# Patient Record
Sex: MALE | Race: WHITE | NOT HISPANIC OR LATINO | Employment: OTHER | URBAN - METROPOLITAN AREA
[De-identification: names, ages, dates, MRNs, and addresses within clinical notes are randomized per-mention and may not be internally consistent; named-entity substitution may affect disease eponyms.]

---

## 2017-01-18 ENCOUNTER — TRANSFERRED RECORDS (OUTPATIENT)
Dept: HEALTH INFORMATION MANAGEMENT | Facility: CLINIC | Age: 77
End: 2017-01-18

## 2017-11-20 ENCOUNTER — TRANSFERRED RECORDS (OUTPATIENT)
Dept: HEALTH INFORMATION MANAGEMENT | Facility: CLINIC | Age: 77
End: 2017-11-20

## 2018-09-23 ENCOUNTER — HOSPITAL ENCOUNTER (EMERGENCY)
Facility: CLINIC | Age: 78
Discharge: HOME | End: 2018-09-23
Attending: EMERGENCY MEDICINE | Admitting: EMERGENCY MEDICINE
Payer: MEDICARE

## 2018-09-23 ENCOUNTER — APPOINTMENT (OUTPATIENT)
Dept: GENERAL RADIOLOGY | Facility: CLINIC | Age: 78
End: 2018-09-23
Attending: EMERGENCY MEDICINE
Payer: MEDICARE

## 2018-09-23 VITALS
SYSTOLIC BLOOD PRESSURE: 161 MMHG | WEIGHT: 185.6 LBS | RESPIRATION RATE: 18 BRPM | HEART RATE: 62 BPM | TEMPERATURE: 97.9 F | DIASTOLIC BLOOD PRESSURE: 82 MMHG | OXYGEN SATURATION: 95 %
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
## 2018-09-23 DIAGNOSIS — S62.521B OPEN DISPLACED FRACTURE OF DISTAL PHALANX OF RIGHT THUMB, INITIAL ENCOUNTER: ICD-10-CM

## 2018-09-23 PROCEDURE — 25000125 ZZHC RX 250

## 2018-09-23 PROCEDURE — 99285 EMERGENCY DEPT VISIT HI MDM: CPT | Mod: Z6 | Performed by: EMERGENCY MEDICINE

## 2018-09-23 PROCEDURE — 11760 REPAIR OF NAIL BED: CPT | Mod: F5 | Performed by: EMERGENCY MEDICINE

## 2018-09-23 PROCEDURE — 25000128 H RX IP 250 OP 636: Performed by: EMERGENCY MEDICINE

## 2018-09-23 PROCEDURE — 99284 EMERGENCY DEPT VISIT MOD MDM: CPT | Mod: 25 | Performed by: EMERGENCY MEDICINE

## 2018-09-23 PROCEDURE — 73140 X-RAY EXAM OF FINGER(S): CPT | Mod: RT

## 2018-09-23 PROCEDURE — 25000132 ZZH RX MED GY IP 250 OP 250 PS 637: Mod: GY | Performed by: EMERGENCY MEDICINE

## 2018-09-23 PROCEDURE — 90715 TDAP VACCINE 7 YRS/> IM: CPT | Performed by: EMERGENCY MEDICINE

## 2018-09-23 PROCEDURE — A9270 NON-COVERED ITEM OR SERVICE: HCPCS | Mod: GY | Performed by: EMERGENCY MEDICINE

## 2018-09-23 PROCEDURE — 90471 IMMUNIZATION ADMIN: CPT | Performed by: EMERGENCY MEDICINE

## 2018-09-23 RX ORDER — OXYCODONE HYDROCHLORIDE 5 MG/1
5 TABLET ORAL ONCE
Status: COMPLETED | OUTPATIENT
Start: 2018-09-23 | End: 2018-09-23

## 2018-09-23 RX ORDER — OXYCODONE HYDROCHLORIDE 5 MG/1
5 TABLET ORAL EVERY 6 HOURS PRN
Qty: 12 TABLET | Refills: 0 | Status: ON HOLD | OUTPATIENT
Start: 2018-09-23 | End: 2020-10-28

## 2018-09-23 RX ORDER — ACETAMINOPHEN 325 MG/1
650 TABLET ORAL EVERY 6 HOURS PRN
Qty: 100 TABLET | Refills: 0 | Status: SHIPPED | OUTPATIENT
Start: 2018-09-23 | End: 2021-10-26

## 2018-09-23 RX ORDER — LIDOCAINE HYDROCHLORIDE 10 MG/ML
10 INJECTION, SOLUTION INFILTRATION; PERINEURAL ONCE
Status: COMPLETED | OUTPATIENT
Start: 2018-09-23 | End: 2018-09-23

## 2018-09-23 RX ORDER — CIPROFLOXACIN 500 MG/1
500 TABLET, FILM COATED ORAL 2 TIMES DAILY
Qty: 14 TABLET | Refills: 0 | Status: SHIPPED | OUTPATIENT
Start: 2018-09-23 | End: 2018-09-30

## 2018-09-23 RX ORDER — CEFAZOLIN SODIUM 1 G/3ML
1 INJECTION, POWDER, FOR SOLUTION INTRAMUSCULAR; INTRAVENOUS ONCE
Status: COMPLETED | OUTPATIENT
Start: 2018-09-23 | End: 2018-09-23

## 2018-09-23 RX ORDER — LIDOCAINE HYDROCHLORIDE 10 MG/ML
INJECTION, SOLUTION EPIDURAL; INFILTRATION; INTRACAUDAL; PERINEURAL
Status: COMPLETED
Start: 2018-09-23 | End: 2018-09-23

## 2018-09-23 RX ORDER — PYRIDOXINE HCL (VITAMIN B6) 50 MG
TABLET ORAL
Status: ON HOLD | COMMUNITY
End: 2020-10-28

## 2018-09-23 RX ORDER — ACETAMINOPHEN 325 MG/1
650 TABLET ORAL EVERY 4 HOURS PRN
Status: DISCONTINUED | OUTPATIENT
Start: 2018-09-23 | End: 2018-09-23 | Stop reason: HOSPADM

## 2018-09-23 RX ADMIN — OXYCODONE HYDROCHLORIDE 5 MG: 5 TABLET ORAL at 14:37

## 2018-09-23 RX ADMIN — CEFAZOLIN SODIUM 1 G: 1 INJECTION, POWDER, FOR SOLUTION INTRAMUSCULAR; INTRAVENOUS at 14:45

## 2018-09-23 RX ADMIN — LIDOCAINE HYDROCHLORIDE 10 ML: 10 INJECTION, SOLUTION EPIDURAL; INFILTRATION; INTRACAUDAL; PERINEURAL at 16:31

## 2018-09-23 RX ADMIN — CLOSTRIDIUM TETANI TOXOID ANTIGEN (FORMALDEHYDE INACTIVATED), CORYNEBACTERIUM DIPHTHERIAE TOXOID ANTIGEN (FORMALDEHYDE INACTIVATED), BORDETELLA PERTUSSIS TOXOID ANTIGEN (GLUTARALDEHYDE INACTIVATED), BORDETELLA PERTUSSIS FILAMENTOUS HEMAGGLUTININ ANTIGEN (FORMALDEHYDE INACTIVATED), BORDETELLA PERTUSSIS PERTACTIN ANTIGEN, AND BORDETELLA PERTUSSIS FIMBRIAE 2/3 ANTIGEN 0.5 ML: 5; 2; 2.5; 5; 3; 5 INJECTION, SUSPENSION INTRAMUSCULAR at 14:39

## 2018-09-23 RX ADMIN — LIDOCAINE HYDROCHLORIDE 10 ML: 10 INJECTION, SOLUTION INFILTRATION; PERINEURAL at 16:31

## 2018-09-23 ASSESSMENT — ENCOUNTER SYMPTOMS: WOUND: 1

## 2018-09-23 NOTE — DISCHARGE INSTRUCTIONS
Take antibiotics as prescribed,  Tylenol every 6 hours as needed for pain, oxycodone every 6 hours as needed for breakthrough pain  Keep splint in place, dry, until evaluated by orthopedics  See orthopedics this coming week.  They should call you.  If they do not call Monday for appointment.  Return to the emergency department for worsening pain, numbness, bleeding, fever, pus, or if you have any new or changing symptoms/concerns.   Open Thumb Fracture  You have a broken (fractured) thumb. This causes local pain, swelling, and often bruising. This injury will usually take about 4 to 6 weeks or longer to heal. Thumb fractures may be treated with a splint or cast. This protects the thumb and holds the bone in place while it heals. More serious fractures may need surgery.     If the thumbnail has been severely injured, it may fall off in 1 to 2 weeks. A new thumbnail will usually start to grow back within a month.  Home care  Follow these guidelines when caring for yourself at home:    Keep your arm elevated to reduce pain and swelling. When sitting or lying down elevate your arm above the level of your heart. You can do this by placing your arm on a pillow that rests on your chest or on a pillow at your side. This is most important during the first 2 days (48 hours) after the injury.    Put an ice pack on the injured area. Do this for 20 minutes every 1 to 2 hours the first day for pain relief. You can make an ice pack by wrapping a plastic bag of ice cubes in a thin towel. As the ice melts, be careful that the cast or splint doesn t get wet. Continue using the ice pack 3 to 4 times a day for the next 2 days. Then use the ice pack as needed to ease pain and swelling.    If a splint was put on, leave this in place for the time advised. This will keep the bones from moving out of position.    Keep the cast or splint completely dry at all times. Bathe with your cast or splint out of the water. Protect it with a large  plastic bag, rubber-banded at the top end. If a fiberglass cast or splint gets wet, you can dry it with a hair dryer.    You may use acetaminophen or ibuprofen to control pain, unless another pain medicine was prescribed. If you have chronic liver or kidney disease, talk with your healthcare provider before using these medicines. Also talk with your provider if you ve had a stomach ulcer or gastrointestinal bleeding.    Don t put creams or objects under the cast if you have itching.  Follow-up care  Follow up with your healthcare provider in 1 week, or as advised. This is to make sure the bone is healing the way it should. Talk with your provider about when it is safe to return to sports or work.  X-rays may be taken. You will be told of any new findings that may affect your care.  When to seek medical advice  Call your healthcare provider right away if any of these occur:    The cast or splint cracks    The plaster cast or splint becomes wet or soft    The fiberglass cast or splint stays wet for more than 24 hours    Bad odor from the cast or wound fluid stains the cast    Pain or swelling gets worse    Redness or warmth in the hand    Fingers or hand become cold, blue, numb, or tingly    You can t move your hand or fingers    Skin around cast or splint becomes red    Fever of 100.4 F (38 C) or higher, or as directed by your healthcare provider  Date Last Reviewed: 2/1/2017 2000-2017 [employer]. [address] [place], [place] [id number]. All rights reserved. This information is not intended as a substitute for professional medical care. Always follow your healthcare professional's instructions.          Discharge Instructions: Caring for Your Splint  You will be going home with a splint. This is sometimes called a removable cast. A splint helps your body heal by holding your injured bones or joints in place. Take good care of your splint. A damaged splint can keep your injury from healing well.  If your splint becomes damaged or loses its shape, you may need to replace it.   You have a broken __distal phalanx_ bone.  This bone is located in your R thumb.   Home care    Wear your splint according to your doctor s instructions.    Keep the splint dry at all times. Bathe with your splint well out of the water. You can hold the splint outside the tub or shower when bathing. Protect it with a large plastic bag closed at the top end with a rubber band. Use two layers of plastic to help keep the splint dry. Or you can buy a waterproof shield.    If a splint gets wet, dry it with a hair dryer on the  cool  setting. Don t use the warm or hot setting, because those settings can burn your skin.    Always keep the splint clean and away from dirt.    Wash the Velcro straps and inner cloth sleeve (stockinet) with soapy water and air dry.    Keep your splint away from open flames.    Don t expose your splint to heat, space heaters, or prolonged sunlight. Excessive heat will cause the splint to change shape.    Don t cut or tear the splint.     Exercise all the nearby joints not kept still by the splint. If you have a long leg splint, exercise your hip joint and your toes. If you have an arm splint, exercise your shoulder, elbow, thumb, and fingers.    Elevate the part of your body that is in the splint. This helps reduce swelling.  Follow-up care  Make a follow-up appointment with your healthcare provider, or as advised.  When to call your healthcare provider  Call your healthcare provider right away if you have any of these:    Tingling or numbness in the affected area    Severe pain that cannot be relieved with medicine    Cast that feels too tight or too loose    Swelling, coldness, or blue-gray color in the fingers or toes    Cast that is damaged, cracked, or has rough edges that hurt    Pressure sores or red marks that don t go away within 1 hour after removing the splint    Blisters   Date Last Reviewed: 7/1/2016     1011-9756 [employer]. [address] [place] [address], [place] [address]. All rights reserved. This information is not intended as a substitute for professional medical care. Always follow your healthcare professional's instructions.

## 2018-09-23 NOTE — ED AVS SNAPSHOT
[facility], [place], Emergency Department    [address]    [facility] [address]    Phone:  [phone]    Fax:  [phone]                                       Melo Dangelo   MRN: [id number]    Department:  [facility], Emergency Department   Date of Visit:  9/23/2018           After Visit Summary Signature Page     I have received my discharge instructions, and my questions have been answered. I have discussed any challenges I see with this plan with the nurse or doctor.    ..........................................................................................................................................  Patient/Patient Representative Signature      ..........................................................................................................................................  Patient Representative Print Name and Relationship to Patient    ..................................................               ................................................  Date                                   Time    ..........................................................................................................................................  Reviewed by Signature/Title    ...................................................              ..............................................  Date                                               Time          22EPIC Rev 08/18

## 2018-09-23 NOTE — ED AVS SNAPSHOT
[facility], Emergency Department    [address]    [place] [address]    Phone:  [phone]    Fax:  [phone]                                       Melo Dangelo   MRN: [id number]    Department:  [facility], Emergency Department   Date of Visit:  9/23/2018           Patient Information     Date Of Birth          1940        Your diagnoses for this visit were:     Contact with powered saw as cause of accidental injury     Open displaced fracture of distal phalanx of right thumb, initial encounter        You were seen by Daphne Owen MD.      Follow-up Information     Follow up with [facility], Emergency Department.    Specialty:  EMERGENCY MEDICINE    Why:  As needed, If symptoms worsen    Contact information:    [address] [facility] [address] [phone]    Additional information:    The [facility] is located in the [facility]. lt is easily accessible from virtually any point in the [facility] area, via [address]        Follow up with [facility] In 2 days.    Contact information:    [address]  [address]  [facility] [address] [phone]        Discharge Instructions         Take antibiotics as prescribed,  Tylenol every 6 hours as needed for pain, oxycodone every 6 hours as needed for breakthrough pain  Keep splint in place, dry, until evaluated by orthopedics  See orthopedics this coming week.  They should call you.  If they do not call Monday for appointment.  Return to the emergency department for worsening pain, numbness, bleeding, fever, pus, or if you have any new or changing symptoms/concerns.   Open Thumb Fracture  You have a broken (fractured) thumb. This causes local pain, swelling, and often bruising. This injury will usually take about 4 to 6 weeks or longer to heal. Thumb fractures may be treated with a splint or cast. This protects the thumb and holds  the bone in place while it heals. More serious fractures may need surgery.     If the thumbnail has been severely injured, it may fall off in 1 to 2 weeks. A new thumbnail will usually start to grow back within a month.  Home care  Follow these guidelines when caring for yourself at home:    Keep your arm elevated to reduce pain and swelling. When sitting or lying down elevate your arm above the level of your heart. You can do this by placing your arm on a pillow that rests on your chest or on a pillow at your side. This is most important during the first 2 days (48 hours) after the injury.    Put an ice pack on the injured area. Do this for 20 minutes every 1 to 2 hours the first day for pain relief. You can make an ice pack by wrapping a plastic bag of ice cubes in a thin towel. As the ice melts, be careful that the cast or splint doesn t get wet. Continue using the ice pack 3 to 4 times a day for the next 2 days. Then use the ice pack as needed to ease pain and swelling.    If a splint was put on, leave this in place for the time advised. This will keep the bones from moving out of position.    Keep the cast or splint completely dry at all times. Bathe with your cast or splint out of the water. Protect it with a large plastic bag, rubber-banded at the top end. If a fiberglass cast or splint gets wet, you can dry it with a hair dryer.    You may use acetaminophen or ibuprofen to control pain, unless another pain medicine was prescribed. If you have chronic liver or kidney disease, talk with your healthcare provider before using these medicines. Also talk with your provider if you ve had a stomach ulcer or gastrointestinal bleeding.    Don t put creams or objects under the cast if you have itching.  Follow-up care  Follow up with your healthcare provider in 1 week, or as advised. This is to make sure the bone is healing the way it should. Talk with your provider about when it is safe to return to sports or  work.  X-rays may be taken. You will be told of any new findings that may affect your care.  When to seek medical advice  Call your healthcare provider right away if any of these occur:    The cast or splint cracks    The plaster cast or splint becomes wet or soft    The fiberglass cast or splint stays wet for more than 24 hours    Bad odor from the cast or wound fluid stains the cast    Pain or swelling gets worse    Redness or warmth in the hand    Fingers or hand become cold, blue, numb, or tingly    You can t move your hand or fingers    Skin around cast or splint becomes red    Fever of 100.4 F (38 C) or higher, or as directed by your healthcare provider  Date Last Reviewed: 2/1/2017 2000-2017 [employer]. [address] [place] [address]. All rights reserved. This information is not intended as a substitute for professional medical care. Always follow your healthcare professional's instructions.          Discharge Instructions: Caring for Your Splint  You will be going home with a splint. This is sometimes called a removable cast. A splint helps your body heal by holding your injured bones or joints in place. Take good care of your splint. A damaged splint can keep your injury from healing well. If your splint becomes damaged or loses its shape, you may need to replace it.   You have a broken __distal phalanx_ bone.  This bone is located in your R thumb.   Home care    Wear your splint according to your doctor s instructions.    Keep the splint dry at all times. Bathe with your splint well out of the water. You can hold the splint outside the tub or shower when bathing. Protect it with a large plastic bag closed at the top end with a rubber band. Use two layers of plastic to help keep the splint dry. Or you can buy a waterproof shield.    If a splint gets wet, dry it with a hair dryer on the  cool  setting. Don t use the warm or hot setting, because those settings can burn your  skin.    Always keep the splint clean and away from dirt.    Wash the Velcro straps and inner cloth sleeve (stockinet) with soapy water and air dry.    Keep your splint away from open flames.    Don t expose your splint to heat, space heaters, or prolonged sunlight. Excessive heat will cause the splint to change shape.    Don t cut or tear the splint.     Exercise all the nearby joints not kept still by the splint. If you have a long leg splint, exercise your hip joint and your toes. If you have an arm splint, exercise your shoulder, elbow, thumb, and fingers.    Elevate the part of your body that is in the splint. This helps reduce swelling.  Follow-up care  Make a follow-up appointment with your healthcare provider, or as advised.  When to call your healthcare provider  Call your healthcare provider right away if you have any of these:    Tingling or numbness in the affected area    Severe pain that cannot be relieved with medicine    Cast that feels too tight or too loose    Swelling, coldness, or blue-gray color in the fingers or toes    Cast that is damaged, cracked, or has rough edges that hurt    Pressure sores or red marks that don t go away within 1 hour after removing the splint    Blisters   Date Last Reviewed: 7/1/2016 2000-2017 [employer]. [address] [place] [address]. All rights reserved. This information is not intended as a substitute for professional medical care. Always follow your healthcare professional's instructions.          24 Hour Appointment Hotline       To make an appointment at any [facility], call [phone] ([phone]). If you don't have a family doctor or clinic, we will help you find one. [facility] clinics are conveniently located to serve the needs of you and your family.             Review of your medicines      START taking        Dose / Directions Last dose taken    acetaminophen 325 MG tablet   Commonly known as:  TYLENOL   Dose:  650  mg   Quantity:  100 tablet        Take 2 tablets (650 mg) by mouth every 6 hours as needed for mild pain   Refills:  0        ciprofloxacin 500 MG tablet   Commonly known as:  CIPRO   Dose:  500 mg   Quantity:  14 tablet        Take 1 tablet (500 mg) by mouth 2 times daily for 7 days   Refills:  0        oxyCODONE IR 5 MG tablet   Commonly known as:  ROXICODONE   Dose:  5 mg   Quantity:  12 tablet        Take 1 tablet (5 mg) by mouth every 6 hours as needed for pain   Refills:  0          Our records show that you are taking the medicines listed below. If these are incorrect, please call your family doctor or clinic.        Dose / Directions Last dose taken    B-12 100 MCG Tabs        Refills:  0        BUSPAR PO        Refills:  0        CITALOPRAM HYDROBROMIDE PO        Refills:  0        LAMICTAL PO        Refills:  0        SIMVASTATIN PO        Refills:  0                Information about OPIOIDS     PRESCRIPTION OPIOIDS: WHAT YOU NEED TO KNOW   We gave you an opioid (narcotic) pain medicine. It is important to manage your pain, but opioids are not always the best choice. You should first try all the other options your care team gave you. Take this medicine for as short a time (and as few doses) as possible.    Some activities can increase your pain, such as bandage changes or therapy sessions. It may help to take your pain medicine 30 to 60 minutes before these activities. Reduce your stress by getting enough sleep, working on hobbies you enjoy and practicing relaxation or meditation. Talk to your care team about ways to manage your pain beyond prescription opioids.    These medicines have risks:    DO NOT drive when on new or higher doses of pain medicine. These medicines can affect your alertness and reaction times, and you could be arrested for driving under the influence (DUI). If you need to use opioids long-term, talk to your care team about driving.    DO NOT operate heavy machinery    DO NOT do any  other dangerous activities while taking these medicines.    DO NOT drink any alcohol while taking these medicines.     If the opioid prescribed includes acetaminophen, DO NOT take with any other medicines that contain acetaminophen. Read all labels carefully. Look for the word  acetaminophen  or  Tylenol.  Ask your pharmacist if you have questions or are unsure.    You can get addicted to pain medicines, especially if you have a history of addiction (chemical, alcohol or substance dependence). Talk to your care team about ways to reduce this risk.    All opioids tend to cause constipation. Drink plenty of water and eat foods that have a lot of fiber, such as fruits, vegetables, prune juice, apple juice and high-fiber cereal. Take a laxative (Miralax, milk of magnesia, Colace, Senna) if you don t move your bowels at least every other day. Other side effects include upset stomach, sleepiness, dizziness, throwing up, tolerance (needing more of the medicine to have the same effect), physical dependence and slowed breathing.    Store your pills in a secure place, locked if possible. We will not replace any lost or stolen medicine. If you don t finish your medicine, please throw away (dispose) as directed by your pharmacist. The [place] [employer] has more information about safe disposal: https://www.pca.state.[place]/living-green/managing-unwanted-medications        Prescriptions were sent or printed at these locations (3 Prescriptions)                   Other Prescriptions                Printed at Department/Unit printer (3 of 3)         acetaminophen (TYLENOL) 325 MG tablet               ciprofloxacin (CIPRO) 500 MG tablet               oxyCODONE IR (ROXICODONE) 5 MG tablet                Procedures and tests performed during your visit     XR Finger Right G/E 2 Views      Orders Needing Specimen Collection     None      Pending Results     No orders found from 9/21/2018 to 9/24/2018.            Pending  "Culture Results     No orders found from 2018 to 2018.            Pending Results Instructions     If you had any lab results that were not finalized at the time of your Discharge, you can call the ED Lab Result RN at [phone]. You will be contacted by this team for any positive Lab results or changes in treatment. The nurses are available 7 days a week from 10A to 6:30P.  You can leave a message 24 hours per day and they will return your call.        Thank you for choosing [facility]       Thank you for choosing [facility] for your care. Our goal is always to provide you with excellent care. Hearing back from our patients is one way we can continue to improve our services. Please take a few minutes to complete the written survey that you may receive in the mail after you visit with us. Thank you!        [employer] Information     "[employer] lets you send messages to your doctor, view your test results, renew your prescriptions, schedule appointments and more. To sign up, go to [email or web address] . Click on \"Log in\" on the left side of the screen, which will take you to the Welcome page. Then click on \"Sign up Now\" on the right side of the page.     You will be asked to enter the access code listed below, as well as some personal information. Please follow the directions to create your username and password.     Your access code is: [email or web address]  Expires: 2018  5:44 PM     Your access code will  in 90 days. If you need help or a new code, please call your [facility] clinic or [phone].        Care EveryWhere ID     This is your Care EveryWhere ID. This could be used by other organizations to access your [facility] medical records  [id number]        Equal Access to Services     ALVARO CASTANEDA : Wiley Rivera, damon hazel, ranjana beal. So [facility] [phone].    ATENCIÓN: Si habla español, tiene a pickens disposición servicios " emmanuel de asistencia lingüística. Raghavendra lovell [phone].    We comply with applicable federal civil rights laws and [place] laws. We do not discriminate on the basis of race, color, national origin, age, disability, sex, sexual orientation, or gender identity.            After Visit Summary       This is your record. Keep this with you and show to your community pharmacist(s) and doctor(s) at your next visit.

## 2018-09-23 NOTE — ED PROVIDER NOTES
History     Chief Complaint   Patient presents with     Hand Injury     sawed into R thumb with a tablesaw just PTA     HPI  Melo Dangelo is a 77 year old male who has a history of a well-controlled seizure disorder who presents today with a thumb injury.  He was cutting a piece of wood when the wood kicked back and threw his hand into the blade.  He has a cut through his thumb in the sagittal plane involving his nail.  The thumb was wrapped and he was brought to the hospital.  He has pain and bleeding, has some residual numbness from a previous saw injury to the same thumb though he says that he feels that that has disappeared since injuring his thumb today.  He does not know when his last tetanus shot was.  No other injuries.    I have reviewed the Medications, Allergies, Past Medical and Surgical History, and Social History in the Epic system.    Review of Systems   Skin: Positive for wound.      ROS: 10 point ROS neg other than the symptoms noted above in the HPI.    Physical Exam   BP: 153/74  Pulse: 62  Temp: 98  F (36.7  C)  Resp: 16  Weight: 84.2 kg (185 lb 9.6 oz)  SpO2: 96 %      Physical Exam   Constitutional: He is oriented to person, place, and time. He appears well-developed and well-nourished.   Neurological: He is alert and oriented to person, place, and time.   Sensation intact to BL distal R thumb   Skin: Skin is warm and dry.   Cut extends from tip of the R thumb through 2/3 of the nail.  Ventral surface of thumb macerated with avulsed skin.         ED Course     ED Course     Procedures             Critical Care time:  none             Labs Ordered and Resulted from Time of ED Arrival Up to the Time of Departure from the ED - No data to display         Assessments & Plan (with Medical Decision Making)   Patient with saw injury to R thumb about 1 hour prior to arrival.  Deep macerated laceration, high suspicion for bony involvement.  Cleaned and examined with use of tourniquet, tdap  administered and given ancef ppx.  Xray and hand surgery consult planned.    Progress note:  Comminuted open fracture noted on xray.  Nailbed laceration repaired by hand surgery, splinted, to follow up early next week, they will call.  Pain meds and antibiotics prescribed.  Patient expresses understanding of plan.  Daphne Owen MD on 9/23/2018 at 5:44 PM     I have reviewed the nursing notes.    I have reviewed the findings, diagnosis, plan and need for follow up with the patient.    New Prescriptions    No medications on file       Final diagnoses:   None       9/23/2018   [facility], [place], EMERGENCY DEPARTMENT     Daphne Owen MD  09/23/18 [id number]

## 2018-09-23 NOTE — CONSULTS
[facility]  ORTHOPAEDIC SURGERY CONSULT - HISTORY AND PHYSICAL    DATE OF CONSULT: 9/23/2018 2:41 PM    REQUESTING PROVIDER: Daphne Owen MD, MD - [facility] Staff.    CC: R thumb table saw injury    DATE OF INJURY: 9/23/18    HISTORY OF PRESENT ILLNESS:   Melo Dangelo is a 77 year old R-hand dominant male with PMH including seizure disorder presenting with a R thumb table saw injury. The patient was building a side walk box for his daughter when the piece of wood slipped causing him to sustain a table saw injury to his thumb. Found to have a laceration to his thumb upon presentation to the ED. Denies numbness or tingling, motor dysfunction or weakness, injury elsewhere. Of note, the patient has a history of prior table saw injury to this thumb. The patient reports having a groove to his nail since this previous injury. Tetanus updated in the ED today. Antibiotics administered in the ED.     Nursing and physician Emergency Department notes reviewed and HPI updated to reflect their ED course.     PAST MEDICAL HISTORY:   Seizure disorder  Patient denies any personal history of bleeding disorders, clotting disorders, or adverse reactions to anesthesia.    PAST SURGICAL HISTORY:   Denies PSH.     MEDICATIONS:   Prior to Admission medications    Medication Sig Last Dose Taking? Auth Provider   BusPIRone HCl (BUSPAR PO)   Yes Reported, Patient   CITALOPRAM HYDROBROMIDE PO   Yes Reported, Patient   Cyanocobalamin (B-12) 100 MCG TABS   Yes Reported, Patient   LamoTRIgine (LAMICTAL PO)   Yes Reported, Patient   SIMVASTATIN PO   Yes Reported, Patient     ALLERGIES:   Review of patient's allergies indicates no known allergies.    SOCIAL HISTORY:   Living situation: Patient lives in [place] with his wife.  Occupation: Retired - X-ray technologist.   Tobacco: Denies.   Alcohol: Denies.   Illicit Drugs: Denies.     FAMILY HISTORY:  Patient denies known family history of bleeding, clotting, or anesthesia  related complications.     REVIEW OF SYSTEMS:   Otherwise, a 10-point reviews of systems was negative except as noted above in the HPI.     PHYSICAL EXAM:   Vitals:    09/23/18 1315 09/23/18 1324   BP: 153/74    Pulse: 62    Resp: 16    Temp: 98  F (36.7  C)    TempSrc: Oral    SpO2: 96%    Weight:  84.2 kg (185 lb 9.6 oz)     General: Awake, alert, appropriate, following commands, NAD.  Neuro: CN II-XII grossly intact.   Psych: Appropriate affect.   Skin: See MSK for extremity exam. Otherwise, no rashes,  skin color normal.  HEENT: Normal.   Lungs: Breathing comfortably and nonlabored, no wheezes or stridor noted.  Heart/Cardiovascular: Regular pulse, no peripheral cyanosis.  Right Upper Extremity: Obvious laceration to the thumb in the sagittal plane involving the nail bed. Volar aspect of the distal aspect of the thumb with longitudinal laceration with well appearing volar pulp underneath but wound edges not able to be re-approximated without undue tension. Nail with longitudinal laceration. Nail removed and nail bed with laceration. Laceration with ragged edges and although nail bed able to be closely re-approximated. +EPL and FPL. SILT M/R/U and radial and ulnar aspects of the thumb. No other injuries or lacerations to the remainder of the hand.     LABS: No laboratory data to review.     IMAGING:    XR R Thumb: Distal phalanx tuft fracture with evidence of prior injury. No other acute bony abnormalities appreciated.     PROCEDURE: R thumb irrigation and debridement, nail removal and nail bed repair.     After discussion of the risks, benefits and alternatives, written informed consent was obtained from the patient for the aforementioned procedure. Correct site and side verified with the patient. Neurovascular status checked pre procedure. CMS intact. Digital block performed with 15 ml of lidocaine 1% without epinephrine. Adequate anesthesia achieved. Finger prepped and draped in the usual sterile fashion.  Wound was irrigated with 1 L of sterile normal saline. Nail bed was removed using a freer elevator and needle . The nail bed was repaired with interrupted 5-0 chromic gut suture. A single 4-0 nylon suture was placed in the laceration at the volar aspect of the thumb to re-approximate the laceration. The sterile tin foil was utilized to keep the epithelial fold open. Sterile dressings of adaptic, gauze, cling wrap and a thumb spica splint was applied. Patient tolerated the procedure well without any immediate complications.     ASSESSMENT AND PLAN:   Melo Dangelo is a 77 year old R-hand dominant male with PMH including seizure disorder presenting with a R thumb table saw injury with distal phalanx tuft fracture and nail bed laceration s/p I&D and nail bed repair in the ED.     Activity: Up ad toni.  Weight bearing status: NWB RUE - okay for ADLs.   Pain management: Per ED.    Antibiotics/Tetanus: Ceftriaxone administered in ED. Tetanus updated in ED. PO course of antibiotics at discharge.  Diet: Okay for a diet from Orthopedic Surgery perspective.   Anticoagulation/DVT prophylaxis: Ambulatory.   Imaging: No further imaging needed at this time.  Labs: None.  Bracing/Splinting: Thumb spica splint to be kept clean, dry, and intact until follow-up.   Elevation: Elevate RUE on pillows to keep above the level of the heart as much as possible.   PFollow-up: Clinic with Dr. Kingsley on 9/28/18.   Disposition: Okay to discharge home.     Case and plan discussed with Dr. Owen from the ED.     Assessment and Plan discussed with Dr. Kingsley, Orthopaedic Surgery attending.     Nyla Pride MD  Orthopaedic Surgery Resident, PGY-4  Pager: [phone]     For questions about this patient, please attempt to contact me at my pager prior to contacting the Orthopaedic Surgery resident on call. Thank you!

## 2018-09-28 ENCOUNTER — OFFICE VISIT (OUTPATIENT)
Dept: ORTHOPEDICS | Facility: CLINIC | Age: 78
End: 2018-09-28
Payer: MEDICARE

## 2018-09-28 ENCOUNTER — THERAPY VISIT (OUTPATIENT)
Dept: OCCUPATIONAL THERAPY | Facility: CLINIC | Age: 78
End: 2018-09-28
Payer: MEDICARE

## 2018-09-28 VITALS — HEIGHT: 70 IN | BODY MASS INDEX: 26.84 KG/M2 | WEIGHT: 187.5 LBS

## 2018-09-28 DIAGNOSIS — M79.644 THUMB PAIN, RIGHT: Primary | ICD-10-CM

## 2018-09-28 DIAGNOSIS — S62.524B OPEN NONDISPLACED FRACTURE OF DISTAL PHALANX OF RIGHT THUMB, INITIAL ENCOUNTER: Primary | ICD-10-CM

## 2018-09-28 PROCEDURE — G8984 CARRY CURRENT STATUS: HCPCS | Mod: GO | Performed by: OCCUPATIONAL THERAPIST

## 2018-09-28 PROCEDURE — 97165 OT EVAL LOW COMPLEX 30 MIN: CPT | Mod: GO | Performed by: OCCUPATIONAL THERAPIST

## 2018-09-28 PROCEDURE — G8985 CARRY GOAL STATUS: HCPCS | Mod: GO | Performed by: OCCUPATIONAL THERAPIST

## 2018-09-28 PROCEDURE — 97760 ORTHOTIC MGMT&TRAING 1ST ENC: CPT | Mod: GO | Performed by: OCCUPATIONAL THERAPIST

## 2018-09-28 NOTE — LETTER
9/28/2018       RE: Melo Dangelo  [address]  [place] [address]     Dear Colleague,    Thank you for referring your patient, Melo Dangelo, to [facility]. Please see a copy of my visit note below.    Orthopaedic Surgery Consultation  9/28/2018 10:28 AM   by Colby Kingsley MD    Melo Dangelo MRN# [id number]   Age: 77 year old YOB: 1940     Reason for consult:  Right thumb table saw injury                       Assessment and Plan:   Assessment:   Right thumb distal phalangeal fracture, nailbed injury, status post repair      Plan:   Local wound care.  No indications for any surgical treatment.  The fracture is well aligned, no need for pinning.  We will start dressing changes today.  OT will fabricate a finger tip protector.  He can do dressing changes once a day.  This should re-epithelialize nicely.  I will see him back in 2 weeks for a wound check or sooner if need be.  He does have our contact information.              History of Present Illness:   Melo is a 77-year-old male who injured his right thumb on a table saw.  Date of injury was 9/23/2018.  He was seen in the ED here, nailbed repair was performed.  He was placed in a splint.  He was on antibiotics.  Denies any other injuries, no fevers or chills.           Past Medical History:   There is no problem list on file for this patient.    Past Medical History:   Diagnosis Date     Seizure disorder (H)     last seizure 2008              Past Surgical History:   Relevant surgical history as mentioned in HPI.  No past surgical history on file.           Social History:     Social History     Social History     Marital status:      Spouse name: N/A     Number of children: N/A     Years of education: N/A     Occupational History     Not on file.     Social History Main Topics     Smoking status: Never Smoker     Smokeless tobacco: Never  "Used     Alcohol use No     Drug use: No     Sexual activity: Not on file     Other Topics Concern     Not on file     Social History Narrative      Smoking,  EtOH,        Family History:   No family history on file.  No history of problems with bleeding or anesthesia       Allergies:   No Known Allergies       Medications:     Current Outpatient Prescriptions   Medication Sig     acetaminophen (TYLENOL) 325 MG tablet Take 2 tablets (650 mg) by mouth every 6 hours as needed for mild pain     BusPIRone HCl (BUSPAR PO)      ciprofloxacin (CIPRO) 500 MG tablet Take 1 tablet (500 mg) by mouth 2 times daily for 7 days     CITALOPRAM HYDROBROMIDE PO      Cyanocobalamin (B-12) 100 MCG TABS      LamoTRIgine (LAMICTAL PO)      oxyCODONE IR (ROXICODONE) 5 MG tablet Take 1 tablet (5 mg) by mouth every 6 hours as needed for pain     SIMVASTATIN PO      No current facility-administered medications for this visit.              Review of Systems:   A comprehensive 10 point review of systems (constitutional, ENT, cardiac, peripheral vascular, lymphatic, respiratory, GI, , Musculoskeletal, skin, Neurological) was performed and found to be negative except as described in this note.           Physical Exam:     EXAMINATION pertinent findings:   VITAL SIGNS: Height 1.778 m (5' 10\"), weight 85 kg (187 lb 8 oz).  Body mass index is 26.9 kg/(m^2).  RESP: non labored breathing   CARD: comfortable, no acute distress  ABD: benign   Examination of the right thumb demonstrates a nailbed repair, there is a 1 cm x 1 cm loss of epithelium and dermis on the volar surface of the thumb distal phalanx.  Does not appear to be infected.  FPL is functional as is the EPL.  He has brisk capillary refill.  Overall alignment of the thumb is anatomic.  No other injuries noted.  No motor, no sensory deficits are noted.  No significant atrophy.  There is brisk capillary refill in all digits and a palpable radial pulse.  No overlying skin changes noted.     "        Data:     X-rays are reviewed of the right thumb.  It looks like he had a small distal phalangeal fracture, possibly an old injury to the thumb distal phalanx as well.  Minimally displaced though.      Total Time = 15 min, 50% of which was spent in counseling and coordination of care as documented above.    Colby Kingsley MD  Orthopedic Surgery, Upper Extremity  Cell [phone]       Again, thank you for allowing me to participate in the care of your patient.      Sincerely,    Colby Kinsgley MD

## 2018-09-28 NOTE — LETTER
Date:October 5, 2018      Patient was self referred, no letter generated. Do not send.        [facility] Information

## 2018-09-28 NOTE — MR AVS SNAPSHOT
After Visit Summary   9/28/2018    Melo Dangelo    MRN: [id number]           Patient Information     Date Of Birth          1940        Visit Information        Provider Department      9/28/2018 10:20 AM Colby Kingsley MD [facility]        Today's Diagnoses     Open nondisplaced fracture of distal phalanx of right thumb, initial encounter    -  1       Follow-ups after your visit        Additional Services     HAND THERAPY Occupational Therapy or Physical Therapy       Hand Therapy Referral                  Follow-up notes from your care team     Return in about 2 weeks (around 10/12/2018).      Your next 10 appointments already scheduled     Oct 10, 2018  8:30 AM CDT   RAHEEM Hand with Rita Bhagat OT   [facility] ([facility])    [address] [place] [id number] [phone]            Oct 12, 2018 11:50 AM CDT   (Arrive by 11:35 AM)   RETURN HAND with Colby Kingsley MD   [facility] ([facility])    [address] [place] [id number] [phone]              Who to contact     Please call your clinic at [phone] to:    Ask questions about your health    Make or cancel appointments    Discuss your medicines    Learn about your test results    Speak to your doctor            Additional Information About Your Visit        MyChart Information     [employer] is an electronic gateway that provides easy, online access to your medical records. With [employer], you can request a clinic appointment, read your test results, renew a prescription or communicate with your care team.     To sign up for [employer] visit the website at [email or web address]   You will be asked to enter the access code listed below, as well as some personal information. Please follow the directions to create your username and password.     Your access code is:  [email or web address]  Expires: 2018  5:44 PM     Your access code will  in 90 days. If you need help or a new code, please contact your [facility] or call [phone] for assistance.        Care EveryWhere ID     This is your Care EveryWhere ID. This could be used by other organizations to access your [place] medical records  [id number]        Your Vitals Were     Height BMI (Body Mass Index)                1.778 m (5' 10\") 26.9 kg/m2           Blood Pressure from Last 3 Encounters:   18 161/82    Weight from Last 3 Encounters:   18 85 kg (187 lb 8 oz)   18 84.2 kg (185 lb 9.6 oz)               Primary Care Provider Fax #    Physician No Ref-Primary [phone]       No address on file        Equal Access to Services     ALVARO CASTANEDA : Hadii carlos fleming Soanastacio, waaxda yasemin, qaybta kaalmada dina, ranjana henderson . So [facility] [phone].    ATENCIÓN: Si habla español, tiene a pickens disposición servicios gratuitos de asistencia lingüística. Llame al [phone].    We comply with applicable federal civil rights laws and [place] laws. We do not discriminate on the basis of race, color, national origin, age, disability, sex, sexual orientation, or gender identity.            Thank you!     Thank you for choosing [facility]  for your care. Our goal is always to provide you with excellent care. Hearing back from our patients is one way we can continue to improve our services. Please take a few minutes to complete the written survey that you may receive in the mail after your visit with us. Thank you!             Your Updated Medication List - Protect others around you: Learn how to safely use, store and throw away your medicines at www.disposemymeds.org.          This list is accurate as of 18 11:59 PM.  Always use your most recent med list.                   Brand Name Dispense Instructions for use Diagnosis "    acetaminophen 325 MG tablet    TYLENOL    100 tablet    Take 2 tablets (650 mg) by mouth every 6 hours as needed for mild pain        B-12 100 MCG Tabs           BUSPAR PO           ciprofloxacin 500 MG tablet    CIPRO    14 tablet    Take 1 tablet (500 mg) by mouth 2 times daily for 7 days        CITALOPRAM HYDROBROMIDE PO           LAMICTAL PO           oxyCODONE IR 5 MG tablet    ROXICODONE    12 tablet    Take 1 tablet (5 mg) by mouth every 6 hours as needed for pain        SIMVASTATIN PO

## 2018-09-28 NOTE — PROGRESS NOTES
Orthopaedic Surgery Consultation  9/28/2018 10:28 AM   by Colby Kingsley MD    Melo Dangelo MRN# [id number]   Age: 77 year old YOB: 1940     Reason for consult:  Right thumb table saw injury                       Assessment and Plan:   Assessment:   Right thumb distal phalangeal fracture, nailbed injury, status post repair      Plan:   Local wound care.  No indications for any surgical treatment.  The fracture is well aligned, no need for pinning.  We will start dressing changes today.  OT will fabricate a finger tip protector.  He can do dressing changes once a day.  This should re-epithelialize nicely.  I will see him back in 2 weeks for a wound check or sooner if need be.  He does have our contact information.              History of Present Illness:   Melo is a 77-year-old male who injured his right thumb on a table saw.  Date of injury was 9/23/2018.  He was seen in the ED here, nailbed repair was performed.  He was placed in a splint.  He was on antibiotics.  Denies any other injuries, no fevers or chills.           Past Medical History:   There is no problem list on file for this patient.    Past Medical History:   Diagnosis Date     Seizure disorder (H)     last seizure 2008              Past Surgical History:   Relevant surgical history as mentioned in HPI.  No past surgical history on file.           Social History:     Social History     Social History     Marital status:      Spouse name: N/A     Number of children: N/A     Years of education: N/A     Occupational History     Not on file.     Social History Main Topics     Smoking status: Never Smoker     Smokeless tobacco: Never Used     Alcohol use No     Drug use: No     Sexual activity: Not on file     Other Topics Concern     Not on file     Social History Narrative      Smoking,  EtOH,        Family History:   No family history on file.  No history of problems with bleeding or anesthesia       Allergies:   No Known  "Allergies       Medications:     Current Outpatient Prescriptions   Medication Sig     acetaminophen (TYLENOL) 325 MG tablet Take 2 tablets (650 mg) by mouth every 6 hours as needed for mild pain     BusPIRone HCl (BUSPAR PO)      ciprofloxacin (CIPRO) 500 MG tablet Take 1 tablet (500 mg) by mouth 2 times daily for 7 days     CITALOPRAM HYDROBROMIDE PO      Cyanocobalamin (B-12) 100 MCG TABS      LamoTRIgine (LAMICTAL PO)      oxyCODONE IR (ROXICODONE) 5 MG tablet Take 1 tablet (5 mg) by mouth every 6 hours as needed for pain     SIMVASTATIN PO      No current facility-administered medications for this visit.              Review of Systems:   A comprehensive 10 point review of systems (constitutional, ENT, cardiac, peripheral vascular, lymphatic, respiratory, GI, , Musculoskeletal, skin, Neurological) was performed and found to be negative except as described in this note.           Physical Exam:     EXAMINATION pertinent findings:   VITAL SIGNS: Height 1.778 m (5' 10\"), weight 85 kg (187 lb 8 oz).  Body mass index is 26.9 kg/(m^2).  RESP: non labored breathing   CARD: comfortable, no acute distress  ABD: benign   Examination of the right thumb demonstrates a nailbed repair, there is a 1 cm x 1 cm loss of epithelium and dermis on the volar surface of the thumb distal phalanx.  Does not appear to be infected.  FPL is functional as is the EPL.  He has brisk capillary refill.  Overall alignment of the thumb is anatomic.  No other injuries noted.  No motor, no sensory deficits are noted.  No significant atrophy.  There is brisk capillary refill in all digits and a palpable radial pulse.  No overlying skin changes noted.            Data:     X-rays are reviewed of the right thumb.  It looks like he had a small distal phalangeal fracture, possibly an old injury to the thumb distal phalanx as well.  Minimally displaced though.      Total Time = 15 min, 50% of which was spent in counseling and coordination of care as " documented above.    Colby Kingsley MD  Orthopedic Surgery, Upper Extremity  Cell [phone]

## 2018-09-28 NOTE — LETTER
DEPARTMENT OF HEALTH AND HUMAN SERVICES  CENTERS FOR MEDICARE & MEDICAID SERVICES    PLAN/UPDATED PLAN OF PROGRESS FOR OUTPATIENT REHABILITATION  PATIENTS NAME:  Melo Dangelo   : 1940  PR[facility] NUMBER:    [id number]  [id number]   PROVIDER NAME: [facility]  MEDICAL RECORD NUMBER: [id number]   START OF CARE DATE:  SOC Date: 18   TYPE:  PT  PRIMARY/TREATMENT DIAGNOSIS: (Pertinent Medical Diagnosis)  Thumb pain, right  VISITS FROM START OF CARE:  Rxs Used: 1     Hand Therapy Initial Evaluation  Current Date:  2018  DOI: 18  Diagnosis: Distal phalanx fracture, R thumb  Procedure:  Casted  Post:  0w 4d    Subjective:  Melo Dangelo is a 77 year old R hand dominant male.  Patient reports symptoms of pain, stiffness/loss of motion and weakness/loss of strength of the right thumb which occurred due to Cutting it on a table saw. Since onset symptoms are Gradually getting better.  Special tests:  x-ray.  Previous treatment: Cast.    General health as reported by patient is good.  Pertinent medical history includes:Depression, Seizures  Medical allergies:none.  Surgical history: none.  Medication history: Anti-depressants, Anti-seizure.    Occupational Profile Information:  Current occupation is Retired  Job Tasks: Computer Work, Operating a Machine, Assembly  Prior functional level:  no limitations  Barriers include:none  Mobility: No difficulty  Transportation: drives  Leisure activities/hobbies:  Woodworking, household chores  Other: Does majority of household work due to wife's health.    Functional Outcome Measure:   Upper Extremity Functional Index Score:  SCORE:   Column Totals: /80: 37   (A lower score indicates greater disability.)    Objective:  Pain Level Report  VAS(0-10) 2018   At Rest: 1-2/10   With Use: 5/10   Antolin Melo       Report of Pain:  Location:  thumb   Pain Quality:  Throbbing and swollen  Frequency: constant    Pain is worst:  daytime or  nighttime  Exacerbated by:  Moving it wrong, leaving it down at my side  Relieved by:  elevation  Progression:  Gradually better  Edema:  MILD  Sensation: WNL throughout all nerve distributions; per patient report    ROM:  Not tested    Strength: Contraindicated     Assessment:  Patient presents with symptoms consistent with diagnosis of distal phalanx fracture,  with non-surgical intervention.     Patient's limitations or Problem List includes:  Pain, Decreased ROM/motion, Increased edema, Weakness and Decreased pinch of the right thumb which interferes with the patient's ability to perform Self Care Tasks (dressing, eating, bathing, hygiene/toileting), Sleep Patterns, Recreational Activities, Household Chores and Driving  as compared to previous level of function.    Rehab Potential:  Excellent - Return to full activity, no limitations    Patient will benefit from skilled Occupational Therapy to increase ROM,  strength, pinch strength and dexterity and decrease pain, edema and adherence of scarring to return to previous activity level and resume normal daily tasks and to reach their rehab potential.    Barriers to Learning:  No barrier    Communication Issues:  Patient appears to be able to clearly communicate and understand verbal and written communication and follow directions correctly.    Chart Review: Chart Review and Simple history review with patient    Identified Performance Deficits: bathing/showering, dressing, hygiene and grooming, care of others, driving and community mobility, health management and maintenance, home establishment and management, meal preparation and cleanup, shopping, sleep and leisure activities    Assessment of Occupational Performance:  5 or more Performance Deficits    Clinical Decision Making (Complexity): Low complexity        Melo Dangelo     Treatment Explanation:  The following has been discussed with the patient:  RX ordered/plan of care  Anticipated  "outcomes  Possible risks and side effects    Plan:  Frequency:  1 X week, once daily, beginning after MD indicates.  Duration:  for 6 weeks    Treatment Plan:   Therapeutic Exercise:  AROM, AAROM, PROM, Tendon Gliding and Isotonics  Manual Techniques:  Scar mobilization and Myofascial release  Orthotic Fabrication:  Static orthosis  Discharge Plan:  Achieve all LTG.  Independent in home treatment program.  Reach maximal therapeutic benefit.    Home Exercise Program:  Tip protector full time    Next Visit:  Check splint  Begin AROM when indicated.      Caregiver Signature/Credentials _____________________________ Date ________         Rita Bhagat OTR/L     I have reviewed and certified the need for these services and plan of treatment while under my care.        PHYSICIAN'S SIGNATURE:   _____________________________________  Date___________     Colby Kingsley MD     Certification period:  Beginning of Cert date period: 09/28/18 to  End of Cert period date: 12/26/18     Functional Level Progress Report: Please see attached \"Goal Flow sheet for Functional level.\"    ____X____ Continue Services or       ________ DC Services                Service dates: From  SOC Date: 09/28/18 date to present                         "

## 2018-09-28 NOTE — NURSING NOTE
"Reason For Visit:   Chief Complaint   Patient presents with     Consult     Right thumb table saw injury, DOI: 9-. Distal phalanx thumb fracture.  S/p I&D and nail bed repair in ED.       Primary MD: Clementine Ref-Primary, Physician  Referring:   EMERGENCY, RESIDENT PHYSICIAN    Age: 77 year old    ?  No      Ht 1.778 m (5' 10\")  Wt 85 kg (187 lb 8 oz)  BMI 26.9 kg/m2      Pain Assessment  Patient Currently in Pain: Yes (a little bit.)  Primary Pain Location:  (thumb)  Pain Orientation: Right    Hand Dominance Evaluation  Hand Dominance: Right          QuickDASH Assessment  No flowsheet data found.       Current Outpatient Prescriptions   Medication Sig Dispense Refill     acetaminophen (TYLENOL) 325 MG tablet Take 2 tablets (650 mg) by mouth every 6 hours as needed for mild pain 100 tablet 0     BusPIRone HCl (BUSPAR PO)        ciprofloxacin (CIPRO) 500 MG tablet Take 1 tablet (500 mg) by mouth 2 times daily for 7 days 14 tablet 0     CITALOPRAM HYDROBROMIDE PO        Cyanocobalamin (B-12) 100 MCG TABS        LamoTRIgine (LAMICTAL PO)        oxyCODONE IR (ROXICODONE) 5 MG tablet Take 1 tablet (5 mg) by mouth every 6 hours as needed for pain 12 tablet 0     SIMVASTATIN PO          No Known Allergies    May Ardon LPN    "

## 2018-09-28 NOTE — MR AVS SNAPSHOT
"              After Visit Summary   9/28/2018    Meol Dangelo    MRN: [id number]           Patient Information     Date Of Birth          1940        Visit Information        Provider Department      9/28/2018 11:30 AM [facility]        Today's Diagnoses     Thumb pain, right    -  1       Follow-ups after your visit        Your next 10 appointments already scheduled     Oct 10, 2018  8:30 AM CDT   RAHEEM Hand with [facility] ([facility])    [address] [place] [address]  4th [facility] [address] [phone]              Future tests that were ordered for you today     Open Future Orders        Priority Expected Expires Ordered    HAND THERAPY Occupational Therapy or Physical Therapy Routine  9/28/2019 9/28/2018            Who to contact     If you have questions or need follow up information about today's clinic visit or your schedule please contact [facility] directly at [phone].  Normal or non-critical lab and imaging results will be communicated to you by Zyngeniahart, letter or phone within 4 business days after the clinic has received the results. If you do not hear from us within 7 days, please contact the clinic through FusionOpst or phone. If you have a critical or abnormal lab result, we will notify you by phone as soon as possible.  Submit refill requests through [employer] or call your pharmacy and they will forward the refill request to us. Please allow 3 business days for your refill to be completed.          Additional Information About Your Visit        Zyngeniahart Information     [facility] [facility] [employer] lets you send messages to your doctor, view your test results, renew your prescriptions, schedule appointments and more. To sign up, go to [email or web address] . Click on \"Log in\" on the left side of the screen, which will take you to the Welcome page. Then click on \"Sign up Now\" on the right side of the page. "     You will be asked to enter the access code listed below, as well as some personal information. Please follow the directions to create your username and password.     Your access code is: [email or web address]  Expires: 2018  5:44 PM     Your access code will  in 90 days. If you need help or a new code, please call your [place] clinic or [phone].        Care EveryWhere ID     This is your Care EveryWhere ID. This could be used by other organizations to access your [place] medical records  [id number]         Blood Pressure from Last 3 Encounters:   18 161/82    Weight from Last 3 Encounters:   18 85 kg (187 lb 8 oz)   18 84.2 kg (185 lb 9.6 oz)              We Performed the Following     HC OT EVAL, LOW COMPLEXITY     RAHEEM CERT REPORT     RAHEEM INITIAL EVAL REPORT     ORTHOTIC MGMT AND TRAINING, EACH 15 MIN        Primary Care Provider Fax #    Physician No Ref-Primary [phone]       No address on file        Equal Access to Services     ALVARO CASTANEDA : Hadii aad ku hadasho Soomaali, waaxda luqadaha, qaybta kaalmada adeegyada, waxay amy henderson . So [facility] [phone].    ATENCIÓN: Si habla español, tiene a pickens disposición servicios gratuitos de asistencia lingüística. Llame al [phone].    We comply with applicable federal civil rights laws and [place] laws. We do not discriminate on the basis of race, color, national origin, age, disability, sex, sexual orientation, or gender identity.            Thank you!     Thank you for choosing [facility]  for your care. Our goal is always to provide you with excellent care. Hearing back from our patients is one way we can continue to improve our services. Please take a few minutes to complete the written survey that you may receive in the mail after your visit with us. Thank you!             Your Updated Medication List - Protect others around you: Learn how to safely use, store and throw away your  medicines at www.disposemymeds.org.          This list is accurate as of 9/28/18  1:16 PM.  Always use your most recent med list.                   Brand Name Dispense Instructions for use Diagnosis    acetaminophen 325 MG tablet    TYLENOL    100 tablet    Take 2 tablets (650 mg) by mouth every 6 hours as needed for mild pain        B-12 100 MCG Tabs           BUSPAR PO           ciprofloxacin 500 MG tablet    CIPRO    14 tablet    Take 1 tablet (500 mg) by mouth 2 times daily for 7 days        CITALOPRAM HYDROBROMIDE PO           LAMICTAL PO           oxyCODONE IR 5 MG tablet    ROXICODONE    12 tablet    Take 1 tablet (5 mg) by mouth every 6 hours as needed for pain        SIMVASTATIN PO

## 2018-09-28 NOTE — PROGRESS NOTES
Hand Therapy Initial Evaluation    Current Date:  9/28/2018  DOI: 9/24/18  Diagnosis: Distal phalanx fracture, R thumb  Procedure:  Casted  Post:  0w 4d         Subjective:  Melo Dangelo is a 77 year old R hand dominant male.    Patient reports symptoms of pain, stiffness/loss of motion and weakness/loss of strength of the right thumb which occurred due to Cutting it on a table saw. Since onset symptoms are Gradually getting better.  Special tests:  x-ray.  Previous treatment: Cast.    General health as reported by patient is good.  Pertinent medical history includes:Depression, Seizures  Medical allergies:none.  Surgical history: none.  Medication history: Anti-depressants, Anti-seizure.    Occupational Profile Information:  Current occupation is Retired  Job Tasks: Computer Work, Operating a Machine, Assembly  Prior functional level:  no limitations  Barriers include:none  Mobility: No difficulty  Transportation: drives  Leisure activities/hobbies:  Woodworking, household chores  Other: Does majority of household work due to wife's health.    Functional Outcome Measure:   Upper Extremity Functional Index Score:  SCORE:   Column Totals: /80: 37   (A lower score indicates greater disability.)    Objective:  Pain Level Report  VAS(0-10) 9/28/2018   At Rest: 1-2/10   With Use: 5/10     Report of Pain:  Location:  thumb   Pain Quality:  Throbbing and swollen  Frequency: constant    Pain is worst:  daytime or nighttime  Exacerbated by:  Moving it wrong, leaving it down at my side  Relieved by:  elevation  Progression:  Gradually better  Edema:  MILD  Sensation: WNL throughout all nerve distributions; per patient report    ROM:  Not tested    Strength: Contraindicated     Assessment:  Patient presents with symptoms consistent with diagnosis of distal phalanx fracture,  with non-surgical intervention.     Patient's limitations or Problem List includes:  Pain, Decreased ROM/motion, Increased edema, Weakness and  Decreased pinch of the right thumb which interferes with the patient's ability to perform Self Care Tasks (dressing, eating, bathing, hygiene/toileting), Sleep Patterns, Recreational Activities, Household Chores and Driving  as compared to previous level of function.    Rehab Potential:  Excellent - Return to full activity, no limitations    Patient will benefit from skilled Occupational Therapy to increase ROM,  strength, pinch strength and dexterity and decrease pain, edema and adherence of scarring to return to previous activity level and resume normal daily tasks and to reach their rehab potential.    Barriers to Learning:  No barrier    Communication Issues:  Patient appears to be able to clearly communicate and understand verbal and written communication and follow directions correctly.    Chart Review: Chart Review and Simple history review with patient    Identified Performance Deficits: bathing/showering, dressing, hygiene and grooming, care of others, driving and community mobility, health management and maintenance, home establishment and management, meal preparation and cleanup, shopping, sleep and leisure activities    Assessment of Occupational Performance:  5 or more Performance Deficits    Clinical Decision Making (Complexity): Low complexity    Treatment Explanation:  The following has been discussed with the patient:  RX ordered/plan of care  Anticipated outcomes  Possible risks and side effects    Plan:  Frequency:  1 X week, once daily, beginning after MD indicates.  Duration:  for 6 weeks    Treatment Plan:   Therapeutic Exercise:  AROM, AAROM, PROM, Tendon Gliding and Isotonics  Manual Techniques:  Scar mobilization and Myofascial release  Orthotic Fabrication:  Static orthosis  Discharge Plan:  Achieve all LTG.  Independent in home treatment program.  Reach maximal therapeutic benefit.    Home Exercise Program:  Tip protector full time    Next Visit:  Check splint  Begin AROM when  indicated.

## 2018-10-10 ENCOUNTER — THERAPY VISIT (OUTPATIENT)
Dept: OCCUPATIONAL THERAPY | Facility: CLINIC | Age: 78
End: 2018-10-10
Payer: MEDICARE

## 2018-10-10 DIAGNOSIS — S62.524B OPEN NONDISPLACED FRACTURE OF DISTAL PHALANX OF RIGHT THUMB, INITIAL ENCOUNTER: ICD-10-CM

## 2018-10-10 DIAGNOSIS — M79.644 THUMB PAIN, RIGHT: ICD-10-CM

## 2018-10-10 PROCEDURE — 97110 THERAPEUTIC EXERCISES: CPT | Mod: GO | Performed by: OCCUPATIONAL THERAPIST

## 2018-10-10 PROCEDURE — 97535 SELF CARE MNGMENT TRAINING: CPT | Mod: GO | Performed by: OCCUPATIONAL THERAPIST

## 2018-10-10 NOTE — MR AVS SNAPSHOT
"              After Visit Summary   10/10/2018    Melo Dangelo    MRN: [id number]           Patient Information     Date Of Birth          1940        Visit Information        Provider Department      10/10/2018 8:30 AM Rita Bhagat OT [facility]        Today's Diagnoses     Open nondisplaced fracture of distal phalanx of right thumb, initial encounter        Thumb pain, right           Follow-ups after your visit        Your next 10 appointments already scheduled     Oct 12, 2018 11:50 AM CDT   (Arrive by 11:35 AM)   RETURN HAND with Colby Kingsley MD   [facility] ([facility])    [address] [place] [address] [phone]            Oct 17, 2018  3:30 PM CDT   RAHEEM Hand with Rita Bhagat OT   [facility] ([facility])    [address] [place] [address] [phone]              Who to contact     If you have questions or need follow up information about today's clinic visit or your schedule please contact [facility] directly at [phone].  Normal or non-critical lab and imaging results will be communicated to you by MyChart, letter or phone within 4 business days after the clinic has received the results. If you do not hear from us within 7 days, please contact the clinic through [employer] or phone. If you have a critical or abnormal lab result, we will notify you by phone as soon as possible.  Submit refill requests through Back[employer] or call your pharmacy and they will forward the refill request to us. Please allow 3 business days for your refill to be completed.          Additional Information About Your Visit        MyChart Information     [employer] lets you send messages to your doctor, view your test results, renew your prescriptions, schedule appointments and more. To sign up, go to [email or web address] . Click on \"Log in\" on the left " "side of the screen, which will take you to the Welcome page. Then click on \"Sign up Now\" on the right side of the page.     You will be asked to enter the access code listed below, as well as some personal information. Please follow the directions to create your username and password.     Your access code is: [email or web address]  Expires: 2018  5:44 PM     Your access code will  in 90 days. If you need help or a new code, please call your [place] clinic or [phone].        Care EveryWhere ID     This is your Care EveryWhere ID. This could be used by other organizations to access your [place] medical records  [id number]         Blood Pressure from Last 3 Encounters:   18 161/82    Weight from Last 3 Encounters:   18 85 kg (187 lb 8 oz)   18 84.2 kg (185 lb 9.6 oz)              We Performed the Following     HAND THERAPY Occupational Therapy or Physical Therapy     SELF CARE MNGMENT TRAINING     THERAPEUTIC EXERCISES        Primary Care Provider Fax #    Physician No Ref-Primary [phone]       No address on file        Equal Access to Services     LAURA [facility] Hospital[facility] : Hadii carlos Rivera, damon hazel, tesfaye arroyo, ranjana henderson . So [facility] [phone].    ATENCIÓN: Si habla español, tiene a pickens disposición servicios gratuitos de asistencia lingüística. [facility] [phone].    We comply with applicable federal civil rights laws and [place] laws. We do not discriminate on the basis of race, color, national origin, age, disability, sex, sexual orientation, or gender identity.            Thank you!     Thank you for choosing [facility]ER[facility]  for your care. Our goal is always to provide you with excellent care. Hearing back from our patients is one way we can continue to improve our services. Please take a few minutes to complete the written survey that you may receive in the mail after your visit with us. Thank you!             Your " Updated Medication List - Protect others around you: Learn how to safely use, store and throw away your medicines at www.disposemymeds.org.          This list is accurate as of 10/10/18 10:33 AM.  Always use your most recent med list.                   Brand Name Dispense Instructions for use Diagnosis    acetaminophen 325 MG tablet    TYLENOL    100 tablet    Take 2 tablets (650 mg) by mouth every 6 hours as needed for mild pain        B-12 100 MCG Tabs           BUSPAR PO           CITALOPRAM HYDROBROMIDE PO           LAMICTAL PO           oxyCODONE IR 5 MG tablet    ROXICODONE    12 tablet    Take 1 tablet (5 mg) by mouth every 6 hours as needed for pain        SIMVASTATIN PO

## 2018-10-12 ENCOUNTER — OFFICE VISIT (OUTPATIENT)
Dept: ORTHOPEDICS | Facility: CLINIC | Age: 78
End: 2018-10-12
Payer: MEDICARE

## 2018-10-12 DIAGNOSIS — S62.524D OPEN NONDISPLACED FRACTURE OF DISTAL PHALANX OF RIGHT THUMB WITH ROUTINE HEALING, SUBSEQUENT ENCOUNTER: Primary | ICD-10-CM

## 2018-10-12 NOTE — LETTER
Date:October 15, 2018      Patient was self referred, no letter generated. Do not send.        [facility] Information

## 2018-10-12 NOTE — MR AVS SNAPSHOT
After Visit Summary   10/12/2018    Melo Dangelo    MRN: [id number]           Patient Information     Date Of Birth          1940        Visit Information        Provider Department      10/12/2018 11:50 AM [facility]         Follow-ups after your visit        Your next 10 appointments already scheduled     Oct 17, 2018  3:30 PM CDT   RAHEEM Hand with Rita Bhagat OT   [facility] ([facility])    [address] [place] [id number] [phone]            2018  8:40 AM CST   (Arrive by 8:25 AM)   RETURN HAND with Colby Kingsley MD   [facility] ([facility])    [address] [place] [address] [phone]              Who to contact     Please call your clinic at [phone] to:    Ask questions about your health    Make or cancel appointments    Discuss your medicines    Learn about your test results    Speak to your doctor            Additional Information About Your Visit        [employer] Information     [employer] is an electronic gateway that provides easy, online access to your medical records. With [employer], you can request a clinic appointment, read your test results, renew a prescription or communicate with your care team.     To sign up for [employer] visit the website at [email or web address]   You will be asked to enter the access code listed below, as well as some personal information. Please follow the directions to create your username and password.     Your access code is: [email or web address]  Expires: 2018  5:44 PM     Your access code will  in 90 days. If you need help or a new code, please contact your [facility] or call [phone] for assistance.        Care EveryWhere ID     This is your Care EveryWhere ID. This could be used by other organizations to access  your [place] medical records  [id number]         Blood Pressure from Last 3 Encounters:   09/23/18 161/82    Weight from Last 3 Encounters:   09/28/18 85 kg (187 lb 8 oz)   09/23/18 84.2 kg (185 lb 9.6 oz)              Today, you had the following     No orders found for display       Primary Care Provider Fax #    Physician No Ref-Primary [phone]       No address on file        Equal Access to Services     ALVARO CASTANEDA : Hadii aad ku hadasho Soomaali, waaxda luqadaha, qaybta kaalmada adeegyada, ranjana franklinin shonn marcsilvana castano santiago . So [facility] [phone].    ATENCIÓN: Si habla español, tiene a pickens disposición servicios gratuitos de asistencia lingüística. Jazmineame al [phone].    We comply with applicable federal civil rights laws and [place] laws. We do not discriminate on the basis of race, color, national origin, age, disability, sex, sexual orientation, or gender identity.            Thank you!     Thank you for [facility]  for your care. Our goal is always to provide you with excellent care. Hearing back from our patients is one way we can continue to improve our services. Please take a few minutes to complete the written survey that you may receive in the mail after your visit with us. Thank you!             Your Updated Medication List - Protect others around you: Learn how to safely use, store and throw away your medicines at www.disposemymeds.org.          This list is accurate as of 10/12/18 12:11 PM.  Always use your most recent med list.                   Brand Name Dispense Instructions for use Diagnosis    acetaminophen 325 MG tablet    TYLENOL    100 tablet    Take 2 tablets (650 mg) by mouth every 6 hours as needed for mild pain        B-12 100 MCG Tabs           BUSPAR PO           CITALOPRAM HYDROBROMIDE PO           LAMICTAL PO           oxyCODONE IR 5 MG tablet    ROXICODONE    12 tablet    Take 1 tablet (5 mg) by mouth every 6 hours as needed for pain         SIMVASTATIN PO

## 2018-10-12 NOTE — NURSING NOTE
Reason For Visit:   Chief Complaint   Patient presents with     RECHECK     Right thumb table saw injury, DOI: 9-. Distal phalanx thumb fracture.  S/p I&D and nail bed repair in ED.     Wound Check     Right thumb       Primary MD: No Ref-Primary, Physician    Age: 78 year old      Pain Assessment  Patient Currently in Pain: Yes  0-10 Pain Scale: 1  Primary Pain Location:  (Thumb)  Pain Orientation: Right  Aggravating Factors:  (Bump)    Hand Dominance Evaluation  Hand Dominance: Right            Current Outpatient Prescriptions   Medication Sig Dispense Refill     acetaminophen (TYLENOL) 325 MG tablet Take 2 tablets (650 mg) by mouth every 6 hours as needed for mild pain 100 tablet 0     BusPIRone HCl (BUSPAR PO)        CITALOPRAM HYDROBROMIDE PO        Cyanocobalamin (B-12) 100 MCG TABS        LamoTRIgine (LAMICTAL PO)        oxyCODONE IR (ROXICODONE) 5 MG tablet Take 1 tablet (5 mg) by mouth every 6 hours as needed for pain 12 tablet 0     SIMVASTATIN PO          No Known Allergies    May Ardon LPN

## 2018-10-12 NOTE — PROGRESS NOTES
Clinic note    Last clinic visit: 9/20/2018    Date of injury 9/23/2018    Injury table injury to right distal thumb status post ED irrigation and debridement and nailbed repair    Melo is a 38-year-old male who presents clinic today for follow-up on the above-mentioned injuries.  Patient has been doing very well at this time is wearing his splint as needed.  He has no concerns or complaints today.  He started to wash his hand with tap water and said no wound issues.  No fevers no chills    Physical examination:  Alert and oriented x3  Nonlabored breathing  Then focused on right upper extremity.  Incision is healing clean dry intact.  Luminal follow-up fallen out from underneath the eponychial fold.  No drainage or erythema on the thumb.  Able to flex and extend the IP joint and MP joint.  Decreased sensation on the radial ulnar aspect of the thumb.  There is a new nail growing at this time.    Imaging: None    Impression 78-year-old male who is now 2.5 weeks status post Injury to the Right Distal Thumb Doing Well    Plan:  We discussed with the patient that he can start washing his hands with soapy water now.  He should use his protective splint whenever he is doing activities that might cause him to bump his finger.  He remove it while at home and resting.  He is return to clinic if any worsening pain redness or drainage.  Otherwise we will see him back in 4 weeks and he can cancel the appointment if he is doing well.    Lenny Arias MD  Orthopaedic resident    Patient was discussed and seen with Dr. Kingsley who agrees with the assessment and plan noted above  Patient was seen and examined with the resident.  I agree with the assessment and plan of care.

## 2018-10-12 NOTE — LETTER
10/12/2018       RE: Melo Dangelo  [address]  [place] [address]     Dear Colleague,    Thank you for referring your patient, Melo Dangelo, to the [facility]. Please see a copy of my visit note below.    Clinic note    Last clinic visit: 9/20/2018    Date of injury 9/23/2018    Injury table injury to right distal thumb status post ED irrigation and debridement and nailbed repair    Melo is a 38-year-old male who presents clinic today for follow-up on the above-mentioned injuries.  Patient has been doing very well at this time is wearing his splint as needed.  He has no concerns or complaints today.  He started to wash his hand with tap water and said no wound issues.  No fevers no chills    Physical examination:  Alert and oriented x3  Nonlabored breathing  Then focused on right upper extremity.  Incision is healing clean dry intact.  Luminal follow-up fallen out from underneath the eponychial fold.  No drainage or erythema on the thumb.  Able to flex and extend the IP joint and MP joint.  Decreased sensation on the radial ulnar aspect of the thumb.  There is a new nail growing at this time.    Imaging: None    Impression 78-year-old male who is now 2.5 weeks status post Injury to the Right Distal Thumb Doing Well    Plan:  We discussed with the patient that he can start washing his hands with soapy water now.  He should use his protective splint whenever he is doing activities that might cause him to bump his finger.  He remove it while at home and resting.  He is return to clinic if any worsening pain redness or drainage.  Otherwise we will see him back in 4 weeks and he can cancel the appointment if he is doing well.    Lenny Arias MD  Orthopaedic resident    Patient was discussed and seen with Dr. Kingsley who agrees with the assessment and plan noted above  Patient was seen and examined with the resident.   I agree with the assessment and plan of care.          Again, thank you for allowing me to participate in the care of your patient.      Sincerely,    Colby Kingsley MD

## 2019-01-02 ENCOUNTER — TRANSFERRED RECORDS (OUTPATIENT)
Dept: HEALTH INFORMATION MANAGEMENT | Facility: CLINIC | Age: 79
End: 2019-01-02

## 2019-04-11 PROBLEM — M79.644 THUMB PAIN, RIGHT: Status: RESOLVED | Noted: 2018-09-28 | Resolved: 2019-04-11

## 2019-10-23 ENCOUNTER — TRANSFERRED RECORDS (OUTPATIENT)
Dept: HEALTH INFORMATION MANAGEMENT | Facility: CLINIC | Age: 79
End: 2019-10-23

## 2019-10-24 ENCOUNTER — TELEPHONE (OUTPATIENT)
Dept: OPHTHALMOLOGY | Facility: CLINIC | Age: 79
End: 2019-10-24

## 2019-10-24 NOTE — TELEPHONE ENCOUNTER
Wet AMD referral   Records and images were faxed yesterday per pt/clinic    Scheduled with Dr. Julien next Monday at [facility] location    Pt aware of date/time/location  Lenin Garcia RN RN 4:30 PM 10/24/19          [facility]    Phone Message    May a detailed message be left on voicemail: yes    Reason for Call: Other: Pt has been referred to us for retinal (macula) consult.  Dr Inderjit Grier from [facility] referring is asking for appt in next two weeks. Please call Pt back to schedule    Action Taken: Message routed to:  [facility] Eye clinic

## 2019-10-28 ENCOUNTER — OFFICE VISIT (OUTPATIENT)
Dept: OPHTHALMOLOGY | Facility: CLINIC | Age: 79
End: 2019-10-28
Attending: OPHTHALMOLOGY
Payer: MEDICARE

## 2019-10-28 DIAGNOSIS — H35.711: Primary | ICD-10-CM

## 2019-10-28 DIAGNOSIS — H47.231 CUPPING OF OPTIC DISC, RIGHT: ICD-10-CM

## 2019-10-28 DIAGNOSIS — H35.363 PERIPHERAL DRUSEN OF BOTH EYES: ICD-10-CM

## 2019-10-28 DIAGNOSIS — H25.13 AGE-RELATED NUCLEAR CATARACT OF BOTH EYES: ICD-10-CM

## 2019-10-28 DIAGNOSIS — H43.813 POSTERIOR VITREOUS DETACHMENT, BILATERAL: ICD-10-CM

## 2019-10-28 PROCEDURE — 92134 CPTRZ OPH DX IMG PST SGM RTA: CPT | Mod: ZF | Performed by: OPHTHALMOLOGY

## 2019-10-28 PROCEDURE — 92250 FUNDUS PHOTOGRAPHY W/I&R: CPT | Mod: ZF,59 | Performed by: OPHTHALMOLOGY

## 2019-10-28 PROCEDURE — G0463 HOSPITAL OUTPT CLINIC VISIT: HCPCS | Mod: ZF

## 2019-10-28 ASSESSMENT — EXTERNAL EXAM - RIGHT EYE: OD_EXAM: NORMAL

## 2019-10-28 ASSESSMENT — VISUAL ACUITY
OS_CC+: -1
OD_CC+: -1
CORRECTION_TYPE: GLASSES
METHOD: SNELLEN - LINEAR
OS_CC: 20/20
OD_CC: 20/25

## 2019-10-28 ASSESSMENT — REFRACTION_WEARINGRX
OD_CYLINDER: +1.00
OD_AXIS: 179
OS_AXIS: 010
OS_SPHERE: +2.50
SPECS_TYPE: BIFOCAL
OS_CYLINDER: +1.75
OD_SPHERE: +2.50
OD_ADD: +2.50
OS_ADD: +2.50

## 2019-10-28 ASSESSMENT — CUP TO DISC RATIO
OD_RATIO: 0.6
OS_RATIO: 0.5

## 2019-10-28 ASSESSMENT — CONF VISUAL FIELD
OS_SUPERIOR_TEMPORAL_RESTRICTION: 3
OD_NORMAL: 1

## 2019-10-28 ASSESSMENT — SLIT LAMP EXAM - LIDS: COMMENTS: MEIBOMIAN GLAND INSPISSATION

## 2019-10-28 ASSESSMENT — TONOMETRY
IOP_METHOD: TONOPEN
OD_IOP_MMHG: 14
OS_IOP_MMHG: 15

## 2019-10-28 ASSESSMENT — EXTERNAL EXAM - LEFT EYE: OS_EXAM: NORMAL

## 2019-10-28 NOTE — PROGRESS NOTES
Chief Complaint/Presenting Concern: Macular Degeneration evaluation    History of Present Illness: Mr. Dangelo is a 79 year old male who noticed a sudden vision change in the right eye two weeks ago while at his wife's [facility] Clinic visit. He had not noticed any issues prior, but during that visit, he noticed while trying to read the chart that it was more difficult out of the right eye only. The left eye was without symptoms or vision changes.     A few weeks later, Mr. Dangelo saw Dr. Grier who noticed some changes in the right eye and made this referral for possible wet macular degeneration in the right eye. No prior mention of macular degeneration before this visit last week.     Past Ocular History: No prior eye injuries, surgeries, no known     Relevant Past Medical/Family/Social History:  Seizure history on prophylactic Lamictal. Last seizure was in the year 2000.   Also takes Buspirone and Citalopram. No history of steroid use    Simvastatin for hyperlipidemia. No hypertension or diabetes. No family history of eye conditions. Non-smoker since 1985-90. 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 Did not work with lasers.    Relevant Review of Systems: Some increased weight gain. Some stressful episodes with loud noises and sensitivity but these are episodic.      Current eye related medications: No eye drops, no eye vitamins.     Retina/Uveitis Imaging:  OCT Spectralis Macula October 28, 2019  right eye: Few Drusenoid changes and focal areas of outer segment disruption, no fluid, moderate choroidal thickness  left eye: Normal foveal contour, no fluid, normal choroidal thickness    Optos Photos October 28, 2019  right eye: Moderate cup, irregular pigmentation in macula, nasal drusen  left eye: Normal disc, macula, nasal drusenoid changes    Optos Autofluorescence October 28, 2019  right eye: Hyper-autofluorescence in macula, speckled nasally  left eye: Normal Optos  Autofluorescence centrally, minimal speckling nasal    Assessment:    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 Mottled appearance on exam with one circuitous vessel but no césar hemorrhage and no intraretinal/subretinal fluid by OCT. There is also mild choroidal thickening and speckled autofluorescence, suggestive of prior alteration of retinal pigment epithelium without active leakage    2. Cupping of optic disc, right  Normal IOP with moderate cupping. No specific risk factors for normal tension glaucoma.     3. Peripheral drusen of both eyes  Not directly associated with macular degeneration.    4. Posterior vitreous detachment, bilateral  Asymptomatic and without retinal breaks or detachments    5. Age-related nuclear cataract of both eyes  Not visually significant    Plan/Recommendations:    Reviewed findings and imaging with patient of inactive central serous chorioretinopathy of the right eye. Discussed that while this is similar to macular degeneration, that there are subtle distinguishing features.. Not taking any steroid medications and not smoking, so nothing in particular to avoid. As this is not wet macular degeneration, no specific treatment indicated.    Explained that the specific onset is difficult to determine, the absence of fluid on OCT scan suggests this is inactive. Will monitor for recurrence of fluid and/or transition to secondary choroidal neovascularization    New medications: None specifically. Avoid steroid medications if possible. Continue taking Citalopram daily. Could consider AREDS2 vitamins, although not specifically necessary    Letters: Dr. Grier, Patient.     RTC 2 months (Before end of 2019) IOP, dilate, OCT, RNFL, Optos AF (all ordered) or sooner PRN worsening blurry vision/lines in the right eye     Physician Attestation     Attending Physician Attestation:  Complete documentation of  historical and exam elements from today's encounter can be found in the full encounter summary report (not reduplicated in this progress note). I personally obtained the chief complaint(s) and history of present illness. I confirmed and edited as necessary the review of systems, past medical/surgical history, family history, social history, and examination findings as documented by others; and I examined the patient myself. I personally reviewed the relevant tests, images, and reports as documented above. I formulated and edited as necessary the assessment and plan and discussed the findings and management plan with the patient and family.    Malik Julien M.D., Uveitis and Medical Retina, October 28, 2019

## 2019-10-28 NOTE — NURSING NOTE
Chief Complaints and History of Present Illnesses   Patient presents with     Macular Degeneration Evaluation     Chief Complaint(s) and History of Present Illness(es)     Macular Degeneration Evaluation               Comments     Pt states vision has been stable over the past year. No eye pain today. RE has slight irritation today.  No flashes or floaters.    KENDY Peres  October 28, 2019 12:46 PM

## 2019-10-28 NOTE — LETTER
10/28/2019     RE: Melo Dangelo  [address]  [place] [address]     Dear Colleague,    Thank you for referring your patient, Melo Dangelo, to the EYE CLINIC at [facility]. Please see a copy of my visit note below.    Chief Complaint/Presenting Concern: Macular Degeneration evaluation    History of Present Illness: Mr. Dangleo is a 79 year old male who noticed a sudden vision change in the right eye two weeks ago while at his wife's [facility] Clinic visit. He had not noticed any issues prior, but during that visit, he noticed while trying to read the chart that it was more difficult out of the right eye only. The left eye was without symptoms or vision changes.     A few weeks later, Mr. Dangelo saw Dr. Grier who noticed some changes in the right eye and made this referral for possible wet macular degeneration in the right eye. No prior mention of macular degeneration before this visit last week.     Past Ocular History: No prior eye injuries, surgeries, no known     Relevant Past Medical/Family/Social History:  Seizure history on prophylactic Lamictal. Last seizure was in the year 2000.   Also takes Buspirone and Citalopram. No history of steroid use    Simvastatin for hyperlipidemia. No hypertension or diabetes. No family history of eye conditions. Non-smoker since 1985-90. 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    Relevant Review of Systems: Some increased weight gain. Some stressful episodes with loud noises and sensitivity but these are episodic.      Current eye related medications: No eye drops, no eye vitamins.     Retina/Uveitis Imaging:  OCT Spectralis Macula October 28, 2019  right eye: Few Drusenoid changes and focal areas of outer segment disruption, no fluid, moderate choroidal thickness  left eye: Normal foveal contour, no fluid, normal choroidal thickness    Optos Photos October  28, 2019  right eye: Moderate cup, irregular pigmentation in macula, nasal drusen  left eye: Normal disc, macula, nasal drusenoid changes    Optos Autofluorescence October 28, 2019  right eye: Hyper-autofluorescence in macula, speckled nasally  left eye: Normal Optos Autofluorescence centrally, minimal speckling nasal    Assessment:  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 Mottled appearance on exam with one circuitous vessel but no césar hemorrhage and no intraretinal/subretinal fluid by OCT. There is also mild choroidal thickening and speckled autofluorescence, suggestive of prior alteration of retinal pigment epithelium without active leakage    2. Cupping of optic disc, right  Normal IOP with moderate cupping. No specific risk factors for normal tension glaucoma.     3. Peripheral drusen of both eyes  Not directly associated with macular degeneration.    4. Posterior vitreous detachment, bilateral  Asymptomatic and without retinal breaks or detachments    5. Age-related nuclear cataract of both eyes  Not visually significant    Plan/Recommendations:    Reviewed findings and imaging with patient of inactive central serous chorioretinopathy of the right eye. Discussed that while this is similar to macular degeneration, that there are subtle distinguishing features.. Not taking any steroid medications and not smoking, so nothing in particular to avoid. As this is not wet macular degeneration, no specific treatment indicated.    Explained that the specific onset is difficult to determine, the absence of fluid on OCT scan suggests this is inactive. Will monitor for recurrence of fluid and/or transition to secondary choroidal neovascularization    New medications: None specifically. Avoid steroid medications if possible. Continue taking Citalopram daily. Could consider AREDS2 vitamins, although not specifically  necessary    Letters: Dr. Grier, Patient.     RTC 2 months (Before end of 2019) IOP, dilate, OCT, RNFL, Optos AF (all ordered) or sooner PRN worsening blurry vision/lines in the right eye     Attending Physician Attestation:  Complete documentation of historical and exam elements from today's encounter can be found in the full encounter summary report (not reduplicated in this progress note). I personally obtained the chief complaint(s) and history of present illness. I confirmed and edited as necessary the review of systems, past medical/surgical history, family history, social history, and examination findings as documented by others; and I examined the patient myself. I personally reviewed the relevant tests, images, and reports as documented above. I formulated and edited as necessary the assessment and plan and discussed the findings and management plan with the patient and family.  Malik Julien MD.        Malik Julien M.D.  Uveitis and Medical Retina  [facility] Department of Ophthalmology and Visual Neurosciences

## 2019-10-28 NOTE — PATIENT INSTRUCTIONS
This is most likely inactive Central Serous Retinopathy of the right eye (Leaky topsoil without actual water in the lawn). No specific treatments are recommended other than to continue your current medications. You can take eye vitamins called AREDS2 or ICAPS/Preservision, but these are not specifically necessary as this is not true macular degeneration.    We will see you back before the end of the year. Please call us sooner for worsening blurry vision of the right eye.

## 2020-02-08 ENCOUNTER — HEALTH MAINTENANCE LETTER (OUTPATIENT)
Age: 80
End: 2020-02-08

## 2020-07-02 ENCOUNTER — VIRTUAL VISIT (OUTPATIENT)
Dept: FAMILY MEDICINE | Facility: CLINIC | Age: 80
End: 2020-07-02
Payer: MEDICARE

## 2020-07-02 DIAGNOSIS — Z86.19 HISTORY OF VIRAL ILLNESS: ICD-10-CM

## 2020-07-02 DIAGNOSIS — G40.909 SEIZURE DISORDER (H): ICD-10-CM

## 2020-07-02 DIAGNOSIS — F32.A DEPRESSION, UNSPECIFIED DEPRESSION TYPE: ICD-10-CM

## 2020-07-02 DIAGNOSIS — E78.00 HIGH CHOLESTEROL: ICD-10-CM

## 2020-07-02 DIAGNOSIS — F41.9 ANXIETY: Primary | ICD-10-CM

## 2020-07-02 RX ORDER — PROPRANOLOL HYDROCHLORIDE 10 MG/1
TABLET ORAL
Qty: 25 TABLET | Refills: 3 | Status: ON HOLD | OUTPATIENT
Start: 2020-07-02 | End: 2020-10-29

## 2020-07-02 RX ORDER — LAMOTRIGINE 100 MG/1
200 TABLET ORAL 2 TIMES DAILY
Qty: 360 TABLET | Refills: 3 | Status: SHIPPED | OUTPATIENT
Start: 2020-07-02 | End: 2021-01-18

## 2020-07-02 RX ORDER — BUSPIRONE HYDROCHLORIDE 5 MG/1
5 TABLET ORAL 3 TIMES DAILY
Qty: 360 TABLET | Refills: 3 | Status: ON HOLD | OUTPATIENT
Start: 2020-07-02 | End: 2020-10-28

## 2020-07-02 RX ORDER — CITALOPRAM HYDROBROMIDE 20 MG/1
20 TABLET ORAL DAILY
Qty: 90 TABLET | Refills: 3 | Status: ON HOLD | OUTPATIENT
Start: 2020-07-02 | End: 2020-10-28

## 2020-07-02 RX ORDER — SIMVASTATIN 20 MG
20 TABLET ORAL AT BEDTIME
Qty: 90 TABLET | Refills: 3 | Status: SHIPPED | OUTPATIENT
Start: 2020-07-02 | End: 2021-10-18

## 2020-07-02 ASSESSMENT — PAIN SCALES - GENERAL: PAINLEVEL: NO PAIN (0)

## 2020-07-02 NOTE — NURSING NOTE
Chief Complaint   Patient presents with     Establish Care     pt would like to establish care       Hudson Price CMA, EMT at 1:40 PM on 7/2/2020.

## 2020-07-02 NOTE — PROGRESS NOTES
"Melo Dangelo is a 79 year old male who is being evaluated via a billable video visit.      The patient has been notified of following:     \"This video visit will be conducted via a call between you and your physician/provider. We have found that certain health care needs can be provided without the need for an in-person physical exam.  This service lets us provide the care you need with a video conversation.  If a prescription is necessary we can send it directly to your pharmacy.  If lab work is needed we can place an order for that and you can then stop by our lab to have the test done at a later time.    Video visits are billed at different rates depending on your insurance coverage.  Please reach out to your insurance provider with any questions.    If during the course of the call the physician/provider feels a video visit is not appropriate, you will not be charged for this service.\"    Patient has given verbal consent for Video visit? Yes  How would you like to obtain your AVS? MyChart  Will anyone else be joining your video visit? 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 No blood, stool normal, no bump no itch or pain. Month or more between        Past Medical History:   Diagnosis Date     Seizure disorder (H)     last seizure 2008     No past surgical history on file.  Family History   Problem Relation Age of Onset     Glaucoma No family hx of      Macular Degeneration No family hx " of      Current Outpatient Medications   Medication     acetaminophen (TYLENOL) 325 MG tablet     BusPIRone HCl (BUSPAR PO)     CITALOPRAM HYDROBROMIDE PO     Cyanocobalamin (B-12) 100 MCG TABS     LamoTRIgine (LAMICTAL PO)     oxyCODONE IR (ROXICODONE) 5 MG tablet     SIMVASTATIN PO     No current facility-administered medications for this visit.      No Known Allergies  Social History     Socioeconomic History     Marital status:      Spouse name: Not on file     Number of children: Not on file     Years of education: Not on file     Highest education level: Not on file   Occupational History     Not on file   Social Needs     Financial resource strain: Not on file     Food insecurity     Worry: Not on file     Inability: Not on file     Transportation needs     Medical: Not on file     Non-medical: Not on file   Tobacco Use     Smoking status: Never Smoker     Smokeless tobacco: Never Used   Substance and Sexual Activity     Alcohol use: No     Drug use: No     Sexual activity: Not on file   Lifestyle     Physical activity     Days per week: Not on file     Minutes per session: Not on file     Stress: Not on file   Relationships     Social connections     Talks on phone: Not on file     Gets together: Not on file     Attends Voodoo service: Not on file     Active member of club or organization: Not on file     Attends meetings of clubs or organizations: Not on file     Relationship status: Not on file     Intimate partner violence     Fear of current or ex partner: Not on file     Emotionally abused: Not on file     Physically abused: Not on file     Forced sexual activity: Not on file   Other Topics Concern     Not on file   Social History Narrative     Not on file           Video Start Time: 2:57             Review of Systems         Objective             Physical Exam     GENERAL: Healthy, alert and no distress  EYES: Eyes grossly normal to inspection.  No discharge or erythema, or obvious  scleral/conjunctival abnormalities.  RESP: No audible wheeze, cough, or visible cyanosis.  No visible retractions or increased work of breathing.    SKIN: Visible skin clear. No significant rash, abnormal pigmentation or lesions.  NEURO: Cranial nerves grossly intact.  Mentation and speech appropriate for age.  PSYCH: Mentation appears normal, affect normal/bright, judgement and insight intact, normal speech and appearance well-groomed.      Diagnostic Test Results:  Labs reviewed in Epic        Assessment & Plan     1. Anxiety  See therapy, cont meds, see me three mo, decide if need changes  - busPIRone (BUSPAR) 5 MG tablet; Take 1 tablet (5 mg) by mouth 3 times daily  Dispense: 360 tablet; Refill: 3  - citalopram (CELEXA) 20 MG tablet; Take 1 tablet (20 mg) by mouth daily  Dispense: 90 tablet; Refill: 3  - propranolol (INDERAL) 10 MG tablet; Take 1-2 po every day prn anxiety attack  Dispense: 25 tablet; Refill: 3  - [facility]    2. Depression, unspecified depression type  As above  - citalopram (CELEXA) 20 MG tablet; Take 1 tablet (20 mg) by mouth daily  Dispense: 90 tablet; Refill: 3  - [facility]    3. High cholesterol  labs  - simvastatin (ZOCOR) 20 MG tablet; Take 1 tablet (20 mg) by mouth At Bedtime  Dispense: 90 tablet; Refill: 3    4. Seizure disorder (H)  See me in autumn consider see neuro to see if still needs  - lamoTRIgine (LAMICTAL) 100 MG tablet; Take 2 tablets (200 mg) by mouth 2 times daily  Dispense: 360 tablet; Refill: 3    5. History of viral illness  Ill in December flu like  - COVID-19 Virus (Coronavirus) Antibody & Titer Reflex; Future    See me three mo hope in person consider prevnar, shingrix then           Return in about 3 months (around 10/2/2020).    Francis Smith MD  [facility]      Video-Visit Details    Type of service:  Video Visit    Video End Time: 3:28    Originating Location (pt. Location): Home    Distant  Location (provider location):  [facility]     Platform used for Video Visit: Jyoti    No follow-ups on file.       Francis Smith MD

## 2020-07-08 DIAGNOSIS — Z86.19 HISTORY OF VIRAL ILLNESS: ICD-10-CM

## 2020-07-08 NOTE — LETTER
July 13, 2020        Melo Dangelo  [address]   [place] [address]      COVID-19 Antibody Screen   Date Value Ref Range Status   07/08/2020 Negative  Final     Comment:     No COVID-19 antibodies detected.  Patients within 10 days of symptom onset for   COVID-19 may not produce sufficient levels of detectable antibodies.    Immunocompromised COVID-19 patients may take longer to develop antibodies.       COVID-19 Antibody, IgG Titer   Date Value Ref Range Status   07/08/2020 Not Applicable  Final     Comment:     Qualitative screen for total antibodies to COVID-19 (SARS-CoV-2) with   semi-quantitative measurement of IgG COVID-19 antibodies by endpoint titer.    COVID-19 antibodies may be elevated due to a past or current infection.  Negative results do not rule out COVID-19 infection.  Results from antibody   testing should not be used as the sole basis to diagnose or exclude SARS-CoV-2   infection or to inform infection status.  COVID-19 PCR test should be ordered   if current infection is suspected.  False positive results may occur in rare   cases due to cross-reacting antibodies.  This test was developed and its performance characteristics determined by the   [facility] ([facility]),   which is regulated under CLIA as qualified to perform high-complexity testing.    This test has not been reviewed by the FDA.  Testing performed by [facility], [facility], [address], [address], [place] [address]       You have tested NEGATIVE for COVID-19 antibodies. This suggests you have not had or been exposed to COVID-19. But it does not mean that for sure.     The test finds antibodies in most people 10 days after they get sick. For some people, it takes longer than 10 days for antibodies to show up. Others may never show antibodies against COVID-19, especially if they have weak immune  systems.    If you have COVID-19 symptoms now, please stay home and away from others.     What is antibody testing?    This is a kind of blood test. We take a small sample of your blood, and then test it for something called  antibodies.      Your body makes antibodies to fight infection. If your blood has antibodies for a certain germ, it means you ve been infected with that germ in the past.     Sometimes, antibodies stay in your body for years after you ve had the infection. They can be there even if the germ didn t make you sick. They are a sign that your body fought off the infection.    Will this test find antibodies in everyone who s had COVID-19?    No. The test finds antibodies in most people 10 days after they get sick. For some people, it takes longer than 10 days for antibodies to show up. Others may never show antibodies against COVID-19, especially if they have weak immune systems.    What does it mean if the test finds COVID-19 antibodies?    If we find these antibodies, it suggests:     This person has had the virus.     Their body s immune system fought the virus.     We don t know if this will help protect someone from getting COVID-19 again. Scientists are still learning about this.    What are the signs of COVID-19?    Signs of COVID-19 can appear from 2 to 14 days (up to 2 weeks) after you re infected. Some people have no symptoms or only mild symptoms. Others get very sick. The most common symptoms are:          Cough    Shortness of breath or trouble breathing  Or at least 2 of these symptoms:    Fever    Chills    Repeated shaking with chills    Muscle pain    Headache    Sore throat    Losing your sense of taste or smell    You may have other symptoms. Please contact your doctor or clinic for any symptoms that worry you.    Where can I get more information?     To learn the [place] s guidelines for staying home, please visit the [facility] of Health website at  [email or web address]/[email or web address]    To learn more about COVID-19 and how to care for yourself at home, please visit the CDC website at https://www.cdc.gov/coronavirus/2019-ncov/about/steps-when-sick.html    For more options for care at [facility], please visit our website at [email or web address]    [place] [facility] ([facility]) COVID-19 Hotline:  [phone]

## 2020-07-09 LAB
COVID-19 SPIKE RBD ABY TITER: NORMAL
COVID-19 SPIKE RBD ABY: NEGATIVE

## 2020-07-21 ENCOUNTER — VIRTUAL VISIT (OUTPATIENT)
Dept: BEHAVIORAL HEALTH | Facility: CLINIC | Age: 80
End: 2020-07-21
Attending: FAMILY MEDICINE
Payer: MEDICARE

## 2020-07-21 DIAGNOSIS — F41.1 GENERALIZED ANXIETY DISORDER: ICD-10-CM

## 2020-07-21 DIAGNOSIS — R41.9 COGNITIVE COMPLAINTS: Primary | ICD-10-CM

## 2020-07-21 DIAGNOSIS — F33.1 MAJOR DEPRESSIVE DISORDER, RECURRENT EPISODE, MODERATE (H): ICD-10-CM

## 2020-07-21 SDOH — ECONOMIC STABILITY: INCOME INSECURITY: IN THE LAST 12 MONTHS, WAS THERE A TIME WHEN YOU WERE NOT ABLE TO PAY THE MORTGAGE OR RENT ON TIME?: NO

## 2020-07-21 ASSESSMENT — ANXIETY QUESTIONNAIRES
GAD7 TOTAL SCORE: 10
4. TROUBLE RELAXING: SEVERAL DAYS
7. FEELING AFRAID AS IF SOMETHING AWFUL MIGHT HAPPEN: SEVERAL DAYS
6. BECOMING EASILY ANNOYED OR IRRITABLE: SEVERAL DAYS
5. BEING SO RESTLESS THAT IT IS HARD TO SIT STILL: SEVERAL DAYS
2. NOT BEING ABLE TO STOP OR CONTROL WORRYING: SEVERAL DAYS
GAD7 TOTAL SCORE: 10
3. WORRYING TOO MUCH ABOUT DIFFERENT THINGS: MORE THAN HALF THE DAYS
GAD7 TOTAL SCORE: 10
1. FEELING NERVOUS, ANXIOUS, OR ON EDGE: NEARLY EVERY DAY
7. FEELING AFRAID AS IF SOMETHING AWFUL MIGHT HAPPEN: SEVERAL DAYS

## 2020-07-21 ASSESSMENT — PATIENT HEALTH QUESTIONNAIRE - PHQ9
SUM OF ALL RESPONSES TO PHQ QUESTIONS 1-9: 10
SUM OF ALL RESPONSES TO PHQ QUESTIONS 1-9: 10
10. IF YOU CHECKED OFF ANY PROBLEMS, HOW DIFFICULT HAVE THESE PROBLEMS MADE IT FOR YOU TO DO YOUR WORK, TAKE CARE OF THINGS AT HOME, OR GET ALONG WITH OTHER PEOPLE: VERY DIFFICULT

## 2020-07-22 ASSESSMENT — PATIENT HEALTH QUESTIONNAIRE - PHQ9: SUM OF ALL RESPONSES TO PHQ QUESTIONS 1-9: 10

## 2020-07-22 ASSESSMENT — ANXIETY QUESTIONNAIRES: GAD7 TOTAL SCORE: 10
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
## 2020-07-29 ENCOUNTER — VIRTUAL VISIT (OUTPATIENT)
Dept: BEHAVIORAL HEALTH | Facility: CLINIC | Age: 80
End: 2020-07-29
Payer: MEDICARE

## 2020-07-29 DIAGNOSIS — F32.A DEPRESSION, UNSPECIFIED DEPRESSION TYPE: Primary | ICD-10-CM

## 2020-07-29 DIAGNOSIS — F41.9 ANXIETY DISORDER, UNSPECIFIED: ICD-10-CM

## 2020-07-29 NOTE — PROGRESS NOTES
"[facility]: Integrated Behavioral Health  July 29, 2020      Behavioral Health Clinician Progress Note    Patient Name: Melo Dangelo           Service Type: Individual      Session Start Time: 9:05  Session End Time: 9:50      Session Length: 38 - 52      Attendees: Patient    Visit Activities (Refresh list every visit): [facility] Only and Phone Encounter    Melo Dangelo is a 79 year old male who is being evaluated via a telephone visit.      The patient has been notified of the following:     \"We have found that certain health care needs can be provided without the need for a face to face visit.  This service lets us provide the care you need with a short phone conversation.      I will have full access to your [facility] medical record during this entire phone call.   I will be taking notes for your medical record.     Since this is like an office visit, we will bill your insurance company for this service.  Please check with your medical insurance if this type of telephone visit/virtual care is covered.  You may be responsible for the cost of this service if insurance coverage is denied.      There are potential benefits and risks of telephone visits (e.g. limits to patient confidentiality) that differ from in-person visits.?  Confidentiality still applies for telephone services, and nobody will record the visit.  It is important to be in a quiet, private space that is free of distractions (including cell phone or other devices) during the visit.??     If during the course of the call I believe a telephone visit is not appropriate, you will not be charged for this service\"    Consent has been obtained for this service by care team member: yes.    Diagnostic Assessment Date: 7/21/2020  Treatment Plan Review Date: 10/29/2020  See Flowsheets for today's PHQ-9 and SHAINA-7 results  Previous PHQ-9:   PHQ-9 SCORE 7/21/2020   PHQ-9 Total Score MyChart 10 (Moderate depression)   PHQ-9 Total " "Score 10     Previous SHAINA-7:   SHAINA-7 SCORE 7/21/2020   Total Score 10 (moderate anxiety)   Total Score 10       RASHMI LEVEL:  No flowsheet data found.    DATA  Extended Session (60+ minutes): No  Interactive Complexity: No  Crisis: No    Treatment Objective(s) Addressed in This Session:  Target Behavior(s): disease management/lifestyle changes related to depression, anxiety, and related anger/irritability    Depressed Mood: Decrease frequency and intensity of feeling down, depressed, hopeless  Anxiety: will experience a reduction in anxiety  Relationship Problems: will address relationship difficulties in a more adaptive manner  Gather additional background for DA    Current Stressors / Issues:  Continued to gather background information on Melo today and started to address his relationship concerns and trouble with irritability or anger. Melo states he often becomes \"jumpy\" or irritable when his wife criticizes him. Explored antecedents and other emotions he experiences with anger, such as feeling hurt. We looked at ways to better manage anger or mood symptoms in the moment. Discussed how anger can sometimes be related to depression and we explored this at length during the session.       Progress on Treatment Objective(s) / Homework:  New Objective established this session - PREPARATION (Decided to change - considering how); Intervened by negotiating a change plan and determining options / strategies for behavior change, identifying triggers, exploring social supports, and working towards setting a date to begin behavior change    Motivational Interviewing    MI Intervention: Expressed Empathy/Understanding, Supported Autonomy, Collaboration, Evocation, Permission to raise concern or advise, Open-ended questions, Reflections: simple and complex, Change talk (evoked) and Reframe     Change Talk Expressed by the Patient: Desire to change Taking steps    Provider Response to Change Talk: E - Evoked more info from " patient about behavior change, A - Affirmed patient's thoughts, decisions, or attempts at behavior change, R - Reflected patient's change talk and S - Summarized patient's change talk statements    Also provided psychoeducation about behavioral health condition, symptoms, and treatment options    Care Plan review completed: Yes    Medication Review:  No changes to current psychiatric medication(s)    Medication Compliance:  Yes    Changes in Health Issues:   None reported    Chemical Use Review:   Substance Use: Chemical use reviewed, no active concerns identified      Tobacco Use: No current tobacco use.      Assessment: Current Emotional / Mental Status (status of significant symptoms):  Risk status (Self / Other harm or suicidal ideation)  Patient has had a history of suicidal ideation: passive thoughts in the remote past, 2000 these last occurred  Patient denies current fears or concerns for personal safety.  Patient denies current or recent suicidal ideation or behaviors.  Patient denies current or recent homicidal ideation or behaviors.  Patient denies current or recent self injurious behavior or ideation.  Patient denies other safety concerns.  A safety and risk management plan has not been developed at this time, however patient was encouraged to call [place] [phone] should there be a change in any of these risk factors.    Appearance:   Appropriate   Eye Contact:   Good   Psychomotor Behavior: Normal   Attitude:   Cooperative  Friendly Pleasant  Orientation:   All  Speech   Rate / Production: Slow    Volume:  Normal   Mood:    Depressed   Affect:    Appropriate   Thought Content:  Clear   Thought Form:  Coherent  Logical   Insight:    Good     Diagnoses:  1. Depression, unspecified depression type    2. Anxiety disorder, unspecified        Collateral Reports Completed:  Not Applicable    Plan: (Homework, other):  Patient was given information about behavioral services and encouraged to schedule a follow  up appointment with the clinic [facility] in 2 weeks.  He was also given information about mental health symptoms and treatment options  and anger management ideas.  CD Recommendations: No indications of CD issues.     Wayne Farmer, LUCAS  7/29/2020

## 2020-08-06 ENCOUNTER — VIRTUAL VISIT (OUTPATIENT)
Dept: BEHAVIORAL HEALTH | Facility: CLINIC | Age: 80
End: 2020-08-06
Payer: MEDICARE

## 2020-08-06 DIAGNOSIS — F41.9 ANXIETY DISORDER, UNSPECIFIED: ICD-10-CM

## 2020-08-06 DIAGNOSIS — F32.A DEPRESSION, UNSPECIFIED DEPRESSION TYPE: Primary | ICD-10-CM

## 2020-08-06 PROBLEM — F33.1 MAJOR DEPRESSIVE DISORDER, RECURRENT EPISODE, MODERATE (H): Status: ACTIVE | Noted: 2020-08-06

## 2020-08-06 PROBLEM — F41.1 GENERALIZED ANXIETY DISORDER: Status: ACTIVE | Noted: 2020-08-06

## 2020-08-06 NOTE — PATIENT INSTRUCTIONS
"Strategies to improve effective listening and communication:    1. Be standing or stand up when trying to listen to important information. If sitting in comfortable seating makes you sleepy, this can make it more difficult to listen effectively. Framing to your partner or the other person that standing up might help you remember can be helpful.     2. Make sure your environment is free of noise or distractions. This could include turning off the television or reducing background noise. If you are doing something else when someone is speaking to you, try to finish that task or take a break and attend to what they are saying.     3. Use summaries and clarifying statements to communicate your understanding and improve retention. This could look like sharing your interpretation of the information given to you and asking if you understood the the main message. For example, you could say, \"I heard you say that we have family coming over tomorrow at noon and you want me to take out the trash, is that right?\"   "

## 2020-08-06 NOTE — PROGRESS NOTES
"[facility]  August 6, 2020      Behavioral Health Clinician Progress Note    Patient Name: Melo Dangelo           Service Type: Individual      Session Start Time: 10:06  Session End Time: 10:52      Session Length: 38 - 52      Attendees: Patient    Visit Activities (Refresh list every visit): [facility] Only and Phone Encounter    Melo Dangelo is a 79 year old male who is being evaluated via a telephone visit.      The patient has been notified of the following:     \"We have found that certain health care needs can be provided without the need for a face to face visit.  This service lets us provide the care you need with a short phone conversation.      I will have full access to your [facility] medical record during this entire phone call.   I will be taking notes for your medical record.     Since this is like an office visit, we will bill your insurance company for this service.  Please check with your medical insurance if this type of telephone visit/virtual care is covered.  You may be responsible for the cost of this service if insurance coverage is denied.      There are potential benefits and risks of telephone visits (e.g. limits to patient confidentiality) that differ from in-person visits.?  Confidentiality still applies for telephone services, and nobody will record the visit.  It is important to be in a quiet, private space that is free of distractions (including cell phone or other devices) during the visit.??     If during the course of the call I believe a telephone visit is not appropriate, you will not be charged for this service\"    Consent has been obtained for this service by care team member: yes.    Diagnostic Assessment Date: 7/21/2020  Treatment Plan Review Date: 10/29/2020  See Flowsheets for today's PHQ-9 and SHAINA-7 results  Previous PHQ-9:   PHQ-9 SCORE 7/21/2020   PHQ-9 Total Score MyChart 10 (Moderate depression)   PHQ-9 " Total Score 10     Previous SHAINA-7:   SHAINA-7 SCORE 7/21/2020   Total Score 10 (moderate anxiety)   Total Score 10       RASHMI LEVEL:  No flowsheet data found.    DATA  Extended Session (60+ minutes): No  Interactive Complexity: No  Crisis: No    Treatment Objective(s) Addressed in This Session:  Target Behavior(s): disease management/lifestyle changes related to depression, anxiety, and related sleep difficulties.    Depressed Mood: Decrease frequency and intensity of feeling down, depressed, hopeless  Anxiety: will experience a reduction in anxiety  Relationship Problems: will address relationship difficulties in a more adaptive manner  Psychological distress related to Sleep Disturbance    Current Stressors / Issues:  Melo today states his mood has felt better, though describes concerns with excessive daytime sleepiness. He notes that he will often become very sleepy when sitting a recliner and has observed some sleepiness while driving. At times, he reports having to pull over his car and rest for 10 minutes before resuming. He reports suspecting that he has narcolepsy, though I explained this is often diagnosed through a sleep study and he denied cataplexy. Melo states his trouble with alertness has caused some relationship stress, as he notes his wife becomes frustrated with him if he nods off during a conversation.     Melo wished to explore ways he could compensate for his sleepiness and retain verbal information more effectively. We explored this at length today and I provided him feedback, based on neuropsychological compensatory strategies, tools he could use to help address his concern. These are listed below.     Melo notes he found today helpful and wishes to continue individual therapy.       Progress on Treatment Objective(s) / Homework:  Satisfactory progress - ACTION (Actively working towards change); Intervened by reinforcing change plan / affirming steps taken    Motivational  "Interviewing    MI Intervention: Expressed Empathy/Understanding, Supported Autonomy, Collaboration, Evocation, Permission to raise concern or advise, Open-ended questions, Reflections: simple and complex, Change talk (evoked) and Reframe     Change Talk Expressed by the Patient: Desire to change Taking steps    Provider Response to Change Talk: E - Evoked more info from patient about behavior change, A - Affirmed patient's thoughts, decisions, or attempts at behavior change, R - Reflected patient's change talk and S - Summarized patient's change talk statements    Strategies to improve effective listening and communication:    1. Be standing or stand up when trying to listen to important information. If sitting in comfortable seating makes you sleepy, this can make it more difficult to listen effectively. Framing to your partner or the other person that standing up might help you remember can be helpful.     2. Make sure your environment is free of noise or distractions. This could include turning off the television or reducing background noise. If you are doing something else when someone is speaking to you, try to finish that task or take a break and attend to what they are saying.     3. Use summaries and clarifying statements to communicate your understanding and improve retention. This could look like sharing your interpretation of the information given to you and asking if you understood the the main message. For example, you could say, \"I heard you say that we have family coming over tomorrow at noon and you want me to take out the trash, is that right?\"     Care Plan review completed: Yes    Medication Review:  No changes to current psychiatric medication(s)    Medication Compliance:  Yes    Changes in Health Issues:   None reported    Chemical Use Review:   Substance Use: Chemical use reviewed, no active concerns identified      Tobacco Use: No current tobacco use.      Assessment: Current Emotional / " Mental Status (status of significant symptoms):  Risk status (Self / Other harm or suicidal ideation)  Patient has had a history of suicidal ideation: passive thoughts in the remote past, 2000 these last occurred  Patient denies current fears or concerns for personal safety.  Patient denies current or recent suicidal ideation or behaviors.  Patient denies current or recent homicidal ideation or behaviors.  Patient denies current or recent self injurious behavior or ideation.  Patient denies other safety concerns.  A safety and risk management plan has not been developed at this time, however patient was encouraged to call [place] [phone] should there be a change in any of these risk factors.    Appearance:   Appropriate   Eye Contact:   Good   Psychomotor Behavior: Normal   Attitude:   Cooperative  Friendly Pleasant  Orientation:   All  Speech   Rate / Production: Slow    Volume:  Normal   Mood:    Depressed   Affect:    Appropriate   Thought Content:  Clear   Thought Form:  Coherent  Logical   Insight:    Good     Diagnoses:  1. Depression, unspecified depression type    2. Anxiety disorder, unspecified        Collateral Reports Completed:  Not Applicable    Plan: (Homework, other):  Patient was given information about behavioral services and encouraged to schedule a follow up appointment with the clinic [facility] in 2 weeks.  He was also given information about compensatory strategies for memory/communication. Encouraged Melo to follow-up with his PCP about a sleep study if he contnues to have concerns with daytime sleepiness.  CD Recommendations: No indications of CD issues.     Wayne Farmer LP  8/6/2020

## 2020-08-20 ENCOUNTER — VIRTUAL VISIT (OUTPATIENT)
Dept: BEHAVIORAL HEALTH | Facility: CLINIC | Age: 80
End: 2020-08-20
Payer: MEDICARE

## 2020-08-20 DIAGNOSIS — F33.1 MAJOR DEPRESSIVE DISORDER, RECURRENT EPISODE, MODERATE (H): Primary | ICD-10-CM

## 2020-08-20 DIAGNOSIS — F41.1 GENERALIZED ANXIETY DISORDER: ICD-10-CM

## 2020-08-20 NOTE — PROGRESS NOTES
"[facility]: Integrated Behavioral Health  August 20, 2020      Behavioral Health Clinician Progress Note    Patient Name: Melo Dangelo           Service Type: Individual      Session Start Time: 10:20  Session End Time: 10:56      Session Length: 16 - 37      Attendees: Patient    Visit Activities (Refresh list every visit): [facility] Only and Phone Encounter    Melo Dangelo is a 79 year old male who is being evaluated via a telephone visit.      The patient has been notified of the following:     \"We have found that certain health care needs can be provided without the need for a face to face visit.  This service lets us provide the care you need with a short phone conversation.      I will have full access to your [place] medical record during this entire phone call.   I will be taking notes for your medical record.     Since this is like an office visit, we will bill your insurance company for this service.  Please check with your medical insurance if this type of telephone visit/virtual care is covered.  You may be responsible for the cost of this service if insurance coverage is denied.      There are potential benefits and risks of telephone visits (e.g. limits to patient confidentiality) that differ from in-person visits.?  Confidentiality still applies for telephone services, and nobody will record the visit.  It is important to be in a quiet, private space that is free of distractions (including cell phone or other devices) during the visit.??     If during the course of the call I believe a telephone visit is not appropriate, you will not be charged for this service\"    Consent has been obtained for this service by care team member: yes.    Diagnostic Assessment Date: 7/21/2020  Treatment Plan Review Date: 10/29/2020  See Flowsheets for today's PHQ-9 and SHAINA-7 results  Previous PHQ-9:   PHQ-9 SCORE 7/21/2020   PHQ-9 Total Score MyChart 10 (Moderate depression)   PHQ-9 " "Total Score 10     Previous SHAINA-7:   SHAINA-7 SCORE 7/21/2020   Total Score 10 (moderate anxiety)   Total Score 10       RASHMI LEVEL:  No flowsheet data found.    DATA  Extended Session (60+ minutes): No  Interactive Complexity: No  Crisis: No    Treatment Objective(s) Addressed in This Session:  Target Behavior(s): disease management/lifestyle changes related to depression, anxiety, and related sleep difficulties.    Depressed Mood: Decrease frequency and intensity of feeling down, depressed, hopeless  Anxiety: will experience a reduction in anxiety  Relationship Problems: will address relationship difficulties in a more adaptive manner  Psychological distress related to Sleep Disturbance    Current Stressors / Issues:  Met with Melo over the phone today for about 36 minutes. Our session began late as Melo stated having some ongoing troubles with his land line and did not receive this writer's first call attempt.  Melo states he has observed greater anger and irritability lately, especially in circumstances when his wife will ask him to remember something or criticizes him. Melo described a recent circumstance when he got upset after his wife said something to him while he was cooking dinner. Meol stated he became \"abusive\" with getting upset, referring to he raised his voice and got upset. We discussed how getting upset/angry does not necessarily constitute abuse and we spent time expanding his definition of this, as it seemed that Melo uses strong negative language to describe himself, a factor I suspect contributes to his depressed mood. We discussed the difference between irritability and anger and how emotional factors like depression or anxiety can influence these experiences. Reflected to Melo that it seems his irritability is triggered by moments when he is doing multiple things at once, which seems to make it more challenging for him to listen or retain information shared with him. Melo " agreed with this observation, as he reports his mood is much better when he isn't doing multiple things at once. We explored ways to reduce distractions when conversing with his wife and reduce his tendency to multi-task and focus instead on completing one task at a time to reduce feeling flustered. We discussed ways to communicate more effectively when flustered or annoyed to reduce relational conflict. For example, we discussed asking for a moment to reduce distractions before engaging with his wife.       Progress on Treatment Objective(s) / Homework:  Satisfactory progress - ACTION (Actively working towards change); Intervened by reinforcing change plan / affirming steps taken    Motivational Interviewing    MI Intervention: Expressed Empathy/Understanding, Supported Autonomy, Collaboration, Evocation, Permission to raise concern or advise, Open-ended questions, Reflections: simple and complex, Change talk (evoked) and Reframe     Change Talk Expressed by the Patient: Desire to change Taking steps    Provider Response to Change Talk: E - Evoked more info from patient about behavior change, A - Affirmed patient's thoughts, decisions, or attempts at behavior change, R - Reflected patient's change talk and S - Summarized patient's change talk statements    Strategies to improve effective listening and communication and reduce irritability/anxious distress and related relational conflict emphasized today.    Care Plan review completed: Yes    Medication Review:  No changes to current psychiatric medication(s)    Medication Compliance:  Yes    Changes in Health Issues:   None reported    Chemical Use Review:   Substance Use: Chemical use reviewed, no active concerns identified      Tobacco Use: No current tobacco use.      Assessment: Current Emotional / Mental Status (status of significant symptoms):  Risk status (Self / Other harm or suicidal ideation)  Patient has had a history of suicidal ideation: passive  thoughts in the remote past, 2000 these last occurred  Patient denies current fears or concerns for personal safety.     Patient denies current or recent suicidal ideation or behaviors.  Patient denies current or recent homicidal ideation or behaviors.  Patient denies current or recent self injurious behavior or ideation.  Patient denies other safety concerns.     A safety and risk management plan has not been developed at this time, however patient was encouraged to call [place] [phone] should there be a change in any of these risk factors.    Appearance:   Appropriate   Eye Contact:   Good   Psychomotor Behavior: Normal   Attitude:   Cooperative  Friendly Pleasant  Orientation:   All  Speech   Rate / Production: Slow    Volume:  Normal   Mood:    Depressed   Affect:    Appropriate   Thought Content:  Clear   Thought Form:  Coherent  Logical   Insight:    Good     Diagnoses:  1. Major depressive disorder, recurrent episode, moderate (H)    2. Generalized anxiety disorder        Collateral Reports Completed:  Not Applicable    Plan: (Homework, other):  Patient was given information about behavioral services and encouraged to schedule a follow up appointment with the clinic [facility] in 2 weeks.  He was also given strategies to improve listening and reduce anxiety/irritabililty.  CD Recommendations: No indications of CD issues.     Wayne Farmer, LUCAS  8/20/2020    _____________________________________________________________________________________  [facility] Integrated Behavioral Health Treatment Plan    Client's Name: Melo Dangelo  YOB: 1940    Date: 8/20/2020    DSM-V Diagnoses: 296.32 (F33.1) Major Depressive Disorder, Recurrent Episode, Moderate _; 300.02 (F41.1) Generalized Anxiety Disorder   Psychosocial / Contextual Factors: marital conflict  WHODAS: 17  CGI: 4     Referral / Collaboration:  Will collaborate with care team as indicated during treatment.    Anticipated number of session or this  episode of care: 10-15      MeasurableTreatment Goal(s) related to diagnosis / functional impairment(s)  Goal 1:  Patient will experience a reduction in depressive and anxious symptoms, along with a corresponding increase in positive emotion and life satisfaction.    Objective #A: Patient will experience a reduction in depressed mood, will develop more effective coping skills to manage depressive symptoms, will develop healthy cognitive patterns and beliefs, will increase ability to function adaptively and will continue to take medications as prescribed / participate in supportive activities and services    Status: Continued - Date(s): 8/20/2020    Objective #B: Patient will experience a reduction in anxiety, will develop more effective coping skills to manage anxiety symptoms, will develop healthy cognitive patterns and beliefs and will increase ability to function adaptively  Status: Continued - Date(s): 8/20/2020    Objective #C: Patient will develop better understanding of triggers and coping strategies to stabilize mood  Status: Continued - Date(s): 8/20/2020    Goal 2:  Patient will identify and increase engagement in valued activity, i.e. improving social connections/relationships, pursuing occupational goals or personally meaningful pursuits, exploration of meaning in life.     Objective #A: Patient will identify meaningful activity in social, occupational and  personal goals, and increase behavioral activation around these goals   Status: Continued - Date(s): 8/20/2020    Objective #B: Patient will address relationship difficulties in a more adaptive manner  Status: Continued - Date(s): 8/20/2020    Objective #C: Patient will develop coping/problem-solving skills to facilitate more adaptive adjustment and will effectively address problems that interfere with adaptive functioning  Status: Continued - Date(s): 8/20/2020    Goal 3:  Patient will address irritability and anger difficulties in a more adaptive  manner.    Objective #A: Patient will learn strategies to resolve conflict adaptively   Status: Continued - Date(s): 8/20/2020    Objective #B: Patient will learn and practice positive anger management skills   Status: Continued - Date(s): 8/20/2020    Objective #C: Patient will increse understanding of triggers for irritability and anger experiences  Status: Continued - Date(s): 8/20/2020    Possible Therapeutic Intervention(s)  Psycho-education regarding mental health diagnoses and treatment options    Skills training    Explore skills useful to client in current situation.    Skills include assertiveness, communication, conflict management, problem-solving, relaxation, etc.    Solution-Focused Therapy    Explore patterns in patient's relationships and discuss options for new behaviors.    Explore patterns in patient's actions and choices and discuss options for new behaviors.    Cognitive-behavioral Therapy    Discuss common cognitive distortions, identify them in patient's life.    Explore ways to challenge, replace, and act against these cognitions.    Acceptance and Commitment Therapy    Explore and identify important values in patient's life.    Discuss ways to commit to behavioral activation around these values.    Psychodynamic psychotherapy    Discuss patient's emotional dynamics and issues and how they impact behaviors.    Explore patient's history of relationships and how they impact present behaviors.    Explore how to work with and make changes in these schemas and patterns.    Narrative Therapy    Explore the patient's story of his/her life from his/her perspective.    Explore alternate ways of understanding their experience, identifying exceptions, developing new themes.    Interpersonal Psychotherapy    Explore patterns in relationships that are effective or ineffective at helping patient reach their goals, find satisfying experience.    Discuss new patterns or behaviors to engage in for improved  social functioning.    Behavioral Activation    Discuss steps patient can take to become more involved in meaningful activity.    Identify barriers to these activities and explore possible solutions.    Mindfulness-Based Strategies    Discuss skills based on development and application of mindfulness.    Skills drawn from compassion-focused therapy, dialectical behavior therapy, mindfulness-based stress reduction, mindfulness-based cognitive therapy, etc.      We have developed these goals together during our work to this point. Patient has assisted in the development of these goals and has agreed to this treatment plan.       Wayne Farmer, LUCAS  August 20, 2020

## 2020-09-08 ENCOUNTER — VIRTUAL VISIT (OUTPATIENT)
Dept: BEHAVIORAL HEALTH | Facility: CLINIC | Age: 80
End: 2020-09-08
Payer: MEDICARE

## 2020-09-08 DIAGNOSIS — F41.1 GENERALIZED ANXIETY DISORDER: ICD-10-CM

## 2020-09-08 DIAGNOSIS — F33.1 MAJOR DEPRESSIVE DISORDER, RECURRENT EPISODE, MODERATE (H): Primary | ICD-10-CM

## 2020-09-08 NOTE — PROGRESS NOTES
"[facility]: Integrated Behavioral Health  September 8, 2020      Behavioral Health Clinician Progress Note    Patient Name: Melo Dangelo           Service Type: Individual      Session Start Time: 11:15  Session End Time: 11:50       Session Length: 16 - 37      Attendees: Patient    Visit Activities (Refresh list every visit): [facility] Only and Phone Encounter    Melo Dangelo is a 79 year old male who is being evaluated via a telephone visit.      The patient has been notified of the following:     \"We have found that certain health care needs can be provided without the need for a face to face visit.  This service lets us provide the care you need with a short phone conversation.      I will have full access to your [place] medical record during this entire phone call.   I will be taking notes for your medical record.     Since this is like an office visit, we will bill your insurance company for this service.  Please check with your medical insurance if this type of telephone visit/virtual care is covered.  You may be responsible for the cost of this service if insurance coverage is denied.      There are potential benefits and risks of telephone visits (e.g. limits to patient confidentiality) that differ from in-person visits.?  Confidentiality still applies for telephone services, and nobody will record the visit.  It is important to be in a quiet, private space that is free of distractions (including cell phone or other devices) during the visit.??     If during the course of the call I believe a telephone visit is not appropriate, you will not be charged for this service\"    Consent has been obtained for this service by care team member: yes.    Diagnostic Assessment Date: 7/21/2020  Treatment Plan Review Date: 10/29/2020  See Flowsheets for today's PHQ-9 and SHAINA-7 results  Previous PHQ-9:   PHQ-9 SCORE 7/21/2020   PHQ-9 Total Score MyChart 10 (Moderate depression) "   PHQ-9 Total Score 10     Previous SHAINA-7:   SHAINA-7 SCORE 7/21/2020   Total Score 10 (moderate anxiety)   Total Score 10       RASHMI LEVEL:  No flowsheet data found.    DATA  Extended Session (60+ minutes): No  Interactive Complexity: No  Crisis: No    Treatment Objective(s) Addressed in This Session:  Target Behavior(s): disease management/lifestyle changes related to depression, anxiety, and related sleep difficulties.    Depressed Mood: Decrease frequency and intensity of feeling down, depressed, hopeless  Anxiety: will experience a reduction in anxiety and will develop more effective coping skills to manage anxiety symptoms  Relationship Problems: will address relationship difficulties in a more adaptive manner    Current Stressors / Issues:  Today Melo reported his mood has improved over the last two weeks. He attributes this change to our previous conversations about strategies to reduce feeling flustered/irritable when distracted. We reviewed what was helpful for him over the last couple weeks and he states that reminding himself to focus on one thing at a time and reduce distractions tendst to help his sense of control and reduces his anger. We continued to discuss strategies he could use to reduce anger/irritability outbursts in relationships. Discussed various metaphors for attention/concentration to help Melo think about how to better manage his concerns with attention.     Melo cited he has had trouble remembering to take his medications as directed. He reports often forgetting to take his buspar during the day. We reviewed strategies to help him remember to take medications as directed, including using a pill box and phone reminders. Melo indicates his daughter helped program his phone to alert him to take his medications at certain times of day.     Melo states he would still like neuropsych testing as he continues to struggle with memory and focus. I provided him contact information from  my previous referral to arrange this. We did discuss how neuropsych testing at this time could be difficult due to COVID-19.        Progress on Treatment Objective(s) / Homework:  Satisfactory progress - ACTION (Actively working towards change); Intervened by reinforcing change plan / affirming steps taken    Motivational Interviewing    MI Intervention: Expressed Empathy/Understanding, Supported Autonomy, Collaboration, Evocation, Permission to raise concern or advise, Open-ended questions, Reflections: simple and complex, Change talk (evoked) and Reframe     Change Talk Expressed by the Patient: Desire to change Taking steps    Provider Response to Change Talk: E - Evoked more info from patient about behavior change, A - Affirmed patient's thoughts, decisions, or attempts at behavior change, R - Reflected patient's change talk and S - Summarized patient's change talk statements    Strategies to improve effective listening and communication and reduce irritability/anxious distress and related relational conflict emphasized today.     Care Plan review completed: Yes    Medication Review:  No changes to current psychiatric medication(s)    Medication Compliance:  Yes - Does report occasionally forgetting medications; reviewed strategies to help remember take them as directed.     Changes in Health Issues:   None reported    Chemical Use Review:   Substance Use: Chemical use reviewed, no active concerns identified      Tobacco Use: No current tobacco use.      Assessment: Current Emotional / Mental Status (status of significant symptoms):  Risk status (Self / Other harm or suicidal ideation)  Patient has had a history of suicidal ideation: passive thoughts in the remote past, 2000 these last occurred  Patient denies current fears or concerns for personal safety.     Patient denies current or recent suicidal ideation or behaviors.  Patient denies current or recent homicidal ideation or behaviors.  Patient denies  current or recent self injurious behavior or ideation.  Patient denies other safety concerns.     A safety and risk management plan has not been developed at this time, however patient was encouraged to call [place] [phone] should there be a change in any of these risk factors.    Appearance:   Appropriate   Eye Contact:   Good   Psychomotor Behavior: Normal   Attitude:   Cooperative  Friendly Pleasant  Orientation:   All  Speech   Rate / Production: Slow    Volume:  Normal   Mood:    Normal  Affect:    Appropriate   Thought Content:  Clear   Thought Form:  Coherent  Logical   Insight:    Good     Diagnoses:  1. Major depressive disorder, recurrent episode, moderate (H)    2. Generalized anxiety disorder        Collateral Reports Completed:  Not Applicable    Plan: (Homework, other):  Patient was given information about behavioral services and encouraged to schedule a follow up appointment with the clinic [facility] in 2 weeks.  He was also given strategies to improve listening and reduce anxiety/irritabililty.  CD Recommendations: No indications of CD issues.     Wayne Farmer LP  9/8/2020    _____________________________________________________________________________________  [facility] Integrated Behavioral Health Treatment Plan    Client's Name: Melo Dangelo  YOB: 1940    Date: 8/20/2020    DSM-V Diagnoses: 296.32 (F33.1) Major Depressive Disorder, Recurrent Episode, Moderate _; 300.02 (F41.1) Generalized Anxiety Disorder   Psychosocial / Contextual Factors: marital conflict  WHODAS: 17  CGI: 4     Referral / Collaboration:  Will collaborate with care team as indicated during treatment.    Anticipated number of session or this episode of care: 10-15      MeasurableTreatment Goal(s) related to diagnosis / functional impairment(s)  Goal 1:  Patient will experience a reduction in depressive and anxious symptoms, along with a corresponding increase in positive emotion and life  satisfaction.    Objective #A: Patient will experience a reduction in depressed mood, will develop more effective coping skills to manage depressive symptoms, will develop healthy cognitive patterns and beliefs, will increase ability to function adaptively and will continue to take medications as prescribed / participate in supportive activities and services    Status: Continued - Date(s): 8/20/2020    Objective #B: Patient will experience a reduction in anxiety, will develop more effective coping skills to manage anxiety symptoms, will develop healthy cognitive patterns and beliefs and will increase ability to function adaptively  Status: Continued - Date(s): 8/20/2020    Objective #C: Patient will develop better understanding of triggers and coping strategies to stabilize mood  Status: Continued - Date(s): 8/20/2020    Goal 2:  Patient will identify and increase engagement in valued activity, i.e. improving social connections/relationships, pursuing occupational goals or personally meaningful pursuits, exploration of meaning in life.     Objective #A: Patient will identify meaningful activity in social, occupational and  personal goals, and increase behavioral activation around these goals   Status: Continued - Date(s): 8/20/2020    Objective #B: Patient will address relationship difficulties in a more adaptive manner  Status: Continued - Date(s): 8/20/2020    Objective #C: Patient will develop coping/problem-solving skills to facilitate more adaptive adjustment and will effectively address problems that interfere with adaptive functioning  Status: Continued - Date(s): 8/20/2020    Goal 3:  Patient will address irritability and anger difficulties in a more adaptive manner.    Objective #A: Patient will learn strategies to resolve conflict adaptively   Status: Continued - Date(s): 8/20/2020    Objective #B: Patient will learn and practice positive anger management skills   Status: Continued - Date(s):  8/20/2020    Objective #C: Patient will increse understanding of triggers for irritability and anger experiences  Status: Continued - Date(s): 8/20/2020    Possible Therapeutic Intervention(s)  Psycho-education regarding mental health diagnoses and treatment options    Skills training    Explore skills useful to client in current situation.    Skills include assertiveness, communication, conflict management, problem-solving, relaxation, etc.    Solution-Focused Therapy    Explore patterns in patient's relationships and discuss options for new behaviors.    Explore patterns in patient's actions and choices and discuss options for new behaviors.    Cognitive-behavioral Therapy    Discuss common cognitive distortions, identify them in patient's life.    Explore ways to challenge, replace, and act against these cognitions.    Acceptance and Commitment Therapy    Explore and identify important values in patient's life.    Discuss ways to commit to behavioral activation around these values.    Psychodynamic psychotherapy    Discuss patient's emotional dynamics and issues and how they impact behaviors.    Explore patient's history of relationships and how they impact present behaviors.    Explore how to work with and make changes in these schemas and patterns.    Narrative Therapy    Explore the patient's story of his/her life from his/her perspective.    Explore alternate ways of understanding their experience, identifying exceptions, developing new themes.    Interpersonal Psychotherapy    Explore patterns in relationships that are effective or ineffective at helping patient reach their goals, find satisfying experience.    Discuss new patterns or behaviors to engage in for improved social functioning.    Behavioral Activation    Discuss steps patient can take to become more involved in meaningful activity.    Identify barriers to these activities and explore possible solutions.    Mindfulness-Based Strategies    Discuss  skills based on development and application of mindfulness.    Skills drawn from compassion-focused therapy, dialectical behavior therapy, mindfulness-based stress reduction, mindfulness-based cognitive therapy, etc.      We have developed these goals together during our work to this point. Patient has assisted in the development of these goals and has agreed to this treatment plan.       Wayne Farmer, LUCAS  August 20, 2020

## 2020-09-21 ENCOUNTER — VIRTUAL VISIT (OUTPATIENT)
Dept: BEHAVIORAL HEALTH | Facility: CLINIC | Age: 80
End: 2020-09-21
Payer: MEDICARE

## 2020-09-21 DIAGNOSIS — F33.1 MAJOR DEPRESSIVE DISORDER, RECURRENT EPISODE, MODERATE (H): Primary | ICD-10-CM

## 2020-09-21 DIAGNOSIS — F41.1 GENERALIZED ANXIETY DISORDER: ICD-10-CM
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
## 2020-10-05 ENCOUNTER — VIRTUAL VISIT (OUTPATIENT)
Dept: BEHAVIORAL HEALTH | Facility: CLINIC | Age: 80
End: 2020-10-05
Payer: MEDICARE

## 2020-10-05 DIAGNOSIS — F33.1 MAJOR DEPRESSIVE DISORDER, RECURRENT EPISODE, MODERATE (H): Primary | ICD-10-CM

## 2020-10-05 PROCEDURE — 90834 PSYTX W PT 45 MINUTES: CPT | Mod: 95 | Performed by: PSYCHOLOGIST

## 2020-10-05 NOTE — PROGRESS NOTES
"[facility]: Integrated Behavioral Health  October 5, 2020      Behavioral Health Clinician Progress Note    Patient Name: Melo Dangelo           Service Type: Individual      Session Start Time: 11:00  Session End Time: 11:50       Session Length: 38 - 52      Attendees: Patient    Visit Activities (Refresh list every visit): [facility] Only and Phone Encounter    Melo Dangelo is a 79 year old male who is being evaluated via a telephone visit.      The patient has been notified of the following:     \"We have found that certain health care needs can be provided without the need for a face to face visit.  This service lets us provide the care you need with a short phone conversation.      I will have full access to your [place] medical record during this entire phone call.   I will be taking notes for your medical record.     Since this is like an office visit, we will bill your insurance company for this service.  Please check with your medical insurance if this type of telephone visit/virtual care is covered.  You may be responsible for the cost of this service if insurance coverage is denied.      There are potential benefits and risks of telephone visits (e.g. limits to patient confidentiality) that differ from in-person visits.?  Confidentiality still applies for telephone services, and nobody will record the visit.  It is important to be in a quiet, private space that is free of distractions (including cell phone or other devices) during the visit.??     If during the course of the call I believe a telephone visit is not appropriate, you will not be charged for this service\"    Consent has been obtained for this service by care team member: yes.    Diagnostic Assessment Date: 7/21/2020  Treatment Plan Review Date: 10/29/2020  See Flowsheets for today's PHQ-9 and SHAINA-7 results  Previous PHQ-9:   PHQ-9 SCORE 7/21/2020   PHQ-9 Total Score MyChart 10 (Moderate depression)   PHQ-9 " Total Score 10     Previous SHAINA-7:   SHAINA-7 SCORE 7/21/2020   Total Score 10 (moderate anxiety)   Total Score 10       RASHMI LEVEL:  No flowsheet data found.    DATA  Extended Session (60+ minutes): No  Interactive Complexity: No  Crisis: No    Treatment Objective(s) Addressed in This Session:  Target Behavior(s): disease management/lifestyle changes related to depression, anxiety, and related sleep difficulties.    Depressed Mood: Decrease frequency and intensity of feeling down, depressed, hopeless  Anxiety: will experience a reduction in anxiety and will develop more effective coping skills to manage anxiety symptoms  Relationship Problems: will address relationship difficulties in a more adaptive manner    Current Stressors / Issues:  Melo reports feeling more down and depressed lately, noting he feels as though his outlook continues to remain negative/pessimistic. He inquired about medication changes and we explored the options of talking with his PCP or a consult with Geoff Solano MD. Provided an overview of psychiatry's role in [facility]. Melo indicated he thought a review of his medications would be helpful and he is open to exploring alternative medicines, as he has been on his current regiment for some time and continues to feel down. Placed referral for [facility].     We additionally reviewed options for increased levels of care, including a referral to the 55+ program. Melo declined this offer for now, noting he wants to continue individual counseling with me and look into medication changes. We agreed to stick with this plan and review progress in a few weeks.     Melo indicated his primary goal today was to improve his tendency to have a negative outlook on future situations. We explored possible reasons for pattern of thinking, including his experience as a  when he had to anticipate the worst quite often. Through other CBT strategies, we looked at possible ways we  could change this pattern of thinking. We also explored broad fears and underlying conflicts with his self-worth. Based on his report, we discussed ways to build self-efficacy and belonging through active listening strategies we explored today.       Progress on Treatment Objective(s) / Homework:  Satisfactory progress - ACTION (Actively working towards change); Intervened by reinforcing change plan / affirming steps taken    Motivational Interviewing    MI Intervention: Expressed Empathy/Understanding, Supported Autonomy, Collaboration, Evocation, Permission to raise concern or advise, Open-ended questions, Reflections: simple and complex, Change talk (evoked) and Reframe     Change Talk Expressed by the Patient: Desire to change Taking steps    Provider Response to Change Talk: E - Evoked more info from patient about behavior change, A - Affirmed patient's thoughts, decisions, or attempts at behavior change, R - Reflected patient's change talk and S - Summarized patient's change talk statements    CBT strategies emphasized today    Care Plan review completed: Not today    Medication Review:  No changes to current psychiatric medication(s)    Medication Compliance:  Yes - Does report occasionally forgetting medications; reviewed strategies to help remember take them as directed.     Changes in Health Issues:   None reported    Chemical Use Review:   Substance Use: Chemical use reviewed, no active concerns identified      Tobacco Use: No current tobacco use.      Assessment: Current Emotional / Mental Status (status of significant symptoms):  Risk status (Self / Other harm or suicidal ideation)  Patient has had a history of suicidal ideation: passive thoughts in the remote past, 2000 these last occurred  Patient denies current fears or concerns for personal safety.     Patient denies current or recent suicidal ideation or behaviors.  Patient denies current or recent homicidal ideation or behaviors.  Patient denies  current or recent self injurious behavior or ideation.  Patient denies other safety concerns.     A safety and risk management plan has not been developed at this time, however patient was encouraged to call [place] [id number] should there be a change in any of these risk factors.    Appearance:   Appropriate   Eye Contact:   Good   Psychomotor Behavior: Normal   Attitude:   Cooperative  Friendly Pleasant  Orientation:   All  Speech   Rate / Production: Slow    Volume:  Normal   Mood:    Normal  Affect:    Appropriate   Thought Content:  Clear   Thought Form:  Coherent  Logical   Insight:    Good     Diagnoses:  1. Major depressive disorder, recurrent episode, moderate (H)        Collateral Reports Completed:  Not Applicable    Plan: (Homework, other):  Patient was given information about behavioral services and encouraged to schedule a follow up appointment with the clinic [facility] in 2 weeks.  He was also given information about mental health symptoms and treatment options  and Cognitive Behavioral Therapy skills to practice when experiencing depression.  CD Recommendations: No indications of CD issues.     Wayne Farmer LP  10/5/2020    _____________________________________________________________________________________  [facility] Integrated Behavioral Health Treatment Plan    Client's Name: Melo Dangelo  YOB: 1940    Date: 8/20/2020    DSM-V Diagnoses: 296.32 (F33.1) Major Depressive Disorder, Recurrent Episode, Moderate _; 300.02 (F41.1) Generalized Anxiety Disorder   Psychosocial / Contextual Factors: marital conflict  WHODAS: 17  CGI: 4     Referral / Collaboration:  Will collaborate with care team as indicated during treatment.    Anticipated number of session or this episode of care: 10-15      MeasurableTreatment Goal(s) related to diagnosis / functional impairment(s)  Goal 1:  Patient will experience a reduction in depressive and anxious symptoms, along with a corresponding increase in  positive emotion and life satisfaction.    Objective #A: Patient will experience a reduction in depressed mood, will develop more effective coping skills to manage depressive symptoms, will develop healthy cognitive patterns and beliefs, will increase ability to function adaptively and will continue to take medications as prescribed / participate in supportive activities and services    Status: Continued - Date(s): 8/20/2020    Objective #B: Patient will experience a reduction in anxiety, will develop more effective coping skills to manage anxiety symptoms, will develop healthy cognitive patterns and beliefs and will increase ability to function adaptively  Status: Continued - Date(s): 8/20/2020    Objective #C: Patient will develop better understanding of triggers and coping strategies to stabilize mood  Status: Continued - Date(s): 8/20/2020    Goal 2:  Patient will identify and increase engagement in valued activity, i.e. improving social connections/relationships, pursuing occupational goals or personally meaningful pursuits, exploration of meaning in life.     Objective #A: Patient will identify meaningful activity in social, occupational and  personal goals, and increase behavioral activation around these goals   Status: Continued - Date(s): 8/20/2020    Objective #B: Patient will address relationship difficulties in a more adaptive manner  Status: Continued - Date(s): 8/20/2020    Objective #C: Patient will develop coping/problem-solving skills to facilitate more adaptive adjustment and will effectively address problems that interfere with adaptive functioning  Status: Continued - Date(s): 8/20/2020    Goal 3:  Patient will address irritability and anger difficulties in a more adaptive manner.    Objective #A: Patient will learn strategies to resolve conflict adaptively   Status: Continued - Date(s): 8/20/2020    Objective #B: Patient will learn and practice positive anger management skills   Status:  Continued - Date(s): 8/20/2020    Objective #C: Patient will increse understanding of triggers for irritability and anger experiences  Status: Continued - Date(s): 8/20/2020    Possible Therapeutic Intervention(s)  Psycho-education regarding mental health diagnoses and treatment options    Skills training    Explore skills useful to client in current situation.    Skills include assertiveness, communication, conflict management, problem-solving, relaxation, etc.    Solution-Focused Therapy    Explore patterns in patient's relationships and discuss options for new behaviors.    Explore patterns in patient's actions and choices and discuss options for new behaviors.    Cognitive-behavioral Therapy    Discuss common cognitive distortions, identify them in patient's life.    Explore ways to challenge, replace, and act against these cognitions.    Acceptance and Commitment Therapy    Explore and identify important values in patient's life.    Discuss ways to commit to behavioral activation around these values.    Psychodynamic psychotherapy    Discuss patient's emotional dynamics and issues and how they impact behaviors.    Explore patient's history of relationships and how they impact present behaviors.    Explore how to work with and make changes in these schemas and patterns.    Narrative Therapy    Explore the patient's story of his/her life from his/her perspective.    Explore alternate ways of understanding their experience, identifying exceptions, developing new themes.    Interpersonal Psychotherapy    Explore patterns in relationships that are effective or ineffective at helping patient reach their goals, find satisfying experience.    Discuss new patterns or behaviors to engage in for improved social functioning.    Behavioral Activation    Discuss steps patient can take to become more involved in meaningful activity.    Identify barriers to these activities and explore possible solutions.    Mindfulness-Based  Strategies    Discuss skills based on development and application of mindfulness.    Skills drawn from compassion-focused therapy, dialectical behavior therapy, mindfulness-based stress reduction, mindfulness-based cognitive therapy, etc.      We have developed these goals together during our work to this point. Patient has assisted in the development of these goals and has agreed to this treatment plan.       Wayne Farmer, LUCAS  August 20, 2020

## 2020-10-15 ENCOUNTER — VIRTUAL VISIT (OUTPATIENT)
Dept: BEHAVIORAL HEALTH | Facility: CLINIC | Age: 80
End: 2020-10-15
Payer: MEDICARE

## 2020-10-15 DIAGNOSIS — F41.1 GENERALIZED ANXIETY DISORDER: ICD-10-CM

## 2020-10-15 DIAGNOSIS — F33.1 MAJOR DEPRESSIVE DISORDER, RECURRENT EPISODE, MODERATE (H): Primary | ICD-10-CM

## 2020-10-15 PROCEDURE — 90834 PSYTX W PT 45 MINUTES: CPT | Mod: 95 | Performed by: PSYCHOLOGIST

## 2020-10-15 NOTE — PROGRESS NOTES
"[facility] Integrated [facility]  October 15, 2020      Behavioral Health Clinician Progress Note    Patient Name: Melo Dangelo           Service Type: Individual      Session Start Time: 3:03  Session End Time: 3:54      Session Length: 38 - 52      Attendees: Patient    Visit Activities (Refresh list every visit): [facility] [place] Only and Phone Encounter    Melo Dangelo is a 79 year old male who is being evaluated via a telephone visit.      The patient has been notified of the following:     \"We have found that certain health care needs can be provided without the need for a face to face visit.  This service lets us provide the care you need with a short phone conversation.      I will have full access to your [place] medical record during this entire phone call.   I will be taking notes for your medical record.     Since this is like an office visit, we will bill your insurance company for this service.  Please check with your medical insurance if this type of telephone visit/virtual care is covered.  You may be responsible for the cost of this service if insurance coverage is denied.      There are potential benefits and risks of telephone visits (e.g. limits to patient confidentiality) that differ from in-person visits.?  Confidentiality still applies for telephone services, and nobody will record the visit.  It is important to be in a quiet, private space that is free of distractions (including cell phone or other devices) during the visit.??     If during the course of the call I believe a telephone visit is not appropriate, you will not be charged for this service\"    Consent has been obtained for this service by care team member: yes.    Diagnostic Assessment Date: 7/21/2020  Treatment Plan Review Date: 10/29/2020  See Flowsheets for today's PHQ-9 and SHAINA-7 results  Previous PHQ-9:   PHQ-9 SCORE 7/21/2020   PHQ-9 Total Score MyChart 10 (Moderate depression)   PHQ-9 " Total Score 10     Previous SHAINA-7:   SHAINA-7 SCORE 7/21/2020   Total Score 10 (moderate anxiety)   Total Score 10       RASHMI LEVEL:  No flowsheet data found.    DATA  Extended Session (60+ minutes): No  Interactive Complexity: No  Crisis: No    Treatment Objective(s) Addressed in This Session:  Target Behavior(s): disease management/lifestyle changes related to depression, anxiety, and related sleep difficulties.    Depressed Mood: Decrease frequency and intensity of feeling down, depressed, hopeless  Anxiety: will experience a reduction in anxiety and will develop more effective coping skills to manage anxiety symptoms  Relationship Problems: will address relationship difficulties in a more adaptive manner    Current Stressors / Issues:  Melo reported he has been doing better overall, but he is not sure why though. We tried reflecting on what was better for him over the last few weeks, but Melo indictaed he was unsure. We did identify that he might have been engaging in more mastery-oriented and enjoyable tasks lately like wood working or fixing matters with his computer. We discussed how he has been able to manage frustrations/set backs as he works toward these goals. We explored an assignment of rewatching a few wood-working videos and making note of how others manage stress/set backs to improve adaptive coping in himself for our next session. We also talked about how to better adaptive to relationship stress, particularly with his wife. Explored ways to adaptively respond to stressful situations or times where he feels criticized.     Progress on Treatment Objective(s) / Homework:  Satisfactory progress - ACTION (Actively working towards change); Intervened by reinforcing change plan / affirming steps taken    Motivational Interviewing    MI Intervention: Expressed Empathy/Understanding, Supported Autonomy, Collaboration, Evocation, Permission to raise concern or advise, Open-ended questions, Reflections:  simple and complex, Change talk (evoked) and Reframe     Change Talk Expressed by the Patient: Desire to change Taking steps    Provider Response to Change Talk: E - Evoked more info from patient about behavior change, A - Affirmed patient's thoughts, decisions, or attempts at behavior change, R - Reflected patient's change talk and S - Summarized patient's change talk statements    CBT strategies emphasized today    Care Plan review completed: Not today    Medication Review:  No changes to current psychiatric medication(s)    Medication Compliance:  Yes - Does report occasionally forgetting medications; reviewed strategies to help remember take them as directed.     Changes in Health Issues:   None reported    Chemical Use Review:   Substance Use: Chemical use reviewed, no active concerns identified      Tobacco Use: No current tobacco use.      Assessment: Current Emotional / Mental Status (status of significant symptoms):  Risk status (Self / Other harm or suicidal ideation)  Patient has had a history of suicidal ideation: passive thoughts in the remote past, 2000 these last occurred  Patient denies current fears or concerns for personal safety.     Patient denies current or recent suicidal ideation or behaviors.  Patient denies current or recent homicidal ideation or behaviors.  Patient denies current or recent self injurious behavior or ideation.  Patient denies other safety concerns.     A safety and risk management plan has not been developed at this time, however patient was encouraged to call [place] [phone] should there be a change in any of these risk factors.    Appearance:   Appropriate   Eye Contact:   Good   Psychomotor Behavior: Normal   Attitude:   Cooperative  Friendly Pleasant  Orientation:   All  Speech   Rate / Production: Slow    Volume:  Normal   Mood:    Normal  Affect:    Appropriate   Thought Content:  Clear   Thought Form:  Coherent  Logical   Insight:    Good     Diagnoses:  1. Major  depressive disorder, recurrent episode, moderate (H)    2. Generalized anxiety disorder         Collateral Reports Completed:  Not Applicable    Plan: (Homework, other):  Patient was given information about behavioral services and encouraged to schedule a follow up appointment with the clinic [facility] in 2 weeks.  He was also given information about mental health symptoms and treatment options  and Cognitive Behavioral Therapy skills to practice when experiencing depression.  CD Recommendations: No indications of CD issues.     Wayne Farmer, LUCAS  10/15/2020    _____________________________________________________________________________________  [facility] Integrated Behavioral Health Treatment Plan    Client's Name: Melo Dangelo  YOB: 1940    Date: 8/20/2020    DSM-V Diagnoses: 296.32 (F33.1) Major Depressive Disorder, Recurrent Episode, Moderate _; 300.02 (F41.1) Generalized Anxiety Disorder   Psychosocial / Contextual Factors: marital conflict  WHODAS: 17  CGI: 4     Referral / Collaboration:  Will collaborate with care team as indicated during treatment.    Anticipated number of session or this episode of care: 10-15      MeasurableTreatment Goal(s) related to diagnosis / functional impairment(s)  Goal 1:  Patient will experience a reduction in depressive and anxious symptoms, along with a corresponding increase in positive emotion and life satisfaction.    Objective #A: Patient will experience a reduction in depressed mood, will develop more effective coping skills to manage depressive symptoms, will develop healthy cognitive patterns and beliefs, will increase ability to function adaptively and will continue to take medications as prescribed / participate in supportive activities and services    Status: Continued - Date(s): 8/20/2020    Objective #B: Patient will experience a reduction in anxiety, will develop more effective coping skills to manage anxiety symptoms, will develop healthy cognitive  patterns and beliefs and will increase ability to function adaptively  Status: Continued - Date(s): 8/20/2020    Objective #C: Patient will develop better understanding of triggers and coping strategies to stabilize mood  Status: Continued - Date(s): 8/20/2020    Goal 2:  Patient will identify and increase engagement in valued activity, i.e. improving social connections/relationships, pursuing occupational goals or personally meaningful pursuits, exploration of meaning in life.     Objective #A: Patient will identify meaningful activity in social, occupational and  personal goals, and increase behavioral activation around these goals   Status: Continued - Date(s): 8/20/2020    Objective #B: Patient will address relationship difficulties in a more adaptive manner  Status: Continued - Date(s): 8/20/2020    Objective #C: Patient will develop coping/problem-solving skills to facilitate more adaptive adjustment and will effectively address problems that interfere with adaptive functioning  Status: Continued - Date(s): 8/20/2020    Goal 3:  Patient will address irritability and anger difficulties in a more adaptive manner.    Objective #A: Patient will learn strategies to resolve conflict adaptively   Status: Continued - Date(s): 8/20/2020    Objective #B: Patient will learn and practice positive anger management skills   Status: Continued - Date(s): 8/20/2020    Objective #C: Patient will increse understanding of triggers for irritability and anger experiences  Status: Continued - Date(s): 8/20/2020    Possible Therapeutic Intervention(s)  Psycho-education regarding mental health diagnoses and treatment options    Skills training    Explore skills useful to client in current situation.    Skills include assertiveness, communication, conflict management, problem-solving, relaxation, etc.    Solution-Focused Therapy    Explore patterns in patient's relationships and discuss options for new behaviors.    Explore patterns in  patient's actions and choices and discuss options for new behaviors.    Cognitive-behavioral Therapy    Discuss common cognitive distortions, identify them in patient's life.    Explore ways to challenge, replace, and act against these cognitions.    Acceptance and Commitment Therapy    Explore and identify important values in patient's life.    Discuss ways to commit to behavioral activation around these values.    Psychodynamic psychotherapy    Discuss patient's emotional dynamics and issues and how they impact behaviors.    Explore patient's history of relationships and how they impact present behaviors.    Explore how to work with and make changes in these schemas and patterns.    Narrative Therapy    Explore the patient's story of his/her life from his/her perspective.    Explore alternate ways of understanding their experience, identifying exceptions, developing new themes.    Interpersonal Psychotherapy    Explore patterns in relationships that are effective or ineffective at helping patient reach their goals, find satisfying experience.    Discuss new patterns or behaviors to engage in for improved social functioning.    Behavioral Activation    Discuss steps patient can take to become more involved in meaningful activity.    Identify barriers to these activities and explore possible solutions.    Mindfulness-Based Strategies    Discuss skills based on development and application of mindfulness.    Skills drawn from compassion-focused therapy, dialectical behavior therapy, mindfulness-based stress reduction, mindfulness-based cognitive therapy, etc.      We have developed these goals together during our work to this point. Patient has assisted in the development of these goals and has agreed to this treatment plan.       Wayne Farmer, LUCAS  August 20, 2020

## 2020-10-21 ENCOUNTER — COMMUNICATION - HEALTHEAST (OUTPATIENT)
Dept: SCHEDULING | Facility: CLINIC | Age: 80
End: 2020-10-21

## 2020-10-22 ENCOUNTER — COMMUNICATION - HEALTHEAST (OUTPATIENT)
Dept: SCHEDULING | Facility: CLINIC | Age: 80
End: 2020-10-22

## 2020-10-26 DIAGNOSIS — R05.9 COUGH: ICD-10-CM

## 2020-10-26 PROCEDURE — U0003 INFECTIOUS AGENT DETECTION BY NUCLEIC ACID (DNA OR RNA); SEVERE ACUTE RESPIRATORY SYNDROME CORONAVIRUS 2 (SARS-COV-2) (CORONAVIRUS DISEASE [COVID-19]), AMPLIFIED PROBE TECHNIQUE, MAKING USE OF HIGH THROUGHPUT TECHNOLOGIES AS DESCRIBED BY CMS-2020-01-R: HCPCS | Performed by: NURSE PRACTITIONER

## 2020-10-27 ENCOUNTER — APPOINTMENT (OUTPATIENT)
Dept: GENERAL RADIOLOGY | Facility: CLINIC | Age: 80
DRG: 177 | End: 2020-10-27
Attending: EMERGENCY MEDICINE
Payer: MEDICARE

## 2020-10-27 ENCOUNTER — HOSPITAL ENCOUNTER (INPATIENT)
Facility: CLINIC | Age: 80
LOS: 2 days | Discharge: HOME | DRG: 177 | End: 2020-10-29
Attending: EMERGENCY MEDICINE | Admitting: STUDENT IN AN ORGANIZED HEALTH CARE EDUCATION/TRAINING PROGRAM
Payer: MEDICARE

## 2020-10-27 ENCOUNTER — NURSE TRIAGE (OUTPATIENT)
Dept: NURSING | Facility: CLINIC | Age: 80
End: 2020-10-27

## 2020-10-27 DIAGNOSIS — F41.1 GENERALIZED ANXIETY DISORDER: ICD-10-CM

## 2020-10-27 DIAGNOSIS — I48.0 PAROXYSMAL ATRIAL FIBRILLATION (H): Primary | ICD-10-CM

## 2020-10-27 DIAGNOSIS — R09.02 HYPOXIA: ICD-10-CM

## 2020-10-27 DIAGNOSIS — K59.00 CONSTIPATION, UNSPECIFIED CONSTIPATION TYPE: ICD-10-CM

## 2020-10-27 DIAGNOSIS — U07.1 2019 NOVEL CORONAVIRUS DISEASE (COVID-19): ICD-10-CM

## 2020-10-27 LAB
ALBUMIN SERPL-MCNC: 3.2 G/DL (ref 3.4–5)
ALP SERPL-CCNC: 50 U/L (ref 40–150)
ALT SERPL W P-5'-P-CCNC: 27 U/L (ref 0–70)
ANION GAP SERPL CALCULATED.3IONS-SCNC: 8 MMOL/L (ref 3–14)
APTT PPP: 30 SEC (ref 22–37)
AST SERPL W P-5'-P-CCNC: 52 U/L (ref 0–45)
BASOPHILS # BLD AUTO: 0 10E9/L (ref 0–0.2)
BASOPHILS NFR BLD AUTO: 0.1 %
BILIRUB SERPL-MCNC: 0.7 MG/DL (ref 0.2–1.3)
BUN SERPL-MCNC: 24 MG/DL (ref 7–30)
CALCIUM SERPL-MCNC: 8.9 MG/DL (ref 8.5–10.1)
CHLORIDE SERPL-SCNC: 105 MMOL/L (ref 94–109)
CO2 SERPL-SCNC: 23 MMOL/L (ref 20–32)
CREAT SERPL-MCNC: 1.67 MG/DL (ref 0.66–1.25)
CRP SERPL-MCNC: 100 MG/L (ref 0–8)
D DIMER PPP FEU-MCNC: 1.9 UG/ML FEU (ref 0–0.5)
DIFFERENTIAL METHOD BLD: NORMAL
EOSINOPHIL # BLD AUTO: 0 10E9/L (ref 0–0.7)
EOSINOPHIL NFR BLD AUTO: 0 %
ERYTHROCYTE [DISTWIDTH] IN BLOOD BY AUTOMATED COUNT: 13.4 % (ref 10–15)
ERYTHROCYTE [SEDIMENTATION RATE] IN BLOOD BY WESTERGREN METHOD: 30 MM/H (ref 0–20)
FERRITIN SERPL-MCNC: 444 NG/ML (ref 26–388)
GFR SERPL CREATININE-BSD FRML MDRD: 38 ML/MIN/{1.73_M2}
GLUCOSE SERPL-MCNC: 101 MG/DL (ref 70–99)
HCT VFR BLD AUTO: 41.2 % (ref 40–53)
HGB BLD-MCNC: 13.4 G/DL (ref 13.3–17.7)
IMM GRANULOCYTES # BLD: 0 10E9/L (ref 0–0.4)
IMM GRANULOCYTES NFR BLD: 0.5 %
INR PPP: 1.05 (ref 0.86–1.14)
LACTATE BLD-SCNC: 2.2 MMOL/L (ref 0.7–2)
LDH SERPL L TO P-CCNC: 307 U/L (ref 85–227)
LYMPHOCYTES # BLD AUTO: 1 10E9/L (ref 0.8–5.3)
LYMPHOCYTES NFR BLD AUTO: 12.7 %
MCH RBC QN AUTO: 29.7 PG (ref 26.5–33)
MCHC RBC AUTO-ENTMCNC: 32.5 G/DL (ref 31.5–36.5)
MCV RBC AUTO: 91 FL (ref 78–100)
MONOCYTES # BLD AUTO: 0.7 10E9/L (ref 0–1.3)
MONOCYTES NFR BLD AUTO: 8.9 %
NEUTROPHILS # BLD AUTO: 6.2 10E9/L (ref 1.6–8.3)
NEUTROPHILS NFR BLD AUTO: 77.8 %
NRBC # BLD AUTO: 0 10*3/UL
NRBC BLD AUTO-RTO: 0 /100
NT-PROBNP SERPL-MCNC: 634 PG/ML (ref 0–1800)
PLATELET # BLD AUTO: 185 10E9/L (ref 150–450)
POTASSIUM SERPL-SCNC: 3.9 MMOL/L (ref 3.4–5.3)
PROCALCITONIN SERPL-MCNC: 0.1 NG/ML
PROT SERPL-MCNC: 7.6 G/DL (ref 6.8–8.8)
RBC # BLD AUTO: 4.51 10E12/L (ref 4.4–5.9)
SARS-COV-2 RNA SPEC QL NAA+PROBE: ABNORMAL
SODIUM SERPL-SCNC: 136 MMOL/L (ref 133–144)
SPECIMEN SOURCE: ABNORMAL
TROPONIN I SERPL-MCNC: 0.03 UG/L (ref 0–0.04)
WBC # BLD AUTO: 8 10E9/L (ref 4–11)

## 2020-10-27 PROCEDURE — 96368 THER/DIAG CONCURRENT INF: CPT | Performed by: EMERGENCY MEDICINE

## 2020-10-27 PROCEDURE — 84484 ASSAY OF TROPONIN QUANT: CPT | Performed by: EMERGENCY MEDICINE

## 2020-10-27 PROCEDURE — 94640 AIRWAY INHALATION TREATMENT: CPT | Performed by: EMERGENCY MEDICINE

## 2020-10-27 PROCEDURE — 99285 EMERGENCY DEPT VISIT HI MDM: CPT | Mod: 25 | Performed by: EMERGENCY MEDICINE

## 2020-10-27 PROCEDURE — 85610 PROTHROMBIN TIME: CPT | Performed by: EMERGENCY MEDICINE

## 2020-10-27 PROCEDURE — 83615 LACTATE (LD) (LDH) ENZYME: CPT | Performed by: EMERGENCY MEDICINE

## 2020-10-27 PROCEDURE — 83690 ASSAY OF LIPASE: CPT | Performed by: EMERGENCY MEDICINE

## 2020-10-27 PROCEDURE — 258N000003 HC RX IP 258 OP 636: Performed by: EMERGENCY MEDICINE

## 2020-10-27 PROCEDURE — 99223 1ST HOSP IP/OBS HIGH 75: CPT | Mod: AI | Performed by: STUDENT IN AN ORGANIZED HEALTH CARE EDUCATION/TRAINING PROGRAM

## 2020-10-27 PROCEDURE — 93005 ELECTROCARDIOGRAM TRACING: CPT | Performed by: EMERGENCY MEDICINE

## 2020-10-27 PROCEDURE — 71045 X-RAY EXAM CHEST 1 VIEW: CPT

## 2020-10-27 PROCEDURE — 99291 CRITICAL CARE FIRST HOUR: CPT | Mod: 25 | Performed by: EMERGENCY MEDICINE

## 2020-10-27 PROCEDURE — 86900 BLOOD TYPING SEROLOGIC ABO: CPT | Performed by: STUDENT IN AN ORGANIZED HEALTH CARE EDUCATION/TRAINING PROGRAM

## 2020-10-27 PROCEDURE — 81003 URINALYSIS AUTO W/O SCOPE: CPT | Performed by: EMERGENCY MEDICINE

## 2020-10-27 PROCEDURE — 96365 THER/PROPH/DIAG IV INF INIT: CPT | Performed by: EMERGENCY MEDICINE

## 2020-10-27 PROCEDURE — 86901 BLOOD TYPING SEROLOGIC RH(D): CPT | Performed by: STUDENT IN AN ORGANIZED HEALTH CARE EDUCATION/TRAINING PROGRAM

## 2020-10-27 PROCEDURE — 82728 ASSAY OF FERRITIN: CPT | Performed by: EMERGENCY MEDICINE

## 2020-10-27 PROCEDURE — 85652 RBC SED RATE AUTOMATED: CPT | Performed by: EMERGENCY MEDICINE

## 2020-10-27 PROCEDURE — 82550 ASSAY OF CK (CPK): CPT | Performed by: EMERGENCY MEDICINE

## 2020-10-27 PROCEDURE — 71045 X-RAY EXAM CHEST 1 VIEW: CPT | Mod: 26 | Performed by: RADIOLOGY

## 2020-10-27 PROCEDURE — 85025 COMPLETE CBC W/AUTO DIFF WBC: CPT | Performed by: EMERGENCY MEDICINE

## 2020-10-27 PROCEDURE — 250N000011 HC RX IP 250 OP 636: Performed by: EMERGENCY MEDICINE

## 2020-10-27 PROCEDURE — 83880 ASSAY OF NATRIURETIC PEPTIDE: CPT | Performed by: EMERGENCY MEDICINE

## 2020-10-27 PROCEDURE — 83605 ASSAY OF LACTIC ACID: CPT | Performed by: EMERGENCY MEDICINE

## 2020-10-27 PROCEDURE — 96366 THER/PROPH/DIAG IV INF ADDON: CPT | Performed by: EMERGENCY MEDICINE

## 2020-10-27 PROCEDURE — 93010 ELECTROCARDIOGRAM REPORT: CPT | Performed by: EMERGENCY MEDICINE

## 2020-10-27 PROCEDURE — 83735 ASSAY OF MAGNESIUM: CPT | Performed by: EMERGENCY MEDICINE

## 2020-10-27 PROCEDURE — 96375 TX/PRO/DX INJ NEW DRUG ADDON: CPT | Performed by: EMERGENCY MEDICINE

## 2020-10-27 PROCEDURE — 85730 THROMBOPLASTIN TIME PARTIAL: CPT | Performed by: EMERGENCY MEDICINE

## 2020-10-27 PROCEDURE — 87086 URINE CULTURE/COLONY COUNT: CPT | Performed by: EMERGENCY MEDICINE

## 2020-10-27 PROCEDURE — 120N000002 HC R&B MED SURG/OB UMMC

## 2020-10-27 PROCEDURE — 86140 C-REACTIVE PROTEIN: CPT | Performed by: EMERGENCY MEDICINE

## 2020-10-27 PROCEDURE — 84145 PROCALCITONIN (PCT): CPT | Performed by: EMERGENCY MEDICINE

## 2020-10-27 PROCEDURE — 87040 BLOOD CULTURE FOR BACTERIA: CPT | Performed by: EMERGENCY MEDICINE

## 2020-10-27 PROCEDURE — 85379 FIBRIN DEGRADATION QUANT: CPT | Performed by: EMERGENCY MEDICINE

## 2020-10-27 PROCEDURE — 80053 COMPREHEN METABOLIC PANEL: CPT | Performed by: EMERGENCY MEDICINE

## 2020-10-27 RX ORDER — MAGNESIUM SULFATE HEPTAHYDRATE 40 MG/ML
2 INJECTION, SOLUTION INTRAVENOUS ONCE
Status: COMPLETED | OUTPATIENT
Start: 2020-10-27 | End: 2020-10-28

## 2020-10-27 RX ORDER — SODIUM CHLORIDE 9 MG/ML
INJECTION, SOLUTION INTRAVENOUS CONTINUOUS
Status: DISCONTINUED | OUTPATIENT
Start: 2020-10-27 | End: 2020-10-27

## 2020-10-27 RX ORDER — DEXAMETHASONE SODIUM PHOSPHATE 10 MG/ML
6 INJECTION, SOLUTION INTRAMUSCULAR; INTRAVENOUS ONCE
Status: COMPLETED | OUTPATIENT
Start: 2020-10-27 | End: 2020-10-27

## 2020-10-27 RX ORDER — ONDANSETRON 2 MG/ML
4 INJECTION INTRAMUSCULAR; INTRAVENOUS ONCE
Status: COMPLETED | OUTPATIENT
Start: 2020-10-27 | End: 2020-10-27

## 2020-10-27 RX ADMIN — DEXAMETHASONE SODIUM PHOSPHATE 6 MG: 10 INJECTION, SOLUTION INTRAMUSCULAR; INTRAVENOUS at 20:47

## 2020-10-27 RX ADMIN — SODIUM CHLORIDE: 9 INJECTION, SOLUTION INTRAVENOUS at 21:53

## 2020-10-27 RX ADMIN — ONDANSETRON 4 MG: 2 INJECTION INTRAMUSCULAR; INTRAVENOUS at 20:49

## 2020-10-27 RX ADMIN — SODIUM CHLORIDE 1000 ML: 9 INJECTION, SOLUTION INTRAVENOUS at 20:57

## 2020-10-27 RX ADMIN — MAGNESIUM SULFATE IN WATER 2 G: 40 INJECTION, SOLUTION INTRAVENOUS at 20:53

## 2020-10-27 ASSESSMENT — ENCOUNTER SYMPTOMS
SHORTNESS OF BREATH: 1
NAUSEA: 1
ABDOMINAL PAIN: 1
PALPITATIONS: 0
COUGH: 1
VOMITING: 0

## 2020-10-27 ASSESSMENT — MIFFLIN-ST. JEOR: SCORE: 1579.45

## 2020-10-27 NOTE — TELEPHONE ENCOUNTER
He  Was checking on his and his wife's covid results.  His  Was not in but hers was. She is positive. However Melo sounded very sick.  He was having trouble breathing, shortness of breath.  Discussed going to ER for supportive help.  He agrees to do so if needed.  Still awaiting results.     Additional Information    MILD difficulty breathing (e.g., minimal/no SOB at rest, SOB with walking, pulse < 100) of new onset or worse than normal     Possibly with covid. Will go to ER as needed he states.    Protocols used: BREATHING DIFFICULTY-A-OH

## 2020-10-27 NOTE — TELEPHONE ENCOUNTER
Patient called, unable to stop coughing. Every time he try's to talk he starts coughing.     About a week and a half ago he started getting bad headaches and coughing.     Felt like eyes were bulging.  Subsided a little but the coughing became more and more.     Especially when exerting himself.  Example: dishes would take 5 minutes to do but now needs to sit down and then go back to them.     At night if he lays on side, the coughing will subside and able to sleep but then will wake with nausea.      Yesterday had COVID test, wife came back positive and patients test did not come back yet.     Coughs and cannot stop until he is out of breath.  Has dizziness and off balance.  Called 911 and EMTs said not bad enough to take to hospital.  Told him that he would need to be passed out for them to take him.     Advised ED per protocol.     Deyanira Perez, RN/[facility] Nurse Advisors    COVID 19 Nurse Triage Plan/Patient Instructions    Please be aware that novel coronavirus (COVID-19) may be circulating in the community. If you develop symptoms such as fever, cough, or SOB or if you have concerns about the presence of another infection including coronavirus (COVID-19), please contact your health care provider or visit [email or web address].     Disposition/Instructions    ED Visit recommended. Follow protocol based instructions.     Bring Your Own Device:  Please also bring your smart device(s) (smart phones, tablets, laptops) and their charging cables for your personal use and to communicate with your care team during your visit.    Thank you for taking steps to prevent the spread of this virus.  o Limit your contact with others.  o Wear a simple mask to cover your cough.  o Wash your hands well and often.    Resources    [facility]: About COVID-19: [email or web address]    CDC: What to Do If You're Sick: www.cdc.gov/coronavirus/2019-ncov/about/steps-when-sick.html    CDC: Ending Home Isolation:  www.cdc.gov/coronavirus/2019-ncov/hcp/disposition-in-home-patients.html     CDC: Caring for Someone: www.cdc.gov/coronavirus/2019-ncov/if-you-are-sick/care-for-someone.html     [facility]: Interim Guidance for Hospital Discharge to Home: www.health.[facility].[place]/diseases/coronavirus/hcp/hospdischarge.pdf    [facility] clinical trials (COVID-19 research studies): clinicalaffairs.[facility]-clinical-trials     Below are the COVID-19 hotlines at the [place] Department of Health ([facility]). Interpreters are available.   o For health questions: Call 844-148-3915 or 1-801.702.1335 (7 a.m. to 7 p.m.)  o For questions about schools and childcare: Call 313-688-9653 or 1-821.369.5081 (7 a.m. to 7 p.m.)     Reason for Disposition    MODERATE difficulty breathing (e.g., speaks in phrases, SOB even at rest, pulse 100-120)    Additional Information    Negative: SEVERE difficulty breathing (e.g., struggling for each breath, speaks in single words)    Negative: Difficult to awaken or acting confused (e.g., disoriented, slurred speech)    Negative: Bluish (or gray) lips or face now    Negative: Shock suspected (e.g., cold/pale/clammy skin, too weak to stand, low BP, rapid pulse)    Negative: Sounds like a life-threatening emergency to the triager    Negative: [1] COVID-19 exposure AND [2] no symptoms    Negative: COVID-19 and Breastfeeding, questions about    Negative: [1] Adult with possible COVID-19 symptoms AND [2] triager concerned about severity of symptoms or other causes    Negative: SEVERE or constant chest pain or pressure (Exception: mild central chest pain, present only when coughing)    Protocols used: CORONAVIRUS (COVID-19) DIAGNOSED OR [id number] 8.4.20

## 2020-10-28 ENCOUNTER — APPOINTMENT (OUTPATIENT)
Dept: CARDIOLOGY | Facility: CLINIC | Age: 80
DRG: 177 | End: 2020-10-28
Attending: STUDENT IN AN ORGANIZED HEALTH CARE EDUCATION/TRAINING PROGRAM
Payer: MEDICARE

## 2020-10-28 LAB
ABO + RH BLD: NORMAL
ABO + RH BLD: NORMAL
ALBUMIN SERPL-MCNC: 2.9 G/DL (ref 3.4–5)
ALBUMIN UR-MCNC: 100 MG/DL
ALP SERPL-CCNC: 42 U/L (ref 40–150)
ALT SERPL W P-5'-P-CCNC: 29 U/L (ref 0–70)
AMORPH CRY #/AREA URNS HPF: ABNORMAL /HPF
ANION GAP SERPL CALCULATED.3IONS-SCNC: 7 MMOL/L (ref 3–14)
APPEARANCE UR: ABNORMAL
AST SERPL W P-5'-P-CCNC: 52 U/L (ref 0–45)
AT III ACT/NOR PPP CHRO: NORMAL % (ref 85–135)
BACTERIA SPEC CULT: NO GROWTH
BILIRUB SERPL-MCNC: 0.6 MG/DL (ref 0.2–1.3)
BILIRUB UR QL STRIP: NEGATIVE
BLD PROD TYP BPU: NORMAL
BLD PROD TYP BPU: NORMAL
BLD UNIT ID BPU: NORMAL
BLOOD PRODUCT CODE: NORMAL
BPU ID: NORMAL
BUN SERPL-MCNC: 28 MG/DL (ref 7–30)
C PNEUM DNA SPEC QL NAA+PROBE: NOT DETECTED
CALCIUM SERPL-MCNC: 8.1 MG/DL (ref 8.5–10.1)
CHLORIDE SERPL-SCNC: 110 MMOL/L (ref 94–109)
CK SERPL-CCNC: 778 U/L (ref 30–300)
CO2 SERPL-SCNC: 22 MMOL/L (ref 20–32)
COLOR UR AUTO: YELLOW
CREAT SERPL-MCNC: 1.4 MG/DL (ref 0.66–1.25)
CRP SERPL-MCNC: 90 MG/L (ref 0–8)
ERYTHROCYTE [DISTWIDTH] IN BLOOD BY AUTOMATED COUNT: 13.7 % (ref 10–15)
FLUAV H1 2009 PAND RNA SPEC QL NAA+PROBE: NOT DETECTED
FLUAV H1 RNA SPEC QL NAA+PROBE: NOT DETECTED
FLUAV H3 RNA SPEC QL NAA+PROBE: NOT DETECTED
FLUAV RNA SPEC QL NAA+PROBE: NOT DETECTED
FLUBV RNA SPEC QL NAA+PROBE: NOT DETECTED
GFR SERPL CREATININE-BSD FRML MDRD: 47 ML/MIN/{1.73_M2}
GLUCOSE SERPL-MCNC: 124 MG/DL (ref 70–99)
GLUCOSE UR STRIP-MCNC: NEGATIVE MG/DL
HADV DNA SPEC QL NAA+PROBE: NOT DETECTED
HCOV PNL SPEC NAA+PROBE: NOT DETECTED
HCT VFR BLD AUTO: 37.2 % (ref 40–53)
HGB BLD-MCNC: 11.7 G/DL (ref 13.3–17.7)
HGB UR QL STRIP: ABNORMAL
HMPV RNA SPEC QL NAA+PROBE: NOT DETECTED
HPIV1 RNA SPEC QL NAA+PROBE: NOT DETECTED
HPIV2 RNA SPEC QL NAA+PROBE: NOT DETECTED
HPIV3 RNA SPEC QL NAA+PROBE: NOT DETECTED
HPIV4 RNA SPEC QL NAA+PROBE: NOT DETECTED
IL6 SERPL-MCNC: 28.81 PG/ML
INTERPRETATION ECG - MUSE: NORMAL
KETONES UR STRIP-MCNC: 10 MG/DL
LEUKOCYTE ESTERASE UR QL STRIP: NEGATIVE
LIPASE SERPL-CCNC: 97 U/L (ref 73–393)
LMWH PPP CHRO-ACNC: NORMAL IU/ML
Lab: NORMAL
M PNEUMO DNA SPEC QL NAA+PROBE: NOT DETECTED
MAGNESIUM SERPL-MCNC: 2.3 MG/DL (ref 1.6–2.3)
MCH RBC QN AUTO: 29.3 PG (ref 26.5–33)
MCHC RBC AUTO-ENTMCNC: 31.5 G/DL (ref 31.5–36.5)
MCV RBC AUTO: 93 FL (ref 78–100)
MICROBIOLOGIST REVIEW: NORMAL
MUCOUS THREADS #/AREA URNS LPF: PRESENT /LPF
NITRATE UR QL: NEGATIVE
NUM BPU REQUESTED: 1
PH UR STRIP: 5.5 PH (ref 5–7)
PLATELET # BLD AUTO: 182 10E9/L (ref 150–450)
POTASSIUM SERPL-SCNC: 4.4 MMOL/L (ref 3.4–5.3)
POTASSIUM SERPL-SCNC: 4.4 MMOL/L (ref 3.4–5.3)
PROT SERPL-MCNC: 6.8 G/DL (ref 6.8–8.8)
RBC # BLD AUTO: 3.99 10E12/L (ref 4.4–5.9)
RBC #/AREA URNS AUTO: ABNORMAL /HPF (ref 0–2)
RSV RNA SPEC QL NAA+PROBE: NOT DETECTED
RSV RNA SPEC QL NAA+PROBE: NOT DETECTED
RV+EV RNA SPEC QL NAA+PROBE: NOT DETECTED
SODIUM SERPL-SCNC: 139 MMOL/L (ref 133–144)
SOURCE: ABNORMAL
SP GR UR STRIP: 1.03 (ref 1–1.03)
SPECIMEN EXP DATE BLD: NORMAL
SPECIMEN SOURCE: NORMAL
SQUAMOUS #/AREA URNS AUTO: <1 /HPF (ref 0–1)
TRANS CELLS #/AREA URNS HPF: <1 /HPF (ref 0–1)
TRANSFUSION STATUS PATIENT QL: NORMAL
TRANSFUSION STATUS PATIENT QL: NORMAL
UFH PPP CHRO-ACNC: 0.87 IU/ML
UFH PPP CHRO-ACNC: 1 IU/ML
UROBILINOGEN UR STRIP-MCNC: NORMAL MG/DL (ref 0–2)
WBC # BLD AUTO: 6.9 10E9/L (ref 4–11)
WBC #/AREA URNS AUTO: ABNORMAL /HPF (ref 0–5)

## 2020-10-28 PROCEDURE — 999N001187 HC STATISTIC CONVALESCENT PLASMA: Performed by: PATHOLOGY

## 2020-10-28 PROCEDURE — 250N000013 HC RX MED GY IP 250 OP 250 PS 637: Performed by: STUDENT IN AN ORGANIZED HEALTH CARE EDUCATION/TRAINING PROGRAM

## 2020-10-28 PROCEDURE — 99233 SBSQ HOSP IP/OBS HIGH 50: CPT | Performed by: NURSE PRACTITIONER

## 2020-10-28 PROCEDURE — 250N000013 HC RX MED GY IP 250 OP 250 PS 637: Performed by: EMERGENCY MEDICINE

## 2020-10-28 PROCEDURE — 250N000011 HC RX IP 250 OP 636: Performed by: STUDENT IN AN ORGANIZED HEALTH CARE EDUCATION/TRAINING PROGRAM

## 2020-10-28 PROCEDURE — 36415 COLL VENOUS BLD VENIPUNCTURE: CPT | Performed by: PHYSICIAN ASSISTANT

## 2020-10-28 PROCEDURE — 93306 TTE W/DOPPLER COMPLETE: CPT

## 2020-10-28 PROCEDURE — 36415 COLL VENOUS BLD VENIPUNCTURE: CPT | Performed by: PATHOLOGY

## 2020-10-28 PROCEDURE — 36415 COLL VENOUS BLD VENIPUNCTURE: CPT | Performed by: STUDENT IN AN ORGANIZED HEALTH CARE EDUCATION/TRAINING PROGRAM

## 2020-10-28 PROCEDURE — 250N000012 HC RX MED GY IP 250 OP 636 PS 637: Performed by: STUDENT IN AN ORGANIZED HEALTH CARE EDUCATION/TRAINING PROGRAM

## 2020-10-28 PROCEDURE — 85520 HEPARIN ASSAY: CPT | Performed by: STUDENT IN AN ORGANIZED HEALTH CARE EDUCATION/TRAINING PROGRAM

## 2020-10-28 PROCEDURE — 87633 RESP VIRUS 12-25 TARGETS: CPT | Performed by: EMERGENCY MEDICINE

## 2020-10-28 PROCEDURE — 87486 CHLMYD PNEUM DNA AMP PROBE: CPT | Performed by: EMERGENCY MEDICINE

## 2020-10-28 PROCEDURE — 85520 HEPARIN ASSAY: CPT | Performed by: PHYSICIAN ASSISTANT

## 2020-10-28 PROCEDURE — 84132 ASSAY OF SERUM POTASSIUM: CPT | Performed by: STUDENT IN AN ORGANIZED HEALTH CARE EDUCATION/TRAINING PROGRAM

## 2020-10-28 PROCEDURE — 999N000128 HC STATISTIC PERIPHERAL IV START W/O US GUIDANCE

## 2020-10-28 PROCEDURE — 85027 COMPLETE CBC AUTOMATED: CPT | Performed by: PATHOLOGY

## 2020-10-28 PROCEDURE — 86140 C-REACTIVE PROTEIN: CPT | Performed by: PATHOLOGY

## 2020-10-28 PROCEDURE — 83520 IMMUNOASSAY QUANT NOS NONAB: CPT | Performed by: STUDENT IN AN ORGANIZED HEALTH CARE EDUCATION/TRAINING PROGRAM

## 2020-10-28 PROCEDURE — 250N000011 HC RX IP 250 OP 636: Performed by: EMERGENCY MEDICINE

## 2020-10-28 PROCEDURE — 99231 SBSQ HOSP IP/OBS SF/LOW 25: CPT

## 2020-10-28 PROCEDURE — 999N001063 HC STATISTIC THAWING COMPONENT: Performed by: PATHOLOGY

## 2020-10-28 PROCEDURE — A9270 NON-COVERED ITEM OR SERVICE: HCPCS | Performed by: STUDENT IN AN ORGANIZED HEALTH CARE EDUCATION/TRAINING PROGRAM

## 2020-10-28 PROCEDURE — 80053 COMPREHEN METABOLIC PANEL: CPT | Performed by: PATHOLOGY

## 2020-10-28 PROCEDURE — 120N000002 HC R&B MED SURG/OB UMMC

## 2020-10-28 PROCEDURE — 93306 TTE W/DOPPLER COMPLETE: CPT | Mod: 26 | Performed by: STUDENT IN AN ORGANIZED HEALTH CARE EDUCATION/TRAINING PROGRAM

## 2020-10-28 PROCEDURE — 87581 M.PNEUMON DNA AMP PROBE: CPT | Performed by: EMERGENCY MEDICINE

## 2020-10-28 RX ORDER — MAGNESIUM SULFATE HEPTAHYDRATE 40 MG/ML
2 INJECTION, SOLUTION INTRAVENOUS DAILY PRN
Status: DISCONTINUED | OUTPATIENT
Start: 2020-10-28 | End: 2020-10-28

## 2020-10-28 RX ORDER — DEXAMETHASONE 2 MG/1
6 TABLET ORAL EVERY 24 HOURS
Status: DISCONTINUED | OUTPATIENT
Start: 2020-10-28 | End: 2020-10-29 | Stop reason: HOSPADM

## 2020-10-28 RX ORDER — CEFTRIAXONE 1 G/1
1 INJECTION, POWDER, FOR SOLUTION INTRAMUSCULAR; INTRAVENOUS ONCE
Status: COMPLETED | OUTPATIENT
Start: 2020-10-28 | End: 2020-10-28

## 2020-10-28 RX ORDER — SIMVASTATIN 20 MG
20 TABLET ORAL AT BEDTIME
Status: DISCONTINUED | OUTPATIENT
Start: 2020-10-28 | End: 2020-10-29 | Stop reason: HOSPADM

## 2020-10-28 RX ORDER — POTASSIUM CHLORIDE 750 MG/1
20-40 TABLET, EXTENDED RELEASE ORAL
Status: DISCONTINUED | OUTPATIENT
Start: 2020-10-28 | End: 2020-10-28

## 2020-10-28 RX ORDER — HYDROXYZINE HYDROCHLORIDE 25 MG/1
25 TABLET, FILM COATED ORAL EVERY 6 HOURS PRN
Status: DISCONTINUED | OUTPATIENT
Start: 2020-10-28 | End: 2020-10-29 | Stop reason: HOSPADM

## 2020-10-28 RX ORDER — POTASSIUM CHLORIDE 1.5 G/1.58G
20-40 POWDER, FOR SOLUTION ORAL
Status: DISCONTINUED | OUTPATIENT
Start: 2020-10-28 | End: 2020-10-28

## 2020-10-28 RX ORDER — ALBUTEROL SULFATE 90 UG/1
2 AEROSOL, METERED RESPIRATORY (INHALATION) ONCE
Status: COMPLETED | OUTPATIENT
Start: 2020-10-28 | End: 2020-10-28

## 2020-10-28 RX ORDER — MAGNESIUM SULFATE HEPTAHYDRATE 40 MG/ML
4 INJECTION, SOLUTION INTRAVENOUS EVERY 4 HOURS PRN
Status: DISCONTINUED | OUTPATIENT
Start: 2020-10-28 | End: 2020-10-28

## 2020-10-28 RX ORDER — ESCITALOPRAM OXALATE 20 MG/1
20 TABLET ORAL DAILY
COMMUNITY
End: 2021-03-30

## 2020-10-28 RX ORDER — BUSPIRONE HYDROCHLORIDE 5 MG/1
5 TABLET ORAL 3 TIMES DAILY
Status: DISCONTINUED | OUTPATIENT
Start: 2020-10-28 | End: 2020-10-29 | Stop reason: HOSPADM

## 2020-10-28 RX ORDER — UBIDECARENONE 75 MG
1000 CAPSULE ORAL EVERY MORNING
COMMUNITY
End: 2023-06-19

## 2020-10-28 RX ORDER — BUSPIRONE HYDROCHLORIDE 5 MG/1
5 TABLET ORAL 3 TIMES DAILY
Status: DISCONTINUED | OUTPATIENT
Start: 2020-10-28 | End: 2020-10-28

## 2020-10-28 RX ORDER — LIDOCAINE 40 MG/G
CREAM TOPICAL
Status: DISCONTINUED | OUTPATIENT
Start: 2020-10-28 | End: 2020-10-29 | Stop reason: HOSPADM

## 2020-10-28 RX ORDER — CITALOPRAM HYDROBROMIDE 10 MG/1
20 TABLET ORAL DAILY
Status: DISCONTINUED | OUTPATIENT
Start: 2020-10-28 | End: 2020-10-29

## 2020-10-28 RX ORDER — LAMOTRIGINE 200 MG/1
200 TABLET ORAL 2 TIMES DAILY
Status: DISCONTINUED | OUTPATIENT
Start: 2020-10-28 | End: 2020-10-28

## 2020-10-28 RX ORDER — METOPROLOL TARTRATE 25 MG/1
25 TABLET, FILM COATED ORAL 2 TIMES DAILY
Status: DISCONTINUED | OUTPATIENT
Start: 2020-10-28 | End: 2020-10-29 | Stop reason: HOSPADM

## 2020-10-28 RX ORDER — METOPROLOL TARTRATE 25 MG/1
25 TABLET, FILM COATED ORAL 2 TIMES DAILY
Status: DISCONTINUED | OUTPATIENT
Start: 2020-10-28 | End: 2020-10-28

## 2020-10-28 RX ORDER — IBUPROFEN 200 MG
200-400 TABLET ORAL EVERY 4 HOURS PRN
Status: ON HOLD | COMMUNITY
End: 2020-10-29

## 2020-10-28 RX ORDER — ONDANSETRON 2 MG/ML
4 INJECTION INTRAMUSCULAR; INTRAVENOUS EVERY 6 HOURS PRN
Status: DISCONTINUED | OUTPATIENT
Start: 2020-10-28 | End: 2020-10-29 | Stop reason: HOSPADM

## 2020-10-28 RX ORDER — ALBUTEROL SULFATE 90 UG/1
2 AEROSOL, METERED RESPIRATORY (INHALATION) EVERY 6 HOURS PRN
Status: DISCONTINUED | OUTPATIENT
Start: 2020-10-28 | End: 2020-10-29 | Stop reason: HOSPADM

## 2020-10-28 RX ORDER — ONDANSETRON 4 MG/1
4 TABLET, ORALLY DISINTEGRATING ORAL EVERY 6 HOURS PRN
Status: DISCONTINUED | OUTPATIENT
Start: 2020-10-28 | End: 2020-10-29 | Stop reason: HOSPADM

## 2020-10-28 RX ORDER — AMOXICILLIN 250 MG
2 CAPSULE ORAL 2 TIMES DAILY
Status: DISCONTINUED | OUTPATIENT
Start: 2020-10-28 | End: 2020-10-29 | Stop reason: HOSPADM

## 2020-10-28 RX ORDER — LAMOTRIGINE 200 MG/1
200 TABLET ORAL 2 TIMES DAILY
Status: DISCONTINUED | OUTPATIENT
Start: 2020-10-28 | End: 2020-10-29 | Stop reason: HOSPADM

## 2020-10-28 RX ORDER — HEPARIN SODIUM 10000 [USP'U]/100ML
0-5000 INJECTION, SOLUTION INTRAVENOUS CONTINUOUS
Status: DISCONTINUED | OUTPATIENT
Start: 2020-10-28 | End: 2020-10-29

## 2020-10-28 RX ORDER — CODEINE PHOSPHATE AND GUAIFENESIN 10; 100 MG/5ML; MG/5ML
10 SOLUTION ORAL EVERY 4 HOURS PRN
Status: DISCONTINUED | OUTPATIENT
Start: 2020-10-28 | End: 2020-10-29 | Stop reason: HOSPADM

## 2020-10-28 RX ORDER — BUSPIRONE HYDROCHLORIDE 5 MG/1
10 TABLET ORAL 2 TIMES DAILY
COMMUNITY
End: 2021-03-30

## 2020-10-28 RX ORDER — AMOXICILLIN 250 MG
1 CAPSULE ORAL 2 TIMES DAILY
Status: DISCONTINUED | OUTPATIENT
Start: 2020-10-28 | End: 2020-10-29 | Stop reason: HOSPADM

## 2020-10-28 RX ORDER — HEPARIN SODIUM 10000 [USP'U]/100ML
0-3500 INJECTION, SOLUTION INTRAVENOUS CONTINUOUS
Status: DISCONTINUED | OUTPATIENT
Start: 2020-10-28 | End: 2020-10-28 | Stop reason: CLARIF

## 2020-10-28 RX ORDER — AZITHROMYCIN 250 MG/1
500 TABLET, FILM COATED ORAL ONCE
Status: COMPLETED | OUTPATIENT
Start: 2020-10-28 | End: 2020-10-28

## 2020-10-28 RX ADMIN — METOPROLOL TARTRATE 25 MG: 25 TABLET, FILM COATED ORAL at 01:00

## 2020-10-28 RX ADMIN — OMEPRAZOLE 20 MG: 20 CAPSULE, DELAYED RELEASE ORAL at 08:23

## 2020-10-28 RX ADMIN — BUSPIRONE HYDROCHLORIDE 5 MG: 5 TABLET ORAL at 08:23

## 2020-10-28 RX ADMIN — LAMOTRIGINE 200 MG: 200 TABLET ORAL at 01:50

## 2020-10-28 RX ADMIN — LAMOTRIGINE 200 MG: 200 TABLET ORAL at 08:22

## 2020-10-28 RX ADMIN — CEFTRIAXONE 1 G: 1 INJECTION, POWDER, FOR SOLUTION INTRAMUSCULAR; INTRAVENOUS at 02:05

## 2020-10-28 RX ADMIN — SIMVASTATIN 20 MG: 20 TABLET, FILM COATED ORAL at 22:10

## 2020-10-28 RX ADMIN — BUSPIRONE HYDROCHLORIDE 5 MG: 5 TABLET ORAL at 19:21

## 2020-10-28 RX ADMIN — AZITHROMYCIN MONOHYDRATE 500 MG: 250 TABLET ORAL at 01:00

## 2020-10-28 RX ADMIN — GUAIFENESIN AND CODEINE PHOSPHATE 10 ML: 10; 100 LIQUID ORAL at 08:26

## 2020-10-28 RX ADMIN — HEPARIN SODIUM 1550 UNITS/HR: 10000 INJECTION, SOLUTION INTRAVENOUS at 01:45

## 2020-10-28 RX ADMIN — GUAIFENESIN AND CODEINE PHOSPHATE 10 ML: 10; 100 LIQUID ORAL at 13:45

## 2020-10-28 RX ADMIN — DEXAMETHASONE 6 MG: 2 TABLET ORAL at 16:45

## 2020-10-28 RX ADMIN — LAMOTRIGINE 200 MG: 200 TABLET ORAL at 19:18

## 2020-10-28 RX ADMIN — HYDROXYZINE HYDROCHLORIDE 25 MG: 25 TABLET, FILM COATED ORAL at 23:13

## 2020-10-28 RX ADMIN — BUSPIRONE HYDROCHLORIDE 5 MG: 5 TABLET ORAL at 13:44

## 2020-10-28 RX ADMIN — METOPROLOL TARTRATE 25 MG: 25 TABLET, FILM COATED ORAL at 08:23

## 2020-10-28 RX ADMIN — GUAIFENESIN AND CODEINE PHOSPHATE 10 ML: 10; 100 LIQUID ORAL at 23:12

## 2020-10-28 RX ADMIN — DOCUSATE SODIUM 50 MG AND SENNOSIDES 8.6 MG 1 TABLET: 8.6; 5 TABLET, FILM COATED ORAL at 08:23

## 2020-10-28 RX ADMIN — METOPROLOL TARTRATE 25 MG: 25 TABLET, FILM COATED ORAL at 19:18

## 2020-10-28 RX ADMIN — GUAIFENESIN AND CODEINE PHOSPHATE 10 ML: 10; 100 LIQUID ORAL at 19:18

## 2020-10-28 RX ADMIN — DOCUSATE SODIUM 50 MG AND SENNOSIDES 8.6 MG 1 TABLET: 8.6; 5 TABLET, FILM COATED ORAL at 19:19

## 2020-10-28 RX ADMIN — ALBUTEROL SULFATE 2 PUFF: 90 AEROSOL, METERED RESPIRATORY (INHALATION) at 00:59

## 2020-10-28 RX ADMIN — HEPARIN SODIUM 1250 UNITS/HR: 10000 INJECTION, SOLUTION INTRAVENOUS at 18:30

## 2020-10-28 RX ADMIN — BUSPIRONE HYDROCHLORIDE 5 MG: 5 TABLET ORAL at 01:50

## 2020-10-28 RX ADMIN — GUAIFENESIN AND CODEINE PHOSPHATE 10 ML: 10; 100 LIQUID ORAL at 01:52

## 2020-10-28 RX ADMIN — CITALOPRAM HYDROBROMIDE 20 MG: 20 TABLET ORAL at 08:23

## 2020-10-28 ASSESSMENT — ACTIVITIES OF DAILY LIVING (ADL)
DOING_ERRANDS_INDEPENDENTLY_DIFFICULTY: NO
DIFFICULTY_COMMUNICATING: NO
ADLS_ACUITY_SCORE: 13
ADLS_ACUITY_SCORE: 13
DIFFICULTY_EATING/SWALLOWING: NO
CONCENTRATING,_REMEMBERING_OR_MAKING_DECISIONS_DIFFICULTY: NO
FALL_HISTORY_WITHIN_LAST_SIX_MONTHS: NO
WALKING_OR_CLIMBING_STAIRS_DIFFICULTY: NO
TOILETING_ISSUES: NO
ADLS_ACUITY_SCORE: 13
DRESSING/BATHING_DIFFICULTY: NO
WEAR_GLASSES_OR_BLIND: YES
ADLS_ACUITY_SCORE: 13

## 2020-10-28 NOTE — PROGRESS NOTES
[facility]     Medicine Progress Note - Hospitalist Service, Gold 6       Date of Admission:  10/27/2020  Assessment & Plan         Melo Dangelo is an 80 year old male with past medical history of seizure disorder, CKD, depression, and anxiety admitted for acute hypoxic respiratory failure and found to be positive for COVID 19.      # Acute hypoxic respiratory failure   # COVID 19 infection   Symptom onset about 9-10 days ago.  Presented with dyspnea and persistent dry cough.  Currently requiring 2L NC for sats > 92%.  No hx underlying lung disease.  CRP 90, , AST mildly elevated 92, IL6 28.81, D dimer 1.9, , ferritin 444, sed rate 30, procal 0.10. WBCs wnl. Afebrile.     - Meets criteria for PassItOn convalescent plasma randomized control trial, pt agreeable, consent completed   - Continue suppl O2, wean as able   - Likely will transfer to [facility] or [place] tomorrow for continued treatment if still hypoxic       # New onset paroxysmal AF w/ RVR - Resolved with mag replacement.  HR wnl this AM.  Echocardiogram today 10/28 with normal EF 55-60%, slightly elevated RA pressure, no WMAs.  Started on heparin gtt and metoprolol overnight. [id number] 2.   - Continue metoprolol 25mg BID w/ hold parameters   - D/w Pharmacy, will need full treatment dose if starting Eliquis; will ask discharge pharmacy to run a test claim in AM     # Suspected MARYANN in setting of CKD - Likely pre-renal in setting of acute infection, possible dehydration.  BL Cr unclear.  Cr elevated to 1.67 on admission, improved to 1.4 today.    - Check BMP in AM     # Seizure disorder - Continue PTA Lamictal BID.     # Depression, anxiety - Follows closely with Psychiatry outpt.  On Buspar 5mg TID, Celexa 20mg daily, and Propranolol PTA.  Pt requested Psychiatry consult while inpatient.    - For now, continue PTA Buspar and Celexa           Diet: Combination Diet Regular Diet Adult    DVT  Prophylaxis: Heparin gtt for now, may switch to DOAC  Barclay Catheter: not present  Code Status: No CPR- Pre-arrest intubation OK           Disposition Plan   Expected discharge: 1-2 days, recommended to home vs [facility]  pending supp O2 requirements.  Entered: ELIZABETH Whitt CNP 10/28/2020, 7:32 AM       The patient's care was discussed with the Attending Physician, Dr. Abad George.    ELIZABETH Whitt CNP  Hospitalist Service, [address]   Contact information available via [facility] Paging/Directory  Please see sign in/sign out for up to date coverage information  ______________________________________________________________________    Interval History   Melo is resting in bed.  He reports feeling better with having been started on supplemental oxygen.  He reports developing a dry cough that has gotten significantly worse over the last week.  Reports that his wife is sick as well, and that he is her main caretaker.  He is concerned about family being able to take care of her.  Currently he denies chest pain and abdominal pain.  Denies ever having fevers.  Eating and drinking well.      Data reviewed today: I reviewed all medications, new labs and imaging results over the last 24 hours.    Physical Exam   Vital Signs: Temp: 98.5  F (36.9  C)   BP: 120/69 Pulse: 82   Resp: 10 SpO2: 97 % O2 Device: Nasal cannula Oxygen Delivery: 4 LPM  Weight: 190 lbs 4.8 oz    GENERAL: Alert and oriented x 3. Well nourished, well developed.  No acute distress.    HEENT: Normocephalic, atraumatic. Anicteric sclera. Mucous membranes moist.   CV: RRR. S1, S2. No murmurs appreciated.   RESPIRATORY: Effort normal on room air. Lungs CTAB with no wheezing, rales, or rhonchi.   GI: Abdomen soft and non distended, bowel sounds present x all 4 quadrants. No tenderness, rebound, or guarding.   NEUROLOGICAL: No focal deficits. Follows commands.  Strength equal in upper and lower  extremities.   MUSCULOSKELETAL: No joint swelling or tenderness. Moves all extremities.   EXTREMITIES: No gross deformities. No peripheral edema.   SKIN: Grossly warm, dry, and intact. No jaundice. No rashes.       Data   Recent Labs   Lab 10/28/20  1631 10/28/20  0905 10/27/20  2008   WBC 6.9  --  8.0   HGB 11.7*  --  13.4   MCV 93  --  91     --  185   INR  --   --  1.05     --  136   POTASSIUM 4.4 4.4 3.9   CHLORIDE 110*  --  105   CO2 22  --  23   BUN 28  --  24   CR 1.40*  --  1.67*   ANIONGAP 7  --  8   GIANNI 8.1*  --  8.9   *  --  101*   ALBUMIN 2.9*  --  3.2*   PROTTOTAL 6.8  --  7.6   BILITOTAL 0.6  --  0.7   ALKPHOS 42  --  50   ALT 29  --  27   AST 52*  --  52*   LIPASE  --   --  97   TROPI  --   --  0.025     Recent Results (from the past 24 hour(s))   XR Chest Port 1 View    Narrative    Exam: XR CHEST PORT 1 VW, 10/27/2020 8:20 PM    Indication: SOB / COVID+    Comparison: None    Findings:   Prominent pulmonary vasculature and diffuse interstitial opacities.  Subtle East there is a small area of atelectasis versus consolidation  in the left lower lung. No pleural effusion or pneumothorax. Cardiac  silhouette is within normal limits. Cardiac silhouette is within  normal limits. Mild calcifications in the aortic arch. No acute  findings in the upper abdomen. No acute osseous findings.      Impression    Impression:   1. Diffuse interstitial opacities which may represent  infection in  the setting of known COVID 19, versus pulmonary edema.   2. Small airspace opacity in the left lower lobe may atelectasis  versus superimposed pneumonia.    I have personally reviewed the examination and initial interpretation  and I agree with the findings.    BARBIE MA MD     Medications     heparin 1,550 Units/hr (10/28/20 0145)     - MEDICATION INSTRUCTIONS -         busPIRone  5 mg Oral TID     citalopram  20 mg Oral Daily     dexamethasone  6 mg Oral Q24H     lamoTRIgine  200 mg Oral BID      metoprolol tartrate  25 mg Oral BID     omeprazole  20 mg Oral QAM AC     senna-docusate  1 tablet Oral BID    Or     senna-docusate  2 tablet Oral BID     simvastatin  20 mg Oral At Bedtime     sodium chloride (PF)  3 mL Intracatheter Q8H

## 2020-10-28 NOTE — ED PROVIDER NOTES
[place] EMERGENCY DEPARTMENT ([facility])  10/27/20    History     Chief Complaint   Patient presents with     Shortness of Breath     Cough     Headache     History was provided by the patient and medical records.        Melo Dangelo is a 80 year old male with a history of seizure disorder and major depressive disorder who presents for evaluation of headache, cough and shortness of breath following a positive COVID-19 result this afternoon.  Patient reports developing a cough with associated shortness of breath on 10/17/2020, 10 days ago.  He notes his cough and shortness of breath worsened with minimal exertion.  He states his symptoms have worsened over the past week.  He also notes abdominal pain with nausea but denies vomiting.  Patient denies chest pain or palpitations.  No leg or calf pain.  Patient denies being on O2 at home.  He denies history of atrial fibrillation.  Patient's wife has also been ill over the past week received a positive test this morning.  She has not been hospitalized.      Past Medical History:   Diagnosis Date     Seizure disorder (H)     last seizure 2008       History reviewed. No pertinent surgical history.    Family History   Problem Relation Age of Onset     Glaucoma No family hx of      Macular Degeneration No family hx of        Social History     Tobacco Use     Smoking status: Never Smoker     Smokeless tobacco: Never Used   Substance Use Topics     Alcohol use: No     Frequency: Never     Drinks per session: 5 or 6     Binge frequency: Never     Current Facility-Administered Medications   Medication     0.9% sodium chloride BOLUS    Followed by     sodium chloride 0.9% infusion     dexamethasone PF (DECADRON) injection 6 mg     magnesium sulfate 2 g in water intermittent infusion     ondansetron (ZOFRAN) injection 4 mg     Current Outpatient Medications   Medication     acetaminophen (TYLENOL) 325 MG tablet     busPIRone (BUSPAR) 5 MG tablet     BusPIRone HCl  "(BUSPAR PO)     citalopram (CELEXA) 20 MG tablet     CITALOPRAM HYDROBROMIDE PO     Cyanocobalamin (B-12) 100 MCG TABS     LamoTRIgine (LAMICTAL PO)     lamoTRIgine (LAMICTAL) 100 MG tablet     oxyCODONE IR (ROXICODONE) 5 MG tablet     propranolol (INDERAL) 10 MG tablet     simvastatin (ZOCOR) 20 MG tablet     SIMVASTATIN PO      No Known Allergies      Review of Systems   Respiratory: Positive for cough and shortness of breath.    Cardiovascular: Negative for chest pain, palpitations and leg swelling.   Gastrointestinal: Positive for abdominal pain and nausea. Negative for vomiting.     A complete review of systems was performed with pertinent positives and negatives noted in the HPI, and all other systems negative.       Physical Exam   BP: 139/65  Pulse: 134  Temp: 98.5  F (36.9  C)  Resp: 20  Height: 177.8 cm (5' 10\")  Weight: 86.3 kg (190 lb 4.8 oz)  SpO2: 92 %  Physical Exam  Vitals signs and nursing note reviewed.   Constitutional:       General: He is not in acute distress.     Appearance: He is well-developed. He is not diaphoretic.   HENT:      Head: Atraumatic.   Eyes:      General: No scleral icterus.     Pupils: Pupils are equal, round, and reactive to light.   Cardiovascular:      Heart sounds: Normal heart sounds.   Pulmonary:      Effort: Pulmonary effort is normal. No respiratory distress.      Breath sounds: Normal breath sounds.   Abdominal:      General: Bowel sounds are normal.      Palpations: Abdomen is soft.      Tenderness: There is no abdominal tenderness.   Musculoskeletal:         General: No tenderness.   Skin:     General: Skin is warm.      Findings: No rash.   Neurological:      General: No focal deficit present.      Mental Status: He is alert and oriented to person, place, and time.           ED Course   8:21 PM  The patient was seen and examined by Dr. Natasha Isaac in Room ED 11.     ED Course as of Oct 27 2021   Tue Oct 27, 2020   2018 XR Chest Port 1 View     Procedures            "  EKG Interpretation:      Interpreted by Natasha Isaac MD  Time reviewed: perEpic  Symptoms at time of EKG: SOB   Rhythm: atrial fibrillation - rapid  Rate: 140-150  Axis: Normal  Ectopy: none  Conduction: normal  ST Segments/ T Waves: Non-specific ST-T wave changes  Q Waves: none  Comparison to prior: No old EKG available    Clinical Impression: atrial fibrillation (new onset)                   Critical Care Addendum    My initial assessment, based on my review of vital signs, focused history, review of cardiac rhythm monitor and 12 lead ECG analysis, established that Melo Dangelo has a critical arrhythmia, which requires immediate intervention, and therefore he is critically ill.     After the initial assessment, the care team initiated multiple lab tests, initiated IV fluid administration and initiated medication therapy with IVF and Mag Sulfate to provide stabilization care. Due to the critical nature of this patient, I reassessed review of cardiac rhythm monitor multiple times prior to his disposition.     Time also spent performing documentation.     Critical care time (excluding teaching time and procedures): 40 minutes.            No results found for any visits on 10/27/20.  Medications - No data to display     Assessments & Plan (with Medical Decision Making)     80 year old male with a history of seizure disorder and major depressive disorder who presents for evaluation of headache, cough and shortness of breath following a positive COVID-19 result this afternoon. Patient was found to be hypoxic on room air and placed on 2 L supplemental oxygen which improved her pulse ox from 92% to 94%, and during the course of ED evaluation patient's supplemental oxygen was uptitrated to 4 L which maintained his saturations in the 97% range.  IV established, labs consent reviewed and document in epic remarkable for a ferritinemia, elevated inflammatory markers and LDH and dimer.  Electrolytes showed an MARYANN with a  creatinine 1.67 but otherwise normal chemistry and liver panel.  Procalcitonin 0.1 lactic acid 2.2.  X-ray of the chest obtained which showed diffuse interstitial opacities consistent with COVID-19 with small airspace opacity concerning for possible superimposed pneumonia.  Patient given dexamethasone, ceftriaxone and Zithromax.  Case discussed with medicine hospital service with arrangements made to admit to Covid floor.    I have reviewed the nursing notes. I have reviewed the findings, diagnosis, plan and need for follow up with the patient.    New Prescriptions    No medications on file       Final diagnoses:   2019 novel coronavirus disease (COVID-19)   Hypoxia     I, Fernando Mendoza, am serving as a trained medical scribe to document services personally performed by Natasha sIaac MD, based on the provider's statements to me.      I, Natasha Isaac MD, was physically present and have reviewed and verified the accuracy of this note documented by Fernando Mendoza.     --  Natasha Isaac MD  [facility] EMERGENCY DEPARTMENT  10/27/2020     Natasha Isaac MD  10/28/20 0036

## 2020-10-28 NOTE — PLAN OF CARE
Alert, orientated, placed on 2 liters as pt desats to 87% on RA at rest and with activity, up with SBA due to lines, using call light appropriately, fair po intake, very pleasant and cooperative, lung sounds diminished, will start on convalescent plasma study this jessica, blood consent in chart, other VSS afebrile

## 2020-10-28 NOTE — PROGRESS NOTES
Passive Immunity Trial for [facility]  IRB:   : Myla Li MD  Consent discussion follow up  The consent discussion included:  The following items were reviewed with .him again:     Introduction     Procedure information    Device information    Post procedure follow up    Risks    Potential benefits (if any)    Alternatives    Costs    Injury coverage    Legal rights    Privacy    Contact information    Mr Dangelo indicated .his questions were answered to .his satisfaction.  He signed the paper consent form.  A copy of his signed consent form will be provided to him.     No study procedures were done prior to obtaining informed consent.

## 2020-10-28 NOTE — CONSULTS
Psychiatry Consult Brief Note    Patient wanted to discuss the possibility of changing his regimen for his chronic depression and anxiety.  On Citalopram and Buspar. His symptoms appear to be fairly well controlled and at baseline currently.  He has not had a worsening of symptoms. He doesn't feel a strong need to do this but wanted to discuss options. He currently does not have any significant or disabling symptoms. He currently is ill with COVID. We decided there was no pressing need to change things now and it would make sense to revisit this when he is feeling better physically. Informed him we would be available for any changing or worsening symptoms.    Enzo Gonzalez MD

## 2020-10-28 NOTE — ED TRIAGE NOTES
Pt arrives to triage with shortness of breath, cough and headache which started last Saturday. Had COVID test yesterday which came back positive. Upon exertion gets short of breath and has harsh frequent cough. Wife is also positive for COVID. Patient feels like he is getting progressively weaker every day and has no energy to do anything.

## 2020-10-28 NOTE — H&P
Medicine History and Physical - Hospitalist Service  Melo Dangelo MRN: [id number]  Age: 80 year old, : 1940  Primary care provider: No Ref-Primary, Physician  Admitting service: Gold Night   Date of Service: 10/27/2020           Chief Complaint:     Dyspnea           History of Present Illness:     HISTORY OF PRESENT ILLNESS:  Melo Dangelo is a 80 year old with PMH of seizure disorder who presented with two weeks history of dyspnea.     Patient reports that since last Saturday he started feeling weak. Initially he started having intermittent headache which was followed by a cough. Cough was dry and non productive associated with dyspnea . No chest pain. Had fever of 100.3. Reports that wife recently tested positive for COVID for the second time (initially in November). Denies paplpitations, dyspnea prior to Saturday, weight changes, orthopnea.     In the ED patient was initially in A.fib with RVR , /65, spo2 92% on RA. Labs were pertinent for Cr 1.67, .0, Lactic acid 2.2 ,  - WBC 8.0. Patient was given 1 L of NS, 6 mg of dexamethasone, 2 g of Mg - patient converted to NSR and was admitted to medicine for further evaluation.        ROS : 10 point review of systems was performed and was positive for epigastric pain. Started on Monday . No associated N/V/D. Reports that its like being full. Not related to full. Intermittent , not sure what makes it better or worse. Doesn't radiate.             Past Medical History:     Reviewed with patient on 10/27/2020   Past Medical History:   Diagnosis Date     Seizure disorder (H)     last seizure        History reviewed. No pertinent surgical history.          Social History:     Social History     Tobacco Use     Smoking status: Never Smoker     Smokeless tobacco: Never Used   Substance Use Topics     Alcohol use: No     Frequency: Never     Drinks per session: 5 or 6     Binge frequency: Never       . Wife has  CARI.  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             Family History:     Family History   Problem Relation Age of Onset     Glaucoma No family hx of      Macular Degeneration No family hx of    Mother had RA  Brother has A.fib             Allergies:     No Known Allergies                 Medications:     PTA Meds  Prior to Admission medications    Medication Sig Last Dose Taking? Auth Provider   acetaminophen (TYLENOL) 325 MG tablet Take 2 tablets (650 mg) by mouth every 6 hours as needed for mild pain   Daphne Owen MD   busPIRone (BUSPAR) 5 MG tablet Take 1 tablet (5 mg) by mouth 3 times daily   Francis Smith MD   BusPIRone HCl (BUSPAR PO)    Reported, Patient   citalopram (CELEXA) 20 MG tablet Take 1 tablet (20 mg) by mouth daily   Francis Smith MD   CITALOPRAM HYDROBROMIDE PO    Reported, Patient   Cyanocobalamin (B-12) 100 MCG TABS    Reported, Patient   LamoTRIgine (LAMICTAL PO)    Reported, Patient   lamoTRIgine (LAMICTAL) 100 MG tablet Take 2 tablets (200 mg) by mouth 2 times daily   Francis Smith MD   oxyCODONE IR (ROXICODONE) 5 MG tablet Take 1 tablet (5 mg) by mouth every 6 hours as needed for pain   Daphne Owen MD   propranolol (INDERAL) 10 MG tablet Take 1-2 po every day prn anxiety attack   Francis Smith MD   simvastatin (ZOCOR) 20 MG tablet Take 1 tablet (20 mg) by mouth At Bedtime   Francis Smith MD   SIMVASTATIN PO    Reported, Patient      Current Meds    Infusion Meds      ALLERGIES:    No Known Allergies              Physical Exam:     B/P: 133/75, T: 98.5, P: 80, R: 22    Wt Readings from Last 4 Encounters:   10/27/20 86.3 kg (190 lb 4.8 oz)   09/28/18 85 kg (187 lb 8 oz)   09/23/18 84.2 kg (185 lb 9.6 oz)     General: coughing excessively, appears stated age,pleasant and cooperative, AAOx3.  HEENT: NC/AT, well injected Conjunctiva, anicteric sclera, EOMI; PERRL,   Neck: Trachea midline; supple, good tone; no JVD  Chest: basilar rales on  the right , expiratory wheezing   CV: RRR; nl S1/S2, no murmurs   Abd: Soft, NT/ND, (+) BS;   Ext: Warm/well perfused; no Edema.  Skin: Clear; unbroken; no new rashe  Neuro: - MS: LORAO x3 - no focal deficit   Psych: good mood             Data:       DIAGNOSTIC STUDIES  ROUTINE ICU LABS (Last four results)  CMP  Recent Labs   Lab 10/27/20  2008      POTASSIUM 3.9   CHLORIDE 105   CO2 23   ANIONGAP 8   *   BUN 24   CR 1.67*   GFRESTIMATED 38*   GFRESTBLACK 44*   GIANNI 8.9   PROTTOTAL 7.6   ALBUMIN 3.2*   BILITOTAL 0.7   ALKPHOS 50   AST 52*   ALT 27     CBC  Recent Labs   Lab 10/27/20  2008   WBC 8.0   RBC 4.51   HGB 13.4   HCT 41.2   MCV 91   MCH 29.7   MCHC 32.5   RDW 13.4        Procalcitonin 0.10   INR  Recent Labs   Lab 10/27/20  2008   INR 1.05     Arterial Blood GasNo lab results found in last 7 days.    MICROBIOLOGY  Blood culture 10/27     IMAGING  CXR: diffuse interstitial infiltrates.     EKG: A.fib with RVR     Echo  None           Assessment and Plan:     Melo Dangelo is a 80 year old with PMH of seizure disorder who presented with two weeks history of dyspnea.     #Dyspnea   #Cough  #COVID-19   Likely 2/2 Covid-19 no evidence of superimposed bacterial infection at this point. He was given Abx by the ED. Will hold off further dosing for now.     - Dexamethasone 6 mg qday   - Remdesivir to be decided in the AM  - Antibiotics: Given Ceftriaxone and azithromycin in the ED. Day team to decide if further therapy is warranted.   - AC: therapeutic heparin (see below)   - Titrate o2 90-95%   - Albuterol inhaler q6h PRN  - Codeine-guaifenesin AC given excessive cough   - Check RVP given recent wife second illness (she had COVID a month ago and now is sick again - tested + for covid again)    #New onset paroxysmal A.fib with RVR (CHADSVASC 2)   - TTE in the AM   - RC: start metoprolol 25 mg BID   - RyC: none   - AC: start heparin gtt overnight ---> long term to be decided based on A.fib burden.  May need to be discharged with Ziopatch.     #Abdominal pain   Hx suggestive of PUD. DDx gastritis, pancreatitis, biliary colic.   - Check Lipase   - Start PPI     #MARYANN ?   Unclear what baseline kidney function is. Likely pre-renal if true MARYANN.   - s/p IVF in the ED   - obtain UA     #Lacitic acidosis   Likely driven by acute illness.  - Recheck in the AM     #Anxiety  Patient reports he was trying to reach psychiatry to adjust medications  - Psychiatry consult in the AM (patient requested)   - Continue Buspar 5 mg TID, Celexa 20 mg   - Hold propranolol PRN and start hydralazine PRN     #History of seizures: Continue PTA Lamictal 200 mg BID     Consulting Services:   None     Diet: Regular   GI PPX: PPI   VTE prophylaxis: on heparin gtt  Disposition: 2-3 days   Family: Not updated   PT/OT: N/N  Code status: DNR okay with intubation     Patient care plan discussed beside with patient, RN.     Felix Santos MD  Internal Medicine Hospitalist & Staff Physician  [facility]  Pager: [phone]    Team: Wally Tang   Page Cross Cover between 5pm-8am: pager [phone] ([place]), if no response then page job code [phone].

## 2020-10-28 NOTE — PHARMACY-ADMISSION MEDICATION HISTORY
Pharmacy Admission Medication History    Admission medication history interview status for the 10/27/2020 admission is complete. See EPIC admission navigator for allergy information, prior to admission medications and immunization status.     Medication history interview source(s): Patient    Medication history resources (including written lists, pill bottles, clinic record): Pharmacy records from [facility]    Medication history source reliability: Good    Primary pharmacy: [facility]. [phone]    Actions taken by pharmacist (provider contacted, medication changes, etc):Provider paged about medication changes.     Changes made to medication history:    Added to medication list: Lexapro, Ibuprofen  Removed from medication list: Citalopram 20 mg daily, Oxycodone IR 5 mg Q6 PRN.  Modified medication on list: Buspar from 5 mg TID, to 10 mg BID    Additional medication history information: None    Medication reconciliation/reorder completed by provider prior to medication history? Yes    Time spent in this activity: 30 minutes    Prior to Admission medications    Medication Sig Last Dose Taking? Auth Provider   busPIRone (BUSPAR) 5 MG tablet Take 10 mg by mouth 2 times daily Takes in am and at 5pm 10/27/2020 at Unknown time Yes Unknown, Entered By History   cyanocobalamin (VITAMIN B-12) 100 MCG tablet Take 100 mcg by mouth daily 10/27/2020 at Unknown time Yes Unknown, Entered By History   escitalopram (LEXAPRO) 20 MG tablet Take 20 mg by mouth daily 10/27/2020 at Unknown time Yes Unknown, Entered By History   ibuprofen (ADVIL/MOTRIN) 200 MG tablet Take 200-400 mg by mouth every 4 hours as needed for mild pain 10/27/2020 at Unknown time Yes Unknown, Entered By History   lamoTRIgine (LAMICTAL) 100 MG tablet Take 2 tablets (200 mg) by mouth 2 times daily 10/27/2020 at Unknown time Yes Francis Smith MD   simvastatin (ZOCOR) 20 MG tablet Take 1 tablet (20 mg) by mouth At Bedtime 10/26/2020 at Unknown  time Yes Francis Smith MD   acetaminophen (TYLENOL) 325 MG tablet Take 2 tablets (650 mg) by mouth every 6 hours as needed for mild pain   Daphne Owen MD   propranolol (INDERAL) 10 MG tablet Take 1-2 po every day prn anxiety attack More than a month at Unknown time  Francis Smith MD

## 2020-10-29 ENCOUNTER — TRANSFERRED RECORDS (OUTPATIENT)
Dept: HEALTH INFORMATION MANAGEMENT | Facility: CLINIC | Age: 80
End: 2020-10-29

## 2020-10-29 ENCOUNTER — PATIENT OUTREACH (OUTPATIENT)
Dept: CARE COORDINATION | Facility: CLINIC | Age: 80
End: 2020-10-29

## 2020-10-29 VITALS
OXYGEN SATURATION: 95 % | BODY MASS INDEX: 27.24 KG/M2 | TEMPERATURE: 97.7 F | SYSTOLIC BLOOD PRESSURE: 138 MMHG | RESPIRATION RATE: 20 BRPM | HEART RATE: 63 BPM | WEIGHT: 190.3 LBS | DIASTOLIC BLOOD PRESSURE: 68 MMHG | HEIGHT: 70 IN

## 2020-10-29 LAB
ALBUMIN SERPL-MCNC: 2.7 G/DL (ref 3.4–5)
ALP SERPL-CCNC: 42 U/L (ref 40–150)
ALT SERPL W P-5'-P-CCNC: 30 U/L (ref 0–70)
ANION GAP SERPL CALCULATED.3IONS-SCNC: 5 MMOL/L (ref 3–14)
AST SERPL W P-5'-P-CCNC: 53 U/L (ref 0–45)
BASOPHILS # BLD AUTO: 0 10E9/L (ref 0–0.2)
BASOPHILS NFR BLD AUTO: 0.1 %
BILIRUB SERPL-MCNC: 0.4 MG/DL (ref 0.2–1.3)
BUN SERPL-MCNC: 31 MG/DL (ref 7–30)
CALCIUM SERPL-MCNC: 8.5 MG/DL (ref 8.5–10.1)
CHLORIDE SERPL-SCNC: 110 MMOL/L (ref 94–109)
CO2 SERPL-SCNC: 25 MMOL/L (ref 20–32)
CREAT SERPL-MCNC: 1.33 MG/DL (ref 0.66–1.25)
CRP SERPL-MCNC: 71 MG/L (ref 0–8)
D DIMER PPP FEU-MCNC: 0.9 UG/ML FEU (ref 0–0.5)
DIFFERENTIAL METHOD BLD: ABNORMAL
EOSINOPHIL # BLD AUTO: 0 10E9/L (ref 0–0.7)
EOSINOPHIL NFR BLD AUTO: 0 %
ERYTHROCYTE [DISTWIDTH] IN BLOOD BY AUTOMATED COUNT: 13.7 % (ref 10–15)
FIBRINOGEN PPP-MCNC: 597 MG/DL (ref 200–420)
GFR SERPL CREATININE-BSD FRML MDRD: 50 ML/MIN/{1.73_M2}
GLUCOSE SERPL-MCNC: 136 MG/DL (ref 70–99)
HCT VFR BLD AUTO: 35.6 % (ref 40–53)
HGB BLD-MCNC: 11.6 G/DL (ref 13.3–17.7)
IMM GRANULOCYTES # BLD: 0.1 10E9/L (ref 0–0.4)
IMM GRANULOCYTES NFR BLD: 0.6 %
INR PPP: 1.15 (ref 0.86–1.14)
LACTATE BLD-SCNC: 1.1 MMOL/L (ref 0.7–2)
LDH SERPL L TO P-CCNC: 294 U/L (ref 85–227)
LYMPHOCYTES # BLD AUTO: 0.7 10E9/L (ref 0.8–5.3)
LYMPHOCYTES NFR BLD AUTO: 9.3 %
MAGNESIUM SERPL-MCNC: 2.9 MG/DL (ref 1.6–2.3)
MCH RBC QN AUTO: 30.1 PG (ref 26.5–33)
MCHC RBC AUTO-ENTMCNC: 32.6 G/DL (ref 31.5–36.5)
MCV RBC AUTO: 93 FL (ref 78–100)
MONOCYTES # BLD AUTO: 0.5 10E9/L (ref 0–1.3)
MONOCYTES NFR BLD AUTO: 5.9 %
NEUTROPHILS # BLD AUTO: 6.5 10E9/L (ref 1.6–8.3)
NEUTROPHILS NFR BLD AUTO: 84.1 %
NRBC # BLD AUTO: 0 10*3/UL
NRBC BLD AUTO-RTO: 0 /100
PLATELET # BLD AUTO: 198 10E9/L (ref 150–450)
POTASSIUM SERPL-SCNC: 4.7 MMOL/L (ref 3.4–5.3)
PROT SERPL-MCNC: 6.8 G/DL (ref 6.8–8.8)
RBC # BLD AUTO: 3.85 10E12/L (ref 4.4–5.9)
RETICS # AUTO: 25 10E9/L (ref 25–95)
RETICS/RBC NFR AUTO: 0.7 % (ref 0.5–2)
SODIUM SERPL-SCNC: 139 MMOL/L (ref 133–144)
UFH PPP CHRO-ACNC: 0.65 IU/ML
UFH PPP CHRO-ACNC: 0.98 IU/ML
WBC # BLD AUTO: 7.7 10E9/L (ref 4–11)

## 2020-10-29 PROCEDURE — 86140 C-REACTIVE PROTEIN: CPT | Performed by: PATHOLOGY

## 2020-10-29 PROCEDURE — 83735 ASSAY OF MAGNESIUM: CPT | Performed by: PATHOLOGY

## 2020-10-29 PROCEDURE — 85045 AUTOMATED RETICULOCYTE COUNT: CPT | Performed by: PATHOLOGY

## 2020-10-29 PROCEDURE — 250N000011 HC RX IP 250 OP 636: Performed by: NURSE PRACTITIONER

## 2020-10-29 PROCEDURE — 999N000127 HC STATISTIC PERIPHERAL IV START W US GUIDANCE

## 2020-10-29 PROCEDURE — 250N000011 HC RX IP 250 OP 636: Performed by: STUDENT IN AN ORGANIZED HEALTH CARE EDUCATION/TRAINING PROGRAM

## 2020-10-29 PROCEDURE — 83615 LACTATE (LD) (LDH) ENZYME: CPT | Performed by: PATHOLOGY

## 2020-10-29 PROCEDURE — 99207 PR APP CREDIT; MD BILLING SHARED VISIT: CPT | Performed by: NURSE PRACTITIONER

## 2020-10-29 PROCEDURE — 80053 COMPREHEN METABOLIC PANEL: CPT | Performed by: PATHOLOGY

## 2020-10-29 PROCEDURE — 83605 ASSAY OF LACTIC ACID: CPT | Performed by: PHYSICIAN ASSISTANT

## 2020-10-29 PROCEDURE — 36415 COLL VENOUS BLD VENIPUNCTURE: CPT | Performed by: STUDENT IN AN ORGANIZED HEALTH CARE EDUCATION/TRAINING PROGRAM

## 2020-10-29 PROCEDURE — 85610 PROTHROMBIN TIME: CPT | Performed by: PATHOLOGY

## 2020-10-29 PROCEDURE — 250N000013 HC RX MED GY IP 250 OP 250 PS 637: Performed by: STUDENT IN AN ORGANIZED HEALTH CARE EDUCATION/TRAINING PROGRAM

## 2020-10-29 PROCEDURE — 85520 HEPARIN ASSAY: CPT | Performed by: STUDENT IN AN ORGANIZED HEALTH CARE EDUCATION/TRAINING PROGRAM

## 2020-10-29 PROCEDURE — 999N001121 HC STATISTIC RESEARCH KIT COLLECTION: Performed by: PATHOLOGY

## 2020-10-29 PROCEDURE — 36415 COLL VENOUS BLD VENIPUNCTURE: CPT | Performed by: PATHOLOGY

## 2020-10-29 PROCEDURE — 85384 FIBRINOGEN ACTIVITY: CPT | Performed by: PATHOLOGY

## 2020-10-29 PROCEDURE — 85025 COMPLETE CBC W/AUTO DIFF WBC: CPT | Performed by: PATHOLOGY

## 2020-10-29 PROCEDURE — 85379 FIBRIN DEGRADATION QUANT: CPT | Performed by: PATHOLOGY

## 2020-10-29 RX ORDER — DEXAMETHASONE 6 MG/1
6 TABLET ORAL EVERY 24 HOURS
DISCHARGE
Start: 2020-10-29 | End: 2021-04-16

## 2020-10-29 RX ORDER — CODEINE PHOSPHATE AND GUAIFENESIN 10; 100 MG/5ML; MG/5ML
10 SOLUTION ORAL EVERY 4 HOURS PRN
Status: SHIPPED | DISCHARGE
Start: 2020-10-29 | End: 2021-06-21

## 2020-10-29 RX ORDER — ONDANSETRON 4 MG/1
4 TABLET, ORALLY DISINTEGRATING ORAL EVERY 6 HOURS PRN
DISCHARGE
Start: 2020-10-29 | End: 2021-06-21

## 2020-10-29 RX ORDER — HYDROXYZINE HYDROCHLORIDE 25 MG/1
25 TABLET, FILM COATED ORAL EVERY 6 HOURS PRN
DISCHARGE
Start: 2020-10-29 | End: 2020-12-10

## 2020-10-29 RX ORDER — ALBUTEROL SULFATE 90 UG/1
2 AEROSOL, METERED RESPIRATORY (INHALATION) EVERY 6 HOURS PRN
DISCHARGE
Start: 2020-10-29 | End: 2021-06-21

## 2020-10-29 RX ORDER — AMOXICILLIN 250 MG
1 CAPSULE ORAL 2 TIMES DAILY
DISCHARGE
Start: 2020-10-29 | End: 2021-06-21

## 2020-10-29 RX ORDER — ESCITALOPRAM OXALATE 20 MG/1
20 TABLET ORAL DAILY
Status: DISCONTINUED | OUTPATIENT
Start: 2020-10-30 | End: 2020-10-29 | Stop reason: HOSPADM

## 2020-10-29 RX ORDER — METOPROLOL TARTRATE 25 MG/1
25 TABLET, FILM COATED ORAL 2 TIMES DAILY
DISCHARGE
Start: 2020-10-29 | End: 2021-10-15

## 2020-10-29 RX ADMIN — METOPROLOL TARTRATE 25 MG: 25 TABLET, FILM COATED ORAL at 08:16

## 2020-10-29 RX ADMIN — ENOXAPARIN SODIUM 40 MG: 40 INJECTION SUBCUTANEOUS at 10:42

## 2020-10-29 RX ADMIN — DOCUSATE SODIUM 50 MG AND SENNOSIDES 8.6 MG 2 TABLET: 8.6; 5 TABLET, FILM COATED ORAL at 08:16

## 2020-10-29 RX ADMIN — CITALOPRAM HYDROBROMIDE 20 MG: 20 TABLET ORAL at 08:16

## 2020-10-29 RX ADMIN — OMEPRAZOLE 20 MG: 20 CAPSULE, DELAYED RELEASE ORAL at 08:17

## 2020-10-29 RX ADMIN — BUSPIRONE HYDROCHLORIDE 5 MG: 5 TABLET ORAL at 08:16

## 2020-10-29 RX ADMIN — LAMOTRIGINE 200 MG: 200 TABLET ORAL at 08:16

## 2020-10-29 ASSESSMENT — ACTIVITIES OF DAILY LIVING (ADL)
ADLS_ACUITY_SCORE: 13

## 2020-10-29 NOTE — PROGRESS NOTES
Passive Immunity Trial for [place]: [place]  : Myla Li MD  Melo Mtz has been enrolled in the PassItOn trial and has completed their treatment.    Myla Li MD, PhD  Transfusion Medicine Attending  Medical Director, Blood Bank Laboratory  Pager [phone]

## 2020-10-29 NOTE — CONSULTS
Blaise from pharmacist service pointed out that the patient was taking Lexapro rather than Celexa, so I made this order.   Page me at [phone] as needed.

## 2020-10-29 NOTE — PLAN OF CARE
Transfer arranged to [facility] at 11AM tomorrow (10/29/20) as [facility] is no longer taking patients    Janelle Sosa PA-C  Hospitalist Service  [facility], [place]  Pager: [phone]

## 2020-10-29 NOTE — PLAN OF CARE
Time:  3463-0956    Reason for admission: Acute resp failure, COVID(+)  Activity:  Ind in room, able to make needs known  Pain:  No c/o pain this shift  Neuro: WDL, AOx4. Able to make needs known. Pleasant and cooperative.   Cardiac: Int homer, Tele in place reading sinus bradycardia.   Respiratory:  Coughs frequently, Codeine given x2. Stable on 2L NC.   GI/: Voiding in urinal w/o difficulty. LBM 10/27 one senna given.  Diet:  Reg diet, tolerating well with good appetite  Lines:  2 R PIV WDL. Infusing Heparin at 1050units/hour.   Skin:  WDL  Labs/Imaging:  Most recent Xa was 0.98, redraw due at 0845. Research Lab drawn this AM.     New changes this shift:  Performed plasma study for COVID, lab draw due this AM for study. Infusion went well w/o difficulty. Pt removed both IVs this AM, unknown how long heparin was disconnected. MD Notified. New IV placed and heparin restarted at 0630.    Plan: Transfer arranged for 11am today to [facility].

## 2020-10-29 NOTE — DISCHARGE SUMMARY
[facility]   Hospitalist Discharge Summary      Date of Admission:  10/27/2020  Date of Discharge:  10/29/2020 11:16 AM  Discharging Provider: ELIZABETH Whitt CNP, Abad George MD   Discharge Team: Hospitalist Service, Gold     Discharge Diagnoses   # Acute hypoxic respiratory failure   # COVID 19 infection  # New onset paroxysmal AF w/ RVR   # Suspected MARYANN in setting of CKD   # Seizure disorder   # Depression, anxiety     Follow-ups Needed After Discharge     - Please follow up with PCP after appropriate quarantine period  - Cardiology referral placed for new onset atrial fib   - Further follow up (if needed) regarding clinical trial per Dr. Myla Li     Unresulted Labs Ordered in the Past 30 Days of this Admission     Date and Time Order Name Status Description    10/29/2020 0001 Research Kit Collection In process     10/27/2020 2032 Blood culture Preliminary           Discharge Disposition   Transferred to [facility] COVID unit for ongoing care   Condition at discharge: Stable/good       Hospital Course      Melo Dangelo is an 80 year old male with past medical history of seizure disorder, CKD, depression, and anxiety admitted for acute hypoxic respiratory failure and found to be positive for COVID 19.     # Acute hypoxic respiratory failure   # COVID 19 infection   Symptom onset about 9-10 days ago.  Presented with dyspnea and persistent dry cough.  COVID+ on 10/27.  Currently requiring 2L NC for sats > 92%.  No hx underlying lung disease.  Started on dexamethasone 6mg daily.  Initially on heparin gtt due to new onset atrial fib, transferred to [facility] on lovenox - could consider transition to Eliquis vs Xarelto (test claims checked, both in the ~ $180/mo price range).  Met criteria for PassItOn convalescent plasma randomized control trial, pt agreeable, consent and treatment completed.  Continuing to require 2L supplemental O2 at time of  transfer, but cough seems improved.  Continue PRN robitussin w/ codeine.        # New onset paroxysmal AF w/ RVR  # HLD  Noted to be in atrial fib w/ RVR on admission, no known previous hx arrhythmias.  Resolved with mag replacement and metoprolol, no further issues.  [id number] 2. Initially started on heparin gtt, transitioned to lovenox prior to transfer.  Echocardiogram on 10/28 with normal EF 55-60%, slightly elevated RA pressure, no WMAs.  Continued metoprolol 25mg BID and Lovenox.  Continue PTA statin.  Cardiology consult placed due to new atrial fib, patient instructed to follow up.       # Suspected MARYANN in setting of CKD - Likely pre-renal in setting of acute infection, possible dehydration.  BL Cr unclear.  Cr elevated to 1.67 on admission, downtrending. GFR stable.      # Seizure disorder - Continued PTA Lamictal BID.      # Depression, anxiety - Follows closely with Psychiatry outpt.  On Buspar 5mg TID, Lexapro 20mg daily, and Propranolol PTA.  Pt requested Psychiatry consult while inpatient, agree w/ resuming home regimen and stopping Propranolol (due to initiation of metoprolol for rate control).  Continue to follow w/ OP Psychiatry.     Consultations This Hospital Stay   PSYCHIATRY IP CONSULT  MEDICATION HISTORY IP PHARMACY CONSULT  VASCULAR ACCESS CARE ADULT IP CONSULT  VASCULAR ACCESS CARE ADULT IP CONSULT  PHYSICAL THERAPY ADULT IP CONSULT    Code Status   No CPR- Pre-arrest intubation OK    Time Spent on this Encounter   I, ELIZABETH Whitt CNP, personally saw the patient today and spent greater than 30 minutes discharging this patient.       ELIZABETH Whitt CNP  [facility] [address] [place] [id number]  Phone: [phone]    Physician Attestation   I, Abad George MD, have reviewed and discussed with the advanced practice provider their discharge plan for Melo Dangelo. I did not participate in a shared visit by interviewing or examining the  patient and this should be billed as an advanced practice provider only discharge.    Abad George  Date of Service (when I saw the patient): I did not personally see this patient today.    ______________________________________________________________________    Physical Exam   Vital Signs: Temp: 97.7  F (36.5  C) Temp src: Oral BP: (!) 149/74 Pulse: 63   Resp: 20 SpO2: 95 % O2 Device: Nasal cannula Oxygen Delivery: 2 LPM  Weight: 190 lbs 4.8 oz    GENERAL: Alert and oriented x 3. Well nourished, well developed.  No acute distress.    HEENT: Normocephalic, atraumatic. Anicteric sclera. Mucous membranes moist.   CV: RRR. S1, S2. No murmurs appreciated.   RESPIRATORY: Effort normal on 2L. Lungs CTAB with no wheezing, rales, or rhonchi.   GI: Abdomen soft and non distended, bowel sounds present x all 4 quadrants. No tenderness, rebound, or guarding.   NEUROLOGICAL: No focal deficits. Follows commands.  Strength equal in upper and lower extremities.   MUSCULOSKELETAL: No joint swelling or tenderness. Moves all extremities.   EXTREMITIES: No gross deformities. No peripheral edema.   SKIN: Grossly warm, dry, and intact. No jaundice. No rashes.          Primary Care Physician   Francis Smith    Discharge Orders   No discharge procedures on file.    Significant Results and Procedures   Most Recent 3 CBC's:  Recent Labs   Lab Test 10/29/20  0613 10/28/20  1631 10/27/20  2008   WBC 7.7 6.9 8.0   HGB 11.6* 11.7* 13.4   MCV 93 93 91    182 185     Most Recent 3 BMP's:  Recent Labs   Lab Test 10/29/20  0613 10/28/20  1631 10/28/20  0905 10/27/20  2008    139  --  136   POTASSIUM 4.7 4.4 4.4 3.9   CHLORIDE 110* 110*  --  105   CO2 25 22  --  23   BUN 31* 28  --  24   CR 1.33* 1.40*  --  1.67*   ANIONGAP 5 7  --  8   GIANNI 8.5 8.1*  --  8.9   * 124*  --  101*     Most Recent 2 LFT's:  Recent Labs   Lab Test 10/29/20  0613 10/28/20  1631   AST 53* 52*   ALT 30 29   ALKPHOS 42 42   BILITOTAL 0.4 0.6      Most Recent 3 INR's:  Recent Labs   Lab Test 10/29/20  0613 10/27/20  2008   INR 1.15* 1.05   ,   Results for orders placed or performed during the hospital encounter of 10/27/20   XR Chest Port 1 View    Narrative    Exam: XR CHEST PORT 1 VW, 10/27/2020 8:20 PM    Indication: SOB / COVID+    Comparison: None    Findings:   Prominent pulmonary vasculature and diffuse interstitial opacities.  Subtle East there is a small area of atelectasis versus consolidation  in the left lower lung. No pleural effusion or pneumothorax. Cardiac  silhouette is within normal limits. Cardiac silhouette is within  normal limits. Mild calcifications in the aortic arch. No acute  findings in the upper abdomen. No acute osseous findings.      Impression    Impression:   1. Diffuse interstitial opacities which may represent  infection in  the setting of known COVID 19, versus pulmonary edema.   2. Small airspace opacity in the left lower lobe may atelectasis  versus superimposed pneumonia.    I have personally reviewed the examination and initial interpretation  and I agree with the findings.    BARBIE MA MD   Echocardiogram Complete    Narrative    [id number]  [id number]  [id number]  [id number]^PAYAL SARAVIA^BENJI^ALAINA           [facility],[place]  Echocardiography Laboratory  [address] [place] [address]     Name: BATSHEVA DEUTSCH  MRN: [id number]  : 1940  Study Date: 10/28/2020 11:10 AM  Age: 80 yrs  Gender: Male  Patient Location: [facility]  Reason For Study: Atrial Fibrillation  Ordering Physician: BENJI LAWS  Performed By: Anali Freire RDCS     BSA: 2.0 m2  Height: 70 in  Weight: 190 lb  HR: 61  BP: 125/79 mmHg  _____________________________________________________________________________  __        Procedure  Echocardiogram with two-dimensional, color and spectral Doppler performed.  _____________________________________________________________________________  __         Interpretation Summary  Global and regional left ventricular function is normal with an EF of 55-60%.  Left ventricular diastolic function is normal.  Global right ventricular function is normal. The right ventricle is normal  size.  The estimated pulmonary artery systolic pressure is at least 34 mmHg.  There is no prior study for direct comparison.  _____________________________________________________________________________  __        Left Ventricle  Global and regional left ventricular function is normal with an EF of 55-60%.  Left ventricular wall thickness is normal. Left ventricular size is normal.  Left ventricular diastolic function is normal.     Right Ventricle  Global right ventricular function is normal. The right ventricle is normal  size.     Atria  Both atria appear normal.     Mitral Valve  The mitral valve is normal. Mild mitral insufficiency is present.        Aortic Valve  The aortic valve is tricuspid. Mild aortic insufficiency is present.     Tricuspid Valve  Trace tricuspid insufficiency is present. The right ventricular systolic  pressure is approximated at 19.4 mmHg plus the right atrial pressure.     Pulmonic Valve  The valve leaflets are not well visualized. Trace pulmonic insufficiency is  present.     Vessels  Ascending aorta 3.6 cm. IVC diameter >2.1 cm collapsing <50% with sniff  suggests a high RA pressure estimated at 15 mmHg or greater.     Pericardium  No pericardial effusion is present.        Compared to Previous Study  There is no prior study for direct comparison.  _____________________________________________________________________________  __  MMode/2D Measurements & Calculations     IVSd: 1.0 cm  LVIDd: 5.2 cm  LVIDs: 3.4 cm  LVPWd: 1.0 cm  FS: 34.4 %  LV mass(C)d: 202.7 grams  LV mass(C)dI: 99.2 grams/m2  asc Aorta Diam: 3.6 cm  LVOT diam: 2.3 cm  LVOT area: 4.2 cm2  LA Volume (BP): 63.5 ml  LA Volume Index (BP): 31.1 ml/m2  RWT: 0.41           Doppler Measurements &  Calculations  MV E max yumiko: 73.3 cm/sec  MV A max yumiko: 42.8 cm/sec  MV E/A: 1.7  MV dec slope: 400.0 cm/sec2  MV dec time: 0.18 sec  PA acc time: 0.11 sec  TR max yumiko: 220.0 cm/sec  TR max P.4 mmHg  E/E' av.1  Lateral E/e': 9.4  Medial E/e': 8.7     _____________________________________________________________________________  __           Report approved by: Calos Rolle 10/28/2020 12:42 PM            Discharge Medications   Current Discharge Medication List      CONTINUE these medications which have NOT CHANGED    Details   busPIRone (BUSPAR) 5 MG tablet Take 10 mg by mouth 2 times daily Takes in am and at 5pm      cyanocobalamin (VITAMIN B-12) 100 MCG tablet Take 100 mcg by mouth daily      escitalopram (LEXAPRO) 20 MG tablet Take 20 mg by mouth daily      ibuprofen (ADVIL/MOTRIN) 200 MG tablet Take 200-400 mg by mouth every 4 hours as needed for mild pain      lamoTRIgine (LAMICTAL) 100 MG tablet Take 2 tablets (200 mg) by mouth 2 times daily  Qty: 360 tablet, Refills: 3    Associated Diagnoses: Seizure disorder (H)      simvastatin (ZOCOR) 20 MG tablet Take 1 tablet (20 mg) by mouth At Bedtime  Qty: 90 tablet, Refills: 3    Associated Diagnoses: High cholesterol      acetaminophen (TYLENOL) 325 MG tablet Take 2 tablets (650 mg) by mouth every 6 hours as needed for mild pain  Qty: 100 tablet, Refills: 0      propranolol (INDERAL) 10 MG tablet Take 1-2 po every day prn anxiety attack  Qty: 25 tablet, Refills: 3    Associated Diagnoses: Anxiety         STOP taking these medications       citalopram (CELEXA) 20 MG tablet Comments:   Reason for Stopping:             Allergies   No Known Allergies

## 2020-10-29 NOTE — PLAN OF CARE
"[id number]    Pt discharged and transferred to [facility] unit 3500.    Frequent dry cough with some discomfort, On 2L via NC. Can ambulate independently. Pt A&Ox4 but forgetful.     /68   Pulse 63   Temp 97.7  F (36.5  C) (Oral)   Resp 20   Ht 1.778 m (5' 10\")   Wt 86.3 kg (190 lb 4.8 oz)   SpO2 95%   BMI 27.31 kg/m      "

## 2020-10-30 NOTE — PROGRESS NOTES
Spoke to wife   She states the Melo is currently admitted to [facility]     No further calls were made at this time

## 2020-11-02 LAB
BACTERIA SPEC CULT: NO GROWTH
RESEARCH KIT COLLECTION: NORMAL
SPECIMEN SOURCE: NORMAL

## 2020-11-03 ENCOUNTER — TRANSFERRED RECORDS (OUTPATIENT)
Dept: HEALTH INFORMATION MANAGEMENT | Facility: CLINIC | Age: 80
End: 2020-11-03

## 2020-11-08 ENCOUNTER — HEALTH MAINTENANCE LETTER (OUTPATIENT)
Age: 80
End: 2020-11-08

## 2020-11-11 ENCOUNTER — COMMUNICATION - HEALTHEAST (OUTPATIENT)
Dept: CARDIOLOGY | Facility: CLINIC | Age: 80
End: 2020-11-11

## 2020-11-25 ENCOUNTER — COMMUNICATION - HEALTHEAST (OUTPATIENT)
Dept: CARDIOLOGY | Facility: CLINIC | Age: 80
End: 2020-11-25

## 2020-12-08 ENCOUNTER — NURSE TRIAGE (OUTPATIENT)
Dept: NURSING | Facility: CLINIC | Age: 80
End: 2020-12-08

## 2020-12-08 NOTE — TELEPHONE ENCOUNTER
On eliquis - is wondering how long he's to take it. Was given a 3 month supply. He just got a refill 2 days ago.    Wants his pharmacy to be the [facility] pharmacy in [place]. They will pay more of the xarelto prescription than his current pharmacy.     [phone]     Luis PCP [facility] [phone]    Advised patient he needs an appointment with Luis to follow-up on hospital stay. Patient states he hasn't been able to find the clinic phone #. Warm transferred patient to the scheduling line.    Melisa Wallace RN on 12/8/2020 at 4:51 PM    Reason for Disposition    [1] Follow-up call from patient regarding patient's clinical status AND [2] information NON-URGENT    Additional Information    Negative: Lab calling with strep throat test results and triager can call in prescription    Negative: Lab calling with urinalysis test results and triager can call in prescription    Negative: Medication questions    Negative: ED call to PCP    Negative: Physician call to PCP    Negative: Call about patient who is currently hospitalized    Negative: Lab or radiology calling with CRITICAL test results    Negative: [1] Prescription not at pharmacy AND [2] was prescribed today by PCP    Negative: [1] Follow-up call from patient regarding patient's clinical status AND [2] information urgent    Negative: [1] Caller requests to speak ONLY to PCP AND [2] urgent question    Negative: [1] Caller requests to speak to PCP now AND [2] won't tell us reason for call  (Exception: if 10 pm to 6 am, caller must first discuss reason for the call)    Negative: Notification of hospital admission    Negative: Notification of death    Negative: Caller requesting lab results    Negative: Lab or radiology calling with test results    Protocols used: PCP CALL - NO TRIAGE-A-

## 2020-12-10 ENCOUNTER — VIRTUAL VISIT (OUTPATIENT)
Dept: BEHAVIORAL HEALTH | Facility: CLINIC | Age: 80
End: 2020-12-10
Payer: MEDICARE

## 2020-12-10 DIAGNOSIS — F33.0 MILD EPISODE OF RECURRENT MAJOR DEPRESSIVE DISORDER (H): ICD-10-CM

## 2020-12-10 DIAGNOSIS — F41.1 GENERALIZED ANXIETY DISORDER: Primary | ICD-10-CM

## 2020-12-10 PROCEDURE — 90792 PSYCH DIAG EVAL W/MED SRVCS: CPT | Mod: 95 | Performed by: PSYCHIATRY & NEUROLOGY

## 2020-12-10 RX ORDER — PROPRANOLOL HYDROCHLORIDE 10 MG/1
20-30 TABLET ORAL DAILY PRN
COMMUNITY
End: 2021-10-15

## 2020-12-10 RX ORDER — ASPIRIN 81 MG/1
81 TABLET, CHEWABLE ORAL DAILY
COMMUNITY
End: 2021-03-12

## 2020-12-10 RX ORDER — OMEGA-3 FATTY ACIDS/FISH OIL 300-1000MG
400 CAPSULE ORAL EVERY 4 HOURS PRN
COMMUNITY
End: 2021-06-21

## 2020-12-10 NOTE — Clinical Note
Martha Hitchcock, this pt was referred by Wayne Farmer and is seeing you on Monday. Pretty straightforward case, very nice mauricio.     I'd say just increase his buspirone and see how it goes.   If you're okay with propranolol (given that he was recently started on low dose metoprolol) I'd say let him keep that as a PRN.   He should definitely reduce caffeine use (drinking 12 cups per day)  Probably would be good to do a MoCA screener if possible given his age and mild cognitive complaints, but not essential.     Long term, would be nice if escitalopram could be reduced, but not necessary, and certainly no need to change it anytime soon.
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
## 2020-12-10 NOTE — NURSING NOTE
"Chief Complaint   Patient presents with     Consult     MDD SHAINA     Would like out of today's visit:  Treatment for anxiety  Sienna Denny LPN     VIDEO VISIT  Melo Dangelo is a 80 year old patient who is being evaluated via a billable video visit.      The patient has been notified of following:   \"This video visit will be conducted via a call between you and your physician/provider. We have found that certain health care needs can be provided without the need for an in-person physical exam. This service lets us provide the care you need with a video conversation. If a prescription is necessary we can send it directly to your pharmacy. If lab work is needed we can place an order for that and you can then stop by our lab to have the test done at a later time. Insurers are generally covering virtual visits as they would in-office visits so billing should not be different than normal.  If for some reason you do get billed incorrectly, you should contact the billing office to correct it and that number is in the AVS .    Video Conference to be completed via:  Winifred    Patient has given verbal consent for video visit?:  Yes    Patient would prefer that any video invitations be sent by: Text to cell phone: [phone]      How would patient like to obtain AVS?:  Mail a copy    AVS SmartPhrase [PsychAVS] has been placed in 'Patient Instructions':  Yes    "

## 2020-12-12 NOTE — PATIENT INSTRUCTIONS
Contact Us: Please call [phone] during business hours (8-5:00 M-F).  If after clinic hours, or on the weekend, please call  [phone].    Financial Assistance [phone]  [facility] Billing [phone]  [facility] Billing [phone]  Medical Records [phone]  [facility] [place] Patient Bill of Rights    MENTAL HEALTH CRISIS NUMBERS:  [facility]:  [facility] - [phone]  Crisis Residence [facility] - [phone]  Walk-In Counseling [facility] [phone]  COPE 24/7 [facility] Mobile Team - [[phone]]    [facility]:  [facility] - [phone]  Walk-in counseling [facility] - [phone]  Walk-in counseling [facility] - [phone]  [facility] Residence [facility] - [phone]  Urgent Care Adult Mental Health:   --Drop-in, 24/7 crisis line, and [facility] Mobile Team [[phone]]    24/7 CRISIS TEXT LINE: www.crisistextline.org OR text [phone] anywhere, anytime, any crisis    Poison Control Center - [phone]  [facility] [employer] - [phone]; or [employer] - [phone]    If you have a medical emergency please call 911 or go to the nearest ER.

## 2020-12-14 ENCOUNTER — VIRTUAL VISIT (OUTPATIENT)
Dept: FAMILY MEDICINE | Facility: CLINIC | Age: 80
End: 2020-12-14
Payer: MEDICARE

## 2020-12-14 DIAGNOSIS — N40.0 BENIGN PROSTATIC HYPERPLASIA, UNSPECIFIED WHETHER LOWER URINARY TRACT SYMPTOMS PRESENT: ICD-10-CM

## 2020-12-14 DIAGNOSIS — G40.909 SEIZURE DISORDER (H): Primary | ICD-10-CM

## 2020-12-14 PROCEDURE — 99213 OFFICE O/P EST LOW 20 MIN: CPT | Mod: 95 | Performed by: FAMILY MEDICINE

## 2020-12-14 NOTE — NURSING NOTE
Chief Complaint   Patient presents with     Hospital F/U     pt was in hospital with COVID. still having symptoms     Kimberly Nissen, EMT at 8:41 AM on 12/14/2020    Video Visit Technology for this patient: Caitlyn not working, patient has smart device, please try Doximity Video with patient

## 2020-12-14 NOTE — PROGRESS NOTES
"Melo Dangelo is a 80 year old male who is being evaluated via a billable video visit.      The patient has been notified of following:     \"This video visit will be conducted via a call between you and your physician/provider. We have found that certain health care needs can be provided without the need for an in-person physical exam.  This service lets us provide the care you need with a video conversation.  If a prescription is necessary we can send it directly to your pharmacy.  If lab work is needed we can place an order for that and you can then stop by our lab to have the test done at a later time.    Video visits are billed at different rates depending on your insurance coverage.  Please reach out to your insurance provider with any questions.    If during the course of the call the physician/provider feels a video visit is not appropriate, you will not be charged for this service.\"    Patient has given verbal consent for Video visit? Yes  How would you like to obtain your AVS? MyChart  If you are dropped from the video visit, the video invite should be resent to: in epic  Will anyone else be joining your video visit? No    Subjective     Melo Dangelo is a 80 year old male who presents today via video visit for the following health issues:    HPI     Had covid about six weeks ago  Still tired, dizzy at times  Sx began w/ covid  Can taste and smell now    Also switching all cares and meds [facility] to here, sees Urology (bph) and Neurology (sz) at [facility]    Had flu shot    Past Medical History:   Diagnosis Date     Alcohol dependence (H)      Concussion with loss of consciousness     x10 going back to childhood     Seizure disorder (H)     last seizure 2008     No past surgical history on file.  Current Outpatient Medications   Medication     acetaminophen (TYLENOL) 325 MG tablet     albuterol (PROAIR HFA/PROVENTIL HFA/VENTOLIN HFA) 108 (90 Base) MCG/ACT inhaler     apixaban ANTICOAGULANT (ELIQUIS) 2.5 MG " tablet     aspirin (ASA) 81 MG chewable tablet     busPIRone (BUSPAR) 5 MG tablet     cyanocobalamin (VITAMIN B-12) 100 MCG tablet     escitalopram (LEXAPRO) 20 MG tablet     guaiFENesin-codeine (ROBITUSSIN AC) 100-10 MG/5ML solution     ibuprofen (ADVIL/MOTRIN) 200 MG capsule     lamoTRIgine (LAMICTAL) 100 MG tablet     metoprolol tartrate (LOPRESSOR) 25 MG tablet     propranolol (INDERAL) 10 MG tablet     simvastatin (ZOCOR) 20 MG tablet     dexamethasone (DECADRON) 6 MG tablet     enoxaparin ANTICOAGULANT (LOVENOX) 40 MG/0.4ML syringe     omeprazole (PRILOSEC) 20 MG DR capsule     ondansetron (ZOFRAN-ODT) 4 MG ODT tab     senna-docusate (SENOKOT-S/PERICOLACE) 8.6-50 MG tablet     No current facility-administered medications for this visit.      No Known Allergies      Video Start Time: 9 am    Review of Systems         Objective           Vitals:  No vitals were obtained today due to virtual visit.    Physical Exam     GENERAL: Healthy, alert and no distress  EYES: Eyes grossly normal to inspection.  No discharge or erythema, or obvious scleral/conjunctival abnormalities.  RESP: No audible wheeze, cough, or visible cyanosis.  No visible retractions or increased work of breathing.    SKIN: Visible skin clear. No significant rash, abnormal pigmentation or lesions.  NEURO: Cranial nerves grossly intact.  Mentation and speech appropriate for age.  PSYCH: Mentation appears normal, affect normal/bright, judgement and insight intact, normal speech and appearance well-groomed.      Covid w/ persisiting fatigue  See me one mo    BPH: est care Urology   Sz: est care Neurology  Will switch meds to me      Video-Visit Details    Type of service:  Video Visit    Video End Time:9:50    Originating Location (pt. Location): Home    Distant Location (provider location):  [facility] PRIMARY CARE [facility]     Platform used for Video Visit: MikieJack Robie   Francis Smith MD

## 2020-12-15 NOTE — TELEPHONE ENCOUNTER
MEDICAL RECORDS REQUEST   [facility]  Urology Clinic  [address] [place] [address]  PHONE: [phone]  Fax: [phone]        FUTURE VISIT INFORMATION                                                   Melo Dangelo, : 1940 scheduled for future visit at [facility]    APPOINTMENT INFORMATION:    Date: 2021 1:30PM    Provider:  Esperanza Guillaume RN    Reason for Visit/Diagnosis: BPH    REFERRAL INFORMATION:    Referring provider:  N/A    Specialty: N/A    Referring providers clinic:      Clinic contact number:  N/A    RECORDS REQUESTED FOR VISIT                                                     NOTES  STATUS/DETAILS   OFFICE NOTE from referring provider  yes   OFFICE NOTE from other specialist  no   DISCHARGE SUMMARY from hospital  no   DISCHARGE REPORT from the ER  no   OPERATIVE REPORT  no   MEDICATION LIST  no   LABS     URINALYSIS (UA)  yes     PRE-VISIT CHECKLIST      Record collection complete Yes   Appointment appropriately scheduled           (right time/right provider) Yes   MyChart activation Yes   Questionnaire complete If no, please explain: In process      Completed by: Starr Calderón

## 2020-12-28 ENCOUNTER — TELEPHONE (OUTPATIENT)
Dept: FAMILY MEDICINE | Facility: CLINIC | Age: 80
End: 2020-12-28

## 2020-12-28 NOTE — TELEPHONE ENCOUNTER
Health Call Center    Phone Message    May a detailed message be left on voicemail: yes     Reason for Call: Medication Question or concern regarding medication   Prescription Clarification  Name of Medication: All medications  Prescribing Provider: Dr. Solano should have contacted to Dr. Smith    Pharmacy: N/A   What on the order needs clarification? Patient said Dr. Solano should have contacted Dr. Smith about medication changes. Patient was wondering if Dr. Smith got the message yet about him approving all patient's medications. Please call patient. Thank you.           Action Taken: Message routed to:  [facility]    Travel Screening: Not Applicable

## 2020-12-29 NOTE — TELEPHONE ENCOUNTER
I can prescribe and did get Dr Solano notes, did pt need meds at this time or just alerting me that he'd seen Dr Solano?

## 2020-12-30 NOTE — TELEPHONE ENCOUNTER
Ideally we could discuss at a visit and do then, if can wait see if could do in person or virtual visit w/ me soon to do that

## 2020-12-31 NOTE — TELEPHONE ENCOUNTER
I called and left VM, patient should call to schedule phone or video appointment with Dr. Smith to discuss medications. If he prefers in person please schedule him on jan 8th.  Ilda King, EMT at 12:31 PM on 12/31/2020.

## 2021-01-11 ENCOUNTER — MEDICAL CORRESPONDENCE (OUTPATIENT)
Dept: HEALTH INFORMATION MANAGEMENT | Facility: CLINIC | Age: 81
End: 2021-01-11

## 2021-01-15 ENCOUNTER — PRE VISIT (OUTPATIENT)
Dept: UROLOGY | Facility: CLINIC | Age: 81
End: 2021-01-15

## 2021-01-15 NOTE — TELEPHONE ENCOUNTER
Chief Complaint : Referral - Dr. Smith    Hx/Sx: BPH    Records/Orders: CE recs need authorization. Message sent to recs to inquire    Pt Contacted: N/a    At Rooming: Ford Partida EMT

## 2021-01-18 ENCOUNTER — OFFICE VISIT (OUTPATIENT)
Dept: NEUROLOGY | Facility: CLINIC | Age: 81
End: 2021-01-18
Payer: MEDICARE

## 2021-01-18 ENCOUNTER — ANCILLARY PROCEDURE (OUTPATIENT)
Dept: NEUROLOGY | Facility: CLINIC | Age: 81
End: 2021-01-18
Payer: MEDICARE

## 2021-01-18 VITALS
TEMPERATURE: 97.5 F | WEIGHT: 194.4 LBS | DIASTOLIC BLOOD PRESSURE: 80 MMHG | HEART RATE: 74 BPM | SYSTOLIC BLOOD PRESSURE: 142 MMHG | HEIGHT: 70 IN | BODY MASS INDEX: 27.83 KG/M2

## 2021-01-18 DIAGNOSIS — R56.9 SEIZURES (H): ICD-10-CM

## 2021-01-18 DIAGNOSIS — G40.909 SEIZURE DISORDER (H): ICD-10-CM

## 2021-01-18 RX ORDER — LAMOTRIGINE 100 MG/1
200 TABLET ORAL 2 TIMES DAILY
Qty: 360 TABLET | Refills: 3 | Status: SHIPPED | OUTPATIENT
Start: 2021-01-18 | End: 2021-07-27

## 2021-01-18 ASSESSMENT — PAIN SCALES - GENERAL: PAINLEVEL: NO PAIN (0)

## 2021-01-18 ASSESSMENT — MIFFLIN-ST. JEOR: SCORE: 1598.04

## 2021-01-18 NOTE — PATIENT INSTRUCTIONS
Continue lamotrigine 200 mg twice a day script sent   Check lamotrigine lab at a [facility] today   Follow up with primary care provider about lexapro, mental health, and cough     Daria Raza MD   [phone]

## 2021-01-18 NOTE — LETTER
"2021       RE: Melo Dangelo  : 1940   MRN: [id number]      Dear Colleague,    Thank you for referring your patient, Melo Dangelo, to the [facility] EPILEPSY CARE at [facility]. Please see a copy of my visit note below.    [facility] Epilepsy Care History and Physical       Patient:  Melo Dangelo  :  1940   Age:  80 year old   Today's Office Visit:  2021    Referring Provider:    Referred Self, MD  No address on file    History of Present Illness:  He is transferring care to [facility] because he moved to the [facility]. Melo Dangelo is 80 year old right handed who has history of seizure that started age 55. He has been seizure free since . He became seizure free after starting lamotrigine. He is not able to recall details of his epilepsy history. He has daily dizziness (if he turns quickly, he has not fallen from this, he has gotten use to this), he has not had a primary care provider for 15 years. He got a primary care provider this year. No falls, independent at home and ADL, he is able to do his finance. He stopped drinking in , it seems his big (generalized tonic-clonic convulsion) in  and he had focal seizure with no impairment in awareness last .   Seizure type 1:  \"funny sensation in mediastinum and nausea\". Confused one moment and then woke up in the hospital wondering why he was there. He has been told by his wife he was shaking and belligerent after the seizure. He has had seizure in the day and nighttime.   Epilepsy Risk Factors:  Patient has no history of encephalitis/meningitis, no history of stroke, no history of tumor, no history of traumatic brain injury.  The patient had a normal birth and delivery.  No developmental delays noted.  No febrile seizures.  No family members with epilepsy.     Precipitating factors:   NONE  Current Outpatient Medications   Medication Sig Dispense Refill     acetaminophen (TYLENOL) " 325 MG tablet Take 2 tablets (650 mg) by mouth every 6 hours as needed for mild pain 100 tablet 0     albuterol (PROAIR HFA/PROVENTIL HFA/VENTOLIN HFA) 108 (90 Base) MCG/ACT inhaler Inhale 2 puffs into the lungs every 6 hours as needed for wheezing       apixaban ANTICOAGULANT (ELIQUIS) 2.5 MG tablet Take 2.5 mg by mouth 2 times daily       aspirin (ASA) 81 MG chewable tablet Take 81 mg by mouth daily       busPIRone (BUSPAR) 5 MG tablet Take 10 mg by mouth 2 times daily Takes in am and at 5pm       cyanocobalamin (VITAMIN B-12) 100 MCG tablet Take 100 mcg by mouth daily       dexamethasone (DECADRON) 6 MG tablet Take 1 tablet (6 mg) by mouth every 24 hours (Patient not taking: Reported on 12/10/2020)       enoxaparin ANTICOAGULANT (LOVENOX) 40 MG/0.4ML syringe Inject 0.4 mLs (40 mg) Subcutaneous every 24 hours (Patient not taking: Reported on 12/10/2020)       escitalopram (LEXAPRO) 20 MG tablet Take 20 mg by mouth daily       guaiFENesin-codeine (ROBITUSSIN AC) 100-10 MG/5ML solution Take 10 mLs by mouth every 4 hours as needed for cough       ibuprofen (ADVIL/MOTRIN) 200 MG capsule Take 400 mg by mouth every 4 hours as needed for fever       lamoTRIgine (LAMICTAL) 100 MG tablet Take 2 tablets (200 mg) by mouth 2 times daily 360 tablet 3     metoprolol tartrate (LOPRESSOR) 25 MG tablet Take 1 tablet (25 mg) by mouth 2 times daily       omeprazole (PRILOSEC) 20 MG DR capsule Take 1 capsule (20 mg) by mouth every morning (before breakfast) (Patient not taking: Reported on 12/10/2020)       ondansetron (ZOFRAN-ODT) 4 MG ODT tab Take 1 tablet (4 mg) by mouth every 6 hours as needed for nausea or vomiting (Patient not taking: Reported on 12/10/2020)       propranolol (INDERAL) 10 MG tablet Take 20-30 mg by mouth daily as needed (for anxiety)       senna-docusate (SENOKOT-S/PERICOLACE) 8.6-50 MG tablet Take 1 tablet by mouth 2 times daily (Patient not taking: Reported on 12/10/2020)       simvastatin (ZOCOR) 20 MG  tablet Take 1 tablet (20 mg) by mouth At Bedtime 90 tablet 3     Perceived AED Side Effects: No  Medication Notes:   AED Medication Compliance:  compliant most of the time  Using a pill box:  Yes, has an alarm on phone for pill (shelia)  Past AEDs:  Does not recall  Family history:   No family history of epilepsy     AED - ANTIEPILEPTIC DRUGS 10/29/2020   lamoTRIgine (Oral) 200 mg BID     Past Medical History:   Diagnosis Date     Alcohol dependence (H)      Concussion with loss of consciousness     x10 going back to childhood     Seizure disorder (H)     last seizure      Atrial Fibrilliation  Depresion/Anxiety    No surgery   Family History   Problem Relation Age of Onset     Glaucoma No family hx of      Macular Degeneration No family hx of       Psycho-Social History: Lives in [place], highest level of education 4 year degree in college, 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 Lives with his wife, he has three daughters that live near by.    Currently, patient denies feeling depressed, denies feeling anhedonia, denies suicidal  thoughts, and denies having feelings of excessive guilt/worthlessness.  Does not smoke, no alcohol use since , no recreational drug use. We reviewed importance of mental and emotional wellbeing and impact on health.   Driving:  Currently patient is:  Driving: Patient was made aware of state driving laws. Currently meets criteria to continue to drive.  Previous Evaluations for Epilepsy:   EE2009 - EEG CLASSIFICATION: Dysrhythmia gr. 1 generalized (excess beta or medication effect);   Sleep - no activation; EKG and pulse oximeter monitors.     REPORT: The brief recording during wakefulness contains 10 Hz alpha activity over  the posterior head regions.  An excess of beta activity is present.  No abnormal activity occurred during hyperventilation or photic stimulation.  During   the recording, the patient was excessively drowsy and fell asleep spontaneously.  No abnormal  "activity occurred during sleep or at the time of arousal. The EKG and pulse oximeter monitors were unremarkable.   MRI of Brain: 9/9/2009 - Again noted and stable is mild cerebral atrophy, including mild atrophy of the hippocampal formations bilaterally.  Subtle diffusely increased T2 signal within both hippocampal formations. This is nonspecific, and may be related to recent seizure activity. No evidence for pathologic enhancement following administration of IV gadolinium.      Remainder the MRI is otherwise unchanged. Minimal leukoaraiosis. Scattered membrane thickening and fluid within bilateral ethmoid air cells. Nasal septal deviation, convex to the left.     Review of Systems:  Lethargy / Tiredness:  yes  Nausea / Vomiting:  No  Double Vision:  No  Sleepiness:  No  Depression:  No  Slowed Cognitive Function:  No  Memory Problems:  yes  Poor Balance:  No  Dizziness:  yes  Appetite Changes:  No  Blurred Vision:  No  Sleep Changes:  No  Behavioral Changes:  No  Skin: negative  Respiratory: +shortness of breath after COVID, dyspnea on exertion, cough, or hemoptysis  Cardiovascular: negative  Have you experienced a traumatic fall related to your events: No  Are these falls related to your seizures: No      Exam:    There were no vitals taken for this visit.     Wt Readings from Last 5 Encounters:   10/27/20 190 lb 4.8 oz (86.3 kg)   09/28/18 187 lb 8 oz (85 kg)   09/23/18 185 lb 9.6 oz (84.2 kg)       EXAMINATION BP (!) 142/80 (BP Location: Left arm, Patient Position: Sitting, Cuff Size: Adult Regular)   Pulse 74   Temp 97.5  F (36.4  C) (Temporal)   Ht 5' 10\" (177.8 cm)   Wt 194 lb 6.4 oz (88.2 kg)   BMI 27.89 kg/m    GENERAL:  Alert and oriented x3.   CARDIOVASCULAR:  Regular rate and rhythm, positive S1, S2.   LUNGS:  Clear to auscultation bilaterally.   ABDOMEN:  Nondistended, nontender.  Normal active bowel sounds.      NEUROLOGICAL EXAMINATION   Mental Status and Higher Cortical Functions:  Alert and " "oriented to person, place, and time.  Speech fluent, with intact naming and repetition. No dysarthria.  Cranial Nerves (II-XII):  Pupils equal, round, and reactive to light.  Extraocular movements full with no nystagmus.  Visual fields full to confrontation.  Facial sensation intact to light touch.  Face symmetric at rest and with activation.  Hearing intact to finger rub bilaterally.  Tongue midline and palate elevation symmetric.   Motor:  Normal tone, normal bulk, and no pronator drift.  No tremors or fasciculations. Arm/hand circumduction was symmetric.  Motor strength 5/5 in upper and lower extremities.   Sensation:  Intact to light touch and vibration  Coordination:  Normal finger-nose-finger  Reflexes:  Deep tendon reflexes 2+ and symmetric throughout.    Gait:  Casual gait and stance normal.        Impression:    History of seizure unspecified   History of A-Fib   History of depression/anxiety/ alcohol dependency sober since 2000    Discussion:   Seizure are stable, last seizure was 2009. We have no EEG with abnormalities. MRI notable for small bilateral hippocampi. He is tolerating lamotrigine with no major side effects. We will get EEG today. Continue lamotrigine.     Plan :   Continue lamotrigine 200 mg twice a day script sent   Check lamotrigine lab   vEEG today   Encouraged him to follow up with primary care provider about lexapro and mental health     I spent 45 minutes with the patient. During this time medical history data collection, counseling, and coordination of care exceeded 50% of the visit time. I addressed all questions the patient/caregiver raised in regards to the patient's medical care. This note was created with voice recognition software. Inadvertent grammatical errors, typographical errors, and \"sound a like\" substitutions may occur due to limitations of the software.  Read the note carefully and apply context when erroneous substitutions have occurred. Thank you.     Daria Raza MD "   Epilepsy Staff

## 2021-01-18 NOTE — PROGRESS NOTES
"[facility] Epilepsy Care History and Physical       Patient:  Melo Dangelo  :  1940   Age:  80 year old   Today's Office Visit:  2021    Referring Provider:    Referred Self, MD  No address on file    History of Present Illness:  He is transferring care to [facility] because he moved to the [facility]. Melo Dangelo is 80 year old right handed who has history of seizure that started age 55. He has been seizure free since . He became seizure free after starting lamotrigine. He is not able to recall details of his epilepsy history. He has daily dizziness (if he turns quickly, he has not fallen from this, he has gotten use to this), he has not had a primary care provider for 15 years. He got a primary care provider this year. No falls, independent at home and ADL, he is able to do his finance. He stopped drinking in , it seems his big (generalized tonic-clonic convulsion) in  and he had focal seizure with no impairment in awareness last .   Seizure type 1:  \"funny sensation in mediastinum and nausea\". Confused one moment and then woke up in the hospital wondering why he was there. He has been told by his wife he was shaking and belligerent after the seizure. He has had seizure in the day and nighttime.   Epilepsy Risk Factors:  Patient has no history of encephalitis/meningitis, no history of stroke, no history of tumor, no history of traumatic brain injury.  The patient had a normal birth and delivery.  No developmental delays noted.  No febrile seizures.  No family members with epilepsy.     Precipitating factors:   NONE  Current Outpatient Medications   Medication Sig Dispense Refill     acetaminophen (TYLENOL) 325 MG tablet Take 2 tablets (650 mg) by mouth every 6 hours as needed for mild pain 100 tablet 0     albuterol (PROAIR HFA/PROVENTIL HFA/VENTOLIN HFA) 108 (90 Base) MCG/ACT inhaler Inhale 2 puffs into the lungs every 6 hours as needed for wheezing       apixaban ANTICOAGULANT " (ELIQUIS) 2.5 MG tablet Take 2.5 mg by mouth 2 times daily       aspirin (ASA) 81 MG chewable tablet Take 81 mg by mouth daily       busPIRone (BUSPAR) 5 MG tablet Take 10 mg by mouth 2 times daily Takes in am and at 5pm       cyanocobalamin (VITAMIN B-12) 100 MCG tablet Take 100 mcg by mouth daily       dexamethasone (DECADRON) 6 MG tablet Take 1 tablet (6 mg) by mouth every 24 hours (Patient not taking: Reported on 12/10/2020)       enoxaparin ANTICOAGULANT (LOVENOX) 40 MG/0.4ML syringe Inject 0.4 mLs (40 mg) Subcutaneous every 24 hours (Patient not taking: Reported on 12/10/2020)       escitalopram (LEXAPRO) 20 MG tablet Take 20 mg by mouth daily       guaiFENesin-codeine (ROBITUSSIN AC) 100-10 MG/5ML solution Take 10 mLs by mouth every 4 hours as needed for cough       ibuprofen (ADVIL/MOTRIN) 200 MG capsule Take 400 mg by mouth every 4 hours as needed for fever       lamoTRIgine (LAMICTAL) 100 MG tablet Take 2 tablets (200 mg) by mouth 2 times daily 360 tablet 3     metoprolol tartrate (LOPRESSOR) 25 MG tablet Take 1 tablet (25 mg) by mouth 2 times daily       omeprazole (PRILOSEC) 20 MG DR capsule Take 1 capsule (20 mg) by mouth every morning (before breakfast) (Patient not taking: Reported on 12/10/2020)       ondansetron (ZOFRAN-ODT) 4 MG ODT tab Take 1 tablet (4 mg) by mouth every 6 hours as needed for nausea or vomiting (Patient not taking: Reported on 12/10/2020)       propranolol (INDERAL) 10 MG tablet Take 20-30 mg by mouth daily as needed (for anxiety)       senna-docusate (SENOKOT-S/PERICOLACE) 8.6-50 MG tablet Take 1 tablet by mouth 2 times daily (Patient not taking: Reported on 12/10/2020)       simvastatin (ZOCOR) 20 MG tablet Take 1 tablet (20 mg) by mouth At Bedtime 90 tablet 3     Perceived AED Side Effects: No  Medication Notes:   AED Medication Compliance:  compliant most of the time  Using a pill box:  Yes, has an alarm on phone for pill (shelia)  Past AEDs:  Does not recall  Family history:    No family history of epilepsy     AED - ANTIEPILEPTIC DRUGS 10/29/2020   lamoTRIgine (Oral) 200 mg BID     Past Medical History:   Diagnosis Date     Alcohol dependence (H)      Concussion with loss of consciousness     x10 going back to childhood     Seizure disorder (H)     last seizure      Atrial Fibrilliation  Depresion/Anxiety    No surgery   Family History   Problem Relation Age of Onset     Glaucoma No family hx of      Macular Degeneration No family hx of       Psycho-Social History: Lives in [place], highest level of education 4 year degree in college, 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 Lives with his wife, he has three daughters that live near by.    Currently, patient denies feeling depressed, denies feeling anhedonia, denies suicidal  thoughts, and denies having feelings of excessive guilt/worthlessness.  Does not smoke, no alcohol use since , no recreational drug use. We reviewed importance of mental and emotional wellbeing and impact on health.   Driving:  Currently patient is:  Driving: Patient was made aware of state driving laws. Currently meets criteria to continue to drive.  Previous Evaluations for Epilepsy:   EE2009 - EEG CLASSIFICATION: Dysrhythmia gr. 1 generalized (excess beta or medication effect);   Sleep - no activation; EKG and pulse oximeter monitors.     REPORT: The brief recording during wakefulness contains 10 Hz alpha activity over  the posterior head regions.  An excess of beta activity is present.  No abnormal activity occurred during hyperventilation or photic stimulation.  During   the recording, the patient was excessively drowsy and fell asleep spontaneously.  No abnormal activity occurred during sleep or at the time of arousal. The EKG and pulse oximeter monitors were unremarkable.   MRI of Brain: 2009 - Again noted and stable is mild cerebral atrophy, including mild atrophy of the hippocampal formations bilaterally.  Subtle diffusely increased T2  "signal within both hippocampal formations. This is nonspecific, and may be related to recent seizure activity. No evidence for pathologic enhancement following administration of IV gadolinium.      Remainder the MRI is otherwise unchanged. Minimal leukoaraiosis. Scattered membrane thickening and fluid within bilateral ethmoid air cells. Nasal septal deviation, convex to the left.     Review of Systems:  Lethargy / Tiredness:  yes  Nausea / Vomiting:  No  Double Vision:  No  Sleepiness:  No  Depression:  No  Slowed Cognitive Function:  No  Memory Problems:  yes  Poor Balance:  No  Dizziness:  yes  Appetite Changes:  No  Blurred Vision:  No  Sleep Changes:  No  Behavioral Changes:  No  Skin: negative  Respiratory: +shortness of breath after COVID, dyspnea on exertion, cough, or hemoptysis  Cardiovascular: negative  Have you experienced a traumatic fall related to your events: No  Are these falls related to your seizures: No      Exam:    There were no vitals taken for this visit.     Wt Readings from Last 5 Encounters:   10/27/20 190 lb 4.8 oz (86.3 kg)   09/28/18 187 lb 8 oz (85 kg)   09/23/18 185 lb 9.6 oz (84.2 kg)       EXAMINATION BP (!) 142/80 (BP Location: Left arm, Patient Position: Sitting, Cuff Size: Adult Regular)   Pulse 74   Temp 97.5  F (36.4  C) (Temporal)   Ht 5' 10\" (177.8 cm)   Wt 194 lb 6.4 oz (88.2 kg)   BMI 27.89 kg/m    GENERAL:  Alert and oriented x3.   CARDIOVASCULAR:  Regular rate and rhythm, positive S1, S2.   LUNGS:  Clear to auscultation bilaterally.   ABDOMEN:  Nondistended, nontender.  Normal active bowel sounds.      NEUROLOGICAL EXAMINATION   Mental Status and Higher Cortical Functions:  Alert and oriented to person, place, and time.  Speech fluent, with intact naming and repetition. No dysarthria.  Cranial Nerves (II-XII):  Pupils equal, round, and reactive to light.  Extraocular movements full with no nystagmus.  Visual fields full to confrontation.  Facial sensation intact to " "light touch.  Face symmetric at rest and with activation.  Hearing intact to finger rub bilaterally.  Tongue midline and palate elevation symmetric.   Motor:  Normal tone, normal bulk, and no pronator drift.  No tremors or fasciculations. Arm/hand circumduction was symmetric.  Motor strength 5/5 in upper and lower extremities.   Sensation:  Intact to light touch and vibration  Coordination:  Normal finger-nose-finger  Reflexes:  Deep tendon reflexes 2+ and symmetric throughout.    Gait:  Casual gait and stance normal.        Impression:    History of seizure unspecified   History of A-Fib   History of depression/anxiety/ alcohol dependency sober since 2000    Discussion:   Seizure are stable, last seizure was 2009. We have no EEG with abnormalities. MRI notable for small bilateral hippocampi. He is tolerating lamotrigine with no major side effects. We will get EEG today. Continue lamotrigine.     Plan :   Continue lamotrigine 200 mg twice a day script sent   Check lamotrigine lab   vEEG today   Encouraged him to follow up with primary care provider about lexapro and mental health     I spent 45 minutes with the patient. During this time medical history data collection, counseling, and coordination of care exceeded 50% of the visit time. I addressed all questions the patient/caregiver raised in regards to the patient's medical care. This note was created with voice recognition software. Inadvertent grammatical errors, typographical errors, and \"sound a like\" substitutions may occur due to limitations of the software.  Read the note carefully and apply context when erroneous substitutions have occurred. Thank you.     Daria Raza MD   Epilepsy Staff           "

## 2021-01-19 DIAGNOSIS — G40.909 SEIZURE DISORDER (H): ICD-10-CM

## 2021-01-19 PROCEDURE — 80175 DRUG SCREEN QUAN LAMOTRIGINE: CPT | Mod: 90 | Performed by: PATHOLOGY

## 2021-01-19 PROCEDURE — 99000 SPECIMEN HANDLING OFFICE-LAB: CPT | Performed by: PATHOLOGY

## 2021-01-20 LAB — LAMOTRIGINE SERPL-MCNC: 8.9 UG/ML (ref 2.5–15)

## 2021-01-22 ENCOUNTER — VIRTUAL VISIT (OUTPATIENT)
Dept: UROLOGY | Facility: CLINIC | Age: 81
End: 2021-01-22
Payer: MEDICARE

## 2021-01-22 ENCOUNTER — PRE VISIT (OUTPATIENT)
Dept: UROLOGY | Facility: CLINIC | Age: 81
End: 2021-01-22

## 2021-01-22 DIAGNOSIS — N40.1 BENIGN PROSTATIC HYPERPLASIA WITH URINARY FREQUENCY: Primary | ICD-10-CM

## 2021-01-22 DIAGNOSIS — R35.0 BENIGN PROSTATIC HYPERPLASIA WITH URINARY FREQUENCY: Primary | ICD-10-CM

## 2021-01-22 PROCEDURE — 99203 OFFICE O/P NEW LOW 30 MIN: CPT | Mod: 95 | Performed by: NURSE PRACTITIONER

## 2021-01-22 RX ORDER — TAMSULOSIN HYDROCHLORIDE 0.4 MG/1
0.4 CAPSULE ORAL DAILY
Qty: 60 CAPSULE | Refills: 3 | Status: SHIPPED | OUTPATIENT
Start: 2021-01-22 | End: 2021-10-15

## 2021-01-22 ASSESSMENT — PATIENT HEALTH QUESTIONNAIRE - PHQ9: SUM OF ALL RESPONSES TO PHQ QUESTIONS 1-9: 3

## 2021-01-22 NOTE — LETTER
"1/22/2021       RE: Melo Dangelo  [address]  [place] [address]     Dear Colleague,    Thank you for referring your patient, Melo Dangelo, to the [facility] UROLOGY CLINIC [place] at [facility]. Please see a copy of my visit note below.    Melo is a 80 year old who is being evaluated via a billable video visit.      Video Visit Technology for this patient: Caitlyn Video Visit- Please send an invite to patient's [phone]    How would you like to obtain your AVS? MyChart  If the video visit is dropped, the invitation should be resent by: Text to cell phone: [phone]  Will anyone else be joining your video visit? No    Video Start Time: 1:43 PM  Video-Visit Details    Type of service:  Video Visit    Video End Time:1:59 PM    Originating Location (pt. Location): Home    Distant Location (provider location):  [facility] UROLOGY [facility]     Platform used for Video Visit: Caitlyn      Melo Dangelo complains of   Chief Complaint   Patient presents with     Consult For     BPH       80 year old male who presents today for evaluation regarding BPH. Referred by his PCP, Dr. Smith. The patient initially states that he is unaware of the reason of the referral, as he has no issue with voiding symptoms. However, upon further questioning, he notes some mild frequency, a variably weaker stream, nocturia x3-4, and urgency with some bathroom-mapping when he is out of the house. Denies any concern for retention. Denies significant bother from these symptoms, but also notes that they are \"on my mind.\" Has wondered if he should seek care for this but usually talks himself out of it. Does not take any medication for urination and has not previously followed with a urologist for this.     Exam:   Patient is a 80 year old male   General Appearance: Well groomed, hygenic  Respiratory: No cough, no respiratory distress or labored " breathing  Musculoskeletal: Grossly normal with no gross deficits  Skin: No discoloration or apparent rashes  Neurologic: No tremors  Psychiatric: Alert and oriented  Further examination is deferred due to the nature of our visit.       Assessment/Plan:  80 year old male with symptoms suggestive of BPH, including  mild frequency, a variably weaker stream, nocturia x3-4, and urgency with some bathroom-mapping when he is out of the house. Due to the nature of our visit today, I am unable to perform an exam, including DI and PVR. I suspect he would benefit from the addition of an alpha blocker, and we discussed this today.   -Will initiate a trial of Flomax 0.4 mg daily. Potential side effects discussed and Rx sent.   -Patient will plan to follow up with me in 2-3 months for a symptom check.     Esperanza Guillaume, CNP  Department of Urology

## 2021-01-22 NOTE — PROGRESS NOTES
"Melo is a 80 year old who is being evaluated via a billable video visit.      Video Visit Technology for this patient: Caitlyn Video Visit- Please send an invite to patient's [phone]    How would you like to obtain your AVS? MyChart  If the video visit is dropped, the invitation should be resent by: Text to cell phone: [phone]  Will anyone else be joining your video visit? No    Video Start Time: 1:43 PM  Video-Visit Details    Type of service:  Video Visit    Video End Time:1:59 PM    Originating Location (pt. Location): Home    Distant Location (provider location):  [facility] UROLOGY CLINIC [place]     Platform used for Video Visit: Caitlyn      Melo Dangelo complains of   Chief Complaint   Patient presents with     Consult For     BPH       80 year old male who presents today for evaluation regarding BPH. Referred by his PCP, Dr. Smith. The patient initially states that he is unaware of the reason of the referral, as he has no issue with voiding symptoms. However, upon further questioning, he notes some mild frequency, a variably weaker stream, nocturia x3-4, and urgency with some bathroom-mapping when he is out of the house. Denies any concern for retention. Denies significant bother from these symptoms, but also notes that they are \"on my mind.\" Has wondered if he should seek care for this but usually talks himself out of it. Does not take any medication for urination and has not previously followed with a urologist for this.     Exam:   Patient is a 80 year old male   General Appearance: Well groomed, hygenic  Respiratory: No cough, no respiratory distress or labored breathing  Musculoskeletal: Grossly normal with no gross deficits  Skin: No discoloration or apparent rashes  Neurologic: No tremors  Psychiatric: Alert and oriented  Further examination is deferred due to the nature of our visit.       Assessment/Plan:  80 year old male with symptoms suggestive of BPH, including  " mild frequency, a variably weaker stream, nocturia x3-4, and urgency with some bathroom-mapping when he is out of the house. Due to the nature of our visit today, I am unable to perform an exam, including DI and PVR. I suspect he would benefit from the addition of an alpha blocker, and we discussed this today.   -Will initiate a trial of Flomax 0.4 mg daily. Potential side effects discussed and Rx sent.   -Patient will plan to follow up with me in 2-3 months for a symptom check.     Esperanza Guillaume, CNP  Department of Urology

## 2021-01-22 NOTE — PATIENT INSTRUCTIONS
UROLOGY CLINIC VISIT PATIENT INSTRUCTIONS    -Start Flomax 0.4 mg daily. Can take in the evening.   -Follow up with me in 2-3 months for a follow up visit to revisit your urinary symptoms.     If you have any issues, questions or concerns in the meantime, do not hesitate to contact us at [phone] or via [employer].     It was a pleasure meeting with you today.  Thank you for allowing me and my team the privilege of caring for you today.  YOU are the reason we are here, and I truly hope we provided you with the excellent service you deserve.  Please let us know if there is anything else we can do for you so that we can be sure you are leaving completely satisfied with your care experience.    Esperanza Guillaume, CNP

## 2021-02-18 ENCOUNTER — TELEPHONE (OUTPATIENT)
Dept: NEUROLOGY | Facility: CLINIC | Age: 81
End: 2021-02-18

## 2021-02-24 ENCOUNTER — IMMUNIZATION (OUTPATIENT)
Dept: NURSING | Facility: CLINIC | Age: 81
End: 2021-02-24
Payer: MEDICARE

## 2021-02-24 PROCEDURE — 0001A PR COVID VAC PFIZER DIL RECON 30 MCG/0.3 ML IM: CPT

## 2021-02-24 PROCEDURE — 91300 PR COVID VAC PFIZER DIL RECON 30 MCG/0.3 ML IM: CPT

## 2021-03-08 DIAGNOSIS — I48.0 PAROXYSMAL ATRIAL FIBRILLATION (H): Primary | ICD-10-CM

## 2021-03-08 NOTE — TELEPHONE ENCOUNTER
[facility] Call Center    Phone Message    May a detailed message be left on voicemail: yes     Reason for Call: Medication Refill Request    Has the patient contacted the pharmacy for the refill? Yes   Name of medication being requested: apixaban ANTICOAGULANT (ELIQUIS) 2.5 MG tablet  Provider who prescribed the medication: Dr. Brower  Pharmacy: [facility] [address] [place] [address]  Date medication is needed: 3/8/21   Pt is out of this medication, pt is needing it today, pt requesting call back      Action Taken: Message routed to:  [facility] sarah

## 2021-03-08 NOTE — TELEPHONE ENCOUNTER
apixaban ANTICOAGULANT (ELIQUIS) 2.5 MG tablet   HISTORICAL ONLY ON MEDICATION LIST    Last Office Visit : 12-  Future Office visit:  none    Routing refill request to provider for review/approval because:  Medication is reported/historical.  Pt moving medication management to [facility].      Kathleen M Doege RN

## 2021-03-09 ENCOUNTER — TELEPHONE (OUTPATIENT)
Dept: OPHTHALMOLOGY | Facility: CLINIC | Age: 81
End: 2021-03-09

## 2021-03-09 NOTE — TELEPHONE ENCOUNTER
Thanks for the note. This timeframe and date sound appropriate. Wrote comment for testing in the visit notes. Thank you!

## 2021-03-09 NOTE — TELEPHONE ENCOUNTER
Spoke to pt at 1040    Right upper quadrant distortion and blurring in right eye-- noticed in past 2 days    Pt still able to read as before.    Dryness discomfort    Scheduled appt this Friday with Dr. Julien at 0930     Pt aware of date/time/location at [facility] and aware to call for any sudden changes between visits.    Reviewed may use preservative free artificial tears til visit for dryness symptoms    Note to Dr. Julien for review/amendment to plan if applicable    Lenin Garcia RN 10:53 AM 03/09/21         [facility] Call Center    Phone Message    May a detailed message be left on voicemail: yes     Reason for Call: Symptoms or Concerns     If patient has red-flag symptoms, warm transfer to triage line    Current symptom or concern: Rt eye vision is fuzzier, and when he looks at Amsler grid he sees jagged lines.    Symptoms have been present for:  2 day(s)    Has patient previously been seen for this? Yes    By : Dr. Julien    Date: 10/28/2019    Are there any new or worsening symptoms? Yes: Pt just noticed these symptoms 2 days ago.      Action Taken: Message routed to:  [facility] EYE    Travel Screening: Not Applicable

## 2021-03-12 ENCOUNTER — OFFICE VISIT (OUTPATIENT)
Dept: OPHTHALMOLOGY | Facility: CLINIC | Age: 81
End: 2021-03-12
Attending: OPHTHALMOLOGY
Payer: MEDICARE

## 2021-03-12 DIAGNOSIS — H43.813 POSTERIOR VITREOUS DETACHMENT, BILATERAL: ICD-10-CM

## 2021-03-12 DIAGNOSIS — H35.711: Primary | ICD-10-CM

## 2021-03-12 DIAGNOSIS — H25.13 AGE-RELATED NUCLEAR CATARACT OF BOTH EYES: ICD-10-CM

## 2021-03-12 DIAGNOSIS — H47.231 CUPPING OF OPTIC DISC, RIGHT: ICD-10-CM

## 2021-03-12 DIAGNOSIS — H35.363 PERIPHERAL DRUSEN OF BOTH EYES: ICD-10-CM

## 2021-03-12 PROCEDURE — 99207 OCT OPTIC NERVE RNFL SPECTRALIS OU (BOTH EYES): CPT | Mod: 26 | Performed by: OPHTHALMOLOGY

## 2021-03-12 PROCEDURE — 92133 CPTRZD OPH DX IMG PST SGM ON: CPT | Performed by: OPHTHALMOLOGY

## 2021-03-12 PROCEDURE — 92250 FUNDUS PHOTOGRAPHY W/I&R: CPT | Performed by: OPHTHALMOLOGY

## 2021-03-12 PROCEDURE — 99207 FUNDUS PHOTOS OU (BOTH EYES): CPT | Mod: 26 | Performed by: OPHTHALMOLOGY

## 2021-03-12 PROCEDURE — 92134 CPTRZ OPH DX IMG PST SGM RTA: CPT | Performed by: OPHTHALMOLOGY

## 2021-03-12 PROCEDURE — 99214 OFFICE O/P EST MOD 30 MIN: CPT | Performed by: OPHTHALMOLOGY

## 2021-03-12 PROCEDURE — G0463 HOSPITAL OUTPT CLINIC VISIT: HCPCS

## 2021-03-12 RX ORDER — CITALOPRAM HYDROBROMIDE 20 MG/1
TABLET ORAL
COMMUNITY
Start: 2020-12-04 | End: 2021-03-30

## 2021-03-12 ASSESSMENT — EXTERNAL EXAM - LEFT EYE: OS_EXAM: NORMAL

## 2021-03-12 ASSESSMENT — TONOMETRY
OD_IOP_MMHG: 20
OS_IOP_MMHG: 22
IOP_METHOD: TONOPEN

## 2021-03-12 ASSESSMENT — REFRACTION_WEARINGRX
OS_ADD: +2.50
OS_CYLINDER: +1.75
OD_SPHERE: +2.50
SPECS_TYPE: BIFOCAL
OD_ADD: +2.50
OS_SPHERE: +2.50
OS_AXIS: 010
OD_AXIS: 179
OD_CYLINDER: +1.00

## 2021-03-12 ASSESSMENT — EXTERNAL EXAM - RIGHT EYE: OD_EXAM: NORMAL

## 2021-03-12 ASSESSMENT — VISUAL ACUITY
OS_CC: 20/20
CORRECTION_TYPE: GLASSES
METHOD: SNELLEN - LINEAR
OD_CC: 20/30

## 2021-03-12 ASSESSMENT — CONF VISUAL FIELD
OS_NORMAL: 1
METHOD: COUNTING FINGERS
OD_NORMAL: 1

## 2021-03-12 ASSESSMENT — SLIT LAMP EXAM - LIDS: COMMENTS: MEIBOMIAN GLAND INSPISSATION

## 2021-03-12 ASSESSMENT — CUP TO DISC RATIO
OD_RATIO: 0.6
OS_RATIO: 0.5

## 2021-03-12 NOTE — PATIENT INSTRUCTIONS
Continue using the Amsler grid at least once weekly to check the right eye. If you notice any major changes before next visit, please let us know.    No new medications are needed.     We will see you back in 4-6 weeks

## 2021-03-12 NOTE — NURSING NOTE
Chief Complaints and History of Present Illnesses   Patient presents with     Retinal Evaluation     Chief Complaint(s) and History of Present Illness(es)     Retinal Evaluation     Laterality: right eye    Onset: years ago    Quality: RE some changes in the Amsler Grid with both vertical and horizontal lines  VA in the RE is hazy or milky.    Associated symptoms: photophobia (has increased).  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

## 2021-03-12 NOTE — PROGRESS NOTES
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 He was last seen in October 2019 at which time the  in the right eye was inactive and we recommended follow up in a few months.    In the interim, Mr. Dangelo reports things were doing well until a few weeks ago when he again noticed blurriness in the right eye and he called for this appointment. He noticed some waviness on the Amsler Grid. The left eye is doing well without any issues.     Interval Updates to Medical/Family/Social History:    Had COVID in November 2020 and recovered well after a few days in the hospital. Took some Dexamethosone pills around this time in the hospital in November 2020.. Was previously on Lovenox but now on Eliquis (after being off few days).     Relevant Review of Systems Updates:  Still having some challenges going up/down stairs and some residual COVID brain fog.      Current eye related medications: None currently, no current use of steroid drops/pills    Retina/Uveitis Imaging:   OCT Spectralis Macula March 12, 2021  right eye: New outer segment loss nasal to fovea, focal subretinal changes but no césar fluid, no CNV. Thick choroid. No PP CNV  left eye: Normal contour, no fluid, normal outer segments, normal choroidal thickness    OCT Optic Nerve RNFL Spectralis March 12, 2021  right eye: Avg thickness 115 microns, no thinning.   left eye: Avg thickness 118 microns, no thinning.    Optos Fundus Autofluorescence March 12, 2021  right eye:Mixed AF in macula, focal hyper AF spots nasal  left eye: Focal hyper-AF spots nasal    Optos Fundus Photos OU (both eyes) March 12, 2021  right eye: Mottling in macula  left eye:Nasal drusen    Assessment:     1. 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     2.  Cupping of optic disc, right  With elevated IOP left eye, but normal RNFL scans of both optic nerves    3. Peripheral drusen of both eyes  Stable anatomic variant    4. Posterior vitreous detachment, bilateral  Stable without associated retinal breaks    5. Age-related nuclear cataract of both eyes  Mildly visually signficiant    Plan/Recommendations:      Discussed findings with patient. There have been interval symptoms since last visit. There is no fluid on OCT but some outer segment disruption nasal to the fovea. These changes may account for the symptoms in the right eye (progression of atrophy), or perhaps there was fluid in the retina which could have been related to dexamethosone use from COVID, which has since resolved. As such, no treatment recommended other than observation    The retina in the left eye is stable with nasal drusen but no macular changes     The eye pressure is slightly elevated in the left eye but optic nerve scan normal in each eye    The cataracts are stable and have not progressed much since last visit. As such, would not recommend new glasses change at this time    Do not recommend additional diagnostic testing at this time    Continue using the Amsler grid at least once weekly to check the right eye. If you notice any major changes before next visit, please let us know.    No new medications are needed. Limit oral steroid use which can cause this condition to flare     Return for 4-6 weeks , Tonopen, Dilate, OCT (ordered).     Physician Attestation     Attending Physician Attestation:  Complete documentation of historical and exam elements from today's encounter can be found in the full encounter summary report (not reduplicated in this progress note). I personally obtained the chief complaint(s) and history of present illness. I confirmed and edited as necessary the review of systems, past medical/surgical history, family history, social history, and examination findings as  documented by others; and I examined the patient myself. I personally reviewed the relevant tests, images, and reports as documented above. I formulated and edited as necessary the assessment and plan and discussed the findings and management plan with the patient and family members present at the time of this visit.  Malik Julien M.D., Uveitis and Medical Retina, March 12, 2021

## 2021-03-12 NOTE — LETTER
3/12/2021       RE: Melo Dangelo  [address]  [place] [address]     Dear Colleague,    Thank you for referring your patient, Melo Dangelo, to the [facility] EYE [facility] at [facility]. Please see a copy of my visit note below.    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 He was last seen in October 2019 at which time the  in the right eye was inactive and we recommended follow up in a few months.    In the interim, Mr. Dangelo reports things were doing well until a few weeks ago when he again noticed blurriness in the right eye and he called for this appointment. He noticed some waviness on the Amsler Grid. The left eye is doing well without any issues.     Interval Updates to Medical/Family/Social History: Had COVID in November 2020 and recovered well after a few days in the hospital. Took some Dexamethosone pills around this time in the hospital in November 2020.. Was previously on Lovenox but now on Eliquis (after being off few days).     Relevant Review of Systems Updates:  Still having some challenges going up/down stairs and some residual COVID brain fog.      Current eye related medications: None currently, no current use of steroid drops/pills    Retina/Uveitis Imaging:   OCT Spectralis Macula March 12, 2021  right eye: New outer segment loss nasal to fovea, focal subretinal changes but no césar fluid, no CNV. Thick choroid. No PP CNV  left eye: Normal contour, no fluid, normal outer segments, normal choroidal thickness    OCT Optic Nerve RNFL Spectralis March 12, 2021  right eye: Avg thickness 115 microns, no thinning.   left eye: Avg thickness 118 microns, no thinning.    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     2. Cupping of optic disc, right  With elevated IOP left eye, but normal RNFL scans of both optic nerves    3. Peripheral drusen of both eyes  Stable anatomic variant    4. Posterior vitreous detachment, bilateral  Stable without associated retinal breaks    5. Age-related nuclear cataract of both eyes  Mildly visually signficiant    Plan/Recommendations:      Discussed findings with patient. There have been interval symptoms since last visit. There is no fluid on OCT but some outer segment disruption nasal to the fovea. These changes may account for the symptoms in the right eye (progression of atrophy), or perhaps there was fluid in the retina which could have been related to dexamethosone use from COVID, which has since resolved. As such, no treatment recommended other than observation    The retina in the left eye is stable with nasal drusen but no macular changes     The eye pressure is slightly elevated in the left eye but optic nerve scan normal in each eye    The cataracts are stable and have not progressed much since last visit. As such, would not recommend new glasses change at this time    Do not recommend additional diagnostic testing at this time    Continue using the Amsler grid at least once weekly to check the right eye. If you notice any major changes before next visit, please let us know.    No new medications are needed. Limit oral steroid use which can cause this condition to flare     Return for 4-6 weeks , Tonopen, Dilate, OCT (ordered).     Physician Attestation   Attending Physician Attestation:  Complete documentation of historical and exam elements from today's encounter can be found in the  full encounter summary report (not reduplicated in this progress note). I personally obtained the chief complaint(s) and history of present illness. I confirmed and edited as necessary the review of systems, past medical/surgical history, family history, social history, and examination findings as documented by others; and I examined the patient myself. I personally reviewed the relevant tests, images, and reports as documented above. I formulated and edited as necessary the assessment and plan and discussed the findings and management plan with the patient and family.  Malik Julien MD.    Malik Julien M.D., Uveitis and Medical Retina, March 12, 2021       Again, thank you for allowing me to participate in the care of your patient.    Sincerely,    Malik Julien MD  [facility] Dept of Ophthalmology  Uveitis and Medical Retina

## 2021-03-17 ENCOUNTER — IMMUNIZATION (OUTPATIENT)
Dept: NURSING | Facility: CLINIC | Age: 81
End: 2021-03-17
Attending: INTERNAL MEDICINE
Payer: MEDICARE

## 2021-03-17 PROCEDURE — 91300 PR COVID VAC PFIZER DIL RECON 30 MCG/0.3 ML IM: CPT

## 2021-03-17 PROCEDURE — 0002A PR COVID VAC PFIZER DIL RECON 30 MCG/0.3 ML IM: CPT

## 2021-03-19 ENCOUNTER — TELEPHONE (OUTPATIENT)
Dept: FAMILY MEDICINE | Facility: CLINIC | Age: 81
End: 2021-03-19

## 2021-03-19 NOTE — TELEPHONE ENCOUNTER
[facility] Call Center    Phone Message    May a detailed message be left on voicemail: yes     Reason for Call: Symptoms or Concerns     If patient has red-flag symptoms, warm transfer to triage line    Current symptom or concern: Blood in urine, pt to say it is bright red.     Symptoms have been present for:  2 day(s)    Has patient previously been seen for this? No    By NA    Date: NA    Are there any new or worsening symptoms? No      Action Taken: Message routed to:  [facility]    Travel Screening: Not Applicable

## 2021-03-22 NOTE — TELEPHONE ENCOUNTER
Spoke with pt. He is currently taking Eliquis 2.5 mg BID. Blood in urine was tapered off and it was completely gone 2 days ago. No other sxs. He will keep monitoring.

## 2021-03-26 DIAGNOSIS — F32.A DEPRESSION: Primary | ICD-10-CM

## 2021-03-28 ENCOUNTER — HEALTH MAINTENANCE LETTER (OUTPATIENT)
Age: 81
End: 2021-03-28

## 2021-03-30 RX ORDER — BUSPIRONE HYDROCHLORIDE 5 MG/1
20 TABLET ORAL 2 TIMES DAILY
Start: 2021-03-30 | End: 2021-09-07

## 2021-03-30 RX ORDER — ESCITALOPRAM OXALATE 20 MG/1
20 TABLET ORAL DAILY
Status: ON HOLD
Start: 2021-03-30 | End: 2021-10-22

## 2021-03-30 RX ORDER — CITALOPRAM HYDROBROMIDE 20 MG/1
TABLET ORAL
OUTPATIENT
Start: 2021-03-30

## 2021-03-30 NOTE — TELEPHONE ENCOUNTER
CITALOPRAM 20MG TABS      Last Written Prescription Date:  12/4/2020  Last Fill Quantity: ?,   # refills: ?  Last Office Visit : 12/14/2020  Future Office visit:  None    Routing refill request to provider for review/approval because:  Medication is reported/historical      Raissa Fisher RN  Central Triage Red Flags/Med Refills

## 2021-03-30 NOTE — TELEPHONE ENCOUNTER
Celexa was discontinued when discharged on 10/29/20.    From Dr. Solano's note on 12/10/20,  Pt is on :  1. Lexapro 20 mg/day -- consider decreasing to 10 mg when sxx have improved.    2. Buspirone 20 mg BID --could increase as high as 30 mg BID    I called the pt and discussed.

## 2021-04-08 ENCOUNTER — TELEPHONE (OUTPATIENT)
Dept: FAMILY MEDICINE | Facility: CLINIC | Age: 81
End: 2021-04-08

## 2021-04-08 ENCOUNTER — NURSE TRIAGE (OUTPATIENT)
Dept: NURSING | Facility: CLINIC | Age: 81
End: 2021-04-08

## 2021-04-08 NOTE — TELEPHONE ENCOUNTER
Patient was sent to the nurse triage line.  Patient was advised to be seen in urgent care today.  Patient agrees.      Scout Calderón CMA ([facility]) at 1:07 PM on 4/8/2021

## 2021-04-08 NOTE — TELEPHONE ENCOUNTER
"Call from Melo, past 1.5 weeks experiencing increasing shortness of breath and dry cough.  Temp 97.7 during call.  Denies dizziness/lightheadedness.  Experiences right upper chest discomfort with coughing.  Pain is not worse with inspiration. On eliquis.  Is post covid patient, had experienced shortness of breath after covid, this was getting better until 1.5 weeks ago.  Has been vaccinated for COVID 19.  Coughing throughout entire call.  No available in person appointments until 4/20 per scheduling    Advised to be seen in urgent care today.  Agrees.    Additional Information    Negative: Breathing stopped and hasn't returned    Negative: Choking on something    Negative: SEVERE difficulty breathing (e.g., struggling for each breath, speaks in single words, pulse > 120)    Negative: Bluish (or gray) lips or face    Negative: Difficult to awaken or acting confused (e.g., disoriented, slurred speech)    Negative: Passed out (i.e., fainted, collapsed and was not responding)    Negative: Wheezing started suddenly after medicine, an allergic food, or bee sting    Negative: Stridor    Negative: Slow, shallow and weak breathing    Negative: Sounds like a life-threatening emergency to the triager    Negative: Chest pain    Negative: Wheezing (high pitched whistling sound) and previous asthma attacks or use of asthma medicines    Negative: Difficulty breathing and only present when coughing    Negative: Difficulty breathing and only from stuffy or runny nose    Negative: MODERATE difficulty breathing (e.g., speaks in phrases, SOB even at rest, pulse 100-120) of new onset or worse than normal    Negative: Wheezing can be heard across the room    Negative: Drooling or spitting out saliva (because can't swallow)    Negative: Any history of prior \"blood clot\" in leg or lungs    Negative: Recent illness requiring prolonged bedrest (i.e., immobilization)    Negative: Hip or leg fracture in past 2 months (e.g., or had cast on " "leg or ankle)    Negative: Major surgery in the past month    Negative: Recent long-distance travel with prolonged time in car, bus, plane, or train (i.e., within past 2 weeks; 6 or  more hours duration)    Negative: Extra heart beats OR irregular heart beating   (i.e., \"palpitations\")    Negative: Fever > 103 F (39.4 C)    Negative: Fever > 101 F (38.3 C) and over 60 years of age    Negative: Fever > 100.0 F (37.8 C) and bedridden (e.g., nursing home patient, stroke, chronic illness, recovering from surgery)    Negative: Fever > 100.0 F (37.8 C) and diabetes mellitus or weak immune system (e.g., HIV positive, cancer chemo, splenectomy, organ transplant, chronic steroids)    Negative: Periods where breathing stops and then resumes normally and bedridden (e.g., nursing home patient, CVA)    Negative: Pregnant or postpartum (from 0 to 6 weeks after delivery)    Negative: Patient sounds very sick or weak to the triager    MILD difficulty breathing (e.g., minimal/no SOB at rest, SOB with walking, pulse < 100) of new onset or worse than normal    Answer Assessment - Initial Assessment Questions  1. RESPIRATORY STATUS: \"Describe your breathing?\" (e.g., wheezing, shortness of breath, unable to speak, severe coughing)       Short of breath  2. ONSET: \"When did this breathing problem begin?\"       1.5 weeks  3. PATTERN \"Does the difficult breathing come and go, or has it been constant since it started?\"       Has been increasing this past week and a half  4. SEVERITY: \"How bad is your breathing?\" (e.g., mild, moderate, severe)     - MILD: No SOB at rest, mild SOB with walking, speaks normally in sentences, can lay down, no retractions, pulse < 100.     - MODERATE: SOB at rest, SOB with minimal exertion and prefers to sit, cannot lie down flat, speaks in phrases, mild retractions, audible wheezing, pulse 100-120.     - SEVERE: Very SOB at rest, speaks in single words, struggling to breathe, sitting hunched forward, " "retractions, pulse > 120       Mild-moderate  5. RECURRENT SYMPTOM: \"Have you had difficulty breathing before?\" If so, ask: \"When was the last time?\" and \"What happened that time?\"       Had this after having had COVID 19, improved  6. CARDIAC HISTORY: \"Do you have any history of heart disease?\" (e.g., heart attack, angina, bypass surgery, angioplasty)       denies  7. LUNG HISTORY: \"Do you have any history of lung disease?\"  (e.g., pulmonary embolus, asthma, emphysema)      Bronchitis 50 years ago  8. CAUSE: \"What do you think is causing the breathing problem?\"       Doesn't know  9. OTHER SYMPTOMS: \"Do you have any other symptoms? (e.g., dizziness, runny nose, cough, chest pain, fever)      Non productive, temp 97.7, denies cold symptoms, dizziness light headedness  10. PREGNANCY: \"Is there any chance you are pregnant?\" \"When was your last menstrual period?\"        N/A  11. TRAVEL: \"Have you traveled out of the country in the last month?\" (e.g., travel history, exposures)        none    Protocols used: BREATHING DIFFICULTY-A-OH      "

## 2021-04-08 NOTE — TELEPHONE ENCOUNTER
[facility] Call Center    Phone Message    May a detailed message be left on voicemail: yes     Reason for Call: Symptoms or Concerns     If patient has red-flag symptoms, warm transfer to triage line    Current symptom or concern: trouble breathing for last month following COVID vaccine     Symptoms have been present for:  1 month(s)    Has patient previously been seen for this? No     Patient is having difficulty in breathing which is increasing since the COVID vaccine. Patient having pain and coughing     Are there any new or worsening symptoms? Yes:        Action Taken: Message routed to:  [facility]    Travel Screening: Not Applicable

## 2021-04-15 ENCOUNTER — MYC MEDICAL ADVICE (OUTPATIENT)
Dept: OPHTHALMOLOGY | Facility: CLINIC | Age: 81
End: 2021-04-15

## 2021-04-15 NOTE — TELEPHONE ENCOUNTER
Spoke to pt at 1628    Right eye blurring and distortion noted in past times one week-- been gradual    Amsler grid changes in past week    Offered appt tomorrow with Dr. Julien in open return retina time slot and pt accepts    Pt aware of date/time/location at [facility]    H/o central serous retinopathy    Lenin Garcia RN 4:33 PM 04/15/21    Note to Dr. Julien

## 2021-04-16 ENCOUNTER — OFFICE VISIT (OUTPATIENT)
Dept: OPHTHALMOLOGY | Facility: CLINIC | Age: 81
End: 2021-04-16
Attending: OPHTHALMOLOGY
Payer: MEDICARE

## 2021-04-16 DIAGNOSIS — H35.711: Primary | ICD-10-CM

## 2021-04-16 DIAGNOSIS — H35.363 PERIPHERAL DRUSEN OF BOTH EYES: ICD-10-CM

## 2021-04-16 DIAGNOSIS — H47.231 CUPPING OF OPTIC DISC, RIGHT: ICD-10-CM

## 2021-04-16 DIAGNOSIS — H43.813 POSTERIOR VITREOUS DETACHMENT, BILATERAL: ICD-10-CM

## 2021-04-16 DIAGNOSIS — H25.13 AGE-RELATED NUCLEAR CATARACT OF BOTH EYES: ICD-10-CM

## 2021-04-16 PROCEDURE — 92133 CPTRZD OPH DX IMG PST SGM ON: CPT | Performed by: OPHTHALMOLOGY

## 2021-04-16 PROCEDURE — G0463 HOSPITAL OUTPT CLINIC VISIT: HCPCS

## 2021-04-16 PROCEDURE — 92134 CPTRZ OPH DX IMG PST SGM RTA: CPT | Performed by: OPHTHALMOLOGY

## 2021-04-16 PROCEDURE — 99207 FUNDUS AUTOFLUORESCENCE IMAGE (FAF) OU (BOTH EYES): CPT | Performed by: OPHTHALMOLOGY

## 2021-04-16 PROCEDURE — 99213 OFFICE O/P EST LOW 20 MIN: CPT | Performed by: OPHTHALMOLOGY

## 2021-04-16 PROCEDURE — 92250 FUNDUS PHOTOGRAPHY W/I&R: CPT | Performed by: OPHTHALMOLOGY

## 2021-04-16 ASSESSMENT — REFRACTION_WEARINGRX
OD_SPHERE: +2.50
OS_SPHERE: +2.50
OD_ADD: +2.50
OD_AXIS: 179
OS_CYLINDER: +1.75
OD_CYLINDER: +1.00
OS_ADD: +2.50
SPECS_TYPE: BIFOCAL
OS_AXIS: 010

## 2021-04-16 ASSESSMENT — VISUAL ACUITY
OD_CC+: +2
OD_PH_CC: 20/25
OD_CC: 20/40
OS_CC+: -1
METHOD: SNELLEN - LINEAR
CORRECTION_TYPE: GLASSES
OS_CC: 20/20

## 2021-04-16 ASSESSMENT — CUP TO DISC RATIO
OD_RATIO: 0.6
OS_RATIO: 0.5

## 2021-04-16 ASSESSMENT — TONOMETRY
OD_IOP_MMHG: 16
IOP_METHOD: TONOPEN
OS_IOP_MMHG: 15

## 2021-04-16 ASSESSMENT — SLIT LAMP EXAM - LIDS: COMMENTS: MEIBOMIAN GLAND INSPISSATION

## 2021-04-16 ASSESSMENT — EXTERNAL EXAM - LEFT EYE: OS_EXAM: NORMAL

## 2021-04-16 ASSESSMENT — EXTERNAL EXAM - RIGHT EYE: OD_EXAM: NORMAL

## 2021-04-16 ASSESSMENT — CONF VISUAL FIELD
OD_NORMAL: 1
METHOD: COUNTING FINGERS
OS_NORMAL: 1

## 2021-04-16 NOTE — PROGRESS NOTES
Chief Complaint/Presenting Concern:  Retina follow up    Interval History of Present Ocular Illness:  Melo Dangelo is a 80 year old patient who returns for follow up for inactive  of the right eye. He was last seen on 3/12/21 for blurry vision at which time the  was inactive and we determined the blurry vision was related to progressive macular atrophy. We recommended observation and to return this week.    In the interim, Mr. Dangelo reports increasing blurry vision and changes on the Amsler grid in the right eye. He notices that the horizontal lines on the Amsler grid seem to be wider and the vertical ones seem to be doubled. The right eye seems somewhat dry, particularly when looking at the computer. No issues with the left eye, lines are clear and straight.     Interval Updates to Medical/Family/Social History:    Still recovering from COVID infection with some improvement in breathing ability. Not on Decadron/Dexamethosone nor any other steroids    Relevant Review of Systems Updates:  No fevers. Some tightness when pushing on chest, but no pain when walking.     Laboratory Testing: None today/since last visit    Current eye related medications: No steroid medications    Retina/Uveitis Imaging:   OCT Spectralis Macula April 16, 2021  right eye: Focal outer segment changes without fluid. Thick choroid, slightly improved. Subretinal deposit inferior to fovea and nasal to fovea slightly increased but no overlying fluid.  left eye: Normal contour, no outer segment changes. No fluid    [place] 33 degree Autofluorescence April 16, 2021  right eye: Mixed AF, generally stable vs 55 degree AF from October 20219.  left eye: Minimal hyper-AF just temporal disc margin, normal macular Auto-fluoresence.     RNFL April 16, 2021  right eye: Avg thickness 115 microns, no thinning.   left eye: Avg thickness 115 microns, no thinning.     Assessment:     1. 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 Likely related to progression of atrophy.     2. Cupping of optic disc, right  With normal eye pressure. RNFL scans without signs of glaucoma last month and today    3. Peripheral drusen of both eyes  Anatomic variant in each eye     4. Posterior vitreous detachment, bilateral  Without associated retinal breaks    5. Age-related nuclear cataract of both eyes  Mildly visually significant    Plan/Recommendations:      Discussed findings with patient and his wife. The blurriness of the lines on the Amsler grid in the right eye are likely related to progression of atrophy. There is no fluid and no blood on exam. Given ongoing symptoms, we recommend close monitoring and a check in 1 month to ensure no recurrence of retinal fluid and no signs of CNV    Eye pressure is normal in each eye and RNFL scans are normal in each eye without signs of glaucoma. As such, we can continue to observe without eye pressure lowering drops    Do not recommend additional diagnostic testing    Try some artificial tears over the counter such as Refresh or Systane. These can help with eye dryness and may even help with eye watering.     Avoid steroid medications including inhaled ones (Not using any currently)    Recommend holding off on new glasses until the  stabilizes    Continue to monitor the vision with the Amsler grid weekly and let us know if there are any sudden changes.    RTC 1 month tonopen, dilate, OCT (ordered)    Physician Attestation     Attending Physician Attestation:  Complete documentation of historical and exam elements from today's encounter can be found in the full encounter summary report (not reduplicated in this progress note). I personally obtained the chief complaint(s) and history of present illness. I confirmed and edited as necessary the review of systems, past medical/surgical history, family history, social history, and examination findings as documented by others; and I examined  the patient myself. I personally reviewed the relevant tests, images, and reports as documented above. I formulated and edited as necessary the assessment and plan and discussed the findings and management plan with the patient and family members present at the time of this visit.  Mlaik Julien M.D., Uveitis and Medical Retina, April 16, 2021

## 2021-04-16 NOTE — LETTER
4/16/2021       RE: Melo Dangelo  [address]  [place] [address]     Dear Colleague,    Thank you for referring your patient, Melo Dangelo, to the [facility] at [facility]. Please see a copy of my visit note below.    Chief Complaint/Presenting Concern:  Retina follow up    Interval History of Present Ocular Illness:  Melo Dangelo is a 80 year old patient who returns for follow up for inactive  of the right eye. He was last seen on 3/12/21 for blurry vision at which time the  was inactive and we determined the blurry vision was related to progressive macular atrophy. We recommended observation and to return this week.    In the interim, Mr. Dangelo reports increasing blurry vision and changes on the Amsler grid in the right eye. He notices that the horizontal lines on the Amsler grid seem to be wider and the vertical ones seem to be doubled. The right eye seems somewhat dry, particularly when looking at the computer. No issues with the left eye, lines are clear and straight.     Interval Updates to Medical/Family/Social History:    Still recovering from COVID infection with some improvement in breathing ability. Not on Decadron/Dexamethosone nor any other steroids    Relevant Review of Systems Updates:  No fevers. Some tightness when pushing on chest, but no pain when walking.     Laboratory Testing: None today/since last visit    Current eye related medications: No steroid medications    Retina/Uveitis Imaging:   OCT Spectralis Macula April 16, 2021  right eye: Focal outer segment changes without fluid. Thick choroid, slightly improved. Subretinal deposit inferior to fovea and nasal to fovea slightly increased but no overlying fluid.  left eye: Normal contour, no outer segment changes. No fluid    [place] 33 degree Autofluorescence April 16, 2021  right eye: Mixed AF, generally stable vs 55 degree AF from  October 20219.  left eye: Minimal hyper-AF just temporal disc margin, normal macular Auto-fluoresence.     RNFL April 16, 2021  right eye: Avg thickness 115 microns, no thinning.   left eye: Avg thickness 115 microns, no thinning.     Assessment:     1. 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 Likely related to progression of atrophy.     2. Cupping of optic disc, right  With normal eye pressure. RNFL scans without signs of glaucoma last month and today    3. Peripheral drusen of both eyes  Anatomic variant in each eye     4. Posterior vitreous detachment, bilateral  Without associated retinal breaks    5. Age-related nuclear cataract of both eyes  Mildly visually significant    Plan/Recommendations:      Discussed findings with patient and his wife. The blurriness of the lines on the Amsler grid in the right eye are likely related to progression of atrophy. There is no fluid and no blood on exam. Given ongoing symptoms, we recommend close monitoring and a check in 1 month to ensure no recurrence of retinal fluid and no signs of CNV    Eye pressure is normal in each eye and RNFL scans are normal in each eye without signs of glaucoma. As such, we can continue to observe without eye pressure lowering drops    Do not recommend additional diagnostic testing    Try some artificial tears over the counter such as Refresh or Systane. These can help with eye dryness and may even help with eye watering.     Avoid steroid medications including inhaled ones (Not using any currently)    Recommend holding off on new glasses until the  stabilizes    Continue to monitor the vision with the Amsler grid weekly and let us know if there are any sudden changes.    RTC 1 month tonopen, dilate, OCT (ordered)    Physician Attestation     Attending Physician Attestation:  Complete documentation of historical and exam elements from today's encounter can be found in the full encounter  summary report (not reduplicated in this progress note). I personally obtained the chief complaint(s) and history of present illness. I confirmed and edited as necessary the review of systems, past medical/surgical history, family history, social history, and examination findings as documented by others; and I examined the patient myself. I personally reviewed the relevant tests, images, and reports as documented above. I formulated and edited as necessary the assessment and plan and discussed the findings and management plan with the patient and family.  Malik Julien MD.      Sincerely,    Malik Julien MD  [facility] Dept of Ophthalmology  Uveitis and Medical Retina

## 2021-04-16 NOTE — PATIENT INSTRUCTIONS
Try some artificial tears over the counter such as Refresh or Systane. These can help with eye dryness and may even help with eye watering.     Avoid steroid medications including inhaled ones (Not using any currently)    Recommend holding off on new glasses until the  stabilizes    Continue to monitor the vision with the Amsler grid weekly and let us know if there are any sudden changes.

## 2021-05-21 ENCOUNTER — OFFICE VISIT (OUTPATIENT)
Dept: OPHTHALMOLOGY | Facility: CLINIC | Age: 81
End: 2021-05-21
Attending: OPHTHALMOLOGY
Payer: MEDICARE

## 2021-05-21 DIAGNOSIS — H25.13 AGE-RELATED NUCLEAR CATARACT OF BOTH EYES: ICD-10-CM

## 2021-05-21 DIAGNOSIS — H43.813 POSTERIOR VITREOUS DETACHMENT, BILATERAL: ICD-10-CM

## 2021-05-21 DIAGNOSIS — H47.231 CUPPING OF OPTIC DISC, RIGHT: ICD-10-CM

## 2021-05-21 DIAGNOSIS — H35.711 CENTRAL SEROUS RETINOPATHY, RIGHT: Primary | ICD-10-CM

## 2021-05-21 DIAGNOSIS — H35.363 PERIPHERAL DRUSEN OF BOTH EYES: ICD-10-CM

## 2021-05-21 PROCEDURE — 92134 CPTRZ OPH DX IMG PST SGM RTA: CPT | Performed by: OPHTHALMOLOGY

## 2021-05-21 PROCEDURE — 99214 OFFICE O/P EST MOD 30 MIN: CPT | Performed by: OPHTHALMOLOGY

## 2021-05-21 PROCEDURE — G0463 HOSPITAL OUTPT CLINIC VISIT: HCPCS

## 2021-05-21 RX ORDER — EPLERENONE 25 MG/1
25 TABLET, FILM COATED ORAL 2 TIMES DAILY
Qty: 60 TABLET | Refills: 1 | Status: SHIPPED | OUTPATIENT
Start: 2021-05-21 | End: 2021-06-21

## 2021-05-21 ASSESSMENT — CONF VISUAL FIELD
OD_NORMAL: 1
METHOD: COUNTING FINGERS
OS_NORMAL: 1

## 2021-05-21 ASSESSMENT — TONOMETRY
IOP_METHOD: TONOPEN
OS_IOP_MMHG: 18
OD_IOP_MMHG: 21

## 2021-05-21 ASSESSMENT — REFRACTION_WEARINGRX
OS_AXIS: 010
OD_AXIS: 179
SPECS_TYPE: BIFOCAL
OS_SPHERE: +2.50
OS_CYLINDER: +1.75
OD_ADD: +2.50
OD_SPHERE: +2.50
OD_CYLINDER: +1.00
OS_ADD: +2.50

## 2021-05-21 ASSESSMENT — VISUAL ACUITY
OS_CC: 20/25
OD_CC: 20/30
OS_CC+: +2
METHOD: SNELLEN - LINEAR
CORRECTION_TYPE: GLASSES

## 2021-05-21 ASSESSMENT — CUP TO DISC RATIO
OS_RATIO: 0.5
OD_RATIO: 0.6

## 2021-05-21 ASSESSMENT — EXTERNAL EXAM - RIGHT EYE: OD_EXAM: NORMAL

## 2021-05-21 ASSESSMENT — SLIT LAMP EXAM - LIDS: COMMENTS: MEIBOMIAN GLAND INSPISSATION

## 2021-05-21 ASSESSMENT — EXTERNAL EXAM - LEFT EYE: OS_EXAM: NORMAL

## 2021-05-21 NOTE — PATIENT INSTRUCTIONS
Start a pill called Eplerenone 25 mg. This is a blood pressure medicine but can also help retinal swelling. If you feel too tired, just take the morning dose

## 2021-05-21 NOTE — LETTER
"5/21/2021       RE: Melo Dangelo  [address]  [place] [address]     Dear Colleague,    Thank you for referring your patient, Melo Dangelo, to the [facility] at [facility]. Please see a copy of my visit note below.    Chief Complaint/Presenting Concern:  Retina follow up    Interval History of Present Ocular Illness:  Melo Dangelo is a 80 year old patient who returns for follow up of the  of the right eye. Since last visit, he feels the waviness is getting slightly worse and the line is perhaps spreading out. He also noticed twice when looking into a sterescope that he saw \"coffee ground images\" twice and then things cleared. Left eye fine.     Interval Updates to Medical/Family/Social History:    No recent health changes. Health continues to improve with more energy with increased activity.     Relevant Review of Systems Updates:  Mild leg swelling in evening at times depending on certain foods.      Current eye related medications: No oral prednisone, no eye drops.     Retina/Uveitis Imaging:   OCT Spectralis Macula May 21, 2021  right eye: Focal subretinal deposits, faint subretinal fluid in one area, stable to slightly thick choroid  left eye: Normal contour, no fluid    Assessment:     1.Central serous chorioretinopathy of right eye  Persistent symptoms with early return of subretinal fluid but no hemorrhage    2. Peripheral drusen of both eyes  Stable in both eyes  3. Cupping of optic disc, right  With normal eye pressure in both eyes.    4. Posterior vitreous detachment, bilateral  Without retinal breaks in either eye    5. Age-related nuclear cataract of both eyes  Mildly visually significant in both eyes    Plan/Recommendations:      Discussed findings with patient.  There are persistent symptoms with waviness of straight lines in the right eye.  While exam is without obvious fluid or hemorrhage, OCT scan " shows a trace amount of subretinal fluid.  We discussed that often the course would be observation but given persistent and worsening symptoms that would consider a medical option.  We discussed that one of the treatment options is a medication called eplerenone which is primarily used for blood pressure but can also help with fluid under the retina.  We will obtain OCT angiography at the next visit to ensure there are no signs of choroidal neovascularization    Eye pressure is normal in both eyes    Do not recommend additional diagnostic testing:    Start a pill called Eplerenone 25 mg as long as this is safe per PCP. This is a blood pressure medicine but can also help retinal swelling and central serous retinopathy. If you feel too tired, just take the morning dose    RTC Last week of June tonopen, dilate, OCT (ordered) and OCT-A    Physician Attestation     Attending Physician Attestation:  Complete documentation of historical and exam elements from today's encounter can be found in the full encounter summary report (not reduplicated in this progress note). I personally obtained the chief complaint(s) and history of present illness. I confirmed and edited as necessary the review of systems, past medical/surgical history, family history, social history, and examination findings as documented by others; and I examined the patient myself. I personally reviewed the relevant tests, images, and reports as documented above. I formulated and edited as necessary the assessment and plan and discussed the findings and management plan with the patient and family members present at the time of this visit.  Malik Julien M.D., Uveitis and Medical Retina, May 21, 2021     Sincerely,    Malik Julien MD  [facility] Dept of Ophthalmology  Uveitis and Medical Retina

## 2021-05-21 NOTE — PROGRESS NOTES
"Chief Complaint/Presenting Concern:  Retina follow up    Interval History of Present Ocular Illness:  Melo Dangelo is a 80 year old patient who returns for follow up of the  of the right eye. Since last visit, he feels the waviness is getting slightly worse and the line is perhaps spreading out. He also noticed twice when looking into a sterescope that he saw \"Coffee ground images\" twice and then things cleared. Left eye fine.     Interval Updates to Medical/Family/Social History:    No recent health changes. Health continues to improve with more energy with increased activity.     Relevant Review of Systems Updates:  Mild leg swelling in evening at times depending on certain foods.      Current eye related medications: No oral prednisone, no eye drops.     Retina/Uveitis Imaging:   OCT Spectralis Macula May 21, 2021  right eye: Focal subretinal deposits, faint subretinal fluid in one area, stable to slightly thick choroid  left eye: Normal contour, no fluid    Assessment:     1.Central serous chorioretinopathy of right eye  Persistent symptoms with early return of subretinal fluid but no hemorrhage    2. Peripheral drusen of both eyes  Stable in both eyes    3. Cupping of optic disc, right  With normal eye pressure in both eyes.    4. Posterior vitreous detachment, bilateral  Without retinal breaks in either eye    5. Age-related nuclear cataract of both eyes  Mildly visually significant in both eyes    Plan/Recommendations:      Discussed findings with patient.  There are persistent symptoms with waviness of straight lines in the right eye.  While exam is without obvious fluid or hemorrhage, OCT scan shows a trace amount of subretinal fluid.  We discussed that often the course would be observation but given persistent and worsening symptoms that would consider a medical option.  We discussed that one of the treatment options is a medication called eplerenone which is primarily used for blood pressure but " can also help with fluid under the retina.  We will obtain OCT angiography at the next visit to ensure there are no signs of choroidal neovascularization    Eye pressure is normal in both eyes    Do not recommend additional diagnostic testing:    Start a pill called Eplerenone 25 mg as long as this is safe per PCP. This is a blood pressure medicine but can also help retinal swelling and central serous retinopathy. If you feel too tired, just take the morning dose    RTC Last week of June tonopen, dilate, OCT (ordered) and OCT-A    Physician Attestation     Attending Physician Attestation:  Complete documentation of historical and exam elements from today's encounter can be found in the full encounter summary report (not reduplicated in this progress note). I personally obtained the chief complaint(s) and history of present illness. I confirmed and edited as necessary the review of systems, past medical/surgical history, family history, social history, and examination findings as documented by others; and I examined the patient myself. I personally reviewed the relevant tests, images, and reports as documented above. I formulated and edited as necessary the assessment and plan and discussed the findings and management plan with the patient and family members present at the time of this visit.  Malik Julien M.D., Uveitis and Medical Retina, May 21, 2021

## 2021-05-21 NOTE — NURSING NOTE
Chief Complaints and History of Present Illnesses   Patient presents with     Retinal Evaluation     Chief Complaint(s) and History of Present Illness(es)     Retinal Evaluation     Laterality: left eye    Onset: gradual    Onset: months ago    Quality: Feels the va in the right eye has decreased but the left eye is stable      Severity: moderate    Frequency: intermittently    Associated symptoms: Negative for floaters, flashes and photophobia    Treatments tried: eye drops and no treatments    Pain scale: 0/10              Comments     Here for 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   Georgia JENNINGS 10:03 AM May 21, 2021

## 2021-06-12 NOTE — TELEPHONE ENCOUNTER
RN Triage:    Calling regarding covid testing.  Transferred caller to covid test scheduling line.    Carlota Rapp RN  [facility] Nurse Advisor

## 2021-06-12 NOTE — TELEPHONE ENCOUNTER
COVID 19 Nurse Triage Plan/Patient Instructions    Please be aware that novel coronavirus (COVID-19) may be circulating in the community. If you develop symptoms such as fever, cough, or SOB or if you have concerns about the presence of another infection including coronavirus (COVID-19), please contact your health care provider or visit [email or web address].     Disposition/Instructions    Virtual Visit with provider recommended. Reference Visit Selection Guide.    Thank you for taking steps to prevent the spread of this virus.  o Limit your contact with others.  o Wear a simple mask to cover your cough.  o Wash your hands well and often.    Resources    [facility]: About COVID-19: [email or web address]    CDC: What to Do If You're Sick: www.cdc.gov/coronavirus/2019-ncov/about/steps-when-sick.html    CDC: Ending Home Isolation: www.cdc.gov/coronavirus/2019-ncov/hcp/disposition-in-home-patients.html     CDC: Caring for Someone: www.cdc.gov/coronavirus/2019-ncov/if-you-are-sick/care-for-someone.html     [facility]: Interim Guidance for Hospital Discharge to Home: www.[facility].[place]/diseases/coronavirus/hcp/hospdischarge.pdf    [facility] clinical trials (COVID-19 research studies): clinicalaffairs.[facility]-clinical-trials     Below are the COVID-19 hotlines at the [place] [facility] ([facility]). Interpreters are available.   o For health questions: Call 045-161-8555 or 1-304.324.8214 (7 a.m. to 7 p.m.)  o For questions about schools and childcare: Call 028-016-7588 or 1-244.795.7401 (7 a.m. to 7 p.m.)           RN triage   Call from pt   Pt states sick since Saturday -- fevers to 100.3 -- cough -- some shortness of breath w/ activity -- not at rest -- headache off and on -- no chest pressure /heaviness     Reviewed home care advice - and isolation advice  Transferred to    Emily Sotomayor RN [facility] RN triage    Reason for Disposition    HIGH RISK patient (e.g., age > 64  years, diabetes, heart or lung disease, weak immune system) (Exception: Has already been evaluated by healthcare provider and has no new or worsening symptoms)    MILD difficulty breathing (e.g., minimal/no SOB at rest, SOB with walking, pulse <100)    Additional Information    Negative: SEVERE difficulty breathing (e.g., struggling for each breath, speaks in single words)    Negative: Difficult to awaken or acting confused (e.g., disoriented, slurred speech)    Negative: Bluish (or gray) lips or face now    Negative: Shock suspected (e.g., cold/pale/clammy skin, too weak to stand, low BP, rapid pulse)    Negative: Sounds like a life-threatening emergency to the triager    Negative: SEVERE or constant chest pain or pressure (Exception: mild central chest pain, present only when coughing)    Negative: MODERATE difficulty breathing (e.g., speaks in phrases, SOB even at rest, pulse 100-120)    Negative: Chest pain or pressure    Protocols used: CORONAVIRUS (COVID-19) DIAGNOSED OR [id number] 8.4.20

## 2021-06-13 NOTE — TELEPHONE ENCOUNTER
Symptom screening: [] fevers [x] cough [] shortness of breath   Melo Dangelo reports occasional cough.  He had not had any ER visits or hospital re-admissions since discharge on 11/04/2020  7 point ordinal scale   []  Not hospitalized with resumption of normal activities  [x]  Not hospitalized, but unable to resume normal activities  []  Hospitalized, not on supplemental oxygen  []  Hospitalized, on supplemental oxygen  []  Hospitalized, on nasal high-flow oxygen therapy, noninvasive mechanical ventilation, or both  []  Hospitalized, on ECMO, invasive mechanical ventilation, or both  []  Death

## 2021-06-14 ENCOUNTER — TELEPHONE (OUTPATIENT)
Dept: FAMILY MEDICINE | Facility: CLINIC | Age: 81
End: 2021-06-14

## 2021-06-14 NOTE — TELEPHONE ENCOUNTER
[facility]    Phone Message    May a detailed message be left on voicemail: yes     Reason for Call: Form or Letter   Type or form/letter needing completion: Paperwork for medical asst to approve medication; patient dropping off in clinic 6/18/21.   Provider: Ofstedal  Date form needed: ASAP  Once completed: Patient will  at .      Action Taken: Message routed to:  [facility] pcc    Travel Screening: Not Applicable

## 2021-06-15 NOTE — CONFIDENTIAL NOTE
Will wait for patient to drop off form on 6/18 per message sent.    Qiana Bagley on 6/15/2021 at 11:18 AM

## 2021-06-17 NOTE — TELEPHONE ENCOUNTER
Telephone Encounter by Henny Yancey RN at 11/11/2020 10:26 AM     Author: Henny Yancey RN Service: -- Author Type: Registered Nurse    Filed: 11/11/2020 10:30 AM Encounter Date: 11/11/2020 Status: Signed    : Henny Yancey RN (Registered Nurse)     Summary: [employer] Trial Phone Visit Day 15        Passive Immunity Trial for [place]: [employer]  IRB:   : Myla Li MD  Day   [x] 15  [] 29  Phone call visit    Participant reached at [x]home [] long term acute care facility [] skilled nursing facility []  acute rehabilitation facility [] hospital    Consent continuation: Updated the participant on their progress and any study updates.    Since discharge from hospital on 11/04/20], have you been seen   [] In an ED No If yes, details:    [] been admitted to a hospital   If yes, details:   [x] None    Symptom screening: [] fevers [x] cough [] shortness of breath     7 point ordinal scale   [x] Not hospitalized with resumption of normal activities  [] Not hospitalized, but unable to resume normal activities  [] Hospitalized, not on supplemental oxygen  [] Hospitalized, on supplemental oxygen  [] Hospitalized, on nasal high-flow oxygen therapy, noninvasive mechanical ventilation, or both  [] Hospitalized, on ECMO, invasive mechanical ventilation, or both  [] Death     Thank you for your gift of participation.    Henny Yancey RN     Current Outpatient Medications   Medication Instructions   ? albuterol (PROAIR HFA;PROVENTIL HFA;VENTOLIN HFA) 90 mcg/actuation inhaler 2 puffs, Inhalation, Every 6 hours PRN   ? apixaban ANTICOAGULANT (ELIQUIS) 5 mg, Oral, 2 times daily   ? benzonatate (TESSALON) 100 mg, Oral, 3 times daily   ? busPIRone (BUSPAR) 10 mg, Oral, 2 times daily   ? cyanocobalamin 100 mcg, Oral, DAILY   ? escitalopram oxalate (LEXAPRO) 20 mg, Oral, DAILY   ? guaiFENesin (ROBITUSSIN) 200 mg, Oral, 3 times daily PRN   ? hydrOXYzine HCL (ATARAX) 25 mg, Oral,  Every 6 hours PRN   ? lamoTRIgine (LAMICTAL) 200 mg, Oral, 2 times daily   ? metoprolol tartrate (LOPRESSOR) 25 mg, Oral, DAILY   ? omeprazole (PRILOSEC) 20 mg, Oral, every morning   ? ondansetron (ZOFRAN-ODT) 4 mg, Oral, Every 6 hours PRN   ? propranoloL (INDERAL) 10 MG tablet 2 tablets, Oral, Daily PRN   ? senna-docusate (PERICOLACE) 8.6-50 mg tablet 1 tablet, Oral, 2 times daily   ? simvastatin (ZOCOR) 20 mg, Oral, Bedtime

## 2021-06-17 NOTE — TELEPHONE ENCOUNTER
Telephone Encounter by Henny Yancey RN at 11/25/2020 10:42 AM     Author: Henny Yancey RN Service: -- Author Type: Registered Nurse    Filed: 11/25/2020 10:45 AM Encounter Date: 11/25/2020 Status: Signed    : Henny Yancey RN (Registered Nurse)         Passive Immunity Trial for [place]: [facility]  IRB: [id number]  : Myla Li MD  Day   [] 15  [x] 29  Phone call visit    Participant reached at [x]home [] long term acute care facility [] skilled nursing facility []  acute rehabilitation facility [] hospital      Since discharge from hospital on 11/04/2020 have you been seen   [] In an ED  If yes, details:    [] been admitted to a hospital   If yes, details:   [x] None   No ED or hospital visits    Symptom screening: [] fevers [x] cough [] shortness of breath     7 point ordinal scale   [] Not hospitalized with resumption of normal activities  [x] Not hospitalized, but unable to resume normal activities  [] Hospitalized, not on supplemental oxygen  [] Hospitalized, on supplemental oxygen  [] Hospitalized, on nasal high-flow oxygen therapy, noninvasive mechanical ventilation, or both  [] Hospitalized, on ECMO, invasive mechanical ventilation, or both  [] Death      11/25/2020: Day 29: this is the final study visit for Melo Saleem  .   Thank you for your gift of participation.    Henny Yancey RN

## 2021-06-21 ENCOUNTER — OFFICE VISIT (OUTPATIENT)
Dept: OPHTHALMOLOGY | Facility: CLINIC | Age: 81
End: 2021-06-21
Attending: OPHTHALMOLOGY
Payer: MEDICARE

## 2021-06-21 DIAGNOSIS — H43.813 POSTERIOR VITREOUS DETACHMENT, BILATERAL: ICD-10-CM

## 2021-06-21 DIAGNOSIS — H25.13 AGE-RELATED NUCLEAR CATARACT OF BOTH EYES: ICD-10-CM

## 2021-06-21 DIAGNOSIS — H35.711 CENTRAL SEROUS RETINOPATHY, RIGHT: Primary | ICD-10-CM

## 2021-06-21 DIAGNOSIS — H35.363 PERIPHERAL DRUSEN OF BOTH EYES: ICD-10-CM

## 2021-06-21 DIAGNOSIS — H47.231 CUPPING OF OPTIC DISC, RIGHT: ICD-10-CM

## 2021-06-21 DIAGNOSIS — H35.711 CENTRAL SEROUS CHORIORETINOPATHY OF RIGHT EYE: ICD-10-CM

## 2021-06-21 PROCEDURE — G0463 HOSPITAL OUTPT CLINIC VISIT: HCPCS

## 2021-06-21 PROCEDURE — 92134 CPTRZ OPH DX IMG PST SGM RTA: CPT | Performed by: OPHTHALMOLOGY

## 2021-06-21 PROCEDURE — 99214 OFFICE O/P EST MOD 30 MIN: CPT | Performed by: OPHTHALMOLOGY

## 2021-06-21 RX ORDER — EPLERENONE 25 MG/1
25 TABLET, FILM COATED ORAL DAILY
Qty: 60 TABLET | Refills: 1 | Status: ON HOLD | OUTPATIENT
Start: 2021-06-21 | End: 2021-10-22

## 2021-06-21 ASSESSMENT — VISUAL ACUITY
CORRECTION_TYPE: GLASSES
METHOD: SNELLEN - LINEAR
OS_CC: 20/20
OD_CC: 20/25

## 2021-06-21 ASSESSMENT — SLIT LAMP EXAM - LIDS: COMMENTS: MEIBOMIAN GLAND INSPISSATION

## 2021-06-21 ASSESSMENT — REFRACTION_WEARINGRX
OS_ADD: +2.50
OS_CYLINDER: +1.75
OS_AXIS: 010
OS_SPHERE: +2.50
OD_AXIS: 179
OD_ADD: +2.50
OD_CYLINDER: +1.00
SPECS_TYPE: BIFOCAL
OD_SPHERE: +2.50

## 2021-06-21 ASSESSMENT — CONF VISUAL FIELD
OD_NORMAL: 1
METHOD: COUNTING FINGERS
OS_NORMAL: 1

## 2021-06-21 ASSESSMENT — EXTERNAL EXAM - LEFT EYE: OS_EXAM: NORMAL

## 2021-06-21 ASSESSMENT — TONOMETRY
IOP_METHOD: TONOPEN
OS_IOP_MMHG: 18
OD_IOP_MMHG: 18

## 2021-06-21 ASSESSMENT — EXTERNAL EXAM - RIGHT EYE: OD_EXAM: NORMAL

## 2021-06-21 ASSESSMENT — CUP TO DISC RATIO
OD_RATIO: 0.6
OS_RATIO: 0.5

## 2021-06-21 NOTE — NURSING NOTE
Chief Complaints and History of Present Illnesses   Patient presents with     Central Serous Retinopathy Follow Up     Chief Complaint(s) and History of Present Illness(es)     Central Serous Retinopathy Follow Up     Laterality: right eye    Onset: gradual    Onset: months ago    Quality: States va is the same since last visit      Severity: moderate    Frequency: intermittently    Associated symptoms: Negative for floaters, flashes and photophobia    Treatments tried: no treatments    Pain scale: 0/10              Comments     Here for Central serous retinopathy, right   Georgia Clifton COT 9:55 AM June 21, 2021

## 2021-06-21 NOTE — PROGRESS NOTES
Chief Complaint/Presenting Concern: Retina follow up    Interval History of Present Ocular Illness:  Melo Dangelo is a 80 year old patient who returns for follow up of his  of the right eye. At his last visit, we added Eplerenone for perhaps slight worsening of . This was started and Mr. Dangelo feels perhaps things are slightly worse, more compact areas of wavinees. Left eye fine.     Interval Updates to Medical/Family/Social History:    Narcolepsy perhaps more noticeable with some tiredness when driving, improved by resting short periods. Using propranolol roughly weekly for anxiety.     Relevant Review of Systems Updates:  Improved breathing and fatigue     Current eye related medications: Now on Eplerenone 25 mg twice daily.     Retina/Uveitis Imaging:   OCT Spectralis Macula June 21, 2021  right eye: Outer segment deposits, focal area of SRF stable, thick choroid slightly improved  left eye: Normal contour, no fluid. Thick choroid but stable.     Assessment:     1. Central serous retinopathy, right  Subjectively not much change, OCT stable, no signs of CNV    2. Peripheral drusen of both eyes  Anatomic variant    3. Cupping of optic disc, right  With normal eye pressure    4. Posterior vitreous detachment, bilateral  Without retinal breaks    5. Age-related nuclear cataract of both eyes  Mildly visually significant    Plan/Recommendations:      Discussed findings with patient and his wife. The symptoms are about the same and OCT scan generally stable without fluid. As such, we will reduce eplerenone to once daily. No plans for PDT laser nor eye injections    Eye pressure is normal and optic discs without hemorrhages in either eye    Do not recommend additional diagnostic testing    Continue Eplerenone but reduce to just one 25 mg pill in the morning and monitor with [employer] grid. Call for any worsening symptoms.     Return for 6 weeks, Tonopen, Dilate, OCT (ordered).     Physician Attestation      Attending Physician Attestation:  Complete documentation of historical and exam elements from today's encounter can be found in the full encounter summary report (not reduplicated in this progress note). I personally obtained the chief complaint(s) and history of present illness. I confirmed and edited as necessary the review of systems, past medical/surgical history, family history, social history, and examination findings as documented by others; and I examined the patient myself. I personally reviewed the relevant tests, images, and reports as documented above. I formulated and edited as necessary the assessment and plan and discussed the findings and management plan with the patient and family members present at the time of this visit.  Malik Julien M.D., Uveitis and Medical Retina, June 21, 2021

## 2021-06-21 NOTE — PATIENT INSTRUCTIONS
Reduce the Eplerenone to just 1 pill (25 mg) once in the morning.     We will see you in six weeks

## 2021-06-22 ENCOUNTER — MEDICAL CORRESPONDENCE (OUTPATIENT)
Dept: HEALTH INFORMATION MANAGEMENT | Facility: CLINIC | Age: 81
End: 2021-06-22

## 2021-07-27 ENCOUNTER — VIRTUAL VISIT (OUTPATIENT)
Dept: NEUROLOGY | Facility: CLINIC | Age: 81
End: 2021-07-27
Payer: MEDICARE

## 2021-07-27 DIAGNOSIS — G40.909 SEIZURE DISORDER (H): ICD-10-CM

## 2021-07-27 RX ORDER — LAMOTRIGINE 100 MG/1
200 TABLET ORAL 2 TIMES DAILY
Qty: 360 TABLET | Refills: 3 | Status: SHIPPED | OUTPATIENT
Start: 2021-07-27 | End: 2022-07-26

## 2021-07-27 ASSESSMENT — PATIENT HEALTH QUESTIONNAIRE - PHQ9: SUM OF ALL RESPONSES TO PHQ QUESTIONS 1-9: 5

## 2021-07-27 NOTE — PROGRESS NOTES
"I spent 13 minutes with the patient on the phone obtaining a medical history, determined medical diagnosis and treatment plan, then patient was counseled on this treatment plan and diagnosis. I answered all her questions and concerns, and arranged follow up care during this time.    [facility] Epilepsy Care History and Physical       Patient:  Melo Dangelo  :  1940   Age:  80 year old   Today's Visit:  2021    History of Present Illness:  He is transferring care to [facility] because he moved to the [facility]. Melo Dangelo is 80 year old right handed who has history of seizure that started age 55. He has been seizure free since . He became seizure free after starting lamotrigine.   Interval History: No seizures since . He stopped drinking in , it seems his big (generalized tonic-clonic convulsion) in  and he had focal seizure with no impairment in awareness last .   Seizure type:  \"funny sensation in mediastinum and nausea\". Confused one moment and then woke up in the hospital wondering why he was there. He has been told by his wife he was shaking and belligerent after the seizure. Last seizure was .   Current Outpatient Medications   Medication Sig Dispense Refill     acetaminophen (TYLENOL) 325 MG tablet Take 2 tablets (650 mg) by mouth every 6 hours as needed for mild pain 100 tablet 0     apixaban ANTICOAGULANT (ELIQUIS) 2.5 MG tablet Take 2 tablets (5 mg) by mouth 2 times daily 120 tablet 5     aspirin (ASA) 81 MG EC tablet Take 81 mg by mouth daily       busPIRone (BUSPAR) 5 MG tablet Take 4 tablets (20 mg) by mouth 2 times daily       cyanocobalamin (VITAMIN B-12) 100 MCG tablet Take 1,000 mcg by mouth daily        escitalopram (LEXAPRO) 20 MG tablet Take 1 tablet (20 mg) by mouth daily       lamoTRIgine (LAMICTAL) 100 MG tablet Take 2 tablets (200 mg) by mouth 2 times daily 360 tablet 3     metoprolol tartrate (LOPRESSOR) 25 MG tablet Take 1 tablet (25 mg) by mouth " 2 times daily       propranolol (INDERAL) 10 MG tablet Take 20-30 mg by mouth daily as needed (for anxiety)       simvastatin (ZOCOR) 20 MG tablet Take 1 tablet (20 mg) by mouth At Bedtime 90 tablet 3     tamsulosin (FLOMAX) 0.4 MG capsule Take 1 capsule (0.4 mg) by mouth daily 60 capsule 3     eplerenone (INSPRA) 25 MG tablet Take 1 tablet (25 mg) by mouth daily (Patient not taking: Reported on 2021) 60 tablet 1     omeprazole (PRILOSEC) 20 MG DR capsule Take 1 capsule (20 mg) by mouth every morning (before breakfast) (Patient not taking: Reported on 2021)       Perceived AED Side Effects: No  Medication Notes:   AED Medication Compliance:  compliant most of the time  Using a pill box:  Yes, has an alarm on phone for pill (song)  Past AEDs:  Does not recall  Psycho-Social History: Lives in [place], highest level of education 4 year degree in college, 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 Lives with his wife, he has three daughters that live near by.    Currently, patient denies feeling depressed, denies feeling anhedonia, denies suicidal  thoughts, and denies having feelings of excessive guilt/worthlessness.  Does not smoke, no alcohol use since , no recreational drug use. We reviewed importance of mental and emotional wellbeing and impact on health.   Driving:  Currently patient is:  Driving: Patient was made aware of state driving laws. Currently meets criteria to continue to drive.  Previous Evaluations for Epilepsy:   EE2009 - EEG CLASSIFICATION: Dysrhythmia gr. 1 generalized (excess beta or medication effect);     REPORT: The brief recording during wakefulness contains 10 Hz alpha activity over  the posterior head regions.  An excess of beta activity is present.  No abnormal activity occurred during hyperventilation or photic stimulation.  During the recording, the patient was excessively drowsy and fell asleep spontaneously.  No abnormal activity occurred during sleep or at the time  of arousal. The EKG and pulse oximeter monitors were unremarkable.   MRI of Brain: 9/9/2009 - Again noted and stable is mild cerebral atrophy, including mild atrophy of the hippocampal formations bilaterally.  Subtle diffusely increased T2 signal within both hippocampal formations. This is nonspecific, and may be related to recent seizure activity. No evidence for pathologic enhancement following administration of IV gadolinium.      Remainder the MRI is otherwise unchanged. Minimal leukoaraiosis. Scattered membrane thickening and fluid within bilateral ethmoid air cells. Nasal septal deviation, convex to the left.     Exam:    There were no vitals taken for this visit.     Wt Readings from Last 5 Encounters:   01/18/21 194 lb 6.4 oz (88.2 kg)   10/27/20 190 lb 4.8 oz (86.3 kg)   09/28/18 187 lb 8 oz (85 kg)   09/23/18 185 lb 9.6 oz (84.2 kg)       EXAMINATION There were no vitals taken for this visit.  Alert, orientated, speech is fluent    Impression:    History of seizure unspecified   History of A-Fib on anticoagulant   History of depression/anxiety/ alcohol dependency sober since 2000    Discussion:   He is seizure free on lamotrigine, last seizure was 2009. We have no EEG with abnormalities. MRI notable for small bilateral hippocampi. He is tolerating lamotrigine with no major side effects. We will get EEG today. Continue lamotrigine.     Plan :   Continue lamotrigine 200 mg twice a day script sent   Encouraged him to follow up with primary care provider about lexapro and mental health   Follow up  1 year   He can drive (no need complete DMV form since he is seizure free 10 year)    I spent 13 minutes with the patient. During this time medical history data collection, counseling, and coordination of care exceeded 50% of the visit time. I addressed all questions the patient/caregiver raised in regards to the patient's medical care. This note was created with voice recognition software. Inadvertent grammatical  "errors, typographical errors, and \"sound a like\" substitutions may occur due to limitations of the software.  Read the note carefully and apply context when erroneous substitutions have occurred. Thank you.     Daria Raza MD   Epilepsy Staff           "

## 2021-07-27 NOTE — PATIENT INSTRUCTIONS
Continue lamotrigine 200 mg twice a day script sent  Follow up with primary care provider about lexapro and mental health   Follow up  1 year   He can drive     Daria Raza MD

## 2021-07-27 NOTE — LETTER
"2021       RE: Melo Dangelo  : 1940   MRN: [id number]      Dear Colleague,    Thank you for referring your patient, Melo Dangleo, to the [facility] EPILEPSY CARE at [facility]. Please see a copy of my visit note below.    Called patient, no answer.  Left voicemail message to call back for rooming process.    Naila Strickland CMA      I spent 13 minutes with the patient on the phone obtaining a medical history, determined medical diagnosis and treatment plan, then patient was counseled on this treatment plan and diagnosis. I answered all her questions and concerns, and arranged follow up care during this time.    [facility] Epilepsy Care History and Physical       Patient:  Melo Dangelo  :  1940   Age:  80 year old   Today's Visit:  2021    History of Present Illness:  He is transferring care to [facility] because he moved to the [facility]. Melo Dangelo is 80 year old right handed who has history of seizure that started age 55. He has been seizure free since . He became seizure free after starting lamotrigine.   Interval History: No seizures since . He stopped drinking in , it seems his big (generalized tonic-clonic convulsion) in  and he had focal seizure with no impairment in awareness last .   Seizure type:  \"funny sensation in mediastinum and nausea\". Confused one moment and then woke up in the hospital wondering why he was there. He has been told by his wife he was shaking and belligerent after the seizure. Last seizure was .   Current Outpatient Medications   Medication Sig Dispense Refill     acetaminophen (TYLENOL) 325 MG tablet Take 2 tablets (650 mg) by mouth every 6 hours as needed for mild pain 100 tablet 0     apixaban ANTICOAGULANT (ELIQUIS) 2.5 MG tablet Take 2 tablets (5 mg) by mouth 2 times daily 120 tablet 5     aspirin (ASA) 81 MG EC tablet Take 81 mg by mouth daily       busPIRone (BUSPAR) 5 " MG tablet Take 4 tablets (20 mg) by mouth 2 times daily       cyanocobalamin (VITAMIN B-12) 100 MCG tablet Take 1,000 mcg by mouth daily        escitalopram (LEXAPRO) 20 MG tablet Take 1 tablet (20 mg) by mouth daily       lamoTRIgine (LAMICTAL) 100 MG tablet Take 2 tablets (200 mg) by mouth 2 times daily 360 tablet 3     metoprolol tartrate (LOPRESSOR) 25 MG tablet Take 1 tablet (25 mg) by mouth 2 times daily       propranolol (INDERAL) 10 MG tablet Take 20-30 mg by mouth daily as needed (for anxiety)       simvastatin (ZOCOR) 20 MG tablet Take 1 tablet (20 mg) by mouth At Bedtime 90 tablet 3     tamsulosin (FLOMAX) 0.4 MG capsule Take 1 capsule (0.4 mg) by mouth daily 60 capsule 3     eplerenone (INSPRA) 25 MG tablet Take 1 tablet (25 mg) by mouth daily (Patient not taking: Reported on 2021) 60 tablet 1     omeprazole (PRILOSEC) 20 MG DR capsule Take 1 capsule (20 mg) by mouth every morning (before breakfast) (Patient not taking: Reported on 2021)       Perceived AED Side Effects: No  Medication Notes:   AED Medication Compliance:  compliant most of the time  Using a pill box:  Yes, has an alarm on phone for pill (shelia)  Past AEDs:  Does not recall  Psycho-Social History: Lives in [place], highest level of education 4 year degree in college, 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    Currently, patient denies feeling depressed, denies feeling anhedonia, denies suicidal  thoughts, and denies having feelings of excessive guilt/worthlessness.  Does not smoke, no alcohol use since , no recreational drug use. We reviewed importance of mental and emotional wellbeing and impact on health.   Driving:  Currently patient is:  Driving: Patient was made aware of state driving laws. Currently meets criteria to continue to drive.  Previous Evaluations for Epilepsy:   EE2009 - EEG CLASSIFICATION: Dysrhythmia gr. 1 generalized (excess beta or  medication effect);     REPORT: The brief recording during wakefulness contains 10 Hz alpha activity over  the posterior head regions.  An excess of beta activity is present.  No abnormal activity occurred during hyperventilation or photic stimulation.  During the recording, the patient was excessively drowsy and fell asleep spontaneously.  No abnormal activity occurred during sleep or at the time of arousal. The EKG and pulse oximeter monitors were unremarkable.   MRI of Brain: 9/9/2009 - Again noted and stable is mild cerebral atrophy, including mild atrophy of the hippocampal formations bilaterally.  Subtle diffusely increased T2 signal within both hippocampal formations. This is nonspecific, and may be related to recent seizure activity. No evidence for pathologic enhancement following administration of IV gadolinium.      Remainder the MRI is otherwise unchanged. Minimal leukoaraiosis. Scattered membrane thickening and fluid within bilateral ethmoid air cells. Nasal septal deviation, convex to the left.     Exam:    There were no vitals taken for this visit.     Wt Readings from Last 5 Encounters:   01/18/21 194 lb 6.4 oz (88.2 kg)   10/27/20 190 lb 4.8 oz (86.3 kg)   09/28/18 187 lb 8 oz (85 kg)   09/23/18 185 lb 9.6 oz (84.2 kg)       EXAMINATION There were no vitals taken for this visit.  Alert, orientated, speech is fluent    Impression:    History of seizure unspecified   History of A-Fib on anticoagulant   History of depression/anxiety/ alcohol dependency sober since 2000    Discussion:   He is seizure free on lamotrigine, last seizure was 2009. We have no EEG with abnormalities. MRI notable for small bilateral hippocampi. He is tolerating lamotrigine with no major side effects. We will get EEG today. Continue lamotrigine.     Plan :   Continue lamotrigine 200 mg twice a day script sent   Encouraged him to follow up with primary care provider about lexapro and mental health   Follow up  1 year   He can  "drive (no need complete DMV form since he is seizure free 10 year)    I spent 13 minutes with the patient. During this time medical history data collection, counseling, and coordination of care exceeded 50% of the visit time. I addressed all questions the patient/caregiver raised in regards to the patient's medical care. This note was created with voice recognition software. Inadvertent grammatical errors, typographical errors, and \"sound a like\" substitutions may occur due to limitations of the software.  Read the note carefully and apply context when erroneous substitutions have occurred. Thank you.     Daria Raza MD   Epilepsy Staff       "

## 2021-07-27 NOTE — PROGRESS NOTES
Called patient, no answer.  Left voicemail message to call back for rooming process.    Naila Strickland, [facility]

## 2021-08-02 ENCOUNTER — OFFICE VISIT (OUTPATIENT)
Dept: OPHTHALMOLOGY | Facility: CLINIC | Age: 81
End: 2021-08-02
Attending: OPHTHALMOLOGY
Payer: MEDICARE

## 2021-08-02 DIAGNOSIS — H35.363 PERIPHERAL DRUSEN OF BOTH EYES: ICD-10-CM

## 2021-08-02 DIAGNOSIS — H35.711 CENTRAL SEROUS CHORIORETINOPATHY OF RIGHT EYE: ICD-10-CM

## 2021-08-02 DIAGNOSIS — H43.813 POSTERIOR VITREOUS DETACHMENT, BILATERAL: ICD-10-CM

## 2021-08-02 DIAGNOSIS — H35.711 CENTRAL SEROUS RETINOPATHY, RIGHT: Primary | ICD-10-CM

## 2021-08-02 DIAGNOSIS — H47.231 CUPPING OF OPTIC DISC, RIGHT: ICD-10-CM

## 2021-08-02 DIAGNOSIS — H25.13 AGE-RELATED NUCLEAR CATARACT OF BOTH EYES: ICD-10-CM

## 2021-08-02 PROCEDURE — 99213 OFFICE O/P EST LOW 20 MIN: CPT | Performed by: OPHTHALMOLOGY

## 2021-08-02 PROCEDURE — G0463 HOSPITAL OUTPT CLINIC VISIT: HCPCS

## 2021-08-02 PROCEDURE — 92134 CPTRZ OPH DX IMG PST SGM RTA: CPT | Performed by: OPHTHALMOLOGY

## 2021-08-02 ASSESSMENT — REFRACTION_WEARINGRX
SPECS_TYPE: BIFOCAL
OS_SPHERE: +2.50
OD_CYLINDER: +1.00
OD_SPHERE: +2.50
OS_CYLINDER: +1.75
OD_AXIS: 179
OD_ADD: +2.50
OS_AXIS: 010
OS_ADD: +2.50

## 2021-08-02 ASSESSMENT — EXTERNAL EXAM - RIGHT EYE: OD_EXAM: NORMAL

## 2021-08-02 ASSESSMENT — CUP TO DISC RATIO
OS_RATIO: 0.5
OD_RATIO: 0.6

## 2021-08-02 ASSESSMENT — TONOMETRY
OS_IOP_MMHG: 21
IOP_METHOD: TONOPEN
OD_IOP_MMHG: 21

## 2021-08-02 ASSESSMENT — SLIT LAMP EXAM - LIDS: COMMENTS: MEIBOMIAN GLAND INSPISSATION

## 2021-08-02 ASSESSMENT — VISUAL ACUITY
CORRECTION_TYPE: GLASSES
METHOD: SNELLEN - LINEAR
OD_CC: 20/25
OS_CC: 20/20
OD_CC+: -1

## 2021-08-02 ASSESSMENT — CONF VISUAL FIELD
METHOD: COUNTING FINGERS
OS_NORMAL: 1

## 2021-08-02 ASSESSMENT — EXTERNAL EXAM - LEFT EYE: OS_EXAM: NORMAL

## 2021-08-02 NOTE — LETTER
8/2/2021       RE: Melo Dangelo  [address]  [place] [address]     Dear Colleague,    Thank you for referring your patient, Melo Dangelo, to the [facility] EYE CLINIC at [facility]. Please see a copy of my visit note below.    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 He was last seen on 6/21/21 at which time there was minimal change after adding Eplerenone 25 mg twice daily and there were also some symptoms possibly related to low blood pressure. We reduced eplerenone to 25 mg once daily and recommended follow up today.     Since then, Mr. Dangelo feels things are doing well. He thinks things are doing with the eyes working well together, still some waviness right eye. Left eye fine.     Interval Updates to Medical/Family/Social History:    Health doing well, not so tired/dizzy. At recent video visit with neurologist, it was determined that his seizure medications should remain unchanged.    Relevant Review of Systems Updates:  No coughs/colds, no dizziness     Current eye related medications: Eplerenone 25 mg daily    Retina/Uveitis Imaging:   OCT Spectralis Macula August 2, 2021  right eye: Subretinal deposits, less Subretinal fluid, thick choroid but stable.   left eye: Normal contour, no fluid          Assessment:     1. Central serous retinopathy, right  Slightly improved subretinal fluid    2. Peripheral drusen of both eyes  Anatomic variant in each eye     3. Cupping of optic disc, right  With normal eye pressure    4. Posterior vitreous detachment, bilateral  Without associated retinal breaks in either eye    5. Age-related nuclear cataract of both eyes  Mildly visually significant    Plan/Recommendations:      Discussed findings with patient.  Although symptoms are persistent, the subretinal fluid in the right eye improving and this dose of Eplerenone is better tolerated. As such, we will continue this dose    Eye pressure is normal in each eye and optic nerves without disc hemorrhages    Do not recommend additional diagnostic testing    Continue Eplerenone 25 mg daily. While this is not used primarily for blood pressure lowering, we are using it to help with stress related retinal changes in the right eye (This medication also blocks some of the stress receptors)     Will ask Dr. Smith to assist with a refill on Propranolol which has been used for situational anxiety     It would be okay to use AREDS 2 vitamins, although not completely necessary as this is not true macular degeneration.     RTC 2 months tonopen, dilate, RNFL, OCT (ordered)    Physician Attestation     Attending Physician Attestation:  Complete documentation of historical and exam elements from today's encounter can be found in the full encounter summary report (not reduplicated in this progress note). I personally obtained the chief complaint(s) and history of present illness. I confirmed and edited as necessary the review of systems, past medical/surgical history, family history, social history, and examination findings as documented by others; and I examined the patient myself. I personally reviewed the relevant tests, images, and reports as documented above. I formulated and edited as necessary the assessment and plan and discussed the findings and management plan with the patient and family members present at the time of this visit.  Malik Julien M.D., Uveitis and Medical Retina, August 2, 2021     Sincerely,    Malik Julien MD  [facility] Dept of Ophthalmology  Uveitis and Medical Retina

## 2021-08-02 NOTE — PATIENT INSTRUCTIONS
Continue Eplerenone 25 mg daily. While this is not used primarily for blood pressure lowering, we are using it to help with stress related retinal changes in the right eye (This medication also blocks some of the stress receptors)     Will ask Dr. Smith to assist with a refill on Propranolol which has been used for situational anxiety     It would be okay to use AREDS 2 vitamins, although not completely necessary as this is not true macular degeneration.

## 2021-08-02 NOTE — PROGRESS NOTES
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 He was last seen on 6/21/21 at which time there was minimal change after adding Eplerenone 25 mg twice daily and there were also some symptoms possibly related to low blood pressure. We reduced eplerenone to 25 mg once daily and recommended follow up today.     Since then, Mr. Dangelo feels things are doing well. He thinks things are doing with the eyes working well together, still some waviness right eye. Left eye fine.     Interval Updates to Medical/Family/Social History:    Health doing well, not so tired/dizzy. At recent video visit with Neurologist, it was determined that his seizure medications should remain unchanged.    Relevant Review of Systems Updates:  No coughs/colds, no dizziness     Current eye related medications: Eplerenone 25 mg daily    Retina/Uveitis Imaging:   OCT Spectralis Macula August 2, 2021  right eye: Subretinal deposits, less Subretinal fluid, thick choroid but stable.   left eye: Normal contour, no fluid    Assessment:     1. Central serous retinopathy, right  Slightly improved subretinal fluid    2. Peripheral drusen of both eyes  Anatomic variant in each eye     3. Cupping of optic disc, right  With normal eye pressure    4. Posterior vitreous detachment, bilateral  Without associated retinal breaks in either eye    5. Age-related nuclear cataract of both eyes  Mildly visually significant    Plan/Recommendations:      Discussed findings with patient. Although symptoms are persistent, the subretinal fluid in the right eye improving and this dose of Eplerenone is better tolerated. As such, we will continue this dose    Eye pressure is normal in each eye and optic nerves without disc hemorrhages    Do not recommend additional diagnostic testing    Continue Eplerenone 25  mg daily. While this is not used primarily for blood pressure lowering, we are using it to help with stress related retinal changes in the right eye (This medication also blocks some of the stress receptors)     Will ask Dr. Smith to assist with a refill on Propranolol which has been used for situational anxiety     It would be okay to use AREDS 2 vitamins, although not completely necessary as this is not true macular degeneration.     RTC 2 months tonopen, dilate, RNFL, OCT (ordered)    Physician Attestation     Attending Physician Attestation:  Complete documentation of historical and exam elements from today's encounter can be found in the full encounter summary report (not reduplicated in this progress note). I personally obtained the chief complaint(s) and history of present illness. I confirmed and edited as necessary the review of systems, past medical/surgical history, family history, social history, and examination findings as documented by others; and I examined the patient myself. I personally reviewed the relevant tests, images, and reports as documented above. I formulated and edited as necessary the assessment and plan and discussed the findings and management plan with the patient and family members present at the time of this visit.  Malik Julien M.D., Uveitis and Medical Retina, August 2, 2021

## 2021-08-02 NOTE — NURSING NOTE
Chief Complaints and History of Present Illnesses   Patient presents with     Central Serous Retinopathy Follow Up     Chief Complaint(s) and History of Present Illness(es)     Central Serous Retinopathy Follow Up     Laterality: right eye    Onset: gradual    Onset: months ago    Quality: States that the right eye the lines are more curved than before      Severity: moderate    Frequency: intermittently    Associated symptoms: photophobia.  Negative for flashes and floaters    Treatments tried: no treatments    Pain scale: 0/10              Comments     Here for Central serous retinopathy, right   No gtts  Georgia Clifton COT 12:59 PM August 2, 2021

## 2021-08-12 ENCOUNTER — TELEPHONE (OUTPATIENT)
Dept: FAMILY MEDICINE | Facility: CLINIC | Age: 81
End: 2021-08-12

## 2021-08-12 NOTE — TELEPHONE ENCOUNTER
[facility] Call Center    Phone Message    May a detailed message be left on voicemail: no     Reason for Call: Medication Question or concern regarding medication   Prescription Clarification  Name of Medication: apixaban ANTICOAGULANT (ELIQUIS) 2.5 MG tablet    Prescribing Provider: Dr. Smith     Pharmacy: [facility]     What on the order needs clarification?Please call pharmacy back for clarification on Eliquis.            Action Taken: Other: PCC    Travel Screening: Not Applicable

## 2021-08-13 NOTE — TELEPHONE ENCOUNTER
[facility] Call Center    Phone Message    May a detailed message be left on voicemail: yes     Reason for Call: Other: .  Patient is requesting that we call the pharmacy back. Please call patient back once complete.    Action Taken: Message routed to:  [facility] primary care clinic    Travel Screening: Not Applicable

## 2021-09-01 ENCOUNTER — TELEPHONE (OUTPATIENT)
Dept: FAMILY MEDICINE | Facility: CLINIC | Age: 81
End: 2021-09-01

## 2021-09-01 DIAGNOSIS — F32.A DEPRESSION: ICD-10-CM

## 2021-09-04 NOTE — CONFIDENTIAL NOTE
busPIRone (BUSPAR) 5 MG tablet  Last Written Prescription Date:  unknown  Last Fill Quantity: unknown,   # refills: unknown  Last Office Visit : 12/14/20  Future Office visit:  None    Routing refill request to provider for review/approval because: please complete order.

## 2021-09-11 ENCOUNTER — HEALTH MAINTENANCE LETTER (OUTPATIENT)
Age: 81
End: 2021-09-11

## 2021-09-13 RX ORDER — BUSPIRONE HYDROCHLORIDE 5 MG/1
20 TABLET ORAL 2 TIMES DAILY
Qty: 360 TABLET | Refills: 0 | Status: SHIPPED | OUTPATIENT
Start: 2021-09-13 | End: 2021-09-16

## 2021-09-16 RX ORDER — BUSPIRONE HYDROCHLORIDE 5 MG/1
20 TABLET ORAL 2 TIMES DAILY
Qty: 360 TABLET | Refills: 0 | Status: ON HOLD | OUTPATIENT
Start: 2021-09-16 | End: 2021-10-22

## 2021-09-16 NOTE — TELEPHONE ENCOUNTER
Last Clinic Visit: 12/14/20 to establish care  Recommended 1 month follow up, no upcoming appointments  Last completed as no print out    
[facility] Call Center    Phone Message    May a detailed message be left on voicemail: no     Reason for Call: Medication Question or concern regarding medication   Prescription Clarification  Name of Medication: busPIRone (BUSPAR) 5 MG tablet     Prescribing Provider: Dr. Smith   Pharmacy: [facility]   What on the order needs clarification? Pharmacy called and stated they never received this script, writer informed the pharmacy it was sent on 9/13, but they stated they did not get it          Action Taken: Message routed to:  [facility]                                           
[facility] Call Center    Phone Message    May a detailed message be left on voicemail: yes     Reason for Call: Medication Refill Request    Has the patient contacted the pharmacy for the refill? Yes   Name of medication being requested: busPIRone (BUSPAR) 5 MG tablet  Provider who prescribed the medication: Colestmabell  Pharmacy: [facility] - [place], [place] - [address]  Date medication is needed: ASAP    Action Taken: Message routed to:  [facility]    Travel Screening: Not Applicable                                                                      
Patient scheduled for 10/15/21 at 9:00 AM with Dr. Smith.   
Rx was resent to pharmacy. Zuly Barriga LPN 9/16/2021 12:09 PM    
No. ABILIO screening performed.  STOP BANG Legend: 0-2 = LOW Risk; 3-4 = INTERMEDIATE Risk; 5-8 = HIGH Risk

## 2021-10-07 ENCOUNTER — ALLIED HEALTH/NURSE VISIT (OUTPATIENT)
Dept: INTERNAL MEDICINE | Facility: CLINIC | Age: 81
End: 2021-10-07
Payer: MEDICARE

## 2021-10-07 DIAGNOSIS — Z23 NEED FOR VACCINATION: Primary | ICD-10-CM

## 2021-10-07 PROCEDURE — 90662 IIV NO PRSV INCREASED AG IM: CPT

## 2021-10-07 PROCEDURE — G0008 ADMIN INFLUENZA VIRUS VAC: HCPCS

## 2021-10-07 NOTE — PROGRESS NOTES
Melo Dangelo received the Influenza Vaccine Fluzone High-Dose Quadrivalent today in clinic at the request of Dr. Francis Smith. The immunization was given under the supervision of Dr. Carvalho if assistance was needed. The immunization site was cleaned with an alcohol prep wipe. The immunization was given without incident--see immunization list for administration details. No swelling or redness was observed at the site of injection after the immunization was given. The patient was advised to remain in [facility] lobby for 15 minutes after the injection in case of an averse reaction.     Justice Miller, EMT at 10:28 AM on 10/7/2021

## 2021-10-14 DIAGNOSIS — E78.00 HIGH CHOLESTEROL: ICD-10-CM

## 2021-10-15 ENCOUNTER — OFFICE VISIT (OUTPATIENT)
Dept: FAMILY MEDICINE | Facility: CLINIC | Age: 81
End: 2021-10-15
Payer: MEDICARE

## 2021-10-15 ENCOUNTER — LAB (OUTPATIENT)
Dept: LAB | Facility: CLINIC | Age: 81
End: 2021-10-15
Payer: MEDICARE

## 2021-10-15 ENCOUNTER — TELEPHONE (OUTPATIENT)
Dept: INTERNAL MEDICINE | Facility: CLINIC | Age: 81
End: 2021-10-15

## 2021-10-15 VITALS
BODY MASS INDEX: 26.4 KG/M2 | OXYGEN SATURATION: 95 % | DIASTOLIC BLOOD PRESSURE: 79 MMHG | HEART RATE: 64 BPM | SYSTOLIC BLOOD PRESSURE: 170 MMHG | WEIGHT: 184 LBS

## 2021-10-15 DIAGNOSIS — F41.9 ANXIETY: ICD-10-CM

## 2021-10-15 DIAGNOSIS — N53.14 RETROGRADE EJACULATION: ICD-10-CM

## 2021-10-15 DIAGNOSIS — R68.82 LOW LIBIDO: ICD-10-CM

## 2021-10-15 DIAGNOSIS — N40.1 BENIGN PROSTATIC HYPERPLASIA WITH URINARY FREQUENCY: ICD-10-CM

## 2021-10-15 DIAGNOSIS — N17.9 ACUTE RENAL FAILURE, UNSPECIFIED ACUTE RENAL FAILURE TYPE (H): ICD-10-CM

## 2021-10-15 DIAGNOSIS — I10 BENIGN ESSENTIAL HYPERTENSION: ICD-10-CM

## 2021-10-15 DIAGNOSIS — D64.9 ANEMIA, UNSPECIFIED TYPE: ICD-10-CM

## 2021-10-15 DIAGNOSIS — D64.9 ANEMIA: Primary | ICD-10-CM

## 2021-10-15 DIAGNOSIS — N28.9 DECREASED RENAL FUNCTION: ICD-10-CM

## 2021-10-15 DIAGNOSIS — E78.00 HIGH CHOLESTEROL: ICD-10-CM

## 2021-10-15 DIAGNOSIS — R35.0 BENIGN PROSTATIC HYPERPLASIA WITH URINARY FREQUENCY: ICD-10-CM

## 2021-10-15 DIAGNOSIS — D64.9 ANEMIA: ICD-10-CM

## 2021-10-15 DIAGNOSIS — Z23 NEED FOR VIRAL IMMUNIZATION: ICD-10-CM

## 2021-10-15 DIAGNOSIS — E78.00 HIGH CHOLESTEROL: Primary | ICD-10-CM

## 2021-10-15 DIAGNOSIS — M72.2 PLANTAR FASCIITIS OF LEFT FOOT: ICD-10-CM

## 2021-10-15 LAB
ALBUMIN SERPL-MCNC: 4 G/DL (ref 3.4–5)
ALP SERPL-CCNC: 82 U/L (ref 40–150)
ALT SERPL W P-5'-P-CCNC: 21 U/L (ref 0–70)
ANION GAP SERPL CALCULATED.3IONS-SCNC: 7 MMOL/L (ref 3–14)
AST SERPL W P-5'-P-CCNC: 19 U/L (ref 0–45)
BASOPHILS # BLD AUTO: 0.1 10E3/UL (ref 0–0.2)
BASOPHILS NFR BLD AUTO: 1 %
BILIRUB SERPL-MCNC: 0.4 MG/DL (ref 0.2–1.3)
BUN SERPL-MCNC: 36 MG/DL (ref 7–30)
CALCIUM SERPL-MCNC: 9.1 MG/DL (ref 8.5–10.1)
CHLORIDE BLD-SCNC: 108 MMOL/L (ref 94–109)
CHOLEST SERPL-MCNC: 179 MG/DL
CO2 SERPL-SCNC: 24 MMOL/L (ref 20–32)
CREAT SERPL-MCNC: 3.58 MG/DL (ref 0.66–1.25)
EOSINOPHIL # BLD AUTO: 0 10E3/UL (ref 0–0.7)
EOSINOPHIL NFR BLD AUTO: 0 %
ERYTHROCYTE [DISTWIDTH] IN BLOOD BY AUTOMATED COUNT: 13.3 % (ref 10–15)
FASTING STATUS PATIENT QL REPORTED: ABNORMAL
FERRITIN SERPL-MCNC: 260 NG/ML (ref 26–388)
GFR SERPL CREATININE-BSD FRML MDRD: 15 ML/MIN/1.73M2
GLUCOSE BLD-MCNC: 100 MG/DL (ref 70–99)
HCT VFR BLD AUTO: 33 % (ref 40–53)
HDLC SERPL-MCNC: 40 MG/DL
HGB BLD-MCNC: 10.9 G/DL (ref 13.3–17.7)
IMM GRANULOCYTES # BLD: 0 10E3/UL
IMM GRANULOCYTES NFR BLD: 0 %
IRON SATN MFR SERPL: 24 % (ref 15–46)
IRON SERPL-MCNC: 81 UG/DL (ref 35–180)
LDLC SERPL CALC-MCNC: 114 MG/DL
LYMPHOCYTES # BLD AUTO: 1.5 10E3/UL (ref 0.8–5.3)
LYMPHOCYTES NFR BLD AUTO: 28 %
MCH RBC QN AUTO: 30.1 PG (ref 26.5–33)
MCHC RBC AUTO-ENTMCNC: 33 G/DL (ref 31.5–36.5)
MCV RBC AUTO: 91 FL (ref 78–100)
MONOCYTES # BLD AUTO: 0.7 10E3/UL (ref 0–1.3)
MONOCYTES NFR BLD AUTO: 12 %
NEUTROPHILS # BLD AUTO: 3.1 10E3/UL (ref 1.6–8.3)
NEUTROPHILS NFR BLD AUTO: 59 %
NONHDLC SERPL-MCNC: 139 MG/DL
NRBC # BLD AUTO: 0 10E3/UL
NRBC BLD AUTO-RTO: 0 /100
PLATELET # BLD AUTO: 179 10E3/UL (ref 150–450)
POTASSIUM BLD-SCNC: 4.8 MMOL/L (ref 3.4–5.3)
PROT SERPL-MCNC: 7.8 G/DL (ref 6.8–8.8)
RBC # BLD AUTO: 3.62 10E6/UL (ref 4.4–5.9)
RETICS # AUTO: 0.03 10E6/UL (ref 0.03–0.1)
RETICS/RBC NFR AUTO: 0.8 % (ref 0.5–2)
SODIUM SERPL-SCNC: 139 MMOL/L (ref 133–144)
TIBC SERPL-MCNC: 331 UG/DL (ref 240–430)
TRIGL SERPL-MCNC: 126 MG/DL
VIT B12 SERPL-MCNC: 2363 PG/ML (ref 193–986)
WBC # BLD AUTO: 5.3 10E3/UL (ref 4–11)

## 2021-10-15 PROCEDURE — 99215 OFFICE O/P EST HI 40 MIN: CPT | Performed by: FAMILY MEDICINE

## 2021-10-15 PROCEDURE — 82607 VITAMIN B-12: CPT | Performed by: PATHOLOGY

## 2021-10-15 PROCEDURE — 80053 COMPREHEN METABOLIC PANEL: CPT | Performed by: PATHOLOGY

## 2021-10-15 PROCEDURE — 80061 LIPID PANEL: CPT | Performed by: PATHOLOGY

## 2021-10-15 PROCEDURE — 36415 COLL VENOUS BLD VENIPUNCTURE: CPT | Performed by: PATHOLOGY

## 2021-10-15 PROCEDURE — 84403 ASSAY OF TOTAL TESTOSTERONE: CPT | Performed by: FAMILY MEDICINE

## 2021-10-15 PROCEDURE — 85025 COMPLETE CBC W/AUTO DIFF WBC: CPT | Performed by: PATHOLOGY

## 2021-10-15 PROCEDURE — 85045 AUTOMATED RETICULOCYTE COUNT: CPT | Performed by: PATHOLOGY

## 2021-10-15 PROCEDURE — 83550 IRON BINDING TEST: CPT | Performed by: PATHOLOGY

## 2021-10-15 PROCEDURE — 82728 ASSAY OF FERRITIN: CPT | Performed by: PATHOLOGY

## 2021-10-15 RX ORDER — TAMSULOSIN HYDROCHLORIDE 0.4 MG/1
0.4 CAPSULE ORAL DAILY
Qty: 90 CAPSULE | Refills: 3 | Status: SHIPPED | OUTPATIENT
Start: 2021-10-15 | End: 2022-07-29

## 2021-10-15 RX ORDER — FINASTERIDE 5 MG/1
5 TABLET, FILM COATED ORAL DAILY
Qty: 90 TABLET | Refills: 3 | Status: SHIPPED | OUTPATIENT
Start: 2021-10-15 | End: 2022-08-09

## 2021-10-15 ASSESSMENT — PAIN SCALES - GENERAL: PAINLEVEL: MODERATE PAIN (4)

## 2021-10-15 NOTE — PROGRESS NOTES
"    Assessment & Plan     Benign prostatic hyperplasia with urinary frequency  Add Proscar, up four times/dnight  - finasteride (PROSCAR) 5 MG tablet; Take 1 tablet (5 mg) by mouth daily  - tamsulosin (FLOMAX) 0.4 MG capsule; Take 1 capsule (0.4 mg) by mouth daily    High cholesterol  Due  - Comprehensive metabolic panel; Future  - Lipid panel reflex to direct LDL Fasting; Future    Anemia, unspecified type  Recheck due based on today labs will check expanded labs but could be from renal fcn decline  - CBC with platelets differential; Future    Low libido  Discussed what to do if abnl  - Testosterone total; Future    Need for viral immunization  Covid at pharm    Retrograde ejaculation  Monitor    Anxiety  Review again w/ Dr Solano  - [facility]    Plantar fasciitis of left foot  PT  - [facility]; Future    ARF on today labs; on Inspra, hold, push fluids, recheck BMP 3 days if not better review w/ renal; if better will reach out to prescriinb eye MD for alternative    Watch BP at home consider norvasc w/ his renal fcn      65 minutes spent on the date of the encounter doing chart review, history and exam, documentation and further activities per the note    BMI:   Estimated body mass index is 26.4 kg/m  as calculated from the following:    Height as of 1/18/21: 1.778 m (5' 10\").    Weight as of this encounter: 83.5 kg (184 lb).       Return in about 3 months (around 1/15/2022).    Francis Smith MD  [facility] KARLY Alejo is a 81 year old who presents for the following health issues  accompanied by his spouse:    HPI 1-will get pfizer booster today   2-left foot pain, new no trauma red warm swollen, worst in am, heel  3-still nocturia see Uro note on Flomax tolerates, he'd like better control  4-ED: minimal he declines meds  5-he describes low libido, he'd like eval, mood can contribute see below but will check testosterone " he knows may or may not be appropriate to take meds for if low  And to endo  6-he describes retrograde ejaculation, no known PN dx  7-anxiety: discussed at length, on lexapro/buspar, still a problem today jeannette is 8 phq is 9, cannot take wellbutrin re: seizures  8-seizures none for years on lamictal discussed this might help mental health sx too  10-htn; at home wnl  11-anemia: no GI,  bleed on ROS recheck  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  Current Outpatient Medications   Medication     acetaminophen (TYLENOL) 325 MG tablet     apixaban ANTICOAGULANT (ELIQUIS) 2.5 MG tablet     aspirin (ASA) 81 MG EC tablet     busPIRone (BUSPAR) 5 MG tablet     cyanocobalamin (VITAMIN B-12) 100 MCG tablet     eplerenone (INSPRA) 25 MG tablet     escitalopram (LEXAPRO) 20 MG tablet     finasteride (PROSCAR) 5 MG tablet     lamoTRIgine (LAMICTAL) 100 MG tablet     simvastatin (ZOCOR) 20 MG tablet     tamsulosin (FLOMAX) 0.4 MG capsule     omeprazole (PRILOSEC) 20 MG DR capsule     No current facility-administered medications for this visit.     No Known Allergies      Review of Systems         Objective    BP (!) 170/79   Pulse 64   Wt 83.5 kg (184 lb)   SpO2 95%   BMI 26.40 kg/m    Body mass index is 26.4 kg/m .  Physical Exam   GENERAL: healthy, alert and no distress  MS: no gross musculoskeletal defects noted, no edema  Psych exam wnl

## 2021-10-15 NOTE — TELEPHONE ENCOUNTER
----- Message from Francis Smith MD sent at 10/15/2021 10:43 AM CDT -----  Just did labs  Anemic  See if lab can add  Ferritin, iron, tibc, b12, folate, reticulocyte count for that  Thanks

## 2021-10-15 NOTE — NURSING NOTE
Chief Complaint   Patient presents with     Recheck Medication     follow up visit, his left heal he would like to have looked at and discussed       Carmen Jauregui, EMT at 8:59 AM on 10/15/2021

## 2021-10-15 NOTE — TELEPHONE ENCOUNTER
----- Message from Francis Smith MD sent at 10/15/2021 11:40 AM CDT -----  I just noted his eye MD put him on Inspra that could impact kidneys    Soon mi can you tell him kidney function down, stop Inspra over the weekend, push fluids, redo BMP Mon or Tues? If improves I'll ask eye MD for alternatives.

## 2021-10-15 NOTE — TELEPHONE ENCOUNTER
Spoke with pt, informed the result and recommendations. He denied to take OTC Nsaids.  He drinks lots of coffee, but not the water and I encouraged to drink more water.    Soon-Mi  ----------------------------------------------------------------------------------          ----- Message from Francis Smith MD sent at 10/15/2021 10:52 AM CDT -----  I just saw him  No discussion GI symptoms to suggest dehydration  Kidney funciton is down significantly from last year  Ask if drinking ok  Ask if taking OTC meds perhaps something like advil would irritate kidneys    Nithin if you have a chance could you glance at his meds, kidney function dropped, I don't think his meds would do this but I'd appreciate your opinon

## 2021-10-17 LAB — TESTOST SERPL-MCNC: 258 NG/DL (ref 240–950)

## 2021-10-18 ENCOUNTER — LAB (OUTPATIENT)
Dept: LAB | Facility: CLINIC | Age: 81
End: 2021-10-18
Payer: MEDICARE

## 2021-10-18 ENCOUNTER — HOSPITAL ENCOUNTER (OUTPATIENT)
Facility: CLINIC | Age: 81
Setting detail: OBSERVATION
Discharge: HOME | End: 2021-10-22
Attending: EMERGENCY MEDICINE | Admitting: INTERNAL MEDICINE
Payer: MEDICARE

## 2021-10-18 ENCOUNTER — APPOINTMENT (OUTPATIENT)
Dept: CT IMAGING | Facility: CLINIC | Age: 81
End: 2021-10-18
Attending: EMERGENCY MEDICINE
Payer: MEDICARE

## 2021-10-18 DIAGNOSIS — F32.A DEPRESSION: ICD-10-CM

## 2021-10-18 DIAGNOSIS — N17.9 ACUTE KIDNEY INJURY (H): Primary | ICD-10-CM

## 2021-10-18 DIAGNOSIS — N28.9 DECREASED RENAL FUNCTION: ICD-10-CM

## 2021-10-18 DIAGNOSIS — F32.9 MAJOR DEPRESSIVE DISORDER WITH CURRENT ACTIVE EPISODE, UNSPECIFIED DEPRESSION EPISODE SEVERITY, UNSPECIFIED WHETHER RECURRENT: ICD-10-CM

## 2021-10-18 DIAGNOSIS — R31.9 HEMATURIA, UNSPECIFIED TYPE: ICD-10-CM

## 2021-10-18 DIAGNOSIS — D64.9 ANEMIA: ICD-10-CM

## 2021-10-18 DIAGNOSIS — Z11.52 ENCOUNTER FOR SCREENING LABORATORY TESTING FOR SEVERE ACUTE RESPIRATORY SYNDROME CORONAVIRUS 2 (SARS-COV-2): ICD-10-CM

## 2021-10-18 LAB
ALBUMIN SERPL-MCNC: 3.9 G/DL (ref 3.4–5)
ALBUMIN UR-MCNC: 20 MG/DL
ALP SERPL-CCNC: 70 U/L (ref 40–150)
ALT SERPL W P-5'-P-CCNC: 20 U/L (ref 0–70)
ANION GAP SERPL CALCULATED.3IONS-SCNC: 3 MMOL/L (ref 3–14)
ANION GAP SERPL CALCULATED.3IONS-SCNC: 4 MMOL/L (ref 3–14)
APPEARANCE UR: CLEAR
AST SERPL W P-5'-P-CCNC: 16 U/L (ref 0–45)
BASOPHILS # BLD AUTO: 0.1 10E3/UL (ref 0–0.2)
BASOPHILS NFR BLD AUTO: 1 %
BILIRUB DIRECT SERPL-MCNC: <0.1 MG/DL (ref 0–0.2)
BILIRUB SERPL-MCNC: 0.3 MG/DL (ref 0.2–1.3)
BILIRUB UR QL STRIP: NEGATIVE
BUN SERPL-MCNC: 40 MG/DL (ref 7–30)
BUN SERPL-MCNC: 42 MG/DL (ref 7–30)
CALCIUM SERPL-MCNC: 8.8 MG/DL (ref 8.5–10.1)
CALCIUM SERPL-MCNC: 9.1 MG/DL (ref 8.5–10.1)
CHLORIDE BLD-SCNC: 108 MMOL/L (ref 94–109)
CHLORIDE BLD-SCNC: 108 MMOL/L (ref 94–109)
CO2 SERPL-SCNC: 27 MMOL/L (ref 20–32)
CO2 SERPL-SCNC: 27 MMOL/L (ref 20–32)
COLOR UR AUTO: ABNORMAL
CREAT SERPL-MCNC: 3.85 MG/DL (ref 0.66–1.25)
CREAT SERPL-MCNC: 3.92 MG/DL (ref 0.66–1.25)
EOSINOPHIL # BLD AUTO: 0.1 10E3/UL (ref 0–0.7)
EOSINOPHIL NFR BLD AUTO: 2 %
ERYTHROCYTE [DISTWIDTH] IN BLOOD BY AUTOMATED COUNT: 13.5 % (ref 10–15)
FOLATE SERPL-MCNC: 23.6 NG/ML
GFR SERPL CREATININE-BSD FRML MDRD: 13 ML/MIN/1.73M2
GFR SERPL CREATININE-BSD FRML MDRD: 14 ML/MIN/1.73M2
GLUCOSE BLD-MCNC: 86 MG/DL (ref 70–99)
GLUCOSE BLD-MCNC: 91 MG/DL (ref 70–99)
GLUCOSE UR STRIP-MCNC: NEGATIVE MG/DL
HCT VFR BLD AUTO: 30.8 % (ref 40–53)
HGB BLD-MCNC: 10.2 G/DL (ref 13.3–17.7)
HGB UR QL STRIP: ABNORMAL
IMM GRANULOCYTES # BLD: 0 10E3/UL
IMM GRANULOCYTES NFR BLD: 0 %
KETONES UR STRIP-MCNC: NEGATIVE MG/DL
LEUKOCYTE ESTERASE UR QL STRIP: NEGATIVE
LYMPHOCYTES # BLD AUTO: 1.6 10E3/UL (ref 0.8–5.3)
LYMPHOCYTES NFR BLD AUTO: 29 %
MCH RBC QN AUTO: 30.8 PG (ref 26.5–33)
MCHC RBC AUTO-ENTMCNC: 33.1 G/DL (ref 31.5–36.5)
MCV RBC AUTO: 93 FL (ref 78–100)
MONOCYTES # BLD AUTO: 0.9 10E3/UL (ref 0–1.3)
MONOCYTES NFR BLD AUTO: 17 %
NEUTROPHILS # BLD AUTO: 2.8 10E3/UL (ref 1.6–8.3)
NEUTROPHILS NFR BLD AUTO: 51 %
NITRATE UR QL: NEGATIVE
NRBC # BLD AUTO: 0 10E3/UL
NRBC BLD AUTO-RTO: 0 /100
PH UR STRIP: 5.5 [PH] (ref 5–7)
PLATELET # BLD AUTO: 161 10E3/UL (ref 150–450)
POTASSIUM BLD-SCNC: 4.5 MMOL/L (ref 3.4–5.3)
POTASSIUM BLD-SCNC: 4.7 MMOL/L (ref 3.4–5.3)
PROT SERPL-MCNC: 7.5 G/DL (ref 6.8–8.8)
RBC # BLD AUTO: 3.31 10E6/UL (ref 4.4–5.9)
RBC URINE: 84 /HPF
SODIUM SERPL-SCNC: 138 MMOL/L (ref 133–144)
SODIUM SERPL-SCNC: 139 MMOL/L (ref 133–144)
SP GR UR STRIP: 1.01 (ref 1–1.03)
TRANSITIONAL EPI: 4 /HPF
UROBILINOGEN UR STRIP-MCNC: NORMAL MG/DL
WBC # BLD AUTO: 5.5 10E3/UL (ref 4–11)
WBC URINE: 1 /HPF

## 2021-10-18 PROCEDURE — 82746 ASSAY OF FOLIC ACID SERUM: CPT

## 2021-10-18 PROCEDURE — 36415 COLL VENOUS BLD VENIPUNCTURE: CPT | Performed by: EMERGENCY MEDICINE

## 2021-10-18 PROCEDURE — 74176 CT ABD & PELVIS W/O CONTRAST: CPT | Mod: ME

## 2021-10-18 PROCEDURE — G1004 CDSM NDSC: HCPCS | Performed by: RADIOLOGY

## 2021-10-18 PROCEDURE — 99285 EMERGENCY DEPT VISIT HI MDM: CPT | Performed by: EMERGENCY MEDICINE

## 2021-10-18 PROCEDURE — 96360 HYDRATION IV INFUSION INIT: CPT

## 2021-10-18 PROCEDURE — 82040 ASSAY OF SERUM ALBUMIN: CPT | Performed by: EMERGENCY MEDICINE

## 2021-10-18 PROCEDURE — 80048 BASIC METABOLIC PNL TOTAL CA: CPT | Performed by: EMERGENCY MEDICINE

## 2021-10-18 PROCEDURE — 80048 BASIC METABOLIC PNL TOTAL CA: CPT

## 2021-10-18 PROCEDURE — 85025 COMPLETE CBC W/AUTO DIFF WBC: CPT | Performed by: EMERGENCY MEDICINE

## 2021-10-18 PROCEDURE — 83036 HEMOGLOBIN GLYCOSYLATED A1C: CPT | Performed by: STUDENT IN AN ORGANIZED HEALTH CARE EDUCATION/TRAINING PROGRAM

## 2021-10-18 PROCEDURE — U0005 INFEC AGEN DETEC AMPLI PROBE: HCPCS | Performed by: EMERGENCY MEDICINE

## 2021-10-18 PROCEDURE — 82550 ASSAY OF CK (CPK): CPT | Performed by: STUDENT IN AN ORGANIZED HEALTH CARE EDUCATION/TRAINING PROGRAM

## 2021-10-18 PROCEDURE — 80175 DRUG SCREEN QUAN LAMOTRIGINE: CPT | Performed by: STUDENT IN AN ORGANIZED HEALTH CARE EDUCATION/TRAINING PROGRAM

## 2021-10-18 PROCEDURE — C9803 HOPD COVID-19 SPEC COLLECT: HCPCS

## 2021-10-18 PROCEDURE — 74176 CT ABD & PELVIS W/O CONTRAST: CPT | Mod: 26 | Performed by: RADIOLOGY

## 2021-10-18 PROCEDURE — 99285 EMERGENCY DEPT VISIT HI MDM: CPT | Mod: 25

## 2021-10-18 PROCEDURE — 84166 PROTEIN E-PHORESIS/URINE/CSF: CPT | Mod: TC | Performed by: STUDENT IN AN ORGANIZED HEALTH CARE EDUCATION/TRAINING PROGRAM

## 2021-10-18 PROCEDURE — 84156 ASSAY OF PROTEIN URINE: CPT | Performed by: STUDENT IN AN ORGANIZED HEALTH CARE EDUCATION/TRAINING PROGRAM

## 2021-10-18 PROCEDURE — 84443 ASSAY THYROID STIM HORMONE: CPT | Performed by: STUDENT IN AN ORGANIZED HEALTH CARE EDUCATION/TRAINING PROGRAM

## 2021-10-18 PROCEDURE — 84540 ASSAY OF URINE/UREA-N: CPT | Performed by: STUDENT IN AN ORGANIZED HEALTH CARE EDUCATION/TRAINING PROGRAM

## 2021-10-18 PROCEDURE — 84100 ASSAY OF PHOSPHORUS: CPT | Performed by: STUDENT IN AN ORGANIZED HEALTH CARE EDUCATION/TRAINING PROGRAM

## 2021-10-18 PROCEDURE — 36415 COLL VENOUS BLD VENIPUNCTURE: CPT

## 2021-10-18 PROCEDURE — 81001 URINALYSIS AUTO W/SCOPE: CPT | Performed by: EMERGENCY MEDICINE

## 2021-10-18 PROCEDURE — 84300 ASSAY OF URINE SODIUM: CPT | Performed by: STUDENT IN AN ORGANIZED HEALTH CARE EDUCATION/TRAINING PROGRAM

## 2021-10-18 PROCEDURE — 84550 ASSAY OF BLOOD/URIC ACID: CPT | Performed by: STUDENT IN AN ORGANIZED HEALTH CARE EDUCATION/TRAINING PROGRAM

## 2021-10-18 PROCEDURE — 82570 ASSAY OF URINE CREATININE: CPT | Performed by: STUDENT IN AN ORGANIZED HEALTH CARE EDUCATION/TRAINING PROGRAM

## 2021-10-18 RX ORDER — SIMVASTATIN 20 MG
20 TABLET ORAL AT BEDTIME
Qty: 90 TABLET | Refills: 3 | Status: SHIPPED | OUTPATIENT
Start: 2021-10-18 | End: 2023-01-19

## 2021-10-18 ASSESSMENT — MIFFLIN-ST. JEOR: SCORE: 1588.96

## 2021-10-18 ASSESSMENT — ACTIVITIES OF DAILY LIVING (ADL): ADLS_ACUITY_SCORE: 10

## 2021-10-18 NOTE — ED TRIAGE NOTES
Referred from clinic for further evaluation of hematuria and increased creatinine of 3.85. Patient states urine continues to be pink in color but has significantly improved since Saturday. Denies pain.

## 2021-10-19 LAB
ALBUMIN SERPL ELPH-MCNC: 3.7 G/DL (ref 3.7–5.1)
ALPHA1 GLOB SERPL ELPH-MCNC: 0.3 G/DL (ref 0.2–0.4)
ALPHA2 GLOB SERPL ELPH-MCNC: 0.7 G/DL (ref 0.5–0.9)
ANION GAP SERPL CALCULATED.3IONS-SCNC: 7 MMOL/L (ref 3–14)
B-GLOBULIN SERPL ELPH-MCNC: 0.7 G/DL (ref 0.6–1)
BASOPHILS # BLD AUTO: 0.1 10E3/UL (ref 0–0.2)
BASOPHILS NFR BLD AUTO: 1 %
BUN SERPL-MCNC: 42 MG/DL (ref 7–30)
CA-I BLD-MCNC: 5 MG/DL (ref 4.4–5.2)
CALCIUM SERPL-MCNC: 8.6 MG/DL (ref 8.5–10.1)
CHLORIDE BLD-SCNC: 111 MMOL/L (ref 94–109)
CK SERPL-CCNC: 126 U/L (ref 30–300)
CO2 SERPL-SCNC: 23 MMOL/L (ref 20–32)
CREAT SERPL-MCNC: 3.8 MG/DL (ref 0.66–1.25)
CREAT UR-MCNC: 51 MG/DL
EOSINOPHIL # BLD AUTO: 0.1 10E3/UL (ref 0–0.7)
EOSINOPHIL NFR BLD AUTO: 1 %
ERYTHROCYTE [DISTWIDTH] IN BLOOD BY AUTOMATED COUNT: 13.3 % (ref 10–15)
ERYTHROCYTE [DISTWIDTH] IN BLOOD BY AUTOMATED COUNT: 13.5 % (ref 10–15)
FERRITIN SERPL-MCNC: 242 NG/ML (ref 26–388)
GAMMA GLOB SERPL ELPH-MCNC: 0.7 G/DL (ref 0.7–1.6)
GFR SERPL CREATININE-BSD FRML MDRD: 14 ML/MIN/1.73M2
GLUCOSE BLD-MCNC: 80 MG/DL (ref 70–99)
HAPTOGLOB SERPL-MCNC: 150 MG/DL (ref 32–197)
HBA1C MFR BLD: 5.8 % (ref 0–5.6)
HBV SURFACE AB SERPL IA-ACNC: 134.61 M[IU]/ML
HBV SURFACE AG SERPL QL IA: NONREACTIVE
HCT VFR BLD AUTO: 29.6 % (ref 40–53)
HCT VFR BLD AUTO: 30.5 % (ref 40–53)
HCV AB SERPL QL IA: NONREACTIVE
HGB BLD-MCNC: 10.1 G/DL (ref 13.3–17.7)
HGB BLD-MCNC: 9.7 G/DL (ref 13.3–17.7)
HIV 1+2 AB+HIV1 P24 AG SERPL QL IA: NONREACTIVE
IMM GRANULOCYTES # BLD: 0 10E3/UL
IMM GRANULOCYTES NFR BLD: 0 %
INR PPP: 1.09 (ref 0.85–1.15)
IRON SATN MFR SERPL: 29 % (ref 15–46)
IRON SERPL-MCNC: 72 UG/DL (ref 35–180)
KAPPA LC FREE SER-MCNC: 3.95 MG/DL (ref 0.33–1.94)
KAPPA LC FREE/LAMBDA FREE SER NEPH: 1.3 {RATIO} (ref 0.26–1.65)
LAMBDA LC FREE SERPL-MCNC: 3.05 MG/DL (ref 0.57–2.63)
LDH SERPL L TO P-CCNC: 155 U/L (ref 85–227)
LYMPHOCYTES # BLD AUTO: 1.4 10E3/UL (ref 0.8–5.3)
LYMPHOCYTES NFR BLD AUTO: 26 %
M PROTEIN SERPL ELPH-MCNC: 0.1 G/DL
MAGNESIUM SERPL-MCNC: 2.4 MG/DL (ref 1.6–2.3)
MCH RBC QN AUTO: 30.2 PG (ref 26.5–33)
MCH RBC QN AUTO: 30.7 PG (ref 26.5–33)
MCHC RBC AUTO-ENTMCNC: 32.8 G/DL (ref 31.5–36.5)
MCHC RBC AUTO-ENTMCNC: 33.1 G/DL (ref 31.5–36.5)
MCV RBC AUTO: 92 FL (ref 78–100)
MCV RBC AUTO: 93 FL (ref 78–100)
MONOCYTES # BLD AUTO: 0.5 10E3/UL (ref 0–1.3)
MONOCYTES NFR BLD AUTO: 10 %
NEUTROPHILS # BLD AUTO: 3.2 10E3/UL (ref 1.6–8.3)
NEUTROPHILS NFR BLD AUTO: 62 %
NRBC # BLD AUTO: 0 10E3/UL
NRBC BLD AUTO-RTO: 0 /100
PHOSPHATE SERPL-MCNC: 4.6 MG/DL (ref 2.5–4.5)
PLATELET # BLD AUTO: 161 10E3/UL (ref 150–450)
PLATELET # BLD AUTO: 169 10E3/UL (ref 150–450)
POTASSIUM BLD-SCNC: 4.3 MMOL/L (ref 3.4–5.3)
PROT PATTERN SERPL ELPH-IMP: ABNORMAL
PROT UR-MCNC: 0.35 G/L
PROT/CREAT 24H UR: 0.69 G/G CR (ref 0–0.2)
RBC # BLD AUTO: 3.21 10E6/UL (ref 4.4–5.9)
RBC # BLD AUTO: 3.29 10E6/UL (ref 4.4–5.9)
RETICS # AUTO: 0.03 10E6/UL (ref 0.03–0.1)
RETICS/RBC NFR AUTO: 0.9 % (ref 0.5–2)
SARS-COV-2 RNA RESP QL NAA+PROBE: NEGATIVE
SODIUM SERPL-SCNC: 141 MMOL/L (ref 133–144)
SODIUM UR-SCNC: 44 MMOL/L
TIBC SERPL-MCNC: 246 UG/DL (ref 240–430)
TOTAL PROTEIN SERUM FOR ELP: 6.2 G/DL (ref 6.8–8.8)
TRANSFERRIN SERPL-MCNC: 194 MG/DL (ref 210–360)
TSH SERPL DL<=0.005 MIU/L-ACNC: 2.76 MU/L (ref 0.4–4)
URATE SERPL-MCNC: 5.2 MG/DL (ref 3.5–7.2)
UUN UR-MCNC: 359 MG/DL
UUN/CREAT 24H UR: 7 G/G CR
WBC # BLD AUTO: 5.1 10E3/UL (ref 4–11)
WBC # BLD AUTO: 5.2 10E3/UL (ref 4–11)

## 2021-10-19 PROCEDURE — 85610 PROTHROMBIN TIME: CPT | Performed by: STUDENT IN AN ORGANIZED HEALTH CARE EDUCATION/TRAINING PROGRAM

## 2021-10-19 PROCEDURE — 84165 PROTEIN E-PHORESIS SERUM: CPT | Mod: 26 | Performed by: PATHOLOGY

## 2021-10-19 PROCEDURE — 99220 PR INITIAL OBSERVATION CARE,LEVEL III: CPT | Mod: GC | Performed by: STUDENT IN AN ORGANIZED HEALTH CARE EDUCATION/TRAINING PROGRAM

## 2021-10-19 PROCEDURE — 36415 COLL VENOUS BLD VENIPUNCTURE: CPT

## 2021-10-19 PROCEDURE — 36415 COLL VENOUS BLD VENIPUNCTURE: CPT | Performed by: STUDENT IN AN ORGANIZED HEALTH CARE EDUCATION/TRAINING PROGRAM

## 2021-10-19 PROCEDURE — 85027 COMPLETE CBC AUTOMATED: CPT

## 2021-10-19 PROCEDURE — 99207 PR CDG-CODE CATEGORY CHANGED: CPT | Performed by: STUDENT IN AN ORGANIZED HEALTH CARE EDUCATION/TRAINING PROGRAM

## 2021-10-19 PROCEDURE — 83516 IMMUNOASSAY NONANTIBODY: CPT

## 2021-10-19 PROCEDURE — 86160 COMPLEMENT ANTIGEN: CPT

## 2021-10-19 PROCEDURE — 83615 LACTATE (LD) (LDH) ENZYME: CPT | Performed by: STUDENT IN AN ORGANIZED HEALTH CARE EDUCATION/TRAINING PROGRAM

## 2021-10-19 PROCEDURE — 83735 ASSAY OF MAGNESIUM: CPT | Performed by: STUDENT IN AN ORGANIZED HEALTH CARE EDUCATION/TRAINING PROGRAM

## 2021-10-19 PROCEDURE — 80048 BASIC METABOLIC PNL TOTAL CA: CPT | Performed by: STUDENT IN AN ORGANIZED HEALTH CARE EDUCATION/TRAINING PROGRAM

## 2021-10-19 PROCEDURE — 84165 PROTEIN E-PHORESIS SERUM: CPT | Mod: TC | Performed by: PATHOLOGY

## 2021-10-19 PROCEDURE — 86038 ANTINUCLEAR ANTIBODIES: CPT

## 2021-10-19 PROCEDURE — 87389 HIV-1 AG W/HIV-1&-2 AB AG IA: CPT | Performed by: STUDENT IN AN ORGANIZED HEALTH CARE EDUCATION/TRAINING PROGRAM

## 2021-10-19 PROCEDURE — 82330 ASSAY OF CALCIUM: CPT | Performed by: STUDENT IN AN ORGANIZED HEALTH CARE EDUCATION/TRAINING PROGRAM

## 2021-10-19 PROCEDURE — 87340 HEPATITIS B SURFACE AG IA: CPT | Mod: GZ | Performed by: STUDENT IN AN ORGANIZED HEALTH CARE EDUCATION/TRAINING PROGRAM

## 2021-10-19 PROCEDURE — 85025 COMPLETE CBC W/AUTO DIFF WBC: CPT | Performed by: STUDENT IN AN ORGANIZED HEALTH CARE EDUCATION/TRAINING PROGRAM

## 2021-10-19 PROCEDURE — 82728 ASSAY OF FERRITIN: CPT

## 2021-10-19 PROCEDURE — 258N000003 HC RX IP 258 OP 636: Performed by: EMERGENCY MEDICINE

## 2021-10-19 PROCEDURE — 86334 IMMUNOFIX E-PHORESIS SERUM: CPT | Mod: TC

## 2021-10-19 PROCEDURE — 84155 ASSAY OF PROTEIN SERUM: CPT | Performed by: STUDENT IN AN ORGANIZED HEALTH CARE EDUCATION/TRAINING PROGRAM

## 2021-10-19 PROCEDURE — 83550 IRON BINDING TEST: CPT

## 2021-10-19 PROCEDURE — G0378 HOSPITAL OBSERVATION PER HR: HCPCS

## 2021-10-19 PROCEDURE — 85045 AUTOMATED RETICULOCYTE COUNT: CPT

## 2021-10-19 PROCEDURE — 250N000013 HC RX MED GY IP 250 OP 250 PS 637: Performed by: STUDENT IN AN ORGANIZED HEALTH CARE EDUCATION/TRAINING PROGRAM

## 2021-10-19 PROCEDURE — 83883 ASSAY NEPHELOMETRY NOT SPEC: CPT | Performed by: STUDENT IN AN ORGANIZED HEALTH CARE EDUCATION/TRAINING PROGRAM

## 2021-10-19 PROCEDURE — 86255 FLUORESCENT ANTIBODY SCREEN: CPT

## 2021-10-19 PROCEDURE — 99222 1ST HOSP IP/OBS MODERATE 55: CPT | Mod: GC | Performed by: INTERNAL MEDICINE

## 2021-10-19 PROCEDURE — 84466 ASSAY OF TRANSFERRIN: CPT

## 2021-10-19 PROCEDURE — 86706 HEP B SURFACE ANTIBODY: CPT | Performed by: STUDENT IN AN ORGANIZED HEALTH CARE EDUCATION/TRAINING PROGRAM

## 2021-10-19 PROCEDURE — 86803 HEPATITIS C AB TEST: CPT | Mod: GZ | Performed by: STUDENT IN AN ORGANIZED HEALTH CARE EDUCATION/TRAINING PROGRAM

## 2021-10-19 PROCEDURE — 83010 ASSAY OF HAPTOGLOBIN QUANT: CPT | Performed by: STUDENT IN AN ORGANIZED HEALTH CARE EDUCATION/TRAINING PROGRAM

## 2021-10-19 RX ORDER — BUSPIRONE HYDROCHLORIDE 10 MG/1
20 TABLET ORAL
Status: CANCELLED | OUTPATIENT
Start: 2021-10-19

## 2021-10-19 RX ORDER — ESCITALOPRAM OXALATE 10 MG/1
20 TABLET ORAL DAILY
Status: DISCONTINUED | OUTPATIENT
Start: 2021-10-19 | End: 2021-10-22 | Stop reason: HOSPADM

## 2021-10-19 RX ORDER — TAMSULOSIN HYDROCHLORIDE 0.4 MG/1
0.4 CAPSULE ORAL DAILY
Status: DISCONTINUED | OUTPATIENT
Start: 2021-10-19 | End: 2021-10-22 | Stop reason: HOSPADM

## 2021-10-19 RX ORDER — LIDOCAINE 40 MG/G
CREAM TOPICAL
Status: DISCONTINUED | OUTPATIENT
Start: 2021-10-19 | End: 2021-10-22 | Stop reason: HOSPADM

## 2021-10-19 RX ORDER — LANOLIN ALCOHOL/MO/W.PET/CERES
3 CREAM (GRAM) TOPICAL
Status: DISCONTINUED | OUTPATIENT
Start: 2021-10-19 | End: 2021-10-22 | Stop reason: HOSPADM

## 2021-10-19 RX ORDER — FINASTERIDE 5 MG/1
5 TABLET, FILM COATED ORAL DAILY
Status: DISCONTINUED | OUTPATIENT
Start: 2021-10-19 | End: 2021-10-22 | Stop reason: HOSPADM

## 2021-10-19 RX ORDER — ACETAMINOPHEN 650 MG/1
650 SUPPOSITORY RECTAL EVERY 6 HOURS PRN
Status: DISCONTINUED | OUTPATIENT
Start: 2021-10-19 | End: 2021-10-22 | Stop reason: HOSPADM

## 2021-10-19 RX ORDER — SIMVASTATIN 20 MG
20 TABLET ORAL AT BEDTIME
Status: DISCONTINUED | OUTPATIENT
Start: 2021-10-19 | End: 2021-10-22 | Stop reason: HOSPADM

## 2021-10-19 RX ORDER — LAMOTRIGINE 100 MG/1
200 TABLET ORAL 2 TIMES DAILY
Status: DISCONTINUED | OUTPATIENT
Start: 2021-10-19 | End: 2021-10-22 | Stop reason: HOSPADM

## 2021-10-19 RX ORDER — ACETAMINOPHEN 325 MG/1
650 TABLET ORAL EVERY 6 HOURS PRN
Status: DISCONTINUED | OUTPATIENT
Start: 2021-10-19 | End: 2021-10-22 | Stop reason: HOSPADM

## 2021-10-19 RX ORDER — LANOLIN ALCOHOL/MO/W.PET/CERES
1000 CREAM (GRAM) TOPICAL DAILY
Status: DISCONTINUED | OUTPATIENT
Start: 2021-10-19 | End: 2021-10-22 | Stop reason: HOSPADM

## 2021-10-19 RX ORDER — LABETALOL HYDROCHLORIDE 5 MG/ML
20 INJECTION, SOLUTION INTRAVENOUS EVERY 6 HOURS PRN
Status: ACTIVE | OUTPATIENT
Start: 2021-10-19 | End: 2021-10-20

## 2021-10-19 RX ADMIN — LAMOTRIGINE 200 MG: 100 TABLET ORAL at 19:34

## 2021-10-19 RX ADMIN — Medication 1000 MCG: at 09:40

## 2021-10-19 RX ADMIN — ESCITALOPRAM OXALATE 20 MG: 10 TABLET ORAL at 09:39

## 2021-10-19 RX ADMIN — FINASTERIDE 5 MG: 5 TABLET, FILM COATED ORAL at 09:41

## 2021-10-19 RX ADMIN — TAMSULOSIN HYDROCHLORIDE 0.4 MG: 0.4 CAPSULE ORAL at 09:43

## 2021-10-19 RX ADMIN — SIMVASTATIN 20 MG: 20 TABLET, FILM COATED ORAL at 21:41

## 2021-10-19 RX ADMIN — SODIUM CHLORIDE 1000 ML: 9 INJECTION, SOLUTION INTRAVENOUS at 02:13

## 2021-10-19 RX ADMIN — LAMOTRIGINE 200 MG: 100 TABLET ORAL at 09:40

## 2021-10-19 ASSESSMENT — ACTIVITIES OF DAILY LIVING (ADL)
ADLS_ACUITY_SCORE: 10

## 2021-10-19 ASSESSMENT — MIFFLIN-ST. JEOR: SCORE: 1549.96

## 2021-10-19 NOTE — H&P
[facility]    History and Physical - [facility]        Date of Admission:  10/19/2021    Assessment & Plan      Melo Dangelo is a 81 year old male with a history of seizure disorder, GERD, HTN, depression/anxiety, HLD, and BPH who presents with hematuria and elevated creatinine, concerning for MARYANN on CKD.    # MARYANN on  CKD:  # Hematuria:  # Proteinuria:  Cr 3.92 on presentation today. Recent 10/15 Creatinine also elevated at 3.58, but prior to this, last values in October-November 2020 were in the 1.2-1.3 range. UA not c/w rhabdo or infection, but with hematuria and proteinuria. Remote, but significant smoking history. No evidence of stones or obstruction on CT scan. No clear history to suggest pre-renal. Given IVF challenge in the ED (1 L NS), will assess response. Possible etiologies: HTN (given elevated blood pressures in the ED), NSAID use (if more than stated), causes of GN. Will evaluate for additional etiologies with labs.  - Fluid challenge in ED  - I/Os  - Labs: urine lytes, CK, Phosphorus, Hemoglobin A1c, SPEP, UPEP  - Hold eplerenone  - Nephrology consult, appreciate recs.    # Normocytic anemia:  Hgb 10.2 on presentation. Suspect in setting of hematuria, kidney disease. Prior baseline appears to be in the 11s-12s.. Folate, B12, iron studies, TBili wnl. Abs retic count inappropriately not elevated.  - LDH, Haptoglobin, MM labs    # HTN:  Reports largely normal BP's at home in the past, although admits to not checking as frequently. Elevated Cr, but no other clear signs of end-organ dysfunction on presentation.   - Consider amlodipine or other agent for additional BP control in AM.    # Atrial fibrillation:  Diagnosed in setting of hospitalization for COVID in October 2020, at which time Apixaban was restarted, with plans to possibly stop med 1+ months after discharge.  - Hold PTA apixaban    # Central Retinal Swelling :   - Hold PTA  eplerenone 25 mg qday (ordered by patient's eye doctor) in setting of MARYANN/CKD  # Seizure Disorder:   Last seizure reportedly in 2008  - PTA Lamotrigine 200 mg BID  # Depression/Anxiety: Hold PTA Buspirone 20 mg TID d/t renal function, Continue PTA Escitalopram at reduced dose (5 mg qday)  # HLD: PTA Simvastatin 20 mg at bedtime  # BPH: PTA Tamsulosin 0.4 mg qday, PTA Finasteride 5 mg qday     Diet:  Renal  DVT Prophylaxis: Pneumatic Compression Devices  Barclay Catheter: Not present  Fluids: 1L IVF down in ED  Central Lines: None  Code Status:  Full Code    Clinically Significant Risk Factors Present on Admission             # Coagulation Defect: home medication list includes an anticoagulant medication       Disposition Plan   Expected discharge:  recommended to prior living arrangement once renal function improved.     Patient will be formally staffed in the AM.    Bryon Lopez MD  [facility]  Securely message with the Vocera Web Console (learn more here)  Text page via [employer] Paging/Directory  Please see sign in/sign out for up to date coverage information  ______________________________________________________________________    Chief Complaint   Hematuria, elevated creatinine    History is obtained from the patient, chart review    History of Present Illness   Melo Dangelo is a 81 year old male with a history of seizure disorder, GERD, HTN, depression/anxiety, HLD, and BPH who presents with hematuria and elevated creatinine.    He notes several days of hematuria, mainly pinkish color without any clots. He reports a previous episode of this about 1-1.5 months ago, that quickly cleared up. When seen by his PCP on 10/15, his creatinine was noted to be markedly elevated from his baseline (3.58 compared to the low-mid 1s). The plan was to hold his Eplerenone, push fluids, and recheck BMP in 3 days. On re-check of labs on 10/18, his creatinine  was 3.85. He was sent to the ED from clinic for further evaluation of hematuria and an elevated creatinine. The hematuria has improved over the past 2 days. He has been holding the apixaban over this time.    He denies fevers, chills, headache (had one last week, though), vision changes, nausea, vomiting, SOB, chest pain, abdominal pain, bowel changes, diarrhea, melena, hematochezia, new rashes or skin changes, myalgias, lower extremity swelling, dysuria. He smoked for 15 years (1-3 ppd), quitting around 1980. Former heavy alcohol consumption, but quit 6 years ago. Denies other drug use. No known history of diabetes or prior renal problems he knows of. Denies a family history of kidney disease. Reports adequate PO intake prior to presentation. Reports taking NSAIDs, unclear how much, about 1 month ago, but denies more recent use. 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    Patient was hospitalized for COVID-19 at [facility] in October 2020. Hospital course was complicated by new diagnosis of atrial fibrillation, for which he has been taking apixaban, and MARYANN (resolved). Creatinine at the time was 1.67 on admission and 1.25 on discharge.     In the ED:  - Vitals: T 98.1, P 60s-70s, RR 17-18, BP's: 155/99 initially, up to 217/98 charted, O2 97-99 on room air  - Labs: Na 139, K 4.7, CO2 27, BUN 42, Cr 3.92. LFTs unremarkable. WBC 5.5, Hgb 10.2, Plts 161. UA with albuminuria, large blood, 84 RBCs, negative for nitrite and leuk esterase. COVID negative.  - Imaging: CT Abdomen/Pelvis without any acute abnormality, renal/ureteral calculi, or evidence of urinary obstruction.   - Interventions: 1L NS IVF bolus    Review of Systems    The 10 point Review of Systems is negative other than noted in the HPI     Past Medical History    I have reviewed this patient's medical history and updated it with pertinent information if needed.   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  History reviewed. No pertinent surgical history.     Social History   I have reviewed this patient's social history and updated it with pertinent information if needed. Melo Dangelo  reports that he has never smoked. He has never used smokeless tobacco. He reports that he does not drink alcohol and does not use drugs.    Family History     No significant family history, including no history of: kidney disease    Prior to Admission Medications   Prior to Admission Medications   Prescriptions Last Dose Informant Patient Reported? Taking?   acetaminophen (TYLENOL) 325 MG tablet   No No   Sig: Take 2 tablets (650 mg) by mouth every 6 hours as needed for mild pain   apixaban ANTICOAGULANT (ELIQUIS) 2.5 MG tablet  at Unknown time  No No   Sig: Take 2 tablets (5 mg) by mouth 2 times daily   busPIRone (BUSPAR) 5 MG tablet 10/18/2021 at Unknown time  No Yes   Sig: Take 4 tablets (20 mg) by mouth 2 times daily For additional refills, please schedule a follow-up appointment at [phone]   Patient taking differently: Take 20 mg by mouth 3 times daily For additional refills, please schedule a follow-up appointment at [phone]   cyanocobalamin (VITAMIN B-12) 100 MCG tablet   Yes No   Sig: Take 1,000 mcg by mouth daily    eplerenone (INSPRA) 25 MG tablet 10/17/2021 at Unknown time  No Yes   Sig: Take 1 tablet (25 mg) by mouth daily   escitalopram (LEXAPRO) 20 MG tablet 10/18/2021 at Unknown time  No Yes   Sig: Take 1 tablet (20 mg) by mouth daily   finasteride (PROSCAR) 5 MG tablet 10/18/2021 at Unknown time  No Yes   Sig: Take 1 tablet (5 mg) by mouth daily   lamoTRIgine (LAMICTAL) 100 MG tablet  10/18/2021 at Unknown time  No Yes   Sig: Take 2 tablets (200 mg) by mouth 2 times daily   omeprazole (PRILOSEC) 20 MG DR capsule   No No   Sig: Take 1 capsule (20 mg) by mouth every morning (before breakfast)   Patient not taking: Reported on 7/27/2021   simvastatin (ZOCOR) 20 MG tablet   No No   Sig: Take 1 tablet (20 mg) by mouth At Bedtime   tamsulosin (FLOMAX) 0.4 MG capsule 10/18/2021 at Unknown time  No Yes   Sig: Take 1 capsule (0.4 mg) by mouth daily      Facility-Administered Medications: None     Allergies   No Known Allergies    Physical Exam   Vital Signs: Temp: 98.1  F (36.7  C) Temp src: Oral BP: (!) 154/84 Pulse: 60   Resp: 18 SpO2: 93 % O2 Device: None (Room air)    Weight: 190 lbs 0 oz    General Appearance: no acute distress, appears stated age  HEENT: NC/AT, no scleral icterus, PERRL, EOMI  Respiratory: CTAB, no wheezes or crackles, no accessory muscle use  Cardiovascular: RRR, no murmurs appreciated  GI: +bowel sounds, soft, non-tender, non-distended  Skin: no concerning rashes or skin changes  Extremities: warm, no lower extremity edema  Neurologic: alert & oriented to person, place, time, situation. 5/5 gross strength in bilateral upper and lower extremities. CNII-XII grossly intact and symmetric bilaterally. Intermittently tangential and losing his train of thought.     Data   Data reviewed today: I reviewed all medications, new labs and imaging results over the last 24 hours.

## 2021-10-19 NOTE — UTILIZATION REVIEW
"  Admission Status; Secondary Review Determination         Under the authority of the Utilization Management Committee, the utilization review process indicated a secondary review on the above patient.  The review outcome is based on review of the medical records, discussions with staff, and applying clinical experience noted on the date of the review.          (x) Observation Status Appropriate - This patient does not meet hospital inpatient criteria and is placed in observation status. If this patient's primary payer is Medicare and was admitted as an inpatient, Condition Code 44 should be used and patient status changed to \"observation\".     RATIONALE FOR DETERMINATION   81-year-old man admitted to the hospital with concern for acute kidney injury, last creatinine on record was 1.2 from November 2020, he was referred for admission with elevated creatinine Cr 3.92 on presentation . Recent 10/15 Creatinine also elevated at 3.58.  Patient received IV fluid no significant change in creatinine, unclear how acute ST kidney injury, work-up for acute kidney injury including UPEP SPEP    Patient is not anuric, there is no evidence of rapid worsening in kidney function, he is hemodynamically stable, there is no concern for cardiac arrhythmia or mental status, there is no significant electrolyte abnormality such as hyperkalemia or metabolic acidosis there is no documentation of Goodpasture syndrome, vasculitis or possible need for plasmapheresis, there is no anasarca documented.    The severity of illness, intensity of service provided, expected LOS and risk for adverse outcome make the care appropriate for further observation; however, doesn't meet criteria for hospital inpatient admission. Dr Bray  notified of this determination.    This document was produced using voice recognition software.      The information on this document is developed by the utilization review team in order for the business office to ensure " compliance.  This only denotes the appropriateness of proper admission status and does not reflect the quality of care rendered.         The definitions of Inpatient Status and Observation Status used in making the determination above are those provided in the CMS Coverage Manual, Chapter 1 and Chapter 6, section 70.4.      Sincerely,     JANET ASHLEY MD    System Medical Director  Utilization Management  [facility].

## 2021-10-19 NOTE — ED PROVIDER NOTES
[place] EMERGENCY DEPARTMENT ([facility])  10/18/21    History     Chief Complaint   Patient presents with     Abnormal Labs     Hematuria     HPI  Melo Dangelo is a 81 year old male with PMH significant for seizure disorder, CKD, depression, and anxiety who presents to the Emergency Department for evaluation of hematuria and increased creatinine of 3.85.  Patient states that the urine may have some slight hematuria, but is much improved since it was noticed 2 days ago.  Patient reports he is on Eliquis and was instructed to hold this.  He indicates that 4 days ago he started noticing fatigue.  He indicates he is still urinating.  He denies dysuria, abdominal pain, flank pain, nausea, vomiting, or diarrhea.  He denies fever, chills, chest pain, cough, or shortness of breath.  He indicates that he is on Eliquis following COVID-19 hospitalization.    Per review of the chart, patient was admitted to [facility] from 10/29/2020-11/04/2020 for pneumonia due to COVID-19, acute hypoxic respiratory failure, new diagnosis of atrial fibrillation, MARYANN resolved, and CKD stage III.  Patient started on Eliquis following the new diagnosis of A. fib.  Creatinine was 1.67 on admission, and a 1.25 on discharge.    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 No pertinent surgical history.  busPIRone (BUSPAR) 5 MG tablet  eplerenone (INSPRA) 25 MG tablet  escitalopram (LEXAPRO) 20 MG tablet  finasteride (PROSCAR) 5 MG tablet  lamoTRIgine (LAMICTAL) 100 MG tablet  tamsulosin (FLOMAX) 0.4 MG capsule  acetaminophen (TYLENOL) 325 MG tablet  apixaban ANTICOAGULANT (ELIQUIS) 2.5 MG tablet  cyanocobalamin (VITAMIN B-12) 100 MCG  "tablet  omeprazole (PRILOSEC) 20 MG DR capsule  simvastatin (ZOCOR) 20 MG tablet      No Known Allergies  Past medical history, past surgical history, medications, and allergies were reviewed with the patient. Additional pertinent items: None    Family History  Family History   Problem Relation Age of Onset     Glaucoma No family hx of      Macular Degeneration No family hx of      Family history was reviewed with the patient. Additional pertinent items: None    Social History  Social History     Tobacco Use     Smoking status: Never Smoker     Smokeless tobacco: Never Used   Substance Use Topics     Alcohol use: No     Drug use: No      Social history was reviewed with the patient. Additional pertinent items: None      Review of Systems  A complete review of systems was performed with pertinent positives and negatives noted in the HPI, and all other systems negative.    Physical Exam   BP: (!) 155/99  Pulse: 69  Temp: 98.1  F (36.7  C)  Resp: 17  Height: 180.3 cm (5' 11\")  Weight: 86.2 kg (190 lb)  SpO2: 97 %  Physical Exam  Constitutional:       General: He is not in acute distress.     Appearance: Normal appearance.   HENT:      Head: Normocephalic and atraumatic.      Mouth/Throat:      Mouth: Mucous membranes are moist.   Eyes:      Extraocular Movements: Extraocular movements intact.      Pupils: Pupils are equal, round, and reactive to light.   Cardiovascular:      Rate and Rhythm: Normal rate and regular rhythm.      Pulses: Normal pulses.   Pulmonary:      Effort: Pulmonary effort is normal.      Breath sounds: Normal breath sounds.   Abdominal:      General: Abdomen is flat.      Palpations: Abdomen is soft.      Tenderness: There is no abdominal tenderness. There is no guarding or rebound.   Musculoskeletal:         General: Normal range of motion.      Cervical back: Normal range of motion.   Skin:     General: Skin is warm.   Neurological:      General: No focal deficit present.      Mental Status: He " is alert and oriented to person, place, and time.         ED Course      Procedures   9:35 PM  The patient was seen and examined by Good Bell MD in Room ED26.           Results for orders placed or performed during the hospital encounter of 10/18/21   Abd/pelvis CT no contrast - Stone Protocol     Status: None    Narrative    EXAM: CT ABDOMEN AND PELVIS WITHOUT CONTRAST - RENAL STONE PROTOCOL  LOCATION: [facility]  DATE/TIME: 10/18/2021 10:17 PM    INDICATION: Hematuria.  COMPARISON: None.    TECHNIQUE: CT scan of the abdomen and pelvis was performed without oral or IV contrast. Multiplanar reformats were obtained. Dose reduction techniques were used.  CONTRAST: None.    FINDINGS:    LOWER CHEST: Coronary artery calcification.    HEPATOBILIARY: Unremarkable.    SPLEEN: Unremarkable.    PANCREAS: Unremarkable.    ADRENAL GLANDS: Unremarkable.    KIDNEYS/BLADDER: No renal or ureteral calculi. No dilatation of the intrarenal collecting systems or ureters. No visualized bladder calculi.    BOWEL: Possible tiny hiatal hernia. Normal appendix.    LYMPH NODES: Unremarkable.    PELVIC ORGANS: No acute findings.    MUSCULOSKELETAL: No acute findings.    OTHER: Atherosclerotic calcification in the abdominal aorta.      Impression    IMPRESSION:   1. No acute abnormality identified in the abdomen or pelvis.  2. No renal or ureteral calculi or evidence of urinary obstruction.    Basic metabolic panel     Status: Abnormal   Result Value Ref Range    Sodium 139 133 - 144 mmol/L    Potassium 4.7 3.4 - 5.3 mmol/L    Chloride 108 94 - 109 mmol/L    Carbon Dioxide (CO2) 27 20 - 32 mmol/L    Anion Gap 4 3 - 14 mmol/L    Urea Nitrogen 42 (H) 7 - 30 mg/dL    Creatinine 3.92 (H) 0.66 - 1.25 mg/dL    Calcium 9.1 8.5 - 10.1 mg/dL    Glucose 86 70 - 99 mg/dL    GFR Estimate 13 (L) >60 mL/min/1.73m2   UA with Microscopic reflex to Culture     Status: Abnormal    Specimen: Urine, Midstream    Result Value Ref Range    Color Urine Straw Colorless, Straw, Light Yellow, Yellow    Appearance Urine Clear Clear    Glucose Urine Negative Negative mg/dL    Bilirubin Urine Negative Negative    Ketones Urine Negative Negative mg/dL    Specific Gravity Urine 1.009 1.003 - 1.035    Blood Urine Large (A) Negative    pH Urine 5.5 5.0 - 7.0    Protein Albumin Urine 20  (A) Negative mg/dL    Urobilinogen Urine Normal Normal, 2.0 mg/dL    Nitrite Urine Negative Negative    Leukocyte Esterase Urine Negative Negative    RBC Urine 84 (H) <=2 /HPF    WBC Urine 1 <=5 /HPF    Transitional Epithelials Urine 4 (H) <=1 /HPF    Narrative    Urine Culture not indicated   CBC with platelets and differential     Status: Abnormal   Result Value Ref Range    WBC Count 5.5 4.0 - 11.0 10e3/uL    RBC Count 3.31 (L) 4.40 - 5.90 10e6/uL    Hemoglobin 10.2 (L) 13.3 - 17.7 g/dL    Hematocrit 30.8 (L) 40.0 - 53.0 %    MCV 93 78 - 100 fL    MCH 30.8 26.5 - 33.0 pg    MCHC 33.1 31.5 - 36.5 g/dL    RDW 13.5 10.0 - 15.0 %    Platelet Count 161 150 - 450 10e3/uL    % Neutrophils 51 %    % Lymphocytes 29 %    % Monocytes 17 %    % Eosinophils 2 %    % Basophils 1 %    % Immature Granulocytes 0 %    NRBCs per 100 WBC 0 <1 /100    Absolute Neutrophils 2.8 1.6 - 8.3 10e3/uL    Absolute Lymphocytes 1.6 0.8 - 5.3 10e3/uL    Absolute Monocytes 0.9 0.0 - 1.3 10e3/uL    Absolute Eosinophils 0.1 0.0 - 0.7 10e3/uL    Absolute Basophils 0.1 0.0 - 0.2 10e3/uL    Absolute Immature Granulocytes 0.0 <=0.0 10e3/uL    Absolute NRBCs 0.0 10e3/uL   Hepatic panel     Status: Normal   Result Value Ref Range    Bilirubin Total 0.3 0.2 - 1.3 mg/dL    Bilirubin Direct <0.1 0.0 - 0.2 mg/dL    Protein Total 7.5 6.8 - 8.8 g/dL    Albumin 3.9 3.4 - 5.0 g/dL    Alkaline Phosphatase 70 40 - 150 U/L    AST 16 0 - 45 U/L    ALT 20 0 - 70 U/L   CBC with platelets differential     Status: Abnormal    Narrative    The following orders were created for panel order CBC with platelets  differential.  Procedure                               Abnormality         Status                     ---------                               -----------         ------                     CBC with platelets and d...[[id number]]  Abnormal            Final result                 Please view results for these tests on the individual orders.   Results for orders placed or performed in visit on 10/18/21   Folate     Status: Normal   Result Value Ref Range    Folic Acid 23.6 >=5.4 ng/mL   Basic metabolic panel     Status: Abnormal   Result Value Ref Range    Sodium 138 133 - 144 mmol/L    Potassium 4.5 3.4 - 5.3 mmol/L    Chloride 108 94 - 109 mmol/L    Carbon Dioxide (CO2) 27 20 - 32 mmol/L    Anion Gap 3 3 - 14 mmol/L    Urea Nitrogen 40 (H) 7 - 30 mg/dL    Creatinine 3.85 (H) 0.66 - 1.25 mg/dL    Calcium 8.8 8.5 - 10.1 mg/dL    Glucose 91 70 - 99 mg/dL    GFR Estimate 14 (L) >60 mL/min/1.73m2     Medications   0.9% sodium chloride BOLUS (has no administration in time range)        Assessments & Plan (with Medical Decision Making)   Patient referred by primary care physician for abnormal outpatient labs and hematuria.  Patient denies any abdominal pain dysuria or GI complaints.  He reports that his hematuria has started to improve while holding Eliquis.  He denies any recent NSAID use.  Discussed that his creatinine is close to 4, unsure how recent this is as this was found to be elevated over the last week on outpatient labs.  Previously when he had MARYANN his creatinine has been in the 1.5 range when he was admitted last year for Covid.  He states that he still is urinating.  He does not have any abdominal tenderness on exam.  Repeat labs show his worsening kidney function.  CT imaging was obtained no signs of obstruction or stone.  No signs for urinary infection.  Discussed the results with the patient plan is for admission for further evaluation for the MARYANN on CKD.  Patient was discussed with medicine who agreed.    I  have reviewed the nursing notes. I have reviewed the findings, diagnosis, plan and need for follow up with the patient.    New Prescriptions    No medications on file       Final diagnoses:   Acute kidney injury (H)   Hematuria, unspecified type     I, Clay Markham, am serving as a trained medical scribe to document services personally performed by Good Bell MD, based on the provider's statements to me.     I, Good Bell MD, was physically present and have reviewed and verified the accuracy of this note documented by Clay Markham.    --  Good Bell MD  [facility] EMERGENCY DEPARTMENT  10/18/2021     Good Bell MD  10/18/21 [id number]

## 2021-10-19 NOTE — PLAN OF CARE
"Focus: Admit Observation    Data: Pt came from ED and is being admitted Acute kidney injury  under observation status. Monitoring his renal function. Today Creatinine level 3.80.     Intervention: Written literature \"What is observation\" given to pt. MD notified.     Assessment: Pt doing fine. Making yellow color urine.     Plan: Waiting for nephrology consult.   "

## 2021-10-19 NOTE — PROGRESS NOTES
Resident/Fellow Attestation   I, Javan Zavala, was present with the medical/KASSANDRA student who participated in the service and in the documentation of the note.  I have verified the history and personally performed the physical exam and medical decision making.  I agree with the assessment and plan of care as documented in the note.      Melo Dangelo is a 81 year old male with a history of seizure disorder, GERD, HTN, depression/anxiety, HLD, and BPH who presents with hematuria and elevated creatinine, proteinuria, concerning for MARYANN on CKD likely due to AGN    Javan Zavala MD  PGY1  Date of Service (when I saw the patient): 10/19/21    [facility]    Progress Note - Maroon 1 Service        Date of Admission:  10/18/2021    Assessment & Plan           Melo Dangelo is a 81 year old male with a history of seizure disorder, GERD, HTN, depression/anxiety, HLD, and BPH who presents with hematuria and elevated creatinine, concerning for MARYANN on CKD.    Today:   - Nephrology consult  - JOSE, ANCA, Anti- GBM, C3/C4, HIV pending  - Peripheral morphology.  - Transferrin/Ferritin pending  - PRN antihypertensive    # MARYANN on CKD  #Concern for AGN  # Hematuria:   # Proteinuria:  Cr 3.92 on presentation to the ED. Recent 10/15 Creatinine also elevated at 3.58, but prior to this, last values in October-November 2020 were in the 1.2-1.3 range. UA not c/w rhabdo or infection, but with hematuria and proteinuria. No evidence of stones or obstruction on CT scan and without CVA tenderness. He denies any issues with oral intake or decreased void volumes, making pre and post-renal etiologies less likely. Patient received 1 L IVF while in ED, with no real improvement in Cr. Most recent of 3.8 on 10/19. Phos elevated at 4.6, ionized calcium wnl, protein urine/creatinine ratio elevated at 0.69.  Calculated FeNa >2, consistent with intrinsic MARYANN. Differential includes interstitial nephritis due  to NSAID use or microthrombi vs most likely GN. Thrombotic etiologies are less likely given pt's history of Eliquis use. Workup including SPEP, JOSE, ANCA, Anti-GBM, and C3/C4 pending to evaluate for causes of GN. Suspect patient's hypertension is likely secondary to possible glomerulonephritis, will continue to monitor. Plan to consult nephrology, awaiting further recommendations.    - Nephrology consult, appreciate recs  - 1 L. IVF in ED.  - I/Os  - Labs: SPEP, JOSE, ANCA, Anti-GBM, C3/C4, Hep B and C antibodies pending   - Hold eplerenone     # Normocytic anemia:  Hgb 10.2 on presentation with most recent of 9.7 on 10/19. Suspect in setting of hematuria, likely 2/2 kidney disease. Prior baseline appears to be in the 11s-12s.. Folate, B12, iron studies, TBili wnl. Abs retic count inappropriately not elevated. LDH and haptoglobin wnl, making hemolysis less likely. Plan to further evaluate with Transferrin, Ferritin, and blood smear.     - Transferrin, Ferritin, and Blood Smear labs pending  - Daily CBC  - Transfuse if Hb <7     # HTN:  Reports largely normal BP's at home in the past, although admits to not checking as frequently. Elevated Cr, but no other clear signs of end-organ dysfunction on presentation. Suspect secondary to intrinsic MARYANN.  - PRN Labetalol 20 mg if SBP >180    # Atrial fibrillation:  Diagnosed in setting of hospitalization for COVID in October 2020, at which time Apixaban was restarted, with plans to possibly stop med 1+ months after discharge.  - Hold PTA apixaban     # Central Retinal Swelling :   - Hold PTA eplerenone 25 mg qday (ordered by patient's eye doctor) in setting of MARYANN/CKD    # Seizure Disorder:   Last seizure reportedly in 2008  - PTA Lamotrigine 200 mg BID    # Depression/Anxiety: Hold PTA Buspirone 20 mg TID d/t renal function, Continue PTA Escitalopram at reduced dose (5 mg qday)    # HLD: PTA Simvastatin 20 mg at bedtime  # BPH: PTA Tamsulosin 0.4 mg qday, PTA Finasteride 5 mg  qday         Diet: Renal Diet (non-dialysis)    DVT Prophylaxis: Pneumatic Compression Devices  Barclay Catheter: Not present  Fluids: 1L IVF down in ED  Central Lines: None  Code Status:   Full Code    Disposition Plan   Expected discharge: 10/22/2021   recommended to prior living arrangement once renal function improved.     The patient's care was discussed with the Attending Physician, Dr. Bray.    Cedrick [facility]  Medical Student  [address]  Securely message with the Vocera Web Console (learn more here)  Text page via [employer] Paging/Directory  Please see sign in/sign out for up to date coverage information    ______________________________________________________________________    Interval History   No acute events overnight. The patient states that his hematuria has resolved. He states he has some mild tenderness over his right flank. When obtaining additional history, the patient states that he was feeling fatigued during the same time that he noticed the hematuria, but is feeling improved today. He denies any recent illness, sore throat, or cough. He has had no chest pain, shortness of breath, or abdominal pain.     Data reviewed today: I reviewed all medications, new labs and imaging results over the last 24 hours.    Physical Exam   Vital Signs: Temp: (!) 96.3  F (35.7  C) Temp src: Oral BP: (!) 150/67 Pulse: 74   Resp: 18 SpO2: 96 % O2 Device: None (Room air)    Weight: 181 lbs 6.4 oz  General Appearance: Well appearing. Pleasant. Laying in bed. NAD  HEENT: No conjunctival icterus. Moist mucus membranes.  Respiratory: Clear to auscultation bilaterally.  Cardiovascular: Regular rate and rhythm. No murmurs, rubs, or gallops.  GI: Soft, non-distended, non-tender. No rebound or guarding.  Skin: No rashes to exposed skin areas. No jaundice.  Neuro: Alert and oriented x 3.    Data   Recent Labs   Lab 10/19/21  0552 10/18/21  1832 10/18/21  1302  10/15/21  1023 10/15/21  1023   WBC 5.1 5.5  --   --  5.3   HGB 9.7* 10.2*  --   --  10.9*   MCV 92 93  --   --  91    161  --   --  179   INR 1.09  --   --   --   --     139 138   < > 139   POTASSIUM 4.3 4.7 4.5   < > 4.8   CHLORIDE 111* 108 108   < > 108   CO2 23 27 27   < > 24   BUN 42* 42* 40*   < > 36*   CR 3.80* 3.92* 3.85*   < > 3.58*   ANIONGAP 7 4 3   < > 7   GIANNI 8.6 9.1 8.8   < > 9.1   GLC 80 86 91   < > 100*   ALBUMIN  --  3.9  --   --  4.0   PROTTOTAL  --  7.5  --   --  7.8   BILITOTAL  --  0.3  --   --  0.4   ALKPHOS  --  70  --   --  82   ALT  --  20  --   --  21   AST  --  16  --   --  19    < > = values in this interval not displayed.     Recent Results (from the past 24 hour(s))   Abd/pelvis CT no contrast - Stone Protocol    Narrative    EXAM: CT ABDOMEN AND PELVIS WITHOUT CONTRAST - RENAL STONE PROTOCOL  LOCATION: [facility]  DATE/TIME: 10/18/2021 10:17 PM    INDICATION: Hematuria.  COMPARISON: None.    TECHNIQUE: CT scan of the abdomen and pelvis was performed without oral or IV contrast. Multiplanar reformats were obtained. Dose reduction techniques were used.  CONTRAST: None.    FINDINGS:    LOWER CHEST: Coronary artery calcification.    HEPATOBILIARY: Unremarkable.    SPLEEN: Unremarkable.    PANCREAS: Unremarkable.    ADRENAL GLANDS: Unremarkable.    KIDNEYS/BLADDER: No renal or ureteral calculi. No dilatation of the intrarenal collecting systems or ureters. No visualized bladder calculi.    BOWEL: Possible tiny hiatal hernia. Normal appendix.    LYMPH NODES: Unremarkable.    PELVIC ORGANS: No acute findings.    MUSCULOSKELETAL: No acute findings.    OTHER: Atherosclerotic calcification in the abdominal aorta.      Impression    IMPRESSION:   1. No acute abnormality identified in the abdomen or pelvis.  2. No renal or ureteral calculi or evidence of urinary obstruction.

## 2021-10-20 ENCOUNTER — PRE VISIT (OUTPATIENT)
Dept: UROLOGY | Facility: CLINIC | Age: 81
End: 2021-10-20

## 2021-10-20 PROBLEM — R09.02 HYPOXIA: Status: RESOLVED | Noted: 2020-10-27 | Resolved: 2021-10-20

## 2021-10-20 LAB
ANA SER QL IF: NEGATIVE
ANCA AB PATTERN SER IF-IMP: NORMAL
ANION GAP SERPL CALCULATED.3IONS-SCNC: 8 MMOL/L (ref 3–14)
BUN SERPL-MCNC: 50 MG/DL (ref 7–30)
C-ANCA TITR SER IF: NORMAL {TITER}
C3 SERPL-MCNC: 115 MG/DL (ref 81–157)
C4 SERPL-MCNC: 31 MG/DL (ref 13–39)
CALCIUM SERPL-MCNC: 8.7 MG/DL (ref 8.5–10.1)
CHLORIDE BLD-SCNC: 110 MMOL/L (ref 94–109)
CO2 SERPL-SCNC: 23 MMOL/L (ref 20–32)
CREAT SERPL-MCNC: 3.85 MG/DL (ref 0.66–1.25)
ERYTHROCYTE [DISTWIDTH] IN BLOOD BY AUTOMATED COUNT: 13.3 % (ref 10–15)
GFR SERPL CREATININE-BSD FRML MDRD: 14 ML/MIN/1.73M2
GLUCOSE BLD-MCNC: 84 MG/DL (ref 70–99)
HCT VFR BLD AUTO: 28.1 % (ref 40–53)
HGB BLD-MCNC: 9.3 G/DL (ref 13.3–17.7)
LAMOTRIGINE SERPL-MCNC: 6.8 UG/ML
MCH RBC QN AUTO: 30.5 PG (ref 26.5–33)
MCHC RBC AUTO-ENTMCNC: 33.1 G/DL (ref 31.5–36.5)
MCV RBC AUTO: 92 FL (ref 78–100)
PATH REPORT.COMMENTS IMP SPEC: NORMAL
PATH REPORT.COMMENTS IMP SPEC: NORMAL
PATH REPORT.FINAL DX SPEC: NORMAL
PATH REPORT.MICROSCOPIC SPEC OTHER STN: NORMAL
PATH REPORT.MICROSCOPIC SPEC OTHER STN: NORMAL
PATH REPORT.RELEVANT HX SPEC: NORMAL
PLATELET # BLD AUTO: 162 10E3/UL (ref 150–450)
POTASSIUM BLD-SCNC: 4.4 MMOL/L (ref 3.4–5.3)
PTH-INTACT SERPL-MCNC: 78 PG/ML (ref 18–80)
RBC # BLD AUTO: 3.05 10E6/UL (ref 4.4–5.9)
SODIUM SERPL-SCNC: 141 MMOL/L (ref 133–144)
WBC # BLD AUTO: 6 10E3/UL (ref 4–11)

## 2021-10-20 PROCEDURE — 250N000013 HC RX MED GY IP 250 OP 250 PS 637: Performed by: STUDENT IN AN ORGANIZED HEALTH CARE EDUCATION/TRAINING PROGRAM

## 2021-10-20 PROCEDURE — 999N000128 HC STATISTIC PERIPHERAL IV START W/O US GUIDANCE

## 2021-10-20 PROCEDURE — 99214 OFFICE O/P EST MOD 30 MIN: CPT | Performed by: PHYSICIAN ASSISTANT

## 2021-10-20 PROCEDURE — 83970 ASSAY OF PARATHORMONE: CPT | Performed by: INTERNAL MEDICINE

## 2021-10-20 PROCEDURE — G0378 HOSPITAL OBSERVATION PER HR: HCPCS

## 2021-10-20 PROCEDURE — 99226 PR SUBSEQUENT OBSERVATION CARE,LEVEL III: CPT | Mod: GC | Performed by: STUDENT IN AN ORGANIZED HEALTH CARE EDUCATION/TRAINING PROGRAM

## 2021-10-20 PROCEDURE — 80048 BASIC METABOLIC PNL TOTAL CA: CPT

## 2021-10-20 PROCEDURE — 99207 PR CDG-CODE CATEGORY CHANGED: CPT | Performed by: STUDENT IN AN ORGANIZED HEALTH CARE EDUCATION/TRAINING PROGRAM

## 2021-10-20 PROCEDURE — 85018 HEMOGLOBIN: CPT

## 2021-10-20 PROCEDURE — 36415 COLL VENOUS BLD VENIPUNCTURE: CPT

## 2021-10-20 RX ADMIN — TAMSULOSIN HYDROCHLORIDE 0.4 MG: 0.4 CAPSULE ORAL at 08:59

## 2021-10-20 RX ADMIN — LAMOTRIGINE 200 MG: 100 TABLET ORAL at 19:25

## 2021-10-20 RX ADMIN — ESCITALOPRAM OXALATE 20 MG: 10 TABLET ORAL at 08:59

## 2021-10-20 RX ADMIN — SIMVASTATIN 20 MG: 20 TABLET, FILM COATED ORAL at 21:25

## 2021-10-20 RX ADMIN — Medication 1000 MCG: at 08:59

## 2021-10-20 RX ADMIN — LAMOTRIGINE 200 MG: 100 TABLET ORAL at 08:59

## 2021-10-20 RX ADMIN — FINASTERIDE 5 MG: 5 TABLET, FILM COATED ORAL at 08:59

## 2021-10-20 ASSESSMENT — MIFFLIN-ST. JEOR: SCORE: 1529.54

## 2021-10-20 NOTE — UTILIZATION REVIEW
"  Admission Status; Secondary Review Determination         Under the authority of the Utilization Management Committee, the utilization review process indicated a secondary review on the above patient.  The review outcome is based on review of the medical records, discussions with staff, and applying clinical experience noted on the date of the review.       (x) Observation Status Appropriate - This patient does not meet hospital inpatient criteria and is placed in observation status. If this patient's primary payer is Medicare and was admitted as an inpatient, Condition Code 44 should be used and patient status changed to \"observation\".     RATIONALE FOR DETERMINATION     Admission Status; Secondary Review Determination         Under the authority of the Utilization Management Committee, the utilization review process indicated a secondary review on the above patient.  The review outcome is based on review of the medical records, discussions with staff, and applying clinical experience noted on the date of the review.        ()      Inpatient Status Appropriate - This patient's medical care is consistent with medical management for inpatient care and reasonable inpatient medical practice.      () Observation Status Appropriate - This patient does not meet hospital inpatient criteria and is placed in observation status. If this patient's primary payer is Medicare and was admitted as an inpatient, Condition Code 44 should be used and patient status changed to \"observation\".   () Admission Status NOT Appropriate - This patient's medical care is not consistent with medical management for Inpatient or Observation Status.          RATIONALE FOR DETERMINATION   The patient is a 81-year-old male admitted on 10/18/2021.  The patient came to the emergency room because he had slight hematuria 2 days ago and some fatigue.  He did have COVID-19 in October 2020.  Patient was noted on evaluation to have a creatinine of 3.92.  " Creatinine is 3.85 today.  Patient had a utilization review done yesterday which determined and recommended observation status based on the patient being able to continue to avoid been stable creatinine at this time.  Initial labs and work-up do not point to a specific cause at this time for his acute renal failure.  The patient's baseline for creatinine is in the mid to lower 100s.  Oxygenation and vitals and other labs appear to be normal at this time.  The patient likely will require follow-up as an outpatient for further renal testing.  Urology was consulted and determined that there was no obstructive situation leading to elevated creatinine.  Based on no significant change in patient information and care since yesterday, recommend continuation of observation status.  If there are other issues that arise that require acute hospital management, recommend an additional review at that time.  Likely the patient will be discharged tomorrow if he continues to be stable..      The severity of illness, intensity of service provided, expected LOS and risk for adverse outcome make the care complex, high risk and appropriate for hospital admission.        The information on this document is developed by the utilization review team in order for the business office to ensure compliance.  This only denotes the appropriateness of proper admission status and does not reflect the quality of care rendered.         The definitions of Inpatient Status and Observation Status used in making the determination above are those provided in the CMS Coverage Manual, Chapter 1 and Chapter 6, section 70.4.      Sincerely,     Jorge Irvin MD  Physician Advisor  Utilization Review/ Case Management  [facility].        The severity of illness, intensity of service provided, expected LOS and risk for adverse outcome make the care complex, high risk and appropriate for hospital admission.        The information on this document is  developed by the utilization review team in order for the business office to ensure compliance.  This only denotes the appropriateness of proper admission status and does not reflect the quality of care rendered.         The definitions of Inpatient Status and Observation Status used in making the determination above are those provided in the CMS Coverage Manual, Chapter 1 and Chapter 6, section 70.4.      Sincerely,     Jorge Irvin MD  Physician Advisor  Utilization Review/ Case Management  [facility].

## 2021-10-20 NOTE — PROGRESS NOTES
Focus: Observation goal  -Observation goal: Improve renal function    -Observation goal not met. Creatinine level this am 3.85. Pt waiting for nephrology and urology consult recommendation if anything can be done outpatient or does pt need to remain hospitalized. No maint. IV fluid infusion needed per primary team as MARYANN not related to dehydration.

## 2021-10-20 NOTE — CONSULTS
"Care Management Follow Up    Length of Stay (days): 1    Expected Discharge Date: 10/22/2021     Concerns to be Addressed: GARZA document       Patient plan of care discussed at interdisciplinary rounds: Yes    Anticipated Discharge Disposition: Home      Anticipated Discharge Services: None  Anticipated Discharge DME: N/A    Patient/family educated on Medicare website which has current facility and service quality ratings: N/A  Education Provided on the Discharge Plan: Yes   Patient/Family in Agreement with the Plan: Yes     Referrals Placed by CM/SW: N/A   Private pay costs discussed: insurance costs out of pocket expenses    Additional Information:    Patient was admitted under observation for hematuria and elevated creatinine, concerning for MARYANN on CKD. Per team, nephrology consulted. Pending nephrology plan, patient may discharge home today, with OP follow-up.     Patient flagged as needing \"MOON\" document as patient was admitted under observation status and has Medicare insurance.     Writer met with the patient to discuss \"MOON\" document. Patient stated understanding and signed document. Document placed in patients chart and copy provided to the patient. Patient had no questions or concerns at this time.     Candy Cohen, RN, BSN, PHN  Care Coordinator   P: [phone], [facility]             "

## 2021-10-20 NOTE — PLAN OF CARE
Focus: Observation goal  -Observation goal: Improve renal function    -Observation goal not met. Creatinine level this am 3.85. Pt waiting for nephrology and urology consult recommendation if anything can be done outpatient or does pt need to remain hospitalized.

## 2021-10-20 NOTE — PLAN OF CARE
[id number]    VSS on RA. Denies pain and nausea. Up independently to void. Continues to void clear, yellow urine. Pt is observation status, see observation goals notes. Continue with plan of care.

## 2021-10-20 NOTE — PROGRESS NOTES
Observation goals:      Improved renal function: Not met. Pt is still voiding clear, yellow urine. Most recent creatinine level is 3.80.           Will continue to monitor.

## 2021-10-20 NOTE — TELEPHONE ENCOUNTER
Reason for visit: cystoscopy      Dx/Hx/Sx: hematuria, BPH    Records/imaging/labs/orders: in epic     At Rooming: collect urine for cytology

## 2021-10-20 NOTE — PLAN OF CARE
[id number]:        Observation assessment. Pt is voiding clear, yellow urine output without difficulty. The goal for pt Creatinine is 1-2. Creatinine this morning was 8.80.     Pt is alert and oriented x 4. Vital signs stable. Afebrile. Pt denies nausea/vomiting/shortness of breath and pain. Pt code status updated. Pt is SBA; writer and pt went for a walked x 2 on evening. Pt has PIV on the LA that is saline locked. Ate all of his dinner. No BM this shift. Continue with POC.

## 2021-10-20 NOTE — PROGRESS NOTES
[facility]    Progress Note - Maroon 1 Service        Date of Admission:  10/18/2021    Assessment & Plan           Melo Dangelo is a 81 year old male with a history of seizure disorder, GERD, HTN, depression/anxiety, HLD, and BPH who presents with hematuria and elevated creatinine, concerning for MARYANN on CKD.    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 Recent 10/15 Creatinine also elevated at 3.58, but prior to this, last values in October-November 2020 were in the 1.2-1.3 range., hematuria and proteinuria. No evidence of stones or obstruction on CT scan and without CVA tenderness. He denies any issues with oral intake or decreased void volumes, making pre and post-renal etiologies less likely. Patient received 1 L IVF while in ED, with no real improvement in Cr. Most recent of 3.8 on 10/19 and 3.85 on 10/20. Phos elevated at 4.6, ionized calcium wnl, protein urine/creatinine ratio elevated at 0.69.  Calculated FeNa >2, consistent with intrinsic MARYANN. JOSE, ANCA neg. Hep screen neg.   Normal C3, C4. Hapto noraml Concern or IGA-nephropathy gross hematuria occurred following COVID vaccination     Nephrology consult, considered to check urine centrifuged,suggested cystoscopy before considering renal biops.    Urology consulted for cystoscope, to be done as outpatient        # HTN:  Reports largely normal BP's at home in the past, although admits to not checking as frequently. Elevated Cr, but no other clear signs of end-organ dysfunction on presentation. Likely 2/2 AGN    PRN Labetalol 20 mg if SBP >180    Consider CCB if high BP persists      # Normocytic anemia  likey 2/2 CKD    # Atrial fibrillation:  Diagnosed in setting of hospitalization for COVID in October 2020, at which time Apixaban was restarted, with plans to possibly stop med 1+ months after discharge. Chads Vasc score 3    Hold PTA apixaban    Consdier rhythm rhythm monitorin    # Central Retinal Swelling :   - Hold PTA eplerenone 25 mg qday (ordered by patient's eye doctor) in setting of MARYANN/CKD    # Seizure Disorder:   Last seizure reportedly in 2008  - PTA Lamotrigine 200 mg BID    # Depression/Anxiety: Hold PTA Buspirone 20 mg TID d/t renal function, Continue PTA Escitalopram at reduced dose (5 mg qday)    # HLD: PTA Simvastatin 20 mg at bedtime  # BPH: PTA Tamsulosin 0.4 mg qday, PTA Finasteride 5 mg qday         Diet: Renal Diet (non-dialysis)    DVT Prophylaxis: Pneumatic Compression Devices  Barclay Catheter: Not present  Fluids: 1L IVF down in ED  Central Lines: None  Code Status: Full Code Full Code    Disposition Plan   Expected discharge: Possibly on 10/21/2021   recommended to prior living arrangement.     The patient's care was discussed with the Attending Physician, Dr. Bray.    Javan Zavala MD  PGY1  Maroon 1 Service  [phone]  [facility]  Securely message with the Vocera Web Console (learn more here)  Text page via [employer] Paging/Directory  Please see sign in/sign out for up to date coverage information    ______________________________________________________________________    Interval History   No acute events overnight. The patient states that his hematuria has resolved.     Data reviewed today: I reviewed all medications, new labs and imaging results over the last 24 hours.    Physical Exam   Vital Signs: Temp: 97.7  F (36.5  C) Temp src: Oral BP: (!) 167/67 Pulse: 72   Resp: 16 SpO2: 92 % O2 Device: None (Room air)    Weight: 176 lbs 14.4 oz  General Appearance: Well appearing. Pleasant. Laying in bed. NAD  HEENT: No conjunctival icterus. Moist  mucus membranes.  Respiratory: Clear to auscultation bilaterally.  Cardiovascular: Regular rate and rhythm. Systolic murmur, with P2 accentuation.  GI: Soft, non-distended, non-tender. No rebound or guarding.  MONY: mild right CVAT  Skin: No rashes to exposed skin areas. No jaundice.  Neuro: Alert and oriented x 3.    Data   Recent Labs   Lab 10/20/21  0646 10/19/21  1442 10/19/21  0552 10/18/21  1832 10/18/21  1832 10/18/21  1302 10/15/21  1023   WBC 6.0 5.2 5.1   < > 5.5  --  5.3   HGB 9.3* 10.1* 9.7*   < > 10.2*  --  10.9*   MCV 92 93 92   < > 93  --  91    169 161   < > 161  --  179   INR  --   --  1.09  --   --   --   --      --  141  --  139   < > 139   POTASSIUM 4.4  --  4.3  --  4.7   < > 4.8   CHLORIDE 110*  --  111*  --  108   < > 108   CO2 23  --  23  --  27   < > 24   BUN 50*  --  42*  --  42*   < > 36*   CR 3.85*  --  3.80*  --  3.92*   < > 3.58*   ANIONGAP 8  --  7  --  4   < > 7   GIANNI 8.7  --  8.6  --  9.1   < > 9.1   GLC 84  --  80  --  86   < > 100*   ALBUMIN  --   --   --   --  3.9  --  4.0   PROTTOTAL  --   --   --   --  7.5  --  7.8   BILITOTAL  --   --   --   --  0.3  --  0.4   ALKPHOS  --   --   --   --  70  --  82   ALT  --   --   --   --  20  --  21   AST  --   --   --   --  16  --  19    < > = values in this interval not displayed.     No results found for this or any previous visit (from the past 24 hour(s)).

## 2021-10-20 NOTE — PROGRESS NOTES
Observation goals:     Improved renal function: Not met. Pt is voiding clear, yellow urine. Most recent creatinine level is 3.80.         Will continue to monitor.

## 2021-10-20 NOTE — PLAN OF CARE
Focus: Observation goal  -Observation goal: Improve renal function     -Observation goal not met. Creatinine level this morning was 3.85. Pt is staying overnight at the hospital.

## 2021-10-20 NOTE — CONSULTS
Nephrology Initial Consult  October 19, 2021      Melo Dangelo MRN:[id number] YOB: 1940  Date of Admission:10/18/2021  Primary care provider: Francis Smith  Requesting physician: Khris Bray    ASSESSMENT AND RECOMMENDATIONS:     # Non oliguric MARYANN on CKD stage 3a likely secondary to acute tubular injury   # Tubular range proteinuria  # Hematuria    - Baseline creatinine ~1.2 -1.3  - Check complement level,  JOSE, ANCA, serum immunofixation  - Follow daily renal panel  - Stability of serum creatinine on repeat draw is reassuring, though will continue to monitor trend closely  - UA - 84 RBC, 1 WBC.   - He had COVID infection in Oct 2020 and at that time developed MARYANN with creatinine peak of 1.67. This subsequently improved. UA at that time also demonstrated presence of RBC and albuminuria.   It is interesting to note that he noted reddish discoloration of urine after his booster shot on Friday. Isolated cases of IgA nephropathy flare post COVID vaccine have been noted. It is unclear whether current incidence is related to this. Given presence of gross hematuria recommend urologic work-up (possible cystoscopy) first before considering renal biopsy.     # Hx of Afib on anticoagulation     Recommendations were communicated to primary team via note    Seen and discussed with Dr. Pushpa Harry MD   [phone]        REASON FOR CONSULT: MARYANN, hematuria, proteinuria    HISTORY OF PRESENT ILLNESS:  Admitting provider and nursing notes reviewed  Melo Dangelo is a 81 year old male with history of CKD stage III who presented to ED for evaluation of hematuria. He was seen in clinic on Friday for regular checkup and underwent. During his visit he also received Covid shot. Following this the noted increased lethargy and blood in urine the next day. He reached out to his clinic and was advised to hold Eliquis. His serum creatinine obtained during lab investigation on 10/15 showed serum  creatinine elevation at 3.58. Given new onset hematuria and elevated serum creatinine he was advised to report to ER.    History of hematuria in the past in setting of NSAID use. History of hospitalization for COVID-19 in October 2020 which was complicated by a new diagnosis of atrial fibrillation for which he has been on apixaban. He also had acute kidney injury at that time with creatinine peak at 1.67. This subsequently improved to 1.25 on discharge.    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 No pertinent surgical history.     MEDICATIONS:  PTA Meds  Prior to Admission medications    Medication Sig Last Dose Taking? Auth Provider   acetaminophen (TYLENOL) 325 MG tablet Take 2 tablets (650 mg) by mouth every 6 hours as needed for mild pain  at prn Yes Daphne Owen MD   apixaban ANTICOAGULANT (ELIQUIS) 2.5 MG tablet Take 2 tablets (5 mg) by mouth 2 times daily 10/17/2021 at am Yes Francis Smith MD   busPIRone (BUSPAR) 5 MG tablet Take 4 tablets (20 mg) by mouth 2 times daily For additional refills, please schedule a follow-up appointment at [phone]  Patient taking differently: Take 20 mg by mouth 3 times daily For additional refills, please schedule a follow-up appointment at [phone] 10/18/2021 at  Yes Francis Smith MD   cyanocobalamin (VITAMIN B-12) 100 MCG tablet Take 1,000 mcg by mouth daily  10/18/2021 at am Yes Unknown, Entered By History   eplerenone (INSPRA) 25 MG tablet Take 1 tablet (25 mg) by mouth daily 10/17/2021 at am Yes Malik Julien MD   escitalopram (LEXAPRO) 20 MG tablet Take 1 tablet (20 mg) by mouth daily 10/18/2021 at am  Yes Francis Smith MD   finasteride (PROSCAR) 5 MG tablet Take 1 tablet (5 mg) by mouth daily  at not started Yes Francis Smith MD   lamoTRIgine (LAMICTAL) 100 MG tablet Take 2 tablets (200 mg) by mouth 2 times daily 10/18/2021 at pm Yes Daria Raza MD   simvastatin (ZOCOR) 20 MG tablet Take 1 tablet (20 mg) by mouth At Bedtime 10/17/2021 at hs Yes Francis Smith MD   tamsulosin (FLOMAX) 0.4 MG capsule Take 1 capsule (0.4 mg) by mouth daily 10/18/2021 at am Yes Francis Smith MD   omeprazole (PRILOSEC) 20 MG DR capsule Take 1 capsule (20 mg) by mouth every morning (before breakfast)  Patient not taking: Reported on 7/27/2021   Virginie Mccain, BRITTANY      Current Meds    cyanocobalamin  1,000 mcg Oral Daily     escitalopram  20 mg Oral Daily     finasteride  5 mg Oral Daily     lamoTRIgine  200 mg Oral BID     simvastatin  20 mg Oral At Bedtime     sodium chloride (PF)  3 mL Intracatheter Q8H     tamsulosin  0.4 mg Oral Daily     Infusion Meds      ALLERGIES:    No Known Allergies    REVIEW OF SYSTEMS:  A comprehensive of systems was negative except as noted above.    SOCIAL HISTORY:   Social History     Socioeconomic History     Marital status:      Spouse name: Not on file     Number of children: Not on file     Years of education: Not on file     Highest education level: Bachelor's degree (e.g., BA, AB, BS)   Occupational History     Not on file   Tobacco Use     Smoking status: Never Smoker     Smokeless tobacco: Never Used   Substance and Sexual Activity     Alcohol use: No     Drug use: No     Sexual activity: Not on file   Other Topics Concern     Not on file   Social History Narrative     Not on file     Social Determinants of Health     Financial Resource Strain: Low Risk      Difficulty of Paying Living Expenses: Not hard at all   Food Insecurity: No Food Insecurity     Worried About Running Out of Food in the Last Year: Never true     Ran Out of Food in the Last  "Year: Never true   Transportation Needs: No Transportation Needs     Lack of Transportation (Medical): No     Lack of Transportation (Non-Medical): No   Physical Activity:      Days of Exercise per Week:      Minutes of Exercise per Session:    Stress:      Feeling of Stress :    Social Connections:      Frequency of Communication with Friends and Family:      Frequency of Social Gatherings with Friends and Family:      Attends Rastafari Services:      Active Member of Clubs or Organizations:      Attends Club or Organization Meetings:      Marital Status:    Intimate Partner Violence:      Fear of Current or Ex-Partner:      Emotionally Abused:      Physically Abused:      Sexually Abused:      Reviewed with patient   FAMILY MEDICAL HISTORY:   Family History   Problem Relation Age of Onset     Glaucoma No family hx of      Macular Degeneration No family hx of      Reviewed with patient     PHYSICAL EXAM:   Temp  Av.1  F (36.2  C)  Min: 96.3  F (35.7  C)  Max: 98.1  F (36.7  C)      Pulse  Av.9  Min: 55  Max: 74 Resp  Av  Min: 16  Max: 18  SpO2  Av.8 %  Min: 93 %  Max: 99 %       /57 (BP Location: Right arm)   Pulse 59   Temp 97.1  F (36.2  C) (Oral)   Resp 16   Ht 1.803 m (5' 11\")   Wt 82.3 kg (181 lb 6.4 oz)   SpO2 94%   BMI 25.30 kg/m     Date 10/19/21 07 - 10/20/21 0659   Shift 6382-7050 2406-8425 5918-7641 24 Hour Total   INTAKE   P.O.  400  400   Shift Total(mL/kg)  400(4.86)  400(4.86)   OUTPUT   Urine  450  450   Shift Total(mL/kg)  450(5.47)  450(5.47)   Weight (kg) 82.28 82.28 82.28 82.28      Admit Weight: 86.2 kg (190 lb)     GENERAL APPEARANCE: Not in acute distress, alert and awake  EYES: no scleral icterus, pupils equal  Endo: no goiter, no moon facies  Lymphatics: no cervical or supraclavicular LAD  Pulmonary: lungs clear to auscultation with equal breath sounds bilaterally, no clubbing  CV: regular rhythm, normal rate, no rub   - JVD not distended   - Edema " absent  GI: soft, nontender, normal bowel sounds  MS: no evidence of inflammation in joints, no muscle tenderness  : nono pompa  SKIN: no rash, warm, dry, no cyanosis    LABS:   CMP  Recent Labs   Lab 10/19/21  0552 10/18/21  1832 10/18/21  1302 10/15/21  1023    139 138 139   POTASSIUM 4.3 4.7 4.5 4.8   CHLORIDE 111* 108 108 108   CO2 23 27 27 24   ANIONGAP 7 4 3 7   GLC 80 86 91 100*   BUN 42* 42* 40* 36*   CR 3.80* 3.92* 3.85* 3.58*   GFRESTIMATED 14* 13* 14* 15*   GIANNI 8.6 9.1 8.8 9.1   MAG 2.4*  --   --   --    PHOS  --  4.6*  --   --    PROTTOTAL  --  7.5  --  7.8   ALBUMIN  --  3.9  --  4.0   BILITOTAL  --  0.3  --  0.4   ALKPHOS  --  70  --  82   AST  --  16  --  19   ALT  --  20  --  21     CBC  Recent Labs   Lab 10/19/21  1442 10/19/21  0552 10/18/21  1832 10/15/21  1023   HGB 10.1* 9.7* 10.2* 10.9*   WBC 5.2 5.1 5.5 5.3   RBC 3.29* 3.21* 3.31* 3.62*   HCT 30.5* 29.6* 30.8* 33.0*   MCV 93 92 93 91   MCH 30.7 30.2 30.8 30.1   MCHC 33.1 32.8 33.1 33.0   RDW 13.5 13.3 13.5 13.3    161 161 179     INR  Recent Labs   Lab 10/19/21  0552   INR 1.09     ABGNo lab results found in last 7 days.   URINE STUDIES  Recent Labs   Lab Test 10/18/21  2126 10/27/20  2257   COLOR Straw Yellow   APPEARANCE Clear Slightly Cloudy   URINEGLC Negative Negative   URINEBILI Negative Negative   URINEKETONE Negative 10*   SG 1.009 1.026   UBLD Large* Large*   URINEPH 5.5 5.5   PROTEIN 20 * 100*   NITRITE Negative Negative   LEUKEST Negative Negative   RBCU 84* 10-25   WBCU 1 2-5     Recent Labs   Lab Test 10/18/21  2126   UTPG 0.69*     PTH  No lab results found.  IRON STUDIES  Recent Labs   Lab Test 10/19/21  0552 10/15/21  1023 10/27/20  2008   IRON 72 81  --     331  --    IRONSAT 29 24  --    ALBERT 242 260 444*       IMAGING:  All imaging studies reviewed by me.     Dar Harry MD

## 2021-10-21 ENCOUNTER — APPOINTMENT (OUTPATIENT)
Dept: ULTRASOUND IMAGING | Facility: CLINIC | Age: 81
End: 2021-10-21
Attending: INTERNAL MEDICINE
Payer: MEDICARE

## 2021-10-21 LAB
ANION GAP SERPL CALCULATED.3IONS-SCNC: 7 MMOL/L (ref 3–14)
BUN SERPL-MCNC: 54 MG/DL (ref 7–30)
CALCIUM SERPL-MCNC: 9 MG/DL (ref 8.5–10.1)
CHLORIDE BLD-SCNC: 109 MMOL/L (ref 94–109)
CO2 SERPL-SCNC: 24 MMOL/L (ref 20–32)
CREAT SERPL-MCNC: 3.89 MG/DL (ref 0.66–1.25)
ERYTHROCYTE [DISTWIDTH] IN BLOOD BY AUTOMATED COUNT: 13.4 % (ref 10–15)
GFR SERPL CREATININE-BSD FRML MDRD: 14 ML/MIN/1.73M2
GLUCOSE BLD-MCNC: 86 MG/DL (ref 70–99)
HCT VFR BLD AUTO: 29.4 % (ref 40–53)
HGB BLD-MCNC: 9.6 G/DL (ref 13.3–17.7)
HGB BLD-MCNC: 9.6 G/DL (ref 13.3–17.7)
MCH RBC QN AUTO: 30.5 PG (ref 26.5–33)
MCHC RBC AUTO-ENTMCNC: 32.7 G/DL (ref 31.5–36.5)
MCV RBC AUTO: 93 FL (ref 78–100)
PATH REPORT.COMMENTS IMP SPEC: NORMAL
PATH REPORT.FINAL DX SPEC: NORMAL
PATH REPORT.GROSS SPEC: NORMAL
PATH REPORT.MICROSCOPIC SPEC OTHER STN: NORMAL
PATH REPORT.RELEVANT HX SPEC: NORMAL
PLATELET # BLD AUTO: 170 10E3/UL (ref 150–450)
POTASSIUM BLD-SCNC: 4.6 MMOL/L (ref 3.4–5.3)
PROT PATTERN SERPL IFE-IMP: NORMAL
PROT PATTERN UR ELPH-IMP: NORMAL
RBC # BLD AUTO: 3.15 10E6/UL (ref 4.4–5.9)
SODIUM SERPL-SCNC: 140 MMOL/L (ref 133–144)
WBC # BLD AUTO: 5.8 10E3/UL (ref 4–11)

## 2021-10-21 PROCEDURE — 99226 PR SUBSEQUENT OBSERVATION CARE,LEVEL III: CPT | Mod: GC | Performed by: STUDENT IN AN ORGANIZED HEALTH CARE EDUCATION/TRAINING PROGRAM

## 2021-10-21 PROCEDURE — 86334 IMMUNOFIX E-PHORESIS SERUM: CPT | Mod: 26 | Performed by: PATHOLOGY

## 2021-10-21 PROCEDURE — 99207 PR CDG-CODE CATEGORY CHANGED: CPT | Performed by: STUDENT IN AN ORGANIZED HEALTH CARE EDUCATION/TRAINING PROGRAM

## 2021-10-21 PROCEDURE — 88112 CYTOPATH CELL ENHANCE TECH: CPT | Mod: TC | Performed by: PHYSICIAN ASSISTANT

## 2021-10-21 PROCEDURE — 76942 ECHO GUIDE FOR BIOPSY: CPT | Mod: TC

## 2021-10-21 PROCEDURE — 250N000013 HC RX MED GY IP 250 OP 250 PS 637: Performed by: STUDENT IN AN ORGANIZED HEALTH CARE EDUCATION/TRAINING PROGRAM

## 2021-10-21 PROCEDURE — 80048 BASIC METABOLIC PNL TOTAL CA: CPT

## 2021-10-21 PROCEDURE — 88348 ELECTRON MICROSCOPY DX: CPT | Mod: TC | Performed by: INTERNAL MEDICINE

## 2021-10-21 PROCEDURE — 84165 PROTEIN E-PHORESIS SERUM: CPT | Mod: 26 | Performed by: PATHOLOGY

## 2021-10-21 PROCEDURE — 85014 HEMATOCRIT: CPT

## 2021-10-21 PROCEDURE — 88112 CYTOPATH CELL ENHANCE TECH: CPT | Mod: 26 | Performed by: PATHOLOGY

## 2021-10-21 PROCEDURE — 36415 COLL VENOUS BLD VENIPUNCTURE: CPT

## 2021-10-21 PROCEDURE — 272N000431 US BIOPSY RENAL: Mod: TC

## 2021-10-21 PROCEDURE — 250N000009 HC RX 250: Performed by: INTERNAL MEDICINE

## 2021-10-21 PROCEDURE — 99233 SBSQ HOSP IP/OBS HIGH 50: CPT | Mod: GC | Performed by: INTERNAL MEDICINE

## 2021-10-21 PROCEDURE — 84166 PROTEIN E-PHORESIS/URINE/CSF: CPT | Mod: 26 | Performed by: PATHOLOGY

## 2021-10-21 PROCEDURE — 85018 HEMOGLOBIN: CPT | Mod: 91 | Performed by: STUDENT IN AN ORGANIZED HEALTH CARE EDUCATION/TRAINING PROGRAM

## 2021-10-21 PROCEDURE — 36415 COLL VENOUS BLD VENIPUNCTURE: CPT | Performed by: STUDENT IN AN ORGANIZED HEALTH CARE EDUCATION/TRAINING PROGRAM

## 2021-10-21 PROCEDURE — G0378 HOSPITAL OBSERVATION PER HR: HCPCS

## 2021-10-21 RX ORDER — BUSPIRONE HYDROCHLORIDE 5 MG/1
5 TABLET ORAL
Status: CANCELLED
Start: 2021-10-21

## 2021-10-21 RX ORDER — LIDOCAINE HYDROCHLORIDE 10 MG/ML
30 INJECTION, SOLUTION INFILTRATION; PERINEURAL ONCE
Status: COMPLETED | OUTPATIENT
Start: 2021-10-21 | End: 2021-10-21

## 2021-10-21 RX ADMIN — LAMOTRIGINE 200 MG: 100 TABLET ORAL at 20:45

## 2021-10-21 RX ADMIN — LIDOCAINE HYDROCHLORIDE 8 ML: 10 INJECTION, SOLUTION EPIDURAL; INFILTRATION; INTRACAUDAL; PERINEURAL at 14:25

## 2021-10-21 RX ADMIN — LAMOTRIGINE 200 MG: 100 TABLET ORAL at 07:59

## 2021-10-21 RX ADMIN — ESCITALOPRAM OXALATE 20 MG: 10 TABLET ORAL at 07:59

## 2021-10-21 RX ADMIN — SIMVASTATIN 20 MG: 20 TABLET, FILM COATED ORAL at 21:52

## 2021-10-21 RX ADMIN — Medication 1000 MCG: at 07:59

## 2021-10-21 RX ADMIN — FINASTERIDE 5 MG: 5 TABLET, FILM COATED ORAL at 07:59

## 2021-10-21 RX ADMIN — TAMSULOSIN HYDROCHLORIDE 0.4 MG: 0.4 CAPSULE ORAL at 07:59

## 2021-10-21 ASSESSMENT — MIFFLIN-ST. JEOR: SCORE: 1518.66

## 2021-10-21 NOTE — PLAN OF CARE
Focus: Observation goal  -Observation goal: Improve renal function     -Observation goal not met. Creatinine level this morning was 3.85. Pt is staying overnight.  Urology met with pt today, decision will be made tomorrow.      Vital signs stable. Afebrile. Pt denies N/V/SOB/Pain. Pt ate 100% of his dinner. Voiding clear yellow urine without difficulty.  Pt is independent. PIV on RA that is saline locked.  No BM this shift. Continue with POC.

## 2021-10-21 NOTE — PROGRESS NOTES
Focus: Observation goal   -Observation goal: Improve renal function: Goal not met. Most recent creatinine level is 3.85. Pt continues to void clear, yellow urine.         Will continue to monitor.

## 2021-10-21 NOTE — PLAN OF CARE
[id number]    VSS on RA. Denies pain and nausea. Up independently to void. Pt continues to void clear, yellow urine. Pt is observation status, see observation goals notes. Continue with plan of care.

## 2021-10-21 NOTE — PROGRESS NOTES
Focus: Observation Status    Observation goal: Improve renal function      - Goal not met.  Creatinine  continue to be elevated 3.89 today.  Pt currently NPO to have Renal biopsy w/ Ultrasound at 13:00 today per nephrology recommendation. Plan for pt to be observed overnight after procedure. No discharge today.

## 2021-10-21 NOTE — PROGRESS NOTES
"  Nephrology Progress Note  10/21/2021         Assessment & Recommendations:     # Non oliguric MARYANN on CKD stage 3a likely secondary to acute tubular injury   # Tubular range proteinuria  # Hematuria     - Baseline creatinine ~1.2 -1.3  - Underwent kidney biopsy on 10/21. Monitor for post-biopsy bleeding. Repeat Hb 4 hrs after procedure  - Outpatient follow-up with Nephrology (will set-up)  - repeat renal panel on 10/25  - May discharge from Nephrology standpoint on 10/22 if hemoglobin remains stable.       # Hx of COVID infection in Oct 2020 with MARYANN (creatinine peak of 1.67, with subsequent improvement)      # Hx of Afib on anticoagulation      Recommendations were communicated to primary team via note     Seen and discussed with Dr. Pushpa Harry MD   [phone]    Interval History :   Nursing and provider notes from last 24 hours reviewed.    Review of Systems:   I reviewed the following systems:  GI: no change in appetite. no nausea or vomiting or diarrhea.   Neuro:  no confusion  Constitutional:  no fever or chills  CV: no dyspnea or edema.  no chest pain.    Physical Exam:   I/O last 3 completed shifts:  In: 2940 [P.O.:2940]  Out: 2350 [Urine:2350]   /60 (BP Location: Right arm)   Pulse 63   Temp 97.3  F (36.3  C) (Oral)   Resp 16   Ht 1.803 m (5' 11\")   Wt 79.2 kg (174 lb 8 oz)   SpO2 93%   BMI 24.34 kg/m       GENERAL APPEARANCE: stable, not in acute distress  EYES:  no scleral icterus, pupils equal  HENT: mouth without ulcers or lesions  PULM: lungs are clear to auscultation bilaterally, equal air movement, no clubbing  CV: regular rhythm, normal rate, no rub     -JVD not distended     -edema not present   GI: soft, non-tender, not distended  INTEGUMENT: no cyanosis, no rash      Labs:   All labs reviewed by me  Electrolytes/Renal - Recent Labs   Lab Test 10/21/21  0601 10/20/21  0646 10/19/21  0552 10/18/21  1832 10/18/21  1832 11/04/20  0703 11/03/20  0923 11/02/20  0900    " 141 141   < > 139   < >  --  139   POTASSIUM 4.6 4.4 4.3   < > 4.7   < >  --  4.1   CHLORIDE 109 110* 111*   < > 108   < >  --  108*   CO2 24 23 23   < > 27   < >  --  24   BUN 54* 50* 42*   < > 42*   < >  --  26   CR 3.89* 3.85* 3.80*   < > 3.92*   < >  --  1.24   GLC 86 84 80   < > 86   < >  --  76   GIANNI 9.0 8.7 8.6   < > 9.1   < >  --  8.7   MAG  --   --  2.4*  --   --   --  2.1 2.3   PHOS  --   --   --   --  4.6*  --   --   --     < > = values in this interval not displayed.       CBC -   Recent Labs   Lab Test 10/21/21  0601 10/20/21  0646 10/19/21  1442   WBC 5.8 6.0 5.2   HGB 9.6* 9.3* 10.1*    162 169       LFTs -   Recent Labs   Lab Test 10/18/21  1832 10/15/21  1023 11/04/20  0703   ALKPHOS 70 82 47   BILITOTAL 0.3 0.4 0.4   ALT 20 21 61*   AST 16 19 52*   PROTTOTAL 7.5 7.8 6.1   ALBUMIN 3.9 4.0 2.7*       Iron Panel -   Recent Labs   Lab Test 10/19/21  0552 10/15/21  1023 10/15/21  1023   IRON 72  --  81   IRONSAT 29  --  24   ALBERT 242   < > 260    < > = values in this interval not displayed.         Imaging:  All imaging studies reviewed by me.     Current Medications:    cyanocobalamin  1,000 mcg Oral Daily     escitalopram  20 mg Oral Daily     finasteride  5 mg Oral Daily     lamoTRIgine  200 mg Oral BID     simvastatin  20 mg Oral At Bedtime     sodium chloride (PF)  3 mL Intracatheter Q8H     tamsulosin  0.4 mg Oral Daily       Dar Harry MD

## 2021-10-21 NOTE — PROGRESS NOTES
[facility]    Progress Note - Shayne 1 Service        Date of Admission:  10/18/2021    Assessment & Plan           Melo Dangelo is a 81 year old male with a history of seizure disorder, GERD, HTN, depression/anxiety, HLD, and BPH who presents with hematuria and elevated creatinine, concerning for MARYANN on CKD.    Today:   - Nephrology plan for renal biopsy today    # MARYANN on CKD  #Concern for AGN  #Concern for IGA nephropahty  # Hematuria:   # Proteinuria:  Cr 3.92 on presentation to the ED. Recent 10/15 Creatinine also elevated at 3.58, but prior to this, last values in October-November 2020 were in the 1.2-1.3 range., hematuria and proteinuria. No evidence of stones or obstruction on CT scan and without CVA tenderness. He denies any issues with oral intake or decreased void volumes, making pre and post-renal etiologies less likely. Patient received 1 L IVF while in ED, with no real improvement in Cr. Most recent of 3.8 on 10/19 and 3.85 on 10/20. Phos elevated at 4.6, ionized calcium wnl, protein urine/creatinine ratio elevated at 0.69.  Calculated FeNa >2, consistent with intrinsic MARYANN. JOSE, ANCA neg. Hep screen neg.   Normal C3, C4. Hapto normal. Concern or IGA-nephropathy gross hematuria occurred following COVID vaccination  - Nephrology consulted   -Plan for biopsy 10/21   - Recommend overnight monitoring after biopsy  - Urology consulted   - Plan for outpatient cystoscopy   - Urine cytology pending   - Consider CT urogram (if renal function improves)    # HTN:  Reports largely normal BP's at home in the past, although admits to not checking as frequently. Elevated Cr, but no other clear signs of end-organ dysfunction on presentation. Likely 2/2 AGN. SBPs mainly 130-160s during admission     Consider PRN Labetalol 20 mg if SBP >180    Consider CCB if high BP persists    # Normocytic anemia  likey 2/2 CKD. Iron studies unremarkable   - CTM    # Paroxysmal  Atrial fibrillation  Diagnosed in setting of hospitalization for COVID in October 2020, at which time Apixaban was restarted, with plans to possibly stop med 1+ months after discharge. Chads Vasc score 3. Patient currently in NSR and has been throughout admission on examination    Hold PTA apixaban in anticipation of procedures    Plan to hold AC on discharge as may have needs for further procedures (cystoscopy)     Plan to discharge with zio patch/event monitor at time of discharge and follow-up with PCP to determine Afib burden     # Central Retinal Swelling :   - Hold PTA eplerenone 25 mg qday (ordered by patient's eye doctor) in setting of MARYANN/CKD    # Seizure Disorder:   Last seizure reportedly in 2008  - PTA Lamotrigine 200 mg BID    # Depression/Anxiety: Hold PTA Buspirone 20 mg TID d/t renal function, Continue PTA Escitalopram at reduced dose (5 mg qday)    # HLD: PTA Simvastatin 20 mg at bedtime  # BPH: PTA Tamsulosin 0.4 mg qday, PTA Finasteride 5 mg qday       Diet: NPO per Anesthesia Guidelines for Procedure/Surgery Except for: Meds    DVT Prophylaxis: Pneumatic Compression Devices  Abrclay Catheter: Not present  Fluids:  PO intake   Central Lines: None  Code Status: Full Code Full Code    Disposition Plan   Expected discharge: Possibly on 10/22/2021   recommended to prior living arrangement.     The patient's care was discussed with the Attending Physician, Dr. Bray.    Ines Arora MD  IM PGY-3    ______________________________________________________________________    Interval History   No acute events overnight. Denies hematuria. Patient denying in pain. Urine output is good.    4pt ROS completed and negative or as per above history     Data reviewed today: I reviewed all medications, new labs and imaging results over the last 24 hours.    Physical Exam   Vital Signs: Temp: 96.9  F (36.1  C) Temp src: Oral BP: 121/59 Pulse: 65   Resp: 16 SpO2: 94 % O2 Device: None (Room air)    Weight: 174 lbs 8  oz  General Appearance: Well appearing. Pleasant. Sitting on edge of bed. NAD  HEENT: No conjunctival icterus. Moist mucus membranes.  Respiratory: Clear to auscultation bilaterally.  Cardiovascular: Regular rate and rhythm  GI: Soft, non-distended, non-tender. No rebound or guarding.  MONY: no CVAT  Skin: No rashes to exposed skin areas. No jaundice.  Neuro: Alert and oriented x 3.    Data   Recent Labs   Lab 10/21/21  0601 10/20/21  0646 10/19/21  1442 10/19/21  0552 10/19/21  0552 10/18/21  1832 10/18/21  1832 10/18/21  1302 10/15/21  1023   WBC 5.8 6.0 5.2   < > 5.1   < > 5.5  --  5.3   HGB 9.6* 9.3* 10.1*   < > 9.7*   < > 10.2*  --  10.9*   MCV 93 92 93   < > 92   < > 93  --  91    162 169   < > 161   < > 161  --  179   INR  --   --   --   --  1.09  --   --   --   --     141  --   --  141   < > 139   < > 139   POTASSIUM 4.6 4.4  --   --  4.3   < > 4.7   < > 4.8   CHLORIDE 109 110*  --   --  111*   < > 108   < > 108   CO2 24 23  --   --  23   < > 27   < > 24   BUN 54* 50*  --   --  42*   < > 42*   < > 36*   CR 3.89* 3.85*  --   --  3.80*   < > 3.92*   < > 3.58*   ANIONGAP 7 8  --   --  7   < > 4   < > 7   GIANNI 9.0 8.7  --   --  8.6   < > 9.1   < > 9.1   GLC 86 84  --   --  80   < > 86   < > 100*   ALBUMIN  --   --   --   --   --   --  3.9  --  4.0   PROTTOTAL  --   --   --   --   --   --  7.5  --  7.8   BILITOTAL  --   --   --   --   --   --  0.3  --  0.4   ALKPHOS  --   --   --   --   --   --  70  --  82   ALT  --   --   --   --   --   --  20  --  21   AST  --   --   --   --   --   --  16  --  19    < > = values in this interval not displayed.     No results found for this or any previous visit (from the past 24 hour(s)).

## 2021-10-21 NOTE — PLAN OF CARE
Pt return to floor at 15:00 from Ultrasound renal biopsy. Nurse called ultrasound dept and request post procedure order and monitoring.

## 2021-10-21 NOTE — PLAN OF CARE
Focus: Observation status  -Observation goal: Improve renal function      - Goal not met.  Creatinine  continue to be elevated 3.89 today. Plan to possible discharge today and have procedure & testing done outpatient.

## 2021-10-22 ENCOUNTER — APPOINTMENT (OUTPATIENT)
Dept: CARDIOLOGY | Facility: CLINIC | Age: 81
End: 2021-10-22
Payer: MEDICARE

## 2021-10-22 VITALS
WEIGHT: 174.2 LBS | RESPIRATION RATE: 18 BRPM | SYSTOLIC BLOOD PRESSURE: 155 MMHG | HEIGHT: 71 IN | TEMPERATURE: 96.7 F | HEART RATE: 51 BPM | BODY MASS INDEX: 24.39 KG/M2 | OXYGEN SATURATION: 93 % | DIASTOLIC BLOOD PRESSURE: 74 MMHG

## 2021-10-22 LAB
ANION GAP SERPL CALCULATED.3IONS-SCNC: 5 MMOL/L (ref 3–14)
BUN SERPL-MCNC: 49 MG/DL (ref 7–30)
CALCIUM SERPL-MCNC: 8.8 MG/DL (ref 8.5–10.1)
CHLORIDE BLD-SCNC: 111 MMOL/L (ref 94–109)
CO2 SERPL-SCNC: 25 MMOL/L (ref 20–32)
CREAT SERPL-MCNC: 3.9 MG/DL (ref 0.66–1.25)
ERYTHROCYTE [DISTWIDTH] IN BLOOD BY AUTOMATED COUNT: 13.3 % (ref 10–15)
GBM IGG SER IA-ACNC: <2.4 U/ML
GBM IGG SER IA-ACNC: NEGATIVE
GFR SERPL CREATININE-BSD FRML MDRD: 14 ML/MIN/1.73M2
GLUCOSE BLD-MCNC: 93 MG/DL (ref 70–99)
HCT VFR BLD AUTO: 29 % (ref 40–53)
HGB BLD-MCNC: 9.6 G/DL (ref 13.3–17.7)
MCH RBC QN AUTO: 30.6 PG (ref 26.5–33)
MCHC RBC AUTO-ENTMCNC: 33.1 G/DL (ref 31.5–36.5)
MCV RBC AUTO: 92 FL (ref 78–100)
PLATELET # BLD AUTO: 162 10E3/UL (ref 150–450)
POTASSIUM BLD-SCNC: 4.6 MMOL/L (ref 3.4–5.3)
RBC # BLD AUTO: 3.14 10E6/UL (ref 4.4–5.9)
SODIUM SERPL-SCNC: 141 MMOL/L (ref 133–144)
WBC # BLD AUTO: 6 10E3/UL (ref 4–11)

## 2021-10-22 PROCEDURE — G0378 HOSPITAL OBSERVATION PER HR: HCPCS

## 2021-10-22 PROCEDURE — 82565 ASSAY OF CREATININE: CPT

## 2021-10-22 PROCEDURE — 99207 PR CDG-CODE CATEGORY CHANGED: CPT | Performed by: STUDENT IN AN ORGANIZED HEALTH CARE EDUCATION/TRAINING PROGRAM

## 2021-10-22 PROCEDURE — 99217 PR OBSERVATION CARE DISCHARGE: CPT | Mod: GC | Performed by: STUDENT IN AN ORGANIZED HEALTH CARE EDUCATION/TRAINING PROGRAM

## 2021-10-22 PROCEDURE — 36415 COLL VENOUS BLD VENIPUNCTURE: CPT

## 2021-10-22 PROCEDURE — 250N000013 HC RX MED GY IP 250 OP 250 PS 637: Performed by: STUDENT IN AN ORGANIZED HEALTH CARE EDUCATION/TRAINING PROGRAM

## 2021-10-22 PROCEDURE — 85027 COMPLETE CBC AUTOMATED: CPT

## 2021-10-22 PROCEDURE — 93246 EXT ECG>7D<15D RECORDING: CPT

## 2021-10-22 PROCEDURE — 82374 ASSAY BLOOD CARBON DIOXIDE: CPT

## 2021-10-22 PROCEDURE — 93248 EXT ECG>7D<15D REV&INTERPJ: CPT | Performed by: INTERNAL MEDICINE

## 2021-10-22 RX ORDER — ESCITALOPRAM OXALATE 5 MG/1
5 TABLET ORAL DAILY
Qty: 30 TABLET | Refills: 0 | Status: SHIPPED | OUTPATIENT
Start: 2021-10-22 | End: 2022-01-05

## 2021-10-22 RX ADMIN — Medication 1000 MCG: at 08:36

## 2021-10-22 RX ADMIN — ESCITALOPRAM OXALATE 20 MG: 10 TABLET ORAL at 08:36

## 2021-10-22 RX ADMIN — FINASTERIDE 5 MG: 5 TABLET, FILM COATED ORAL at 08:36

## 2021-10-22 RX ADMIN — LAMOTRIGINE 200 MG: 100 TABLET ORAL at 08:36

## 2021-10-22 RX ADMIN — TAMSULOSIN HYDROCHLORIDE 0.4 MG: 0.4 CAPSULE ORAL at 08:36

## 2021-10-22 ASSESSMENT — MIFFLIN-ST. JEOR: SCORE: 1517.3

## 2021-10-22 NOTE — PROGRESS NOTES
Focus: Observation goal   -Observation goal: Improve renal function: Goal not met. Most recent creatinine level is 3.89. Pt continues to void clear, yellow urine without difficulty.          Will continue to monitor.

## 2021-10-22 NOTE — PROGRESS NOTES
Focus: Observation Status    Observation goal: Improve renal function   - Goal not met. Pt creat elevated at 3.89 this morning, evaluate ceat tomorrow AM.    -Pt hgb stable at 9.6 following  following renal biopsy this afternoon. Urine is clear yellow and free of blood. Continue to monitor hgb and urine for hematuria.

## 2021-10-22 NOTE — PROGRESS NOTES
Improved renal function - Not completed: AM creatinine increased slightly to 3.90. Hgb  stable at 9.6. Will continue  to monitor.

## 2021-10-22 NOTE — PROGRESS NOTES
Improved renal function - Not completed: AM creatinine increased slightly to 3.90. Provider and nephrology signing off on discharge as patient is willing to follow-up outpatient and has no other problems right now.     Discharge  D: Orders for discharge and outpatient medications written.  I: Home medications and return to clinic schedule reviewed with patient. Discharge instructions and parameters for calling Health Care Provider reviewed. Patient left at 1430 accompanied by himself.   A: Patient/family verbalized understanding and was ready for discharge.   P: Patient instructed to  medications in Pharmacy. Follow up as scheduled outpatient with urology clinic on Nov. 1st.

## 2021-10-22 NOTE — PLAN OF CARE
[id number]     VSS on RA. Denies pain and nausea. Up independently to void. Pt continues to void clear, yellow urine without difficulty. Observation status continued, see observation goals notes. Continue with plan of care.

## 2021-10-22 NOTE — PROGRESS NOTES
Focus: Observation goal   -Observation goal: Improve renal function: Goal not met. Most recent creatinine level is 3.89. Pt continues to void clear, yellow urine.         Will continue to monitor.

## 2021-10-22 NOTE — DISCHARGE SUMMARY
[facility]  Discharge Summary - Medicine & Pediatrics       Date of Admission:  10/18/2021  Date of Discharge:  10/22/2021  3:12 PM  Discharging Provider: Dr. Bray  Discharge Service: Shayne Valencia    Discharge Diagnoses   Non oliguric MARYANN on CKD stage 3a  Hematuria  Proteinuria  HTN  Normocytic Anemia  Paroxysmal Atrial Fibrillation  Central Retinal Swelling  Seizure Disorder  Depression/Anxiety  HLD  BPH    Follow-ups Needed After Discharge   Follow-up Appointments     Adult [facility] Follow-up and recommended labs and tests      Follow up with primary care provider, Francis Smith, within 7 days   to evaluate medication change and for hospital follow- up.  No follow up   labs or test are needed.      Appointments on [place] and/or [facility] (with [facility] or [facility]   provider or service). Call [phone] if you haven't heard regarding   these appointments within 7 days of discharge.             Unresulted Labs Ordered in the Past 30 Days of this Admission       Date and Time Order Name Status Description    10/21/2021  2:29 PM Surgical Pathology Exam In process         These results will be followed up by [facility] Nephrology     Discharge Disposition   Discharged to home  Condition at discharge: Stable    Hospital Course   Melo Dangelo was admitted on 10/18/2021 for MARYANN on CKD.  The following problems were addressed during his hospitalization:    # MARYANN on CKD Stage 3a  # Hematuria  # Proteinuria  Cr 3.92 on presentation to the ED. Recent 10/15 Creatinine also elevated at 3.58, but prior to this, last values in October-November 2020 were in the 1.2-1.3 range. Patient had gross hematuria PTA and continued to have microscopic hematuria on admission UA. Also demonstrates proteinuria. No evidence of stones or obstruction on CT scan and without CVA tenderness. No evidence of dehydration or pre-renal pathologies (cardio-renal; hepato-renal). Patient received 1 L IVF while  in ED, with no real improvement in Cr. Phos elevated at 4.6, ionized calcium wnl, protein urine/creatinine ratio elevated at 0.69.  Calculated FeNa >2, consistent with intrinsic MARYANN. JOSE, ANCA neg. Hep screen neg.  Normal C3, C4. Hapto normal. Etiology is overall unknown at this time.   Nephrology consulted with patient undergoing renal biopsy on 10/21. Observed for nearly 24 hours post-biopsy with no evidence of bleeding or complications. Will follow-up as outpatient regarding biopsy results. For hematuria, urology follow-up recommending outpatient cystoscopy.  Creatinine stabilized prior to discharge at approximately 3.9  - BMP ordered for 10/25 - will be followed up by Dr. Bray  - Outpatient nephrology follow-up (arranged by nephrology team)  - Urology outpatient follow-up for hematuria evaluation with cystoscopy. Could consider CT urogram if renal function improves.      # HTN:  Reports largely normal BP's at home in the past, although admits to not checking as frequently. Elevated Cr, but no other clear signs of end-organ dysfunction on presentation. Likely 2/2 AGN. SBPs mainly 130-160s during admission.  - PCP follow-up      # Normocytic anemia  likey 2/2 CKD. Iron studies unremarkable. Hgb on discharge 9.6      # Paroxysmal Atrial fibrillation  Diagnosed in setting of hospitalization for COVID in October 2020, at which time Apixaban was restarted, with plans to possibly stop med 1+ months after discharge. Chads Vasc score 3. Patient currently in NSR and has been throughout admission on examination. Apixaban was held during admission with anticipation of procedures. Due to likely stress-induced afib during acute infection and concern for potential continued procedures (cystoscopy); AC held on discharge.   - Discontinue apixaban  - Zio patch placed prior to discharge to determine afib burden  - Follow-up with PCP     # Central Retinal Swelling :   - Hold PTA eplerenone 25 mg qday (ordered by patient's eye  doctor) in setting of MARYANN/CKD     # Seizure Disorder:   Last seizure reportedly in 2008  - PTA Lamotrigine 200 mg BID     # Depression/Anxiety:   - Hold PTA Buspirone 20 mg TID d/t renal function (discussed with pharmacy could consider resuming at much lower dose if needed)  - Continue PTA Escitalopram at reduced dose (5 mg qday) due to renal impairment     # HLD: PTA Simvastatin 20 mg at bedtime  # BPH: PTA Tamsulosin 0.4 mg qday, PTA Finasteride 5 mg qday    Consultations This Hospital Stay   NEPHROLOGY GENERAL ADULT IP CONSULT  VASCULAR ACCESS CARE ADULT IP CONSULT  CARE MANAGEMENT / SOCIAL WORK IP CONSULT  UROLOGY IP CONSULT    Code Status   Full Code       The patient was discussed with Dr. Asa Arora MD  [address] [place] [id number]  Phone: [phone]  ______________________________________________________________________    Physical Exam   Vital Signs: Temp: (!) 96.7  F (35.9  C) Temp src: Oral BP: (!) 155/74 Pulse: 51   Resp: 18 SpO2: 93 % O2 Device: None (Room air)    Weight: 174 lbs 3.2 oz  General Appearance:      Well appearing. Pleasant. Sitting on edge of bed. NAD  HEENT: No conjunctival icterus. Moist mucus membranes.  Respiratory: Clear to auscultation bilaterally.  Cardiovascular: Regular rate and rhythm  GI: Soft, non-distended, non-tender. No rebound or guarding.  MONY: no CVAT  Skin: No rashes to exposed skin areas. No jaundice.  Neuro: Alert and oriented x 3.      Primary Care Physician   Francis Smith    Discharge Orders      Basic metabolic panel     Activity    Your activity upon discharge: activity as tolerated     Adult [facility] Follow-up and recommended labs and tests    Follow up with primary care provider, Francis Smith, within 7 days to evaluate medication change and for hospital follow- up.  No follow up labs or test are needed.      Appointments on [place] and/or [facility] (with [facility] or  [facility] provider or service). Call [phone] if you haven't heard regarding these appointments within 7 days of discharge.     Reason for your hospital stay    You were admitted to hospital for increase creatinine (or worsening renal function). You were evaluated by nephrology (kidney doctors) and urology. You underwent kidney biopsy on 10/21 with results of this biopsy pending. You also had other blood tests completed to determine reason for your worsening kidney function.     You will need to follow-up with nephrology and urology as an outpatient for continued monitoring and testing.    Due to your blood in urine and your heart rhythm being normal in the hospital. We discontinued your apixaban (blood thinner). You will have a cardiac monitor placed to further evaluate your heart rhythm to determine if you have atrial fibrillation. You will follow-up with your primary doctor to discuss this cardiac monitor.    Please obtain a lab on 10/25 to check your kidney function.     If you have decrease in urine, chest pain, shortness of breath, blood in urine, fever/chills please seek medical attention     Diet    Follow this diet upon discharge: Orders Placed This Encounter      Regular Diet Adult       Significant Results and Procedures   Results for orders placed or performed during the hospital encounter of 10/18/21   Abd/pelvis CT no contrast - Stone Protocol    Narrative    EXAM: CT ABDOMEN AND PELVIS WITHOUT CONTRAST - RENAL STONE PROTOCOL  LOCATION: [facility]  DATE/TIME: 10/18/2021 10:17 PM    INDICATION: Hematuria.  COMPARISON: None.    TECHNIQUE: CT scan of the abdomen and pelvis was performed without oral or IV contrast. Multiplanar reformats were obtained. Dose reduction techniques were used.  CONTRAST: None.    FINDINGS:    LOWER CHEST: Coronary artery calcification.    HEPATOBILIARY: Unremarkable.    SPLEEN: Unremarkable.    PANCREAS: Unremarkable.    ADRENAL  GLANDS: Unremarkable.    KIDNEYS/BLADDER: No renal or ureteral calculi. No dilatation of the intrarenal collecting systems or ureters. No visualized bladder calculi.    BOWEL: Possible tiny hiatal hernia. Normal appendix.    LYMPH NODES: Unremarkable.    PELVIC ORGANS: No acute findings.    MUSCULOSKELETAL: No acute findings.    OTHER: Atherosclerotic calcification in the abdominal aorta.      Impression    IMPRESSION:   1. No acute abnormality identified in the abdomen or pelvis.  2. No renal or ureteral calculi or evidence of urinary obstruction.    US Biopsy Renal    Narrative    This exam was marked as non-reportable because it will not be read by a   radiologist or a [place] non-radiologist provider.               Discharge Medications   Discharge Medication List as of 10/22/2021  2:14 PM        CONTINUE these medications which have CHANGED    Details   escitalopram (LEXAPRO) 5 MG tablet Take 1 tablet (5 mg) by mouth daily, Disp-30 tablet, R-0, E-Prescribe           CONTINUE these medications which have NOT CHANGED    Details   acetaminophen (TYLENOL) 325 MG tablet Take 2 tablets (650 mg) by mouth every 6 hours as needed for mild pain, Disp-100 tablet, R-0, Local Print      cyanocobalamin (VITAMIN B-12) 100 MCG tablet Take 1,000 mcg by mouth daily , Historical      finasteride (PROSCAR) 5 MG tablet Take 1 tablet (5 mg) by mouth daily, Disp-90 tablet, R-3, E-Prescribe      lamoTRIgine (LAMICTAL) 100 MG tablet Take 2 tablets (200 mg) by mouth 2 times daily, Disp-360 tablet, R-3, E-PrescribeKeep on file      simvastatin (ZOCOR) 20 MG tablet Take 1 tablet (20 mg) by mouth At Bedtime, Disp-90 tablet, R-3, E-Prescribe      tamsulosin (FLOMAX) 0.4 MG capsule Take 1 capsule (0.4 mg) by mouth daily, Disp-90 capsule, R-3, E-Prescribe           STOP taking these medications       apixaban ANTICOAGULANT (ELIQUIS) 2.5 MG tablet Comments:   Reason for Stopping:         busPIRone (BUSPAR) 5 MG tablet Comments:   Reason for  Stopping:         eplerenone (INSPRA) 25 MG tablet Comments:   Reason for Stopping:         omeprazole (PRILOSEC) 20 MG DR capsule Comments:   Reason for Stopping:             Allergies   No Known Allergies

## 2021-10-25 ENCOUNTER — LAB (OUTPATIENT)
Dept: LAB | Facility: CLINIC | Age: 81
End: 2021-10-25
Payer: MEDICARE

## 2021-10-25 DIAGNOSIS — N17.9 ACUTE KIDNEY INJURY (H): ICD-10-CM

## 2021-10-25 PROCEDURE — 80048 BASIC METABOLIC PNL TOTAL CA: CPT

## 2021-10-25 PROCEDURE — 36415 COLL VENOUS BLD VENIPUNCTURE: CPT

## 2021-10-26 ENCOUNTER — OFFICE VISIT (OUTPATIENT)
Dept: OPHTHALMOLOGY | Facility: CLINIC | Age: 81
End: 2021-10-26
Attending: OPHTHALMOLOGY
Payer: MEDICARE

## 2021-10-26 DIAGNOSIS — H35.711 CENTRAL SEROUS CHORIORETINOPATHY OF RIGHT EYE: ICD-10-CM

## 2021-10-26 DIAGNOSIS — H47.231 CUPPING OF OPTIC DISC, RIGHT: ICD-10-CM

## 2021-10-26 DIAGNOSIS — H35.711 CENTRAL SEROUS RETINOPATHY, RIGHT: Primary | ICD-10-CM

## 2021-10-26 DIAGNOSIS — H25.13 AGE-RELATED NUCLEAR CATARACT OF BOTH EYES: ICD-10-CM

## 2021-10-26 DIAGNOSIS — Z79.899 HIGH RISK MEDICATION USE: ICD-10-CM

## 2021-10-26 DIAGNOSIS — N17.9 ACUTE KIDNEY INJURY (H): Primary | ICD-10-CM

## 2021-10-26 DIAGNOSIS — H43.813 POSTERIOR VITREOUS DETACHMENT, BILATERAL: ICD-10-CM

## 2021-10-26 DIAGNOSIS — H35.363 PERIPHERAL DRUSEN OF BOTH EYES: ICD-10-CM

## 2021-10-26 LAB
ANION GAP SERPL CALCULATED.3IONS-SCNC: 6 MMOL/L (ref 3–14)
BUN SERPL-MCNC: 48 MG/DL (ref 7–30)
CALCIUM SERPL-MCNC: 8.9 MG/DL (ref 8.5–10.1)
CHLORIDE BLD-SCNC: 109 MMOL/L (ref 94–109)
CO2 SERPL-SCNC: 23 MMOL/L (ref 20–32)
CREAT SERPL-MCNC: 4.35 MG/DL (ref 0.66–1.25)
GFR SERPL CREATININE-BSD FRML MDRD: 12 ML/MIN/1.73M2
GLUCOSE BLD-MCNC: 94 MG/DL (ref 70–99)
PATH REPORT.COMMENTS IMP SPEC: NORMAL
PATH REPORT.COMMENTS IMP SPEC: NORMAL
PATH REPORT.FINAL DX SPEC: NORMAL
PATH REPORT.GROSS SPEC: NORMAL
PATH REPORT.MICROSCOPIC SPEC OTHER STN: NORMAL
PATH REPORT.RELEVANT HX SPEC: NORMAL
PHOTO IMAGE: NORMAL
POTASSIUM BLD-SCNC: 4.8 MMOL/L (ref 3.4–5.3)
SODIUM SERPL-SCNC: 138 MMOL/L (ref 133–144)

## 2021-10-26 PROCEDURE — 92133 CPTRZD OPH DX IMG PST SGM ON: CPT | Performed by: OPHTHALMOLOGY

## 2021-10-26 PROCEDURE — 88313 SPECIAL STAINS GROUP 2: CPT | Mod: 26 | Performed by: PATHOLOGY

## 2021-10-26 PROCEDURE — 99214 OFFICE O/P EST MOD 30 MIN: CPT | Performed by: OPHTHALMOLOGY

## 2021-10-26 PROCEDURE — 88348 ELECTRON MICROSCOPY DX: CPT | Mod: 26 | Performed by: PATHOLOGY

## 2021-10-26 PROCEDURE — G0463 HOSPITAL OUTPT CLINIC VISIT: HCPCS

## 2021-10-26 PROCEDURE — 88346 IMFLUOR 1ST 1ANTB STAIN PX: CPT | Mod: 26 | Performed by: PATHOLOGY

## 2021-10-26 PROCEDURE — 88305 TISSUE EXAM BY PATHOLOGIST: CPT | Mod: 26 | Performed by: PATHOLOGY

## 2021-10-26 PROCEDURE — 88350 IMFLUOR EA ADDL 1ANTB STN PX: CPT | Mod: 26 | Performed by: PATHOLOGY

## 2021-10-26 PROCEDURE — 92134 CPTRZ OPH DX IMG PST SGM RTA: CPT | Performed by: OPHTHALMOLOGY

## 2021-10-26 ASSESSMENT — CONF VISUAL FIELD
METHOD: COUNTING FINGERS
OD_NORMAL: 1
OS_NORMAL: 1

## 2021-10-26 ASSESSMENT — REFRACTION_WEARINGRX
OD_AXIS: 179
OS_AXIS: 010
OD_CYLINDER: +1.00
OS_ADD: +2.50
OS_CYLINDER: +1.75
OS_SPHERE: +2.50
SPECS_TYPE: BIFOCAL
OD_SPHERE: +2.50
OD_ADD: +2.50

## 2021-10-26 ASSESSMENT — VISUAL ACUITY
OD_CC: 20/25
CORRECTION_TYPE: GLASSES
METHOD: SNELLEN - LINEAR
OS_CC: 20/20

## 2021-10-26 ASSESSMENT — SLIT LAMP EXAM - LIDS: COMMENTS: MEIBOMIAN GLAND INSPISSATION

## 2021-10-26 ASSESSMENT — EXTERNAL EXAM - RIGHT EYE: OD_EXAM: NORMAL

## 2021-10-26 ASSESSMENT — TONOMETRY
OS_IOP_MMHG: 20
IOP_METHOD: TONOPEN
OD_IOP_MMHG: 19

## 2021-10-26 ASSESSMENT — EXTERNAL EXAM - LEFT EYE: OS_EXAM: NORMAL

## 2021-10-26 ASSESSMENT — CUP TO DISC RATIO
OS_RATIO: 0.5
OD_RATIO: 0.6

## 2021-10-26 NOTE — PROGRESS NOTES
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  Seen on August 2, 2021 at which time the central serous retinopathy was slightly improved but there is some persistent subretinal fluid in the right eye. We recommended daily eplerenone 25 mg daily and to follow-up today.    He reports things may be perhaps slightly worse in the right eye.     Interval Updates to Medical/Family/Social History:    Mr. Dangelo dialyzed recently for acute kidney injury as well as hematuria.  There are some concern about the possibility of eplerenone contributing to these conditions but this was discontinued after his hospitalization.  He does have plans to follow-up with urology as an outpatient next week. He has stopped the Eplerenone and also awaiting kidney biopsy. Not back on Propranolol for anxiety    Relevant Review of Systems Updates:  Slightly more dizziness and perhaps unsteadiness. No passing out, no fevers.    Laboratory Testing  Reviewed from October 19, 2021: HIV negative, hepatitis C negative, Hepatitis B surface antibody positive. CBC with mild anemia, BMP with elevated creatinine 3.80  Normal C3, C4, JOSE, ANCA, Anti Glomerular Basement Membrane antibody    BMP on 10/26/21: Creatine 4.5, CBC 9.6 (stable)    Current eye related medications: No longer on Eplerenone    Retina/Uveitis Imaging:   OCT Spectralis Macula October 26, 2021  right eye: No new fluid, one focal PED slightly larger, one slightly smaller, some stable nasal ellipsoid band disruption. Thick choroid.   left eye: Normal contour    OCT Optic Nerve RNFL Spectralis October 26, 2021  right eye: Avg thickness 114 microns, no thinning.   left eye: Avg thickness 117 microns, no thinning.     Assessment:     1. Central serous retinopathy, right  Slight variation on OCT, but no active fluid, no  CNV    2. Cupping of optic disc, right  With normal eye pressure and stable RNFL scans without signs of glaucoma    3. Peripheral drusen of both eyes  Anatomic variant     4. Posterior vitreous detachment, bilateral  Stable without retinal braks    5. Age-related nuclear cataract of both eyes  Mild visual signficance    Plan/Recommendations:      Discussed findings with patient. There are persistent changes on exam, but no fluid by OCT. Some of the vision issues may be related to prior changes and fluid. Given no active fluid and no CNV, we can observe off Eplerenone and without any other treatment.    Eye pressure is 19, 20    Repeat Creatinine next week    No more Eplerenone but continue drinking lots of water.    Will ask Dr. Smith if he has any other recommendations regarding elevated creatinine other than no more Eplerenone and continued hydration    RTC  1. Urology on 11/1/21, will also schedule lab at [place] to recheck BMP (Creatinine)    2. Early Dec 2021, tonopen, dilate, OCT (ordered) and 55 degree FAF    Physician Attestation     Attending Physician Attestation:  Complete documentation of historical and exam elements from today's encounter can be found in the full encounter summary report (not reduplicated in this progress note). I personally obtained the chief complaint(s) and history of present illness. I confirmed and edited as necessary the review of systems, past medical/surgical history, family history, social history, and examination findings as documented by others; and I examined the patient myself. I personally reviewed the relevant tests, images, and reports as documented above. I formulated and edited as necessary the assessment and plan and discussed the findings and management plan with the patient and family members present at the time of this visit.  Malik Julien M.D., Uveitis and Medical Retina, October 26, 2021

## 2021-10-26 NOTE — PROGRESS NOTES
Renal panel and CBC placed. To be seen by Dr. Barrow Nov 1st at 1230 virtual. To be discussed by Dr. Harry to the patient.  Lizbeth Roldan LPN  Nephrology  [phone]

## 2021-10-26 NOTE — PATIENT INSTRUCTIONS
Okay to keep drinking lots of water and see the Urologist next week and get blood tests next week.

## 2021-10-26 NOTE — NURSING NOTE
Chief Complaints and History of Present Illnesses   Patient presents with     Central Serous Retinopathy Follow Up     Chief Complaint(s) and History of Present Illness(es)     Central Serous Retinopathy Follow Up     Laterality: right eye    Onset: gradual    Onset: months ago    Quality: Feels the va has decreased  But also will fluctuate     Severity: moderate    Frequency: intermittently    Associated symptoms: photophobia (very little).  Negative for floaters and flashes    Treatments tried: no treatments    Pain scale: 0/10              Comments     Here for Central serous retinopathy  Eplerenone discontinue after kidney function was off  Georgia Clfiton COT 8:54 AM October 26, 2021                    Pt wasn't weaned at this time because she will have dialysis later today per the RNOpal.

## 2021-10-26 NOTE — LETTER
10/26/2021       RE: Melo Dangelo  [address]  [place] [address]     Dear Colleague,    Thank you for referring your patient, Melo Dangelo, to the [facility] at [facility]. Please see a copy of my visit note below.    Chief Complaint/Presenting Concern:  Retina fllow up.    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  Seen on August 2, 2021 at which time the central serous retinopathy was slightly improved but there is some persistent subretinal fluid in the right eye. We recommended daily eplerenone 25 mg daily and to follow-up today.    He reports things may be perhaps slightly worse in the right eye.     Interval Updates to Medical/Family/Social History:    Mr. Dangelo dialyzed recently for acute kidney injury as well as hematuria.  There are some concern about the possibility of eplerenone contributing to these conditions but this was discontinued after his hospitalization.  He does have plans to follow-up with urology as an outpatient next week. He has stopped the Eplerenone and also awaiting kidney biopsy. Not back on Propranolol for anxiety.    Relevant Review of Systems Updates:  Slightly more dizziness and perhaps unsteadiness. No passing out, no fevers.    Laboratory Testing  Reviewed from October 19, 2021: HIV negative, hepatitis C negative, Hepatitis B surface antibody positive. CBC with mild anemia, BMP with elevated creatinine 3.80.  Normal C3, C4, JOSE, ANCA, Anti Glomerular Basement Membrane antibody.    BMP on 10/26/21: Creatine 4.5, CBC 9.6 (stable)    Current eye related medications: No longer on Eplerenone.    Retina/Uveitis Imaging:   OCT Spectralis Macula October 26, 2021  right eye: No new fluid, one focal PED slightly larger, one slightly smaller, some stable nasal ellipsoid band  disruption. Thick choroid.   left eye: Normal contour.    OCT Optic Nerve RNFL Spectralis October 26, 2021  right eye: Avg thickness 114 microns, no thinning.   left eye: Avg thickness 117 microns, no thinning.     Assessment:     1. Central serous retinopathy, right  Slight variation on OCT, but no active fluid, no CNV.    2. Cupping of optic disc, right  With normal eye pressure and stable RNFL scans without signs of glaucoma.    3. Peripheral drusen of both eyes  Anatomic variant.     4. Posterior vitreous detachment, bilateral  Stable without retinal braks.    5. Age-related nuclear cataract of both eyes  Mild visual signficance.    Plan/Recommendations:      Discussed findings with patient. There are persistent changes on exam, but no fluid by OCT. Some of the vision issues may be related to prior changes and fluid. Given no active fluid and no CNV, we can observe off Eplerenone and without any other treatment.    Eye pressure is 19, 20.    Repeat Creatinine next week.    No more Eplerenone but continue drinking lots of water.    Will ask Dr. Smith if he has any other recommendations regarding elevated creatinine other than no more Eplerenone and continued hydration.    RTC  1. Urology on 11/1/21, will also schedule lab at [facility] to recheck BMP (Creatinine).    2. Early Dec 2021, tonopen, dilate, OCT (ordered) and 55 degree FAF.    Physician Attestation     Attending Physician Attestation:  Complete documentation of historical and exam elements from today's encounter can be found in the full encounter summary report (not reduplicated in this progress note). I personally obtained the chief complaint(s) and history of present illness. I confirmed and edited as necessary the review of systems, past medical/surgical history, family history, social history, and examination findings as documented by others; and I examined the patient myself. I personally reviewed the relevant tests, images, and reports as documented  above. I formulated and edited as necessary the assessment and plan and discussed the findings and management plan with the patient and family members present at the time of this visit.  Malik Julien M.D., Uveitis and Medical Retina, October 26, 2021     Again, thank you for allowing me to participate in the care of your patient.    Sincerely,    Malik Julien MD  [facility] Dept of Ophthalmology  Uveitis and Medical Retina

## 2021-10-27 ENCOUNTER — TELEPHONE (OUTPATIENT)
Dept: NEPHROLOGY | Facility: CLINIC | Age: 81
End: 2021-10-27

## 2021-10-27 NOTE — TELEPHONE ENCOUNTER
Call to patient per Dr. Barrow to inform him that we are working on infusion appointments:Date: 10/27/2021    Time of Call: 4:54 PM     Diagnosis:  IgA Nephropathy    [ TORB ] Ordering provider: Dr. Merry Barorw    Order: High dose solumedrol 500 mg/3 days followed by oral prednisone 40 mg every other day with bactrim single strength 1 tab PO every other day, pepcid 40 mg PO daily and calcium carbonate 8080-0344 mg PO daily.         Order received by: Lizbeth Roldan LPN     Follow-up/additional notes:    Patient has labs tomorrow (RP/CBC) and follow up with Dr. Barrow/Dr. Harry Monday Nov 1.    Lizbeth Roldan LPN  Nephrology  [phone]

## 2021-10-28 ENCOUNTER — LAB (OUTPATIENT)
Dept: LAB | Facility: CLINIC | Age: 81
End: 2021-10-28
Payer: MEDICARE

## 2021-10-28 ENCOUNTER — TELEPHONE (OUTPATIENT)
Dept: MULTI SPECIALTY CLINIC | Facility: CLINIC | Age: 81
End: 2021-10-28

## 2021-10-28 DIAGNOSIS — N02.B9 IGA NEPHROPATHY: Primary | ICD-10-CM

## 2021-10-28 DIAGNOSIS — Z79.899 HIGH RISK MEDICATION USE: ICD-10-CM

## 2021-10-28 DIAGNOSIS — N17.9 ACUTE KIDNEY INJURY (H): ICD-10-CM

## 2021-10-28 LAB
ALBUMIN SERPL-MCNC: 3.6 G/DL (ref 3.4–5)
ANION GAP SERPL CALCULATED.3IONS-SCNC: 2 MMOL/L (ref 3–14)
BUN SERPL-MCNC: 46 MG/DL (ref 7–30)
CALCIUM SERPL-MCNC: 9.1 MG/DL (ref 8.5–10.1)
CHLORIDE BLD-SCNC: 110 MMOL/L (ref 94–109)
CO2 SERPL-SCNC: 26 MMOL/L (ref 20–32)
CREAT SERPL-MCNC: 4.45 MG/DL (ref 0.66–1.25)
ERYTHROCYTE [DISTWIDTH] IN BLOOD BY AUTOMATED COUNT: 13.3 % (ref 10–15)
GFR SERPL CREATININE-BSD FRML MDRD: 12 ML/MIN/1.73M2
GLUCOSE BLD-MCNC: 100 MG/DL (ref 70–99)
HCT VFR BLD AUTO: 29.1 % (ref 40–53)
HGB BLD-MCNC: 9.7 G/DL (ref 13.3–17.7)
MCH RBC QN AUTO: 30.2 PG (ref 26.5–33)
MCHC RBC AUTO-ENTMCNC: 33.3 G/DL (ref 31.5–36.5)
MCV RBC AUTO: 91 FL (ref 78–100)
PHOSPHATE SERPL-MCNC: 3.5 MG/DL (ref 2.5–4.5)
PLATELET # BLD AUTO: 176 10E3/UL (ref 150–450)
POTASSIUM BLD-SCNC: 5.6 MMOL/L (ref 3.4–5.3)
RBC # BLD AUTO: 3.21 10E6/UL (ref 4.4–5.9)
SODIUM SERPL-SCNC: 138 MMOL/L (ref 133–144)
WBC # BLD AUTO: 6 10E3/UL (ref 4–11)

## 2021-10-28 PROCEDURE — 36415 COLL VENOUS BLD VENIPUNCTURE: CPT | Performed by: PATHOLOGY

## 2021-10-28 PROCEDURE — 85027 COMPLETE CBC AUTOMATED: CPT | Performed by: PATHOLOGY

## 2021-10-28 PROCEDURE — 80069 RENAL FUNCTION PANEL: CPT | Performed by: PATHOLOGY

## 2021-10-28 RX ORDER — DIPHENHYDRAMINE HYDROCHLORIDE 50 MG/ML
50 INJECTION INTRAMUSCULAR; INTRAVENOUS
Status: CANCELLED
Start: 2021-10-28

## 2021-10-28 RX ORDER — MEPERIDINE HYDROCHLORIDE 25 MG/ML
25 INJECTION INTRAMUSCULAR; INTRAVENOUS; SUBCUTANEOUS EVERY 30 MIN PRN
Status: CANCELLED | OUTPATIENT
Start: 2021-10-28

## 2021-10-28 RX ORDER — ALBUTEROL SULFATE 90 UG/1
1-2 AEROSOL, METERED RESPIRATORY (INHALATION)
Status: CANCELLED
Start: 2021-10-28

## 2021-10-28 RX ORDER — EPINEPHRINE 1 MG/ML
0.3 INJECTION, SOLUTION, CONCENTRATE INTRAVENOUS EVERY 5 MIN PRN
Status: CANCELLED | OUTPATIENT
Start: 2021-10-28

## 2021-10-28 RX ORDER — FAMOTIDINE 40 MG/1
40 TABLET, FILM COATED ORAL DAILY
Qty: 90 TABLET | Refills: 3 | Status: SHIPPED | OUTPATIENT
Start: 2021-10-28 | End: 2022-01-05

## 2021-10-28 RX ORDER — SULFAMETHOXAZOLE AND TRIMETHOPRIM 400; 80 MG/1; MG/1
1 TABLET ORAL EVERY OTHER DAY
Qty: 60 TABLET | Refills: 0 | Status: SHIPPED | OUTPATIENT
Start: 2021-10-28 | End: 2022-08-09

## 2021-10-28 RX ORDER — ALBUTEROL SULFATE 0.83 MG/ML
2.5 SOLUTION RESPIRATORY (INHALATION)
Status: CANCELLED | OUTPATIENT
Start: 2021-10-28

## 2021-10-28 RX ORDER — PREDNISONE 20 MG/1
40 TABLET ORAL EVERY OTHER DAY
Qty: 60 TABLET | Refills: 1 | Status: SHIPPED | OUTPATIENT
Start: 2021-11-01 | End: 2021-12-30

## 2021-10-28 RX ORDER — HEPARIN SODIUM,PORCINE 10 UNIT/ML
5 VIAL (ML) INTRAVENOUS
Status: CANCELLED | OUTPATIENT
Start: 2021-10-28

## 2021-10-28 RX ORDER — HEPARIN SODIUM (PORCINE) LOCK FLUSH IV SOLN 100 UNIT/ML 100 UNIT/ML
5 SOLUTION INTRAVENOUS
Status: CANCELLED | OUTPATIENT
Start: 2021-10-28

## 2021-10-28 RX ORDER — DIPHENHYDRAMINE HYDROCHLORIDE 50 MG/ML
50 INJECTION INTRAMUSCULAR; INTRAVENOUS DAILY
Status: CANCELLED
Start: 2021-10-28

## 2021-10-28 RX ORDER — NALOXONE HYDROCHLORIDE 0.4 MG/ML
0.2 INJECTION, SOLUTION INTRAMUSCULAR; INTRAVENOUS; SUBCUTANEOUS
Status: CANCELLED | OUTPATIENT
Start: 2021-10-28

## 2021-10-28 NOTE — PROGRESS NOTES
Communicated to patient solumedrol infusion appointments and oral medications to start Monday per Dr. Barrow. Patient verbalized understanding. Will send my chart with appointments and reminders.  Lizbeth Roldan LPN  Nephrology  [phone]

## 2021-10-28 NOTE — TELEPHONE ENCOUNTER
[facility] Call Center    Phone Message    May a detailed message be left on voicemail: yes     Reason for Call: Other: Pt has questions regarding injections, and would like some clarification. Please call pt to discuss. Thanks     Action Taken: Message routed to:  [facility] Nephro    Travel Screening: Not Applicable

## 2021-10-28 NOTE — TELEPHONE ENCOUNTER
Attempted to call patient regarding call. Provided number for call back.  Lizbeth Roldan LPN  Nephrology  [phone]

## 2021-10-29 ENCOUNTER — TELEPHONE (OUTPATIENT)
Dept: INTERNAL MEDICINE | Facility: CLINIC | Age: 81
End: 2021-10-29

## 2021-10-29 ENCOUNTER — INFUSION THERAPY VISIT (OUTPATIENT)
Dept: INFUSION THERAPY | Facility: CLINIC | Age: 81
End: 2021-10-29
Attending: INTERNAL MEDICINE
Payer: MEDICARE

## 2021-10-29 VITALS
TEMPERATURE: 98.1 F | RESPIRATION RATE: 16 BRPM | DIASTOLIC BLOOD PRESSURE: 86 MMHG | HEART RATE: 72 BPM | OXYGEN SATURATION: 98 % | SYSTOLIC BLOOD PRESSURE: 180 MMHG

## 2021-10-29 DIAGNOSIS — N17.9 ACUTE KIDNEY INJURY (H): ICD-10-CM

## 2021-10-29 DIAGNOSIS — N02.B9 IGA NEPHROPATHY: Primary | ICD-10-CM

## 2021-10-29 PROCEDURE — 258N000003 HC RX IP 258 OP 636: Performed by: INTERNAL MEDICINE

## 2021-10-29 PROCEDURE — 96365 THER/PROPH/DIAG IV INF INIT: CPT

## 2021-10-29 PROCEDURE — 250N000011 HC RX IP 250 OP 636: Performed by: INTERNAL MEDICINE

## 2021-10-29 RX ORDER — MEPERIDINE HYDROCHLORIDE 25 MG/ML
25 INJECTION INTRAMUSCULAR; INTRAVENOUS; SUBCUTANEOUS EVERY 30 MIN PRN
Status: CANCELLED | OUTPATIENT
Start: 2021-10-30

## 2021-10-29 RX ORDER — HEPARIN SODIUM (PORCINE) LOCK FLUSH IV SOLN 100 UNIT/ML 100 UNIT/ML
5 SOLUTION INTRAVENOUS
Status: CANCELLED | OUTPATIENT
Start: 2021-10-30

## 2021-10-29 RX ORDER — DIPHENHYDRAMINE HYDROCHLORIDE 50 MG/ML
50 INJECTION INTRAMUSCULAR; INTRAVENOUS DAILY
Status: CANCELLED
Start: 2021-10-30

## 2021-10-29 RX ORDER — HEPARIN SODIUM,PORCINE 10 UNIT/ML
5 VIAL (ML) INTRAVENOUS
Status: CANCELLED | OUTPATIENT
Start: 2021-10-30

## 2021-10-29 RX ORDER — NALOXONE HYDROCHLORIDE 0.4 MG/ML
0.2 INJECTION, SOLUTION INTRAMUSCULAR; INTRAVENOUS; SUBCUTANEOUS
Status: CANCELLED | OUTPATIENT
Start: 2021-10-30

## 2021-10-29 RX ORDER — ALBUTEROL SULFATE 0.83 MG/ML
2.5 SOLUTION RESPIRATORY (INHALATION)
Status: CANCELLED | OUTPATIENT
Start: 2021-10-30

## 2021-10-29 RX ORDER — ALBUTEROL SULFATE 90 UG/1
1-2 AEROSOL, METERED RESPIRATORY (INHALATION)
Status: CANCELLED
Start: 2021-10-30

## 2021-10-29 RX ORDER — EPINEPHRINE 1 MG/ML
0.3 INJECTION, SOLUTION INTRAMUSCULAR; SUBCUTANEOUS EVERY 5 MIN PRN
Status: CANCELLED | OUTPATIENT
Start: 2021-10-30

## 2021-10-29 RX ORDER — METHYLPREDNISOLONE SODIUM SUCCINATE 125 MG/2ML
125 INJECTION, POWDER, LYOPHILIZED, FOR SOLUTION INTRAMUSCULAR; INTRAVENOUS
Status: CANCELLED
Start: 2021-10-30

## 2021-10-29 RX ADMIN — SODIUM CHLORIDE 500 MG: 9 INJECTION, SOLUTION INTRAVENOUS at 07:49

## 2021-10-29 NOTE — TELEPHONE ENCOUNTER
Pt was scheduled on 11/12/21.    Soon-Mi  -----------------------------------------------------------------------------------        ----- Message from Francis Smith MD sent at 10/26/2021  6:28 PM CDT -----  Regarding: RE: Hospital admission  Thank you that is very helpful, I'd been watching for biopsy report but am glad you took care of it    Soon mi can you get him in to see me in November go over everything?  ----- Message -----  From: Khris Bray  Sent: 10/26/2021   3:11 PM CDT  To: Francis Smith MD  Subject: Hospital admission                               Hi Dr. Smith,    I was the hospitalist who took care of your patient, Mr. Melo Dangelo, during his hospitalization for MARYANN. During his stay, he was seen by urology and nephrology, and had a kidney biopsy which pathology now shows is IgA nephropathy. He has repeat labs and a virtual appointment with nephrology on 11/1. He has an outpatient visit with urology as well (I believe he will be getting a cystoscopy). Mr. Mtz just wanted to make sure you were in the loop as his primary care doctor as he wasn't sure if he should be expecting a call from you or only from the new specialists involved in his care.     Of note, he was previously on apixaban post-COVID and for new afib that occurred when he was sick with COVID. Due to hematuria and need for kidney biopsy, we held the apixaban. It was not clear to me whether or not he needed to be on it long term as he is many months COVID recovered and it is not clear his afib burden. His preference is to not be on apixaban so we sent him home with an event monitor, with the hopes that you would be able to follow this up and if no afib occurs, then perhaps we can spare him from resuming anticoagulation.     Thank you!    Sincerely,    Khris Bray MD (Sally)  Internal Medicine/Pediatrics  Hospitalist

## 2021-10-29 NOTE — PROGRESS NOTES
Nursing Note  [facility]o Dangelo [facility]:   Chief Complaint   Patient presents with     Infusion     IV Solumedrol     During [facility]'s [facility] appointment, orders from Dr GINA Barrow were completed.  Frequency: today is dose 1 of 3 total    Progress note:  Patient identification verified by name and date of birth.  Assessment completed.  Vitals recorded in Doc Flowsheets.  Patient was provided with education regarding medication/procedure and possible side effects.  Patient verbalized understanding.     present during visit today: Not Applicable.    Treatment Conditions: Non-applicable.    Premedications: were not ordered.    Drug Waste Record: No    Infusion length and rate:  infusion given over approximately 60 minutes    Labs: were not ordered for this appointment.    Vascular access: peripheral IV placed today.    Patient with elevated blood pressure upon arrival to [facility] and at time of discharge. Patient asymptomatic. States he has a blood pressure machine at home and will check it when he gets home and will call if it does not decrease or if he becomes symptomatic. Phone number provided to patient and patient verbalized understanding.     Is the next appt scheduled? Yes  Asymptomatic COVID test completed? No    Post Infusion Assessment:  Patient tolerated infusion without incident.  Blood return noted pre and post infusion.  Site patent and intact, free from redness, edema or discomfort.  No evidence of extravasations.  Access discontinued per protocol.     Discharge Plan:   Follow up plan of care with: ongoing infusions at [facility].  Discharge instructions were reviewed with patient.  Patient/representative verbalized understanding of discharge instructions and all questions answered.  Patient discharged from [facility] in stable condition.    Vanita Zavala,  RN       Administrations This Visit     methylPREDNISolone sodium succinate (solu-MEDROL) 500 mg in sodium chloride 0.9 % 100 mL intermittent infusion     Admin Date  10/29/2021 Action  New Bag Dose  500 mg Route  Intravenous Administered By  Vanita Zavala RN                  BP (!) 164/76 (BP Location: Left arm, Patient Position: Sitting)   Pulse 64   Temp 98.1  F (36.7  C) (Oral)   Resp 16   SpO2 97%

## 2021-10-29 NOTE — PATIENT INSTRUCTIONS
Patient Education     Patient Education    Methylprednisolone Acetate Suspension for injection    Methylprednisolone Oral tablet    Methylprednisolone Sodium Succinate Solution for injection    **Call [phone] and ask for physician on call for Dr GINA Barrow should you have any concerns following discharge.**  Methylprednisolone Acetate Suspension for injection  What is this medicine?  METHYLPREDNISOLONE (meth ill pred NISS oh lone) is a corticosteroid. It is commonly used to treat inflammation of the skin, joints, lungs, and other organs. Common conditions treated include asthma, allergies, and arthritis. It is also used for other conditions, such as blood disorders and diseases of the adrenal glands.  This medicine may be used for other purposes; ask your health care provider or pharmacist if you have questions.  What should I tell my health care provider before I take this medicine?  They need to know if you have any of these conditions:    cataracts or glaucoma    Cushings    heart disease    high blood pressure    infection including tuberculosis    low calcium or potassium levels in the blood    recent surgery    seizures    stomach or intestinal disease, including colitis    threadworms    thyroid problems    an unusual or allergic reaction to methylprednisolone, corticosteroids, benzyl alcohol, other medicines, foods, dyes, or preservatives    pregnant or trying to get pregnant    breast-feeding  How should I use this medicine?  This medicine is for injection into a muscle, joint, or other tissue. It is given by a health care professional in a hospital or clinic setting.  Talk to your pediatrician regarding the use of this medicine in children. While this drug may be prescribed for selected conditions, precautions do apply.  Overdosage: If you think you have taken too much of this medicine contact a poison control center or emergency room at once.  NOTE: This medicine is only for you. Do not share this  medicine with others.  What if I miss a dose?  This does not apply.  What may interact with this medicine?  Do not take this medicine with any of the following medications:    mifepristone  This medicine may also interact with the following medications:    aspirin and aspirin-like medicines    cyclosporin    ketoconazole    phenobarbital    phenytoin    rifampin    tacrolimus    troleandomycin    vaccines    warfarin  This list may not describe all possible interactions. Give your health care provider a list of all the medicines, herbs, non-prescription drugs, or dietary supplements you use. Also tell them if you smoke, drink alcohol, or use illegal drugs. Some items may interact with your medicine.  What should I watch for while using this medicine?  Visit your doctor or health care professional for regular checks on your progress. If you are taking this medicine for a long time, carry an identification card with your name and address, the type and dose of your medicine, and your doctor's name and address.  The medicine may increase your risk of getting an infection. Stay away from people who are sick. Tell your doctor or health care professional if you are around anyone with measles or chickenpox.  You may need to avoid some vaccines. Talk to your health care provider for more information.  If you are going to have surgery, tell your doctor or health care professional that you have taken this medicine within the last twelve months.  Ask your doctor or health care professional about your diet. You may need to lower the amount of salt you eat.  The medicine can increase your blood sugar. If you are a diabetic check with your doctor if you need help adjusting the dose of your diabetic medicine.  What side effects may I notice from receiving this medicine?  Side effects that you should report to your doctor or health care professional as soon as possible:    allergic reactions like skin rash, itching or hives,  swelling of the face, lips, or tongue    bloody or tarry stools    changes in vision    eye pain or bulging eyes    fever, sore throat, sneezing, cough, or other signs of infection, wounds that will not heal    increased thirst    irregular heartbeat    muscle cramps    pain in hips, back, ribs, arms, shoulders, or legs    swelling of the ankles, feet, hands    trouble passing urine or change in the amount of urine    unusual bleeding or bruising    unusually weak or tired    weight gain or weight loss  Side effects that usually do not require medical attention (report to your doctor or health care professional if they continue or are bothersome):    changes in emotions or moods    constipation or diarrhea    headache    irritation at site where injected    nausea, vomiting    skin problems, acne, thin and shiny skin    trouble sleeping    unusual hair growth on the face or body  This list may not describe all possible side effects. Call your doctor for medical advice about side effects. You may report side effects to FDA at 1-036-FDA-2411.  Where should I keep my medicine?  This drug is given in a hospital or clinic and will not be stored at home.  NOTE:This sheet is a summary. It may not cover all possible information. If you have questions about this medicine, talk to your doctor, pharmacist, or health care provider. Copyright  2016 Gold Standard

## 2021-10-29 NOTE — LETTER
10/29/2021         RE: Melo Dangelo  [address]  [place] [address]        Dear Colleague,    Thank you for referring your patient, Melo Dangelo, to the [facility]. Please see a copy of my visit note below.    Nursing Note  Melo Dangelo [facility]:   Chief Complaint   Patient presents with     Infusion     IV Solumedrol     During [facility] appointment, orders from Dr GINA Barrow were completed.  Frequency: today is dose 1 of 3 total    Progress note:  Patient identification verified by name and date of birth.  Assessment completed.  Vitals recorded in Doc Flowsheets.  Patient was provided with education regarding medication/procedure and possible side effects.  Patient verbalized understanding.     present during visit today: Not Applicable.    Treatment Conditions: Non-applicable.    Premedications: were not ordered.    Drug Waste Record: No    Infusion length and rate:  infusion given over approximately 60 minutes    Labs: were not ordered for this appointment.    Vascular access: peripheral IV placed today.    Patient with elevated blood pressure upon arrival to [facility] and at time of discharge. Patient asymptomatic. States he has a blood pressure machine at home and will check it when he gets home and will call if it does not decrease or if he becomes symptomatic. Phone number provided to patient and patient verbalized understanding.     Is the next appt scheduled? Yes  Asymptomatic COVID test completed? No    Post Infusion Assessment:  Patient tolerated infusion without incident.  Blood return noted pre and post infusion.  Site patent and intact, free from redness, edema or discomfort.  No evidence of extravasations.  Access discontinued per protocol.     Discharge Plan:   Follow up plan of care with: ongoing infusions at [place]  [facility] [place].  Discharge instructions were reviewed with patient.  Patient/representative verbalized understanding of discharge instructions and all questions answered.  [facility] in [facility] condition.    Vanita Zavala RN       Administrations This Visit     methylPREDNISolone sodium succinate (solu-MEDROL) 500 mg in sodium chloride 0.9 % 100 mL intermittent infusion     Admin Date  10/29/2021 Action  New Bag Dose  500 mg Route  Intravenous Administered By  Vanita Zavala RN                  BP (!) 164/76 (BP Location: Left arm, Patient Position: Sitting)   Pulse 64   Temp 98.1  F (36.7  C) (Oral)   Resp 16   SpO2 97%           Again, thank you for allowing me to participate in the care of your patient.        Sincerely,        [facility]

## 2021-10-30 ENCOUNTER — INFUSION THERAPY VISIT (OUTPATIENT)
Dept: INFUSION THERAPY | Facility: CLINIC | Age: 81
End: 2021-10-30
Attending: OPHTHALMOLOGY
Payer: MEDICARE

## 2021-10-30 VITALS
DIASTOLIC BLOOD PRESSURE: 74 MMHG | RESPIRATION RATE: 20 BRPM | OXYGEN SATURATION: 96 % | HEART RATE: 68 BPM | TEMPERATURE: 97.1 F | SYSTOLIC BLOOD PRESSURE: 167 MMHG

## 2021-10-30 DIAGNOSIS — N02.B9 IGA NEPHROPATHY: Primary | ICD-10-CM

## 2021-10-30 DIAGNOSIS — N17.9 ACUTE KIDNEY INJURY (H): ICD-10-CM

## 2021-10-30 PROCEDURE — 250N000011 HC RX IP 250 OP 636: Performed by: INTERNAL MEDICINE

## 2021-10-30 PROCEDURE — 96365 THER/PROPH/DIAG IV INF INIT: CPT

## 2021-10-30 PROCEDURE — 258N000003 HC RX IP 258 OP 636: Performed by: INTERNAL MEDICINE

## 2021-10-30 RX ORDER — DIPHENHYDRAMINE HYDROCHLORIDE 50 MG/ML
50 INJECTION INTRAMUSCULAR; INTRAVENOUS DAILY
Status: CANCELLED
Start: 2021-10-31

## 2021-10-30 RX ORDER — METHYLPREDNISOLONE SODIUM SUCCINATE 125 MG/2ML
125 INJECTION, POWDER, LYOPHILIZED, FOR SOLUTION INTRAMUSCULAR; INTRAVENOUS
Status: CANCELLED
Start: 2021-10-31

## 2021-10-30 RX ORDER — MEPERIDINE HYDROCHLORIDE 25 MG/ML
25 INJECTION INTRAMUSCULAR; INTRAVENOUS; SUBCUTANEOUS EVERY 30 MIN PRN
Status: CANCELLED | OUTPATIENT
Start: 2021-10-31

## 2021-10-30 RX ORDER — ALBUTEROL SULFATE 90 UG/1
1-2 AEROSOL, METERED RESPIRATORY (INHALATION)
Status: CANCELLED
Start: 2021-10-31

## 2021-10-30 RX ORDER — EPINEPHRINE 1 MG/ML
0.3 INJECTION, SOLUTION INTRAMUSCULAR; SUBCUTANEOUS EVERY 5 MIN PRN
Status: CANCELLED | OUTPATIENT
Start: 2021-10-31

## 2021-10-30 RX ORDER — HEPARIN SODIUM (PORCINE) LOCK FLUSH IV SOLN 100 UNIT/ML 100 UNIT/ML
5 SOLUTION INTRAVENOUS
Status: CANCELLED | OUTPATIENT
Start: 2021-10-31

## 2021-10-30 RX ORDER — NALOXONE HYDROCHLORIDE 0.4 MG/ML
0.2 INJECTION, SOLUTION INTRAMUSCULAR; INTRAVENOUS; SUBCUTANEOUS
Status: CANCELLED | OUTPATIENT
Start: 2021-10-31

## 2021-10-30 RX ORDER — HEPARIN SODIUM,PORCINE 10 UNIT/ML
5 VIAL (ML) INTRAVENOUS
Status: CANCELLED | OUTPATIENT
Start: 2021-10-31

## 2021-10-30 RX ORDER — ALBUTEROL SULFATE 0.83 MG/ML
2.5 SOLUTION RESPIRATORY (INHALATION)
Status: CANCELLED | OUTPATIENT
Start: 2021-10-31

## 2021-10-30 RX ADMIN — SODIUM CHLORIDE 500 MG: 9 INJECTION, SOLUTION INTRAVENOUS at 09:08

## 2021-10-30 ASSESSMENT — PAIN SCALES - GENERAL: PAINLEVEL: NO PAIN (0)

## 2021-10-30 NOTE — PROGRESS NOTES
Infusion Nursing Note:  Melo Dangelo presents today for Solumedrol.    Patient seen by provider today: No   present during visit today: Not Applicable.    Note: N/A.    Intravenous Access:  Peripheral IV placed.    Treatment Conditions:  Not Applicable.      Post Infusion Assessment:  Patient tolerated infusion without incident.  Blood return noted pre and post infusion.  Site patent and intact, free from redness, edema or discomfort.  No evidence of extravasations.  Access discontinued per protocol.       Discharge Plan:   Patient declined prescription refills.  Discharge instructions reviewed with: Patient.  Patient verbalized understanding of discharge instructions and all questions answered.  AVS to patient via [employer]  Patient will return 10/31/21 for next appointment.   Patient discharged in stable condition accompanied by: self.  Departure Mode: Ambulatory.      Amy Jacobson RN

## 2021-10-31 ENCOUNTER — INFUSION THERAPY VISIT (OUTPATIENT)
Dept: INFUSION THERAPY | Facility: CLINIC | Age: 81
End: 2021-10-31
Attending: OPHTHALMOLOGY
Payer: MEDICARE

## 2021-10-31 VITALS
HEART RATE: 56 BPM | TEMPERATURE: 97.8 F | DIASTOLIC BLOOD PRESSURE: 71 MMHG | OXYGEN SATURATION: 97 % | SYSTOLIC BLOOD PRESSURE: 157 MMHG | RESPIRATION RATE: 16 BRPM

## 2021-10-31 DIAGNOSIS — N17.9 ACUTE KIDNEY INJURY (H): ICD-10-CM

## 2021-10-31 DIAGNOSIS — N02.B9 IGA NEPHROPATHY: Primary | ICD-10-CM

## 2021-10-31 PROCEDURE — 250N000011 HC RX IP 250 OP 636: Performed by: INTERNAL MEDICINE

## 2021-10-31 PROCEDURE — 96365 THER/PROPH/DIAG IV INF INIT: CPT

## 2021-10-31 PROCEDURE — 258N000003 HC RX IP 258 OP 636: Performed by: INTERNAL MEDICINE

## 2021-10-31 RX ORDER — HEPARIN SODIUM,PORCINE 10 UNIT/ML
5 VIAL (ML) INTRAVENOUS
Status: CANCELLED | OUTPATIENT
Start: 2021-11-01

## 2021-10-31 RX ORDER — MEPERIDINE HYDROCHLORIDE 25 MG/ML
25 INJECTION INTRAMUSCULAR; INTRAVENOUS; SUBCUTANEOUS EVERY 30 MIN PRN
Status: CANCELLED | OUTPATIENT
Start: 2021-11-01

## 2021-10-31 RX ORDER — DIPHENHYDRAMINE HYDROCHLORIDE 50 MG/ML
50 INJECTION INTRAMUSCULAR; INTRAVENOUS DAILY
Status: CANCELLED
Start: 2021-11-01

## 2021-10-31 RX ORDER — METHYLPREDNISOLONE SODIUM SUCCINATE 125 MG/2ML
125 INJECTION, POWDER, LYOPHILIZED, FOR SOLUTION INTRAMUSCULAR; INTRAVENOUS
Status: CANCELLED
Start: 2021-11-01

## 2021-10-31 RX ORDER — HEPARIN SODIUM (PORCINE) LOCK FLUSH IV SOLN 100 UNIT/ML 100 UNIT/ML
5 SOLUTION INTRAVENOUS
Status: CANCELLED | OUTPATIENT
Start: 2021-11-01

## 2021-10-31 RX ORDER — NALOXONE HYDROCHLORIDE 0.4 MG/ML
0.2 INJECTION, SOLUTION INTRAMUSCULAR; INTRAVENOUS; SUBCUTANEOUS
Status: CANCELLED | OUTPATIENT
Start: 2021-11-01

## 2021-10-31 RX ORDER — ALBUTEROL SULFATE 90 UG/1
1-2 AEROSOL, METERED RESPIRATORY (INHALATION)
Status: CANCELLED
Start: 2021-11-01

## 2021-10-31 RX ORDER — EPINEPHRINE 1 MG/ML
0.3 INJECTION, SOLUTION INTRAMUSCULAR; SUBCUTANEOUS EVERY 5 MIN PRN
Status: CANCELLED | OUTPATIENT
Start: 2021-11-01

## 2021-10-31 RX ORDER — ALBUTEROL SULFATE 0.83 MG/ML
2.5 SOLUTION RESPIRATORY (INHALATION)
Status: CANCELLED | OUTPATIENT
Start: 2021-11-01

## 2021-10-31 RX ADMIN — SODIUM CHLORIDE 500 MG: 9 INJECTION, SOLUTION INTRAVENOUS at 08:52

## 2021-10-31 NOTE — PROGRESS NOTES
Infusion Nursing Note:  Melo JOANN Millertroy presents today for solemedrol.    Patient seen by provider today: No   present during visit today: Not Applicable.    Note: N/A.      Intravenous Access:  Peripheral IV placed.    Treatment Conditions:  Not Applicable.      Post Infusion Assessment:  Patient tolerated infusion without incident.  Blood return noted pre and post infusion.  Site patent and intact, free from redness, edema or discomfort.  No evidence of extravasations.  Access discontinued per protocol.       Discharge Plan:   Discharge instructions reviewed with: Patient.  Patient and/or family verbalized understanding of discharge instructions and all questions answered.  AVS to patient via [employer]  .   Patient discharged in stable condition accompanied by: self.  Departure Mode: Ambulatory.      Randolph Vora RN

## 2021-11-01 ENCOUNTER — VIRTUAL VISIT (OUTPATIENT)
Dept: NEPHROLOGY | Facility: CLINIC | Age: 81
End: 2021-11-01
Attending: INTERNAL MEDICINE
Payer: MEDICARE

## 2021-11-01 ENCOUNTER — OFFICE VISIT (OUTPATIENT)
Dept: UROLOGY | Facility: CLINIC | Age: 81
End: 2021-11-01
Payer: MEDICARE

## 2021-11-01 VITALS
WEIGHT: 180 LBS | DIASTOLIC BLOOD PRESSURE: 76 MMHG | HEART RATE: 62 BPM | BODY MASS INDEX: 25.2 KG/M2 | SYSTOLIC BLOOD PRESSURE: 147 MMHG | HEIGHT: 71 IN

## 2021-11-01 DIAGNOSIS — R31.0 GROSS HEMATURIA: ICD-10-CM

## 2021-11-01 DIAGNOSIS — N17.9 ACUTE KIDNEY INJURY (H): Primary | ICD-10-CM

## 2021-11-01 DIAGNOSIS — R35.0 BENIGN PROSTATIC HYPERPLASIA WITH URINARY FREQUENCY: Primary | ICD-10-CM

## 2021-11-01 DIAGNOSIS — N40.1 BENIGN PROSTATIC HYPERPLASIA WITH URINARY FREQUENCY: Primary | ICD-10-CM

## 2021-11-01 LAB
PATH REPORT.COMMENTS IMP SPEC: NORMAL
PATH REPORT.FINAL DX SPEC: NORMAL
PATH REPORT.GROSS SPEC: NORMAL
PATH REPORT.RELEVANT HX SPEC: NORMAL

## 2021-11-01 PROCEDURE — 52000 CYSTOURETHROSCOPY: CPT | Performed by: UROLOGY

## 2021-11-01 PROCEDURE — 40000724 ZZH UMP OPEN ENCOUNTER >70 DAYS

## 2021-11-01 PROCEDURE — 88112 CYTOPATH CELL ENHANCE TECH: CPT | Mod: TC | Performed by: UROLOGY

## 2021-11-01 PROCEDURE — 88112 CYTOPATH CELL ENHANCE TECH: CPT | Mod: 26 | Performed by: PATHOLOGY

## 2021-11-01 RX ORDER — LIDOCAINE HYDROCHLORIDE 20 MG/ML
JELLY TOPICAL ONCE
Status: COMPLETED | OUTPATIENT
Start: 2021-11-01 | End: 2021-11-01

## 2021-11-01 RX ADMIN — LIDOCAINE HYDROCHLORIDE: 20 JELLY TOPICAL at 08:20

## 2021-11-01 ASSESSMENT — PAIN SCALES - GENERAL
PAINLEVEL: NO PAIN (0)
PAINLEVEL: NO PAIN (0)

## 2021-11-01 ASSESSMENT — MIFFLIN-ST. JEOR: SCORE: 1543.6

## 2021-11-01 NOTE — NURSING NOTE
"Chief Complaint   Patient presents with    Cystoscopy     hematuria, BPH       Blood pressure (!) 147/76, pulse 62, height 1.803 m (5' 11\"), weight 81.6 kg (180 lb). Body mass index is 25.1 kg/m .    Patient Active Problem List   Diagnosis    Central serous chorioretinopathy of right eye    Cupping of optic disc, right    Peripheral drusen of both eyes    Posterior vitreous detachment, bilateral    Age-related nuclear cataract of both eyes    Major depressive disorder, recurrent episode, moderate (H)    Major depressive disorder    2019 novel coronavirus disease (COVID-19)    Arthritis of knee    Acute kidney injury (H)    Hematuria, unspecified type    IgA nephropathy       No Known Allergies    Current Outpatient Medications   Medication Sig Dispense Refill    busPIRone HCl (BUSPAR PO)       calcium carb-cholecalciferol (OS-GIANNI) 500-200 MG-UNIT tablet Take 1 tablet by mouth 2 times daily (with meals) 60 tablet 11    cyanocobalamin (VITAMIN B-12) 100 MCG tablet Take 1,000 mcg by mouth daily       escitalopram (LEXAPRO) 5 MG tablet Take 1 tablet (5 mg) by mouth daily 30 tablet 0    famotidine (PEPCID) 40 MG tablet Take 1 tablet (40 mg) by mouth daily 90 tablet 3    finasteride (PROSCAR) 5 MG tablet Take 1 tablet (5 mg) by mouth daily 90 tablet 3    lamoTRIgine (LAMICTAL) 100 MG tablet Take 2 tablets (200 mg) by mouth 2 times daily 360 tablet 3    predniSONE (DELTASONE) 20 MG tablet Take 2 tablets (40 mg) by mouth every other day 60 tablet 1    simvastatin (ZOCOR) 20 MG tablet Take 1 tablet (20 mg) by mouth At Bedtime 90 tablet 3    sulfamethoxazole-trimethoprim (BACTRIM) 400-80 MG tablet Take 1 tablet by mouth every other day 60 tablet 0    tamsulosin (FLOMAX) 0.4 MG capsule Take 1 capsule (0.4 mg) by mouth daily 90 capsule 3       Social History     Tobacco Use    Smoking status: Never Smoker    Smokeless tobacco: Never Used   Substance Use Topics    Alcohol use: No    Drug use: No       Invasive Procedure Safety " Checklist:    Procedure: Cystoscopy    Action: Complete sections and checkboxes as appropriate.    Pre-procedure:  1. Patient ID Verified with 2 identifiers (Jessie and  or MRN) : YES    2. Procedure and site verified with patient/designee (when able) : YES    3. Accurate consent documentation in medical record : YES    4. H&P (or appropriate assessment) documented in medical record : N/A  H&P must be up to 30 days prior to procedure an updated within 24 hours of                 Procedure as applicable.     5. Relevant diagnostic and radiology test results appropriately labeled and displayed as applicable : YES    6. Blood products, implants, devices, and/or special equipment available for the procedure as applicable : YES    7. Procedure site(s) marked with provider initials [Exclusions: none] : NO    8. Marking not required. Reason : Yes  Procedure does not require site marking    Time Out:     Time-Out performed immediately prior to starting procedure, including verbal and active participation of all team members addressing: YES    1. Correct patient identity.  2. Confirmed that the correct side and site are marked.  3. An accurate procedure to be done.  4. Agreement on the procedure to be done.  5. Correct patient position.  6. Relevant images and results are properly labeled and appropriately displayed.  7. The need to administer antibiotics or fluids for irrigation purposes during the procedure as applicable.  8. Safety precautions based on patient history or medication use.    During Procedure: Verification of correct person, site, and procedure occurs any time the responsibility for care of the patient is transferred to another member of the care team.    The following medication was given:     MEDICATION:  Lidocaine without epinephrine 2% jelly  ROUTE: urethral   SITE: urethral   DOSE: 10 mL  LOT #: [id number]  : [employer]  EXPIRATION DATE:   NDC#: [address]-9    Was there drug waste? No    Prior to med admin, verified patient identity using patient's name and date of birth.  Due to med administration, patient instructed to remain in clinic for 15 minutes  afterwards, and to report any adverse reaction to me immediately.    Drug Amount Wasted:  None.  Vial/Syringe: Syringe      Carleen Boyle  11/1/2021  8:00 AM

## 2021-11-01 NOTE — LETTER
"11/1/2021     RE: Melo Dangelo  [address]  [place] [address]     Dear Colleague,    Thank you for referring your patient, Melo Dangelo, to the [facility] [place] at [facility]. Please see a copy of my visit note below.    Melo is a 81 year old who is being evaluated via a billable video visit.      How would you like to obtain your AVS? MyChart  If the video visit is dropped, the invitation should be resent by: Text to cell phone: [phone]  Will anyone else be joining your video visit? No  {If patient encounters technical issues they should call [phone] :[id number]}    Video Start Time: {video visit start/end time for provider to select:406843}  Video-Visit Details  Type of service:  Video Visit  Video End Time:{video visit start/end time for provider to select:765442}    Originating Location (pt. Location): {video visit patient location:765954::\"Home\"}    Distant Location (provider location):  [facility] [place]     Platform used for Video Visit: {Virtual Visit Platforms:408082::\"[email or web address]\"}    Again, thank you for allowing me to participate in the care of your patient.      Sincerely,    Dar Harry MD    "

## 2021-11-01 NOTE — PROGRESS NOTES
CC: gross hematuria:    HPI: 82 yo male with gross hematuria in the setting of MARYANN.  Here for cystoscopy as part of his evaluation.    PMHx, PSHx, MEDS, ALLERGIES, FHx, SocHx reviewed in the chart    Cystoscopy: after informed consent was obtained, the patient was prepped and draped in standard sterile fashion. The flexible cystoscope was introduced into the patient's urethra without difficulty. There were no strictures in the urethra. Upon entering the bladder, the UOs were orthotopic and effluxing clear urine. There were no mucosal lesions, stones or foreign objects noted in the bladder. The remainder of the exam was normal.      DI: no nodules    ASSESSMENT AND PLAN: Over half of today's 25-minute visit was spent counseling the patient regarding his gross hematuria.  We will complete a hematuria evaluation with a renal US and cytology which was sent today.  We are pleased to see no sinister lesions noted on cystoscopy.  Patient will call my nurse for results of the US and cytology in the near future and follow up with us as needed if no concern for cancer.

## 2021-11-01 NOTE — LETTER
11/1/2021       RE: Melo Dangelo  [address]  [place] [address]     Dear Colleague,    Thank you for referring your patient, Melo Dangelo, to the [facility] [place] at [facility]. Please see a copy of my visit note below.    CC: gross hematuria:    HPI: 80 yo male with gross hematuria in the setting of MARYANN.  Here for cystoscopy as part of his evaluation.    PMHx, PSHx, MEDS, ALLERGIES, FHx, SocHx reviewed in the chart    Cystoscopy: after informed consent was obtained, the patient was prepped and draped in standard sterile fashion. The flexible cystoscope was introduced into the patient's urethra without difficulty. There were no strictures in the urethra. Upon entering the bladder, the UOs were orthotopic and effluxing clear urine. There were no mucosal lesions, stones or foreign objects noted in the bladder. The remainder of the exam was normal.      DI: no nodules    ASSESSMENT AND PLAN: Over half of today's 25-minute visit was spent counseling the patient regarding his gross hematuria.  We will complete a hematuria evaluation with a renal US and cytology which was sent today.  We are pleased to see no sinister lesions noted on cystoscopy.  Patient will call my nurse for results of the US and cytology in the near future and follow up with us as needed if no concern for cancer.    Again, thank you for allowing me to participate in the care of your patient.      Sincerely,    Shane Lemon MD

## 2021-11-02 ENCOUNTER — ANCILLARY PROCEDURE (OUTPATIENT)
Dept: ULTRASOUND IMAGING | Facility: CLINIC | Age: 81
End: 2021-11-02
Attending: UROLOGY
Payer: MEDICARE

## 2021-11-02 ENCOUNTER — LAB (OUTPATIENT)
Dept: LAB | Facility: CLINIC | Age: 81
End: 2021-11-02
Payer: MEDICARE

## 2021-11-02 DIAGNOSIS — R31.0 GROSS HEMATURIA: ICD-10-CM

## 2021-11-02 DIAGNOSIS — N17.9 ACUTE KIDNEY INJURY (H): ICD-10-CM

## 2021-11-02 LAB
ALBUMIN SERPL-MCNC: 3.3 G/DL (ref 3.4–5)
ANION GAP SERPL CALCULATED.3IONS-SCNC: 6 MMOL/L (ref 3–14)
BUN SERPL-MCNC: 58 MG/DL (ref 7–30)
CALCIUM SERPL-MCNC: 8.6 MG/DL (ref 8.5–10.1)
CHLORIDE BLD-SCNC: 106 MMOL/L (ref 94–109)
CO2 SERPL-SCNC: 25 MMOL/L (ref 20–32)
CREAT SERPL-MCNC: 3.69 MG/DL (ref 0.66–1.25)
ERYTHROCYTE [DISTWIDTH] IN BLOOD BY AUTOMATED COUNT: 13.5 % (ref 10–15)
GFR SERPL CREATININE-BSD FRML MDRD: 14 ML/MIN/1.73M2
GLUCOSE BLD-MCNC: 116 MG/DL (ref 70–99)
HCT VFR BLD AUTO: 27.8 % (ref 40–53)
HGB BLD-MCNC: 9.4 G/DL (ref 13.3–17.7)
MCH RBC QN AUTO: 31.1 PG (ref 26.5–33)
MCHC RBC AUTO-ENTMCNC: 33.8 G/DL (ref 31.5–36.5)
MCV RBC AUTO: 92 FL (ref 78–100)
PHOSPHATE SERPL-MCNC: 3.1 MG/DL (ref 2.5–4.5)
PLATELET # BLD AUTO: 164 10E3/UL (ref 150–450)
POTASSIUM BLD-SCNC: 4.1 MMOL/L (ref 3.4–5.3)
RBC # BLD AUTO: 3.02 10E6/UL (ref 4.4–5.9)
SODIUM SERPL-SCNC: 137 MMOL/L (ref 133–144)
WBC # BLD AUTO: 11.5 10E3/UL (ref 4–11)

## 2021-11-02 PROCEDURE — 36415 COLL VENOUS BLD VENIPUNCTURE: CPT | Performed by: PATHOLOGY

## 2021-11-02 PROCEDURE — 80069 RENAL FUNCTION PANEL: CPT | Performed by: PATHOLOGY

## 2021-11-02 PROCEDURE — 85027 COMPLETE CBC AUTOMATED: CPT | Performed by: PATHOLOGY

## 2021-11-02 PROCEDURE — 76770 US EXAM ABDO BACK WALL COMP: CPT | Mod: GC | Performed by: STUDENT IN AN ORGANIZED HEALTH CARE EDUCATION/TRAINING PROGRAM

## 2021-11-08 ENCOUNTER — TELEPHONE (OUTPATIENT)
Dept: NEPHROLOGY | Facility: CLINIC | Age: 81
End: 2021-11-08
Payer: MEDICARE

## 2021-11-08 DIAGNOSIS — N17.9 ACUTE KIDNEY INJURY (H): Primary | ICD-10-CM

## 2021-11-08 NOTE — TELEPHONE ENCOUNTER
[facility] Call Center    Phone Message    May a detailed message be left on voicemail: yes     Reason for Call: Other: Pt calling with worries about what to do until next appt on 11/12 with PCP Dr Smith.   Should he scheduled an appts, blood draws until them?  Please call Pt to discuss     Action Taken: Message routed to:  [facility]ph    Travel Screening: Not Applicable

## 2021-11-12 ENCOUNTER — OFFICE VISIT (OUTPATIENT)
Dept: FAMILY MEDICINE | Facility: CLINIC | Age: 81
End: 2021-11-12
Payer: MEDICARE

## 2021-11-12 ENCOUNTER — LAB (OUTPATIENT)
Dept: LAB | Facility: CLINIC | Age: 81
End: 2021-11-12
Payer: MEDICARE

## 2021-11-12 VITALS
SYSTOLIC BLOOD PRESSURE: 135 MMHG | OXYGEN SATURATION: 96 % | BODY MASS INDEX: 25.62 KG/M2 | HEART RATE: 63 BPM | HEIGHT: 71 IN | RESPIRATION RATE: 16 BRPM | DIASTOLIC BLOOD PRESSURE: 70 MMHG | WEIGHT: 183 LBS

## 2021-11-12 DIAGNOSIS — M72.2 PLANTAR FASCIITIS OF LEFT FOOT: ICD-10-CM

## 2021-11-12 DIAGNOSIS — N02.B9 IGA NEPHROPATHY: Primary | ICD-10-CM

## 2021-11-12 DIAGNOSIS — N02.B9 IGA NEPHROPATHY: ICD-10-CM

## 2021-11-12 DIAGNOSIS — Z09 HOSPITAL DISCHARGE FOLLOW-UP: ICD-10-CM

## 2021-11-12 LAB
ANION GAP SERPL CALCULATED.3IONS-SCNC: 2 MMOL/L (ref 3–14)
BASOPHILS # BLD AUTO: 0 10E3/UL (ref 0–0.2)
BASOPHILS NFR BLD AUTO: 0 %
BUN SERPL-MCNC: 51 MG/DL (ref 7–30)
CALCIUM SERPL-MCNC: 9.1 MG/DL (ref 8.5–10.1)
CHLORIDE BLD-SCNC: 109 MMOL/L (ref 94–109)
CO2 SERPL-SCNC: 24 MMOL/L (ref 20–32)
CREAT SERPL-MCNC: 3.67 MG/DL (ref 0.66–1.25)
EOSINOPHIL # BLD AUTO: 0 10E3/UL (ref 0–0.7)
EOSINOPHIL NFR BLD AUTO: 0 %
ERYTHROCYTE [DISTWIDTH] IN BLOOD BY AUTOMATED COUNT: 13.8 % (ref 10–15)
GFR SERPL CREATININE-BSD FRML MDRD: 15 ML/MIN/1.73M2
GLUCOSE BLD-MCNC: 84 MG/DL (ref 70–99)
HCT VFR BLD AUTO: 30.9 % (ref 40–53)
HGB BLD-MCNC: 10.1 G/DL (ref 13.3–17.7)
IMM GRANULOCYTES # BLD: 0.1 10E3/UL
IMM GRANULOCYTES NFR BLD: 1 %
LYMPHOCYTES # BLD AUTO: 2.8 10E3/UL (ref 0.8–5.3)
LYMPHOCYTES NFR BLD AUTO: 23 %
MCH RBC QN AUTO: 30.5 PG (ref 26.5–33)
MCHC RBC AUTO-ENTMCNC: 32.7 G/DL (ref 31.5–36.5)
MCV RBC AUTO: 93 FL (ref 78–100)
MONOCYTES # BLD AUTO: 1.2 10E3/UL (ref 0–1.3)
MONOCYTES NFR BLD AUTO: 10 %
NEUTROPHILS # BLD AUTO: 8.3 10E3/UL (ref 1.6–8.3)
NEUTROPHILS NFR BLD AUTO: 66 %
NRBC # BLD AUTO: 0 10E3/UL
NRBC BLD AUTO-RTO: 0 /100
PLATELET # BLD AUTO: 154 10E3/UL (ref 150–450)
POTASSIUM BLD-SCNC: 4.4 MMOL/L (ref 3.4–5.3)
RBC # BLD AUTO: 3.31 10E6/UL (ref 4.4–5.9)
SODIUM SERPL-SCNC: 135 MMOL/L (ref 133–144)
WBC # BLD AUTO: 12.6 10E3/UL (ref 4–11)

## 2021-11-12 PROCEDURE — 80048 BASIC METABOLIC PNL TOTAL CA: CPT | Performed by: PATHOLOGY

## 2021-11-12 PROCEDURE — 85025 COMPLETE CBC W/AUTO DIFF WBC: CPT | Performed by: PATHOLOGY

## 2021-11-12 PROCEDURE — 99214 OFFICE O/P EST MOD 30 MIN: CPT | Performed by: FAMILY MEDICINE

## 2021-11-12 PROCEDURE — 36415 COLL VENOUS BLD VENIPUNCTURE: CPT | Performed by: PATHOLOGY

## 2021-11-12 ASSESSMENT — MIFFLIN-ST. JEOR: SCORE: 1557.21

## 2021-11-12 ASSESSMENT — PAIN SCALES - GENERAL: PAINLEVEL: NO PAIN (0)

## 2021-11-12 NOTE — NURSING NOTE
Melo Dangelo is a 81 year old male patient that presents today in clinic for the following:    Chief Complaint   Patient presents with     Hospital F/U     Patient comes for a hospital follow up.      The patient's allergies and medications were reviewed as noted. A set of vitals were recorded as noted without incident. The patient does not have any other questions for the provider.    Melo Ortega, EMT at 10:11 AM on 11/12/2021

## 2021-11-12 NOTE — PROGRESS NOTES
"    Assessment & Plan     IgA nephropathy  Discussed dx at length  - Adult Nephrology Referral  - CBC with platelets differential; Future  - Basic metabolic panel; Future    Hospital discharge follow-up  Reviewed inpt notes together    Plantar fasciitis of left foot  Better from pred, bedrest time        35 minutes spent on the date of the encounter doing chart review, history and exam, documentation and further activities per the note    BMI:   Estimated body mass index is 25.52 kg/m  as calculated from the following:    Height as of this encounter: 1.803 m (5' 11\").    Weight as of this encounter: 83 kg (183 lb).     Francis Smith MD  [facility] [place]    Armand Alejo is a 81 year old who presents for the following health issues  accompanied by his spouse.    HPI Here in f/u. [place] well at last visit, still does.  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  Current Outpatient Medications   Medication     busPIRone HCl (BUSPAR PO)     calcium carb-cholecalciferol (OS-GIANNI) 500-200 MG-UNIT tablet     cyanocobalamin (VITAMIN B-12) 100 MCG tablet     escitalopram (LEXAPRO) 5 MG tablet     famotidine (PEPCID) 40 MG tablet     finasteride (PROSCAR) 5 MG tablet     lamoTRIgine (LAMICTAL) 100 MG tablet     predniSONE (DELTASONE) 20 MG tablet     simvastatin (ZOCOR) 20 MG tablet     sulfamethoxazole-trimethoprim (BACTRIM) 400-80 MG tablet     " "tamsulosin (FLOMAX) 0.4 MG capsule     No current facility-administered medications for this visit.     No Known Allergies        Review of Systems         Objective    /70 (BP Location: Right arm, Patient Position: Sitting, Cuff Size: Adult Regular)   Pulse 63   Resp 16   Ht 1.803 m (5' 11\")   Wt 83 kg (183 lb)   SpO2 96%   BMI 25.52 kg/m    Body mass index is 25.52 kg/m .  Physical Exam   GENERAL: healthy, alert and no distress  MS: no gross musculoskeletal defects noted, no edema                "

## 2021-11-18 ENCOUNTER — TELEPHONE (OUTPATIENT)
Dept: FAMILY MEDICINE | Facility: CLINIC | Age: 81
End: 2021-11-18
Payer: MEDICARE

## 2021-11-18 NOTE — TELEPHONE ENCOUNTER
[facility] Call Center    Phone Message    May a detailed message be left on voicemail: yes     Reason for Call: Other: Dr. Smith told patient to make an appointment with nephrology and earliest time to be seen is 02/11/2021. Patient just has renal end stage last time he saw Dr. Smith 1.5 weeks ago. Patient has not received any explanation of what to expect. Patient just found out 2 weeks ago about his diagnosis, he went to hospital and had a biopsy. Patient also talked to someone from phrenology who said he had IGA. They did not explain what will happen, what diet the patient should be on, or if he needs a transplant.      Patient wondering if he should get a blood test for creatinine so that he can get some answers sooner. Patient wants to know if he should get a nephology appointment sooner and Dr. Smith s thoughts.    Please call patient back asap at home phone, if that doesn t work please call his mobile.    Action Taken: Message routed to:  [facility]    Travel Screening: Not Applicable

## 2021-11-19 ENCOUNTER — MYC MEDICAL ADVICE (OUTPATIENT)
Dept: FAMILY MEDICINE | Facility: CLINIC | Age: 81
End: 2021-11-19
Payer: MEDICARE

## 2021-11-19 ENCOUNTER — TELEPHONE (OUTPATIENT)
Dept: FAMILY MEDICINE | Facility: CLINIC | Age: 81
End: 2021-11-19
Payer: MEDICARE

## 2021-11-19 NOTE — TELEPHONE ENCOUNTER
Spoke with Jennie and informed that pt was referred to nephrology. I told her I would contact nephrology to see how they can help the pt. Message was sent to nephrology.

## 2021-11-19 NOTE — TELEPHONE ENCOUNTER
[facility] Call Center    Phone Message    May a detailed message be left on voicemail: yes     Reason for Call: Other: Patient's daughter, Jennie Mtz calling and reporting that she will be working on administering her father's care. Jennie is aware that her father has called the primary care clinic and nephrology clinic to discuss his kidney concerns. Jennie would like to talk to the clinic to get an understanding of the situation.      At the time of the call Jennie mentioned that she has consent to communicate on file.  could not find the document until after the call ended. Consent to communicate form is located under EPIC media folder dated 01/20/2021 7:39 AM. Please call back to discuss.    Jennie #: [phone]    Action Taken: Message routed to:  [facility]    Travel Screening: Not Applicable

## 2021-11-24 ENCOUNTER — TELEPHONE (OUTPATIENT)
Dept: NEPHROLOGY | Facility: CLINIC | Age: 81
End: 2021-11-24
Payer: MEDICARE

## 2021-11-24 NOTE — TELEPHONE ENCOUNTER
Call to patient. Helped to schedule follow up with Dr. Harry as he reports he did not have that scheduled, had more questions and is worried about his kidneys declining. Patient also asking if more lab monitoring would be appropriate given his kidney disease and agreed for in person with Dr. Harry Jan 28th. Will route message to Dr. Harry to see if ok to do labs in December or standing labs until them. Patient otherwise reports to be taking his prednisone 40 mg daily and other medications and reports to be feeling fine.  Cancelled appointment with Dr. Chong.  Mentioned that we will need an consent to communicate on file with his daughter as well.  Lizbeth Roldan, SOBEIDA  Nephrology  [phone]

## 2021-11-29 ENCOUNTER — LAB (OUTPATIENT)
Dept: LAB | Facility: CLINIC | Age: 81
End: 2021-11-29
Payer: MEDICARE

## 2021-11-29 DIAGNOSIS — N17.9 ACUTE KIDNEY INJURY (H): ICD-10-CM

## 2021-11-29 LAB
ALBUMIN SERPL-MCNC: 3.6 G/DL (ref 3.4–5)
ALBUMIN UR-MCNC: NEGATIVE MG/DL
ANION GAP SERPL CALCULATED.3IONS-SCNC: 5 MMOL/L (ref 3–14)
APPEARANCE UR: CLEAR
BACTERIA #/AREA URNS HPF: ABNORMAL /HPF
BILIRUB UR QL STRIP: NEGATIVE
BUN SERPL-MCNC: 39 MG/DL (ref 7–30)
CALCIUM SERPL-MCNC: 9 MG/DL (ref 8.5–10.1)
CHLORIDE BLD-SCNC: 105 MMOL/L (ref 94–109)
CO2 SERPL-SCNC: 27 MMOL/L (ref 20–32)
COLOR UR AUTO: ABNORMAL
CREAT SERPL-MCNC: 3.85 MG/DL (ref 0.66–1.25)
CREAT UR-MCNC: 37 MG/DL
ERYTHROCYTE [DISTWIDTH] IN BLOOD BY AUTOMATED COUNT: 13.6 % (ref 10–15)
GFR SERPL CREATININE-BSD FRML MDRD: 14 ML/MIN/1.73M2
GLUCOSE BLD-MCNC: 132 MG/DL (ref 70–99)
GLUCOSE UR STRIP-MCNC: NEGATIVE MG/DL
HCT VFR BLD AUTO: 30.6 % (ref 40–53)
HGB BLD-MCNC: 10.3 G/DL (ref 13.3–17.7)
HGB UR QL STRIP: ABNORMAL
KETONES UR STRIP-MCNC: NEGATIVE MG/DL
LEUKOCYTE ESTERASE UR QL STRIP: NEGATIVE
MCH RBC QN AUTO: 31 PG (ref 26.5–33)
MCHC RBC AUTO-ENTMCNC: 33.7 G/DL (ref 31.5–36.5)
MCV RBC AUTO: 92 FL (ref 78–100)
NITRATE UR QL: NEGATIVE
PH UR STRIP: 6 [PH] (ref 5–7)
PHOSPHATE SERPL-MCNC: 2.7 MG/DL (ref 2.5–4.5)
PLATELET # BLD AUTO: 216 10E3/UL (ref 150–450)
POTASSIUM BLD-SCNC: 4.9 MMOL/L (ref 3.4–5.3)
PROT UR-MCNC: 0.17 G/L
PROT/CREAT 24H UR: 0.46 G/G CR (ref 0–0.2)
RBC # BLD AUTO: 3.32 10E6/UL (ref 4.4–5.9)
RBC URINE: 2 /HPF
SODIUM SERPL-SCNC: 137 MMOL/L (ref 133–144)
SP GR UR STRIP: 1.01 (ref 1–1.03)
SQUAMOUS EPITHELIAL: <1 /HPF
UROBILINOGEN UR STRIP-MCNC: NORMAL MG/DL
WBC # BLD AUTO: 6.9 10E3/UL (ref 4–11)
WBC URINE: 1 /HPF

## 2021-11-29 PROCEDURE — 85027 COMPLETE CBC AUTOMATED: CPT | Performed by: PATHOLOGY

## 2021-11-29 PROCEDURE — 81001 URINALYSIS AUTO W/SCOPE: CPT | Performed by: PATHOLOGY

## 2021-11-29 PROCEDURE — 80069 RENAL FUNCTION PANEL: CPT | Performed by: PATHOLOGY

## 2021-11-29 PROCEDURE — 84156 ASSAY OF PROTEIN URINE: CPT | Performed by: PATHOLOGY

## 2021-11-29 PROCEDURE — 36415 COLL VENOUS BLD VENIPUNCTURE: CPT | Performed by: PATHOLOGY

## 2021-11-29 NOTE — TELEPHONE ENCOUNTER
Reviewed with Dr. Barrow, she spoke to patient and offered video visit tomorrow at 1130, patient agreed. Patient top get labs drawn today. Lab orders placed  .  Lizbeth Roldan LPN  Nephrology  [phone]

## 2021-11-30 ENCOUNTER — VIRTUAL VISIT (OUTPATIENT)
Dept: NEPHROLOGY | Facility: CLINIC | Age: 81
End: 2021-11-30
Attending: INTERNAL MEDICINE
Payer: MEDICARE

## 2021-11-30 DIAGNOSIS — N02.B9 IGA NEPHROPATHY: Primary | ICD-10-CM

## 2021-11-30 DIAGNOSIS — N18.4 CKD (CHRONIC KIDNEY DISEASE) STAGE 4, GFR 15-29 ML/MIN (H): ICD-10-CM

## 2021-11-30 PROCEDURE — G0463 HOSPITAL OUTPT CLINIC VISIT: HCPCS | Mod: PN,RTG | Performed by: INTERNAL MEDICINE

## 2021-11-30 PROCEDURE — 99215 OFFICE O/P EST HI 40 MIN: CPT | Mod: 95 | Performed by: INTERNAL MEDICINE

## 2021-11-30 RX ORDER — CYCLOPHOSPHAMIDE 50 MG/1
100 CAPSULE ORAL DAILY
Qty: 60 CAPSULE | Refills: 3 | Status: SHIPPED | OUTPATIENT
Start: 2021-11-30 | End: 2022-06-02

## 2021-11-30 ASSESSMENT — PAIN SCALES - GENERAL: PAINLEVEL: NO PAIN (0)

## 2021-11-30 NOTE — LETTER
11/30/2021     RE: Melo Dangelo  [address]  [place] [address]     Dear Colleague,    Thank you for referring your patient, Melo Dangelo, to the [facility]OLOG[facility] [place] at [facility]. Please see a copy of my visit note below.    Melo is a 81 year old who is being evaluated via a billable video visit.      How would you like to obtain your AVS? MyChart  If the video visit is dropped, the invitation should be resent by: Text to cell phone: [phone]  Will anyone else be joining your video visit? No    Video Start Time:11.37 am  Video-Visit Details  Type of service:  Video Visit  Video End Time:12.11 pm  Originating Location (pt. Location): Home  Distant Location (provider location):  [facility] [place]   Platform used for Video Visit: [facility]       Nephrology Clinic Follow up  November 30, 2021      Melo Dangelo MRN:[id number] YOB: 1940  Date of Admission:(Not on file)  Primary care provider: Francis Smith  Requesting physician: Merry Barrow, *       REASON FOR CONSULT: IgA nephropathy       HISTORY OF PRESENT ILLNESS:       PAST MEDICAL HISTORY:  Reviewed with patient on 11/30/2021   As per HPI    MEDICATIONS:  Reviewed with the patient in detail    ALLERGIES:    Reviewed with the patient in detail    REVIEW OF SYSTEMS:  A comprehensive of systems was negative except as noted above.    SOCIAL HISTORY:   Reviewed with patient, no smoking and no alcohol use     FAMILY MEDICAL HISTORY:   Reviewed, no family history of need for dialysis, transplant or CKD    PHYSICAL EXAM:   Vital signs:There were no vitals taken for this visit.    Physical Exam     Interval History: Checks blood pressures occasionally and reports that most of the times his pressures have been around 140 mm of Hg     ASSESSMENT AND RECOMMENDATIONS:     # IgA nephropathy [id number]   # CKD  stage 4   # Anemia     Patient was first diagnosed with IgA nephropathy when he presented to the hospital with gross hematuria after his second COVID vaccine. He presented with a Sr creatinine of 3.5 which peaked to 4.4. A kidney bx was done which showed IgA nephropathy with some fibrocellular crescents.  7/14 gloms were globally sclerosed.   He was started on Pozzi protocol with solumedrol 500 mg/3 days followed by oral prednisone 40 mg every other day with bactrim single strength 1 tab PO every other day, pepcid 40 mg PO daily and calcium carbonate 7401-3658 mg PO daily.   Unfortunately, there has not been any significant improvement in his creatinine however his urine sediment is slightly better.   Will start him on Cyclophosphamide 100 mg daily. For now will continue his prednisone however with next set of labs will start tapering his steroids.     F/up with labs in 1 month     Merry Barrow MD

## 2021-11-30 NOTE — PROGRESS NOTES
Melo is a 81 year old who is being evaluated via a billable video visit.      How would you like to obtain your AVS? MyChart  If the video visit is dropped, the invitation should be resent by: Text to cell phone: [phone]  Will anyone else be joining your video visit? No      Video Start Time:11.37 am  Video-Visit Details    Type of service:  Video Visit    Video End Time:12.11 pm    Originating Location (pt. Location): Home    Distant Location (provider location):  [facility] [place]     Platform used for Video Visit: [facility]       Nephrology Clinic Follow up  November 30, 2021      Melo Dangelo MRN:[id number] YOB: 1940  Date of Admission:(Not on file)  Primary care provider: Francis Smith  Requesting physician: Merry Barrow, *       REASON FOR CONSULT: IgA nephropathy       HISTORY OF PRESENT ILLNESS:       PAST MEDICAL HISTORY:  Reviewed with patient on 11/30/2021   As per HPI    MEDICATIONS:  Reviewed with the patient in detail    ALLERGIES:    Reviewed with the patient in detail    REVIEW OF SYSTEMS:  A comprehensive of systems was negative except as noted above.    SOCIAL HISTORY:   Reviewed with patient, no smoking and no alcohol use     FAMILY MEDICAL HISTORY:   Reviewed, no family history of need for dialysis, transplant or CKD    PHYSICAL EXAM:   Vital signs:There were no vitals taken for this visit.    Physical Exam     Interval History: Checks blood pressures occasionally and reports that most of the times his pressures have been around 140 mm of Hg     ASSESSMENT AND RECOMMENDATIONS:     # IgA nephropathy [id number]   # CKD stage 4   # Anemia     Patient was first diagnosed with IgA nephropathy when he presented to the hospital with gross hematuria after his second COVID vaccine. He presented with a Sr creatinine of 3.5 which peaked to 4.4. A kidney bx was done which showed IgA nephropathy with some fibrocellular crescents.  7/14  gloms were globally sclerosed.   He was started on Pozzi protocol with solumedrol 500 mg/3 days followed by oral prednisone 40 mg every other day with bactrim single strength 1 tab PO every other day, pepcid 40 mg PO daily and calcium carbonate 9457-1218 mg PO daily.   Unfortunately, there has not been any significant improvement in his creatinine however his urine sediment is slightly better.   Will start him on Cyclophosphamide 100 mg daily. For now will continue his prednisone however with next set of labs will start tapering his steroids.     F/up with labs in 1 month     Merry Barrow MD

## 2021-12-01 DIAGNOSIS — N18.4 CKD (CHRONIC KIDNEY DISEASE) STAGE 4, GFR 15-29 ML/MIN (H): Primary | ICD-10-CM

## 2021-12-01 DIAGNOSIS — N02.B9 IGA NEPHROPATHY: ICD-10-CM

## 2021-12-03 ENCOUNTER — TELEPHONE (OUTPATIENT)
Dept: BEHAVIORAL HEALTH | Facility: CLINIC | Age: 81
End: 2021-12-03
Payer: MEDICARE

## 2021-12-03 NOTE — TELEPHONE ENCOUNTER
Good point  Nithin could you or a renal clinic pharmD review his chronic meds to see if ok in his new diagnosis ckd?

## 2021-12-03 NOTE — TELEPHONE ENCOUNTER
Melo let a voice message earlier this week. He stated he was recently diagnosed with end stage renal failure. He has concerns about taking Buspar as he has noticed in the literature a contraindication for Buspar in patients with kidney disease.     I returned Melo's call and spoke with him. I told him according to Dr. Solano's last note that his care had concluded with him and he was being referred back to Dr. Smith to manage his psychiatric medications. I told Melo I would inform Dr. Smith and Dr. Solano of his concerns and that Dr. Smith;s office would most likely be reaching out to him soon to discuss their recommendations and further instructions.

## 2021-12-07 ENCOUNTER — OFFICE VISIT (OUTPATIENT)
Dept: OPHTHALMOLOGY | Facility: CLINIC | Age: 81
End: 2021-12-07
Attending: OPHTHALMOLOGY
Payer: MEDICARE

## 2021-12-07 DIAGNOSIS — H35.711 CENTRAL SEROUS RETINOPATHY, RIGHT: Primary | ICD-10-CM

## 2021-12-07 DIAGNOSIS — H35.363 PERIPHERAL DRUSEN OF BOTH EYES: ICD-10-CM

## 2021-12-07 DIAGNOSIS — H47.231 CUPPING OF OPTIC DISC, RIGHT: ICD-10-CM

## 2021-12-07 DIAGNOSIS — Z79.899 HIGH RISK MEDICATION USE: ICD-10-CM

## 2021-12-07 DIAGNOSIS — H25.13 AGE-RELATED NUCLEAR CATARACT OF BOTH EYES: ICD-10-CM

## 2021-12-07 PROCEDURE — G0463 HOSPITAL OUTPT CLINIC VISIT: HCPCS

## 2021-12-07 PROCEDURE — 92134 CPTRZ OPH DX IMG PST SGM RTA: CPT | Performed by: OPHTHALMOLOGY

## 2021-12-07 PROCEDURE — 92250 FUNDUS PHOTOGRAPHY W/I&R: CPT | Performed by: OPHTHALMOLOGY

## 2021-12-07 PROCEDURE — 99213 OFFICE O/P EST LOW 20 MIN: CPT | Performed by: OPHTHALMOLOGY

## 2021-12-07 ASSESSMENT — VISUAL ACUITY
OS_CC: 20/20
METHOD: SNELLEN - LINEAR
OD_CC: 20/25
OD_CC+: -2
CORRECTION_TYPE: GLASSES

## 2021-12-07 ASSESSMENT — REFRACTION_WEARINGRX
OD_CYLINDER: +1.00
OD_AXIS: 179
OS_CYLINDER: +1.75
OS_ADD: +2.50
OS_AXIS: 010
OD_SPHERE: +2.50
OS_SPHERE: +2.50
OD_ADD: +2.50
SPECS_TYPE: BIFOCAL

## 2021-12-07 ASSESSMENT — CUP TO DISC RATIO
OD_RATIO: 0.6
OS_RATIO: 0.5

## 2021-12-07 ASSESSMENT — CONF VISUAL FIELD
OD_NORMAL: 1
OS_NORMAL: 1

## 2021-12-07 ASSESSMENT — TONOMETRY
OD_IOP_MMHG: 14
IOP_METHOD: TONOPEN
OS_IOP_MMHG: 19

## 2021-12-07 ASSESSMENT — EXTERNAL EXAM - RIGHT EYE: OD_EXAM: NORMAL

## 2021-12-07 ASSESSMENT — SLIT LAMP EXAM - LIDS: COMMENTS: MEIBOMIAN GLAND INSPISSATION

## 2021-12-07 ASSESSMENT — EXTERNAL EXAM - LEFT EYE: OS_EXAM: NORMAL

## 2021-12-07 NOTE — NURSING NOTE
Chief Complaints and History of Present Illnesses   Patient presents with     Follow Up     Chief Complaint(s) and History of Present Illness(es)     Follow Up     Laterality: right eye    Associated symptoms: Negative for eye pain, headache, floaters and flashes              Comments     Pt here for follow up on  right eye. Vision has changed since last exam. Pt says looking at his AMSLER grid he is noticing they are more pronounced distortion. Pt recently dx renal failure and is on new medications he wants to talk about.  Not using drops.  VANDANA BURNETT 12:25 PM December 7, 2021

## 2021-12-07 NOTE — PROGRESS NOTES
Chief Complaint/Presenting Concern:  Retina follow up    Interval History of Present Ocular Illness:  Melo Dangelo is a 81 year old patient who returns for follow up of his . At our last visit, the Eplerenone had been stopped given elevated creatinine level.We recommended monitoring today. Now on medicines as below, and Mr. Dangelo feels things are more wavy ine right eye. He feels it is more difficult for the eyes to work together.    Interval Updates to Medical/Family/Social History:    Elevated creatinine diagnosed as IgA Nephropathy and now on prednisone (for one month) and Cyclophosphamide.    Mr. Dangelo is now off Buspar given possible consideration for liver/kidney issues.    Relevant Review of Systems Updates:  No coughs/colds. No abdominal/back pain.    Laboratory Testing:  Creatinine 3.85 (generally stable) from 11/30/21    Current eye related medications: Prednisone 40 mg daily, Cyclophosphamide 100 mg daily    Retina/Uveitis Imaging:   OCT Spectralis Macula December 7, 2021  right eye: Focal outer segment deposits, PED, no true fluid, thick choroid. Stable   left eye: Normal contour, normal outer segments, no fluid, thick choroid. Stable     [place] 55 degree Autofluoresence December 7, 2021  right eye: Interval increased in hypo-FAF area including and temporal to fovea  left eye: Generally normal FAF    Assessment:     1. Central serous retinopathy, right  Some symptoms but no new fluid even on prednisone for 1 month.     2. Cupping of optic disc, right  With normal eye pressure    3. 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 Age-related nuclear cataract of both eyes  Mildly visually significant    5. High risk medication use  Prednisone 40 mg daily, Cyclophosphamide 100 mg daily    Plan/Recommendations:      Discussed findings with patient. Even on prednisone for the last month, there is no new fluid in the retina in the right eye. As such, we can safely monitor as the need for  prednisone and cyclophosphamide is paramount given IgA Nephropathy.     Eye pressure is normal in each eye     No additional lab testing.      Continue Prednisone and Cyclophosphamide per Nephrology.    No treatment specifically for eyes    RTC 3 months tonopen, dilate, OCT, RNFL (Ordered)     Physician Attestation     Attending Physician Attestation:  Complete documentation of historical and exam elements from today's encounter can be found in the full encounter summary report (not reduplicated in this progress note). I personally obtained the chief complaint(s) and history of present illness. I confirmed and edited as necessary the review of systems, past medical/surgical history, family history, social history, and examination findings as documented by others; and I examined the patient myself. I personally reviewed the relevant tests, images, and reports as documented above. I formulated and edited as necessary the assessment and plan and discussed the findings and management plan with the patient and family members present at the time of this visit.  Malik Julien M.D., Uveitis and Medical Retina, December 7, 2021

## 2021-12-07 NOTE — LETTER
12/7/2021       RE: Melo Dangelo  [address]  [place] [address]     Dear Colleague,    Thank you for referring your patient, Melo Dangelo, to the [facility] at [facility]. Please see a copy of my visit note below.    Chief Complaint/Presenting Concern:  Retina follow up    Interval History of Present Ocular Illness:  Melo Dangelo is a 81 year old patient who returns for follow up of his . At our last visit, the Eplerenone had been stopped given elevated creatinine level.We recommended monitoring today. Now on medicines as below, and Mr. Dangelo feels things are more wavy ine right eye. He feels it is more difficult for the eyes to work together.    Interval Updates to Medical/Family/Social History:    Elevated creatinine diagnosed as IgA Nephropathy and now on prednisone (for one month) and Cyclophosphamide.    Mr. Dangelo is now off Buspar given possible consideration for liver/kidney issues.    Relevant Review of Systems Updates:  No coughs/colds. No abdominal/back pain.    Laboratory Testing:  Creatinine 3.85 (generally stable) from 11/30/21    Current eye related medications: Prednisone 40 mg daily, Cyclophosphamide 100 mg daily    Retina/Uveitis Imaging:   OCT Spectralis Macula December 7, 2021  right eye: Focal outer segment deposits, PED, no true fluid, thick choroid. Stable   left eye: Normal contour, normal outer segments, no fluid, thick choroid. Stable         [place] 55 degree Autofluoresence December 7, 2021  right eye: Interval increased in hypo-FAF area including and temporal to fovea  left eye: Generally normal FAF    Assessment:     1. Central serous retinopathy, right  Some symptoms but no new fluid even on prednisone for 1 month.     2. Cupping of optic disc, right  With normal eye pressure    3. 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 Age-related nuclear cataract of both  eyes  Mildly visually significant    5. High risk medication use  Prednisone 40 mg daily, Cyclophosphamide 100 mg daily    Plan/Recommendations:      Discussed findings with patient. Even on prednisone for the last month, there is no new fluid in the retina in the right eye. As such, we can safely monitor as the need for prednisone and cyclophosphamide is paramount given IgA Nephropathy.     Eye pressure is normal in each eye     No additional lab testing.      Continue Prednisone and Cyclophosphamide per Nephrology.    No treatment specifically for eyes    RTC 3 months tonopen, dilate, OCT, RNFL (Ordered)     Physician Attestation     Attending Physician Attestation:  Complete documentation of historical and exam elements from today's encounter can be found in the full encounter summary report (not reduplicated in this progress note). I personally obtained the chief complaint(s) and history of present illness. I confirmed and edited as necessary the review of systems, past medical/surgical history, family history, social history, and examination findings as documented by others; and I examined the patient myself. I personally reviewed the relevant tests, images, and reports as documented above. I formulated and edited as necessary the assessment and plan and discussed the findings and management plan with the patient and family members present at the time of this visit.  Malik Julien M.D., Uveitis and Medical Retina, December 7, 2021     Sincerely,    Malik Julien MD  [facility] Dept of Ophthalmology  Uveitis and Medical Retina

## 2021-12-08 ENCOUNTER — LAB (OUTPATIENT)
Dept: LAB | Facility: CLINIC | Age: 81
End: 2021-12-08
Attending: INTERNAL MEDICINE
Payer: MEDICARE

## 2021-12-08 DIAGNOSIS — N02.B9 IGA NEPHROPATHY: ICD-10-CM

## 2021-12-08 DIAGNOSIS — N18.4 CKD (CHRONIC KIDNEY DISEASE) STAGE 4, GFR 15-29 ML/MIN (H): ICD-10-CM

## 2021-12-08 LAB
ALBUMIN SERPL-MCNC: 3.4 G/DL (ref 3.4–5)
ALBUMIN UR-MCNC: NEGATIVE MG/DL
ANION GAP SERPL CALCULATED.3IONS-SCNC: 7 MMOL/L (ref 3–14)
APPEARANCE UR: CLEAR
BACTERIA #/AREA URNS HPF: ABNORMAL /HPF
BILIRUB UR QL STRIP: NEGATIVE
BUN SERPL-MCNC: 35 MG/DL (ref 7–30)
CALCIUM SERPL-MCNC: 8.9 MG/DL (ref 8.5–10.1)
CHLORIDE BLD-SCNC: 107 MMOL/L (ref 94–109)
CO2 SERPL-SCNC: 26 MMOL/L (ref 20–32)
COLOR UR AUTO: ABNORMAL
CREAT SERPL-MCNC: 3.3 MG/DL (ref 0.66–1.25)
CREAT UR-MCNC: 32 MG/DL
ERYTHROCYTE [DISTWIDTH] IN BLOOD BY AUTOMATED COUNT: 13.9 % (ref 10–15)
GFR SERPL CREATININE-BSD FRML MDRD: 17 ML/MIN/1.73M2
GLUCOSE BLD-MCNC: 83 MG/DL (ref 70–99)
GLUCOSE UR STRIP-MCNC: NEGATIVE MG/DL
HCT VFR BLD AUTO: 27.7 % (ref 40–53)
HGB BLD-MCNC: 8.9 G/DL (ref 13.3–17.7)
HGB UR QL STRIP: ABNORMAL
KETONES UR STRIP-MCNC: NEGATIVE MG/DL
LEUKOCYTE ESTERASE UR QL STRIP: NEGATIVE
MCH RBC QN AUTO: 30.1 PG (ref 26.5–33)
MCHC RBC AUTO-ENTMCNC: 32.1 G/DL (ref 31.5–36.5)
MCV RBC AUTO: 94 FL (ref 78–100)
NITRATE UR QL: NEGATIVE
PH UR STRIP: 6 [PH] (ref 5–7)
PHOSPHATE SERPL-MCNC: 3.5 MG/DL (ref 2.5–4.5)
PLATELET # BLD AUTO: 170 10E3/UL (ref 150–450)
POTASSIUM BLD-SCNC: 4.4 MMOL/L (ref 3.4–5.3)
PROT UR-MCNC: 0.1 G/L
PROT/CREAT 24H UR: 0.31 G/G CR (ref 0–0.2)
RBC # BLD AUTO: 2.96 10E6/UL (ref 4.4–5.9)
RBC URINE: 3 /HPF
SODIUM SERPL-SCNC: 140 MMOL/L (ref 133–144)
SP GR UR STRIP: 1 (ref 1–1.03)
UROBILINOGEN UR STRIP-MCNC: NORMAL MG/DL
WBC # BLD AUTO: 10.7 10E3/UL (ref 4–11)
WBC URINE: <1 /HPF

## 2021-12-08 PROCEDURE — 80069 RENAL FUNCTION PANEL: CPT | Performed by: PATHOLOGY

## 2021-12-08 PROCEDURE — 85027 COMPLETE CBC AUTOMATED: CPT | Performed by: PATHOLOGY

## 2021-12-08 PROCEDURE — 84156 ASSAY OF PROTEIN URINE: CPT | Performed by: PATHOLOGY

## 2021-12-08 PROCEDURE — 81001 URINALYSIS AUTO W/SCOPE: CPT | Performed by: PATHOLOGY

## 2021-12-08 PROCEDURE — 36415 COLL VENOUS BLD VENIPUNCTURE: CPT | Performed by: PATHOLOGY

## 2021-12-21 ENCOUNTER — TELEPHONE (OUTPATIENT)
Dept: FAMILY MEDICINE | Facility: CLINIC | Age: 81
End: 2021-12-21
Payer: MEDICARE

## 2021-12-21 DIAGNOSIS — Z79.899 POLYPHARMACY: Primary | ICD-10-CM

## 2021-12-21 NOTE — TELEPHONE ENCOUNTER
[facility] Call Center    Phone Message    May a detailed message be left on voicemail: yes     Reason for Call: Other: The patient would like to be referred to a dietitian and Psychiatrist to review medications, please review and follow up asap thank you.     Action Taken: Message routed to:  [facility] pcc    Travel Screening: Not Applicable

## 2021-12-22 NOTE — TELEPHONE ENCOUNTER
Spoke to patient who is looking for recomenndations regarding potassium within his diet.     Patient would also like to know what medication could replace the buspar, considering his CKD diagnosis.    Patient also asked about some other medications and how they interacted and if he should be doing any prednisone taper based on some other medications that were recommended to him.     Discussed with patient if he would be agreeable to have an appointment with Pharmacist Nithin Tolbert to discuss all of the above and from there should he need additional referrals they can be placed. Patient was agreeable to plan of an appointment with [facility] pharmacist and additional referrals later on if needed. Zluy Barriga LPN 12/22/2021 9:15 AM

## 2021-12-27 DIAGNOSIS — N18.4 CKD (CHRONIC KIDNEY DISEASE) STAGE 4, GFR 15-29 ML/MIN (H): Primary | ICD-10-CM

## 2021-12-27 DIAGNOSIS — N02.B9 IGA NEPHROPATHY: ICD-10-CM

## 2021-12-30 ENCOUNTER — TELEPHONE (OUTPATIENT)
Dept: NEPHROLOGY | Facility: CLINIC | Age: 81
End: 2021-12-30
Payer: MEDICARE

## 2021-12-30 DIAGNOSIS — N17.9 ACUTE KIDNEY INJURY (H): ICD-10-CM

## 2021-12-30 DIAGNOSIS — N02.B9 IGA NEPHROPATHY: ICD-10-CM

## 2021-12-30 RX ORDER — PREDNISONE 20 MG/1
30 TABLET ORAL EVERY OTHER DAY
Qty: 60 TABLET | Refills: 1 | COMMUNITY
Start: 2021-12-30 | End: 2022-01-24

## 2021-12-30 ASSESSMENT — ANXIETY QUESTIONNAIRES
6. BECOMING EASILY ANNOYED OR IRRITABLE: MORE THAN HALF THE DAYS
7. FEELING AFRAID AS IF SOMETHING AWFUL MIGHT HAPPEN: SEVERAL DAYS
3. WORRYING TOO MUCH ABOUT DIFFERENT THINGS: MORE THAN HALF THE DAYS
2. NOT BEING ABLE TO STOP OR CONTROL WORRYING: MORE THAN HALF THE DAYS
IF YOU CHECKED OFF ANY PROBLEMS ON THIS QUESTIONNAIRE, HOW DIFFICULT HAVE THESE PROBLEMS MADE IT FOR YOU TO DO YOUR WORK, TAKE CARE OF THINGS AT HOME, OR GET ALONG WITH OTHER PEOPLE: SOMEWHAT DIFFICULT
5. BEING SO RESTLESS THAT IT IS HARD TO SIT STILL: NOT AT ALL

## 2021-12-30 ASSESSMENT — PATIENT HEALTH QUESTIONNAIRE - PHQ9
5. POOR APPETITE OR OVEREATING: SEVERAL DAYS
SUM OF ALL RESPONSES TO PHQ QUESTIONS 1-9: 9

## 2021-12-30 NOTE — TELEPHONE ENCOUNTER
Per Dr. Barrow, call to patient and have him decrease his prednisone 30 mg Every other day and get labs today.   Patient agreed and verbalized understanding.   Scheduled follow up with Dr. Barrow Jan 6th in person with labs prior.  Patient is aware.  Lizbeth Roldan LPN  Nephrology  [phone]

## 2021-12-30 NOTE — TELEPHONE ENCOUNTER
[facility] Call Center    Phone Message    May a detailed message be left on voicemail: yes     Reason for Call: Order(s): Other:   Reason for requested: Labs. Pt is currently at the [facility] lab, and needs orders faxed there asap   Date needed: 12/30/21  Provider name: [facility]      Action Taken: Message routed to:  [facility]    Travel Screening: Not Applicable

## 2022-01-04 DIAGNOSIS — K21.9 GASTROESOPHAGEAL REFLUX DISEASE WITHOUT ESOPHAGITIS: Primary | ICD-10-CM

## 2022-01-04 RX ORDER — FAMOTIDINE 20 MG/1
20 TABLET, FILM COATED ORAL 2 TIMES DAILY
Qty: 60 TABLET | Refills: 3 | Status: SHIPPED | OUTPATIENT
Start: 2022-01-04 | End: 2022-08-22

## 2022-01-05 ENCOUNTER — VIRTUAL VISIT (OUTPATIENT)
Dept: PHARMACY | Facility: CLINIC | Age: 82
End: 2022-01-05
Attending: FAMILY MEDICINE
Payer: COMMERCIAL

## 2022-01-05 DIAGNOSIS — N40.0 BENIGN PROSTATIC HYPERPLASIA, UNSPECIFIED WHETHER LOWER URINARY TRACT SYMPTOMS PRESENT: ICD-10-CM

## 2022-01-05 DIAGNOSIS — Z78.9 TAKES DIETARY SUPPLEMENTS: ICD-10-CM

## 2022-01-05 DIAGNOSIS — E78.00 HIGH CHOLESTEROL: ICD-10-CM

## 2022-01-05 DIAGNOSIS — K21.00 GASTROESOPHAGEAL REFLUX DISEASE WITH ESOPHAGITIS, UNSPECIFIED WHETHER HEMORRHAGE: ICD-10-CM

## 2022-01-05 DIAGNOSIS — N02.B9 IGA NEPHROPATHY: ICD-10-CM

## 2022-01-05 DIAGNOSIS — G40.909 SEIZURE DISORDER (H): ICD-10-CM

## 2022-01-05 DIAGNOSIS — Z79.82 CURRENT USE OF ASPIRIN: ICD-10-CM

## 2022-01-05 DIAGNOSIS — F32.9 MAJOR DEPRESSIVE DISORDER, REMISSION STATUS UNSPECIFIED, UNSPECIFIED WHETHER RECURRENT: Primary | ICD-10-CM

## 2022-01-05 PROCEDURE — 99605 MTMS BY PHARM NP 15 MIN: CPT | Performed by: PHARMACIST

## 2022-01-05 PROCEDURE — 99607 MTMS BY PHARM ADDL 15 MIN: CPT | Performed by: PHARMACIST

## 2022-01-05 RX ORDER — ESCITALOPRAM OXALATE 20 MG/1
20 TABLET ORAL EVERY MORNING
COMMUNITY
Start: 2021-11-17 | End: 2023-06-19

## 2022-01-05 NOTE — LETTER
_  Medication List        Prepared on: 1/6/2022     Bring your Medication List when you go to the doctor, hospital, or   emergency room. And, share it with your family or caregivers.     Note any changes to how you take your medications.  Cross out medications when you no longer use them.    Medication How I take it Why I use it Prescriber   calcium carb-cholecalciferol (OS-GIANNI) 500-200 MG-UNIT tablet Take 1 tablet by mouth 2 times daily (with meals) IgA Nephropathy; Acute kidney injury (H) Merry Barrow MD   cyanocobalamin (VITAMIN B-12) 100 MCG tablet Take 1,000 mcg by mouth daily  supplement Over the counter   cyclophosphamide (CYTOXAN) 50 MG capsule Take 2 capsules (100 mg) by mouth daily IgA Nephropathy Merry Barrow MD   escitalopram (LEXAPRO) 20 MG tablet Take 20 mg by mouth daily depression Efrain Harrison   famotidine (PEPCID) 20 MG tablet Take 1 tablet (20 mg) by mouth 2 times daily Gastroesophageal Reflux Disease without Esophagitis Merry Barrow MD   finasteride (PROSCAR) 5 MG tablet Take 1 tablet (5 mg) by mouth daily Benign prostatic hyperplasia with urinary frequency Francis Smith MD   lamoTRIgine (LAMICTAL) 100 MG tablet Take 2 tablets (200 mg) by mouth 2 times daily Seizure Disorder (H) Daria Raza MD   predniSONE (DELTASONE) 20 MG tablet Take 30 mg by mouth every other day Reports he was told to take 20 mg every other day IgA Nephropathy; Acute kidney injury (H) Merry Barrow MD   simvastatin (ZOCOR) 20 MG tablet Take 1 tablet (20 mg) by mouth At Bedtime High Cholesterol Francis Smith MD   sulfamethoxazole-trimethoprim (BACTRIM) 400-80 MG tablet Take 1 tablet by mouth every other day IgA Nephropathy; Acute kidney injury (H) Merry Barrow MD   tamsulosin (FLOMAX) 0.4 MG capsule Take 1 capsule (0.4 mg) by mouth daily Benign prostatic hyperplasia with urinary frequency Francis Smith MD         Add new medications, over-the-counter drugs,  herbals, vitamins, or  minerals in the blank rows below.    Medication How I take it Why I use it Prescriber                          Allergies:      No Known Allergies        Side effects I have had:              Other Information:             My notes and questions:

## 2022-01-05 NOTE — LETTER
"Recommended To-Do List      Prepared on: 1/6/2022     You can get the best results from your medications by completing the items on this \"To-Do List.\"      Bring your To-Do List when you go to your doctor. And, share it with your family or caregivers.    My To-Do List:  What we talked about: What I should do:   Your medication dosage being too high    I will discuss with your famotadine dose with your nephrologist          What we talked about: What I should do:   Your medication dosage being too high    I will discuss a decrease your dosage of escitalopram (LEXAPRO) with Dr. Smith          What we talked about: What I should do:   An issue with your medication    Stop taking aspirin 81 mg          What we talked about: What I should do:                     "

## 2022-01-05 NOTE — PROGRESS NOTES
Medication Therapy Management (MTM) Encounter    ASSESSMENT:                            Medication Adherence/Access: No issues identified    MDD/Anxiety: Since stopping the buspirone he has had a decrease in symptoms of anxiety.  If issues resume he could resume buspirone at low-dose of 5 mg twice daily given his renal function.  He is at a high dose of escitalopram based on his age and renal function.  It could be beneficial for safety reasons to decrease to 10 mg daily and monitor for any increase in symptoms.  Also since he is noting that sometimes he feels like he has skipped beats it would be beneficial to monitor his QT interval.    CKD: Plan placed with nephrology.    GERD: Due to taking aspirin over the last few weeks, and his recent symptoms of stomach pain over the last few days I am worried about the potential for stomach bleed.  He states that this seems to be different than normal heartburn.  His famotidine should be renally dosed to 20 mg every other day, however this is reported to be part of a regimen and I will contact his nephrologist about this.    Hyperlipidemia: Stable.     BPH: Stable.    Seizure disorder: Stable.    Heart Health: No identifiable indication for aspirin medication at this time and recommend to discontinue especially in presence of new onset stomach pain.    Supplements: Stable.    PLAN:                            1. Stop taking aspirin 81 mg as you have no indication for it.  2. Will recommend to Dr Smith to decrease escitalopram to 10 mg due to elderly and renal function.  3. Will discuss dosing of famotidine with kidney doctor.    Follow-up: will follow up via my chart once I have received a response from Dr. Smith.      SUBJECTIVE/OBJECTIVE:                          Melo Dangelo is a 81 year old male contacted via secure video for an initial visit. He was referred to me from Francis Smith MD.      Reason for visit: Polypharmacy/CMR.    Allergies/ADRs: Reviewed  "in chart  Past Medical History: Reviewed in chart  Tobacco: He reports that he has never smoked. He has never used smokeless tobacco.  Alcohol: none  Caffeine: 10 cups a day (approximately)    Medication Adherence/Access: forgets a night dose about once per week.    MDD/Anxiety: Stopped his buspirone due to thinking he couldn't take it with kidney or liver problems. He hasnt felt that he has needed it since he has been off it. He is also taking escitalopram 20 mg daily by the psychiatrist he was seeing in [place]. Reports that the \"mood seems okay but the hyper anxiety, jumping around but for the most part it is not there but something is happening where I am out of control.\" Notes that it is mostly occurring when he is cooking or driving. Reports that jumpiness feeling has gotten worse in the last year but recently in the last week he has been okay.     He reports that he feels like he has been having some skipped beats and he will feel dizzy. If he lays down for a bit it will go away.    SHAINA-7 SCORE 7/21/2020   Total Score 10 (moderate anxiety)   Total Score 10       PHQ 1/22/2021 7/27/2021 12/30/2021   PHQ-9 Total Score 3 5 9   Q9: Thoughts of better off dead/self-harm past 2 weeks Not at all Not at all Not at all       CKD: Currently taking prednisone 20 mg every other day, cyclophosphamide 100 mg daily, and bactrim 400-80 mg every other day.     GERD: Reports that he has been having some stomach upset in the last 2-3 days and his kidney doctor changed his famotidine from 40 mg to 20 mg twice daily. Thinks this could be due to the amount of coffee he has been drinking. Reports that the stomach acid has \"brought up a worry.\" Only has been in the morning but yesterday it was during certain parts of the day and he would eat something and he thought that would help. This morning he had a half of a sandwich and then had his coffee and he had his pain before the coffee. This last 2-3 days is different from when he " started the famotidine.     Hyperlipidemia: Current therapy includes simvastatin 20mg daily.  Patient reports no significant myalgias or other side effects.    Recent Labs   Lab Test 10/15/21  1023   CHOL 179   HDL 40   *   TRIG 126     BPH: Currently taking finasteride 5 mg daily and tamsulosin 0.4 mg daily. Reports that he gets up about 4 times per night and was starting to hurt/be difficult to go. Reports that these medications have been effective for these symptoms but has been still going frequently.    Seizure disorder: Currently taking lamotrigine 200 mg twice daily. Had 2 seizures about 10 years ago. Hasn't had once since. No concerns or questions at this time.     Heart Health:  Currently taking 81 mg aspirin as he thought he should replace the Eliquis. Has been taking the aspirin 81 mg for 2 weeks.    Supplements: Currently taking a calcium with vitamin d supplement 500-200 mg-unit twice daily with meals, and vitamin B-12 1000 mcg daily. No concerns or questions at this time.     Today's Vitals: There were no vitals taken for this visit.  ----------------      I spent 60 minutes with this patient today. .An extra 15 minutes was spent creating the medication action plan. All changes were made via collaborative practice agreement with Francis Smith MD  . A copy of the visit note was provided to the patient's primary care provider.    The patient was sent via [employer] a summary of these recommendations.     Nithin Tolbert, Pharm. D., BCACP  Medication Therapy Management Pharmacist  Direct Voicemail: [phone]    Telemedicine Visit Details  Type of service:  Video Visit  Start Time: 10:00 am  End Time: 11:00 am  Originating Location (patient location): Home  Distant Location (provider location):  [facility]     Medication Therapy Recommendations  Current use of aspirin    Rationale: Unsafe medication for the patient - Adverse medication event - Safety   Recommendation:  Discontinue Medication   Status: Accepted - no CPA Needed         Gastroesophageal reflux disease with esophagitis, unspecified whether hemorrhage    Current Medication: famotidine (PEPCID) 20 MG tablet   Rationale: Dose too high - Dosage too high - Safety   Recommendation: Decrease Frequency   Status: Contact Provider - Awaiting Response         Major depressive disorder    Current Medication: escitalopram (LEXAPRO) 20 MG tablet   Rationale: Dose too high - Dosage too high - Safety   Recommendation: Decrease Dose - escitalopram 10 MG tablet   Status: Contact Provider - Awaiting Response

## 2022-01-05 NOTE — LETTER
January 6, 2022  Melo Dangelo  [address]   [place] [address]    Dear  Antolin, TONNY [facility]        Thank you for talking with me on Jan 5, 2022 about your health and medications. As a follow-up to our conversation, I have included two documents:      1. Your Recommended To-Do List has steps you should take to get the best results from your medications.  2. Your Medication List will help you keep track of your medications and how to take them.    If you want to talk about these documents, please call Nithin Tolbert RPH at phone: [phone], Monday-Friday 8-4:30pm.    I look forward to working with you and your doctors to make sure your medications work well for you.    Sincerely,    Nithin Tolbert RPH

## 2022-01-06 NOTE — PATIENT INSTRUCTIONS
Recommendations from today's MTM visit:                                                    MTM (medication therapy management) is a service provided by a clinical pharmacist designed to help you get the most of out of your medicines.   Today we reviewed what your medicines are for, how to know if they are working, that your medicines are safe and how to make your medicine regimen as easy as possible.       1. Stop taking aspirin 81 mg as you have no indication for it.  2. Will recommend to Dr Smith to decrease escitalopram to 10 mg due to elderly and renal function.  3. Will discuss dosing of famotidine with kidney doctor.    Follow-up: will follow up via my chart once I have received a response from Dr. Smith.    It was great to speak with you today.  I value your experience and would be very thankful for your time with providing feedback on our clinic survey. You may receive a survey via email or text message in the next few days.     To schedule another MTM appointment, please call the clinic directly or you may call the MTM scheduling line at [phone] or toll-free at [phone].     My Clinical Pharmacist's contact information:                                                      Please feel free to contact me with any questions or concerns you have.      Nithin Tolbert, Pharm. D., [facility]  Medication Therapy Management Pharmacist  Direct Voicemail: [phone]

## 2022-01-11 ENCOUNTER — OFFICE VISIT (OUTPATIENT)
Dept: FAMILY MEDICINE | Facility: CLINIC | Age: 82
End: 2022-01-11
Payer: MEDICARE

## 2022-01-11 ENCOUNTER — LAB (OUTPATIENT)
Dept: LAB | Facility: CLINIC | Age: 82
End: 2022-01-11
Payer: MEDICARE

## 2022-01-11 VITALS
BODY MASS INDEX: 26.1 KG/M2 | OXYGEN SATURATION: 95 % | DIASTOLIC BLOOD PRESSURE: 83 MMHG | HEIGHT: 71 IN | WEIGHT: 186.4 LBS | SYSTOLIC BLOOD PRESSURE: 156 MMHG | HEART RATE: 69 BPM

## 2022-01-11 DIAGNOSIS — R00.2 PALPITATIONS: ICD-10-CM

## 2022-01-11 DIAGNOSIS — N18.4 CKD (CHRONIC KIDNEY DISEASE) STAGE 4, GFR 15-29 ML/MIN (H): ICD-10-CM

## 2022-01-11 DIAGNOSIS — R00.2 PALPITATIONS: Primary | ICD-10-CM

## 2022-01-11 DIAGNOSIS — N02.B9 IGA NEPHROPATHY: ICD-10-CM

## 2022-01-11 LAB
ALBUMIN SERPL-MCNC: 3.9 G/DL (ref 3.4–5)
ALBUMIN UR-MCNC: NEGATIVE MG/DL
ANION GAP SERPL CALCULATED.3IONS-SCNC: 6 MMOL/L (ref 3–14)
APPEARANCE UR: CLEAR
BILIRUB UR QL STRIP: NEGATIVE
BUN SERPL-MCNC: 38 MG/DL (ref 7–30)
CALCIUM SERPL-MCNC: 9.4 MG/DL (ref 8.5–10.1)
CHLORIDE BLD-SCNC: 107 MMOL/L (ref 94–109)
CO2 SERPL-SCNC: 27 MMOL/L (ref 20–32)
COLOR UR AUTO: ABNORMAL
CREAT SERPL-MCNC: 3.46 MG/DL (ref 0.66–1.25)
CREAT UR-MCNC: 63 MG/DL
ERYTHROCYTE [DISTWIDTH] IN BLOOD BY AUTOMATED COUNT: 15.4 % (ref 10–15)
GFR SERPL CREATININE-BSD FRML MDRD: 17 ML/MIN/1.73M2
GLUCOSE BLD-MCNC: 89 MG/DL (ref 70–99)
GLUCOSE UR STRIP-MCNC: NEGATIVE MG/DL
HCT VFR BLD AUTO: 29 % (ref 40–53)
HGB BLD-MCNC: 9.4 G/DL (ref 13.3–17.7)
HGB UR QL STRIP: ABNORMAL
KETONES UR STRIP-MCNC: NEGATIVE MG/DL
LEUKOCYTE ESTERASE UR QL STRIP: NEGATIVE
MCH RBC QN AUTO: 31 PG (ref 26.5–33)
MCHC RBC AUTO-ENTMCNC: 32.4 G/DL (ref 31.5–36.5)
MCV RBC AUTO: 96 FL (ref 78–100)
NITRATE UR QL: NEGATIVE
PH UR STRIP: 5 [PH] (ref 5–7)
PHOSPHATE SERPL-MCNC: 4.2 MG/DL (ref 2.5–4.5)
PLATELET # BLD AUTO: 165 10E3/UL (ref 150–450)
POTASSIUM BLD-SCNC: 4.6 MMOL/L (ref 3.4–5.3)
PROT UR-MCNC: 0.3 G/L
PROT/CREAT 24H UR: 0.48 G/G CR (ref 0–0.2)
RBC # BLD AUTO: 3.03 10E6/UL (ref 4.4–5.9)
RBC URINE: 6 /HPF
SODIUM SERPL-SCNC: 140 MMOL/L (ref 133–144)
SP GR UR STRIP: 1.01 (ref 1–1.03)
SQUAMOUS EPITHELIAL: <1 /HPF
TSH SERPL DL<=0.005 MIU/L-ACNC: 1.01 MU/L (ref 0.4–4)
UROBILINOGEN UR STRIP-MCNC: NORMAL MG/DL
WBC # BLD AUTO: 5.7 10E3/UL (ref 4–11)
WBC URINE: <1 /HPF

## 2022-01-11 PROCEDURE — 84156 ASSAY OF PROTEIN URINE: CPT | Performed by: PATHOLOGY

## 2022-01-11 PROCEDURE — 93000 ELECTROCARDIOGRAM COMPLETE: CPT | Performed by: FAMILY MEDICINE

## 2022-01-11 PROCEDURE — 81001 URINALYSIS AUTO W/SCOPE: CPT | Performed by: PATHOLOGY

## 2022-01-11 PROCEDURE — 85027 COMPLETE CBC AUTOMATED: CPT | Performed by: PATHOLOGY

## 2022-01-11 PROCEDURE — 80069 RENAL FUNCTION PANEL: CPT | Performed by: PATHOLOGY

## 2022-01-11 PROCEDURE — 84443 ASSAY THYROID STIM HORMONE: CPT | Performed by: PATHOLOGY

## 2022-01-11 PROCEDURE — 36415 COLL VENOUS BLD VENIPUNCTURE: CPT | Performed by: PATHOLOGY

## 2022-01-11 PROCEDURE — 99215 OFFICE O/P EST HI 40 MIN: CPT | Mod: 25 | Performed by: FAMILY MEDICINE

## 2022-01-11 ASSESSMENT — PAIN SCALES - GENERAL: PAINLEVEL: NO PAIN (0)

## 2022-01-11 ASSESSMENT — MIFFLIN-ST. JEOR: SCORE: 1572.63

## 2022-01-11 NOTE — PROGRESS NOTES
"    Assessment & Plan     Palpitations  Discussed reasonable to do stress test (likely medicated, I doublt could exercise enough) given relatively frequent mild exertional vague chest discomfort, he'd like to monitor for now but knows to call us if worse. The short SVT runs don't seem consistent with what he notes.   - EKG Performed in Clinic w/ Provider Reading Fee  - TSH with free T4 reflex; Future    IgA nephropathy  Cont pred, labs today are in same range as those of December, f/u w/ renal MD, reviewed course so far, diagnosis    41 minutes spent on the date of the encounter doing chart review, history and exam, documentation and further activities per the note    BMI:   Estimated body mass index is 26 kg/m  as calculated from the following:    Height as of this encounter: 1.803 m (5' 11\").    Weight as of this encounter: 84.6 kg (186 lb 6.4 oz).     Francis Smith MD  [facility] KARLY Alejo is a 81 year old who presents for the following health issues     HPI Here for a few things.  One, f/u w/ renal soon. Doing labs for them today.  Two, at times notes pulse feels irregular if takes radial pulse on his own, at times of irregularity feels more fatigued overall. First noted inpatient, went home w/ zio, reviewed today, noted some short SVT runs which would be fast but not irregular, a few PAC, PVC beats.   Three, notes vague chest discomfort, not pain or pressure, sometimes not always w/ exertion, which is mild. 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  Current Outpatient Medications   Medication     calcium carb-cholecalciferol (OS-GIANNI) 500-200 MG-UNIT tablet     cyanocobalamin (VITAMIN B-12) 100 MCG tablet     cyclophosphamide (CYTOXAN) 50 MG capsule     escitalopram (LEXAPRO) 20 MG tablet     famotidine (PEPCID) 20 MG tablet     finasteride (PROSCAR) 5 MG tablet     lamoTRIgine (LAMICTAL) 100 MG tablet     predniSONE (DELTASONE) 20 MG tablet     simvastatin (ZOCOR) 20 MG tablet     sulfamethoxazole-trimethoprim (BACTRIM) 400-80 MG tablet     tamsulosin (FLOMAX) 0.4 MG capsule     No current facility-administered medications for this visit.     No Known Allergies          Review of Systems         Objective    BP (!) 156/83 (BP Location: Right arm, Patient Position: Sitting, Cuff Size: Adult Regular)   Pulse 69   Ht 1.803 m (5' 11\")   Wt 84.6 kg (186 lb 6.4 oz)   SpO2 95%   BMI 26.00 kg/m    Body mass index is 26 kg/m .  Physical Exam   GENERAL: healthy, alert and no distress  EYES: Eyes grossly normal to inspection, PERRL and conjunctivae and sclerae normal  HENT: ear canals and TM's normal, nose and mouth without ulcers or lesions  NECK: no adenopathy, no asymmetry, masses, or scars and thyroid normal to palpation  RESP: lungs clear to auscultation - no rales, rhonchi or wheezes  CV: regular rate and rhythm, normal S1 S2, no S3 or S4, no murmur, click or rub, no peripheral edema and peripheral pulses strong  ABDOMEN: soft, nontender, no hepatosplenomegaly, no masses and bowel sounds normal  MS: no gross musculoskeletal defects noted, no edema    EKG within normal limits, read by me as separate time from billing for visit time today              "

## 2022-01-11 NOTE — NURSING NOTE
Melo Dangelo is a 81 year old male patient that presents today in clinic for the following:    Chief Complaint   Patient presents with     RECHECK     arrhythmia, kidneys     The patient's allergies and medications were reviewed as noted. A set of vitals were recorded as noted without incident. The patient does not have any other questions for the provider.    Justice Miller, EMT at 8:33 AM on 1/11/2022

## 2022-01-12 LAB
ATRIAL RATE - MUSE: 60 BPM
DIASTOLIC BLOOD PRESSURE - MUSE: NORMAL MMHG
INTERPRETATION ECG - MUSE: NORMAL
P AXIS - MUSE: 71 DEGREES
PR INTERVAL - MUSE: 204 MS
QRS DURATION - MUSE: 94 MS
QT - MUSE: 422 MS
QTC - MUSE: 422 MS
R AXIS - MUSE: 52 DEGREES
SYSTOLIC BLOOD PRESSURE - MUSE: NORMAL MMHG
T AXIS - MUSE: 63 DEGREES
VENTRICULAR RATE- MUSE: 60 BPM

## 2022-01-20 ENCOUNTER — APPOINTMENT (OUTPATIENT)
Dept: GENERAL RADIOLOGY | Facility: CLINIC | Age: 82
End: 2022-01-20
Attending: EMERGENCY MEDICINE
Payer: MEDICARE

## 2022-01-20 ENCOUNTER — HOSPITAL ENCOUNTER (OUTPATIENT)
Facility: CLINIC | Age: 82
Setting detail: OBSERVATION
Discharge: HOME | End: 2022-01-21
Attending: EMERGENCY MEDICINE | Admitting: FAMILY MEDICINE
Payer: MEDICARE

## 2022-01-20 DIAGNOSIS — J20.5 ACUTE BRONCHITIS DUE TO RESPIRATORY SYNCYTIAL VIRUS (RSV): ICD-10-CM

## 2022-01-20 DIAGNOSIS — Z20.822 LAB TEST NEGATIVE FOR COVID-19 VIRUS: ICD-10-CM

## 2022-01-20 DIAGNOSIS — J21.0 RSV BRONCHIOLITIS: Primary | ICD-10-CM

## 2022-01-20 DIAGNOSIS — N18.4 CKD (CHRONIC KIDNEY DISEASE) STAGE 4, GFR 15-29 ML/MIN (H): ICD-10-CM

## 2022-01-20 PROBLEM — Z79.899 HIGH RISK MEDICATION USE: Status: ACTIVE | Noted: 2021-12-07

## 2022-01-20 PROBLEM — N02.B9 IGA NEPHROPATHY: Status: ACTIVE | Noted: 2021-10-28

## 2022-01-20 PROBLEM — H90.3 SENSORINEURAL HEARING LOSS (SNHL) OF BOTH EARS: Status: ACTIVE | Noted: 2017-01-18

## 2022-01-20 PROBLEM — U07.1 2019 NOVEL CORONAVIRUS DISEASE (COVID-19): Status: RESOLVED | Noted: 2020-10-27 | Resolved: 2022-01-20

## 2022-01-20 LAB
ALBUMIN SERPL-MCNC: 3.5 G/DL (ref 3.4–5)
ALP SERPL-CCNC: 59 U/L (ref 40–150)
ALT SERPL W P-5'-P-CCNC: 22 U/L (ref 0–70)
ANION GAP SERPL CALCULATED.3IONS-SCNC: 6 MMOL/L (ref 3–14)
AST SERPL W P-5'-P-CCNC: 21 U/L (ref 0–45)
ATRIAL RATE - MUSE: 71 BPM
BASOPHILS # BLD AUTO: 0 10E3/UL (ref 0–0.2)
BASOPHILS NFR BLD AUTO: 1 %
BILIRUB SERPL-MCNC: 0.4 MG/DL (ref 0.2–1.3)
BUN SERPL-MCNC: 32 MG/DL (ref 7–30)
CALCIUM SERPL-MCNC: 9.6 MG/DL (ref 8.5–10.1)
CHLORIDE BLD-SCNC: 106 MMOL/L (ref 94–109)
CO2 SERPL-SCNC: 26 MMOL/L (ref 20–32)
CREAT SERPL-MCNC: 3.3 MG/DL (ref 0.66–1.25)
DIASTOLIC BLOOD PRESSURE - MUSE: NORMAL MMHG
EOSINOPHIL # BLD AUTO: 0 10E3/UL (ref 0–0.7)
EOSINOPHIL NFR BLD AUTO: 0 %
ERYTHROCYTE [DISTWIDTH] IN BLOOD BY AUTOMATED COUNT: 16 % (ref 10–15)
GFR SERPL CREATININE-BSD FRML MDRD: 18 ML/MIN/1.73M2
GLUCOSE BLD-MCNC: 120 MG/DL (ref 70–99)
HCT VFR BLD AUTO: 27.5 % (ref 40–53)
HGB BLD-MCNC: 9.1 G/DL (ref 13.3–17.7)
HOLD SPECIMEN: NORMAL
IMM GRANULOCYTES # BLD: 0 10E3/UL
IMM GRANULOCYTES NFR BLD: 1 %
INR PPP: 1.04 (ref 0.85–1.15)
INTERPRETATION ECG - MUSE: NORMAL
LACTATE SERPL-SCNC: 0.8 MMOL/L (ref 0.7–2)
LYMPHOCYTES # BLD AUTO: 0.5 10E3/UL (ref 0.8–5.3)
LYMPHOCYTES NFR BLD AUTO: 12 %
MCH RBC QN AUTO: 31.2 PG (ref 26.5–33)
MCHC RBC AUTO-ENTMCNC: 33.1 G/DL (ref 31.5–36.5)
MCV RBC AUTO: 94 FL (ref 78–100)
MONOCYTES # BLD AUTO: 0.1 10E3/UL (ref 0–1.3)
MONOCYTES NFR BLD AUTO: 3 %
NEUTROPHILS # BLD AUTO: 3.7 10E3/UL (ref 1.6–8.3)
NEUTROPHILS NFR BLD AUTO: 83 %
NRBC # BLD AUTO: 0 10E3/UL
NRBC BLD AUTO-RTO: 0 /100
NT-PROBNP SERPL-MCNC: 649 PG/ML (ref 0–1800)
P AXIS - MUSE: 78 DEGREES
PLATELET # BLD AUTO: 151 10E3/UL (ref 150–450)
POTASSIUM BLD-SCNC: 4.3 MMOL/L (ref 3.4–5.3)
PR INTERVAL - MUSE: 170 MS
PROT SERPL-MCNC: 6.7 G/DL (ref 6.8–8.8)
QRS DURATION - MUSE: 84 MS
QT - MUSE: 408 MS
QTC - MUSE: 443 MS
R AXIS - MUSE: 8 DEGREES
RBC # BLD AUTO: 2.92 10E6/UL (ref 4.4–5.9)
SARS-COV-2 RNA RESP QL NAA+PROBE: NEGATIVE
SODIUM SERPL-SCNC: 138 MMOL/L (ref 133–144)
SYSTOLIC BLOOD PRESSURE - MUSE: NORMAL MMHG
T AXIS - MUSE: 56 DEGREES
TROPONIN I SERPL HS-MCNC: 13 NG/L
VENTRICULAR RATE- MUSE: 71 BPM
WBC # BLD AUTO: 4.4 10E3/UL (ref 4–11)

## 2022-01-20 PROCEDURE — 250N000013 HC RX MED GY IP 250 OP 250 PS 637: Performed by: STUDENT IN AN ORGANIZED HEALTH CARE EDUCATION/TRAINING PROGRAM

## 2022-01-20 PROCEDURE — 83880 ASSAY OF NATRIURETIC PEPTIDE: CPT | Performed by: EMERGENCY MEDICINE

## 2022-01-20 PROCEDURE — C9803 HOPD COVID-19 SPEC COLLECT: HCPCS | Performed by: EMERGENCY MEDICINE

## 2022-01-20 PROCEDURE — 99285 EMERGENCY DEPT VISIT HI MDM: CPT | Mod: 25 | Performed by: EMERGENCY MEDICINE

## 2022-01-20 PROCEDURE — 83605 ASSAY OF LACTIC ACID: CPT | Performed by: EMERGENCY MEDICINE

## 2022-01-20 PROCEDURE — 94640 AIRWAY INHALATION TREATMENT: CPT | Performed by: EMERGENCY MEDICINE

## 2022-01-20 PROCEDURE — 250N000013 HC RX MED GY IP 250 OP 250 PS 637: Performed by: EMERGENCY MEDICINE

## 2022-01-20 PROCEDURE — 36415 COLL VENOUS BLD VENIPUNCTURE: CPT | Performed by: EMERGENCY MEDICINE

## 2022-01-20 PROCEDURE — 80053 COMPREHEN METABOLIC PANEL: CPT | Performed by: EMERGENCY MEDICINE

## 2022-01-20 PROCEDURE — 85025 COMPLETE CBC W/AUTO DIFF WBC: CPT | Performed by: EMERGENCY MEDICINE

## 2022-01-20 PROCEDURE — 71045 X-RAY EXAM CHEST 1 VIEW: CPT

## 2022-01-20 PROCEDURE — 93005 ELECTROCARDIOGRAM TRACING: CPT | Performed by: EMERGENCY MEDICINE

## 2022-01-20 PROCEDURE — 84484 ASSAY OF TROPONIN QUANT: CPT | Performed by: EMERGENCY MEDICINE

## 2022-01-20 PROCEDURE — 93010 ELECTROCARDIOGRAM REPORT: CPT | Performed by: EMERGENCY MEDICINE

## 2022-01-20 PROCEDURE — 85610 PROTHROMBIN TIME: CPT | Performed by: EMERGENCY MEDICINE

## 2022-01-20 PROCEDURE — 71045 X-RAY EXAM CHEST 1 VIEW: CPT | Mod: 26 | Performed by: RADIOLOGY

## 2022-01-20 PROCEDURE — 82040 ASSAY OF SERUM ALBUMIN: CPT | Performed by: EMERGENCY MEDICINE

## 2022-01-20 PROCEDURE — 250N000012 HC RX MED GY IP 250 OP 636 PS 637: Performed by: EMERGENCY MEDICINE

## 2022-01-20 PROCEDURE — G0378 HOSPITAL OBSERVATION PER HR: HCPCS

## 2022-01-20 PROCEDURE — U0003 INFECTIOUS AGENT DETECTION BY NUCLEIC ACID (DNA OR RNA); SEVERE ACUTE RESPIRATORY SYNDROME CORONAVIRUS 2 (SARS-COV-2) (CORONAVIRUS DISEASE [COVID-19]), AMPLIFIED PROBE TECHNIQUE, MAKING USE OF HIGH THROUGHPUT TECHNOLOGIES AS DESCRIBED BY CMS-2020-01-R: HCPCS | Performed by: EMERGENCY MEDICINE

## 2022-01-20 RX ORDER — ACETAMINOPHEN 325 MG/1
975 TABLET ORAL EVERY 8 HOURS
Status: DISCONTINUED | OUTPATIENT
Start: 2022-01-20 | End: 2022-01-21 | Stop reason: HOSPADM

## 2022-01-20 RX ORDER — CYCLOPHOSPHAMIDE 50 MG/1
100 CAPSULE ORAL DAILY
Status: DISCONTINUED | OUTPATIENT
Start: 2022-01-21 | End: 2022-01-21 | Stop reason: HOSPADM

## 2022-01-20 RX ORDER — FINASTERIDE 5 MG/1
5 TABLET, FILM COATED ORAL DAILY
Status: DISCONTINUED | OUTPATIENT
Start: 2022-01-21 | End: 2022-01-21 | Stop reason: HOSPADM

## 2022-01-20 RX ORDER — PREDNISONE 20 MG/1
60 TABLET ORAL ONCE
Status: DISCONTINUED | OUTPATIENT
Start: 2022-01-20 | End: 2022-01-20

## 2022-01-20 RX ORDER — TAMSULOSIN HYDROCHLORIDE 0.4 MG/1
0.4 CAPSULE ORAL DAILY
Status: DISCONTINUED | OUTPATIENT
Start: 2022-01-21 | End: 2022-01-21 | Stop reason: HOSPADM

## 2022-01-20 RX ORDER — ESCITALOPRAM OXALATE 20 MG/1
20 TABLET ORAL DAILY
Status: DISCONTINUED | OUTPATIENT
Start: 2022-01-21 | End: 2022-01-21 | Stop reason: HOSPADM

## 2022-01-20 RX ORDER — LIDOCAINE 40 MG/G
CREAM TOPICAL
Status: DISCONTINUED | OUTPATIENT
Start: 2022-01-20 | End: 2022-01-21 | Stop reason: HOSPADM

## 2022-01-20 RX ORDER — LAMOTRIGINE 100 MG/1
200 TABLET ORAL 2 TIMES DAILY
Status: DISCONTINUED | OUTPATIENT
Start: 2022-01-21 | End: 2022-01-21 | Stop reason: HOSPADM

## 2022-01-20 RX ORDER — SULFAMETHOXAZOLE AND TRIMETHOPRIM 400; 80 MG/1; MG/1
1 TABLET ORAL
Status: DISCONTINUED | OUTPATIENT
Start: 2022-01-21 | End: 2022-01-21 | Stop reason: HOSPADM

## 2022-01-20 RX ORDER — PREDNISONE 20 MG/1
20 TABLET ORAL EVERY OTHER DAY
Status: DISCONTINUED | OUTPATIENT
Start: 2022-01-21 | End: 2022-01-21 | Stop reason: HOSPADM

## 2022-01-20 RX ORDER — CODEINE PHOSPHATE AND GUAIFENESIN 10; 100 MG/5ML; MG/5ML
10 SOLUTION ORAL ONCE
Status: COMPLETED | OUTPATIENT
Start: 2022-01-20 | End: 2022-01-20

## 2022-01-20 RX ORDER — HEPARIN SODIUM 5000 [USP'U]/.5ML
5000 INJECTION, SOLUTION INTRAVENOUS; SUBCUTANEOUS EVERY 12 HOURS
Status: CANCELLED | OUTPATIENT
Start: 2022-01-20

## 2022-01-20 RX ORDER — PREDNISONE 20 MG/1
20 TABLET ORAL ONCE
Status: COMPLETED | OUTPATIENT
Start: 2022-01-20 | End: 2022-01-20

## 2022-01-20 RX ORDER — ALBUTEROL SULFATE 90 UG/1
2 AEROSOL, METERED RESPIRATORY (INHALATION) ONCE
Status: COMPLETED | OUTPATIENT
Start: 2022-01-20 | End: 2022-01-20

## 2022-01-20 RX ADMIN — PREDNISONE 20 MG: 20 TABLET ORAL at 16:10

## 2022-01-20 RX ADMIN — ALBUTEROL SULFATE 2 PUFF: 90 AEROSOL, METERED RESPIRATORY (INHALATION) at 14:54

## 2022-01-20 RX ADMIN — GUAIFENESIN AND CODEINE PHOSPHATE 10 ML: 100; 10 SOLUTION ORAL at 14:54

## 2022-01-20 RX ADMIN — ACETAMINOPHEN 975 MG: 325 TABLET, FILM COATED ORAL at 22:57

## 2022-01-20 ASSESSMENT — ENCOUNTER SYMPTOMS
VOMITING: 0
COUGH: 1
DIARRHEA: 0
BLOOD IN STOOL: 0
ANAL BLEEDING: 0
SHORTNESS OF BREATH: 1

## 2022-01-20 ASSESSMENT — MIFFLIN-ST. JEOR: SCORE: 1550.4

## 2022-01-20 NOTE — ED PROVIDER NOTES
[place] EMERGENCY DEPARTMENT ([facility])  1/20/22  History     Chief Complaint   Patient presents with     Covid Concern     The history is provided by the patient and medical records.     Melo Dangelo is an 81-year-old gentleman who states that about a month ago they discovered that his creatinine was 4.  Patient states that he has not had dialysis yet and is not planned for dialysis.  Patient states that they are thinking it is secondary to his previous COVID infection.  Patient states that he has had all 3 COVID vaccinations as well and states that in the last week and a half he has had some increasing shortness of breath.  Patient states he has had a cough with some production of unknown color because he swallows it right away.  The patient states that he has had no chest pain but his shortness of breath is gradually getting worse.  He denies any vomiting, any diarrhea, any melena, or bright blood per rectum.  Patient's past medical history is significant for alcohol dependence some depression and his chronic kidney disease.  ---  This part of the medical record was transcribed by Corky Hernandez, Medical Scribe, from a dictation done by Demetrius Cohen MD.     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      PVD (posterior vitreous detachment)      Seizure disorder (H)     last seizure 2008     Current Outpatient Medications   Medication Sig Dispense Refill     calcium carb-cholecalciferol (OS-GIANNI) 500-200 MG-UNIT tablet Take 1 tablet by mouth 2 times daily (with meals) 60 tablet 11     cyanocobalamin (VITAMIN B-12) 100 MCG tablet Take 1,000 mcg by mouth daily        cyclophosphamide (CYTOXAN) 50 MG capsule Take 2 capsules (100 mg) by mouth daily 60 capsule 3     escitalopram (LEXAPRO) 20 MG tablet Take 20 mg by mouth daily        famotidine (PEPCID) 20 MG tablet Take 1 tablet (20 mg) by mouth 2 times daily 60 tablet 3     finasteride (PROSCAR) 5 MG tablet Take 1 tablet (5 mg) by mouth daily 90 tablet 3     lamoTRIgine (LAMICTAL) 100 MG tablet Take 2 tablets (200 mg) by mouth 2 times daily 360 tablet 3     predniSONE (DELTASONE) 20 MG tablet Take 30 mg by mouth every other day Reports he was told to take 20 mg every other day 60 tablet 1     simvastatin (ZOCOR) 20 MG tablet Take 1 tablet (20 mg) by mouth At Bedtime 90 tablet 3     sulfamethoxazole-trimethoprim (BACTRIM) 400-80 MG tablet Take 1 tablet by mouth every other day 60 tablet 0     tamsulosin (FLOMAX) 0.4 MG capsule Take 1 capsule (0.4 mg) by mouth daily 90 capsule 3     No Known Allergies     Social History     Socioeconomic History     Marital status:      Spouse name: Not on file     Number of children: Not on file     Years of education: Not on file     Highest education level: Bachelor's degree (e.g., BA, AB, BS)   Occupational History     Not on file   Tobacco Use     Smoking status: Never Smoker     Smokeless tobacco: Never Used   Substance and Sexual Activity     Alcohol use: No     Drug use: No     Sexual activity: Not on file   Other Topics Concern     Not on file   Social History Narrative     Not on file     Social Determinants of Health     Financial Resource Strain: Low Risk      Difficulty of Paying Living Expenses: Not hard at all   Food Insecurity: No Food Insecurity     Worried About Running Out of Food in the Last Year: Never true     Ran Out of Food in the Last Year: Never true   Transportation Needs: No Transportation Needs     Lack of Transportation (Medical): No     Lack of Transportation (Non-Medical): No   Physical Activity: Not on file   Stress: Not on file   Social Connections: Not on file   Intimate Partner Violence: Not on file   Housing Stability: Not on file     History reviewed. No pertinent surgical history.  Family History   Problem Relation  "Age of Onset     Glaucoma No family hx of      Macular Degeneration No family hx of        I have reviewed the Medications, Allergies, Past Medical and Surgical History, and Social History in the Epic system.    Review of Systems   Respiratory: Positive for cough and shortness of breath.    Cardiovascular: Negative for chest pain.   Gastrointestinal: Negative for anal bleeding, blood in stool, diarrhea and vomiting.   All other systems reviewed and are negative.      Physical Exam   BP: (!) 184/95  Pulse: 73  Temp: 98.1  F (36.7  C)  Resp: 18  Height: 177.8 cm (5' 10\")  Weight: 83.9 kg (185 lb)  SpO2: 95 %      Physical Exam  Vitals and nursing note reviewed.   Constitutional:       Comments: Uncontrolled coughing during the exam with some mild respiratory distress    Elderly, frail   HENT:      Head: Atraumatic.   Eyes:      Extraocular Movements: Extraocular movements intact.      Pupils: Pupils are equal, round, and reactive to light.   Cardiovascular:      Rate and Rhythm: Regular rhythm.      Heart sounds: Normal heart sounds.   Pulmonary:      Comments: Wheezes and rales in the bases bilaterally  Musculoskeletal:         General: No deformity.      Cervical back: Neck supple.      Right lower leg: No edema.      Left lower leg: No edema.   Skin:     General: Skin is warm.   Neurological:      General: No focal deficit present.      Mental Status: He is alert and oriented to person, place, and time.   Psychiatric:         Mood and Affect: Mood normal.         ED Course     Procedures    EKG revealed a normal sinus rhythm at a rate of 71 with a NE of 1.170 and a QRS duration of point 084.  Patient had a normal axis with no acute ST or T wave changes significant for ischemia.  This was read by me personally.    Results for orders placed or performed during the hospital encounter of 01/20/22 (from the past 24 hour(s))   EKG 12-lead, tracing only   Result Value Ref Range    Systolic Blood Pressure  mmHg    " Diastolic Blood Pressure  mmHg    Ventricular Rate 71 BPM    Atrial Rate 71 BPM    WI Interval 170 ms    QRS Duration 84 ms     ms    QTc 443 ms    P Axis 78 degrees    R AXIS 8 degrees    T Axis 56 degrees    Interpretation ECG       Sinus rhythm  Normal ECG  Unconfirmed report - interpretation of this ECG is computer generated - see medical record for final interpretation    Confirmed by - EMERGENCY ROOM, PHYSICIAN (Ruthy),  WALT MARCELO ([id number]) on 1/20/2022 2:33:32 PM     Symptomatic; Yes; 1/12/2022 COVID-19 Virus (Coronavirus) by PCR Nasopharyngeal    Specimen: Nasopharyngeal; Swab   Result Value Ref Range    SARS CoV2 PCR Negative Negative, Testing sent to reference lab. Results will be returned via unsolicited result    Narrative    An incidental finding was obtained: Positive Respiratory syncytial virus (RSV)  Due to reagent shortages, COVID testing was accomplished using a multiplex test panel. The use of the multiplex panel in this situation will not result in additional charges.  Testing was performed using the Xpert Xpress SARS-CoV-2 Assay on the  Cepheid Gene-Xpert Instrument Systems. Additional information about  this Emergency Use Authorization (EUA) assay can be found via the Lab  Guide. This test should be ordered for the detection of SARS-CoV-2 in  individuals who meet SARS-CoV-2 clinical and/or epidemiological  criteria. Test performance is unknown in asymptomatic patients. This  test is for in vitro diagnostic use under the FDA EUA for  laboratories certified under CLIA to perform high complexity testing.  This test has not been FDA cleared or approved. A negative result  does not rule out the presence of PCR inhibitors in the specimen or  target RNA in concentration below the limit of detection for the  assay. The possibility of a false negative should be considered if  the patient's recent exposure or clinical presentation suggests  COVID-19. This test was validated by the [facility]. This laboratory is certified under  the Clinical Laboratory Improvement Amendments of 1988 (CLIA-88) as  qualified to perform high complexity laboratory testing.     CBC with platelets differential    Narrative    The following orders were created for panel order CBC with platelets differential.  Procedure                               Abnormality         Status                     ---------                               -----------         ------                     CBC with platelets and d...[[id number]]  Abnormal            Final result                 Please view results for these tests on the individual orders.   INR   Result Value Ref Range    INR 1.04 0.85 - 1.15   Comprehensive metabolic panel   Result Value Ref Range    Sodium 138 133 - 144 mmol/L    Potassium 4.3 3.4 - 5.3 mmol/L    Chloride 106 94 - 109 mmol/L    Carbon Dioxide (CO2) 26 20 - 32 mmol/L    Anion Gap 6 3 - 14 mmol/L    Urea Nitrogen 32 (H) 7 - 30 mg/dL    Creatinine 3.30 (H) 0.66 - 1.25 mg/dL    Calcium 9.6 8.5 - 10.1 mg/dL    Glucose 120 (H) 70 - 99 mg/dL    Alkaline Phosphatase 59 40 - 150 U/L    AST 21 0 - 45 U/L    ALT 22 0 - 70 U/L    Protein Total 6.7 (L) 6.8 - 8.8 g/dL    Albumin 3.5 3.4 - 5.0 g/dL    Bilirubin Total 0.4 0.2 - 1.3 mg/dL    GFR Estimate 18 (L) >60 mL/min/1.73m2   Troponin I   Result Value Ref Range    Troponin I High Sensitivity 13 <79 ng/L   Nt probnp inpatient (BNP)   Result Value Ref Range    N terminal Pro BNP Inpatient 649 0-1,800 pg/mL   [place] Draw    Narrative    The following orders were created for panel order [place] Draw.  Procedure                               Abnormality         Status                     ---------                               -----------         ------                     Extra Red Top Tube[[id number]]                               Final result                 Please view results for these tests on the individual orders.    Lactic acid whole blood   Result Value Ref Range    Lactic Acid 0.8 0.7 - 2.0 mmol/L   CBC with platelets and differential   Result Value Ref Range    WBC Count 4.4 4.0 - 11.0 10e3/uL    RBC Count 2.92 (L) 4.40 - 5.90 10e6/uL    Hemoglobin 9.1 (L) 13.3 - 17.7 g/dL    Hematocrit 27.5 (L) 40.0 - 53.0 %    MCV 94 78 - 100 fL    MCH 31.2 26.5 - 33.0 pg    MCHC 33.1 31.5 - 36.5 g/dL    RDW 16.0 (H) 10.0 - 15.0 %    Platelet Count 151 150 - 450 10e3/uL    % Neutrophils 83 %    % Lymphocytes 12 %    % Monocytes 3 %    % Eosinophils 0 %    % Basophils 1 %    % Immature Granulocytes 1 %    NRBCs per 100 WBC 0 <1 /100    Absolute Neutrophils 3.7 1.6 - 8.3 10e3/uL    Absolute Lymphocytes 0.5 (L) 0.8 - 5.3 10e3/uL    Absolute Monocytes 0.1 0.0 - 1.3 10e3/uL    Absolute Eosinophils 0.0 0.0 - 0.7 10e3/uL    Absolute Basophils 0.0 0.0 - 0.2 10e3/uL    Absolute Immature Granulocytes 0.0 <=0.4 10e3/uL    Absolute NRBCs 0.0 10e3/uL   Extra Red Top Tube   Result Value Ref Range    Hold Specimen JIC    XR Chest Port 1 View    Narrative    EXAM: XR CHEST PORT 1 VIEW  1/20/2022 1:50 PM     HISTORY:  cough       COMPARISON:  Chest radiograph 12/27/2020    FINDINGS:   AP upright view the chest. Midline trachea. Cardiomediastinal  silhouette is within normal limits. No pneumothorax or pleural  effusion. No new focal pulmonary opacities. Visualized upper abdomen  is unremarkable. No acute osseous abnormality.      Impression    IMPRESSION:   No focal pulmonary opacity.    I have personally reviewed the examination and initial interpretation  and I agree with the findings.    MITA CUNNINGHAM MD         SYSTEM ID:  [id number]         Medications   sodium chloride (PF) 0.9% PF flush 3 mL (has no administration in time range)   sodium chloride (PF) 0.9% PF flush 3 mL (has no administration in time range)   predniSONE (DELTASONE) tablet 60 mg (has no administration in time range)   albuterol (PROVENTIL HFA/VENTOLIN HFA) inhaler (2 puffs  Inhalation Given 1/20/22 6898)   guaiFENesin-codeine (ROBITUSSIN AC) 100-10 MG/5ML solution 10 mL (10 mLs Oral Given 1/20/22 1300)          Assessments & Plan (with Medical Decision Making)     I have reviewed the nursing notes.    Patient found to have RSV bronchitis with some mild respiratory distress.  Patient will be kept overnight in observation status with ongoing treatment to improve his respiratory status so he can go home tomorrow.    I have reviewed the findings, diagnosis, and plan with the patient.      Final diagnoses:   Acute bronchitis due to respiratory syncytial virus (RSV)   CKD (chronic kidney disease) stage 4, GFR 15-29 ml/min (H)     Demetrius Cohen MD, MD    I, Corky Hernandez am serving as a trained medical scribe to document services personally performed by Demetrius Cohen MD, based on the provider's statements to me.      I, Demetrius Cohen MD, was physically present and have reviewed and verified the accuracy of this note documented by Corky Hernandez.     Demetrius Cohen MD  1/20/2022   [facility] EMERGENCY DEPARTMENT     Demetrius Cohen MD  01/20/22 4842

## 2022-01-20 NOTE — ED TRIAGE NOTES
Pt arrives ambulatory with Covid symptoms for about a week. Pt has had cough, sore thoat, SOB, denies chest pain. Pt has received all 3 Covid shots. Denies fevers.   Afebrile in triage.

## 2022-01-21 VITALS
HEART RATE: 64 BPM | TEMPERATURE: 97.1 F | HEIGHT: 70 IN | BODY MASS INDEX: 26.05 KG/M2 | DIASTOLIC BLOOD PRESSURE: 73 MMHG | OXYGEN SATURATION: 94 % | WEIGHT: 182 LBS | RESPIRATION RATE: 18 BRPM | SYSTOLIC BLOOD PRESSURE: 158 MMHG

## 2022-01-21 LAB
ALBUMIN SERPL-MCNC: 2.9 G/DL (ref 3.4–5)
ALP SERPL-CCNC: 44 U/L (ref 40–150)
ALT SERPL W P-5'-P-CCNC: 19 U/L (ref 0–70)
ANION GAP SERPL CALCULATED.3IONS-SCNC: 6 MMOL/L (ref 3–14)
AST SERPL W P-5'-P-CCNC: 19 U/L (ref 0–45)
BILIRUB SERPL-MCNC: 0.4 MG/DL (ref 0.2–1.3)
BUN SERPL-MCNC: 39 MG/DL (ref 7–30)
CALCIUM SERPL-MCNC: 8.7 MG/DL (ref 8.5–10.1)
CHLORIDE BLD-SCNC: 107 MMOL/L (ref 94–109)
CO2 SERPL-SCNC: 24 MMOL/L (ref 20–32)
CREAT SERPL-MCNC: 3.24 MG/DL (ref 0.66–1.25)
ERYTHROCYTE [DISTWIDTH] IN BLOOD BY AUTOMATED COUNT: 15.9 % (ref 10–15)
GFR SERPL CREATININE-BSD FRML MDRD: 18 ML/MIN/1.73M2
GLUCOSE BLD-MCNC: 117 MG/DL (ref 70–99)
HCT VFR BLD AUTO: 25.1 % (ref 40–53)
HGB BLD-MCNC: 8.2 G/DL (ref 13.3–17.7)
MCH RBC QN AUTO: 31.2 PG (ref 26.5–33)
MCHC RBC AUTO-ENTMCNC: 32.7 G/DL (ref 31.5–36.5)
MCV RBC AUTO: 95 FL (ref 78–100)
PLATELET # BLD AUTO: 145 10E3/UL (ref 150–450)
POTASSIUM BLD-SCNC: 3.9 MMOL/L (ref 3.4–5.3)
PROT SERPL-MCNC: 5.8 G/DL (ref 6.8–8.8)
RBC # BLD AUTO: 2.63 10E6/UL (ref 4.4–5.9)
SODIUM SERPL-SCNC: 137 MMOL/L (ref 133–144)
WBC # BLD AUTO: 5 10E3/UL (ref 4–11)

## 2022-01-21 PROCEDURE — 250N000013 HC RX MED GY IP 250 OP 250 PS 637: Performed by: STUDENT IN AN ORGANIZED HEALTH CARE EDUCATION/TRAINING PROGRAM

## 2022-01-21 PROCEDURE — 250N000012 HC RX MED GY IP 250 OP 636 PS 637: Performed by: STUDENT IN AN ORGANIZED HEALTH CARE EDUCATION/TRAINING PROGRAM

## 2022-01-21 PROCEDURE — 80053 COMPREHEN METABOLIC PANEL: CPT | Performed by: STUDENT IN AN ORGANIZED HEALTH CARE EDUCATION/TRAINING PROGRAM

## 2022-01-21 PROCEDURE — G0378 HOSPITAL OBSERVATION PER HR: HCPCS

## 2022-01-21 PROCEDURE — 99219 PR INITIAL OBSERVATION CARE,LEVEL II: CPT | Mod: GC | Performed by: FAMILY MEDICINE

## 2022-01-21 PROCEDURE — 85027 COMPLETE CBC AUTOMATED: CPT | Performed by: STUDENT IN AN ORGANIZED HEALTH CARE EDUCATION/TRAINING PROGRAM

## 2022-01-21 PROCEDURE — 36415 COLL VENOUS BLD VENIPUNCTURE: CPT | Performed by: STUDENT IN AN ORGANIZED HEALTH CARE EDUCATION/TRAINING PROGRAM

## 2022-01-21 RX ORDER — HYDRALAZINE HYDROCHLORIDE 10 MG/1
10 TABLET, FILM COATED ORAL EVERY 6 HOURS PRN
Status: DISCONTINUED | OUTPATIENT
Start: 2022-01-21 | End: 2022-01-21

## 2022-01-21 RX ORDER — GUAIFENESIN 600 MG/1
1200 TABLET, EXTENDED RELEASE ORAL 2 TIMES DAILY
Qty: 28 TABLET | Refills: 0 | Status: SHIPPED | OUTPATIENT
Start: 2022-01-21 | End: 2022-08-22

## 2022-01-21 RX ADMIN — LAMOTRIGINE 200 MG: 100 TABLET ORAL at 09:23

## 2022-01-21 RX ADMIN — PREDNISONE 20 MG: 20 TABLET ORAL at 09:24

## 2022-01-21 RX ADMIN — HYDRALAZINE HYDROCHLORIDE 10 MG: 10 TABLET, FILM COATED ORAL at 02:24

## 2022-01-21 RX ADMIN — SULFAMETHOXAZOLE AND TRIMETHOPRIM 1 TABLET: 400; 80 TABLET ORAL at 09:24

## 2022-01-21 RX ADMIN — LAMOTRIGINE 200 MG: 100 TABLET ORAL at 01:19

## 2022-01-21 RX ADMIN — ESCITALOPRAM OXALATE 20 MG: 20 TABLET ORAL at 09:24

## 2022-01-21 RX ADMIN — TAMSULOSIN HYDROCHLORIDE 0.4 MG: 0.4 CAPSULE ORAL at 09:23

## 2022-01-21 RX ADMIN — FINASTERIDE 5 MG: 5 TABLET, FILM COATED ORAL at 09:24

## 2022-01-21 ASSESSMENT — MIFFLIN-ST. JEOR: SCORE: 1536.8

## 2022-01-21 NOTE — DISCHARGE SUMMARY
[facility]    Family Medicine Discharge Summary- [facility]    Date of Admission:  1/20/2022  Date of Service: 1/21/2022  Date of Discharge:  1/21/2022  Discharging Attending Provider: Dr Ponce Hood  Discharge Team: [facility]    Discharge Diagnoses      Acute bronchitis due to respiratory syncytial virus (RSV)  CKD (chronic kidney disease) stage 4, GFR 15-29 ml/min (H)  Lab test negative for COVID-19 virus  RSV bronchiolitis      Follow-ups Needed After Discharge   - Follow up with your renal providers as already arranged  - Follow up with your primary care doctor as needed in seven days for post hospital check in    Hospital Course   Melo Dangelo is a 81 year old male admitted on 1/20/2022. He has a history of CKD IV, IgA nephropathy, epilepsy, alcohol dependence, depression, and BPH, and and is admitted for RSV bronchitis. He remained vitally stable and symptomatically improved overnight and was discharged to home in the AM in stable condition. He did note some abdominal pain that, upon discussion, sounds to be MSK related to coughing and can be safely managed at home.      #RSV Bronchitis  RSV positive. Cough and progressive shortness of breath with exertion over the past week, stable on room air. CXR without focal pulmonary opacity, no need for O2 supplementation overnight, patient feeling improved in the AM. Will discharge home with saline washes, incentive spirometer, and mucinex for 7 days. He was advised of return precautions.      #CKD stage IV  #History of IgA nephropathy (dx in October 2021)  #Chronic steroid use  Current Cr 3.30. Baseline Cr 3.3-3.8. Diagnosed with IgA nephropathy at the end of October 2021, and started on long steroid course, currently tapered to dose of 20mg PO prednisone every other day. His creatinine remained stable and he was discharged to home on his PTA regimen.   - PTA 20mg prednisone every other day   - PTA  cyclophosphamide 50mg daily  - PTA bactrim 400-80mg every other day for PCP prophylaxis     Chronic problems   #Epilepsy  - PTA lamotrigine 200mg BID     #BPH  - PTA flomax 0.4mg daily  - PTA proscar 5mg daily     #Depression  - PTA lexapro 20mg daily     # Discharge Pain Plan  - Patient currently has NO PAIN and is not being prescribed pain medications on discharge.    Consultations This Hospital Stay   None    Code Status   Full Code       The patient was discussed with Dr. Ponce Palacios MD PGY-2  [facility] Family Medicine Inpatient Service  [facility]   Pager:[phone]_  ___________________________________________________________________  Review of Systems:  CONSTITUTIONAL: NEGATIVE for fever, chills  INTEGUMENTARY/SKIN: NEGATIVE for sweating  ENT/MOUTH: POSITIVE for congestion but able to breathe through nose  RESP: STABLE cough no SOB  CV: NEGATIVE for chest pain, palpitations   GI: NEGATIVE for nausea, abdominal pain  : NEGATIVE for frequency, dysuria, or hematuria  MUSCULOSKELETAL: NEGATIVE for significant arthralgias, POSITIVE for mild stomach wall myalgia  NEURO: NEGATIVE for weakness, dizziness or paresthesias  PSYCHIATRIC: NEGATIVE for changes in mood or affect    Physical Exam   Vital Signs: Temp: 97.1  F (36.2  C) Temp src: Oral BP: (!) 158/73 Pulse: 64   Resp: 18 SpO2: 94 % O2 Device: None (Room air)    Weight: 182 lbs 0 oz    General Appearance: Alert and appropriate non-toxic appearing gentleman who appears stated age  Respiratory: Coughing during exam with deep breath, satting well on RA, no crackles appreciated, speaking in full sentences on RA and walking around the room comfortably   Cardiovascular: RRR, extremities warm  GI: Abdomen soft, nontender, bowel sounds present  Skin: Warm and dry  NeuroPsych: Alert and appropriate, movements coordinated and symmetric with normal appearing gait, mood and affect appropriate to situation.     Significant  Results and Procedures   Most Recent 3 CBC's:Recent Labs   Lab Test 01/21/22  0647 01/20/22  1346 01/11/22  1000   WBC 5.0 4.4 5.7   HGB 8.2* 9.1* 9.4*   MCV 95 94 96   * 151 165     Most Recent 3 BMP's:Recent Labs   Lab Test 01/21/22  0647 01/20/22  1346 01/11/22  1000    138 140   POTASSIUM 3.9 4.3 4.6   CHLORIDE 107 106 107   CO2 24 26 27   BUN 39* 32* 38*   CR 3.24* 3.30* 3.46*   ANIONGAP 6 6 6   GIANNI 8.7 9.6 9.4   * 120* 89     Most Recent 2 LFT's:Recent Labs   Lab Test 01/21/22  0647 01/20/22  1346   AST 19 21   ALT 19 22   ALKPHOS 44 59   BILITOTAL 0.4 0.4       Pending Results   These results will be followed up by NA  Unresulted Labs Ordered in the Past 30 Days of this Admission     No orders found for last 31 day(s).             Primary Care Physician   Francis Smith    Discharge Disposition   Discharged to home  Condition at discharge: Stable    Discharge Orders      Reason for your hospital stay    You were seen with increased shortness of breath, found to have RSV, which is a respiratory virus. You do not have signs of a pneumonia at this time and are safe to discharge. We will send you home with symptomatic cares, so mucinex for your cough and saline washes for your nose to help with congestion. Your creatinine and kidney labs are stable, you can follow up as planned with them.     Return to care if you become more short of breath, you develop fevers that do not respond to tylenol. Keep up your hydration and continue to use the spirometer you got in the hospital (the tube you breathe into).     Activity    Your activity upon discharge: activity as tolerated     Adult [facility] Follow-up and recommended labs and tests    Follow up with primary care provider, Francis Smith, within 7 days for hospital follow- up if needed.  Follow up with your renal doctors as already planned.     Appointments on [place] and/or [facility] (with [facility] or [facility] provider or service). Call  [phone] if you haven't heard regarding these appointments within 7 days of discharge.     Diet    Follow this diet upon discharge: Orders Placed This Encounter      Combination Diet Regular Diet Adult     Discharge Medications   Current Discharge Medication List      START taking these medications    Details   guaiFENesin (MUCINEX) 600 MG 12 hr tablet Take 2 tablets (1,200 mg) by mouth 2 times daily  Qty: 28 tablet, Refills: 0    Associated Diagnoses: RSV bronchiolitis      Sodium Chloride-Sodium Bicarb (SINUS WASH SALINE REFILLS) 2300-700 MG PACK Wash out sinuses up to twice daily as needed for comfort.  Qty: 60 each, Refills: 0    Associated Diagnoses: RSV bronchiolitis         CONTINUE these medications which have NOT CHANGED    Details   calcium carb-cholecalciferol (OS-GIANNI) 500-200 MG-UNIT tablet Take 1 tablet by mouth 2 times daily (with meals)  Qty: 60 tablet, Refills: 11    Associated Diagnoses: IgA nephropathy; Acute kidney injury (H)      cyanocobalamin (VITAMIN B-12) 100 MCG tablet Take 1,000 mcg by mouth daily       cyclophosphamide (CYTOXAN) 50 MG capsule Take 2 capsules (100 mg) by mouth daily  Qty: 60 capsule, Refills: 3    Associated Diagnoses: IgA nephropathy      escitalopram (LEXAPRO) 20 MG tablet Take 20 mg by mouth daily      famotidine (PEPCID) 20 MG tablet Take 1 tablet (20 mg) by mouth 2 times daily  Qty: 60 tablet, Refills: 3    Associated Diagnoses: Gastroesophageal reflux disease without esophagitis      finasteride (PROSCAR) 5 MG tablet Take 1 tablet (5 mg) by mouth daily  Qty: 90 tablet, Refills: 3    Associated Diagnoses: Benign prostatic hyperplasia with urinary frequency      lamoTRIgine (LAMICTAL) 100 MG tablet Take 2 tablets (200 mg) by mouth 2 times daily  Qty: 360 tablet, Refills: 3    Comments: Keep on file  Associated Diagnoses: Seizure disorder (H)      predniSONE (DELTASONE) 20 MG tablet Take 30 mg by mouth every other day Reports he was told to take 20 mg every other  day  Qty: 60 tablet, Refills: 1    Associated Diagnoses: IgA nephropathy; Acute kidney injury (H)      simvastatin (ZOCOR) 20 MG tablet Take 1 tablet (20 mg) by mouth At Bedtime  Qty: 90 tablet, Refills: 3    Associated Diagnoses: High cholesterol      sulfamethoxazole-trimethoprim (BACTRIM) 400-80 MG tablet Take 1 tablet by mouth every other day  Qty: 60 tablet, Refills: 0    Associated Diagnoses: IgA nephropathy; Acute kidney injury (H)      tamsulosin (FLOMAX) 0.4 MG capsule Take 1 capsule (0.4 mg) by mouth daily  Qty: 90 capsule, Refills: 3    Associated Diagnoses: Benign prostatic hyperplasia with urinary frequency           Allergies   No Known Allergies

## 2022-01-21 NOTE — PROGRESS NOTES
"Blood pressure (!) 158/73, pulse 64, temperature 97.1  F (36.2  C), temperature source Oral, resp. rate 18, height 1.778 m (5' 10\"), weight 82.6 kg (182 lb), SpO2 94 %.      Pt is alert and oriented by 4. Able to make needs known. Up independently. Cough infrequently. PIV removed. Belongings returned to pt. Skin intact. On room air. AVS printed and discharge education gone over with pt. Adequate for discharge.   "

## 2022-01-21 NOTE — PLAN OF CARE
Admitted/transferred from: [place] ED    2 RN full   skin assessment completed by Starr Harris, RN and Sharon Martin RN.    Skin assessment finding: issues found scattered scratches across back (pt states scratches are from cat).      Interventions/actions: skin interventions promote fluids, adequate nutrition.      Will continue to monitor.

## 2022-01-21 NOTE — PLAN OF CARE
"/59 (BP Location: Right arm)   Pulse 81   Temp 97.6  F (36.4  C) (Oral)   Resp 18   Ht 1.778 m (5' 10\")   Wt 82.6 kg (182 lb)   SpO2 93%   BMI 26.11 kg/m      Time: 2278-5103.  Reason for Admission: Acute bronchitis d/t RSV.   Activity: Up ad toni.   Neuro: A&O x4. Calls appropriately.  Cardiac: WDL. Denies chest pain.   Respiratory: WDL on RA, satting above 90%. LLL crackles.  Dyspnea on exertion.    GI/: Voiding spontaneously, AUOP. +BS, + flatus. LBM 1/19 per pt report.    Diet: Regular, tolerating.    Skin/Incisions/Drains: Scattered scratches on back - scabbed over.   Lines: L PIV SL.   Labs: Reviewed.   Pain/Nausea: C/o left side pain when pt coughs. Denies nausea.   New changes this shift: X  Plan: Continue POC.       "

## 2022-01-21 NOTE — H&P
[facility]    History and Physical - [facility]       Date of Admission:  1/20/2022    Assessment & Plan      Melo Dangelo is a 81 year old male admitted on 1/20/2022. He has a history of CKD IV, IgA nephropathy, epilepsy, alcohol dependence, depression, and BPH, and and is admitted for RSV bronchitis.    #RSV Bronchitis  RSV positive. Cough and progressive shortness of breath with exertion over the past week, stable on room air. CXR without focal pulmonary opacity.   - continuous pulse oximetry    #CKD stage IV  #History of IgA nephropathy (dx in October 2021)  #Chronic steroid use  Current Cr 3.30. Baseline Cr 3.3-3.8. Diagnosed with IgA nephropathy at the end of October 2021, and started on long steroid course, currently tapered to dose of 20mg PO prednisone daily.   - PTA 20mg prednisone every other day   - PTA cyclophosphamide 50mg daily  - PTA bactrim 400-80mg every other day for PCP prophylaxis    Chronic problems   #Epilepsy  - PTA lamotrigine 200mg BID    #BPH  - PTA flomax 0.4mg daily  - PTA proscar 5mg daily    #Depression  - PTA lexapro 20mg daily        Diet: Combination Diet Regular Diet Adult  DVT Prophylaxis: Low Risk/Ambulatory with no VTE prophylaxis indicated (PADUA 3)  Barclay Catheter: Not present  Fluids: PO  Central Lines: None  Cardiac Monitoring: None  Code Status: Full Code    Disposition Plan   Expected Discharge: Tomorrow   Anticipated discharge location:  Awaiting care coordination huddle     The patient's care was discussed with the Attending Physician, Dr. Becker.    Iram Solares MD  [facility]  [facility]  Securely message with the Vocera Web Console (learn more here)  Text page via [employer] Paging/Directory   Please see signed in provider for up to date coverage  information    ______________________________________________________________________    Chief Complaint   Shortness of breath and cough    History is obtained from the patient    History of Present Illness   Melo Dangelo is a 81 year old male who has a history of CKD IV, IgA nephropathy, epilepsy, alcohol dependence, depression, and BPH. He has had increasing shortness of breath for the past week and a half, with a productive cough. Patient denies chest pain. He is fully vaccinated and boosted against COVID.     Of note, patient's wife is also admitted to our service with similar symptoms.     In the ED, patient received 20mg of prednisone and 1 dose from albuterol inhaler.     Review of Systems    The 10 point Review of Systems is negative other than noted in the HPI or here.     Past Medical History    I have reviewed this patient's medical history and updated it with pertinent information if needed.   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  Past Surgical History:   Procedure Laterality Date     NO HISTORY OF SURGERY       Social History   I have reviewed this patient's social history and updated it with pertinent information if needed. Melo Dangelo  reports that he has never smoked. He has never used smokeless tobacco. He reports that he does not drink alcohol and does not use drugs. History of alcohol dependence, now in remission for 20 years.     Family History   No significant family history.     Prior to Admission Medications   Prior to Admission Medications    Prescriptions Last Dose Informant Patient Reported? Taking?   calcium carb-cholecalciferol (OS-GIANNI) 500-200 MG-UNIT tablet 1/20/2022 at Unknown time  No Yes   Sig: Take 1 tablet by mouth 2 times daily (with meals)   cyanocobalamin (VITAMIN B-12) 100 MCG tablet 1/20/2022 at Unknown time Self Yes Yes   Sig: Take 1,000 mcg by mouth daily    cyclophosphamide (CYTOXAN) 50 MG capsule 1/20/2022 at Unknown time  No Yes   Sig: Take 2 capsules (100 mg) by mouth daily   escitalopram (LEXAPRO) 20 MG tablet 1/20/2022 at Unknown time  Yes Yes   Sig: Take 20 mg by mouth daily   famotidine (PEPCID) 20 MG tablet 1/20/2022 at Unknown time  No Yes   Sig: Take 1 tablet (20 mg) by mouth 2 times daily   finasteride (PROSCAR) 5 MG tablet 1/20/2022 at Unknown time Self No Yes   Sig: Take 1 tablet (5 mg) by mouth daily   lamoTRIgine (LAMICTAL) 100 MG tablet 1/20/2022 at Unknown time Self No Yes   Sig: Take 2 tablets (200 mg) by mouth 2 times daily   predniSONE (DELTASONE) 20 MG tablet 1/20/2022 at Unknown time  Yes Yes   Sig: Take 30 mg by mouth every other day Reports he was told to take 20 mg every other day   simvastatin (ZOCOR) 20 MG tablet 1/20/2022 at Unknown time Self No Yes   Sig: Take 1 tablet (20 mg) by mouth At Bedtime   sulfamethoxazole-trimethoprim (BACTRIM) 400-80 MG tablet 1/19/2022 at Unknown time  No Yes   Sig: Take 1 tablet by mouth every other day   tamsulosin (FLOMAX) 0.4 MG capsule 1/20/2022 at Unknown time Self No Yes   Sig: Take 1 capsule (0.4 mg) by mouth daily      Facility-Administered Medications: None     Allergies   No Known Allergies    Physical Exam   Vital Signs: Temp: 98  F (36.7  C) Temp src: Oral BP: (!) 180/96 Pulse: 70   Resp: 16 SpO2: 96 % O2 Device: None (Room air)    Weight: 185 lbs 0 oz    Physical Exam  Vitals reviewed.   Constitutional:       Appearance: Normal appearance.   HENT:      Head: Normocephalic.   Eyes:      Conjunctiva/sclera: Conjunctivae normal.   Cardiovascular:      Rate and  Rhythm: Normal rate and regular rhythm.      Heart sounds: Normal heart sounds.   Pulmonary:      Effort: Pulmonary effort is normal.      Breath sounds: Rales (mild rales in bilateral lung bases) present. No wheezing or rhonchi.   Musculoskeletal:         General: No swelling.   Skin:     General: Skin is warm and dry.   Neurological:      Mental Status: He is alert.   Psychiatric:         Mood and Affect: Mood normal.         Behavior: Behavior normal.         Thought Content: Thought content normal.         Judgment: Judgment normal.         Data   Data reviewed today: I reviewed all medications, new labs and imaging results over the last 24 hours.    Recent Labs   Lab 01/20/22  1346   WBC 4.4   HGB 9.1*   MCV 94      INR 1.04      POTASSIUM 4.3   CHLORIDE 106   CO2 26   BUN 32*   CR 3.30*   ANIONGAP 6   GIANNI 9.6   *   ALBUMIN 3.5   PROTTOTAL 6.7*   BILITOTAL 0.4   ALKPHOS 59   ALT 22   AST 21

## 2022-01-22 ENCOUNTER — PATIENT OUTREACH (OUTPATIENT)
Dept: CARE COORDINATION | Facility: CLINIC | Age: 82
End: 2022-01-22
Payer: MEDICARE

## 2022-01-22 DIAGNOSIS — Z71.89 OTHER SPECIFIED COUNSELING: ICD-10-CM

## 2022-01-22 NOTE — PROGRESS NOTES
Clinic Care Coordination Contact  [facility]: Post-Discharge Note  SITUATION                                                      Admission:    Admission Date: 01/20/22   Reason for Admission: RSV  Discharge:   Discharge Date: 01/21/22  Discharge Diagnosis: Acute bronchitis due to respiratory syncytial virus (RSV), CKD (chronic kidney disease) stage 4, GFR 15-29 ml/min (H)    BACKGROUND                                                      Melo Dangelo is a 81 year old male admitted on 1/20/2022. He has a history of CKD IV, IgA nephropathy, epilepsy, alcohol dependence, depression, and BPH, and and is admitted for RSV bronchitis. He remained vitally stable and symptomatically improved overnight and was discharged to home in the AM in stable condition. He did note some abdominal pain that, upon discussion, sounds to be MSK related to coughing and can be safely managed at home.      #RSV Bronchitis  RSV positive. Cough and progressive shortness of breath with exertion over the past week, stable on room air. CXR without focal pulmonary opacity, no need for O2 supplementation overnight, patient feeling improved in the AM. Will discharge home with saline washes, incentive spirometer, and mucinex for 7 days. He was advised of return precautions.      #CKD stage IV  #History of IgA nephropathy (dx in October 2021)  #Chronic steroid use  Current Cr 3.30. Baseline Cr 3.3-3.8. Diagnosed with IgA nephropathy at the end of October 2021, and started on long steroid course, currently tapered to dose of 20mg PO prednisone every other day. His creatinine remained stable and he was discharged to home on his PTA regimen.   - PTA 20mg prednisone every other day   - PTA cyclophosphamide 50mg daily  - PTA bactrim 400-80mg every other day for PCP prophylaxis       ASSESSMENT           Discharge Assessment  How are you doing now that you are home?: feeling good but started coughing  How are your symptoms? (Red Flag symptoms  escalate to triage hotline per guidelines): Improved  Do you feel your condition is stable enough to be safe at home until your provider visit?: Yes  Does the patient have their discharge instructions? : Yes  Does the patient have questions regarding their discharge instructions? : No  Were you started on any new medications or were there changes to any of your previous medications? : Yes  Does the patient have all of their medications?: Yes  Do you have questions regarding any of your medications? : No  Do you have all of your needed medical supplies or equipment (DME)?  (i.e. oxygen tank, CPAP, cane, etc.): Yes  Discharge follow-up appointment scheduled within 14 calendar days? : Yes         PLAN                                                      Outpatient Plan:  Follow up with your renal providers as already arranged  - Follow up with your primary care doctor as needed in seven days for post hospital check in    Future Appointments   Date Time Provider Department Center   1/28/2022  8:00 AM [facility]   2/17/2022 11:30 AM Merry Barrow MD [facility]   3/7/2022  1:30 PM Malik Julien MD [facility]   7/26/2022 11:45 AM Daria Raza MD [facility]         For any urgent concerns, please contact our 24 hour nurse triage line: [phone] ([phone])         Tari Floyd MA

## 2022-01-24 ENCOUNTER — TELEPHONE (OUTPATIENT)
Dept: FAMILY MEDICINE | Facility: CLINIC | Age: 82
End: 2022-01-24
Payer: MEDICARE

## 2022-01-24 DIAGNOSIS — N02.B9 IGA NEPHROPATHY: ICD-10-CM

## 2022-01-24 DIAGNOSIS — N17.9 ACUTE KIDNEY INJURY (H): ICD-10-CM

## 2022-01-24 RX ORDER — PREDNISONE 20 MG/1
20 TABLET ORAL EVERY OTHER DAY
Qty: 60 TABLET | Refills: 0 | Status: SHIPPED | OUTPATIENT
Start: 2022-01-24 | End: 2022-06-02

## 2022-01-24 NOTE — TELEPHONE ENCOUNTER
There is no call from the clinic to the patient. Patient should schedule a hospital follow up from with Dr. Smith. Zuly Barriga LPN 1/24/2022 11:52 AM

## 2022-01-24 NOTE — TELEPHONE ENCOUNTER
Per Dr Barrow decreased to prednisone 20 mg every other day  Lizbeth Roldan, LUCASN  Nephrology  [phone]

## 2022-01-24 NOTE — TELEPHONE ENCOUNTER
Call patient if he is requesting for a hospital follow up with PCP as there is no call from the clinic to the patient. Patient is unsure as he and his wife recently hospital for RSV. Offer to help patient schedule and patient does not want to schedule at this time and states he will call back later in the future.

## 2022-01-24 NOTE — TELEPHONE ENCOUNTER
[facility] Call Center    Phone Message    May a detailed message be left on voicemail: yes     Reason for Call: Other: Patient calling stating he is returning a call to Dr. Macias clinic. No encounter listed. Please call to discuss thank you.      Action Taken: Message routed to:  [facility] pcc    Travel Screening: Not Applicable

## 2022-01-28 ENCOUNTER — TELEPHONE (OUTPATIENT)
Dept: NEPHROLOGY | Facility: CLINIC | Age: 82
End: 2022-01-28
Payer: MEDICARE

## 2022-01-28 ENCOUNTER — LAB (OUTPATIENT)
Dept: LAB | Facility: CLINIC | Age: 82
End: 2022-01-28
Attending: INTERNAL MEDICINE
Payer: MEDICARE

## 2022-01-28 DIAGNOSIS — N18.4 CKD (CHRONIC KIDNEY DISEASE) STAGE 4, GFR 15-29 ML/MIN (H): ICD-10-CM

## 2022-01-28 DIAGNOSIS — N18.4 CKD (CHRONIC KIDNEY DISEASE) STAGE 4, GFR 15-29 ML/MIN (H): Primary | ICD-10-CM

## 2022-01-28 LAB
ALBUMIN SERPL-MCNC: 3.6 G/DL (ref 3.4–5)
ANION GAP SERPL CALCULATED.3IONS-SCNC: 7 MMOL/L (ref 3–14)
BUN SERPL-MCNC: 32 MG/DL (ref 7–30)
CALCIUM SERPL-MCNC: 8.9 MG/DL (ref 8.5–10.1)
CHLORIDE BLD-SCNC: 108 MMOL/L (ref 94–109)
CO2 SERPL-SCNC: 26 MMOL/L (ref 20–32)
CREAT SERPL-MCNC: 3.33 MG/DL (ref 0.66–1.25)
ERYTHROCYTE [DISTWIDTH] IN BLOOD BY AUTOMATED COUNT: 16.1 % (ref 10–15)
GFR SERPL CREATININE-BSD FRML MDRD: 18 ML/MIN/1.73M2
GLUCOSE BLD-MCNC: 91 MG/DL (ref 70–99)
HCT VFR BLD AUTO: 28.2 % (ref 40–53)
HGB BLD-MCNC: 9.2 G/DL (ref 13.3–17.7)
HOLD SPECIMEN: NORMAL
MCH RBC QN AUTO: 31.1 PG (ref 26.5–33)
MCHC RBC AUTO-ENTMCNC: 32.6 G/DL (ref 31.5–36.5)
MCV RBC AUTO: 95 FL (ref 78–100)
PHOSPHATE SERPL-MCNC: 3.3 MG/DL (ref 2.5–4.5)
PLATELET # BLD AUTO: 188 10E3/UL (ref 150–450)
POTASSIUM BLD-SCNC: 4.5 MMOL/L (ref 3.4–5.3)
RBC # BLD AUTO: 2.96 10E6/UL (ref 4.4–5.9)
SODIUM SERPL-SCNC: 141 MMOL/L (ref 133–144)
WBC # BLD AUTO: 4.9 10E3/UL (ref 4–11)

## 2022-01-28 PROCEDURE — 85027 COMPLETE CBC AUTOMATED: CPT | Performed by: PATHOLOGY

## 2022-01-28 PROCEDURE — 36415 COLL VENOUS BLD VENIPUNCTURE: CPT | Performed by: PATHOLOGY

## 2022-01-28 PROCEDURE — 80069 RENAL FUNCTION PANEL: CPT | Performed by: PATHOLOGY

## 2022-01-28 NOTE — TELEPHONE ENCOUNTER
[facility] Call Center    Phone Message    May a detailed message be left on voicemail: yes     Reason for Call: Other: Pt is needing lab orders placed for his appt 2/17. He had a lab appt scheduled today 1/28, but there were no orders in the system and lab had to turn him around. Please place orders asap. Thanks     Action Taken: Message routed to:  [facility] nephro    Travel Screening: Not Applicable

## 2022-02-03 ENCOUNTER — OFFICE VISIT (OUTPATIENT)
Dept: FAMILY MEDICINE | Facility: CLINIC | Age: 82
End: 2022-02-03
Payer: MEDICARE

## 2022-02-03 VITALS
OXYGEN SATURATION: 96 % | HEART RATE: 73 BPM | BODY MASS INDEX: 26.63 KG/M2 | SYSTOLIC BLOOD PRESSURE: 164 MMHG | HEIGHT: 70 IN | WEIGHT: 186 LBS | DIASTOLIC BLOOD PRESSURE: 77 MMHG

## 2022-02-03 DIAGNOSIS — N02.B9 IGA NEPHROPATHY: ICD-10-CM

## 2022-02-03 DIAGNOSIS — I48.0 PAROXYSMAL ATRIAL FIBRILLATION (H): ICD-10-CM

## 2022-02-03 DIAGNOSIS — Z23 ENCOUNTER FOR IMMUNIZATION: ICD-10-CM

## 2022-02-03 DIAGNOSIS — Z00.00 HEALTH CARE MAINTENANCE: Primary | ICD-10-CM

## 2022-02-03 DIAGNOSIS — I10 BENIGN ESSENTIAL HYPERTENSION: ICD-10-CM

## 2022-02-03 PROCEDURE — 99495 TRANSJ CARE MGMT MOD F2F 14D: CPT | Performed by: FAMILY MEDICINE

## 2022-02-03 ASSESSMENT — PAIN SCALES - GENERAL: PAINLEVEL: NO PAIN (1)

## 2022-02-03 ASSESSMENT — MIFFLIN-ST. JEOR: SCORE: 1554.94

## 2022-02-03 NOTE — PROGRESS NOTES
"    Assessment & Plan     Health care maintenance  Due  - Pneumococcal vaccine 13 valent PCV13 IM (Prevnar) [[id number]]    Encounter for immunization   Due  - Pneumococcal vaccine 13 valent PCV13 IM (Prevnar) [[id number]]    Paroxysmal atrial fibrillation (H)  Due for f/u, I ask him to discuss possible exertional symptoms w/ cardio; these are stable only w/ stairs. These are chronic months long symptoms, not new or worse since discharge.  - Adult Cardiology Eval  Referral    Benign essential hypertension  I ask him to keep home list show Renal in wo weeks    IgA nephropathy  Eligible for covid fourth shot in spring, discussed. He'll see renal in two weeks. Labs stable, no real change w/ recent illness        52 minutes spent on the date of the encounter doing chart review, history and exam, documentation and further activities per the note    BMI:   Estimated body mass index is 26.69 kg/m  as calculated from the following:    Height as of this encounter: 1.778 m (5' 10\").    Weight as of this encounter: 84.4 kg (186 lb).     Francis Smith MD  [facility] [place]    Armand Alejo is a 81 year old who presents for the following health issues     HPI Here for a few things  One, inpatient followup RSV he has recovered, no further pulm ent or systemic symptoms. Of note his wife is my pt and got same dx and just got home inpt much longer.    Intp 1/20-21/2022 at [facility], RSV w/ pulm complications, well since home, no complications or set backs, no further cares directed at this needed     Two, intermittent palpitations--h/o paroxysmal a fib, no recent cardio visits. Blood thinners stopped while inpt last autumn. He notes that exertion might trigger this, such as stairs. No known CAD dx but he is in his 80s. Negative ziopatch three mo ago noted. No assoc nausea/sweats/dypsnea.    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      PVD (posterior vitreous detachment)      Seizure disorder (H)     last seizure 2008     Past Surgical History:   Procedure Laterality Date     NO HISTORY OF SURGERY       Current Outpatient Medications   Medication     calcium carb-cholecalciferol (OS-GIANNI) 500-200 MG-UNIT tablet     cyanocobalamin (VITAMIN B-12) 100 MCG tablet     cyclophosphamide (CYTOXAN) 50 MG capsule     escitalopram (LEXAPRO) 20 MG tablet     famotidine (PEPCID) 20 MG tablet     finasteride (PROSCAR) 5 MG tablet     guaiFENesin (MUCINEX) 600 MG 12 hr tablet     lamoTRIgine (LAMICTAL) 100 MG tablet     predniSONE (DELTASONE) 20 MG tablet     simvastatin (ZOCOR) 20 MG tablet     Sodium Chloride-Sodium Bicarb (SINUS WASH SALINE REFILLS) 2300-700 MG PACK     sulfamethoxazole-trimethoprim (BACTRIM) 400-80 MG tablet     tamsulosin (FLOMAX) 0.4 MG capsule     No current facility-administered medications for this visit.     No Known Allergies          Review of Systems         Objective    BP (!) 164/77   Pulse 73   Ht 1.778 m (5' 10\")   Wt 84.4 kg (186 lb)   SpO2 96%   BMI 26.69 kg/m    Body mass index is 26.69 kg/m .  Physical Exam   GENERAL: healthy, alert and no distress  RESP: lungs clear to auscultation - no rales, rhonchi or wheezes  CV: regular rate and rhythm, normal S1 S2, no S3 or S4, no murmur, click or rub, no peripheral edema and peripheral pulses strong  MS: no gross musculoskeletal defects noted, no edema                "

## 2022-02-03 NOTE — NURSING NOTE
Chief Complaint   Patient presents with     Recheck Medication     follow up      Palpitations     [facility] F/U     1/20/22 bronchitis       Carmen Jauregui, EMT at 10:53 AM on 2/3/2022

## 2022-02-04 ENCOUNTER — ANCILLARY PROCEDURE (OUTPATIENT)
Dept: CARDIOLOGY | Facility: CLINIC | Age: 82
End: 2022-02-04
Attending: INTERNAL MEDICINE
Payer: MEDICARE

## 2022-02-04 ENCOUNTER — OFFICE VISIT (OUTPATIENT)
Dept: CARDIOLOGY | Facility: CLINIC | Age: 82
End: 2022-02-04
Attending: FAMILY MEDICINE
Payer: MEDICARE

## 2022-02-04 VITALS
HEART RATE: 65 BPM | SYSTOLIC BLOOD PRESSURE: 150 MMHG | OXYGEN SATURATION: 97 % | DIASTOLIC BLOOD PRESSURE: 74 MMHG | WEIGHT: 183.4 LBS | BODY MASS INDEX: 26.32 KG/M2

## 2022-02-04 DIAGNOSIS — R00.2 PALPITATIONS: ICD-10-CM

## 2022-02-04 DIAGNOSIS — I48.0 PAROXYSMAL ATRIAL FIBRILLATION (H): Primary | ICD-10-CM

## 2022-02-04 DIAGNOSIS — I48.0 PAROXYSMAL ATRIAL FIBRILLATION (H): ICD-10-CM

## 2022-02-04 LAB
ATRIAL RATE - MUSE: 65 BPM
DIASTOLIC BLOOD PRESSURE - MUSE: NORMAL MMHG
INTERPRETATION ECG - MUSE: NORMAL
P AXIS - MUSE: 79 DEGREES
PR INTERVAL - MUSE: 182 MS
QRS DURATION - MUSE: 80 MS
QT - MUSE: 402 MS
QTC - MUSE: 418 MS
R AXIS - MUSE: 27 DEGREES
SYSTOLIC BLOOD PRESSURE - MUSE: NORMAL MMHG
T AXIS - MUSE: 57 DEGREES
VENTRICULAR RATE- MUSE: 65 BPM

## 2022-02-04 PROCEDURE — 93246 EXT ECG>7D<15D RECORDING: CPT

## 2022-02-04 PROCEDURE — G0463 HOSPITAL OUTPT CLINIC VISIT: HCPCS | Mod: 25

## 2022-02-04 PROCEDURE — 93005 ELECTROCARDIOGRAM TRACING: CPT | Mod: XU

## 2022-02-04 PROCEDURE — 99204 OFFICE O/P NEW MOD 45 MIN: CPT | Performed by: INTERNAL MEDICINE

## 2022-02-04 ASSESSMENT — PAIN SCALES - GENERAL: PAINLEVEL: NO PAIN (1)

## 2022-02-04 NOTE — PATIENT INSTRUCTIONS
Plan:     Wear 14 day ziopatch    Follow-up in 1 month with Dr Ventura      Your Care Team:  EP Cardiology   Telephone Number     Yeimi Dang RN [phone]     For scheduling appts or procedures:    Vania Saini   [phone]   For the Device Clinic (Pacemakers, ICDs, Loop Recorders)    During business hours: [phone]  After business hours:   [phone]- select option 4 and ask for job code [id number].       Cardiovascular Clinic:   [address] [place] [address]. [place] [address]      As always, Thank you for trusting us with your health care needs!

## 2022-02-04 NOTE — NURSING NOTE
Chief Complaint   Patient presents with     New Patient     new EP- Paroxysmal AF referral per PCP       Vitals were taken, medications reconciled, and EKG was performed.    SUE Emmanuel  2:36 PM

## 2022-02-04 NOTE — LETTER
2/4/2022      RE: Melo Dangelo  [address]  [place] [address]       Dear Colleague,    Thank you for the opportunity to participate in the care of your patient, Melo Dangelo, at the [facility] [place] at [facility]. Please see a copy of my visit note below.    HPI:   Melo Dangelo is an 81 year old Male with a PMH of HTN, IgA nephropathy, Hx of COVID-19 infection x 2 and PAF.  He was referred for a cardiology follow up.  He was diagnosed with AF in setting of hospitalization for COVID-19 infection in October 2020, at which time Apixaban was restarted, with plans to possibly stop med 1+ months after discharge. Is CHADS2-Vasc score = 3.    He has been suffering from skipped beats and also episodes lasting for an hour that happened a couple of times a week.  When the lasting episode occurred, he felt tired and sluggish.  He denied any chest pain or SOB.  Eliquis was discontinued due to CKD.  Zio patch in 10/2021 was not significant, but he is now having more symptoms.    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      PVD (posterior vitreous detachment)      Seizure disorder (H)     last seizure 2008       CURRENT MEDICATIONS:  Current Outpatient Medications   Medication Sig Dispense Refill     calcium carb-cholecalciferol (OS-GIANNI) 500-200 MG-UNIT tablet Take 1 tablet by mouth 2 times daily (with meals) 60 tablet 11     cyanocobalamin (VITAMIN B-12) 100 MCG tablet Take 1,000 mcg by mouth daily        cyclophosphamide (CYTOXAN) 50 MG capsule Take 2 capsules (100 mg) by mouth daily 60 capsule 3     escitalopram (LEXAPRO) 20 MG tablet Take 20 mg by mouth daily       famotidine (PEPCID) 20 MG  tablet Take 1 tablet (20 mg) by mouth 2 times daily 60 tablet 3     finasteride (PROSCAR) 5 MG tablet Take 1 tablet (5 mg) by mouth daily 90 tablet 3     guaiFENesin (MUCINEX) 600 MG 12 hr tablet Take 2 tablets (1,200 mg) by mouth 2 times daily 28 tablet 0     lamoTRIgine (LAMICTAL) 100 MG tablet Take 2 tablets (200 mg) by mouth 2 times daily 360 tablet 3     predniSONE (DELTASONE) 20 MG tablet Take 1 tablet (20 mg) by mouth every other day 60 tablet 0     simvastatin (ZOCOR) 20 MG tablet Take 1 tablet (20 mg) by mouth At Bedtime 90 tablet 3     Sodium Chloride-Sodium Bicarb (SINUS WASH SALINE REFILLS) 2300-700 MG PACK Wash out sinuses up to twice daily as needed for comfort. 60 each 0     sulfamethoxazole-trimethoprim (BACTRIM) 400-80 MG tablet Take 1 tablet by mouth every other day 60 tablet 0     tamsulosin (FLOMAX) 0.4 MG capsule Take 1 capsule (0.4 mg) by mouth daily 90 capsule 3       PAST SURGICAL HISTORY:  Past Surgical History:   Procedure Laterality Date     NO HISTORY OF SURGERY         ALLERGIES:   No Known Allergies    FAMILY HISTORY:  - Premature coronary artery disease  + Atrial fibrillation  + Sudden cardiac death     SOCIAL HISTORY:  Social History     Tobacco Use     Smoking status: Never Smoker     Smokeless tobacco: Never Used   Substance Use Topics     Alcohol use: No     Comment: History of alcohol dependence, in remission for 20 years     Drug use: No       ROS:   Constitutional: No fever, chills, or sweats. Weight stable.   ENT: No visual disturbance, ear ache, epistaxis, sore throat.   Cardiovascular: As per HPI.   Respiratory: No cough, hemoptysis.    GI: No nausea, vomiting, hematemesis, melena, or hematochezia.   : No hematuria.   Integument: Negative.   Psychiatric: Negative.   Hematologic:  Easy bruising, no easy bleeding.  Neuro: Negative.   Endocrinology: No significant heat or cold intolerance   Musculoskeletal: No myalgia.    Exam:  BP (!) 150/74 (BP Location: Right arm, Patient  Position: Supine, Cuff Size: Adult Regular)   Pulse 65   Wt 83.2 kg (183 lb 6.4 oz)   SpO2 97%   BMI 26.32 kg/m    GENERAL APPEARANCE: healthy, alert and no distress  HEENT: no icterus, no xanthelasmas, normal pupil size and reaction, normal palate, mucosa moist, no central cyanosis  NECK: no adenopathy, no asymmetry, masses, or scars, thyroid normal to palpation and no bruits, JVP not elevated  RESPIRATORY: lungs clear to auscultation - no rales, rhonchi or wheezes, no use of accessory muscles, no retractions, respirations are unlabored, normal respiratory rate  CARDIOVASCULAR: regular rhythm, normal S1 with physiologic split S2, no S3 or S4 and no murmur, click or rub, precordium quiet with normal PMI.  ABDOMEN: soft, non tender, without hepatosplenomegaly, no masses palpable, bowel sounds normal, aorta not enlarged by palpation, no abdominal bruits  EXTREMITIES: peripheral pulses normal, no edema, no bruits  NEURO: alert and oriented to person/place/time, normal speech, gait and affect  VASC: Radial, femoral, dorsalis pedis and posterior tibialis pulses are normal in volumes and symmetric bilaterally. No bruits are heard.  SKIN: no ecchymoses, no rashes    Labs:  CBC RESULTS:   Lab Results   Component Value Date    WBC 4.9 01/28/2022    WBC 7.7 10/29/2020    RBC 2.96 (L) 01/28/2022    RBC 3.85 (L) 10/29/2020    HGB 9.2 (L) 01/28/2022    HGB 11.6 (L) 10/29/2020    HCT 28.2 (L) 01/28/2022    HCT 35.6 (L) 10/29/2020    MCV 95 01/28/2022    MCV 93 10/29/2020    MCH 31.1 01/28/2022    MCH 30.1 10/29/2020    MCHC 32.6 01/28/2022    MCHC 32.6 10/29/2020    RDW 16.1 (H) 01/28/2022    RDW 13.7 10/29/2020     01/28/2022     10/29/2020       BMP RESULTS:  Lab Results   Component Value Date     01/28/2022     10/29/2020    POTASSIUM 4.5 01/28/2022    POTASSIUM 4.7 10/29/2020    CHLORIDE 108 01/28/2022    CHLORIDE 110 (H) 10/29/2020    CO2 26 01/28/2022    CO2 25 10/29/2020    ANIONGAP 7  01/28/2022    ANIONGAP 5 10/29/2020    GLC 91 01/28/2022     (H) 10/29/2020    BUN 32 (H) 01/28/2022    BUN 31 (H) 10/29/2020    CR 3.33 (H) 01/28/2022    CR 1.33 (H) 10/29/2020    GFRESTIMATED 18 (L) 01/28/2022    GFRESTIMATED 56 (L) 11/04/2020    GFRESTIMATED 50 (L) 10/29/2020    GFRESTBLACK >60 11/04/2020    GFRESTBLACK 58 (L) 10/29/2020    GIANNI 8.9 01/28/2022    GIANNI 8.5 10/29/2020        INR RESULTS:  Lab Results   Component Value Date    INR 1.04 01/20/2022    INR 1.09 10/19/2021    INR 1.09 11/02/2020    INR 1.18 (H) 11/01/2020    INR 1.15 (H) 10/29/2020    INR 1.05 10/27/2020       Procedures:  ECG on 2/4/2022: Reviewed.  WNL.    Zio patch in 10/2021: reviewed.      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 Is CHADS2-Vasc score = 3.  Eliquis was discontinued due to CKD.  # Skipped beats and also episodes lasting for an hour that happened a couple of times a week.  When the lasting episode occurred, he felt tired and sluggish.  Zio patch in 10/2021 was not significant, but he is now having more symptoms.  We will place him on a repeat Zio patch to see what is happening.  I will see him back in a month.    I spent a total of 45 min today to review the records, see the patient, and complete the documents.    Please do not hesitate to contact me if you have any questions/concerns.     Sincerely,     Adis Ventura MD    CC  Patient Care Team:  Francis Smith MD as PCP - General (Family Medicine)  Inderjit Grier as MD Julien, Malki Javed MD as Assigned Surgical Provider  Esperanza Guillaume CNP as Nurse Practitioner (Urology)  Pushpa Alvarado RN as Registered Nurse  Daria Raza MD as Assigned Neuroscience Provider  Fernando Chong MD as MD  (Nephrology)  Wayne Farmer as Assigned Behavioral Health Provider  Merry Barrow MD as MD (Nephrology)    Nithin Tolbert RPH as Pharmacist (Pharmacist)  Yeimi Dang, RN as Specialty Care Coordinator

## 2022-02-04 NOTE — PROGRESS NOTES
"Per Dr. Ventura, patient to have Zio monitor placed.  Diagnosis: Paroxysmal Atrial Fibrillation [I48.0]  Monitor placed: {YES / NO:709043::\"Yes\"}  Patient Instructed: {YES / NO:186711::\"Yes\"}  Patient verbalized understanding: {YES / NO:343089::\"Yes\"}  Holter # [id number]        "

## 2022-02-04 NOTE — PROGRESS NOTES
HPI:   Melo Dangelo is an 81 year old Male with a PMH of HTN, IgA nephropathy, Hx of COVID-19 infection x 2 and PAF.  He was referred for a cardiology follow up.  He was diagnosed with AF in setting of hospitalization for COVID-19 infection in October 2020, at which time Apixaban was restarted, with plans to possibly stop med 1+ months after discharge. Is CHADS2-Vasc score = 3.    He has been suffering from skipped beats and also episodes lasting for an hour that happened a couple of times a week.  When the lasting episode occurred, he felt tired and sluggish.  He denied any chest pain or SOB.  Eliquis was discontinued due to CKD.  Zio patch in 10/2021 was not significant, but he is now having more symptoms.    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      PVD (posterior vitreous detachment)      Seizure disorder (H)     last seizure 2008       CURRENT MEDICATIONS:  Current Outpatient Medications   Medication Sig Dispense Refill     calcium carb-cholecalciferol (OS-GIANNI) 500-200 MG-UNIT tablet Take 1 tablet by mouth 2 times daily (with meals) 60 tablet 11     cyanocobalamin (VITAMIN B-12) 100 MCG tablet Take 1,000 mcg by mouth daily        cyclophosphamide (CYTOXAN) 50 MG capsule Take 2 capsules (100 mg) by mouth daily 60 capsule 3     escitalopram (LEXAPRO) 20 MG tablet Take 20 mg by mouth daily       famotidine (PEPCID) 20 MG tablet Take 1 tablet (20 mg) by mouth 2 times daily 60 tablet 3     finasteride (PROSCAR) 5 MG tablet Take 1 tablet (5 mg) by mouth daily 90 tablet 3     guaiFENesin (MUCINEX) 600 MG 12 hr tablet Take 2 tablets (1,200 mg) by mouth 2 times daily 28 tablet 0     lamoTRIgine (LAMICTAL) 100 MG tablet Take 2 tablets (200 mg) by mouth 2 times daily 360 tablet  3     predniSONE (DELTASONE) 20 MG tablet Take 1 tablet (20 mg) by mouth every other day 60 tablet 0     simvastatin (ZOCOR) 20 MG tablet Take 1 tablet (20 mg) by mouth At Bedtime 90 tablet 3     Sodium Chloride-Sodium Bicarb (SINUS WASH SALINE REFILLS) 2300-700 MG PACK Wash out sinuses up to twice daily as needed for comfort. 60 each 0     sulfamethoxazole-trimethoprim (BACTRIM) 400-80 MG tablet Take 1 tablet by mouth every other day 60 tablet 0     tamsulosin (FLOMAX) 0.4 MG capsule Take 1 capsule (0.4 mg) by mouth daily 90 capsule 3       PAST SURGICAL HISTORY:  Past Surgical History:   Procedure Laterality Date     NO HISTORY OF SURGERY         ALLERGIES:   No Known Allergies    FAMILY HISTORY:  - Premature coronary artery disease  + Atrial fibrillation  + Sudden cardiac death     SOCIAL HISTORY:  Social History     Tobacco Use     Smoking status: Never Smoker     Smokeless tobacco: Never Used   Substance Use Topics     Alcohol use: No     Comment: History of alcohol dependence, in remission for 20 years     Drug use: No       ROS:   Constitutional: No fever, chills, or sweats. Weight stable.   ENT: No visual disturbance, ear ache, epistaxis, sore throat.   Cardiovascular: As per HPI.   Respiratory: No cough, hemoptysis.    GI: No nausea, vomiting, hematemesis, melena, or hematochezia.   : No hematuria.   Integument: Negative.   Psychiatric: Negative.   Hematologic:  Easy bruising, no easy bleeding.  Neuro: Negative.   Endocrinology: No significant heat or cold intolerance   Musculoskeletal: No myalgia.    Exam:  BP (!) 150/74 (BP Location: Right arm, Patient Position: Supine, Cuff Size: Adult Regular)   Pulse 65   Wt 83.2 kg (183 lb 6.4 oz)   SpO2 97%   BMI 26.32 kg/m    GENERAL APPEARANCE: healthy, alert and no distress  HEENT: no icterus, no xanthelasmas, normal pupil size and reaction, normal palate, mucosa moist, no central cyanosis  NECK: no adenopathy, no asymmetry, masses, or scars, thyroid normal  to palpation and no bruits, JVP not elevated  RESPIRATORY: lungs clear to auscultation - no rales, rhonchi or wheezes, no use of accessory muscles, no retractions, respirations are unlabored, normal respiratory rate  CARDIOVASCULAR: regular rhythm, normal S1 with physiologic split S2, no S3 or S4 and no murmur, click or rub, precordium quiet with normal PMI.  ABDOMEN: soft, non tender, without hepatosplenomegaly, no masses palpable, bowel sounds normal, aorta not enlarged by palpation, no abdominal bruits  EXTREMITIES: peripheral pulses normal, no edema, no bruits  NEURO: alert and oriented to person/place/time, normal speech, gait and affect  VASC: Radial, femoral, dorsalis pedis and posterior tibialis pulses are normal in volumes and symmetric bilaterally. No bruits are heard.  SKIN: no ecchymoses, no rashes    Labs:  CBC RESULTS:   Lab Results   Component Value Date    WBC 4.9 01/28/2022    WBC 7.7 10/29/2020    RBC 2.96 (L) 01/28/2022    RBC 3.85 (L) 10/29/2020    HGB 9.2 (L) 01/28/2022    HGB 11.6 (L) 10/29/2020    HCT 28.2 (L) 01/28/2022    HCT 35.6 (L) 10/29/2020    MCV 95 01/28/2022    MCV 93 10/29/2020    MCH 31.1 01/28/2022    MCH 30.1 10/29/2020    MCHC 32.6 01/28/2022    MCHC 32.6 10/29/2020    RDW 16.1 (H) 01/28/2022    RDW 13.7 10/29/2020     01/28/2022     10/29/2020       BMP RESULTS:  Lab Results   Component Value Date     01/28/2022     10/29/2020    POTASSIUM 4.5 01/28/2022    POTASSIUM 4.7 10/29/2020    CHLORIDE 108 01/28/2022    CHLORIDE 110 (H) 10/29/2020    CO2 26 01/28/2022    CO2 25 10/29/2020    ANIONGAP 7 01/28/2022    ANIONGAP 5 10/29/2020    GLC 91 01/28/2022     (H) 10/29/2020    BUN 32 (H) 01/28/2022    BUN 31 (H) 10/29/2020    CR 3.33 (H) 01/28/2022    CR 1.33 (H) 10/29/2020    GFRESTIMATED 18 (L) 01/28/2022    GFRESTIMATED 56 (L) 11/04/2020    GFRESTIMATED 50 (L) 10/29/2020    GFRESTBLACK >60 11/04/2020    GFRESTBLACK 58 (L) 10/29/2020    GIANNI 8.9  01/28/2022    GIANNI 8.5 10/29/2020        INR RESULTS:  Lab Results   Component Value Date    INR 1.04 01/20/2022    INR 1.09 10/19/2021    INR 1.09 11/02/2020    INR 1.18 (H) 11/01/2020    INR 1.15 (H) 10/29/2020    INR 1.05 10/27/2020       Procedures:  ECG on 2/4/2022: Reviewed.  WNL.    Zio patch in 10/2021: reviewed.      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 Is CHADS2-Vasc score = 3.  Eliquis was discontinued due to CKD.  # Skipped beats and also episodes lasting for an hour that happened a couple of times a week.  When the lasting episode occurred, he felt tired and sluggish.  Zio patch in 10/2021 was not significant, but he is now having more symptoms.  We will place him on a repeat Zio patch to see what is happening.  I will see him back in a month.    I spent a total of 45 min today to review the records, see the patient, and complete the documents.    CC  Patient Care Team:  Francis Smith MD as PCP - General (Family Medicine)  Inderjit Grier as Malik Cordoba MD as Assigned Surgical Provider  Esperanza Guillaume CNP as Nurse Practitioner (Urology)  Pushpa Alvarado, DINH as Registered Nurse  Francis Smith MD as Assigned PCP  Daria Raza MD as Assigned Neuroscience Provider  Fernando Chong MD as MD (Nephrology)  Wayne Farmer as Assigned Behavioral Health Provider  Merry Barrow MD as MD (Nephrology)  Merry Barrow MD as Assigned Nephrology Provider  Nithin Tolbert [facility] as Pharmacist (Pharmacist)  Adis Ventura MD (Cardiovascular Disease)  Yeimi Dang, RN as Specialty Care Coordinator  FRANCIS SMITH

## 2022-02-16 DIAGNOSIS — N02.B9 IGA NEPHROPATHY: ICD-10-CM

## 2022-02-16 DIAGNOSIS — N18.4 CKD (CHRONIC KIDNEY DISEASE) STAGE 4, GFR 15-29 ML/MIN (H): Primary | ICD-10-CM

## 2022-02-17 ENCOUNTER — OFFICE VISIT (OUTPATIENT)
Dept: NEPHROLOGY | Facility: CLINIC | Age: 82
End: 2022-02-17
Attending: INTERNAL MEDICINE
Payer: MEDICARE

## 2022-02-17 ENCOUNTER — LAB (OUTPATIENT)
Dept: LAB | Facility: CLINIC | Age: 82
End: 2022-02-17
Attending: INTERNAL MEDICINE
Payer: MEDICARE

## 2022-02-17 VITALS
OXYGEN SATURATION: 96 % | WEIGHT: 185.9 LBS | HEIGHT: 70 IN | HEART RATE: 74 BPM | TEMPERATURE: 98.2 F | RESPIRATION RATE: 18 BRPM | BODY MASS INDEX: 26.61 KG/M2 | SYSTOLIC BLOOD PRESSURE: 137 MMHG | DIASTOLIC BLOOD PRESSURE: 68 MMHG

## 2022-02-17 DIAGNOSIS — N02.B9 IGA NEPHROPATHY: ICD-10-CM

## 2022-02-17 DIAGNOSIS — N18.4 CKD (CHRONIC KIDNEY DISEASE) STAGE 4, GFR 15-29 ML/MIN (H): ICD-10-CM

## 2022-02-17 DIAGNOSIS — D63.1 ANEMIA IN STAGE 4 CHRONIC KIDNEY DISEASE (H): ICD-10-CM

## 2022-02-17 DIAGNOSIS — N18.4 ANEMIA IN STAGE 4 CHRONIC KIDNEY DISEASE (H): ICD-10-CM

## 2022-02-17 DIAGNOSIS — I48.20 ATRIAL FIBRILLATION, CHRONIC (H): Primary | ICD-10-CM

## 2022-02-17 PROBLEM — H35.711 CENTRAL SEROUS CHORIORETINOPATHY OF RIGHT EYE: Status: ACTIVE | Noted: 2019-10-28

## 2022-02-17 LAB
ALBUMIN SERPL-MCNC: 3.7 G/DL (ref 3.4–5)
ALBUMIN UR-MCNC: NEGATIVE MG/DL
ANION GAP SERPL CALCULATED.3IONS-SCNC: 10 MMOL/L (ref 3–14)
APPEARANCE UR: CLEAR
BILIRUB UR QL STRIP: NEGATIVE
BUN SERPL-MCNC: 49 MG/DL (ref 7–30)
CALCIUM SERPL-MCNC: 9.2 MG/DL (ref 8.5–10.1)
CHLORIDE BLD-SCNC: 106 MMOL/L (ref 94–109)
CO2 SERPL-SCNC: 24 MMOL/L (ref 20–32)
COLOR UR AUTO: YELLOW
CREAT SERPL-MCNC: 3.7 MG/DL (ref 0.66–1.25)
CREAT UR-MCNC: 75 MG/DL
DEPRECATED CALCIDIOL+CALCIFEROL SERPL-MC: 32 UG/L (ref 20–75)
ERYTHROCYTE [DISTWIDTH] IN BLOOD BY AUTOMATED COUNT: 16.4 % (ref 10–15)
FERRITIN SERPL-MCNC: 351 NG/ML (ref 26–388)
GFR SERPL CREATININE-BSD FRML MDRD: 16 ML/MIN/1.73M2
GLUCOSE BLD-MCNC: 116 MG/DL (ref 70–99)
GLUCOSE UR STRIP-MCNC: NEGATIVE MG/DL
HCT VFR BLD AUTO: 27.2 % (ref 40–53)
HGB BLD-MCNC: 9.3 G/DL (ref 13.3–17.7)
HGB UR QL STRIP: ABNORMAL
IRON SATN MFR SERPL: 26 % (ref 15–46)
IRON SERPL-MCNC: 78 UG/DL (ref 35–180)
IRON SERPL-MCNC: 78 UG/DL (ref 35–180)
KETONES UR STRIP-MCNC: NEGATIVE MG/DL
LEUKOCYTE ESTERASE UR QL STRIP: NEGATIVE
MCH RBC QN AUTO: 33.1 PG (ref 26.5–33)
MCHC RBC AUTO-ENTMCNC: 34.2 G/DL (ref 31.5–36.5)
MCV RBC AUTO: 97 FL (ref 78–100)
MUCOUS THREADS #/AREA URNS LPF: PRESENT /LPF
NITRATE UR QL: NEGATIVE
PH UR STRIP: 5 [PH] (ref 5–7)
PHOSPHATE SERPL-MCNC: 3.2 MG/DL (ref 2.5–4.5)
PLATELET # BLD AUTO: 142 10E3/UL (ref 150–450)
POTASSIUM BLD-SCNC: 4.2 MMOL/L (ref 3.4–5.3)
PROT UR-MCNC: 0.28 G/L
PROT/CREAT 24H UR: 0.37 G/G CR (ref 0–0.2)
PTH-INTACT SERPL-MCNC: 58 PG/ML (ref 15–65)
RBC # BLD AUTO: 2.81 10E6/UL (ref 4.4–5.9)
RBC URINE: 3 /HPF
SODIUM SERPL-SCNC: 140 MMOL/L (ref 133–144)
SP GR UR STRIP: 1.01 (ref 1–1.03)
SQUAMOUS EPITHELIAL: <1 /HPF
TIBC SERPL-MCNC: 297 UG/DL (ref 240–430)
UROBILINOGEN UR STRIP-MCNC: NORMAL MG/DL
WBC # BLD AUTO: 5.2 10E3/UL (ref 4–11)
WBC URINE: <1 /HPF

## 2022-02-17 PROCEDURE — 83550 IRON BINDING TEST: CPT | Performed by: PATHOLOGY

## 2022-02-17 PROCEDURE — 85027 COMPLETE CBC AUTOMATED: CPT | Performed by: PATHOLOGY

## 2022-02-17 PROCEDURE — 99214 OFFICE O/P EST MOD 30 MIN: CPT | Performed by: INTERNAL MEDICINE

## 2022-02-17 PROCEDURE — 81001 URINALYSIS AUTO W/SCOPE: CPT | Performed by: PATHOLOGY

## 2022-02-17 PROCEDURE — 84156 ASSAY OF PROTEIN URINE: CPT | Performed by: PATHOLOGY

## 2022-02-17 PROCEDURE — 82728 ASSAY OF FERRITIN: CPT | Performed by: PATHOLOGY

## 2022-02-17 PROCEDURE — 36415 COLL VENOUS BLD VENIPUNCTURE: CPT | Performed by: PATHOLOGY

## 2022-02-17 PROCEDURE — 82306 VITAMIN D 25 HYDROXY: CPT | Performed by: INTERNAL MEDICINE

## 2022-02-17 PROCEDURE — 80069 RENAL FUNCTION PANEL: CPT | Performed by: PATHOLOGY

## 2022-02-17 PROCEDURE — 83970 ASSAY OF PARATHORMONE: CPT | Performed by: PATHOLOGY

## 2022-02-17 PROCEDURE — 83540 ASSAY OF IRON: CPT | Performed by: PATHOLOGY

## 2022-02-17 PROCEDURE — G0463 HOSPITAL OUTPT CLINIC VISIT: HCPCS

## 2022-02-17 RX ORDER — METOPROLOL TARTRATE 25 MG/1
12.5 TABLET, FILM COATED ORAL 2 TIMES DAILY
Qty: 30 TABLET | Refills: 3 | Status: SHIPPED | OUTPATIENT
Start: 2022-02-17 | End: 2022-08-24

## 2022-02-17 ASSESSMENT — PAIN SCALES - GENERAL: PAINLEVEL: NO PAIN (0)

## 2022-02-17 NOTE — LETTER
"2/17/2022      RE: Melo Dangelo  [address]  [place] [address]             Nephrology Clinic Follow up  2/17/21    Melo Dangelo MRN:[id number] YOB: 1940  Date of Admission:(Not on file)  Primary care provider: Francis Smith  Requesting physician: Merry Barrow, *       REASON FOR CONSULT: IgA nephropathy       HISTORY OF PRESENT ILLNESS:       PAST MEDICAL HISTORY:  Reviewed with patient on 02/17/2022   As per HPI    MEDICATIONS:  Reviewed with the patient in detail    ALLERGIES:    Reviewed with the patient in detail    REVIEW OF SYSTEMS:  A comprehensive of systems was negative except as noted above.    SOCIAL HISTORY:   Reviewed with patient, no smoking and no alcohol use     FAMILY MEDICAL HISTORY:   Reviewed, no family history of need for dialysis, transplant or CKD    PHYSICAL EXAM:   Vital signs:/68   Pulse 74   Temp 98.2  F (36.8  C) (Oral)   Resp 18   Ht 1.778 m (5' 10\")   Wt 84.3 kg (185 lb 14.4 oz)   SpO2 96%   BMI 26.67 kg/m      Physical Exam     Gen: Appears well  Neck: No JVD  Lungs: CTA  CVS: S1S2 normal, no murmurs heard  LE: no edema  Skin: no rashes  CNS: Grossly normal       Interval History: He reports that he had a couple of episodes of a fib in last 2 days and wonders if it is related to anxiety. During these episodes, he does feel tired and has low energy, checks his pulse and feels irregular heart beats. At the same time, his heart rate is sometimes up at 100/min. He is currently not on any antihypertensives.     ASSESSMENT AND RECOMMENDATIONS:     # IgA nephropathy [id number]   # CKD stage 4   # Anemia     Patient was first diagnosed with IgA nephropathy when he presented to the hospital with gross hematuria after his second COVID vaccine. He presented with a Sr creatinine of 3.5 which peaked to 4.4. A kidney bx was done which showed IgA nephropathy with some fibrocellular crescents.  7/14 gloms were globally " sclerosed.   He was started on Pozzi protocol with solumedrol 500 mg/3 days followed by oral prednisone 40 mg every other day with bactrim single strength 1 tab PO every other day, pepcid 40 mg PO daily and calcium carbonate 3659-9347 mg PO daily.   Unfortunately, he did not have a significant improvement in his creatinine and had persistent active sediment. He was started on  Cyclophosphamide 100 mg daily in 11/21, and has continued to taper his prednisone, now down to 20 mg Q other day.   His creatinine improved initially and had a new baseline of 3.3 mg/dl, today somewhat higher again at 3.7 mg/dl which could be in relation to his episodes of afib that he described. His proteinuria remains minimal and has mild persistent hematuria.     Will decrease the prednisone to 10 mg Q other day and consider getting him off cyclophosphamide around April.   Today his heart rate is regular with a HR of 73/min. I will start him on Metoprolol 12.5 mg BID for his paroxysmal Afib and he has a cardiologist appointment coming up soon.     F/up with labs in 1 month     F/up in clinic in 3 months     MD Merry Rogers MD

## 2022-02-17 NOTE — PATIENT INSTRUCTIONS
--Start taking Metoprolol 12.5 mg two times a day for your a fib and hypertension   --check labs in 1 month

## 2022-02-17 NOTE — LETTER
"2/17/2022     RE: Melo Dangelo  [address]  [place] [address]     Dear Colleague,    Thank you for referring your patient, Melo Dangelo, to the [facility] [place] at [facility]. Please see a copy of my visit note below.      Nephrology Clinic Follow up  2/17/21    Melo Dangelo MRN:[id number] YOB: 1940  Date of Admission:(Not on file)  Primary care provider: Francis Smith  Requesting physician: Merry Barrow, *       REASON FOR CONSULT: IgA nephropathy       HISTORY OF PRESENT ILLNESS:       PAST MEDICAL HISTORY:  Reviewed with patient on 02/17/2022   As per HPI    MEDICATIONS:  Reviewed with the patient in detail    ALLERGIES:    Reviewed with the patient in detail    REVIEW OF SYSTEMS:  A comprehensive of systems was negative except as noted above.    SOCIAL HISTORY:   Reviewed with patient, no smoking and no alcohol use     FAMILY MEDICAL HISTORY:   Reviewed, no family history of need for dialysis, transplant or CKD    PHYSICAL EXAM:   Vital signs:/68   Pulse 74   Temp 98.2  F (36.8  C) (Oral)   Resp 18   Ht 1.778 m (5' 10\")   Wt 84.3 kg (185 lb 14.4 oz)   SpO2 96%   BMI 26.67 kg/m      Physical Exam     Gen: Appears well  Neck: No JVD  Lungs: CTA  CVS: S1S2 normal, no murmurs heard  LE: no edema  Skin: no rashes  CNS: Grossly normal       Interval History: He reports that he had a couple of episodes of a fib in last 2 days and wonders if it is related to anxiety. During these episodes, he does feel tired and has low energy, checks his pulse and feels irregular heart beats. At the same time, his heart rate is sometimes up at 100/min. He is currently not on any antihypertensives.     ASSESSMENT AND RECOMMENDATIONS:     # IgA nephropathy [id number]   # CKD stage 4   # Anemia     Patient was first diagnosed with IgA nephropathy when he presented to the hospital with " gross hematuria after his second COVID vaccine. He presented with a Sr creatinine of 3.5 which peaked to 4.4. A kidney bx was done which showed IgA nephropathy with some fibrocellular crescents.  7/14 gloms were globally sclerosed.   He was started on Pozzi protocol with solumedrol 500 mg/3 days followed by oral prednisone 40 mg every other day with bactrim single strength 1 tab PO every other day, pepcid 40 mg PO daily and calcium carbonate 3374-4637 mg PO daily.   Unfortunately, he did not have a significant improvement in his creatinine and had persistent active sediment. He was started on  Cyclophosphamide 100 mg daily in 11/21, and has continued to taper his prednisone, now down to 20 mg Q other day.   His creatinine improved initially and had a new baseline of 3.3 mg/dl, today somewhat higher again at 3.7 mg/dl which could be in relation to his episodes of afib that he described. His proteinuria remains minimal and has mild persistent hematuria.     Will decrease the prednisone to 10 mg Q other day and consider getting him off cyclophosphamide around April.   Today his heart rate is regular with a HR of 73/min. I will start him on Metoprolol 12.5 mg BID for his paroxysmal Afib and he has a cardiologist appointment coming up soon.     F/up with labs in 1 month     F/up in clinic in 3 months     Merry Barrow MD

## 2022-02-17 NOTE — NURSING NOTE
"Chief Complaint   Patient presents with     RECHECK     IgA nephropathy and CKD     Vital signs:  Temp: 98.2  F (36.8  C) Temp src: Oral BP: 137/68 Pulse: 74   Resp: 18 SpO2: 96 %     Height: 177.8 cm (5' 10\") Weight: 84.3 kg (185 lb 14.4 oz)  Estimated body mass index is 26.67 kg/m  as calculated from the following:    Height as of this encounter: 1.778 m (5' 10\").    Weight as of this encounter: 84.3 kg (185 lb 14.4 oz).        Starr Ramos, [facility]  2/17/2022 11:29 AM      "

## 2022-02-17 NOTE — PROGRESS NOTES
"      Nephrology Clinic Follow up  2/17/21    Melo Dangelo MRN:[id number] YOB: 1940  Date of Admission:(Not on file)  Primary care provider: Francis Smith  Requesting physician: Merry Barrow, *       REASON FOR CONSULT: IgA nephropathy       HISTORY OF PRESENT ILLNESS:       PAST MEDICAL HISTORY:  Reviewed with patient on 02/17/2022   As per HPI    MEDICATIONS:  Reviewed with the patient in detail    ALLERGIES:    Reviewed with the patient in detail    REVIEW OF SYSTEMS:  A comprehensive of systems was negative except as noted above.    SOCIAL HISTORY:   Reviewed with patient, no smoking and no alcohol use     FAMILY MEDICAL HISTORY:   Reviewed, no family history of need for dialysis, transplant or CKD    PHYSICAL EXAM:   Vital signs:/68   Pulse 74   Temp 98.2  F (36.8  C) (Oral)   Resp 18   Ht 1.778 m (5' 10\")   Wt 84.3 kg (185 lb 14.4 oz)   SpO2 96%   BMI 26.67 kg/m      Physical Exam     Gen: Appears well  Neck: No JVD  Lungs: CTA  CVS: S1S2 normal, no murmurs heard  LE: no edema  Skin: no rashes  CNS: Grossly normal       Interval History: He reports that he had a couple of episodes of a fib in last 2 days and wonders if it is related to anxiety. During these episodes, he does feel tired and has low energy, checks his pulse and feels irregular heart beats. At the same time, his heart rate is sometimes up at 100/min. He is currently not on any antihypertensives.     ASSESSMENT AND RECOMMENDATIONS:     # IgA nephropathy [id number]   # CKD stage 4   # Anemia     Patient was first diagnosed with IgA nephropathy when he presented to the hospital with gross hematuria after his second COVID vaccine. He presented with a Sr creatinine of 3.5 which peaked to 4.4. A kidney bx was done which showed IgA nephropathy with some fibrocellular crescents.  7/14 gloms were globally sclerosed.   He was started on Pozzi protocol with solumedrol 500 mg/3 days followed by oral prednisone " 40 mg every other day with bactrim single strength 1 tab PO every other day, pepcid 40 mg PO daily and calcium carbonate 5924-1293 mg PO daily.   Unfortunately, he did not have a significant improvement in his creatinine and had persistent active sediment. He was started on  Cyclophosphamide 100 mg daily in 11/21, and has continued to taper his prednisone, now down to 20 mg Q other day.   His creatinine improved initially and had a new baseline of 3.3 mg/dl, today somewhat higher again at 3.7 mg/dl which could be in relation to his episodes of afib that he described. His proteinuria remains minimal and has mild persistent hematuria.     Will decrease the prednisone to 10 mg Q other day and consider getting him off cyclophosphamide around April.   Today his heart rate is regular with a HR of 73/min. I will start him on Metoprolol 12.5 mg BID for his paroxysmal Afib and he has a cardiologist appointment coming up soon.     F/up with labs in 1 month     F/up in clinic in 3 months     Merry Barrow MD

## 2022-03-07 ENCOUNTER — OFFICE VISIT (OUTPATIENT)
Dept: OPHTHALMOLOGY | Facility: CLINIC | Age: 82
End: 2022-03-07
Attending: OPHTHALMOLOGY
Payer: MEDICARE

## 2022-03-07 DIAGNOSIS — Z79.899 HIGH RISK MEDICATION USE: ICD-10-CM

## 2022-03-07 DIAGNOSIS — H35.711 CENTRAL SEROUS CHORIORETINOPATHY OF RIGHT EYE: ICD-10-CM

## 2022-03-07 DIAGNOSIS — H35.711 CENTRAL SEROUS RETINOPATHY, RIGHT: Primary | ICD-10-CM

## 2022-03-07 DIAGNOSIS — H47.231 CUPPING OF OPTIC DISC, RIGHT: ICD-10-CM

## 2022-03-07 DIAGNOSIS — H35.363 PERIPHERAL DRUSEN OF BOTH EYES: ICD-10-CM

## 2022-03-07 DIAGNOSIS — H25.13 AGE-RELATED NUCLEAR CATARACT OF BOTH EYES: ICD-10-CM

## 2022-03-07 PROCEDURE — 99214 OFFICE O/P EST MOD 30 MIN: CPT | Performed by: OPHTHALMOLOGY

## 2022-03-07 PROCEDURE — 92134 CPTRZ OPH DX IMG PST SGM RTA: CPT | Performed by: OPHTHALMOLOGY

## 2022-03-07 PROCEDURE — G0463 HOSPITAL OUTPT CLINIC VISIT: HCPCS

## 2022-03-07 PROCEDURE — 92133 CPTRZD OPH DX IMG PST SGM ON: CPT | Performed by: OPHTHALMOLOGY

## 2022-03-07 ASSESSMENT — CONF VISUAL FIELD
OD_NORMAL: 1
OS_NORMAL: 1
METHOD: COUNTING FINGERS

## 2022-03-07 ASSESSMENT — VISUAL ACUITY
OD_CC: 20/25
METHOD: SNELLEN - LINEAR
OS_CC: 20/20
CORRECTION_TYPE: GLASSES
OD_CC+: +1

## 2022-03-07 ASSESSMENT — TONOMETRY
IOP_METHOD: TONOPEN
OS_IOP_MMHG: 23
OD_IOP_MMHG: 18

## 2022-03-07 ASSESSMENT — CUP TO DISC RATIO
OS_RATIO: 0.5
OD_RATIO: 0.6

## 2022-03-07 ASSESSMENT — REFRACTION_WEARINGRX
SPECS_TYPE: BIFOCAL
OS_AXIS: 010
OS_ADD: +2.50
OS_SPHERE: +2.50
OD_CYLINDER: +1.00
OD_ADD: +2.50
OD_AXIS: 179
OD_SPHERE: +2.50
OS_CYLINDER: +1.75

## 2022-03-07 ASSESSMENT — EXTERNAL EXAM - LEFT EYE: OS_EXAM: NORMAL

## 2022-03-07 ASSESSMENT — SLIT LAMP EXAM - LIDS: COMMENTS: MEIBOMIAN GLAND INSPISSATION

## 2022-03-07 ASSESSMENT — EXTERNAL EXAM - RIGHT EYE: OD_EXAM: NORMAL

## 2022-03-07 NOTE — NURSING NOTE
Chief Complaints and History of Present Illnesses   Patient presents with     Central Serous Retinopathy Follow Up     Chief Complaint(s) and History of Present Illness(es)     Central Serous Retinopathy Follow Up     Laterality: right eye    Onset: gradual    Onset: months ago    Quality: States va is the same since last visit      Severity: moderate    Frequency: intermittently    Associated symptoms: photophobia (maybe has increased) and dryness.  Negative for floaters, flashes, tearing and redness    Treatments tried: no treatments    Pain scale: 0/10              Comments     Here for Central serous retinopathy, right   +irritaion in the right eye that started about 2 days ago  Prednisone every other day maybe 20 mg  Georgia Clifton COT 8:52 AM [facility] 7, 2022

## 2022-03-07 NOTE — LETTER
3/7/2022        RE: Melo Dangelo  [address]  [place] [address]     Dear Colleague,    Thank you for referring your patient, Melo Dangelo, to the [facility] [place] at [facility]. Please see a copy of my visit note below.    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  He was last seen on December 7, 2021 at which time there was no new fluid in the retina of the right eye given on oral prednisone for the IgA nephropathy.  We recommended continued monitoring on these medications per nephrology and to follow-up as scheduled today.    Mr. Dangelo reports the vision has been pretty stable. Some fluctuation, but nothing persistent    Interval Updates to Medical/Family/Social History:    1. Working on dietary changes limiting tomatoes and potatoes. Here with his wife, who is recovering from a hospital stay.   2. Had episode of atrial fibrillation noted with heart monitor.Tried beta blocker but self discontinued due to fatigue.  3. Continues to enjoy wood working.    Relevant Review of Systems Updates:  Stable fatigue, doing his best to take care of himself. Few pound weight loss, but generally stable.     Laboratory Testing reviewed from 2/17/2022: Stable mild anemia and thrombocytopenia with normal white blood cell count.  Persistent elevated creatinine 3.7     Current eye related medications: Cyclophosphamide 100 mg daily, Prednisone 20 mg every other day, Bactrim single strength every other day    Retina/Uveitis Imaging:   OCT Spectralis Macula March 7, 2022  right eye: Improved choroidal thickening, drusenoid deposits and thickening improved, no new fluid. No peripapillary fluid  left eye: Normal contour, no fluid. Moderate choroidal thickness but  stable    OCT Optic Nerve RNFL Spectralis March 7, 2022  right eye: Avg thickness 120 microns, no thinning. Stable   left eye: Avg thickness 118 microns, no thinning. Stable     Assessment:     1. Central serous retinopathy, right  Now on prednisone 20 mg every other day, there is no evidence of recurrence of the     2. Cupping of optic disc, right  With elevated IOP left eye only. Optic nerve scans without signs of glaucoma    3. Peripheral drusen of both eyes  Stable anatomic variant    4. Age-related nuclear cataract of both eyes  Minimally visually significant    5. High risk medication use  Cyclophosphamide 100 mg daily, Prednisone 20 mg every other day, Bactrim single strength every other day    Plan/Recommendations:      Discussed findings with patient and his wife. The  in the right eye is improving likely due to being on less prednisone. We can continue to monitor     Eye pressure is 18,23. Optic nerve scans without signs of glaucoma, so no drops needed to lower eye pressure.     Recommend additional testing: None at this time    Continue these medications per Dr. Barrow: Cyclophosphamide 100 mg daily, Prednisone 20 mg every other day, Bactrim single strength every other day    No specific recommendations for prednisone given improvement in  right eye and we will continue to monitor    RTC 3 months tonopen, dilate, OCT (ordered)    Physician Attestation     Attending Physician Attestation:  Complete documentation of historical and exam elements from today's encounter can be found in the full encounter summary report (not reduplicated in this progress note). I personally obtained the chief complaint(s) and history of present illness. I confirmed and edited as necessary the review of systems, past medical/surgical history, family history, social history, and examination findings as documented by others; and I examined the patient myself. I personally reviewed the relevant tests, images, and reports  as documented above. I formulated and edited as necessary the assessment and plan and discussed the findings and management plan with the patient and family members present at the time of this visit.  Malik Julien M.D., Uveitis and Medical Retina, March 7, 2022         Sincerely,    Malik Julien MD  [facility] Dept of Ophthalmology  Uveitis and Medical Retina

## 2022-03-07 NOTE — PROGRESS NOTES
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  He was last seen on December 7, 2021 at which time there was no new fluid in the retina of the right eye given on oral prednisone for the IgA nephropathy.  We recommended continued monitoring on these medications per nephrology and to follow-up as scheduled today.    Mr. Dangelo reports the vision has been pretty stable. Some fluctuation, but nothing persistent    Interval Updates to Medical/Family/Social History:    1. Working on dietary changes limiting tomatoes and potatoes. Here with his Wife, who is recovering from a hospital stay.   2. Had episode of atrial fibrillation noted with heart monitor.Tried Beta blocker but self discontinued due to fatigue.  3. Continues to enjoy wood working.    Relevant Review of Systems Updates:  Stable fatigue, doing his best to take care of himself. Few pound weight loss, but generally stable.     Laboratory Testing reviewed from 2/17/2022: Stable mild anemia and thrombocytopenia with normal white blood cell count.  Persistent elevated creatinine 3.7     Current eye related medications: Cyclophosphamide 100 mg daily, Prednisone 20 mg every other day, Bactrim single strength every other day    Retina/Uveitis Imaging:   OCT Spectralis Macula March 7, 2022  right eye: Improved choroidal thickening, drusenoid deposits and thickening improved, no new fluid. No peripapillary fluid  left eye: Normal contour, no fluid. Moderate choroidal thickness but stable    OCT Optic Nerve RNFL Spectralis March 7, 2022  right eye: Avg thickness 120 microns, no thinning. Stable   left eye: Avg thickness 118 microns, no thinning. Stable     Assessment:     1. Central serous retinopathy, right  Now on prednisone 20 mg every other day, there is no evidence of recurrence of the     2.  Cupping of optic disc, right  With elevated IOP left eye only. Optic nerve scans without signs of glaucoma    3. Peripheral drusen of both eyes  Stable anatomic variant    4. Age-related nuclear cataract of both eyes  Minimally visually significant    5. High risk medication use  Cyclophosphamide 100 mg daily, Prednisone 20 mg every other day, Bactrim single strength every other day    Plan/Recommendations:      Discussed findings with patient and his wife. The  in the right eye is improving likely due to being on less prednisone. We can continue to monitor     Eye pressure is 18,23. Optic nerve scans without signs of glaucoma, so no drops needed to lower eye pressure.     Recommend additional testing: None at this time    Continue these medications per Dr. Barrow: Cyclophosphamide 100 mg daily, Prednisone 20 mg every other day, Bactrim single strength every other day    No specific recommendations for prednisone given improvement in  right eye and we will continue to monitor    RTC 3 months tonopen, dilate, OCT (ordered)    Physician Attestation     Attending Physician Attestation:  Complete documentation of historical and exam elements from today's encounter can be found in the full encounter summary report (not reduplicated in this progress note). I personally obtained the chief complaint(s) and history of present illness. I confirmed and edited as necessary the review of systems, past medical/surgical history, family history, social history, and examination findings as documented by others; and I examined the patient myself. I personally reviewed the relevant tests, images, and reports as documented above. I formulated and edited as necessary the assessment and plan and discussed the findings and management plan with the patient and family members present at the time of this visit.  Malik Julien M.D., Uveitis and Medical Retina, March 7, 2022

## 2022-03-07 NOTE — PATIENT INSTRUCTIONS
Continue taking care of yourself and using your medicines with your Doctors. No eye specific treatments needed.

## 2022-03-30 NOTE — PROGRESS NOTES
"Melo is a 81 year old who is being evaluated via a billable video visit.      How would you like to obtain your AVS? MyChart  If the video visit is dropped, the invitation should be resent by: Text to cell phone: [phone]  Will anyone else be joining your video visit? No  {If patient encounters technical issues they should call [phone] :[id number]}    Video Start Time: {video visit start/end time for provider to select:152948}  Video-Visit Details    Type of service:  Video Visit    Video End Time:{video visit start/end time for provider to select:152948}    Originating Location (pt. Location): {video visit patient location:200790::\"Home\"}    Distant Location (provider location):  [facility] [place]     Platform used for Video Visit: {Virtual Visit Platforms:068476::\"LumiGrow\"}    "
Patient was not seen by me.   
(0) understands/communicates without difficulty

## 2022-04-12 DIAGNOSIS — H35.711 CENTRAL SEROUS RETINOPATHY, RIGHT: ICD-10-CM

## 2022-04-12 RX ORDER — EPLERENONE 25 MG/1
25 TABLET, FILM COATED ORAL DAILY
Qty: 30 TABLET | Refills: 3 | Status: SHIPPED | OUTPATIENT
Start: 2022-04-12 | End: 2022-04-15

## 2022-04-12 NOTE — TELEPHONE ENCOUNTER
eplerenone (INSPRA) 25 MG tablet  Last Written Prescription Date:  ?  Last Fill Quantity: ?,   # refills: ?  Last Office Visit :  3/7/2022  Future Office visit:   6/6/2022      Malik Julien MD  Ophthalmology    Plan/Recommendations:      Discussed findings with patient and his wife. The  in the right eye is improving likely due to being on less prednisone. We can continue to monitor     Eye pressure is 18,23. Optic nerve scans without signs of glaucoma, so no drops needed to lower eye pressure.     Recommend additional testing: None at this time    Continue these medications per Dr. Barrow: Cyclophosphamide 100 mg daily, Prednisone 20 mg every other day, Bactrim single strength every other day    No specific recommendations for prednisone given improvement in  right eye and we will continue to monitor     RTC 3 months tonopen, dilate, OCT (ordered)       Routing refill request to provider for review/approval because:  Drug not on the [facility] refill protocol or controlled substance      Raissa Fisher RN  Central Triage Red Flags/Med Refills

## 2022-04-14 ENCOUNTER — TELEPHONE (OUTPATIENT)
Dept: CARDIOLOGY | Facility: CLINIC | Age: 82
End: 2022-04-14
Payer: MEDICARE

## 2022-04-14 NOTE — TELEPHONE ENCOUNTER
[facility] Call Center    Phone Message    May a detailed message be left on voicemail: yes     Reason for Call: Medication Refill Request    Has the patient contacted the pharmacy for the refill? Yes   Name of medication being requested: eplerenone (INSPRA) 25 MG tablet [[id number]] (Order [id number])    Provider who prescribed the medication: Dr. Ventura  Pharmacy: [facility]  Date medication is needed: asap         Action Taken: Message routed to:  [facility]    Travel Screening: Not Applicable

## 2022-04-15 RX ORDER — EPLERENONE 25 MG/1
25 TABLET, FILM COATED ORAL DAILY
Qty: 30 TABLET | Refills: 3 | Status: SHIPPED | OUTPATIENT
Start: 2022-04-15 | End: 2022-06-02 | Stop reason: ALTCHOICE

## 2022-04-15 NOTE — TELEPHONE ENCOUNTER
eplerenone (INSPRA) 25 MG tablet [[id number]] (Order [id number]) addressed in 4-11-22 encounter, resent as high priority

## 2022-04-18 ENCOUNTER — MYC MEDICAL ADVICE (OUTPATIENT)
Dept: FAMILY MEDICINE | Facility: CLINIC | Age: 82
End: 2022-04-18
Payer: MEDICARE

## 2022-04-18 DIAGNOSIS — F41.9 ANXIETY: Primary | ICD-10-CM

## 2022-04-20 ENCOUNTER — TELEPHONE (OUTPATIENT)
Dept: FAMILY MEDICINE | Facility: CLINIC | Age: 82
End: 2022-04-20
Payer: MEDICARE

## 2022-04-20 NOTE — TELEPHONE ENCOUNTER
[facility] Call Center    Phone Message    May a detailed message be left on voicemail: yes     Reason for Call: Medication Refill Request    Has the patient contacted the pharmacy for the refill? Yes   Name of medication being requested: cyclophosphamide (CYTOXAN) 50 MG capsul  Provider who prescribed the medication: Dr. Barrow  Pharmacy: [facility] [address] [place] [address]  Date medication is needed: as soon as possible, Per patient he was told Dr. Barrow is out of the office for an extended period of time and patient has been out for 4 days, He is not sure where he will get his refills at and is concerned as he hasnt heard from Dr. Barrow's office. Please call to discuss thank you.          Action Taken: Message routed to:  [facility]    Travel Screening: Not Applicable

## 2022-04-23 ENCOUNTER — HEALTH MAINTENANCE LETTER (OUTPATIENT)
Age: 82
End: 2022-04-23

## 2022-04-25 ENCOUNTER — LAB (OUTPATIENT)
Dept: LAB | Facility: CLINIC | Age: 82
End: 2022-04-25
Payer: MEDICARE

## 2022-04-25 DIAGNOSIS — N18.4 CKD (CHRONIC KIDNEY DISEASE) STAGE 4, GFR 15-29 ML/MIN (H): ICD-10-CM

## 2022-04-25 DIAGNOSIS — N02.B9 IGA NEPHROPATHY: Primary | ICD-10-CM

## 2022-04-25 DIAGNOSIS — N02.B9 IGA NEPHROPATHY: ICD-10-CM

## 2022-04-25 LAB
ALBUMIN SERPL-MCNC: 3.6 G/DL (ref 3.4–5)
ALBUMIN UR-MCNC: NEGATIVE MG/DL
ANION GAP SERPL CALCULATED.3IONS-SCNC: 7 MMOL/L (ref 3–14)
APPEARANCE UR: CLEAR
BILIRUB UR QL STRIP: NEGATIVE
BUN SERPL-MCNC: 38 MG/DL (ref 7–30)
CALCIUM SERPL-MCNC: 9.4 MG/DL (ref 8.5–10.1)
CHLORIDE BLD-SCNC: 109 MMOL/L (ref 94–109)
CO2 SERPL-SCNC: 25 MMOL/L (ref 20–32)
COLOR UR AUTO: ABNORMAL
CREAT SERPL-MCNC: 3.33 MG/DL (ref 0.66–1.25)
CREAT UR-MCNC: 42 MG/DL
ERYTHROCYTE [DISTWIDTH] IN BLOOD BY AUTOMATED COUNT: 13.9 % (ref 10–15)
GFR SERPL CREATININE-BSD FRML MDRD: 18 ML/MIN/1.73M2
GLUCOSE BLD-MCNC: 118 MG/DL (ref 70–99)
GLUCOSE UR STRIP-MCNC: NEGATIVE MG/DL
HCT VFR BLD AUTO: 26.2 % (ref 40–53)
HGB BLD-MCNC: 8.8 G/DL (ref 13.3–17.7)
HGB UR QL STRIP: ABNORMAL
KETONES UR STRIP-MCNC: NEGATIVE MG/DL
LEUKOCYTE ESTERASE UR QL STRIP: NEGATIVE
MCH RBC QN AUTO: 34 PG (ref 26.5–33)
MCHC RBC AUTO-ENTMCNC: 33.6 G/DL (ref 31.5–36.5)
MCV RBC AUTO: 101 FL (ref 78–100)
NITRATE UR QL: NEGATIVE
PH UR STRIP: 5 [PH] (ref 5–7)
PHOSPHATE SERPL-MCNC: 4 MG/DL (ref 2.5–4.5)
PLATELET # BLD AUTO: 131 10E3/UL (ref 150–450)
POTASSIUM BLD-SCNC: 4 MMOL/L (ref 3.4–5.3)
PROT UR-MCNC: 0.17 G/L
PROT/CREAT 24H UR: 0.4 G/G CR (ref 0–0.2)
RBC # BLD AUTO: 2.59 10E6/UL (ref 4.4–5.9)
RBC URINE: 1 /HPF
SODIUM SERPL-SCNC: 141 MMOL/L (ref 133–144)
SP GR UR STRIP: 1.01 (ref 1–1.03)
UROBILINOGEN UR STRIP-MCNC: NORMAL MG/DL
WBC # BLD AUTO: 4.5 10E3/UL (ref 4–11)
WBC URINE: <1 /HPF

## 2022-04-25 PROCEDURE — 80069 RENAL FUNCTION PANEL: CPT | Performed by: PATHOLOGY

## 2022-04-25 PROCEDURE — 81001 URINALYSIS AUTO W/SCOPE: CPT | Performed by: PATHOLOGY

## 2022-04-25 PROCEDURE — 85027 COMPLETE CBC AUTOMATED: CPT | Performed by: PATHOLOGY

## 2022-04-25 PROCEDURE — 36415 COLL VENOUS BLD VENIPUNCTURE: CPT | Performed by: PATHOLOGY

## 2022-04-25 PROCEDURE — 84156 ASSAY OF PROTEIN URINE: CPT | Performed by: PATHOLOGY

## 2022-04-25 NOTE — TELEPHONE ENCOUNTER
Ok refer Dr Solano  Re: anxiety  Let pt know he'd be seen 1-2 times to get meds going then f/u w/ me

## 2022-05-02 ENCOUNTER — VIRTUAL VISIT (OUTPATIENT)
Dept: NEPHROLOGY | Facility: CLINIC | Age: 82
End: 2022-05-02
Attending: INTERNAL MEDICINE
Payer: MEDICARE

## 2022-05-02 VITALS
DIASTOLIC BLOOD PRESSURE: 71 MMHG | WEIGHT: 180 LBS | SYSTOLIC BLOOD PRESSURE: 146 MMHG | HEIGHT: 70 IN | BODY MASS INDEX: 25.77 KG/M2

## 2022-05-02 DIAGNOSIS — N02.B9 IGA NEPHROPATHY: Primary | ICD-10-CM

## 2022-05-02 PROCEDURE — G0463 HOSPITAL OUTPT CLINIC VISIT: HCPCS | Mod: PN,RTG | Performed by: INTERNAL MEDICINE

## 2022-05-02 PROCEDURE — 99214 OFFICE O/P EST MOD 30 MIN: CPT | Mod: 95 | Performed by: INTERNAL MEDICINE

## 2022-05-02 ASSESSMENT — PAIN SCALES - GENERAL: PAINLEVEL: NO PAIN (0)

## 2022-05-02 NOTE — LETTER
"5/2/2022       RE: Melo Dangelo  [address]  [place] [address]     Dear Colleague,    Thank you for referring your patient, Melo Dangelo, to the [facility] [place] at [facility]. Please see a copy of my visit note below.    146/71Melo is a 81 year old who is being evaluated via a billable video visit.      How would you like to obtain your AVS? MyChart  If the video visit is dropped, the invitation should be resent by: Text to cell phone:   Will anyone else be joining your video visit? Maybe wife present       Video Start Time: 12.05 pm  Video-Visit Details    Type of service:  Video Visit    Video End Time: 12.18 pm    Originating Location (pt. Location): Home    Distant Location (provider location):  [facility] [place]     Platform used for Video Visit: [facility]           Nephrology Clinic Follow up  5/2/22    Melo Dangelo MRN:[id number] YOB: 1940  Date of Admission:(Not on file)  Primary care provider: Francis Smith  Requesting physician: Merry Barrow, *       REASON FOR CONSULT: IgA nephropathy       HISTORY OF PRESENT ILLNESS:       PAST MEDICAL HISTORY:  Reviewed with patient on 05/02/2022   As per HPI    MEDICATIONS:  Reviewed with the patient in detail    ALLERGIES:    Reviewed with the patient in detail    REVIEW OF SYSTEMS:  A comprehensive of systems was negative except as noted above.    SOCIAL HISTORY:   Reviewed with patient, no smoking and no alcohol use     FAMILY MEDICAL HISTORY:   Reviewed, no family history of need for dialysis, transplant or CKD    PHYSICAL EXAM:   Vital signs:BP (!) 146/71   Ht 1.778 m (5' 10\")   Wt 81.6 kg (180 lb)   BMI 25.83 kg/m      Physical Exam           Interval History: He has been checking his blood pressures at home and have been 130-140 mm of Hg systolics, 70's diastolics. He was " started on Metoprolol 12.5 mg BID and his HR is in high 50's.  He also remains on eplerenone 25 mg daily which was started by his ophthalmologist.     ASSESSMENT AND RECOMMENDATIONS:     # IgA nephropathy [id number]   # CKD stage 4    # Anemia    # Paroxysmal A fib      Patient was first diagnosed with IgA nephropathy when he presented to the hospital with gross hematuria after his second COVID vaccine. He presented with a Sr creatinine of 3.5 which peaked to 4.4. A kidney bx was done which showed IgA nephropathy with some fibrocellular crescents.  7/14 gloms were globally sclerosed.   He was started on Pozzi protocol with solumedrol 500 mg/3 days followed by oral prednisone 40 mg every other day with bactrim single strength 1 tab PO every other day, pepcid 40 mg PO daily and calcium carbonate 0247-3267 mg PO daily.      Unfortunately, he did not have a significant improvement in his creatinine and had persistent active sediment. He was started on  Cyclophosphamide 100 mg daily in 11/21, and has continued to taper his prednisone, now down to10 mg Q other day.    His creatinine improved initially and had a new baseline of 3.3 mg/dl. His proteinuria remains minimal and has mild persistent hematuria.              Will decrease the prednisone tom5 mg Q other day and discontinue his cyclophosphamide.   --he remains on eplerenone and metoprolol and I will not change it at this time however will recheck labs in 1 month and probably get him off eplerenone since with his stage 4 CKD, he remains at hgih risk for hyperkalemia.    Check labs in 1 month and f/up      Merry Barrow MD

## 2022-05-02 NOTE — PROGRESS NOTES
"146/71Melo is a 81 year old who is being evaluated via a billable video visit.      How would you like to obtain your AVS? MyChart  If the video visit is dropped, the invitation should be resent by: Text to cell phone:   Will anyone else be joining your video visit? Maybe wife present       Video Start Time: 12.05 pm  Video-Visit Details    Type of service:  Video Visit    Video End Time: 12.18 pm    Originating Location (pt. Location): Home    Distant Location (provider location):  [facility] [place]     Platform used for Video Visit: [facility]           Nephrology Clinic Follow up  5/2/22    Melo Dangelo MRN:[id number] YOB: 1940  Date of Admission:(Not on file)  Primary care provider: Francis Smith  Requesting physician: Merry Barrow, *       REASON FOR CONSULT: IgA nephropathy       HISTORY OF PRESENT ILLNESS:       PAST MEDICAL HISTORY:  Reviewed with patient on 05/02/2022   As per HPI    MEDICATIONS:  Reviewed with the patient in detail    ALLERGIES:    Reviewed with the patient in detail    REVIEW OF SYSTEMS:  A comprehensive of systems was negative except as noted above.    SOCIAL HISTORY:   Reviewed with patient, no smoking and no alcohol use     FAMILY MEDICAL HISTORY:   Reviewed, no family history of need for dialysis, transplant or CKD    PHYSICAL EXAM:   Vital signs:BP (!) 146/71   Ht 1.778 m (5' 10\")   Wt 81.6 kg (180 lb)   BMI 25.83 kg/m      Physical Exam           Interval History: He has been checking his blood pressures at home and have been 130-140 mm of Hg systolics, 70's diastolics. He was started on Metoprolol 12.5 mg BID and his HR is in high 50's.  He also remains on eplerenone 25 mg daily which was started by his ophthalmologist.     ASSESSMENT AND RECOMMENDATIONS:     # IgA nephropathy [id number]   # CKD stage 4    # Anemia    # Paroxysmal A fib      Patient was first diagnosed with IgA nephropathy when he " presented to the hospital with gross hematuria after his second COVID vaccine. He presented with a Sr creatinine of 3.5 which peaked to 4.4. A kidney bx was done which showed IgA nephropathy with some fibrocellular crescents.  7/14 gloms were globally sclerosed.   He was started on Pozzi protocol with solumedrol 500 mg/3 days followed by oral prednisone 40 mg every other day with bactrim single strength 1 tab PO every other day, pepcid 40 mg PO daily and calcium carbonate 5229-5958 mg PO daily.      Unfortunately, he did not have a significant improvement in his creatinine and had persistent active sediment. He was started on  Cyclophosphamide 100 mg daily in 11/21, and has continued to taper his prednisone, now down to10 mg Q other day.    His creatinine improved initially and had a new baseline of 3.3 mg/dl. His proteinuria remains minimal and has mild persistent hematuria.              Will decrease the prednisone tom5 mg Q other day and discontinue his cyclophosphamide.   --he remains on eplerenone and metoprolol and I will not change it at this time however will recheck labs in 1 month and probably get him off eplerenone since with his stage 4 CKD, he remains at hgih risk for hyperkalemia.    Check labs in 1 month and f/up      Merry Barrow MD

## 2022-05-31 DIAGNOSIS — N02.B9 IGA NEPHROPATHY: Primary | ICD-10-CM

## 2022-05-31 DIAGNOSIS — N18.4 CKD (CHRONIC KIDNEY DISEASE) STAGE 4, GFR 15-29 ML/MIN (H): ICD-10-CM

## 2022-06-02 ENCOUNTER — OFFICE VISIT (OUTPATIENT)
Dept: NEPHROLOGY | Facility: CLINIC | Age: 82
End: 2022-06-02
Attending: INTERNAL MEDICINE
Payer: MEDICARE

## 2022-06-02 ENCOUNTER — LAB (OUTPATIENT)
Dept: LAB | Facility: CLINIC | Age: 82
End: 2022-06-02
Attending: INTERNAL MEDICINE
Payer: MEDICARE

## 2022-06-02 VITALS
DIASTOLIC BLOOD PRESSURE: 70 MMHG | OXYGEN SATURATION: 96 % | SYSTOLIC BLOOD PRESSURE: 129 MMHG | BODY MASS INDEX: 25.83 KG/M2 | HEART RATE: 52 BPM | TEMPERATURE: 97.9 F | WEIGHT: 180 LBS

## 2022-06-02 DIAGNOSIS — N02.B9 IGA NEPHROPATHY: Primary | ICD-10-CM

## 2022-06-02 DIAGNOSIS — N02.B9 IGA NEPHROPATHY: ICD-10-CM

## 2022-06-02 DIAGNOSIS — N18.4 CKD (CHRONIC KIDNEY DISEASE) STAGE 4, GFR 15-29 ML/MIN (H): ICD-10-CM

## 2022-06-02 LAB
ALBUMIN SERPL-MCNC: 3.7 G/DL (ref 3.4–5)
ALBUMIN UR-MCNC: NEGATIVE MG/DL
ANION GAP SERPL CALCULATED.3IONS-SCNC: 8 MMOL/L (ref 3–14)
APPEARANCE UR: CLEAR
BILIRUB UR QL STRIP: NEGATIVE
BUN SERPL-MCNC: 46 MG/DL (ref 7–30)
CALCIUM SERPL-MCNC: 9.2 MG/DL (ref 8.5–10.1)
CHLORIDE BLD-SCNC: 108 MMOL/L (ref 94–109)
CO2 SERPL-SCNC: 27 MMOL/L (ref 20–32)
COLOR UR AUTO: ABNORMAL
CREAT SERPL-MCNC: 3.85 MG/DL (ref 0.66–1.25)
CREAT UR-MCNC: 51 MG/DL
ERYTHROCYTE [DISTWIDTH] IN BLOOD BY AUTOMATED COUNT: 12.6 % (ref 10–15)
GFR SERPL CREATININE-BSD FRML MDRD: 15 ML/MIN/1.73M2
GLUCOSE BLD-MCNC: 108 MG/DL (ref 70–99)
GLUCOSE UR STRIP-MCNC: NEGATIVE MG/DL
HCT VFR BLD AUTO: 28.3 % (ref 40–53)
HGB BLD-MCNC: 9.3 G/DL (ref 13.3–17.7)
HGB UR QL STRIP: ABNORMAL
KETONES UR STRIP-MCNC: NEGATIVE MG/DL
LEUKOCYTE ESTERASE UR QL STRIP: NEGATIVE
MCH RBC QN AUTO: 32.7 PG (ref 26.5–33)
MCHC RBC AUTO-ENTMCNC: 32.9 G/DL (ref 31.5–36.5)
MCV RBC AUTO: 100 FL (ref 78–100)
NITRATE UR QL: NEGATIVE
PH UR STRIP: 5 [PH] (ref 5–7)
PHOSPHATE SERPL-MCNC: 3.3 MG/DL (ref 2.5–4.5)
PLATELET # BLD AUTO: 135 10E3/UL (ref 150–450)
POTASSIUM BLD-SCNC: 3.9 MMOL/L (ref 3.4–5.3)
PROT UR-MCNC: 0.16 G/L
PROT/CREAT 24H UR: 0.31 G/G CR (ref 0–0.2)
RBC # BLD AUTO: 2.84 10E6/UL (ref 4.4–5.9)
RBC URINE: 1 /HPF
SODIUM SERPL-SCNC: 143 MMOL/L (ref 133–144)
SP GR UR STRIP: 1.01 (ref 1–1.03)
SQUAMOUS EPITHELIAL: <1 /HPF
UROBILINOGEN UR STRIP-MCNC: NORMAL MG/DL
WBC # BLD AUTO: 4.9 10E3/UL (ref 4–11)
WBC URINE: <1 /HPF

## 2022-06-02 PROCEDURE — G0463 HOSPITAL OUTPT CLINIC VISIT: HCPCS

## 2022-06-02 PROCEDURE — 85027 COMPLETE CBC AUTOMATED: CPT | Performed by: PATHOLOGY

## 2022-06-02 PROCEDURE — 81001 URINALYSIS AUTO W/SCOPE: CPT | Performed by: PATHOLOGY

## 2022-06-02 PROCEDURE — 36415 COLL VENOUS BLD VENIPUNCTURE: CPT | Performed by: PATHOLOGY

## 2022-06-02 PROCEDURE — 84156 ASSAY OF PROTEIN URINE: CPT | Performed by: PATHOLOGY

## 2022-06-02 PROCEDURE — 99214 OFFICE O/P EST MOD 30 MIN: CPT | Performed by: INTERNAL MEDICINE

## 2022-06-02 PROCEDURE — 80069 RENAL FUNCTION PANEL: CPT | Performed by: PATHOLOGY

## 2022-06-02 RX ORDER — HYDROXYZINE HYDROCHLORIDE 10 MG/1
1 TABLET, FILM COATED ORAL 3 TIMES DAILY PRN
COMMUNITY
Start: 2022-05-29 | End: 2023-03-03

## 2022-06-02 ASSESSMENT — PAIN SCALES - GENERAL: PAINLEVEL: NO PAIN (0)

## 2022-06-02 NOTE — NURSING NOTE
Chief Complaint   Patient presents with     RECHECK     CKD     Blood pressure 129/70, pulse 52, temperature 97.9  F (36.6  C), temperature source Oral, weight 81.6 kg (180 lb), SpO2 96 %.    FLOR SCOTT

## 2022-06-02 NOTE — LETTER
6/2/2022       RE: Melo Dangelo  [address]  [place] [address]     Dear Colleague,    Thank you for referring your patient, Melo Dangelo, to the [facility] [place] at [facility]. Please see a copy of my visit note below.          Nephrology Clinic Follow up  6/2/22      Melo Dangelo MRN:[id number] YOB: 1940  Date of Admission:(Not on file)  Primary care provider: Francis Smith  Requesting physician: Merry Barrow, *       REASON FOR CONSULT: IgA nephropathy     HISTORY OF PRESENT ILLNESS:     PAST MEDICAL HISTORY:  Reviewed with patient on 06/02/2022   As per HPI    MEDICATIONS:  Reviewed with the patient in detail    ALLERGIES:    Reviewed with the patient in detail    REVIEW OF SYSTEMS:  A comprehensive of systems was negative except as noted above.    SOCIAL HISTORY:   Reviewed with patient, no smoking and no alcohol use     FAMILY MEDICAL HISTORY:   Reviewed, no family history of need for dialysis, transplant or CKD    PHYSICAL EXAM:   Vital signs:There were no vitals taken for this visit.    Physical Exam     Gen: Appears well  Neck: No JVD  Lungs: CTA  CVS: S1S2 normal, no murmurs heard  LE: no edema  Skin: no rashes  CNS: Grossly normal       Interval History: He has been doing well overall. Has stopped his cyclophosphamide and on prednisone 5 mg Q other week. He does not have any uremic symptoms. He denies any symptoms concerning for hypervolemia.     ASSESSMENT AND RECOMMENDATIONS:     # IgA nephropathy [id number]   # CKD stage 4    # Anemia of CKD, iron replete 2/22   # Paroxysmal A fib      Patient was first diagnosed with IgA nephropathy when he presented to the hospital with gross hematuria after his second COVID vaccine. He presented with a Sr creatinine of 3.5 which peaked to 4.4 mg/dl.  A kidney bx was done which showed IgA nephropathy with some fibrocellular  crescents.  7/14 gloms were globally sclerosed.   He was started on Pozzi protocol with solumedrol 500 mg/3 days followed by oral prednisone 40 mg every other day with bactrim single strength 1 tab PO every other day, pepcid 40 mg PO daily and calcium carbonate 0372-2001 mg PO daily.    Unfortunately, he did not have a significant improvement in his creatinine and had persistent active sediment. He was started on  Cyclophosphamide 100 mg daily in 11/21 along with prednisone taper. His cyclophosphamide was discontinued 5/2/22 and his prednisone decreased to 5 mg Q other day.     His creatinine improved initially and had a new baseline of 3.3 mg/dl. His proteinuria remains minimal and has mild persistent hematuria.      His creatinine is higher today which I suspect is only a fluctuation related to starting eplerenone in 4/22 . He has small blood on his UA with no RBC's, no protein. His blood pressures remain at a goal of < 140/90 mm of Hg and he has no LE edema.   He is anemic and has thrombocytopenia. Iron studies normal in 2/22. There is no indication for RAMA at this time.     --Will discontinue his prednisone and also asked him to hold the eplerenone and discuss this with his ophthalmologist.       Merry Barrow MD

## 2022-06-02 NOTE — PROGRESS NOTES
Nephrology Clinic Follow up  6/2/22    Melo Dangelo MRN:[id number] YOB: 1940  Date of Admission:(Not on file)  Primary care provider: Francis Smith  Requesting physician: Merry Barrow, *       REASON FOR CONSULT: IgA nephropathy       HISTORY OF PRESENT ILLNESS:       PAST MEDICAL HISTORY:  Reviewed with patient on 06/02/2022   As per HPI    MEDICATIONS:  Reviewed with the patient in detail    ALLERGIES:    Reviewed with the patient in detail    REVIEW OF SYSTEMS:  A comprehensive of systems was negative except as noted above.    SOCIAL HISTORY:   Reviewed with patient, no smoking and no alcohol use     FAMILY MEDICAL HISTORY:   Reviewed, no family history of need for dialysis, transplant or CKD    PHYSICAL EXAM:   Vital signs:There were no vitals taken for this visit.    Physical Exam     Gen: Appears well  Neck: No JVD  Lungs: CTA  CVS: S1S2 normal, no murmurs heard  LE: no edema  Skin: no rashes  CNS: Grossly normal       Interval History: He has been doing well overall. Has stopped his cyclophosphamide and on prednisone 5 mg Q other week. He does not have any uremic symptoms. He denies any symptoms concerning for hypervolemia.     ASSESSMENT AND RECOMMENDATIONS:     # IgA nephropathy [id number]   # CKD stage 4    # Anemia of CKD, iron replete 2/22   # Paroxysmal A fib      Patient was first diagnosed with IgA nephropathy when he presented to the hospital with gross hematuria after his second COVID vaccine. He presented with a Sr creatinine of 3.5 which peaked to 4.4 mg/dl.  A kidney bx was done which showed IgA nephropathy with some fibrocellular crescents.  7/14 gloms were globally sclerosed.   He was started on Pozzi protocol with solumedrol 500 mg/3 days followed by oral prednisone 40 mg every other day with bactrim single strength 1 tab PO every other day, pepcid 40 mg PO daily and calcium carbonate 4885-4853 mg PO daily.    Unfortunately, he did not have a  significant improvement in his creatinine and had persistent active sediment. He was started on  Cyclophosphamide 100 mg daily in 11/21 along with prednisone taper. His cyclophosphamide was discontinued 5/2/22 and his prednisone decreased to 5 mg Q other day.     His creatinine improved initially and had a new baseline of 3.3 mg/dl. His proteinuria remains minimal and has mild persistent hematuria.      His creatinine is higher today which I suspect is only a fluctuation related to starting eplerenone in 4/22 . He has small blood on his UA with no RBC's, no protein. His blood pressures remain at a goal of < 140/90 mm of Hg and he has no LE edema.   He is anemic and has thrombocytopenia. Iron studies normal in 2/22. There is no indication for RAMA at this time.     --Will discontinue his prednisone and also asked him to hold the eplerenone and discuss this with his ophthalmologist.     Merry Barrow MD

## 2022-06-10 ENCOUNTER — DOCUMENTATION ONLY (OUTPATIENT)
Dept: OTHER | Facility: CLINIC | Age: 82
End: 2022-06-10
Payer: MEDICARE

## 2022-06-25 ENCOUNTER — MYC MEDICAL ADVICE (OUTPATIENT)
Dept: FAMILY MEDICINE | Facility: CLINIC | Age: 82
End: 2022-06-25

## 2022-06-28 ENCOUNTER — OFFICE VISIT (OUTPATIENT)
Dept: OPHTHALMOLOGY | Facility: CLINIC | Age: 82
End: 2022-06-28
Attending: OPHTHALMOLOGY
Payer: MEDICARE

## 2022-06-28 DIAGNOSIS — H47.231 CUPPING OF OPTIC DISC, RIGHT: ICD-10-CM

## 2022-06-28 DIAGNOSIS — H25.13 AGE-RELATED NUCLEAR CATARACT OF BOTH EYES: ICD-10-CM

## 2022-06-28 DIAGNOSIS — H35.363 PERIPHERAL DRUSEN OF BOTH EYES: ICD-10-CM

## 2022-06-28 DIAGNOSIS — Z79.899 HIGH RISK MEDICATION USE: ICD-10-CM

## 2022-06-28 DIAGNOSIS — H35.051 CHOROIDAL NEOVASCULARIZATION, RIGHT EYE: ICD-10-CM

## 2022-06-28 DIAGNOSIS — H35.711 CENTRAL SEROUS RETINOPATHY, RIGHT: Primary | ICD-10-CM

## 2022-06-28 PROCEDURE — 92134 CPTRZ OPH DX IMG PST SGM RTA: CPT | Performed by: OPHTHALMOLOGY

## 2022-06-28 PROCEDURE — 99213 OFFICE O/P EST LOW 20 MIN: CPT | Performed by: OPHTHALMOLOGY

## 2022-06-28 PROCEDURE — G0463 HOSPITAL OUTPT CLINIC VISIT: HCPCS | Mod: 25

## 2022-06-28 ASSESSMENT — REFRACTION_WEARINGRX
OD_CYLINDER: +1.00
OD_ADD: +2.50
OD_AXIS: 179
SPECS_TYPE: BIFOCAL
OS_AXIS: 010
OS_CYLINDER: +1.75
OD_SPHERE: +2.50
OS_ADD: +2.50
OS_SPHERE: +2.50

## 2022-06-28 ASSESSMENT — EXTERNAL EXAM - LEFT EYE: OS_EXAM: NORMAL

## 2022-06-28 ASSESSMENT — CUP TO DISC RATIO
OD_RATIO: 0.6
OS_RATIO: 0.5

## 2022-06-28 ASSESSMENT — VISUAL ACUITY
OD_CC: 20/20
OS_CC: 20/20
OD_CC+: -1
OS_CC+: -2
METHOD: SNELLEN - LINEAR
CORRECTION_TYPE: GLASSES

## 2022-06-28 ASSESSMENT — SLIT LAMP EXAM - LIDS: COMMENTS: MEIBOMIAN GLAND INSPISSATION

## 2022-06-28 ASSESSMENT — EXTERNAL EXAM - RIGHT EYE: OD_EXAM: NORMAL

## 2022-06-28 ASSESSMENT — CONF VISUAL FIELD
OD_NORMAL: 1
METHOD: COUNTING FINGERS
OS_INFERIOR_TEMPORAL_RESTRICTION: 3

## 2022-06-28 ASSESSMENT — TONOMETRY
OS_IOP_MMHG: 15
IOP_METHOD: TONOPEN
OD_IOP_MMHG: 15

## 2022-06-28 NOTE — NURSING NOTE
Chief Complaints and History of Present Illnesses   Patient presents with     Follow Up     Central serous retinopathy, right eye     Chief Complaint(s) and History of Present Illness(es)     Follow Up     Laterality: right eye    Course: gradually worsening    Associated symptoms: dryness (mild), eye pain and foreign body sensation.  Negative for redness    Treatments tried: no treatments    Pain scale: 3/10    Comments: Central serous retinopathy, right eye              Comments     He tells me that his right eye seems cloudy at times and vision in general seems decreased.  If frequently feels like there is a lash stuck in his right eye.    Naila Valente, COT 3:35 PM  June 28, 2022

## 2022-06-28 NOTE — LETTER
6/28/2022       RE: Melo Dangelo  [address]  [place] [address]     Dear Colleague,    Thank you for referring your patient, Melo Dangelo, to the [facility] at [facility]. Please see a copy of my visit note below.    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 Things were improving last visit when on less prednisone.      Mr. Dangelo notes some blurriness, some foreign body sensation, trouble focusing with one image closer than the other. All of these seem to be related to the right eye. The left eye is very clear.     Interval Updates to Medical/Family/Social History:  Kidneys continue to be monitored by Dr. Barrow. Dialysis may be considered in the future. Medication changes as below. The recommendation was to hold Eplerenone.    Mrs. Dangelo has her own health issues recently     Relevant Review of Systems Updates:  Some dizziness. Some breast enlargement and a lump under left sided breast    Laboratory Testing: Recent creatinine 3.85    Current eye related medications: Prednisone off since June 2022,  off Cyclophosphamide since May 2022          Retina/Uveitis Imaging:   OCT Spectralis Macula June 28, 2022  right eye: Thick choroid, subretinal deposits, no fluid. Stable   left eye: Normal choroidal thickness, no deposits, no fluid    Assessment:     1. Central serous retinopathy, right  No change on imaging.. One small microaneursym without fluid on exam nor OCT    2. Cupping of optic disc, right  With normal eye pressure     3. Peripheral drusen of both eyes  Stable    4. Age-related nuclear cataract of both eyes  Progressing in the right eye more than the left with some focusing    5. High risk medication use  Now off cyclophosphamide  and off prednisone    Plan/Recommendations:      Discussed findings with patient. The central serous retinopathy in the right eye is stable off prednisone. The only small change is one small microaneursym in the macula but no true fluid. We will continue to monitor without eplerenone. If this progresses into abnormal blood vessel leakage (choroidal neovascularization) we will recommend Avastin injections in the right eye. Will start prior authorization process    The cataracts are progressing especially in the right eye likely causing some focusing issues. We discussed cataract consultation and we will hold off for now    Eye pressure is normal in each eye     Recommend additional testing: None at this time    We recommend using lubricating drops a few times daily to see if this helps with the scratchiness and blurriness. You can do this in the right eye as well as the left eye.     RTC 6 weeks tonopen, dilate, OCT (ordered) ?Avastin    Physician Attestation     Attending Physician Attestation:  Complete documentation of historical and exam elements from today's encounter can be found in the full encounter summary report (not reduplicated in this progress note). I personally obtained the chief complaint(s) and history of present illness. I confirmed and edited as necessary the review of systems, past medical/surgical history, family history, social history, and examination findings as documented by others; and I examined the patient myself. I personally reviewed the relevant tests, images, and reports as documented above. I formulated and edited as necessary the assessment and plan and discussed the findings and management plan with the patient and family members present at the time of this visit.  Malik Julien M.D., Uveitis and Medical Retina, June 28, 2022     Sincerely,    Malik Julien MD  [facility] Dept of Ophthalmology  Uveitis and Medical Retina

## 2022-06-28 NOTE — PROGRESS NOTES
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 Things were improving last visit when on less prednisone.      Mr. Dangelo notes some blurriness, some foreign body sensation, trouble focusing with one image closer than the other. All of these seem to be related to the right eye. The left eye is very clear.     Interval Updates to Medical/Family/Social History:  Kidneys continue to be monitored by Dr. Barrow. Dialysis may be considered in the future. Medication changes as below. The recommendation was to hold Eplerenone.    Mrs. Dangelo has her own health issues recently     Relevant Review of Systems Updates:  Some dizziness. Some breast enlargement and a lump under left sided breast    Laboratory Testing: Recent creatinine 3.85    Current eye related medications: Prednisone off since June 2022,  off Cyclophosphamide since May 2022    Retina/Uveitis Imaging:   OCT Spectralis Macula June 28, 2022  right eye: Thick choroid, subretinal deposits, no fluid. Stable   left eye: Normal choroidal thickness, no deposits, no fluid    Assessment:     1. Central serous retinopathy, right  No change on imaging.. One small microaneursym without fluid on exam nor OCT    2. Cupping of optic disc, right  With normal eye pressure     3. Peripheral drusen of both eyes  Stable    4. Age-related nuclear cataract of both eyes  Progressing in the right eye more than the left with some focusing    5. High risk medication use  Now off cyclophosphamide and off prednisone    Plan/Recommendations:      Discussed findings with patient. The central serous retinopathy in the right eye is stable off prednisone. The only small change is one small microaneursym in the macula but no true fluid. We will continue to monitor without eplerenone. If this progresses into abnormal blood  vessel leakage (choroidal neovascularization) we will recommend Avastin injections in the right eye. Will start prior authorization process    The cataracts are progressing especially in the right eye likely causing some focusing issues. We discussed cataract consultation and we will hold off for now    Eye pressure is normal in each eye     Recommend additional testing: None at this time    We recommend using lubricating drops a few times daily to see if this helps with the scratchiness and blurriness. You can do this in the right eye as well as the left eye.     RTC 6 weeks tonopen, dilate, OCT (ordered) ?Avastin    Physician Attestation     Attending Physician Attestation:  Complete documentation of historical and exam elements from today's encounter can be found in the full encounter summary report (not reduplicated in this progress note). I personally obtained the chief complaint(s) and history of present illness. I confirmed and edited as necessary the review of systems, past medical/surgical history, family history, social history, and examination findings as documented by others; and I examined the patient myself. I personally reviewed the relevant tests, images, and reports as documented above. I formulated and edited as necessary the assessment and plan and discussed the findings and management plan with the patient and family members present at the time of this visit.  Malik Julien M.D., Uveitis and Medical Retina, June 28, 2022

## 2022-06-28 NOTE — PATIENT INSTRUCTIONS
We recommend using lubricating drops a few times daily to see if this helps with the scratchiness and blurriness. You can do this in the right eye as well as the left eye.   No eplerenone needed

## 2022-06-29 ENCOUNTER — OFFICE VISIT (OUTPATIENT)
Dept: FAMILY MEDICINE | Facility: CLINIC | Age: 82
End: 2022-06-29
Payer: MEDICARE

## 2022-06-29 ENCOUNTER — LAB (OUTPATIENT)
Dept: LAB | Facility: CLINIC | Age: 82
End: 2022-06-29

## 2022-06-29 VITALS
HEIGHT: 70 IN | BODY MASS INDEX: 26.01 KG/M2 | OXYGEN SATURATION: 98 % | WEIGHT: 181.7 LBS | DIASTOLIC BLOOD PRESSURE: 69 MMHG | SYSTOLIC BLOOD PRESSURE: 149 MMHG | HEART RATE: 54 BPM

## 2022-06-29 DIAGNOSIS — N63.0 LUMP OR MASS IN BREAST: Primary | ICD-10-CM

## 2022-06-29 DIAGNOSIS — R94.31 ABNORMAL ELECTROCARDIOGRAM (ECG) (EKG): ICD-10-CM

## 2022-06-29 DIAGNOSIS — N63.0 LUMP OR MASS IN BREAST: ICD-10-CM

## 2022-06-29 DIAGNOSIS — F43.9 SITUATIONAL STRESS: ICD-10-CM

## 2022-06-29 DIAGNOSIS — I48.0 PAROXYSMAL ATRIAL FIBRILLATION (H): ICD-10-CM

## 2022-06-29 DIAGNOSIS — M79.89 OTHER SPECIFIED SOFT TISSUE DISORDERS: ICD-10-CM

## 2022-06-29 DIAGNOSIS — R42 LIGHTHEADEDNESS: ICD-10-CM

## 2022-06-29 DIAGNOSIS — N40.1 BENIGN PROSTATIC HYPERPLASIA WITH URINARY FREQUENCY: ICD-10-CM

## 2022-06-29 DIAGNOSIS — R35.0 BENIGN PROSTATIC HYPERPLASIA WITH URINARY FREQUENCY: ICD-10-CM

## 2022-06-29 DIAGNOSIS — N64.4 MASTODYNIA: ICD-10-CM

## 2022-06-29 LAB
ATRIAL RATE - MUSE: 49 BPM
DIASTOLIC BLOOD PRESSURE - MUSE: NORMAL MMHG
ESTRADIOL SERPL-MCNC: 16 PG/ML
INTERPRETATION ECG - MUSE: NORMAL
LH SERPL-ACNC: 31.8 MIU/ML (ref 1.7–8.6)
P AXIS - MUSE: 75 DEGREES
PR INTERVAL - MUSE: 226 MS
QRS DURATION - MUSE: 92 MS
QT - MUSE: 476 MS
QTC - MUSE: 429 MS
R AXIS - MUSE: 42 DEGREES
SYSTOLIC BLOOD PRESSURE - MUSE: NORMAL MMHG
T AXIS - MUSE: 60 DEGREES
VENTRICULAR RATE- MUSE: 49 BPM

## 2022-06-29 PROCEDURE — 99215 OFFICE O/P EST HI 40 MIN: CPT | Mod: 25 | Performed by: FAMILY MEDICINE

## 2022-06-29 PROCEDURE — 84403 ASSAY OF TOTAL TESTOSTERONE: CPT | Performed by: FAMILY MEDICINE

## 2022-06-29 PROCEDURE — 93000 ELECTROCARDIOGRAM COMPLETE: CPT | Performed by: FAMILY MEDICINE

## 2022-06-29 PROCEDURE — 83002 ASSAY OF GONADOTROPIN (LH): CPT | Performed by: FAMILY MEDICINE

## 2022-06-29 PROCEDURE — 82670 ASSAY OF TOTAL ESTRADIOL: CPT | Performed by: FAMILY MEDICINE

## 2022-06-29 PROCEDURE — 36415 COLL VENOUS BLD VENIPUNCTURE: CPT | Performed by: PATHOLOGY

## 2022-06-29 RX ORDER — METOPROLOL SUCCINATE 25 MG/1
TABLET, EXTENDED RELEASE ORAL
Qty: 90 TABLET | Refills: 1 | Status: SHIPPED | OUTPATIENT
Start: 2022-06-29 | End: 2023-04-28

## 2022-06-29 NOTE — PROGRESS NOTES
"    Assessment & Plan     Lump or mass in breast  Likely gynecomastia, possibly from Proscar ([facility] rvwd for this).   - Testosterone total; Future  - Luteinizing Hormone, Adult; Future  - Estradiol; Future  - Adult Endocrinology  Referral; Future  - Mammogram, diagnostic; Future  - US Breast Bilateral Limited 1-3 Quadrants; Future    Lightheadedness  Discussed multi causes possible, for now will halve metop dose to 12.5 mg/day as his slow HR could contribute  - Echocardiogram Complete; Future  - EKG Performed in Clinic w/ Provider Reading Fee    Paroxysmal atrial fibrillation (H)  If ongoing LH, discuss w/ cardio. A S symptom, redo echo.  - Adult Cardiology Eval  Referral  - metoprolol succinate ER (TOPROL XL) 25 MG 24 hr tablet; Take 1/2 tab a day (12.5 mg)    Abnormal electrocardiogram (ECG) (EKG)   Sinus homer  - Echocardiogram Complete; Future    Other specified soft tissue disorders   Check for mass  - Mammogram, diagnostic; Future    Mastodynia   Same  - US Breast Bilateral Limited 1-3 Quadrants; Future    BPH: cont flomax, stop proscar; per pt bph sx well managed now but if worse off proscar he'll let us know    Situational stress: discussed stress of caring for wife, adequate for now he thinks, he'll see me in a month, rvw      64 minutes spent on the date of the encounter doing chart review, history and exam, documentation and further activities per the note, separate from EKG read time    BMI:   Estimated body mass index is 26.07 kg/m  as calculated from the following:    Height as of this encounter: 1.778 m (5' 10\").    Weight as of this encounter: 82.4 kg (181 lb 11.2 oz).     Return in about 1 month (around 7/29/2022).    Francis Smith MD  [facility] KARLY Alejo is a 81 year old, presenting for the following health issues:  Breast Mass (Lump on breasts for about 2 months. Started with sore nipples, patient can now feel a lump on " both sides, L is larger and was noticed about a week ago. Tender/painful to the touch. )      HPI   1-last mo or so new issue: bilat, breast, under nipple, firm/tender area. No drg. On Proscar for BPH. No known endocrine history.   2-gradual worse exertional LH. Notes on stairs, sometimes not always, has to stop half up due to LH (no syncope but feels like would if pushed on). Stands still, passes in a minute, keeps going, does not sit. No CP, no SOB. 2020 echo no aortic stenosis. Has a fib, on low dose metop 25/d total. No LH or cardio sx other times. Per last EP note was to follow-up by now anyway.  3-[facility]  4-he wonders if last covid shot triggered renal dz as occurred right after, per pt discussed w/ renal MD and not clear, so pt is hesitant to do fourth covid shot, he might at least wait till new variant boosters come out, we don't have any in clinic today anyway [facility]  Past Medical History:   Diagnosis Date     2019 novel coronavirus disease (COVID-19) 10/27/2020     Alcohol dependence (H)      Concussion with loss of consciousness     x10 going back to childhood     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      PVD (posterior vitreous detachment)      Seizure disorder (H)     last seizure 2008     Past Surgical History:   Procedure Laterality Date     NO HISTORY OF SURGERY       Current Outpatient Medications   Medication     calcium carb-cholecalciferol (OS-GIANNI) 500-200 MG-UNIT tablet     cyanocobalamin (VITAMIN B-12) 100 MCG tablet     escitalopram (LEXAPRO) 20 MG tablet     famotidine (PEPCID) 20 MG tablet     finasteride (PROSCAR) 5 MG tablet     hydrOXYzine (ATARAX) 10 MG tablet     lamoTRIgine (LAMICTAL) 100 MG tablet     metoprolol succinate ER (TOPROL XL) 25 MG 24 hr tablet     metoprolol tartrate (LOPRESSOR) 25 MG tablet     simvastatin (ZOCOR) 20 MG tablet     tamsulosin (FLOMAX) 0.4 MG capsule     guaiFENesin (MUCINEX)  "600 MG 12 hr tablet     Sodium Chloride-Sodium Bicarb (SINUS WASH SALINE REFILLS) 2300-700 MG PACK     sulfamethoxazole-trimethoprim (BACTRIM) 400-80 MG tablet     Current Facility-Administered Medications   Medication     [START ON 7/27/2022] bevacizumab (AVASTIN) intravitreal inj 1.25 mg     No Known Allergies    Stress: wife has many medical issues, she is home, has HHN/PT come in, some help family but mainly he is doing cares 24/7, for now ok but we discuss the stress of this      Review of Systems         Objective    BP (!) 149/69 (BP Location: Right arm, Patient Position: Sitting, Cuff Size: Adult Large)   Pulse 54   Ht 1.778 m (5' 10\")   Wt 82.4 kg (181 lb 11.2 oz)   SpO2 98%   BMI 26.07 kg/m    Body mass index is 26.07 kg/m .  Physical Exam   GENERAL: healthy, alert and no distress  NECK: no adenopathy, no asymmetry, masses, or scars and thyroid normal to palpation  RESP: lungs clear to auscultation - no rales, rhonchi or wheezes  BREAST: two cm firm tissue under each nipple, mild tender, no nipple discharge and no palpable axillary masses or adenopathy  CV: regular rate and rhythm, HR 50,normal S1 S2, no S3 or S4, no murmur, click or rub, no peripheral edema and peripheral pulses strong  ABDOMEN: soft, nontender, no hepatosplenomegaly, no masses and bowel sounds normal   (male): normal male genitalia without lesions or urethral discharge, no hernia  MS: no gross musculoskeletal defects noted, no edema                .  ..        "

## 2022-06-29 NOTE — NURSING NOTE
Chief Complaint   Patient presents with     Breast Mass     Lump on breasts for about 2 months. Started with sore nipples, patient can now feel a lump on both sides, L is larger and was noticed about a week ago. Tender/painful to the touch.        Francis Palencia, EMT at 8:20 AM on 6/29/2022.

## 2022-06-29 NOTE — PATIENT INSTRUCTIONS
To schedule your echocardiogram (ECHO), please call [phone].     To schedule a Mammogram, please call radiology scheduling at [phone].

## 2022-07-01 LAB — TESTOST SERPL-MCNC: 271 NG/DL (ref 240–950)

## 2022-07-05 NOTE — TELEPHONE ENCOUNTER
"I want him to stop Proscar (finasteride) pill, might trigger the breast issue he has; it is a pill to help men urinate more easily (due to aging prostate causing urinary difficulties), if more difficulty urinating after stopping it let me know.    Tell him go ahead and do the breast imaging as ordered; if it is ok, then to speed up the Endocrine MD input I might ask for an \"e consult\" of Endocrine, where they'd review his chart, and all the notes and tests, and send me a message of their ideas. I'd need his permission to do so, ask him if it is ok to to do that if need be.  "

## 2022-07-06 ENCOUNTER — ANCILLARY PROCEDURE (OUTPATIENT)
Dept: CARDIOLOGY | Facility: CLINIC | Age: 82
End: 2022-07-06
Attending: FAMILY MEDICINE
Payer: MEDICARE

## 2022-07-06 DIAGNOSIS — R42 LIGHTHEADEDNESS: ICD-10-CM

## 2022-07-06 DIAGNOSIS — R94.31 ABNORMAL ELECTROCARDIOGRAM (ECG) (EKG): ICD-10-CM

## 2022-07-06 LAB — LVEF ECHO: NORMAL

## 2022-07-06 PROCEDURE — 93306 TTE W/DOPPLER COMPLETE: CPT | Performed by: INTERNAL MEDICINE

## 2022-07-26 ENCOUNTER — VIRTUAL VISIT (OUTPATIENT)
Dept: NEUROLOGY | Facility: CLINIC | Age: 82
End: 2022-07-26
Payer: MEDICARE

## 2022-07-26 VITALS
DIASTOLIC BLOOD PRESSURE: 65 MMHG | SYSTOLIC BLOOD PRESSURE: 135 MMHG | WEIGHT: 168 LBS | HEIGHT: 71 IN | BODY MASS INDEX: 23.52 KG/M2

## 2022-07-26 DIAGNOSIS — G40.909 SEIZURE DISORDER (H): ICD-10-CM

## 2022-07-26 RX ORDER — LAMOTRIGINE 100 MG/1
200 TABLET ORAL 2 TIMES DAILY
Qty: 360 TABLET | Refills: 3 | Status: SHIPPED | OUTPATIENT
Start: 2022-07-26 | End: 2023-03-30

## 2022-07-26 ASSESSMENT — PATIENT HEALTH QUESTIONNAIRE - PHQ9
SUM OF ALL RESPONSES TO PHQ QUESTIONS 1-9: 6
10. IF YOU CHECKED OFF ANY PROBLEMS, HOW DIFFICULT HAVE THESE PROBLEMS MADE IT FOR YOU TO DO YOUR WORK, TAKE CARE OF THINGS AT HOME, OR GET ALONG WITH OTHER PEOPLE: SOMEWHAT DIFFICULT
SUM OF ALL RESPONSES TO PHQ QUESTIONS 1-9: 6

## 2022-07-26 ASSESSMENT — PAIN SCALES - GENERAL: PAINLEVEL: NO PAIN (0)

## 2022-07-26 NOTE — PROGRESS NOTES
"Melo is a 81 year old who is being evaluated via a billable video visit.      How would you like to obtain your AVS? MyChart  If the video visit is dropped, the invitation should be resent by: Text to cell phone: [phone]   Will anyone else be joining your video visit? No        Video-Visit Details    Video Start Time: 11:51 AM    Type of service:  Video Visit    Video End Time:12:08 PM    Originating Location (pt. Location): Home    Distant Location (provider location):  [facility] EPILEPSY CARE     Platform used for Video Visit: [facility]       Medication and allergies have been reviewed.       WILBERTO Mathis    [facility] Epilepsy Care Progress Note    Patient:  Melo Dangelo  :  1940   Age:  81 year old   Today's Office Visit:  2022      History of Present Illness:  He is transferring care to [facility] because he moved to the [facility]. Melo Dangelo is 80 year old right handed who has history of seizure that started age 55. He has been seizure free since . He became seizure free after starting lamotrigine.   Interval History: His last seizure was . He stopped drinking in , it seems his big (generalized tonic-clonic convulsion) in  and he had focal seizure with no impairment in awareness last . He has excessive day time fatigue, he feels like he falls asleep at any time. He can falls asleep fine, wakes up to go to the bathroom, he is able to fall asleep after going to bathroom, snores at night, no gasping for air at night. I asked him to see primary care provider for excessive daytime fatigue. He expressed understanding of this recommendation. He did not want to see primary care provider at this time. He will follow up  With primary care provider if needed in the coming year.   Seizure type:  \"funny sensation in mediastinum and nausea\". Confused one moment and then woke up in the hospital wondering why he was there. He has been told by his wife he was shaking and " belligerent after the seizure. Last seizure was 2009.   Current Outpatient Medications   Medication Sig Dispense Refill     calcium carb-cholecalciferol (OS-GIANNI) 500-200 MG-UNIT tablet Take 1 tablet by mouth 2 times daily (with meals) 60 tablet 11     cyanocobalamin (VITAMIN B-12) 100 MCG tablet Take 1,000 mcg by mouth daily        escitalopram (LEXAPRO) 20 MG tablet Take 20 mg by mouth daily       famotidine (PEPCID) 20 MG tablet Take 1 tablet (20 mg) by mouth 2 times daily (Patient taking differently: Take 40 mg by mouth daily) 60 tablet 3     hydrOXYzine (ATARAX) 10 MG tablet Take 1 tablet by mouth 3 times daily as needed       lamoTRIgine (LAMICTAL) 100 MG tablet Take 2 tablets (200 mg) by mouth 2 times daily 360 tablet 3     metoprolol succinate ER (TOPROL XL) 25 MG 24 hr tablet Take 1/2 tab a day (12.5 mg) 90 tablet 1     metoprolol tartrate (LOPRESSOR) 25 MG tablet Take 0.5 tablets (12.5 mg) by mouth 2 times daily 30 tablet 3     simvastatin (ZOCOR) 20 MG tablet Take 1 tablet (20 mg) by mouth At Bedtime 90 tablet 3     tamsulosin (FLOMAX) 0.4 MG capsule Take 1 capsule (0.4 mg) by mouth daily 90 capsule 3     finasteride (PROSCAR) 5 MG tablet Take 1 tablet (5 mg) by mouth daily (Patient not taking: Reported on 7/26/2022) 90 tablet 3     guaiFENesin (MUCINEX) 600 MG 12 hr tablet Take 2 tablets (1,200 mg) by mouth 2 times daily (Patient not taking: No sig reported) 28 tablet 0     Sodium Chloride-Sodium Bicarb (SINUS WASH SALINE REFILLS) 2300-700 MG PACK Wash out sinuses up to twice daily as needed for comfort. 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 Lives with his wife, he has three daughters that live near by.    Currently, patient denies feeling depressed, denies feeling anhedonia, denies suicidal  thoughts, and denies having feelings of excessive guilt/worthlessness.  Does not smoke, no alcohol use since , no recreational drug use. We reviewed importance of mental and emotional wellbeing and impact on health.   Driving:  Currently patient is:  Driving: Patient was made aware of state driving laws. Currently meets criteria to continue to drive.  Previous Evaluations for Epilepsy:   EE2009 - EEG CLASSIFICATION: Dysrhythmia gr. 1 generalized (excess beta or medication effect);     REPORT: The brief recording during wakefulness contains 10 Hz alpha activity over  the posterior head regions.  An excess of beta activity is present.  No abnormal activity occurred during hyperventilation or photic stimulation.  During the recording, the patient was excessively drowsy and fell asleep spontaneously.  No abnormal activity occurred during sleep or at the time of arousal. The EKG and pulse oximeter monitors were unremarkable.   MRI of Brain: 2009 - Again noted and stable is mild cerebral atrophy, including mild atrophy of the hippocampal formations bilaterally.  Subtle diffusely increased T2 signal within both hippocampal formations. This is nonspecific, and may be related to recent seizure activity. No evidence for pathologic enhancement following administration of IV gadolinium.      Remainder the MRI is otherwise unchanged. Minimal leukoaraiosis. Scattered membrane thickening and fluid within bilateral ethmoid air cells. Nasal septal deviation, convex to the left.     Exam:    /65   Ht 5' 11\" (180.3 cm)   Wt 168 lb (76.2 kg)   BMI 23.43 kg/m       Wt Readings from Last 5 Encounters:   22 168 lb " "(76.2 kg)   06/29/22 181 lb 11.2 oz (82.4 kg)   06/02/22 180 lb (81.6 kg)   05/02/22 180 lb (81.6 kg)   02/17/22 185 lb 14.4 oz (84.3 kg)       EXAMINATION /65   Ht 5' 11\" (180.3 cm)   Wt 168 lb (76.2 kg)   BMI 23.43 kg/m    Alert, orientated, speech is fluent    Impression:    History of seizure unspecified   History of A-Fib on anticoagulant   CKD  Anemia   Low PLT   History of depression/anxiety/ alcohol dependency sober since 2000      Discussion:   He is seizure free on lamotrigine, last seizure was 2009. We have no EEG with abnormalities. MRI notable for small bilateral hippocampi. He is tolerating lamotrigine with no major side effects. Continue lamotrigine.     Plan :   Continue lamotrigine 200 mg twice a day script sent   Encouraged him to follow up with primary care provider about excessive day time fatigue  - he has anemia, CKD, elevated BUN, so he should follow up  Primary care provider for this as this will cause current fatigue.   I answered several questions about kidney health, anemia, A-fib, etc. I did encourage him to talk to primary care provider because this is not my area of medical practice, however, I did the best I could to address his questions.   Follow up  1 year   He can drive (no need complete DMV form since he is seizure free 10 year)    I spent 21 minutes with the patient. During this time medical history data collection, counseling, and coordination of care exceeded 50% of the visit time. I addressed all questions the patient/caregiver raised in regards to the patient's medical care. This note was created with voice recognition software. Inadvertent grammatical errors, typographical errors, and \"sound a like\" substitutions may occur due to limitations of the software.  Read the note carefully and apply context when erroneous substitutions have occurred. Thank you.     Daria Raza MD   Epilepsy Staff             "

## 2022-07-26 NOTE — LETTER
2022     RE: Melo Dangelo  : 1940   MRN: [id number]      Dear Colleague,    Thank you for referring your patient, Melo Dangelo, to the [facility] EPILEPSY CARE at [facility]. Please see a copy of my visit note below.    Melo is a 81 year old who is being evaluated via a billable video visit.      How would you like to obtain your AVS? MyChart  If the video visit is dropped, the invitation should be resent by: Text to cell phone: [phone]   Will anyone else be joining your video visit? No        Video-Visit Details    Video Start Time: 11:51 AM    Type of service:  Video Visit    Video End Time:12:08 PM    Originating Location (pt. Location): Home    Distant Location (provider location):  [facility] EPILEPSY CARE     Platform used for Video Visit: [facility]       Medication and allergies have been reviewed.       WILBERTO Mathis    [facility] Epilepsy Care Progress Note    Patient:  Melo Dangelo  :  1940   Age:  81 year old   Today's Office Visit:  2022      History of Present Illness:  He is transferring care to [facility] because he moved to the [facility]. Melo Dangelo is 80 year old right handed who has history of seizure that started age 55. He has been seizure free since . He became seizure free after starting lamotrigine.   Interval History: His last seizure was . He stopped drinking in , it seems his big (generalized tonic-clonic convulsion) in  and he had focal seizure with no impairment in awareness last . He has excessive day time fatigue, he feels like he falls asleep at any time. He can falls asleep fine, wakes up to go to the bathroom, he is able to fall asleep after going to bathroom, snores at night, no gasping for air at night. I asked him to see primary care provider for excessive daytime fatigue. He expressed understanding of this recommendation. He did not want to see primary care provider at  "this time. He will follow up  With primary care provider if needed in the coming year.   Seizure type:  \"funny sensation in mediastinum and nausea\". Confused one moment and then woke up in the hospital wondering why he was there. He has been told by his wife he was shaking and belligerent after the seizure. Last seizure was 2009.   Current Outpatient Medications   Medication Sig Dispense Refill     calcium carb-cholecalciferol (OS-GIANNI) 500-200 MG-UNIT tablet Take 1 tablet by mouth 2 times daily (with meals) 60 tablet 11     cyanocobalamin (VITAMIN B-12) 100 MCG tablet Take 1,000 mcg by mouth daily        escitalopram (LEXAPRO) 20 MG tablet Take 20 mg by mouth daily       famotidine (PEPCID) 20 MG tablet Take 1 tablet (20 mg) by mouth 2 times daily (Patient taking differently: Take 40 mg by mouth daily) 60 tablet 3     hydrOXYzine (ATARAX) 10 MG tablet Take 1 tablet by mouth 3 times daily as needed       lamoTRIgine (LAMICTAL) 100 MG tablet Take 2 tablets (200 mg) by mouth 2 times daily 360 tablet 3     metoprolol succinate ER (TOPROL XL) 25 MG 24 hr tablet Take 1/2 tab a day (12.5 mg) 90 tablet 1     metoprolol tartrate (LOPRESSOR) 25 MG tablet Take 0.5 tablets (12.5 mg) by mouth 2 times daily 30 tablet 3     simvastatin (ZOCOR) 20 MG tablet Take 1 tablet (20 mg) by mouth At Bedtime 90 tablet 3     tamsulosin (FLOMAX) 0.4 MG capsule Take 1 capsule (0.4 mg) by mouth daily 90 capsule 3     finasteride (PROSCAR) 5 MG tablet Take 1 tablet (5 mg) by mouth daily (Patient not taking: Reported on 7/26/2022) 90 tablet 3     guaiFENesin (MUCINEX) 600 MG 12 hr tablet Take 2 tablets (1,200 mg) by mouth 2 times daily (Patient not taking: No sig reported) 28 tablet 0     Sodium Chloride-Sodium Bicarb (SINUS WASH SALINE REFILLS) 2300-700 MG PACK Wash out sinuses up to twice daily as needed for comfort. 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    Currently, patient denies feeling depressed, denies feeling anhedonia, denies suicidal  thoughts, and denies having feelings of excessive guilt/worthlessness.  Does not smoke, no alcohol use since , no recreational drug use. We reviewed importance of mental and emotional wellbeing and impact on health.   Driving:  Currently patient is:  Driving: Patient was made aware of state driving laws. Currently meets criteria to continue to drive.  Previous Evaluations for Epilepsy:   EE2009 - EEG CLASSIFICATION: Dysrhythmia gr. 1 generalized (excess beta or medication effect);     REPORT: The brief recording during wakefulness contains 10 Hz alpha activity over  the posterior head regions.  An excess of beta activity is present.  No abnormal activity occurred during hyperventilation or photic stimulation.  During the recording, the patient was excessively drowsy and fell asleep spontaneously.  No abnormal activity occurred during sleep or at the time of arousal. The EKG and pulse oximeter monitors were unremarkable.   MRI of Brain: 2009 - Again noted and stable is mild cerebral atrophy, including mild atrophy of the hippocampal formations bilaterally.  Subtle diffusely increased T2 signal within both hippocampal formations. This is nonspecific, and may be related to recent seizure activity. No evidence for pathologic enhancement following administration of IV gadolinium.      Remainder the MRI is otherwise unchanged. Minimal leukoaraiosis.  "Scattered membrane thickening and fluid within bilateral ethmoid air cells. Nasal septal deviation, convex to the left.     Exam:    /65   Ht 5' 11\" (180.3 cm)   Wt 168 lb (76.2 kg)   BMI 23.43 kg/m       Wt Readings from Last 5 Encounters:   07/26/22 168 lb (76.2 kg)   06/29/22 181 lb 11.2 oz (82.4 kg)   06/02/22 180 lb (81.6 kg)   05/02/22 180 lb (81.6 kg)   02/17/22 185 lb 14.4 oz (84.3 kg)       EXAMINATION /65   Ht 5' 11\" (180.3 cm)   Wt 168 lb (76.2 kg)   BMI 23.43 kg/m    Alert, orientated, speech is fluent    Impression:    History of seizure unspecified   History of A-Fib on anticoagulant   CKD  Anemia   Low PLT   History of depression/anxiety/ alcohol dependency sober since 2000      Discussion:   He is seizure free on lamotrigine, last seizure was 2009. We have no EEG with abnormalities. MRI notable for small bilateral hippocampi. He is tolerating lamotrigine with no major side effects. Continue lamotrigine.     Plan :   Continue lamotrigine 200 mg twice a day script sent   Encouraged him to follow up with primary care provider about excessive day time fatigue  - he has anemia, CKD, elevated BUN, so he should follow up  Primary care provider for this as this will cause current fatigue.   I answered several questions about kidney health, anemia, A-fib, etc. I did encourage him to talk to primary care provider because this is not my area of medical practice, however, I did the best I could to address his questions.   Follow up  1 year   He can drive (no need complete DMV form since he is seizure free 10 year)    I spent 21 minutes with the patient. During this time medical history data collection, counseling, and coordination of care exceeded 50% of the visit time. I addressed all questions the patient/caregiver raised in regards to the patient's medical care. This note was created with voice recognition software. Inadvertent grammatical errors, typographical errors, and \"sound a like\" " substitutions may occur due to limitations of the software.  Read the note carefully and apply context when erroneous substitutions have occurred. Thank you.     Daria Raza MD   Epilepsy Staff

## 2022-07-29 ENCOUNTER — LAB (OUTPATIENT)
Dept: LAB | Facility: CLINIC | Age: 82
End: 2022-07-29
Payer: MEDICARE

## 2022-07-29 ENCOUNTER — OFFICE VISIT (OUTPATIENT)
Dept: FAMILY MEDICINE | Facility: CLINIC | Age: 82
End: 2022-07-29

## 2022-07-29 VITALS
WEIGHT: 179 LBS | BODY MASS INDEX: 25.06 KG/M2 | HEIGHT: 71 IN | SYSTOLIC BLOOD PRESSURE: 145 MMHG | RESPIRATION RATE: 16 BRPM | DIASTOLIC BLOOD PRESSURE: 68 MMHG | HEART RATE: 64 BPM | OXYGEN SATURATION: 96 %

## 2022-07-29 DIAGNOSIS — N62 GYNECOMASTIA: ICD-10-CM

## 2022-07-29 DIAGNOSIS — R53.83 FATIGUE, UNSPECIFIED TYPE: Primary | ICD-10-CM

## 2022-07-29 DIAGNOSIS — R53.83 FATIGUE, UNSPECIFIED TYPE: ICD-10-CM

## 2022-07-29 DIAGNOSIS — N62 GYNECOMASTIA: Primary | ICD-10-CM

## 2022-07-29 DIAGNOSIS — R35.0 BENIGN PROSTATIC HYPERPLASIA WITH URINARY FREQUENCY: ICD-10-CM

## 2022-07-29 DIAGNOSIS — G40.909 SEIZURE DISORDER (H): ICD-10-CM

## 2022-07-29 DIAGNOSIS — R00.1 BRADYCARDIA: ICD-10-CM

## 2022-07-29 DIAGNOSIS — R42 LIGHTHEADEDNESS: ICD-10-CM

## 2022-07-29 DIAGNOSIS — N40.1 BENIGN PROSTATIC HYPERPLASIA WITH URINARY FREQUENCY: ICD-10-CM

## 2022-07-29 LAB
ALBUMIN SERPL-MCNC: 4 G/DL (ref 3.4–5)
ALBUMIN UR-MCNC: ABNORMAL MG/DL
ALP SERPL-CCNC: 79 U/L (ref 40–150)
ALT SERPL W P-5'-P-CCNC: 18 U/L (ref 0–70)
AMORPH CRY #/AREA URNS HPF: ABNORMAL /HPF
ANION GAP SERPL CALCULATED.3IONS-SCNC: 6 MMOL/L (ref 3–14)
APPEARANCE UR: CLEAR
AST SERPL W P-5'-P-CCNC: 20 U/L (ref 0–45)
BACTERIA #/AREA URNS HPF: ABNORMAL /HPF
BASOPHILS # BLD AUTO: 0 10E3/UL (ref 0–0.2)
BASOPHILS NFR BLD AUTO: 1 %
BILIRUB SERPL-MCNC: 0.3 MG/DL (ref 0.2–1.3)
BILIRUB UR QL STRIP: NEGATIVE
BUN SERPL-MCNC: 55 MG/DL (ref 7–30)
CALCIUM SERPL-MCNC: 9.4 MG/DL (ref 8.5–10.1)
CHLORIDE BLD-SCNC: 106 MMOL/L (ref 94–109)
CO2 SERPL-SCNC: 28 MMOL/L (ref 20–32)
COLOR UR AUTO: YELLOW
CREAT SERPL-MCNC: 3.91 MG/DL (ref 0.66–1.25)
EOSINOPHIL # BLD AUTO: 0.1 10E3/UL (ref 0–0.7)
EOSINOPHIL NFR BLD AUTO: 2 %
ERYTHROCYTE [DISTWIDTH] IN BLOOD BY AUTOMATED COUNT: 12.4 % (ref 10–15)
GFR SERPL CREATININE-BSD FRML MDRD: 15 ML/MIN/1.73M2
GLUCOSE BLD-MCNC: 89 MG/DL (ref 70–99)
GLUCOSE UR STRIP-MCNC: NEGATIVE MG/DL
HCT VFR BLD AUTO: 29.4 % (ref 40–53)
HGB BLD-MCNC: 9.7 G/DL (ref 13.3–17.7)
HGB UR QL STRIP: ABNORMAL
IMM GRANULOCYTES # BLD: 0 10E3/UL
IMM GRANULOCYTES NFR BLD: 0 %
KETONES UR STRIP-MCNC: NEGATIVE MG/DL
LEUKOCYTE ESTERASE UR QL STRIP: NEGATIVE
LH SERPL-ACNC: 27.4 MIU/ML (ref 1.7–8.6)
LYMPHOCYTES # BLD AUTO: 0.9 10E3/UL (ref 0.8–5.3)
LYMPHOCYTES NFR BLD AUTO: 18 %
MCH RBC QN AUTO: 31.1 PG (ref 26.5–33)
MCHC RBC AUTO-ENTMCNC: 33 G/DL (ref 31.5–36.5)
MCV RBC AUTO: 94 FL (ref 78–100)
MONOCYTES # BLD AUTO: 0.9 10E3/UL (ref 0–1.3)
MONOCYTES NFR BLD AUTO: 19 %
MUCOUS THREADS #/AREA URNS LPF: PRESENT /LPF
NEUTROPHILS # BLD AUTO: 2.9 10E3/UL (ref 1.6–8.3)
NEUTROPHILS NFR BLD AUTO: 60 %
NITRATE UR QL: NEGATIVE
NRBC # BLD AUTO: 0 10E3/UL
NRBC BLD AUTO-RTO: 0 /100
PH UR STRIP: 5 [PH] (ref 5–7)
PLATELET # BLD AUTO: 151 10E3/UL (ref 150–450)
POTASSIUM BLD-SCNC: 4.8 MMOL/L (ref 3.4–5.3)
PROT SERPL-MCNC: 7.3 G/DL (ref 6.8–8.8)
RBC # BLD AUTO: 3.12 10E6/UL (ref 4.4–5.9)
RBC URINE: 1 /HPF
SODIUM SERPL-SCNC: 140 MMOL/L (ref 133–144)
SP GR UR STRIP: 1.01 (ref 1–1.03)
SQUAMOUS EPITHELIAL: <1 /HPF
TSH SERPL DL<=0.005 MIU/L-ACNC: 1.15 MU/L (ref 0.4–4)
UROBILINOGEN UR STRIP-MCNC: NORMAL MG/DL
WBC # BLD AUTO: 4.8 10E3/UL (ref 4–11)
WBC URINE: 0 /HPF

## 2022-07-29 PROCEDURE — 36415 COLL VENOUS BLD VENIPUNCTURE: CPT | Performed by: PATHOLOGY

## 2022-07-29 PROCEDURE — 80050 GENERAL HEALTH PANEL: CPT | Performed by: PATHOLOGY

## 2022-07-29 PROCEDURE — 80175 DRUG SCREEN QUAN LAMOTRIGINE: CPT | Mod: 90 | Performed by: PATHOLOGY

## 2022-07-29 PROCEDURE — 99215 OFFICE O/P EST HI 40 MIN: CPT | Performed by: FAMILY MEDICINE

## 2022-07-29 PROCEDURE — 99000 SPECIMEN HANDLING OFFICE-LAB: CPT | Performed by: PATHOLOGY

## 2022-07-29 PROCEDURE — 81001 URINALYSIS AUTO W/SCOPE: CPT | Performed by: PATHOLOGY

## 2022-07-29 PROCEDURE — 83002 ASSAY OF GONADOTROPIN (LH): CPT | Performed by: FAMILY MEDICINE

## 2022-07-29 PROCEDURE — 99207 E-CONSULT TO ENDOCRINOLOGY (ADULT OUTPT PROVIDER TO SPECIALIST WRITTEN QUESTION & RESPONSE): CPT | Performed by: FAMILY MEDICINE

## 2022-07-29 RX ORDER — TAMSULOSIN HYDROCHLORIDE 0.4 MG/1
0.8 CAPSULE ORAL DAILY
Qty: 180 CAPSULE | Refills: 3 | Status: SHIPPED | OUTPATIENT
Start: 2022-07-29 | End: 2023-03-30

## 2022-07-29 NOTE — NURSING NOTE
Melo Dangelo is a 81 year old male patient that presents today in clinic for the following:    Chief Complaint   Patient presents with     Results     Pt would like to discuss MA results     Recheck Medication     The patient's allergies and medications were reviewed as noted. A set of vitals were recorded as noted without incident. The patient does not have any other questions for the provider.    Allyson Gonzalez, EMT at 12:29 PM on 7/29/2022

## 2022-07-29 NOTE — PROGRESS NOTES
Assessment & Plan     Fatigue, unspecified type  See cardio too, if not better and persists and no explanation consider STEPHEN eval he is ok w/ that  - CBC with platelets differential; Future  - Comprehensive metabolic panel; Future  - TSH with free T4 reflex; Future  - UA with Micro reflex to Culture; Future  - UA with Micro reflex to Culture      Gynecomastia  If high and or sx not improving in august he is ok w/ e consult endo  - Luteinizing Hormone, Adult; Future  We discuss what this dx is, possible causes    Benign prostatic hyperplasia with urinary frequency  Increase dose  - tamsulosin (FLOMAX) 0.4 MG capsule; Take 2 capsules (0.8 mg) by mouth daily  Might help BP, which might be up a bit from lowering beta    Bradycardia  Better on lower dose beta    Lightheadedness  Same        44 minutes spent on the date of the encounter doing chart review, history and exam, documentation and further activities per the note    Francis Smith MD  [facility] KARLY Alejo is a 81 year old, presenting for the following health issues:  Results (Pt would like to discuss MA results) and Recheck Medication      HPI Here in f/u  Breasts a little less discomfort. Mammogram just gynecomastia. Off proscar for a mo  Labs ok except elevated lutropin, will recheck today  If still up he's ok w/ me doing e consult endo for    CKD: he'd like lab recheck. Not as dizzy as before (see my last note) but more overall fatigued, artemio finds any activity easy fatigued. Does see cardio next week, encouraged to keep that and discuss, but echo no significant abnl, more likely sx are primarily ckd; HR better on lower beta dose.    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      PVD (posterior vitreous detachment)      Seizure disorder (H)     last seizure 2008     Past Surgical History:   Procedure Laterality Date     NO HISTORY OF SURGERY       Current Outpatient Medications   Medication     calcium carb-cholecalciferol (OS-GIANNI) 500-200 MG-UNIT tablet     cyanocobalamin (VITAMIN B-12) 100 MCG tablet     escitalopram (LEXAPRO) 20 MG tablet     famotidine (PEPCID) 20 MG tablet     hydrOXYzine (ATARAX) 10 MG tablet     lamoTRIgine (LAMICTAL) 100 MG tablet     metoprolol succinate ER (TOPROL XL) 25 MG 24 hr tablet     metoprolol tartrate (LOPRESSOR) 25 MG tablet     simvastatin (ZOCOR) 20 MG tablet     tamsulosin (FLOMAX) 0.4 MG capsule     finasteride (PROSCAR) 5 MG tablet     guaiFENesin (MUCINEX) 600 MG 12 hr tablet     Sodium Chloride-Sodium Bicarb (SINUS WASH SALINE REFILLS) 2300-700 MG PACK     sulfamethoxazole-trimethoprim (BACTRIM) 400-80 MG tablet     Current Facility-Administered Medications   Medication     bevacizumab (AVASTIN) intravitreal inj 1.25 mg     No Known Allergies            Review of Systems         Objective    BP (!) 145/68 (BP Location: Right arm, Patient Position: Sitting, Cuff Size: Adult Regular)   Pulse 64   Resp 16   Ht 1.803 m (5' 11\")   Wt 81.2 kg (179 lb)   SpO2 96%   BMI 24.97 kg/m    Body mass index is 24.97 kg/m .  Physical Exam   GENERAL: healthy, alert and no distress  RESP: lungs clear to auscultation - no rales, rhonchi or wheezes  CV: regular rate and rhythm, normal S1 S2, no S3 or S4, no murmur, click or rub, no peripheral edema and peripheral pulses strong  MS: no gross " musculoskeletal defects noted, no edema            .  ..

## 2022-07-30 LAB — LAMOTRIGINE SERPL-MCNC: 7.8 UG/ML

## 2022-08-04 ENCOUNTER — E-CONSULT (OUTPATIENT)
Dept: ENDOCRINOLOGY | Facility: CLINIC | Age: 82
End: 2022-08-04
Payer: MEDICARE

## 2022-08-04 PROCEDURE — 99451 NTRPROF PH1/NTRNET/EHR 5/>: CPT

## 2022-08-05 ENCOUNTER — MYC MEDICAL ADVICE (OUTPATIENT)
Dept: INTERNAL MEDICINE | Facility: CLINIC | Age: 82
End: 2022-08-05

## 2022-08-05 DIAGNOSIS — N62 GYNECOMASTIA: Primary | ICD-10-CM

## 2022-08-05 NOTE — PROGRESS NOTES
"ALL SMARTFIELDS MUST BE COMPLETED FOR PATIENT CARE AND BILLING    8/4/2022     E-Consult has been accepted.    Interprofessional consultation requested by:  Francis Smith MD      Clinical Question/Purpose: MY CLINICAL QUESTION IS: gynecomastia. New onset. Had been on Proscar. Lutropopin up times two, other endo labs wnl. Sx might be a bit better after a month off of Proscar. Unsure if the lutropin is of clinical signficance.    Patient assessment and information reviewed:   10/15/2021 finasteride 5 mg/day started  1/21/022 finasteride ended per the MAR  6/2022 appt -complaint of new tissue under nipple; exam described as 2 cm firm tissue under each nipple ;  \"normal\" male genitalia  6/29/2022 1048 AM weight 181, BMI 26.07 testosterone 271, estradiol 16, LH 31.8  6/29/2022 finasteride stopped per the physician notes.   7/29/22 weight 179, BMI 24.97  LH 27.4     Images  10/18/2021 CT abdomen/ pelvis has only partial testicular images.  Left testes 3.3x 2.8 cm ; Right is not visualized /images cut off right at this point -       Recommendations:   1.  Gynecomastia onset within approximately 6 months after start of finasteride.  Finasteride is a known potential cause of gynecomastia.  Was he more prone to gynecomastia with finasteride because of # 2?  Maybe .  2.  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 This might be due to age.    Are both testes present on exam?     If you want to do additional testing I would recommend getting free testosterone by [facility] mass spec/equilibrium dialysis  ([place] male code TGRP) drawn before 0800 AM.  This will confirm whether or not he actually has low testosterone.  FSH would also be of interest and would be high if he has damage to sertoli cells     The recommendations provided in this E-Consult are based on a review of clinical data pertinent to the clinical question presented, without a review of the patient's " complete medical record or, the benefit of a comprehensive in-person or virtual patient evaluation. This consultation should not replace the clinical judgement and evaluation of the provider ordering this E-Consult. Any new clinical issues, or changes in patient status since the filing of this E-Consult will need to be taken into account when assessing these recommendations. Please contact me if you have further questions.    My total time spent reviewing clinical information and formulating assessment was 10 minutes.    Report sent automatically to requesting provider once signed.     Tricia Hernandez MD

## 2022-08-05 NOTE — TELEPHONE ENCOUNTER
Francis Smith MD   to Ana Girard RN    Can you let him know endocrine MD suggested   7:30 am lab draw   Free and total testosterone   LH   FSH   All re: gynecomastia     Also ask him if improving breast symptoms or not, if they are we could consider monitor for another month then decide if do labs

## 2022-08-09 ENCOUNTER — OFFICE VISIT (OUTPATIENT)
Dept: OPHTHALMOLOGY | Facility: CLINIC | Age: 82
End: 2022-08-09
Attending: OPHTHALMOLOGY
Payer: MEDICARE

## 2022-08-09 DIAGNOSIS — H25.13 AGE-RELATED NUCLEAR CATARACT OF BOTH EYES: ICD-10-CM

## 2022-08-09 DIAGNOSIS — H35.711 CENTRAL SEROUS RETINOPATHY, RIGHT: Primary | ICD-10-CM

## 2022-08-09 DIAGNOSIS — H47.231 CUPPING OF OPTIC DISC, RIGHT: ICD-10-CM

## 2022-08-09 PROCEDURE — G0463 HOSPITAL OUTPT CLINIC VISIT: HCPCS | Mod: 25

## 2022-08-09 PROCEDURE — 99214 OFFICE O/P EST MOD 30 MIN: CPT | Performed by: OPHTHALMOLOGY

## 2022-08-09 PROCEDURE — 92134 CPTRZ OPH DX IMG PST SGM RTA: CPT | Performed by: OPHTHALMOLOGY

## 2022-08-09 ASSESSMENT — SLIT LAMP EXAM - LIDS: COMMENTS: MEIBOMIAN GLAND INSPISSATION

## 2022-08-09 ASSESSMENT — CONF VISUAL FIELD
OS_INFERIOR_TEMPORAL_RESTRICTION: 3
METHOD: COUNTING FINGERS
OD_SUPERIOR_TEMPORAL_RESTRICTION: 3

## 2022-08-09 ASSESSMENT — EXTERNAL EXAM - RIGHT EYE: OD_EXAM: NORMAL

## 2022-08-09 ASSESSMENT — REFRACTION_WEARINGRX
OD_ADD: +2.50
OD_AXIS: 179
OS_AXIS: 010
SPECS_TYPE: BIFOCAL
OD_SPHERE: +2.50
OS_SPHERE: +2.50
OD_CYLINDER: +1.00
OS_ADD: +2.50
OS_CYLINDER: +1.75

## 2022-08-09 ASSESSMENT — CUP TO DISC RATIO
OD_RATIO: 0.6
OS_RATIO: 0.5

## 2022-08-09 ASSESSMENT — TONOMETRY
OS_IOP_MMHG: 19
OD_IOP_MMHG: 14
IOP_METHOD: ICARE

## 2022-08-09 ASSESSMENT — VISUAL ACUITY
METHOD: SNELLEN - LINEAR
CORRECTION_TYPE: GLASSES
OD_CC: 20/25
OS_CC: 20/20

## 2022-08-09 ASSESSMENT — EXTERNAL EXAM - LEFT EYE: OS_EXAM: NORMAL

## 2022-08-09 NOTE — NURSING NOTE
Chief Complaints and History of Present Illnesses   Patient presents with     Follow Up     Chief Complaint(s) and History of Present Illness(es)     Follow Up               Comments     Patient states that his vision is getting is worse in the right eye, left eye is table. He states that his eyes feel dry. No flashes of light. No floaters.     Ocular Meds:artifical tears CANDELARIO JENNINGS, August 9, 2022 10:26 AM

## 2022-08-09 NOTE — LETTER
2022       RE: Melo Dangelo  [address]  [place] [address]     Dear Colleague,    Thank you for referring your patient, Melo Dangelo, to the [facility] [place] at [facility]. Please see a copy of my visit note below.    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 There was some variation in the right eye, so we recommended shorter follow up.  Mr. Dangelo reports some increased blurriness right eye and no changes left eye. No blurriness when woodworking specifically, but there are definite issues when driving. There is some waviness of things when looking with the right eye.     Interval Updates to Medical/Family/Social History:    Stopped Finasteride and this has helped to reduce breast discomfort. Now on more frequent flomax. Few more times urinating in the evening.   Reduced potassium and phosphate for kidney    Relevant Review of Systems Updates:  Breast discomfort improved. There does seem to be some sensation of things moving if he stops too quickly, but this is overall stable to improved.     Laboratory Testin22: Stable low Hemoglobin and stable elevated creatinine     Current eye related medications: No prednisone, no cyclophosphamide    Retina/Uveitis Imaging:   OCT Spectralis Macula 2022  right eye: Improved faint nasal subretinal fluid improved, stable outer segment attenuation,. Focal area of hyperreflectivity more compact without overlying fluid. Stable thick choroid.  left eye: Hazier view, normal retinal contour, no fluid. Stable   Assessment:     1. Central serous retinopathy, right  Improving today, more symptomatic but possibly from #2    2. Age-related nuclear cataract of both eyes  Increasing significance  particularly in the right eye     3. Cupping of optic disc, right  With normal eye pressure    Plan/Recommendations:      Discussed findings with patient and his Wife. The  in the right eye has improved and the blood spot in clearing. Given there is no secondary blood vessel growth (no choroidal neovascularization), we can observe without Avastin injection in the right eye     Cataracts progressing, so we recommend consultation with one of my partners to consider surgery. Likely would be fine with steroid drops 4x/day after surgery, no periocular/intraocular steroid injection needed    Eye pressure is normal in each eye     Recommend additional testing: None at this time    No specific treatment needed    RTC Dr. Padilla Next avail Cat Eval. Rec JY one month after any planned surgery right eye with OCT to reassess . Likely not needing any pre-op Avastin as no CNV    Physician Attestation     Attending Physician Attestation:  Complete documentation of historical and exam elements from today's encounter can be found in the full encounter summary report (not reduplicated in this progress note). I personally obtained the chief complaint(s) and history of present illness. I confirmed and edited as necessary the review of systems, past medical/surgical history, family history, social history, and examination findings as documented by others; and I examined the patient myself. I personally reviewed the relevant tests, images, and reports as documented above. I formulated and edited as necessary the assessment and plan and discussed the findings and management plan with the patient and family members present at the time of this visit.  Malik Julien M.D., Uveitis and Medical Retina, August 9, 2022     Again, thank you for allowing me to participate in the care of your patient.      Sincerely,    Malik Julien MD  [facility] Dept of Ophthalmology  Uveitis and Medical Retina

## 2022-08-09 NOTE — PATIENT INSTRUCTIONS
Take care of yourself, no injections needed!  We will ask you to see one of my Partners about Cataract surgery

## 2022-08-09 NOTE — PROGRESS NOTES
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 There was some variation in the right eye, so we recommended shorter follow up.  Mr. Dangelo reports some increased blurriness right eye and no changes left eye. No blurriness when woodworking specifically, but there are definite issues when driving. There is some waviness of things when looking with the right eye.     Interval Updates to Medical/Family/Social History:    Stopped Finasteride and this has helped to reduce breast discomfort. Now on more frequent flomax. Few more times urinating in the evening.   Reduced potassium and phosphate for kidney    Relevant Review of Systems Updates:  Breast discomfort improved. There does seem to be some sensation of things moving if he stops too quickly, but this is overall stable to improved.     Laboratory Testin22: Stable low Hemoglobin and stable elevated creatinine     Current eye related medications: No prednisone, no cyclophosphamide    Retina/Uveitis Imaging:   OCT Spectralis Macula 2022  right eye: Improved faint nasal subretinal fluid improved, stable outer segment attenuation,. Focal area of hyperreflectivity more compact without overlying fluid. Stable thick choroid.  left eye: Hazier view, normal retinal contour, no fluid. Stable     Assessment:     1. Central serous retinopathy, right  Improving today, more symptomatic but possibly from #2    2. Age-related nuclear cataract of both eyes  Increasing significance particularly in the right eye     3. Cupping of optic disc, right  With normal eye pressure    Plan/Recommendations:      Discussed findings with patient and his Wife. The  in the right eye has improved and the blood spot in clearing. Given there is no secondary blood vessel growth (no choroidal neovascularization),  we can observe without Avastin injection in the right eye     Cataracts progressing, so we recommend consultation with one of my partners to consider surgery. Likely would be fine with steroid drops 4x/day after surgery, no periocular/intraocular steroid injection needed    Eye pressure is normal in each eye     Recommend additional testing: None at this time    No specific treatment needed    RTC Dr. Padilla Next avail Cat Edd. Rec JY one month after any planned surgery right eye with OCT to reassess . Likely not needing any pre-op Avastin as no CNV    Physician Attestation     Attending Physician Attestation:  Complete documentation of historical and exam elements from today's encounter can be found in the full encounter summary report (not reduplicated in this progress note). I personally obtained the chief complaint(s) and history of present illness. I confirmed and edited as necessary the review of systems, past medical/surgical history, family history, social history, and examination findings as documented by others; and I examined the patient myself. I personally reviewed the relevant tests, images, and reports as documented above. I formulated and edited as necessary the assessment and plan and discussed the findings and management plan with the patient and family members present at the time of this visit.  Malik Julien M.D., Uveitis and Medical Retina, August 9, 2022

## 2022-08-10 ENCOUNTER — OFFICE VISIT (OUTPATIENT)
Dept: OPHTHALMOLOGY | Facility: CLINIC | Age: 82
End: 2022-08-10
Attending: OPHTHALMOLOGY
Payer: MEDICARE

## 2022-08-10 DIAGNOSIS — H47.231 CUPPING OF OPTIC DISC, RIGHT: ICD-10-CM

## 2022-08-10 DIAGNOSIS — H25.813 COMBINED FORMS OF AGE-RELATED CATARACT OF BOTH EYES: Primary | ICD-10-CM

## 2022-08-10 DIAGNOSIS — H35.711 CENTRAL SEROUS CHORIORETINOPATHY OF RIGHT EYE: ICD-10-CM

## 2022-08-10 PROCEDURE — 99214 OFFICE O/P EST MOD 30 MIN: CPT | Performed by: OPHTHALMOLOGY

## 2022-08-10 PROCEDURE — G0463 HOSPITAL OUTPT CLINIC VISIT: HCPCS | Mod: 25

## 2022-08-10 PROCEDURE — 76516 ECHO EXAM OF EYE: CPT | Performed by: OPHTHALMOLOGY

## 2022-08-10 ASSESSMENT — VISUAL ACUITY
OS_CC: 20/20
OS_BAT_HIGH: 20/50
OD_CC: 20/40
OD_BAT_HIGH: 20/70
OD_BAT_MED: 20/60
METHOD: SNELLEN - LINEAR
CORRECTION_TYPE: GLASSES
METHOD_MR: DIAGNOSTIC.  NO CHARGE.
OD_CC+: -1
OS_BAT_LOW: 20/25
OS_BAT_MED: 20/40
OD_BAT_LOW: 20/40

## 2022-08-10 ASSESSMENT — REFRACTION_MANIFEST
OD_ADD: +2.50
OS_SPHERE: +2.50
OS_CYLINDER: +1.75
OS_AXIS: 010
OD_SPHERE: +2.75
OD_CYLINDER: +1.25
OS_ADD: +2.50
OD_AXIS: 179

## 2022-08-10 ASSESSMENT — CUP TO DISC RATIO
OD_RATIO: 0.6
OS_RATIO: 0.5

## 2022-08-10 ASSESSMENT — REFRACTION_WEARINGRX
OS_AXIS: 010
OD_SPHERE: +2.50
OD_AXIS: 179
SPECS_TYPE: BIFOCAL
OS_CYLINDER: +1.75
OS_SPHERE: +2.50
OD_ADD: +2.50
OD_CYLINDER: +1.00
OS_ADD: +2.50

## 2022-08-10 ASSESSMENT — CONF VISUAL FIELD
METHOD: COUNTING FINGERS
OS_INFERIOR_TEMPORAL_RESTRICTION: 3
OD_SUPERIOR_TEMPORAL_RESTRICTION: 3

## 2022-08-10 ASSESSMENT — EXTERNAL EXAM - LEFT EYE: OS_EXAM: NORMAL

## 2022-08-10 ASSESSMENT — TONOMETRY
OS_IOP_MMHG: 15
IOP_METHOD: ICARE
OD_IOP_MMHG: 16

## 2022-08-10 ASSESSMENT — EXTERNAL EXAM - RIGHT EYE: OD_EXAM: NORMAL

## 2022-08-10 ASSESSMENT — SLIT LAMP EXAM - LIDS: COMMENTS: MEIBOMIAN GLAND INSPISSATION

## 2022-08-10 NOTE — PROGRESS NOTES
HPI     Consult For     In both eyes.  Onset was gradual.  This started years ago.  Since onset it is stable.  Associated symptoms include glare, haloes and photophobia.  Negative for dryness, eye pain, tearing, flashes and floaters.  Treatments tried include artificial tears.  Pain was noted as 0/10. Additional comments: New Pt here for cataract eval.  Hx of Central serous retinopathy, right.              Comments     Pt states vision is stable since last visit.  Some soreness with some blurriness RE yesterday, improved today.  Hazy spot in in central vision of RE for about a year.  PFAT's PRN.    denies family history of ocular conditions  denies history of ocular surgeries   MICHAELA Caballero August 10, 2022 10:51 AM              Last edited by Best Padilla MD on 8/10/2022 11:49 AM. (History)         Review of systems for the eyes was negative other than the pertinent positives/negatives listed in the HPI.      Assessment & Plan    HPI:  Melo Dangelo is a 81 year old male with history of CKD, IgA Nephropathy, MDD, sensorineural hearing loss, HTN, BPH, seizure history, Central serous chorioretinopathy, hyperopia with astigmatism and presbopia presents for cataract evaluation from Dr. Julien. Notes decreased vision RIGHT eye x years.     POHx: Central serous chorioretinopathy right eye, hyperopia with astigmatism and presbyopia  PMHx: CKD, IgA Nephropathy, MDD, sensorineural hearing loss, Central serous chorioretinopathy, HTN, BPH, seizure  Current Medications: calcium carb-cholecalciferol (OS-GIANNI) 500-200 MG-UNIT tablet, Take 1 tablet by mouth 2 times daily (with meals)  cyanocobalamin (VITAMIN B-12) 100 MCG tablet, Take 1,000 mcg by mouth daily   escitalopram (LEXAPRO) 20 MG tablet, Take 20 mg by mouth daily  famotidine (PEPCID) 20 MG tablet, Take 1 tablet (20 mg) by mouth 2 times daily (Patient taking differently: Take 40 mg by mouth daily)  guaiFENesin (MUCINEX) 600 MG 12 hr tablet, Take 2  tablets (1,200 mg) by mouth 2 times daily  hydrOXYzine (ATARAX) 10 MG tablet, Take 1 tablet by mouth 3 times daily as needed  lamoTRIgine (LAMICTAL) 100 MG tablet, Take 2 tablets (200 mg) by mouth 2 times daily  metoprolol succinate ER (TOPROL XL) 25 MG 24 hr tablet, Take 1/2 tab a day (12.5 mg)  metoprolol tartrate (LOPRESSOR) 25 MG tablet, Take 0.5 tablets (12.5 mg) by mouth 2 times daily  simvastatin (ZOCOR) 20 MG tablet, Take 1 tablet (20 mg) by mouth At Bedtime  Sodium Chloride-Sodium Bicarb (SINUS WASH SALINE REFILLS) 2300-700 MG PACK, Wash out sinuses up to twice daily as needed for comfort.  tamsulosin (FLOMAX) 0.4 MG capsule, Take 2 capsules (0.8 mg) by mouth daily    bevacizumab (AVASTIN) intravitreal inj 1.25 mg      FHx: denies family history of ocular conditions   PSHx: denies history of ocular surgeries       Current Eye Medications:  PFATs PRN    Assessment & Plan:  (H25.813) Combined forms of age-related cataract of both eyes  (primary encounter diagnosis)  Special equipment/needs:  Eye: right  Anesthesia:topical  Dilates to: 6.5  Iris expansion:  Possible  Pseudoexfoliation: No  Trypan Blue: No  Trauma: No    Able lay to flat: Yes  Blood Thinner: No   Tamsulosin: Yes  DM: No  Guttae: No    Dominant Eye: left    Plan: -0.75 right eye, plano left eye, patient defers toric left eye due to cost    We discussed the benefits, alternatives, and risks to cataract surgery, including blindness, decreased vision, and need for additional surgeries; and informed consent was obtained.  Proceed with CE/IOL right eye followed by left eye.    (H35.711) Central serous chorioretinopathy of right eye  Initially noted 10/2019  Follows with Dr. Julien    (H47.231) Cupping of optic disc, right  IOP within normal limits, no appreciable thinning to dilated exam  Observe for now      Return for POD0.        Best Padilla MD     Attending Physician Attestation:  Complete documentation of historical and exam elements  from today's encounter can be found in the full encounter summary report (not reduplicated in this progress note).  I personally obtained the chief complaint(s) and history of present illness.  I confirmed and edited as necessary the review of systems, past medical/surgical history, family history, social history, and examination findings as documented by others; and I examined the patient myself.  I personally reviewed the relevant tests, images, and reports as documented above.  I formulated and edited as necessary the assessment and plan and discussed the findings and management plan with the patient and family. - Best Padilla MD

## 2022-08-10 NOTE — NURSING NOTE
Chief Complaints and History of Present Illnesses   Patient presents with     Consult For     New Pt here for cataract eval.  Hx of Central serous retinopathy, right.     Chief Complaint(s) and History of Present Illness(es)     Consult For     Laterality: both eyes    Onset: gradual    Onset: years ago    Course: stable    Associated symptoms: glare, haloes and photophobia.  Negative for dryness, eye pain, tearing, flashes and floaters    Treatments tried: artificial tears    Pain scale: 0/10    Comments: New Pt here for cataract eval.  Hx of Central serous retinopathy, right.              Comments     Pt states vision is stable since last visit.  Some soreness with some blurriness RE yesterday, improved today.  Hazy spot in in central vision of RE for about a year.  PFAT's PRN.    MICHAELA Caballero August 10, 2022 10:51 AM

## 2022-08-18 NOTE — TELEPHONE ENCOUNTER
RECORDS RECEIVED FROM: Lump or mass in Breast; referred by Dr. Smith   DATE RECEIVED: 11/1/2022   NOTES (FOR ALL VISITS) STATUS DETAILS   OFFICE NOTES from referring provider [facility]:  ([facility]) 9/21/22, 7/29/22 - [facility] OV with Dr. Smith   OFFICE NOTES from other specialist N/A    ED NOTES N/A    OPERATIVE REPORT  (thyroid, pituitary, adrenal, parathyroid) N/A    MEDICATION LIST Internal    IMAGING      DEXASCAN N/A    MRI (BRAIN) N/A    XR (Chest) In process / [facility]:  1/20/22 - XR Chest  10/27/20 - XR Chest    [facility]:  4/8/21 - XR Chest- received    CT (HEAD/NECK/CHEST/ABDOMEN) [facility]:  10/18/21 - CT Abd/Pelvis   NUCLEAR  N/A    ULTRASOUND (HEAD/NECK) N/A    LABS     DIABETES: HBGA1C, CREATININE, FASTING LIPIDS, MICROALBUMIN URINE, POTASSIUM, TSH, T4    THYROID: TSH, T4, CBC, THYRODLONULIN, TOTAL T3, FREE T4, CALCITONIN, CEA Care Everywhere / Internal   [facility]:  7/29/22 - CMP  7/29/22 - TSH, T4  6/2/22 - CBC  6/2/22 - Routine UA  2/17/22 - Parathyroid Hormone  2/17/22 - Vitamin D  11/12/21 - BMP  10/21/21 - HBGA1C  10/18/21 - Creatinine  10/15/21 - Lipid  10/28/20 - Potassium    [facility]:  1/2/19 - Calcium  1/2/19 - Glucose     Records Requested  08/18/22    [facility]  Fax: [phone]   Outcome * 8/18/22 7:16 AM Faxed req to  for images to be pushed to [facility]. - Mary  - received image-

## 2022-08-19 ENCOUNTER — TELEPHONE (OUTPATIENT)
Dept: OPHTHALMOLOGY | Facility: CLINIC | Age: 82
End: 2022-08-19

## 2022-08-20 ENCOUNTER — TELEPHONE (OUTPATIENT)
Dept: OPHTHALMOLOGY | Facility: CLINIC | Age: 82
End: 2022-08-20

## 2022-08-20 PROBLEM — H25.813 COMBINED FORMS OF AGE-RELATED CATARACT OF BOTH EYES: Status: ACTIVE | Noted: 2022-08-20

## 2022-08-20 NOTE — TELEPHONE ENCOUNTER
Called patient to schedule surgery with Dr. Padilla    Date of Surgery: 11/3,11/17    Location of surgery: [facility]    Pre-Op H&P: PCP    Imaging Scheduled:  No    Discussed COVID-19 Testing: Yes    Post-Op Appt Date: 11/16,11/23    Surgery Packet Mailed: yes      Additional comments: Spoke with patient to schedule surgery, he is agreeable to above dates, will review packet and call with any questions      Anna C. Schoenecker on 8/20/2022 at 8:44 AM          Anna C. Schoenecker on 8/20/2022 at 8:36 AM

## 2022-08-22 ENCOUNTER — ANCILLARY PROCEDURE (OUTPATIENT)
Dept: CARDIOLOGY | Facility: CLINIC | Age: 82
End: 2022-08-22
Attending: INTERNAL MEDICINE
Payer: MEDICARE

## 2022-08-22 VITALS
HEIGHT: 69 IN | SYSTOLIC BLOOD PRESSURE: 138 MMHG | DIASTOLIC BLOOD PRESSURE: 66 MMHG | HEART RATE: 60 BPM | WEIGHT: 177.5 LBS | OXYGEN SATURATION: 97 % | BODY MASS INDEX: 26.29 KG/M2

## 2022-08-22 DIAGNOSIS — I48.0 PAROXYSMAL ATRIAL FIBRILLATION (H): ICD-10-CM

## 2022-08-22 DIAGNOSIS — I48.0 PAROXYSMAL ATRIAL FIBRILLATION (H): Primary | ICD-10-CM

## 2022-08-22 PROCEDURE — 93248 EXT ECG>7D<15D REV&INTERPJ: CPT | Performed by: INTERNAL MEDICINE

## 2022-08-22 PROCEDURE — 93246 EXT ECG>7D<15D RECORDING: CPT

## 2022-08-22 PROCEDURE — G0463 HOSPITAL OUTPT CLINIC VISIT: HCPCS | Mod: 25

## 2022-08-22 PROCEDURE — 99215 OFFICE O/P EST HI 40 MIN: CPT | Mod: 25 | Performed by: INTERNAL MEDICINE

## 2022-08-22 PROCEDURE — 93005 ELECTROCARDIOGRAM TRACING: CPT | Mod: XU

## 2022-08-22 ASSESSMENT — PAIN SCALES - GENERAL: PAINLEVEL: NO PAIN (0)

## 2022-08-22 NOTE — PROGRESS NOTES
HPI:   Melo Dangelo is an 81 year old Male with a PMH of HTN, IgA nephropathy, Hx of COVID-19 infection x 2 and PAF.  He was referred for a cardiology follow up.  He was diagnosed with AF in setting of hospitalization for COVID-19 infection in October 2020, at which time Apixaban was restarted, with plans to possibly stop med 1+ months after discharge. Is CHADS2-Vasc score = 3.    He has been suffering from skipped beats and also episodes lasting for an hour that happened a couple of times a week.  When the lasting episode occurred, he felt tired and sluggish.  He denied any chest pain or SOB.  Eliquis was discontinued due to CKD.  Zio patch in 10/2021 was not significant, but he is now having more symptoms.    He is now seen for a follow up.    He is suffering from lightheadedness lasting all day 4-5 times a month.     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      PVD (posterior vitreous detachment)      Seizure disorder (H)     last seizure 2008       CURRENT MEDICATIONS:  Current Outpatient Medications   Medication Sig Dispense Refill     calcium carb-cholecalciferol (OS-GIANNI) 500-200 MG-UNIT tablet Take 1 tablet by mouth 2 times daily (with meals) 60 tablet 11     cyanocobalamin (VITAMIN B-12) 100 MCG tablet Take 1,000 mcg by mouth daily        escitalopram (LEXAPRO) 20 MG tablet Take 20 mg by mouth daily       hydrOXYzine (ATARAX) 10 MG tablet Take 1 tablet by mouth 3 times daily as needed       lamoTRIgine (LAMICTAL) 100 MG tablet Take 2 tablets (200 mg) by mouth 2 times daily 360 tablet 3     metoprolol succinate ER (TOPROL XL) 25 MG 24 hr tablet Take 1/2 tab a day (12.5 mg) 90 tablet 1     metoprolol tartrate (LOPRESSOR) 25 MG tablet Take 0.5 tablets (12.5 mg) by mouth  "2 times daily 30 tablet 3     simvastatin (ZOCOR) 20 MG tablet Take 1 tablet (20 mg) by mouth At Bedtime 90 tablet 3     Sodium Chloride-Sodium Bicarb (SINUS WASH SALINE REFILLS) 2300-700 MG PACK Wash out sinuses up to twice daily as needed for comfort. 60 each 0     tamsulosin (FLOMAX) 0.4 MG capsule Take 2 capsules (0.8 mg) by mouth daily 180 capsule 3     famotidine (PEPCID) 20 MG tablet Take 1 tablet (20 mg) by mouth 2 times daily (Patient not taking: Reported on 8/22/2022) 60 tablet 3     guaiFENesin (MUCINEX) 600 MG 12 hr tablet Take 2 tablets (1,200 mg) by mouth 2 times daily (Patient not taking: Reported on 8/22/2022) 28 tablet 0       PAST SURGICAL HISTORY:  Past Surgical History:   Procedure Laterality Date     NO HISTORY OF SURGERY         ALLERGIES:   No Known Allergies    FAMILY HISTORY:  - Premature coronary artery disease  + Atrial fibrillation  + Sudden cardiac death     SOCIAL HISTORY:  Social History     Tobacco Use     Smoking status: Former Smoker     Packs/day: 0.00     Smokeless tobacco: Never Used   Substance Use Topics     Alcohol use: No     Comment: History of alcohol dependence, in remission for 20 years     Drug use: No       ROS:   Constitutional: No fever, chills, or sweats. Weight stable.   ENT: No visual disturbance, ear ache, epistaxis, sore throat.   Cardiovascular: As per HPI.   Respiratory: No cough, hemoptysis.    GI: No nausea, vomiting, hematemesis, melena, or hematochezia.   : No hematuria.   Integument: Negative.   Psychiatric: Negative.   Hematologic:  Easy bruising, no easy bleeding.  Neuro: Negative.   Endocrinology: No significant heat or cold intolerance   Musculoskeletal: No myalgia.    Exam:  /66 (BP Location: Left arm, Patient Position: Chair, Cuff Size: Adult Regular)   Pulse 60   Ht 1.752 m (5' 8.98\")   Wt 80.5 kg (177 lb 8 oz)   SpO2 97%   BMI 26.23 kg/m    GENERAL APPEARANCE: healthy, alert and no distress  HEENT: no icterus, no xanthelasmas, normal " pupil size and reaction, normal palate, mucosa moist, no central cyanosis  NECK: no adenopathy, no asymmetry, masses, or scars, thyroid normal to palpation and no bruits, JVP not elevated  RESPIRATORY: lungs clear to auscultation - no rales, rhonchi or wheezes, no use of accessory muscles, no retractions, respirations are unlabored, normal respiratory rate  CARDIOVASCULAR: regular rhythm, normal S1 with physiologic split S2, no S3 or S4 and no murmur, click or rub, precordium quiet with normal PMI.  ABDOMEN: soft, non tender, without hepatosplenomegaly, no masses palpable, bowel sounds normal, aorta not enlarged by palpation, no abdominal bruits  EXTREMITIES: peripheral pulses normal, no edema, no bruits  NEURO: alert and oriented to person/place/time, normal speech, gait and affect  VASC: Radial, femoral, dorsalis pedis and posterior tibialis pulses are normal in volumes and symmetric bilaterally. No bruits are heard.  SKIN: no ecchymoses, no rashes    Labs:  CBC RESULTS:   Lab Results   Component Value Date    WBC 4.8 07/29/2022    WBC 7.7 10/29/2020    RBC 3.12 (L) 07/29/2022    RBC 3.85 (L) 10/29/2020    HGB 9.7 (L) 07/29/2022    HGB 11.6 (L) 10/29/2020    HCT 29.4 (L) 07/29/2022    HCT 35.6 (L) 10/29/2020    MCV 94 07/29/2022    MCV 93 10/29/2020    MCH 31.1 07/29/2022    MCH 30.1 10/29/2020    MCHC 33.0 07/29/2022    MCHC 32.6 10/29/2020    RDW 12.4 07/29/2022    RDW 13.7 10/29/2020     07/29/2022     10/29/2020       BMP RESULTS:  Lab Results   Component Value Date     07/29/2022     10/29/2020    POTASSIUM 4.8 07/29/2022    POTASSIUM 4.7 10/29/2020    CHLORIDE 106 07/29/2022    CHLORIDE 110 (H) 10/29/2020    CO2 28 07/29/2022    CO2 25 10/29/2020    ANIONGAP 6 07/29/2022    ANIONGAP 5 10/29/2020    GLC 89 07/29/2022     (H) 10/29/2020    BUN 55 (H) 07/29/2022    BUN 31 (H) 10/29/2020    CR 3.91 (H) 07/29/2022    CR 1.33 (H) 10/29/2020    GFRESTIMATED 15 (L) 07/29/2022     GFRESTIMATED 56 (L) 11/04/2020    GFRESTIMATED 50 (L) 10/29/2020    GFRESTBLACK >60 11/04/2020    GFRESTBLACK 58 (L) 10/29/2020    GIANNI 9.4 07/29/2022    GIANNI 8.5 10/29/2020        INR RESULTS:  Lab Results   Component Value Date    INR 1.04 01/20/2022    INR 1.09 10/19/2021    INR 1.09 11/02/2020    INR 1.18 (H) 11/01/2020    INR 1.15 (H) 10/29/2020    INR 1.05 10/27/2020       Procedures:  ECG on 2/4/2022: Reviewed.  WNL.    Zio patch in 10/2021: reviewed.      Zio patch in 2/2022: Reviewed.          ECG on 8/22/2022: Reviewed.        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 CHADS2-Vasc score = 3.  Eliquis was discontinued due to CKD.  # Skipped beats and also episodes lasting for an hour that happened a couple of times a week.  When the lasting episode occurred, he felt tired and sluggish.  Zio patch in 10/2021 was not significant, but he is now having more symptoms.  Zio patch in 2/202 revealed that the AF burden was 2% with the longest episode of 1.5 hrs.  I advised him to take ASA 81 mg daily for stroke prophylaxis.  We will place him on a repeat Zio patch to see his current AF burden.  I will see him back in a month.    I spent a total of 60 min today to review the records, see the patient, and complete the documents.    CC  Patient Care Team:  Francis Smith MD as PCP - General (Family Medicine)  Inderjit Grier as Malik Cordoba MD as Assigned Surgical Provider  Esperanza Guillaume CNP as Nurse Practitioner (Urology)  Pushpa Alvarado, DINH as Registered Nurse  Francis Smith MD as Assigned PCP  Daria Raza MD as Assigned Neuroscience Provider  Fernando Chong MD as MD (Nephrology)  Merry Barrow MD as MD (Nephrology)  Merry Barrow  MD Milton as Assigned Nephrology Provider  Nithin Tolbert RPH as Pharmacist (Pharmacist)  Adis Ventura MD (Cardiovascular Disease)  Yeimi Dang RN as Specialty Care Coordinator  Adis Ventura MD as Assigned Heart and Vascular Provider  Nithin Tolbert RPH as Assigned [place] Pharmacist  Matthew Cabrera MD as MD (Endocrinology, Diabetes, and Metabolism)  VAL SALAZAR

## 2022-08-22 NOTE — LETTER
8/22/2022      RE: Melo Dangelo  [address]  [place] [address]       Dear Colleague,    Thank you for the opportunity to participate in the care of your patient, Melo Dangelo, at the [facility] [place] [facility]. Please see a copy of my visit note below.    HPI:   Melo Dangelo is an 81 year old Male with a PMH of HTN, IgA nephropathy, Hx of COVID-19 infection x 2 and PAF.  He was referred for a cardiology follow up.  He was diagnosed with AF in setting of hospitalization for COVID-19 infection in October 2020, at which time Apixaban was restarted, with plans to possibly stop med 1+ months after discharge. Is CHADS2-Vasc score = 3.    He has been suffering from skipped beats and also episodes lasting for an hour that happened a couple of times a week.  When the lasting episode occurred, he felt tired and sluggish.  He denied any chest pain or SOB.  Eliquis was discontinued due to CKD.  Zio patch in 10/2021 was not significant, but he is now having more symptoms.    He is now seen for a follow up.    He is suffering from lightheadedness lasting all day 4-5 times a month.     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      PVD (posterior vitreous detachment)      Seizure disorder (H)     last seizure 2008       CURRENT MEDICATIONS:  Current Outpatient Medications   Medication Sig Dispense Refill     calcium carb-cholecalciferol (OS-GIANNI) 500-200 MG-UNIT tablet Take 1 tablet by mouth 2 times daily (with meals) 60 tablet 11     cyanocobalamin (VITAMIN B-12) 100 MCG tablet Take 1,000 mcg by mouth daily        escitalopram (LEXAPRO) 20 MG tablet Take 20 mg by mouth daily       hydrOXYzine  (ATARAX) 10 MG tablet Take 1 tablet by mouth 3 times daily as needed       lamoTRIgine (LAMICTAL) 100 MG tablet Take 2 tablets (200 mg) by mouth 2 times daily 360 tablet 3     metoprolol succinate ER (TOPROL XL) 25 MG 24 hr tablet Take 1/2 tab a day (12.5 mg) 90 tablet 1     metoprolol tartrate (LOPRESSOR) 25 MG tablet Take 0.5 tablets (12.5 mg) by mouth 2 times daily 30 tablet 3     simvastatin (ZOCOR) 20 MG tablet Take 1 tablet (20 mg) by mouth At Bedtime 90 tablet 3     Sodium Chloride-Sodium Bicarb (SINUS WASH SALINE REFILLS) 2300-700 MG PACK Wash out sinuses up to twice daily as needed for comfort. 60 each 0     tamsulosin (FLOMAX) 0.4 MG capsule Take 2 capsules (0.8 mg) by mouth daily 180 capsule 3     famotidine (PEPCID) 20 MG tablet Take 1 tablet (20 mg) by mouth 2 times daily (Patient not taking: Reported on 8/22/2022) 60 tablet 3     guaiFENesin (MUCINEX) 600 MG 12 hr tablet Take 2 tablets (1,200 mg) by mouth 2 times daily (Patient not taking: Reported on 8/22/2022) 28 tablet 0       PAST SURGICAL HISTORY:  Past Surgical History:   Procedure Laterality Date     NO HISTORY OF SURGERY         ALLERGIES:   No Known Allergies    FAMILY HISTORY:  - Premature coronary artery disease  + Atrial fibrillation  + Sudden cardiac death     SOCIAL HISTORY:  Social History     Tobacco Use     Smoking status: Former Smoker     Packs/day: 0.00     Smokeless tobacco: Never Used   Substance Use Topics     Alcohol use: No     Comment: History of alcohol dependence, in remission for 20 years     Drug use: No       ROS:   Constitutional: No fever, chills, or sweats. Weight stable.   ENT: No visual disturbance, ear ache, epistaxis, sore throat.   Cardiovascular: As per HPI.   Respiratory: No cough, hemoptysis.    GI: No nausea, vomiting, hematemesis, melena, or hematochezia.   : No hematuria.   Integument: Negative.   Psychiatric: Negative.   Hematologic:  Easy bruising, no easy bleeding.  Neuro: Negative.   Endocrinology: No  "significant heat or cold intolerance   Musculoskeletal: No myalgia.    Exam:  /66 (BP Location: Left arm, Patient Position: Chair, Cuff Size: Adult Regular)   Pulse 60   Ht 1.752 m (5' 8.98\")   Wt 80.5 kg (177 lb 8 oz)   SpO2 97%   BMI 26.23 kg/m    GENERAL APPEARANCE: healthy, alert and no distress  HEENT: no icterus, no xanthelasmas, normal pupil size and reaction, normal palate, mucosa moist, no central cyanosis  NECK: no adenopathy, no asymmetry, masses, or scars, thyroid normal to palpation and no bruits, JVP not elevated  RESPIRATORY: lungs clear to auscultation - no rales, rhonchi or wheezes, no use of accessory muscles, no retractions, respirations are unlabored, normal respiratory rate  CARDIOVASCULAR: regular rhythm, normal S1 with physiologic split S2, no S3 or S4 and no murmur, click or rub, precordium quiet with normal PMI.  ABDOMEN: soft, non tender, without hepatosplenomegaly, no masses palpable, bowel sounds normal, aorta not enlarged by palpation, no abdominal bruits  EXTREMITIES: peripheral pulses normal, no edema, no bruits  NEURO: alert and oriented to person/place/time, normal speech, gait and affect  VASC: Radial, femoral, dorsalis pedis and posterior tibialis pulses are normal in volumes and symmetric bilaterally. No bruits are heard.  SKIN: no ecchymoses, no rashes    Labs:  CBC RESULTS:   Lab Results   Component Value Date    WBC 4.8 07/29/2022    WBC 7.7 10/29/2020    RBC 3.12 (L) 07/29/2022    RBC 3.85 (L) 10/29/2020    HGB 9.7 (L) 07/29/2022    HGB 11.6 (L) 10/29/2020    HCT 29.4 (L) 07/29/2022    HCT 35.6 (L) 10/29/2020    MCV 94 07/29/2022    MCV 93 10/29/2020    MCH 31.1 07/29/2022    MCH 30.1 10/29/2020    MCHC 33.0 07/29/2022    MCHC 32.6 10/29/2020    RDW 12.4 07/29/2022    RDW 13.7 10/29/2020     07/29/2022     10/29/2020       BMP RESULTS:  Lab Results   Component Value Date     07/29/2022     10/29/2020    POTASSIUM 4.8 07/29/2022    POTASSIUM " 4.7 10/29/2020    CHLORIDE 106 07/29/2022    CHLORIDE 110 (H) 10/29/2020    CO2 28 07/29/2022    CO2 25 10/29/2020    ANIONGAP 6 07/29/2022    ANIONGAP 5 10/29/2020    GLC 89 07/29/2022     (H) 10/29/2020    BUN 55 (H) 07/29/2022    BUN 31 (H) 10/29/2020    CR 3.91 (H) 07/29/2022    CR 1.33 (H) 10/29/2020    GFRESTIMATED 15 (L) 07/29/2022    GFRESTIMATED 56 (L) 11/04/2020    GFRESTIMATED 50 (L) 10/29/2020    GFRESTBLACK >60 11/04/2020    GFRESTBLACK 58 (L) 10/29/2020    GIANNI 9.4 07/29/2022    GIANNI 8.5 10/29/2020        INR RESULTS:  Lab Results   Component Value Date    INR 1.04 01/20/2022    INR 1.09 10/19/2021    INR 1.09 11/02/2020    INR 1.18 (H) 11/01/2020    INR 1.15 (H) 10/29/2020    INR 1.05 10/27/2020       Procedures:  ECG on 2/4/2022: Reviewed.  WNL.    Zio patch in 10/2021: reviewed.      Zio patch in 2/2022: Reviewed.          ECG on 8/22/2022: Reviewed.        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 CHADS2-Vasc score = 3.  Eliquis was discontinued due to CKD.  # Skipped beats and also episodes lasting for an hour that happened a couple of times a week.  When the lasting episode occurred, he felt tired and sluggish.  Zio patch in 10/2021 was not significant, but he is now having more symptoms.  Zio patch in 2/202 revealed that the AF burden was 2% with the longest episode of 1.5 hrs.  I advised him to take ASA 81 mg daily for stroke prophylaxis.  We will place him on a repeat Zio patch to see his current AF burden.  I will see him back in a month.    I spent a total of 60 min today to review the records, see the patient, and complete the documents.    Please do not hesitate to contact me if you have any questions/concerns.     Sincerely,     Adis Ventura,  MD      CC  Patient Care Team:  Francis Smith MD as PCP - General (Family Medicine)  Inderjit Grier as Malik Cordoba MD as Assigned Surgical Provider  Esperanza Guillaume CNP as Nurse Practitioner (Urology)  Pushpa Alvarado, DINH as Registered Nurse  Daria Raza MD as Assigned Neuroscience Provider  Fernando Chong MD as MD (Nephrology)  Merry Barrow MD as MD (Nephrology  Nithin Tolbert [facility] as Pharmacist (Pharmacist)  Yeimi Dang RN as Specialty Care Coordinator  Nithin Tolbert [facility] as Assigned [facility] Pharmacist  Matthew Cabrera MD as MD (Endocrinology, Diabetes, and Metabolism)

## 2022-08-22 NOTE — PROGRESS NOTES
Per Dr. Ventura, patient to have ZIO monitor placed.  Diagnosis: paryoxysmal atrial fibrillation   Monitor placed: Yes  Patient Instructed: Yes  Patient verbalized understanding: Yes  Holter # [id number]

## 2022-08-22 NOTE — NURSING NOTE
Chief Complaint   Patient presents with     Follow Up     6 mo follow-up paroxysmal atrial fibrillation. Review zio.  meds- toprol, statin       Vitals were taken, medications reconciled, and EKG was performed.    Berta Lynn EMT  9:43 AM

## 2022-08-22 NOTE — PATIENT INSTRUCTIONS
You were seen in the Electrophysiology Clinic today by: Dr Ventura    Plan:   Medication Changes:   Aspirin 81mg daily    Labs/Tests Needed:  14 day ziopatch heart monitor    Follow up visit:  1 month after placement of ziopatch      Your Care Team:  EP Cardiology   Telephone Number     Nurse Line  Yeimi Dang, RN  [phone]     For scheduling appts:   [facility]    For procedures:    Vania Saini   [phone]   For the Device Clinic (Pacemakers, ICDs, Loop Recorders)    During business hours: [phone]  After business hours:   [phone]- select option 4 and ask for job code [id number].     On-call cardiologist for after hours or on weekends: [phone], option #4, and ask to speak to the on-call cardiologist.     Cardiovascular Clinic:   [address] [place] [address]. [place] [address]      As always, Thank you for trusting us with your health care needs!

## 2022-08-23 LAB
ATRIAL RATE - MUSE: 55 BPM
DIASTOLIC BLOOD PRESSURE - MUSE: NORMAL MMHG
INTERPRETATION ECG - MUSE: NORMAL
P AXIS - MUSE: 70 DEGREES
PR INTERVAL - MUSE: 226 MS
QRS DURATION - MUSE: 80 MS
QT - MUSE: 434 MS
QTC - MUSE: 415 MS
R AXIS - MUSE: 11 DEGREES
SYSTOLIC BLOOD PRESSURE - MUSE: NORMAL MMHG
T AXIS - MUSE: 49 DEGREES
VENTRICULAR RATE- MUSE: 55 BPM

## 2022-08-24 ENCOUNTER — TELEPHONE (OUTPATIENT)
Dept: NEPHROLOGY | Facility: CLINIC | Age: 82
End: 2022-08-24

## 2022-08-24 NOTE — TELEPHONE ENCOUNTER
Call to patient regarding metoprolol succinate and metoprolol tartrate.   Patient reported that he was taking both and reports BP below.    8/19: 104/48 107/58   8/22: 94/46 100/56 100/52  8/24: 104/48, 107/63, 104/62    HR around 59-62.    Patient reports that he felt dizzy and light headed with narrowed vision for a couple days. Patient was not sure if that was due to medications. Patient recently made chicken soup and reports that he added too much spice and he and his wife had emesis. Planning on trip to [place] to go [employer] watching leaving next Tuesday.    Reviewed with Dr Barrow her recommendations are to stop metoprolol tartrate BID dosing.    Writer called patient back who then reports that he has been taking only the Metoprolol succinate ER tab (12.5 mg daily).   Encouraged patient to continue to follow blood pressures and call if there are any more questions or concerns.  Lizbeth Roldan LPN  Nephrology  [phone]

## 2022-08-26 NOTE — TELEPHONE ENCOUNTER
Pt did not check mychart message yet. I called the pt.  He states that breat pain is better, but he has pain when it is bumped and he will proceed with the labs.

## 2022-09-08 ENCOUNTER — NURSE TRIAGE (OUTPATIENT)
Dept: NURSING | Facility: CLINIC | Age: 82
End: 2022-09-08

## 2022-09-08 NOTE — TELEPHONE ENCOUNTER
Patient is calling due to positive Covid test today. Symptoms started last night.    Per protocol, patient can set up virtual visit to discuss treatment with provider. Reviewed care advice, signs and symptoms to monitor, and when to call back. Connected patient with scheduling.    Juju Perez RN  [place] Nurse Advisor  7:04 PM  9/8/2022          Reason for Disposition    HIGH RISK for severe COVID complications (e.g., weak immune system, age > 64 years, obesity with BMI > 25, pregnant, chronic lung disease or other chronic medical condition)  (Exception: Already seen by PCP and no new or worsening symptoms.)    Additional Information    Negative: SEVERE difficulty breathing (e.g., struggling for each breath, speaks in single words)    Negative: Difficult to awaken or acting confused (e.g., disoriented, slurred speech)    Negative: Bluish (or gray) lips or face now    Negative: Shock suspected (e.g., cold/pale/clammy skin, too weak to stand, low BP, rapid pulse)    Negative: Sounds like a life-threatening emergency to the triager    Negative: [1] Diagnosed or suspected COVID-19 AND [2] symptoms lasting 3 or more weeks    Negative: [1] COVID-19 exposure AND [2] no symptoms    Negative: COVID-19 vaccine reaction suspected (e.g., fever, headache, muscle aches) occurring 1 to 3 days after getting vaccine    Negative: COVID-19 vaccine, questions about    Negative: [1] Lives with someone known to have influenza (flu test positive) AND [2] flu-like symptoms (e.g., cough, runny nose, sore throat, SOB; with or without fever)    Negative: [1] Adult with possible COVID-19 symptoms AND [2] triager concerned about severity of symptoms or other causes    Negative: COVID-19 and breastfeeding, questions about    Negative: SEVERE or constant chest pain or pressure  (Exception: Mild central chest pain, present only when coughing.)    Negative: MODERATE difficulty breathing (e.g., speaks in phrases, SOB even at rest, pulse  100-120)    Negative: [1] Headache AND [2] stiff neck (can't touch chin to chest)    Negative: Oxygen level (e.g., pulse oximetry) 90 percent or lower    Negative: Chest pain or pressure    Negative: Patient sounds very sick or weak to the triager    Negative: MILD difficulty breathing (e.g., minimal/no SOB at rest, SOB with walking, pulse <100)    Negative: Fever > 103 F (39.4 C)    Negative: [1] Fever > 101 F (38.3 C) AND [2] age > 60 years    Negative: [1] Fever > 100.0 F (37.8 C) AND [2] bedridden (e.g., nursing home patient, CVA, chronic illness, recovering from surgery)    Protocols used: CORONAVIRUS (COVID-19) DIAGNOSED OR [id number] 1.18.2022

## 2022-09-09 ENCOUNTER — VIRTUAL VISIT (OUTPATIENT)
Dept: FAMILY MEDICINE | Facility: CLINIC | Age: 82
End: 2022-09-09
Payer: MEDICARE

## 2022-09-09 ENCOUNTER — TELEPHONE (OUTPATIENT)
Dept: FAMILY MEDICINE | Facility: CLINIC | Age: 82
End: 2022-09-09

## 2022-09-09 DIAGNOSIS — U07.1 INFECTION DUE TO 2019 NOVEL CORONAVIRUS: ICD-10-CM

## 2022-09-09 DIAGNOSIS — N18.4 CKD (CHRONIC KIDNEY DISEASE) STAGE 4, GFR 15-29 ML/MIN (H): ICD-10-CM

## 2022-09-09 DIAGNOSIS — R11.0 NAUSEA: ICD-10-CM

## 2022-09-09 DIAGNOSIS — U07.1 INFECTION DUE TO 2019 NOVEL CORONAVIRUS: Primary | ICD-10-CM

## 2022-09-09 DIAGNOSIS — F10.21 ALCOHOL DEPENDENCE IN REMISSION (H): ICD-10-CM

## 2022-09-09 PROCEDURE — 99213 OFFICE O/P EST LOW 20 MIN: CPT | Mod: 95 | Performed by: PHYSICIAN ASSISTANT

## 2022-09-09 RX ORDER — ONDANSETRON 8 MG/1
8 TABLET, ORALLY DISINTEGRATING ORAL EVERY 8 HOURS PRN
Qty: 15 TABLET | Refills: 0 | Status: SHIPPED | OUTPATIENT
Start: 2022-09-09 | End: 2022-12-12

## 2022-09-09 RX ORDER — ONDANSETRON 8 MG/1
8 TABLET, ORALLY DISINTEGRATING ORAL EVERY 8 HOURS PRN
Qty: 12 TABLET | Refills: 0 | Status: SHIPPED | OUTPATIENT
Start: 2022-09-09 | End: 2022-09-09

## 2022-09-09 ASSESSMENT — PATIENT HEALTH QUESTIONNAIRE - PHQ9
SUM OF ALL RESPONSES TO PHQ QUESTIONS 1-9: 3
10. IF YOU CHECKED OFF ANY PROBLEMS, HOW DIFFICULT HAVE THESE PROBLEMS MADE IT FOR YOU TO DO YOUR WORK, TAKE CARE OF THINGS AT HOME, OR GET ALONG WITH OTHER PEOPLE: NOT DIFFICULT AT ALL
SUM OF ALL RESPONSES TO PHQ QUESTIONS 1-9: 3

## 2022-09-09 NOTE — PATIENT INSTRUCTIONS
Take molnupiravir twice a day for five days  Purchase an oximeter and monitor oxygen saturation and go to emergency department if chest pain, shortness of breath or saturation below 95%    Instructions for Patients      What are the symptoms of COVID-19?  Symptoms can include fever, cough, shortness of breath, chills, headache, muscle pain sore throat, fatigue, runny or stuffy nose, and loss of taste and smell. Other less common symptoms include nausea, vomiting, or diarrhea (watery stools).    Know when to call 911. Emergency warning signs include:  Trouble breathing or shortness of breath  Pain or pressure in the chest that doesn't go away  Feeling confused like you haven't felt before, or not being able to wake up  Bluish-colored lips or face    How can I take care of myself?  Get lots of rest. Drink extra fluids (unless a doctor has told you not to).  Take Tylenol (acetaminophen) for fever or pain. If you have liver or kidney problems, ask your family doctor if it's okay to take Tylenol   Adults:   650 mg (two 325 mg pills or tablets) every 4 to 6 hours, or...   1,000 mg (two 500 mg pills or tablets) every 8 hours as needed.  Note: Don't take more than 3,000 mg in one day. Acetaminophen is found in many medicines (both prescribed and over the counter). Read all labels to be sure you don't take too much.  For children, check the Tylenol bottle for the right dose. The dose is based on the child's age or weight.  Take over the counter medicines for your symptoms as needed. Talk to your pharmacist.  If you have other health problems (like cancer, heart failure, an organ transplant, or severe kidney disease): Call your specialty clinic if you don't feel better in the next 2 days.    These guidelines are for isolating and quarantining before returning to work, school or .   For employers, schools and day cares: This is an official notice for this person s medical guidelines for returning in-person.   For health  care sites: The CDC gives different isolation and quarantine guidelines for healthcare sites, please check with these sites before arriving.     How do I self-isolate?  You isolate when you have symptoms of COVID or a test shows you have COVID, even if you don t have symptoms.   If you DO have symptoms:  Stay home and away from others  For at least 5 days after your symptoms started, AND   You are fever free for 24 hours (with no medicine that reduces fever), AND  Your other symptoms are better.  Wear a mask for 10 full days any time you are around others.  If you DON T have symptoms:  Stay at home and away from others for at least 5 days after your positive test.  Wear a mask for 10 full days any time you are around others.    How and when do I quarantine?  You quarantine when you may have been exposed to the virus and DON T have symptoms.   Stay home and away from others.   You must quarantine for 5 days after your last contact with a person who has COVID.  Note: If you are fully vaccinated, you don t need to quarantine. You should still follow the steps below.   Wear a mask for 10 full days any time you re around others.  Get tested at least 5 days after you were exposed, even if you don t have symptoms.   If you start to have symptoms, isolate right away and get tested.    Where can I get more information?  [facility] COVID-19 Resource Hub: www.[employer].org/covid19/   CDC Quarantine & Isolation: https://www.cdc.gov/coronavirus/2019-ncov/your-health/quarantine-isolation.html   CDC - What to Do If You're Sick: https://www.cdc.gov/coronavirus/2019-ncov/if-you-are-sick/index.html  [facility] clinical trials (COVID-19 research studies): clinicalaffairs.[facility]/umn-clinical-trials  [place] Department of Health COVID-19 Public Hotline: 1-242.100.1281

## 2022-09-09 NOTE — TELEPHONE ENCOUNTER
Please call patient and advise [facility] doesn't carry paxlovid- find out which pharmacy he would like to use- and verify with that pharmacy that they carry molnupiravir and have pharmacy transfer prescription

## 2022-09-09 NOTE — TELEPHONE ENCOUNTER
Called patient on mobile phone- prescription not available at [facility] pharmacy- is at [facility]- prescription for antiviral and zofran sent - patient will pickup

## 2022-09-09 NOTE — PROGRESS NOTES
"Melo is a 81 year old who is being evaluated via a billable video visit.      How would you like to obtain your AVS? MyChart  If the video visit is dropped, the invitation should be resent by: Text to cell phone: [phone]  Will anyone else be joining your video visit? No          Assessment & Plan     Infection due to 2019 novel coronavirus  In first five days of symptoms.  Renal disease means unable to use paxlovid.  Will treat with molnupiravir.  Also treat nausea with zofran  Patient had wanted to pick medications up at [facility] but they do not carry that antiviral so I contacted patient and he will  at [facility] since wife being seen in emergency department as directed by me     CKD (chronic kidney disease) stage 4, GFR 15-29 ml/min (H)  Unable to use paxlovid     Nausea  Will treat with zofran     Alcohol dependence in remission (H)  Sober for over a decade       Prescription drug management  25 minutes spent on the date of the encounter doing chart review, history and exam, documentation and further activities per the note       BMI:   Estimated body mass index is 26.23 kg/m  as calculated from the following:    Height as of 8/22/22: 1.752 m (5' 8.98\").    Weight as of 8/22/22: 80.5 kg (177 lb 8 oz).           Return in about 10 days (around 9/19/2022), or if symptoms worsen or fail to improve, for using a video visit.    BRIANA Knowles [facility]   Melo is a 81 year old, presenting for the following health issues:  Covid Concern      HPI       COVID-19 Symptom Review  How many days ago did these symptoms start? 9/5, positive yesterday    Are any of the following symptoms significant for you?    New or worsening difficulty breathing? No    Worsening cough? No    Fever or chills? Yes, the highest temperature was 100.4    Headache: No    Sore throat: No    Chest pain: No    Diarrhea: YES    Body aches? No    What treatments has patient tried? " Acetaminophen   Does patient live in a nursing home, group home, or shelter? No  Does patient have a way to get food/medications during quarantined? Yes, I have a friend or family member who can help me.        patient unknown to me wth history of CKD stage 4, major depression, IGA nephropathy and alcoholism in remission presents for antiviral treatment for covid .  Symptomatic on day 5 with mild symptoms. no chest pain or shortness of breath  Complains of Weakness and occasional nausea.   No vomiting  This AM temp down to 98   Little sore throat   Tylenol not helpful   Sober from alcohol for 10 yrs   Wonders if doing activities will worsen illness. ie Changing clothes, caring for wife with incontinence and washing clothes          Review of Systems   Constitutional, HEENT, cardiovascular, pulmonary, gi and gu systems are negative, except as otherwise noted.      Objective           Vitals:  No vitals were obtained today due to virtual visit.    Physical Exam   GENERAL: alert, no distress, frail and elderly  EYES: Eyes grossly normal to inspection.  No discharge or erythema, or obvious scleral/conjunctival abnormalities.  RESP: No audible wheeze, has a dry cough, no visible cyanosis.  No visible retractions or increased work of breathing.    SKIN: Visible skin clear. No significant rash, abnormal pigmentation or lesions.  NEURO: Cranial nerves grossly intact.  Mentation and speech appropriate for age.  PSYCH: Mentation appears normal, affect normal/bright, judgement and insight intact, normal speech and appearance well-groomed.                Video-Visit Details    Video Start Time: 9:35 AM    Type of service:  Video Visit    Video End Time:9:46 AM  14 minutes   Originating Location (pt. Location): Home    Distant Location (provider location):  [facility]     Platform used for Video Visit: Doximity    Answers for HPI/ROS submitted by the patient on 9/9/2022  If you checked off any problems,  how difficult have these problems made it for you to do your work, take care of things at home, or get along with other people?: Not difficult at all  PHQ9 TOTAL SCORE: 3

## 2022-09-09 NOTE — TELEPHONE ENCOUNTER
molnupiravir (LAGEVRIO) 200 MG capsule was written    Per [facility]:    We only carry Paxlovid

## 2022-09-12 ENCOUNTER — TELEPHONE (OUTPATIENT)
Dept: CARDIOLOGY | Facility: CLINIC | Age: 82
End: 2022-09-12

## 2022-09-12 ENCOUNTER — TELEPHONE (OUTPATIENT)
Dept: FAMILY MEDICINE | Facility: CLINIC | Age: 82
End: 2022-09-12

## 2022-09-12 NOTE — TELEPHONE ENCOUNTER
[facility] Call Center    Phone Message    May a detailed message be left on voicemail: yes     Reason for Call: Other: Melo called to report that he recieved a call [facility] while [facility] & there was a voice message stating to reschedule an appointment however writer did not/could not find any information on needing to rescheduling an appointment. Please reach out to Melo to discuss. Thank you!     Action Taken: Message routed to:  [facility] ([facility] Cardio    Travel Screening: Not Applicable

## 2022-09-13 DIAGNOSIS — N02.B9 IGA NEPHROPATHY: ICD-10-CM

## 2022-09-13 DIAGNOSIS — N18.4 CKD (CHRONIC KIDNEY DISEASE) STAGE 4, GFR 15-29 ML/MIN (H): Primary | ICD-10-CM

## 2022-09-15 ENCOUNTER — OFFICE VISIT (OUTPATIENT)
Dept: NEPHROLOGY | Facility: CLINIC | Age: 82
End: 2022-09-15
Attending: INTERNAL MEDICINE
Payer: MEDICARE

## 2022-09-15 ENCOUNTER — LAB (OUTPATIENT)
Dept: LAB | Facility: CLINIC | Age: 82
End: 2022-09-15
Payer: MEDICARE

## 2022-09-15 VITALS
SYSTOLIC BLOOD PRESSURE: 111 MMHG | OXYGEN SATURATION: 95 % | DIASTOLIC BLOOD PRESSURE: 61 MMHG | BODY MASS INDEX: 25.96 KG/M2 | TEMPERATURE: 98.2 F | HEART RATE: 58 BPM | WEIGHT: 175.7 LBS

## 2022-09-15 DIAGNOSIS — N62 GYNECOMASTIA: ICD-10-CM

## 2022-09-15 DIAGNOSIS — N02.B9 IGA NEPHROPATHY: ICD-10-CM

## 2022-09-15 DIAGNOSIS — N02.B9 IGA NEPHROPATHY: Primary | ICD-10-CM

## 2022-09-15 DIAGNOSIS — N18.4 CKD (CHRONIC KIDNEY DISEASE) STAGE 4, GFR 15-29 ML/MIN (H): ICD-10-CM

## 2022-09-15 LAB
ALBUMIN MFR UR ELPH: 14.7 MG/DL
ALBUMIN SERPL BCG-MCNC: 4.3 G/DL (ref 3.5–5.2)
ALBUMIN UR-MCNC: NEGATIVE MG/DL
ANION GAP SERPL CALCULATED.3IONS-SCNC: 13 MMOL/L (ref 7–15)
APPEARANCE UR: CLEAR
BILIRUB UR QL STRIP: NEGATIVE
BUN SERPL-MCNC: 36 MG/DL (ref 8–23)
CALCIUM SERPL-MCNC: 9.4 MG/DL (ref 8.8–10.2)
CHLORIDE SERPL-SCNC: 104 MMOL/L (ref 98–107)
COLOR UR AUTO: ABNORMAL
CREAT SERPL-MCNC: 3.4 MG/DL (ref 0.67–1.17)
CREAT UR-MCNC: 68.1 MG/DL
DEPRECATED HCO3 PLAS-SCNC: 23 MMOL/L (ref 22–29)
ERYTHROCYTE [DISTWIDTH] IN BLOOD BY AUTOMATED COUNT: 13.1 % (ref 10–15)
FERRITIN SERPL-MCNC: 405 NG/ML (ref 31–409)
FSH SERPL IRP2-ACNC: 66.5 MIU/ML (ref 1.5–12.4)
GFR SERPL CREATININE-BSD FRML MDRD: 17 ML/MIN/1.73M2
GLUCOSE SERPL-MCNC: 96 MG/DL (ref 70–99)
GLUCOSE UR STRIP-MCNC: NEGATIVE MG/DL
HCT VFR BLD AUTO: 29.1 % (ref 40–53)
HGB BLD-MCNC: 9.4 G/DL (ref 13.3–17.7)
HGB UR QL STRIP: ABNORMAL
IRON BINDING CAPACITY (ROCHE): 277 UG/DL (ref 240–430)
IRON SATN MFR SERPL: 32 % (ref 15–46)
IRON SERPL-MCNC: 88 UG/DL (ref 61–157)
KETONES UR STRIP-MCNC: NEGATIVE MG/DL
LEUKOCYTE ESTERASE UR QL STRIP: NEGATIVE
LH SERPL-ACNC: 30.8 MIU/ML (ref 1.7–8.6)
MCH RBC QN AUTO: 30.1 PG (ref 26.5–33)
MCHC RBC AUTO-ENTMCNC: 32.3 G/DL (ref 31.5–36.5)
MCV RBC AUTO: 93 FL (ref 78–100)
NITRATE UR QL: NEGATIVE
PH UR STRIP: 6 [PH] (ref 5–7)
PHOSPHATE SERPL-MCNC: 3.3 MG/DL (ref 2.5–4.5)
PLATELET # BLD AUTO: 179 10E3/UL (ref 150–450)
POTASSIUM SERPL-SCNC: 4.6 MMOL/L (ref 3.4–5.3)
PROT/CREAT 24H UR: 0.22 MG/MG CR (ref 0–0.2)
PTH-INTACT SERPL-MCNC: 74 PG/ML (ref 15–65)
RBC # BLD AUTO: 3.12 10E6/UL (ref 4.4–5.9)
RBC URINE: 1 /HPF
SHBG SERPL-SCNC: 60 NMOL/L (ref 11–80)
SODIUM SERPL-SCNC: 140 MMOL/L (ref 136–145)
SP GR UR STRIP: 1.01 (ref 1–1.03)
SQUAMOUS EPITHELIAL: <1 /HPF
UROBILINOGEN UR STRIP-MCNC: NORMAL MG/DL
WBC # BLD AUTO: 4.3 10E3/UL (ref 4–11)
WBC URINE: 1 /HPF

## 2022-09-15 PROCEDURE — 83001 ASSAY OF GONADOTROPIN (FSH): CPT | Performed by: FAMILY MEDICINE

## 2022-09-15 PROCEDURE — 82310 ASSAY OF CALCIUM: CPT | Performed by: PATHOLOGY

## 2022-09-15 PROCEDURE — 82040 ASSAY OF SERUM ALBUMIN: CPT | Performed by: PATHOLOGY

## 2022-09-15 PROCEDURE — 85027 COMPLETE CBC AUTOMATED: CPT | Performed by: PATHOLOGY

## 2022-09-15 PROCEDURE — 84156 ASSAY OF PROTEIN URINE: CPT | Performed by: PATHOLOGY

## 2022-09-15 PROCEDURE — 99214 OFFICE O/P EST MOD 30 MIN: CPT | Performed by: INTERNAL MEDICINE

## 2022-09-15 PROCEDURE — G0463 HOSPITAL OUTPT CLINIC VISIT: HCPCS

## 2022-09-15 PROCEDURE — 84100 ASSAY OF PHOSPHORUS: CPT | Performed by: PATHOLOGY

## 2022-09-15 PROCEDURE — 80051 ELECTROLYTE PANEL: CPT | Performed by: PATHOLOGY

## 2022-09-15 PROCEDURE — 83540 ASSAY OF IRON: CPT | Performed by: PATHOLOGY

## 2022-09-15 PROCEDURE — 81001 URINALYSIS AUTO W/SCOPE: CPT | Performed by: PATHOLOGY

## 2022-09-15 PROCEDURE — 82306 VITAMIN D 25 HYDROXY: CPT | Performed by: INTERNAL MEDICINE

## 2022-09-15 PROCEDURE — 84403 ASSAY OF TOTAL TESTOSTERONE: CPT | Performed by: FAMILY MEDICINE

## 2022-09-15 PROCEDURE — 83550 IRON BINDING TEST: CPT | Performed by: PATHOLOGY

## 2022-09-15 PROCEDURE — 84270 ASSAY OF SEX HORMONE GLOBUL: CPT | Performed by: FAMILY MEDICINE

## 2022-09-15 PROCEDURE — 36415 COLL VENOUS BLD VENIPUNCTURE: CPT | Performed by: PATHOLOGY

## 2022-09-15 PROCEDURE — 84520 ASSAY OF UREA NITROGEN: CPT | Performed by: PATHOLOGY

## 2022-09-15 PROCEDURE — 82728 ASSAY OF FERRITIN: CPT | Performed by: FAMILY MEDICINE

## 2022-09-15 PROCEDURE — 83970 ASSAY OF PARATHORMONE: CPT | Performed by: PATHOLOGY

## 2022-09-15 PROCEDURE — 83002 ASSAY OF GONADOTROPIN (LH): CPT | Performed by: FAMILY MEDICINE

## 2022-09-15 PROCEDURE — 82565 ASSAY OF CREATININE: CPT | Performed by: PATHOLOGY

## 2022-09-15 ASSESSMENT — PAIN SCALES - GENERAL: PAINLEVEL: NO PAIN (0)

## 2022-09-15 NOTE — PROGRESS NOTES
Nephrology Clinic Follow up  6/2/22    Melo Dangelo MRN:[id number] YOB: 1940  Date of Admission:(Not on file)  Primary care provider: Francis Smith  Requesting physician: Merry Barrow, *       REASON FOR CONSULT: IgA nephropathy       HISTORY OF PRESENT ILLNESS:       PAST MEDICAL HISTORY:  Reviewed with patient on 09/15/2022   As per HPI    MEDICATIONS:  Reviewed with the patient in detail    ALLERGIES:    Reviewed with the patient in detail    REVIEW OF SYSTEMS:  A comprehensive of systems was negative except as noted above.    SOCIAL HISTORY:   Reviewed with patient, no smoking and no alcohol use     FAMILY MEDICAL HISTORY:   Reviewed, no family history of need for dialysis, transplant or CKD    PHYSICAL EXAM:   Vital signs:/61   Pulse 58   Temp 98.2  F (36.8  C)   Wt 79.7 kg (175 lb 11.2 oz)   SpO2 95%   BMI 25.96 kg/m      Physical Exam     Gen: Appears well  Neck: No JVD  Lungs: CTA  CVS: S1S2 normal, no murmurs heard  LE: no edema  Skin: no rashes  CNS: Grossly normal       Interval History: He denies any new symptoms. He went to [place] for whale watching on a cruise ship and developed COVID infection after. This was 2 weeks ago and other than having some low energy, he is doing okay.     ASSESSMENT AND RECOMMENDATIONS:     # IgA nephropathy [id number]   # CKD stage 4    # Anemia of CKD, iron replete 2/22   # Paroxysmal A fib      Patient was first diagnosed with IgA nephropathy when he presented to the hospital with gross hematuria after his second COVID vaccine. He presented with a Sr creatinine of 3.5 which peaked to 4.4 mg/dl.  A kidney bx was done which showed IgA nephropathy with some fibrocellular crescents.  7/14 gloms were globally sclerosed.   He was started on Pozzi protocol with solumedrol 500 mg/3 days followed by oral prednisone 40 mg every other day with bactrim single strength 1 tab PO every other day, pepcid 40 mg PO daily and calcium  carbonate 6186-0770 mg PO daily.    Unfortunately, he did not have a significant improvement in his creatinine and had persistent active sediment. He was started on  Cyclophosphamide 100 mg daily in 11/21 along with prednisone taper. His cyclophosphamide was discontinued 5/2/22 and his prednisone decreased to 5 mg Q other day and now has been discontinued     His creatinine improved initially and had a new baseline of 3.3 mg/dl. His proteinuria remains minimal and his hematuria has improved significantly.       His creatinine was higher which I suspect is only a fluctuation related to starting eplerenone in 4/22. His eplerenone has been discontinued and his creatinine is now back to his baseline. He has small blood on his UA with no RBC's, no protein. His blood pressures remain at a goal of < 140/90 mm of Hg and he has no LE edema.   He is anemic and has thrombocytopenia. Iron studies normal in 2/22. There is no indication for RAMA at this time.         Merry Barrow MD

## 2022-09-15 NOTE — LETTER
9/15/2022       RE: Melo Dangelo  [address]  [place] [address]     Dear Colleague,    Thank you for referring your patient, Melo Dangelo, to the [facility] [place] at [facility]. Please see a copy of my visit note below.          Nephrology Clinic Follow up  6/2/22    Melo Dangelo MRN:[id number] YOB: 1940  Date of Admission:(Not on file)  Primary care provider: Francis Smith  Requesting physician: Merry Barrow, *       REASON FOR CONSULT: IgA nephropathy       HISTORY OF PRESENT ILLNESS:       PAST MEDICAL HISTORY:  Reviewed with patient on 09/15/2022   As per HPI    MEDICATIONS:  Reviewed with the patient in detail    ALLERGIES:    Reviewed with the patient in detail    REVIEW OF SYSTEMS:  A comprehensive of systems was negative except as noted above.    SOCIAL HISTORY:   Reviewed with patient, no smoking and no alcohol use     FAMILY MEDICAL HISTORY:   Reviewed, no family history of need for dialysis, transplant or CKD    PHYSICAL EXAM:   Vital signs:/61   Pulse 58   Temp 98.2  F (36.8  C)   Wt 79.7 kg (175 lb 11.2 oz)   SpO2 95%   BMI 25.96 kg/m      Physical Exam     Gen: Appears well  Neck: No JVD  Lungs: CTA  CVS: S1S2 normal, no murmurs heard  LE: no edema  Skin: no rashes  CNS: Grossly normal       Interval History: He denies any new symptoms. He went to [place] for whale watching on a cruise ship and developed COVID infection after. This was 2 weeks ago and other than having some low energy, he is doing okay.     ASSESSMENT AND RECOMMENDATIONS:     # IgA nephropathy [id number]   # CKD stage 4    # Anemia of CKD, iron replete 2/22   # Paroxysmal A fib      Patient was first diagnosed with IgA nephropathy when he presented to the hospital with gross hematuria after his second COVID vaccine. He presented with a Sr creatinine of 3.5 which peaked to 4.4 mg/dl.  A  kidney bx was done which showed IgA nephropathy with some fibrocellular crescents.  7/14 gloms were globally sclerosed.   He was started on Pozzi protocol with solumedrol 500 mg/3 days followed by oral prednisone 40 mg every other day with bactrim single strength 1 tab PO every other day, pepcid 40 mg PO daily and calcium carbonate 9956-8938 mg PO daily.    Unfortunately, he did not have a significant improvement in his creatinine and had persistent active sediment. He was started on  Cyclophosphamide 100 mg daily in 11/21 along with prednisone taper. His cyclophosphamide was discontinued 5/2/22 and his prednisone decreased to 5 mg Q other day and now has been discontinued     His creatinine improved initially and had a new baseline of 3.3 mg/dl. His proteinuria remains minimal and his hematuria has improved significantly.       His creatinine was higher which I suspect is only a fluctuation related to starting eplerenone in 4/22. His eplerenone has been discontinued and his creatinine is now back to his baseline. He has small blood on his UA with no RBC's, no protein. His blood pressures remain at a goal of < 140/90 mm of Hg and he has no LE edema.   He is anemic and has thrombocytopenia. Iron studies normal in 2/22. There is no indication for RAMA at this time.         Merry Barrow MD

## 2022-09-15 NOTE — NURSING NOTE
Chief Complaint   Patient presents with     RECHECK     Return visit. Has questions about how his overall health is doing and lab results. Pt had covid about 10 days ago.     Blood pressure 111/61, pulse 58, temperature 98.2  F (36.8  C), weight 79.7 kg (175 lb 11.2 oz), SpO2 95 %.    FLOR SCOTT

## 2022-09-16 LAB — DEPRECATED CALCIDIOL+CALCIFEROL SERPL-MC: 36 UG/L (ref 20–75)

## 2022-09-19 LAB
TESTOST FREE SERPL-MCNC: 3.75 NG/DL
TESTOST SERPL-MCNC: 287 NG/DL (ref 240–950)

## 2022-09-20 ENCOUNTER — TELEPHONE (OUTPATIENT)
Dept: NEPHROLOGY | Facility: CLINIC | Age: 82
End: 2022-09-20

## 2022-09-20 NOTE — TELEPHONE ENCOUNTER
[facility] Call Center    Phone Message    May a detailed message be left on voicemail: yes     Reason for Call: Other: Melo is calling stating that he got a message stating to call back in regards to . Writer has no note and is unsure what Melo is calling about. Please call and check in with him, thanks.     Action Taken: Message routed to:  [facility]    Travel Screening: Not Applicable

## 2022-09-20 NOTE — TELEPHONE ENCOUNTER
Call to patient regarding message. Patient wasn't sure if he needed to follow up with Dr Gibbs. Will send to Dr Barrow for clarification and follow up with patient  Lizbeth Roldan LPN  Nephrology  [phone]

## 2022-09-21 ENCOUNTER — OFFICE VISIT (OUTPATIENT)
Dept: FAMILY MEDICINE | Facility: CLINIC | Age: 82
End: 2022-09-21
Payer: MEDICARE

## 2022-09-21 VITALS
RESPIRATION RATE: 16 BRPM | HEIGHT: 70 IN | OXYGEN SATURATION: 96 % | SYSTOLIC BLOOD PRESSURE: 135 MMHG | BODY MASS INDEX: 25.01 KG/M2 | WEIGHT: 174.7 LBS | DIASTOLIC BLOOD PRESSURE: 74 MMHG | HEART RATE: 63 BPM

## 2022-09-21 DIAGNOSIS — M79.604 BILATERAL LOWER EXTREMITY PAIN: ICD-10-CM

## 2022-09-21 DIAGNOSIS — N62 GYNECOMASTIA: ICD-10-CM

## 2022-09-21 DIAGNOSIS — N18.4 CKD (CHRONIC KIDNEY DISEASE) STAGE 4, GFR 15-29 ML/MIN (H): ICD-10-CM

## 2022-09-21 DIAGNOSIS — Z23 ENCOUNTER FOR VACCINATION: Primary | ICD-10-CM

## 2022-09-21 DIAGNOSIS — M79.605 BILATERAL LOWER EXTREMITY PAIN: ICD-10-CM

## 2022-09-21 DIAGNOSIS — U07.1 INFECTION DUE TO 2019 NOVEL CORONAVIRUS: ICD-10-CM

## 2022-09-21 PROCEDURE — 90662 IIV NO PRSV INCREASED AG IM: CPT | Performed by: FAMILY MEDICINE

## 2022-09-21 PROCEDURE — G0008 ADMIN INFLUENZA VIRUS VAC: HCPCS | Performed by: FAMILY MEDICINE

## 2022-09-21 PROCEDURE — 99214 OFFICE O/P EST MOD 30 MIN: CPT | Mod: 25 | Performed by: FAMILY MEDICINE

## 2022-09-21 NOTE — PROGRESS NOTES
Assessment & Plan     (Z23) Encounter for vaccination  (primary encounter diagnosis)  Comment: routine  Plan: FLU VACCINE HIGH DOSE PRESERVATIVE FREE, AGE         =>65 YR  Gynecomastia; discussed labs, unclear if endocrine problem or just the reaction to Proscar which is stopped mid summer, he will discuss w/ Endo in six weeks  CKD: per renal, notes rvwd, IgA nephropathy  Leg pain; not each time but sometimes goes up stairs, started w/ covid, slowly improving, good pulses exam not c/w pad; other than this possible connection he is fully recovered from Covid sx which were mild  Past Medical History:   Diagnosis Date     2019 novel coronavirus disease (COVID-19) 10/27/2020     Alcohol dependence (H)      Concussion with loss of consciousness     x10 going back to childhood     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      PVD (posterior vitreous detachment)      Seizure disorder (H)     last seizure 2008     Past Surgical History:   Procedure Laterality Date     NO HISTORY OF SURGERY       Current Outpatient Medications   Medication     aspirin (ASA) 81 MG EC tablet     calcium carb-cholecalciferol (OS-GIANNI) 500-200 MG-UNIT tablet     cyanocobalamin (VITAMIN B-12) 100 MCG tablet     escitalopram (LEXAPRO) 20 MG tablet     hydrOXYzine (ATARAX) 10 MG tablet     lamoTRIgine (LAMICTAL) 100 MG tablet     metoprolol succinate ER (TOPROL XL) 25 MG 24 hr tablet     molnupiravir (LAGEVRIO) 200 MG capsule     ondansetron (ZOFRAN ODT) 8 MG ODT tab     simvastatin (ZOCOR) 20 MG tablet     Sodium Chloride-Sodium Bicarb (SINUS WASH SALINE REFILLS) 2300-700 MG PACK     tamsulosin (FLOMAX) 0.4 MG capsule     Current Facility-Administered Medications   Medication     bevacizumab (AVASTIN) intravitreal inj 1.25 mg     No Known Allergies               35 minutes spent on the date of the encounter doing chart review, history and exam, documentation and further activities per the note    BMI:   Estimated body mass index is  "25.07 kg/m  as calculated from the following:    Height as of this encounter: 1.778 m (5' 10\").    Weight as of this encounter: 79.2 kg (174 lb 11.2 oz).       Regular exercise  COVID-19 positive patient 2 weeks ago. Antiviral was given      Estimated body mass index is 25.07 kg/m  as calculated from the following:    Height as of this encounter: 1.778 m (5' 10\").    Weight as of this encounter: 79.2 kg (174 lb 11.2 oz).  GFR Estimate   Date Value Ref Range Status   09/15/2022 17 (L) >60 mL/min/1.73m2 Final     Comment:     Effective December 21, 2021 eGFRcr in adults is calculated using the 2021 CKD-EPI creatinine equation which includes age and gender (Madhu et al., NEJ, DOI: 10.1056/WYPKva3410992)   11/04/2020 56 (L) >60 mL/min/1.73m2 Final   10/29/2020 50 (L) >60 mL/min/[1.73_m2] Final     Comment:     Non  GFR Calc  Starting 12/18/2018, serum creatinine based estimated GFR (eGFR) will be   calculated using the Chronic Kidney Disease Epidemiology Collaboration   (CKD-EPI) equation.       Lab Results   Component Value Date    [id number] Negative 01/20/2022       No follow-ups on file.    Francis Smith MD  [facility] KARLY Alejo is a 81 year old, presenting for the following health issues:  Follow Up (Two month follow-up), COVID-19 (He reports having COVID-19 two weeks ago), and Leg Pain (He reports pain in his legs when using the stairs)      HPI   1. Dealing with stress r/t wife health issues, currently in the hospital  2. Contracted Covid-19- 14 days ago after cruise trip- antiviral medication was taken. Now recovered.  3. BLLE pain with stairs-mid thigh to lower calf- denies any chest pain or palpitation but positive for SOB. Have been on heart monitor 2 weeks from cardiology team. Noted R) malleouls- +1 pitting edema and trace edema on BLLE lower leg. This leg pain started post covid, seems to be better slowly.    4.  Dizziness- started " "yesterday that lasted for 30 secs. Resolved after laying down for 10 mins. Denies fainting or syncope. Possible related to Covid-19; wearing a zio patch per his heart MD who is working him up currently.  5. Sleep disturbances- Awake about 4-5 times for urination. Currently taking Flomax 0.4 mg- down from 0.8 mg- d/t dribbling issue with higher dose. Pt feels dribbling is better with lower dose. Currently using incontinence pad. Stopped Proscar due to hormonal se.  6. Hematuria- a year ago- none at this time  7. Discussed getting Shingrix vaccine at the drugstore  8. Pt believes kidney issues started after covid booster shot I 10/15/2021        Review of Systems   CONSTITUTIONAL: NEGATIVE for fever, chills, change in weight  INTEGUMENTARY/SKIN: NEGATIVE for worrisome rashes, moles or lesions  EYES: NEGATIVE for vision changes or irritation  ENT/MOUTH: NEGATIVE for ear, mouth and throat problems  RESP: NEGATIVE for significant cough. Positive for SOB  CV: NEGATIVE for chest pain, palpitations. Positive for peripheral edema  GI: NEGATIVE for nausea, abdominal pain, heartburn, or change in bowel habits  : NEGATIVE for dysuria, or hematuria. Frequent urination at night  MUSCULOSKELETAL: NEGATIVE for significant arthralgias or myalgia  NEURO: NEGATIVE for weakness, or paresthesias. Positive for dizziness   ENDOCRINE: NEGATIVE for temperature intolerance, skin/hair changes  HEME: NEGATIVE for bleeding problems  PSYCHIATRIC: NEGATIVE for changes in mood or affect      Objective    /74 (BP Location: Right arm, Patient Position: Sitting, Cuff Size: Adult Regular)   Pulse 63   Resp 16   Ht 1.778 m (5' 10\")   Wt 79.2 kg (174 lb 11.2 oz)   SpO2 96%   BMI 25.07 kg/m    Body mass index is 25.07 kg/m .     Physical Exam   GENERAL: healthy, alert and no distress  EYES: Eyes grossly normal to inspection, PERRL and conjunctivae and sclerae normal  HENT: ear canals and TM's normal, nose and mouth without ulcers or " lesions  NECK: no adenopathy, no asymmetry, masses, or scars and thyroid normal to palpation  RESP: lungs clear to auscultation - no rales, rhonchi or wheezes  CV: regular rate and rhythm, normal S1 S2, no S3 or S4, no murmur, click or rub, and peripheral pulses strong  ABDOMEN: soft, nontender, no hepatosplenomegaly, no masses and bowel sounds normal  MS: no gross musculoskeletal defects noted, +1 Pitting edema on RLE-  Mainly on malleolus  NEURO: Normal strength and tone, mentation intact and speech normal  PSYCH: mentation appears normal, affect normal/bright  LYMPH: no cervical, supraclavicular, axillary, or inguinal adenopathy    Very good pulses and color and temp both feet        Sheri Ta  FNP student    I saw the patient with the student. The history physical assessment and plan are mine. We collaborated on the note.  Francis Smith MD

## 2022-09-21 NOTE — NURSING NOTE
"Melo Dangelo is a 81 year old male patient that presents today in clinic for the following:    Chief Complaint   Patient presents with     Follow Up     Two month follow-up     COVID-19     He reports having COVID-19 two weeks ago     Leg Pain     He reports pain in his legs when using the stairs     The patient's allergies and medications were reviewed as noted. A set of vitals were recorded as noted without incident: /74 (BP Location: Right arm, Patient Position: Sitting, Cuff Size: Adult Regular)   Pulse 63   Resp 16   Ht 1.778 m (5' 10\")   Wt 79.2 kg (174 lb 11.2 oz)   SpO2 96%   BMI 25.07 kg/m  . The patient does not have any other questions for the provider.    Rito Greer, EMT at 12:11 PM on 9/21/2022  "

## 2022-09-22 NOTE — TELEPHONE ENCOUNTER
Spoke with pt who stated that he started wearing Zio on 9/12 for 2 weeks pt had received a call to reschedule an appt unsure from who but I reassured him that his appt with angi is fine.

## 2022-10-28 NOTE — TELEPHONE ENCOUNTER
FUTURE VISIT INFORMATION      SURGERY INFORMATION:    Date: 22    Location:  or    Surgeon:  Best Padilla MD    Anesthesia Type:  MAC with Local    Procedure: LEFT EYE PHACOEMULSIFICATION, CATARACT, WITH INTRAOCULAR LENS IMPLANT    RECORDS REQUESTED FROM:       Primary Care Provider: Francis Smith MD- [facility]    Most recent EKG+ Tracin22    Most recent ECHO: 22

## 2022-11-01 ENCOUNTER — PRE VISIT (OUTPATIENT)
Dept: ENDOCRINOLOGY | Facility: CLINIC | Age: 82
End: 2022-11-01

## 2022-11-01 ENCOUNTER — VIRTUAL VISIT (OUTPATIENT)
Dept: ENDOCRINOLOGY | Facility: CLINIC | Age: 82
End: 2022-11-01
Attending: FAMILY MEDICINE
Payer: MEDICARE

## 2022-11-01 ENCOUNTER — VIRTUAL VISIT (OUTPATIENT)
Dept: SURGERY | Facility: CLINIC | Age: 82
End: 2022-11-01
Payer: MEDICARE

## 2022-11-01 ENCOUNTER — ANESTHESIA EVENT (OUTPATIENT)
Dept: SURGERY | Facility: AMBULATORY SURGERY CENTER | Age: 82
End: 2022-11-01
Payer: MEDICARE

## 2022-11-01 ENCOUNTER — PRE VISIT (OUTPATIENT)
Dept: SURGERY | Facility: CLINIC | Age: 82
End: 2022-11-01

## 2022-11-01 DIAGNOSIS — H25.813 COMBINED FORMS OF AGE-RELATED CATARACT OF BOTH EYES: ICD-10-CM

## 2022-11-01 DIAGNOSIS — Z01.818 PRE-OP EXAMINATION: Primary | ICD-10-CM

## 2022-11-01 DIAGNOSIS — N63.0 MASS OF BREAST, UNSPECIFIED LATERALITY: ICD-10-CM

## 2022-11-01 PROCEDURE — 99204 OFFICE O/P NEW MOD 45 MIN: CPT | Mod: 95 | Performed by: PHYSICIAN ASSISTANT

## 2022-11-01 PROCEDURE — 99204 OFFICE O/P NEW MOD 45 MIN: CPT | Mod: 95 | Performed by: STUDENT IN AN ORGANIZED HEALTH CARE EDUCATION/TRAINING PROGRAM

## 2022-11-01 ASSESSMENT — PAIN SCALES - GENERAL: PAINLEVEL: NO PAIN (0)

## 2022-11-01 ASSESSMENT — ENCOUNTER SYMPTOMS
SEIZURES: 1
DYSRHYTHMIAS: 1

## 2022-11-01 ASSESSMENT — LIFESTYLE VARIABLES: TOBACCO_USE: 1

## 2022-11-01 NOTE — H&P (VIEW-ONLY)
Pre-Operative H & P     CC:  Preoperative exam to assess for increased cardiopulmonary risk while undergoing surgery and anesthesia.    Date of Encounter: 11/1/2022  Primary Care Physician:  Francis Smith     Reason for visit:   Encounter Diagnoses   Name Primary?     Pre-op examination Yes     Combined forms of age-related cataract of both eyes        HPI  Melo Dangelo is a 82 year old male who presents for pre-operative H & P in preparation for  Procedure Information     Case: [id number] Date/Time: 11/03/22 1050    Procedure: RIGHT EYE PHACOEMULSIFICATION, CATARACT, WITH INTRAOCULAR LENS IMPLANT (Right: Eye)    Anesthesia type: MAC with Local    Diagnosis: Combined forms of age-related cataract of both eyes [H25.813]    Pre-op diagnosis: Combined forms of age-related cataract of both eyes [H25.813]    Location: [place] [address] / [facility]    Providers: Best Padilla MD          The patient is an 82-year-old man with a past medical history significant for paroxysmal A. fib, HLD, hypertension, former smoker, anemia, IgA nephropathy, chronic kidney disease stage IV, depression, history of COVID infection, hearing loss and history of alcohol abuse.  Patient was seen by Dr. Padilla on 8/10/2022 and counseled for the procedure as above.    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      Paroxysmal atrial fibrillation (H)      PVD (posterior vitreous  detachment)      Seizure disorder (H)     last seizure 2008       Past Surgical History  Past Surgical History:   Procedure Laterality Date     NO HISTORY OF SURGERY         Prior to Admission Medications  Current Outpatient Medications   Medication Sig Dispense Refill     aspirin (ASA) 81 MG EC tablet Take 1 tablet (81 mg) by mouth daily (Patient not taking: Reported on 11/1/2022)       calcium carb-cholecalciferol (OS-GIANNI) 500-200 MG-UNIT tablet Take 1 tablet by mouth 2 times daily (with meals) 60 tablet 11     cyanocobalamin (VITAMIN B-12) 100 MCG tablet Take 1,000 mcg by mouth daily        escitalopram (LEXAPRO) 20 MG tablet Take 20 mg by mouth daily       hydrOXYzine (ATARAX) 10 MG tablet Take 1 tablet by mouth 3 times daily as needed       lamoTRIgine (LAMICTAL) 100 MG tablet Take 2 tablets (200 mg) by mouth 2 times daily 360 tablet 3     metoprolol succinate ER (TOPROL XL) 25 MG 24 hr tablet Take 1/2 tab a day (12.5 mg) 90 tablet 1     molnupiravir (LAGEVRIO) 200 MG capsule Take 4 capsules (800 mg) by mouth every 12 hours 40 each 0     ondansetron (ZOFRAN ODT) 8 MG ODT tab Take 1 tablet (8 mg) by mouth every 8 hours as needed for nausea 15 tablet 0     simvastatin (ZOCOR) 20 MG tablet Take 1 tablet (20 mg) by mouth At Bedtime 90 tablet 3     Sodium Chloride-Sodium Bicarb (SINUS WASH SALINE REFILLS) 2300-700 MG PACK Wash out sinuses up to twice daily as needed for comfort. 60 each 0     tamsulosin (FLOMAX) 0.4 MG capsule Take 2 capsules (0.8 mg) by mouth daily 180 capsule 3       Allergies  No Known Allergies    Social History  Social History     Socioeconomic History     Marital status:      Spouse name: Not on file     Number of children: Not on file     Years of education: Not on file     Highest education level: Bachelor's degree (e.g., BA, AB, BS)   Occupational History     Not on file   Tobacco Use     Smoking status: Former     Packs/day: 0.00     Types: Cigarettes     Smokeless tobacco: Never    Vaping Use     Vaping Use: Never used   Substance and Sexual Activity     Alcohol use: No     Comment: History of alcohol dependence, in remission for 20 years     Drug use: No     Sexual activity: Not Currently   Other Topics Concern     Not on file   Social History Narrative     Not on file     Social Determinants of Health     Financial Resource Strain: Not on file   Food Insecurity: Not on file   Transportation Needs: Not on file   Physical Activity: Not on file   Stress: Not on file   Social Connections: Not on file   Intimate Partner Violence: Not on file   Housing Stability: Not on file       Family History  Family History   Problem Relation Age of Onset     Glaucoma No family hx of      Macular Degeneration No family hx of        Review of Systems  The complete review of systems is negative other than noted in the HPI or here.   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 Recent Zio patch without a fib), a-fib, Previous cardiac testing   Echo: Date: 7/6/22 Results:    Stress Test: Date: Results:    ECG Reviewed: Date: 8/22/22 Results:  Sinus bradycardia with 1st degree A-V block   Septal infarct , age undetermined   Abnormal ECG    Cath: Date: Results:      METS/Exercise Tolerance: 4 - Raking leaves, gardening    Hematologic:     (+) anemia (normal iron studies),  (-) history of blood clots and history of blood transfusion   Musculoskeletal:       GI/Hepatic:  - neg GI/hepatic ROS     Renal/Genitourinary: Comment: IgA nephropathy     Baseline creatinine 3.3-3.9    (+) renal disease, type: CRI, Pt does not require dialysis,     Endo:  - neg endo ROS     Psychiatric/Substance Use:     (+)  psychiatric history depression and anxiety alcohol abuse (sober)     Infectious Disease: Comment: COVID-19 infection 11/16/20 and 9/2022      Malignancy:  - neg malignancy ROS     Other:  - neg other ROS          Virtual visit -  No vitals were obtained    Physical Exam  Constitutional: Awake, alert, cooperative, no apparent distress, and appears stated age.  Eyes: Pupils equal  HENT: Normocephalic  Respiratory: non labored breathing   Neurologic: Awake, alert, oriented to name, place and time.   Neuropsychiatric: Calm, cooperative. Normal affect.      Prior Labs/Diagnostic Studies   All labs and imaging personally reviewed    Latest Reference Range & Units 09/15/22 07:33   Sodium 136 - 145 mmol/L 140   Potassium 3.4 - 5.3 mmol/L 4.6   Chloride 98 - 107 mmol/L 104   Carbon Dioxide (CO2) 22 - 29 mmol/L 23   Urea Nitrogen 8.0 - 23.0 mg/dL 36.0 (H)   Creatinine 0.67 - 1.17 mg/dL 3.40 (H)   GFR Estimate >60 mL/min/1.73m2 17 (L)   Calcium 8.8 - 10.2 mg/dL 9.4   Anion Gap 7 - 15 mmol/L 13   Phosphorus 2.5 - 4.5 mg/dL 3.3   Albumin 3.5 - 5.2 g/dL 4.3   Ferritin 31 - 409 ng/mL 405   Free Testosterone Calculated ng/dL 3.75   FSH 1.5 - 12.4 mIU/mL 66.5 (H)   Glucose 70 - 99 mg/dL 96   Iron 61 - 157 ug/dL 88   Iron Binding Capacity 240 - 430 ug/dL 277   Iron Sat Index 15 - 46 % 32   Testosterone Total 240 - 950 ng/dL 287   Vitamin D Deficiency screening 20 - 75 ug/L 36   WBC 4.0 - 11.0 10e3/uL 4.3   Hemoglobin 13.3 - 17.7 g/dL 9.4 (L)   Hematocrit 40.0 - 53.0 % 29.1 (L)   Platelet Count 150 - 450 10e3/uL 179   RBC Count 4.40 - 5.90 10e6/uL 3.12 (L)   MCV 78 - 100 fL 93   MCH 26.5 - 33.0 pg 30.1   MCHC 31.5 - 36.5 g/dL 32.3   RDW 10.0 - 15.0 % 13.1   (H): Data is abnormally high  (L): Data is abnormally low    EKG 8/22/22  Sinus bradycardia with 1st degree A-V block   Septal infarct , age undetermined   Abnormal ECG    Echo 7/6/22  Interpretation Summary  Global and regional left ventricular function is normal with an EF of  60-65%.  Right ventricular function, chamber size, wall motion, and thickness are  normal.  Pulmonary artery systolic pressure is normal.  The inferior vena cava is normal.  No pericardial effusion is present.  No significant changes noted.    Zio 8/22/22        No flowsheet data found.      The patient's records and results personally reviewed by this provider.     Outside records reviewed from: Care Everywhere      Assessment      Melo Dangelo is a 82 year old male seen as a PAC referral for risk assessment and optimization for anesthesia.    Plan/Recommendations  Pt will be optimized for the proposed procedure.  See below for details on the assessment, risk, and preoperative recommendations    NEUROLOGY  - history of seizures - last in 2008. He reports only 2 seizures in his life. The last was in his sleep. He will continue lamictal.   -Post Op delirium risk factors:  Age    ENT  - No current airway concerns.  Will need to be reassessed day of surgery.  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 The patient has specific post op questions. Message sent to surgical team.     CARDIAC  - Afib  [id number] score:  3 - the patient was seen by Dr. Ventura on 8.22.22 and completed follow up Zio patch which showed no episodes of a fib. The patient will continue metoprolol. He reports no symptoms of a fib.   ~ HLD - continue zocor.   - Hypertension  Well controlled  - METS (Metabolic Equivalents)  Patient performs 4 or more METS exercise without symptoms            Total Score: 0      RCRI-Low risk: Class 2 0.9% complication rate            Total Score: 1    RCRI: Elevated Creatinine        PULMONARY  - Obstructive Sleep Apnea  No current risk of obstructive sleep apnea   STEPHEN Medium Risk            Total Score: 3    STEPHEN: Hypertension    STEPHEN: Over 50 ys old    STEPHEN: Male      - Denies asthma or inhaler use  - Tobacco History      History  "  Smoking Status     Former     Packs/day: 0.00   Smokeless Tobacco     Never       GI  PONV Medium Risk  Total Score: 2           1 AN PONV: Patient is not a current smoker    1 AN PONV: Intended Post Op Opioids        /RENAL  - Baseline Creatinine  3.3-3.9 - CKD stage IV - not on dialysis. Followed by Dr. Barrow and last seen on 9/15/22. The patient has IgA nephropathy. He will continue flomax.     ENDOCRINE    - BMI: Estimated body mass index is 25.07 kg/m  as calculated from the following:    Height as of 9/21/22: 1.778 m (5' 10\").    Weight as of 9/21/22: 79.2 kg (174 lb 11.2 oz).  Overweight (BMI 25.0-29.9)  - No history of Diabetes Mellitus    HEME  VTE Low Risk 0.5%            Total Score: 3    VTE: Greater than 59 yrs old    VTE: Male      - Chronic anemia 2/2 CKD. Normal iron studies.   Recommend perioperative use of blood conservation techniques intraoperatively and close monitoring for postoperative bleeding.    ID  ~ History of COVID on 11/16/20 and 9/8/22 - message sent to surgical team as he is within 90 days of positive test.     PSYCH  - MDD/ anxiety - continue lexapro.   ~ History of alcohol abuse - in remission     The patient is optimized for their procedure. AVS with information on surgery time/arrival time, meds and NPO status given by nursing staff. No further diagnostic testing indicated.    Please refer to the physical examination documented by the anesthesiologist in the anesthesia record on the day of surgery.    Video-Visit Details    Type of service:  Video Visit    Provider received verbal consent for a Video Visit from the patient? Yes    Video Start Time: 1:15  Video End Time (time video stopped): 1:30    Originating Location (pt. Location): Home    Distant Location (provider location): Off-site    Mode of Communication:  Video Conference via [employer]  On the day of service:     Prep time: 12 minutes  Visit time: 15 minutes  Documentation time: 13 " minutes  ------------------------------------------  Total time: 40 minutes      Alisha Santos PA-C  Preoperative Assessment Center  [facility]  Clinic and Surgery Center  Phone: [phone]  Fax: [phone]

## 2022-11-01 NOTE — H&P
Pre-Operative H & P     CC:  Preoperative exam to assess for increased cardiopulmonary risk while undergoing surgery and anesthesia.    Date of Encounter: 11/1/2022  Primary Care Physician:  Francis Smith     Reason for visit:   Encounter Diagnoses   Name Primary?     Pre-op examination Yes     Combined forms of age-related cataract of both eyes        HPI  Melo Dagnelo is a 82 year old male who presents for pre-operative H & P in preparation for  Procedure Information     Case: [id number] Date/Time: 11/03/22 1050    Procedure: RIGHT EYE PHACOEMULSIFICATION, CATARACT, WITH INTRAOCULAR LENS IMPLANT (Right: Eye)    Anesthesia type: MAC with Local    Diagnosis: Combined forms of age-related cataract of both eyes [H25.813]    Pre-op diagnosis: Combined forms of age-related cataract of both eyes [H25.813]    Location: [place] [address] / [facility]    Providers: Best Padilla MD          The patient is an 82-year-old man with a past medical history significant for paroxysmal A. fib, HLD, hypertension, former smoker, anemia, IgA nephropathy, chronic kidney disease stage IV, depression, history of COVID infection, hearing loss and history of alcohol abuse.  Patient was seen by Dr. Padilla on 8/10/2022 and counseled for the procedure as above.    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      Paroxysmal atrial fibrillation (H)      PVD (posterior vitreous  detachment)      Seizure disorder (H)     last seizure 2008       Past Surgical History  Past Surgical History:   Procedure Laterality Date     NO HISTORY OF SURGERY         Prior to Admission Medications  Current Outpatient Medications   Medication Sig Dispense Refill     aspirin (ASA) 81 MG EC tablet Take 1 tablet (81 mg) by mouth daily (Patient not taking: Reported on 11/1/2022)       calcium carb-cholecalciferol (OS-GIANNI) 500-200 MG-UNIT tablet Take 1 tablet by mouth 2 times daily (with meals) 60 tablet 11     cyanocobalamin (VITAMIN B-12) 100 MCG tablet Take 1,000 mcg by mouth daily        escitalopram (LEXAPRO) 20 MG tablet Take 20 mg by mouth daily       hydrOXYzine (ATARAX) 10 MG tablet Take 1 tablet by mouth 3 times daily as needed       lamoTRIgine (LAMICTAL) 100 MG tablet Take 2 tablets (200 mg) by mouth 2 times daily 360 tablet 3     metoprolol succinate ER (TOPROL XL) 25 MG 24 hr tablet Take 1/2 tab a day (12.5 mg) 90 tablet 1     molnupiravir (LAGEVRIO) 200 MG capsule Take 4 capsules (800 mg) by mouth every 12 hours 40 each 0     ondansetron (ZOFRAN ODT) 8 MG ODT tab Take 1 tablet (8 mg) by mouth every 8 hours as needed for nausea 15 tablet 0     simvastatin (ZOCOR) 20 MG tablet Take 1 tablet (20 mg) by mouth At Bedtime 90 tablet 3     Sodium Chloride-Sodium Bicarb (SINUS WASH SALINE REFILLS) 2300-700 MG PACK Wash out sinuses up to twice daily as needed for comfort. 60 each 0     tamsulosin (FLOMAX) 0.4 MG capsule Take 2 capsules (0.8 mg) by mouth daily 180 capsule 3       Allergies  No Known Allergies    Social History  Social History     Socioeconomic History     Marital status:      Spouse name: Not on file     Number of children: Not on file     Years of education: Not on file     Highest education level: Bachelor's degree (e.g., BA, AB, BS)   Occupational History     Not on file   Tobacco Use     Smoking status: Former     Packs/day: 0.00     Types: Cigarettes     Smokeless tobacco: Never    Vaping Use     Vaping Use: Never used   Substance and Sexual Activity     Alcohol use: No     Comment: History of alcohol dependence, in remission for 20 years     Drug use: No     Sexual activity: Not Currently   Other Topics Concern     Not on file   Social History Narrative     Not on file     Social Determinants of Health     Financial Resource Strain: Not on file   Food Insecurity: Not on file   Transportation Needs: Not on file   Physical Activity: Not on file   Stress: Not on file   Social Connections: Not on file   Intimate Partner Violence: Not on file   Housing Stability: Not on file       Family History  Family History   Problem Relation Age of Onset     Glaucoma No family hx of      Macular Degeneration No family hx of        Review of Systems  The complete review of systems is negative other than noted in the HPI or here.   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 Recent Zio patch without a fib), a-fib, Previous cardiac testing   Echo: Date: 7/6/22 Results:    Stress Test: Date: Results:    ECG Reviewed: Date: 8/22/22 Results:  Sinus bradycardia with 1st degree A-V block   Septal infarct , age undetermined   Abnormal ECG    Cath: Date: Results:      METS/Exercise Tolerance: 4 - Raking leaves, gardening    Hematologic:     (+) anemia (normal iron studies),  (-) history of blood clots and history of blood transfusion   Musculoskeletal:       GI/Hepatic:  - neg GI/hepatic ROS     Renal/Genitourinary: Comment: IgA nephropathy     Baseline creatinine 3.3-3.9    (+) renal disease, type: CRI, Pt does not require dialysis,     Endo:  - neg endo ROS     Psychiatric/Substance Use:     (+)  psychiatric history depression and anxiety alcohol abuse (sober)     Infectious Disease: Comment: COVID-19 infection 11/16/20 and 9/2022      Malignancy:  - neg malignancy ROS     Other:  - neg other ROS          Virtual visit -  No vitals were obtained    Physical Exam  Constitutional: Awake, alert, cooperative, no apparent distress, and appears stated age.  Eyes: Pupils equal  HENT: Normocephalic  Respiratory: non labored breathing   Neurologic: Awake, alert, oriented to name, place and time.   Neuropsychiatric: Calm, cooperative. Normal affect.      Prior Labs/Diagnostic Studies   All labs and imaging personally reviewed    Latest Reference Range & Units 09/15/22 07:33   Sodium 136 - 145 mmol/L 140   Potassium 3.4 - 5.3 mmol/L 4.6   Chloride 98 - 107 mmol/L 104   Carbon Dioxide (CO2) 22 - 29 mmol/L 23   Urea Nitrogen 8.0 - 23.0 mg/dL 36.0 (H)   Creatinine 0.67 - 1.17 mg/dL 3.40 (H)   GFR Estimate >60 mL/min/1.73m2 17 (L)   Calcium 8.8 - 10.2 mg/dL 9.4   Anion Gap 7 - 15 mmol/L 13   Phosphorus 2.5 - 4.5 mg/dL 3.3   Albumin 3.5 - 5.2 g/dL 4.3   Ferritin 31 - 409 ng/mL 405   Free Testosterone Calculated ng/dL 3.75   FSH 1.5 - 12.4 mIU/mL 66.5 (H)   Glucose 70 - 99 mg/dL 96   Iron 61 - 157 ug/dL 88   Iron Binding Capacity 240 - 430 ug/dL 277   Iron Sat Index 15 - 46 % 32   Testosterone Total 240 - 950 ng/dL 287   Vitamin D Deficiency screening 20 - 75 ug/L 36   WBC 4.0 - 11.0 10e3/uL 4.3   Hemoglobin 13.3 - 17.7 g/dL 9.4 (L)   Hematocrit 40.0 - 53.0 % 29.1 (L)   Platelet Count 150 - 450 10e3/uL 179   RBC Count 4.40 - 5.90 10e6/uL 3.12 (L)   MCV 78 - 100 fL 93   MCH 26.5 - 33.0 pg 30.1   MCHC 31.5 - 36.5 g/dL 32.3   RDW 10.0 - 15.0 % 13.1   (H): Data is abnormally high  (L): Data is abnormally low    EKG 8/22/22  Sinus bradycardia with 1st degree A-V block   Septal infarct , age undetermined   Abnormal ECG    Echo 7/6/22  Interpretation Summary  Global and regional left ventricular function is normal with an EF of  60-65%.  Right ventricular function, chamber size, wall motion, and thickness are  normal.  Pulmonary artery systolic pressure is normal.  The inferior vena cava is normal.  No pericardial effusion is present.  No significant changes noted.    Zio 8/22/22        No flowsheet data found.      The patient's records and results personally reviewed by this provider.     Outside records reviewed from: Care Everywhere      Assessment      Melo Dangelo is a 82 year old male seen as a PAC referral for risk assessment and optimization for anesthesia.    Plan/Recommendations  Pt will be optimized for the proposed procedure.  See below for details on the assessment, risk, and preoperative recommendations    NEUROLOGY  - history of seizures - last in 2008. He reports only 2 seizures in his life. The last was in his sleep. He will continue lamictal.   -Post Op delirium risk factors:  Age    ENT  - No current airway concerns.  Will need to be reassessed day of surgery.  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 The patient has specific post op questions. Message sent to surgical team.     CARDIAC  - Afib  [id number] score:  3 - the patient was seen by Dr. Ventura on 8.22.22 and completed follow up Zio patch which showed no episodes of a fib. The patient will continue metoprolol. He reports no symptoms of a fib.   ~ HLD - continue zocor.   - Hypertension  Well controlled  - METS (Metabolic Equivalents)  Patient performs 4 or more METS exercise without symptoms            Total Score: 0      RCRI-Low risk: Class 2 0.9% complication rate            Total Score: 1    RCRI: Elevated Creatinine        PULMONARY  - Obstructive Sleep Apnea  No current risk of obstructive sleep apnea   STEPHEN Medium Risk            Total Score: 3    STEPHEN: Hypertension    STEPHEN: Over 50 ys old    STEPHEN: Male      - Denies asthma or inhaler use  - Tobacco History      History  "  Smoking Status     Former     Packs/day: 0.00   Smokeless Tobacco     Never       GI  PONV Medium Risk  Total Score: 2           1 AN PONV: Patient is not a current smoker    1 AN PONV: Intended Post Op Opioids        /RENAL  - Baseline Creatinine  3.3-3.9 - CKD stage IV - not on dialysis. Followed by Dr. Barrow and last seen on 9/15/22. The patient has IgA nephropathy. He will continue flomax.     ENDOCRINE    - BMI: Estimated body mass index is 25.07 kg/m  as calculated from the following:    Height as of 9/21/22: 1.778 m (5' 10\").    Weight as of 9/21/22: 79.2 kg (174 lb 11.2 oz).  Overweight (BMI 25.0-29.9)  - No history of Diabetes Mellitus    HEME  VTE Low Risk 0.5%            Total Score: 3    VTE: Greater than 59 yrs old    VTE: Male      - Chronic anemia 2/2 CKD. Normal iron studies.   Recommend perioperative use of blood conservation techniques intraoperatively and close monitoring for postoperative bleeding.    ID  ~ History of COVID on 11/16/20 and 9/8/22 - message sent to surgical team as he is within 90 days of positive test.     PSYCH  - MDD/ anxiety - continue lexapro.   ~ History of alcohol abuse - in remission     The patient is optimized for their procedure. AVS with information on surgery time/arrival time, meds and NPO status given by nursing staff. No further diagnostic testing indicated.    Please refer to the physical examination documented by the anesthesiologist in the anesthesia record on the day of surgery.    Video-Visit Details    Type of service:  Video Visit    Provider received verbal consent for a Video Visit from the patient? Yes    Video Start Time: 1:15  Video End Time (time video stopped): 1:30    Originating Location (pt. Location): Home    Distant Location (provider location): Off-site    Mode of Communication:  Video Conference via [employer]  On the day of service:     Prep time: 12 minutes  Visit time: 15 minutes  Documentation time: 13 " minutes  ------------------------------------------  Total time: 40 minutes      Alisha Santos PA-C  Preoperative Assessment Center  [facility]  Clinic and Surgery Center  Phone: [phone]  Fax: [phone]

## 2022-11-01 NOTE — PATIENT INSTRUCTIONS
Preparing for Your Surgery      Name:  Melo Dangelo   MRN:  [id number]   :  1940   Today's Date:  2022       Arriving for surgery:  Surgery date:  11/3/22  Arrival time:  09:20 am     Surgeries and procedures: Adult patients can have 2 visitors all through the surgery process.     Visiting hours: 8 a.m. to 8:30 p.m.     Hospital: Adult patients and children under age 18 can have 4 visitor at a time     No visitors under the age of 5 are allowed for hospital patients.  Double occupancy rooms: Patients can have only two visitors at a time.     Patients with disabilities: Can have a support person with them (family member, service provider     Or someone well informed about their needs) plus the allowed number of visitors     Patients confirmed or suspected to have symptoms of COVID 19 or flu:     No visitors allowed for adult patients.   Children (under age 18) can have 1 named visitor.     People who are sick or showing symptoms of COVID 19 or flu:    Are not allowed to visit patients--we can only make exceptions in special situations.       Please follow these guidelines for your visit:   Arrive wearing a mask over your mouth and nose; we will give you a medical mask to wear    If you arrive wearing a cloth mask.   Keep it on during your entire visit, even when in patient's room.   If you don't wear a mask we'll ask you to leave.     Clean your hands with alcohol hand . Do this when you arrive at and leave the building and patient room,    And again after you touch your mask or anything in the room.     You can t visit if you have a fever, cough, shortness of breath, muscle aches, headaches, sore throat    Or diarrhea      Stay 6 feet away from others during your visit and between visits     Go directly to and from the room you are visiting.     Stay in the patient s room during your visit. Limit going to other places in the hospital as much as possible     Leave bags and jackets at home  or in the car.     For everyone s health, please don t come and go during your visit. That includes for smoking   during your visit.     Please come to:   [facility] - [address] [address]   Parking is available in front of the Clinics and Surgery Center building from 5:30AM to 8:00PM.  -  Proceed to the 5th floor to check into the Ambulatory Surgery Center.              >> There will be patient concierges on the 1st and 5th floor, for assistance or an escort, if you would like.              >> Please call [phone] with any questions.    What can I eat or drink?  -  You may eat and drink normally up to 8 hours prior to arrival time.  -  You may have clear liquids until 4 hours prior to arrival time.    Examples of clear liquids:  Water  Clear broth  Juices (apple, white grape, white cranberry  and cider) without pulp  Noncarbonated, powder based beverages  (lemonade and Giovani-Aid)  Sodas (Sprite, 7-Up, ginger ale and seltzer)  Coffee or tea (without milk or cream)  Gatorade    -  No Alcohol for at least 24 hours before surgery.     Which medicines can I take?  Hold Aspirin for 7 days before surgery.   Hold Multivitamins for 7 days before surgery.  Hold Supplements for 7 days before surgery.  Hold Ibuprofen (Advil, Motrin) for 1 day before surgery--unless otherwise directed by surgeon.  Hold Naproxen (Aleve) for 4 days before surgery.  Hold B12 and calcium the day of surgery.  -  PLEASE TAKE these medications the day of surgery:  Tylenol if needed; take morning medications.    How do I prepare myself?  - Please take 2 showers before surgery using Scrubcare or Hibiclens soap.    Use this soap only from the neck to your toes.     Leave the soap on your skin for one minute--then rinse thoroughly.      You may use your own shampoo and conditioner. No other hair products.   - Please remove all jewelry and body piercings.  - No lotions,  deodorants or fragrance.  - No makeup or fingernail polish.   - Bring your ID and insurance card.    -If you have a Deep Brain Stimulator, Spinal Cord Stimulator, or any Neuro Stimulator device---you must bring the remote control to the hospital.      ALL PATIENTS GOING HOME THE SAME DAY OF SURGERY ARE REQUIRED TO HAVE A RESPONSIBLE ADULT TO DRIVE AND BE IN ATTENDANCE WITH THEM FOR 24 HOURS FOLLOWING SURGERY.      Questions or Concerns:    - For any questions regarding the day of surgery or your hospital stay, please contact the Pre Admission Nursing Office at [phone].       - If you have health changes between today and your surgery, please call your surgeon.       - For questions after surgery, please call your surgeons office.

## 2022-11-01 NOTE — PROGRESS NOTES
Melo Dangelo  is being evaluated via a billable video visit.      How would you like to obtain your AVS? MyChart  For the video visit, send the invitation by: Send to e-mail at: [email or web address]  Will anyone else be joining your video visit? No

## 2022-11-01 NOTE — PROGRESS NOTES
Melo is a 82 year old who is being evaluated via a billable video visit.      How would you like to obtain your AVS? MyChart        HPI       Review of Systems         Objective    Vitals - Patient Reported  Pain Score: No Pain (0)        Physical Exam     ROSANNE Cummings LPN

## 2022-11-01 NOTE — PROGRESS NOTES
Endocrinology Clinic Visit 11/1/2022      Video-Visit Details    Type of service:  Video Visit    Joined the call at 11/1/2022, 10:41:10 am.  Left the call at 11/1/2022, 11:18:10 am.  You were on the call for 37 minutes .    Originating Location (pt. Location): Home        Distant Location (provider location):  Off-site    Mode of Communication:  Video Conference via [facility]    Physician has received verbal consent for a Video Visit from the patient? Yes    I spent a total of 45 minutes on the date of encounter reviewing medical records, evaluating the patient, coordinating care and documenting in the EHR, as detailed above.      NAME:  Melo Dangelo  PCP:  Francis Smith  MRN:  [id number]  Reason for Consult:  Breast lump  Requesting Provider:  Francis Smith    Chief Complaint     Chief Complaint   Patient presents with     Follow Up     Video Visit       History of Present Illness     Melo Dangelo is a 82 year old male who is seen in video visit for bilateral breast lump    He first noted breast lump that is painful back around April 2022, bilateral L>R. Finasteride was started 10/2021 for LUTS. He denied any other new meds and no other associated symptoms. From PCP note 6/2022 breast tissue bilateral of 2cm.    Finasteride was stopped 6/2022. He reported breast lump decreased in size. Not painful anymore unless touched hard ( severity 3/10).    He noticed that his libido decreased last year but now it is slowly coming back. He has no issues with initiating an erection, sometimes problems with maintaining one.  He has 3 biologic children, in their 50s.  He had normal puberty and no hx of cryptochordism    E-consult was done by Dr. Hernandez on 8/4/2022. Note reviewed.     Pertinent labs, images:  Mammogram on 7/6/2022 benign findings  Creatinine 3.4 GFR stable 17, known CKD stage IV  ALT and AST within normal limit  6/29/2022 estradiol normal at 16  6/29/2022 total testosterone 271 (lower  limit of normal to 40)  6/29/2022 LH elevated at 31 point 8 repeat LH on 2 other occasion elevated at 27.4 and 30.8  9/15/2022 Free testosterone 3.75, LH 30.8, FSH elevated at 66.5, total testosterone 287.10/18/2021   CT abdomen/ pelvis has only partial testicular images.  Left testes 3.3x 2.8 cm ; Right is not visualized /images cut off right at this point -        Of note LUTS controlled on flomax. He said he feels like he does not need the finasteride.    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 No alcohol, s/p AA treatment 20 years ago. No illicit drugs or recreational drugs.    Problem List     Patient Active Problem List   Diagnosis     Central serous chorioretinopathy of right eye     Cupping of optic disc, right     Peripheral drusen of both eyes     Posterior vitreous detachment, bilateral     Age-related nuclear cataract of both eyes     Major depressive disorder, recurrent episode, moderate (H)     Major depressive disorder     Arthritis of knee     Hematuria, unspecified type     IgA nephropathy     CKD (chronic kidney disease) stage 4, GFR 15-29 ml/min (H)     High risk medication use     Acute bronchitis due to respiratory syncytial virus (RSV)     Sensorineural hearing loss (SNHL) of both ears     Combined forms of age-related cataract of both eyes     Alcohol dependence in remission (H)        Medications     Current Outpatient Medications   Medication     calcium carb-cholecalciferol (OS-GIANNI) 500-200 MG-UNIT tablet     cyanocobalamin (VITAMIN B-12) 100 MCG tablet     escitalopram (LEXAPRO) 20 MG tablet     hydrOXYzine (ATARAX) 10 MG tablet     lamoTRIgine (LAMICTAL) 100 MG tablet     metoprolol succinate ER (TOPROL XL) 25 MG 24 hr tablet     molnupiravir (LAGEVRIO) 200 MG capsule     ondansetron (ZOFRAN ODT) 8 MG ODT tab     simvastatin (ZOCOR) 20 MG tablet     Sodium Chloride-Sodium Bicarb (SINUS WASH SALINE REFILLS) 2300-700 MG PACK      tamsulosin (FLOMAX) 0.4 MG capsule     aspirin (ASA) 81 MG EC tablet     Multiple Vitamins-Minerals (EYE VITAMINS PO)     Current Facility-Administered Medications   Medication     bevacizumab (AVASTIN) intravitreal inj 1.25 mg        Allergies     No Known Allergies    Medical / Surgical History     Past Medical History:   Diagnosis Date     2019 novel coronavirus disease (COVID-19) 10/27/2020    also on 9/8/22     Alcohol dependence (H)      Chronic kidney disease, stage IV (severe) (H)      Combined forms of age-related cataract of both eyes      Concussion with loss of consciousness     x10 going back to childhood     HTN (hypertension)      IgA nephropathy      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      Paroxysmal atrial fibrillation (H)      PVD (posterior vitreous detachment)      Seizure disorder (H)     last seizure 2008     Past Surgical History:   Procedure Laterality Date     NO HISTORY OF SURGERY         Social History     Social History     Socioeconomic History     Marital status:      Spouse name: Not on file     Number of children: Not on file     Years of education: Not on file     Highest education level: Bachelor's degree (e.g., BA, AB, BS)   Occupational History     Not on file   Tobacco Use     Smoking status: Former     Packs/day: 0.00     Types: Cigarettes     Smokeless tobacco: Never   Vaping Use     Vaping Use: Never used   Substance and Sexual Activity     Alcohol use: No     Comment: History of alcohol dependence, in remission for 20 years     Drug use: No     Sexual activity: Not Currently   Other Topics Concern     Not on file   Social History Narrative     Not on file     Social Determinants of Health     Financial Resource Strain: Not on file   Food Insecurity: Not on file   Transportation Needs: Not on file   Physical Activity: Not on file   Stress: Not on file   Social Connections: Not on file   Intimate Partner Violence: Not on file   Housing Stability: Not  on file       Family History     Family History   Problem Relation Age of Onset     Glaucoma No family hx of      Macular Degeneration No family hx of      Anesthesia Reaction No family hx of      Deep Vein Thrombosis (DVT) No family hx of        ROS     12 ROS completed, pertinent positive and negative in HPI    Physical Exam   There were no vitals taken for this visit.   GENERAL: Healthy, alert and no distress  EYES: Eyes grossly normal to inspection.  No discharge or erythema, or obvious scleral/conjunctival abnormalities.  RESP: No audible wheeze, cough, or visible cyanosis.  No visible retractions or increased work of breathing.    SKIN: Visible skin clear. No significant rash, abnormal pigmentation or lesions.  NEURO: Cranial nerves grossly intact.  Mentation and speech appropriate for age.  PSYCH: Mentation appears normal, affect normal/bright, judgement and insight intact, normal speech and appearance well-groomed.     Labs/Imaging     Pertinent Labs were reviewed and updated in EPIC and discussed briefly.  Radiology Results were  reviewed and updated in EPIC and discussed briefly.    Summary of recent findings:   Lab Results   Component Value Date    A1C 5.8 10/18/2021       TSH   Date Value Ref Range Status   07/29/2022 1.15 0.40 - 4.00 mU/L Final   01/11/2022 1.01 0.40 - 4.00 mU/L Final   10/18/2021 2.76 0.40 - 4.00 mU/L Final       Creatinine   Date Value Ref Range Status   09/15/2022 3.40 (H) 0.67 - 1.17 mg/dL Final   10/29/2020 1.33 (H) 0.66 - 1.25 mg/dL Final       Recent Labs   Lab Test 10/15/21  1023   CHOL 179   HDL 40   *   TRIG 126       No results found for: [id number], [id number], [id number]    I personally reviewed the patient's outside records from [facility] EMR. Summary of pertinent findings in HPI.    Impression / Plan     1. Bilateral breast lump  2. Elevated FSH and LH  Likely due to finasteride which is a common side effect. He reported significantly improved symptoms since stopping  finasteride. Hormonal work up is significant for normal estradiol and testosterone. He has elevated LH with T.testosterone on lower normal limit which is commonly seen with age, he has no overt clinical or biochemical hypogonadism. FSH elevated this pattern indicates seminiferous tubule damage, but normal testosterone production by the Leydig cells. Other possible contributing factor is advanced CKD.  He agreed on continue holding finasteride and monitoring.        Test and/or medications prescribed today:  No orders of the defined types were placed in this encounter.        Follow up: PRN if symptoms worsen      Matthew Cabrera MD  Endocrinology, Diabetes and Metabolism  [facility]

## 2022-11-01 NOTE — LETTER
11/1/2022       RE: Melo Dangelo  [address]  [place] [address]     Dear Colleague,    Thank you for referring your patient, Melo Dangelo, to the [facility] [place] at [facility]. Please see a copy of my visit note below.    Melo Dangelo  is being evaluated via a billable video visit.      How would you like to obtain your AVS? MyChart  For the video visit, send the invitation by: Send to e-mail at: [email or web address]  Will anyone else be joining your video visit? No          Endocrinology Clinic Visit 11/1/2022      Video-Visit Details    Type of service:  Video Visit    Joined the call at 11/1/2022, 10:41:10 am.  Left the call at 11/1/2022, 11:18:10 am.  You were on the call for 37 minutes .    Originating Location (pt. Location): Home        Distant Location (provider location):  Off-site    Mode of Communication:  Video Conference via [facility]    Physician has received verbal consent for a Video Visit from the patient? Yes    I spent a total of 45 minutes on the date of encounter reviewing medical records, evaluating the patient, coordinating care and documenting in the EHR, as detailed above.      NAME:  Melo Dangelo  PCP:  Francis Smith  MRN:  [id number]  Reason for Consult:  Breast lump  Requesting Provider:  Fracnis Smith    Chief Complaint     Chief Complaint   Patient presents with     Follow Up     Video Visit       History of Present Illness     Melo Dangelo is a 82 year old male who is seen in video visit for bilateral breast lump    He first noted breast lump that is painful back around April 2022, bilateral L>R. Finasteride was started 10/2021 for LUTS. He denied any other new meds and no other associated symptoms. From PCP note 6/2022 breast tissue bilateral of 2cm.    Finasteride was stopped 6/2022. He reported breast lump decreased in size. Not painful anymore  unless touched hard ( severity 3/10).    He noticed that his libido decreased last year but now it is slowly coming back. He has no issues with initiating an erection, sometimes problems with maintaining one.  He has 3 biologic children, in their 50s.  He had normal puberty and no hx of cryptochordism    E-consult was done by Dr. Hernandez on 8/4/2022. Note reviewed.     Pertinent labs, images:  Mammogram on 7/6/2022 benign findings  Creatinine 3.4 GFR stable 17, known CKD stage IV  ALT and AST within normal limit  6/29/2022 estradiol normal at 16  6/29/2022 total testosterone 271 (lower limit of normal to 40)  6/29/2022 LH elevated at 31 point 8 repeat LH on 2 other occasion elevated at 27.4 and 30.8  9/15/2022 Free testosterone 3.75, LH 30.8, FSH elevated at 66.5, total testosterone 287.10/18/2021   CT abdomen/ pelvis has only partial testicular images.  Left testes 3.3x 2.8 cm ; Right is not visualized /images cut off right at this point -        Of note LUTS controlled on flomax. He said he feels like he does not need the finasteride.    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 No alcohol, s/p AA treatment 20 years ago. No illicit drugs or recreational drugs.    Problem List     Patient Active Problem List   Diagnosis     Central serous chorioretinopathy of right eye     Cupping of optic disc, right     Peripheral drusen of both eyes     Posterior vitreous detachment, bilateral     Age-related nuclear cataract of both eyes     Major depressive disorder, recurrent episode, moderate (H)     Major depressive disorder     Arthritis of knee     Hematuria, unspecified type     IgA nephropathy     CKD (chronic kidney disease) stage 4, GFR 15-29 ml/min (H)     High risk medication use     Acute bronchitis due to respiratory syncytial virus (RSV)     Sensorineural hearing loss (SNHL) of both ears     Combined forms of age-related cataract of both eyes     Alcohol  dependence in remission (H)        Medications     Current Outpatient Medications   Medication     calcium carb-cholecalciferol (OS-GIANNI) 500-200 MG-UNIT tablet     cyanocobalamin (VITAMIN B-12) 100 MCG tablet     escitalopram (LEXAPRO) 20 MG tablet     hydrOXYzine (ATARAX) 10 MG tablet     lamoTRIgine (LAMICTAL) 100 MG tablet     metoprolol succinate ER (TOPROL XL) 25 MG 24 hr tablet     molnupiravir (LAGEVRIO) 200 MG capsule     ondansetron (ZOFRAN ODT) 8 MG ODT tab     simvastatin (ZOCOR) 20 MG tablet     Sodium Chloride-Sodium Bicarb (SINUS WASH SALINE REFILLS) 2300-700 MG PACK     tamsulosin (FLOMAX) 0.4 MG capsule     aspirin (ASA) 81 MG EC tablet     Multiple Vitamins-Minerals (EYE VITAMINS PO)     Current Facility-Administered Medications   Medication     bevacizumab (AVASTIN) intravitreal inj 1.25 mg        Allergies     No Known Allergies    Medical / Surgical History     Past Medical History:   Diagnosis Date     2019 novel coronavirus disease (COVID-19) 10/27/2020    also on 9/8/22     Alcohol dependence (H)      Chronic kidney disease, stage IV (severe) (H)      Combined forms of age-related cataract of both eyes      Concussion with loss of consciousness     x10 going back to childhood     HTN (hypertension)      IgA nephropathy      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      Paroxysmal atrial fibrillation (H)      PVD (posterior vitreous detachment)      Seizure disorder (H)     last seizure 2008     Past Surgical History:   Procedure Laterality Date     NO HISTORY OF SURGERY         Social History     Social History     Socioeconomic History     Marital status:      Spouse name: Not on file     Number of children: Not on file     Years of education: Not on file     Highest education level: Bachelor's degree (e.g., BA, AB, BS)   Occupational History     Not on file   Tobacco Use     Smoking status: Former     Packs/day: 0.00     Types: Cigarettes     Smokeless tobacco: Never    Vaping Use     Vaping Use: Never used   Substance and Sexual Activity     Alcohol use: No     Comment: History of alcohol dependence, in remission for 20 years     Drug use: No     Sexual activity: Not Currently   Other Topics Concern     Not on file   Social History Narrative     Not on file     Social Determinants of Health     Financial Resource Strain: Not on file   Food Insecurity: Not on file   Transportation Needs: Not on file   Physical Activity: Not on file   Stress: Not on file   Social Connections: Not on file   Intimate Partner Violence: Not on file   Housing Stability: Not on file       Family History     Family History   Problem Relation Age of Onset     Glaucoma No family hx of      Macular Degeneration No family hx of      Anesthesia Reaction No family hx of      Deep Vein Thrombosis (DVT) No family hx of        ROS     12 ROS completed, pertinent positive and negative in HPI    Physical Exam   There were no vitals taken for this visit.   GENERAL: Healthy, alert and no distress  EYES: Eyes grossly normal to inspection.  No discharge or erythema, or obvious scleral/conjunctival abnormalities.  RESP: No audible wheeze, cough, or visible cyanosis.  No visible retractions or increased work of breathing.    SKIN: Visible skin clear. No significant rash, abnormal pigmentation or lesions.  NEURO: Cranial nerves grossly intact.  Mentation and speech appropriate for age.  PSYCH: Mentation appears normal, affect normal/bright, judgement and insight intact, normal speech and appearance well-groomed.     Labs/Imaging     Pertinent Labs were reviewed and updated in EPIC and discussed briefly.  Radiology Results were  reviewed and updated in EPIC and discussed briefly.    Summary of recent findings:   Lab Results   Component Value Date    A1C 5.8 10/18/2021       TSH   Date Value Ref Range Status   07/29/2022 1.15 0.40 - 4.00 mU/L Final   01/11/2022 1.01 0.40 - 4.00 mU/L Final   10/18/2021 2.76 0.40 - 4.00 mU/L  Final       Creatinine   Date Value Ref Range Status   09/15/2022 3.40 (H) 0.67 - 1.17 mg/dL Final   10/29/2020 1.33 (H) 0.66 - 1.25 mg/dL Final       Recent Labs   Lab Test 10/15/21  1023   CHOL 179   HDL 40   *   TRIG 126     No results found for: [id number], [id number], [id number]    I personally reviewed the patient's outside records from [facility] EMR. Summary of pertinent findings in HPI.    Impression / Plan     1. Bilateral breast lump  2. Elevated FSH and LH  Likely due to finasteride which is a common side effect. He reported significantly improved symptoms since stopping finasteride. Hormonal work up is significant for normal estradiol and testosterone. He has elevated LH with T.testosterone on lower normal limit which is commonly seen with age, he has no overt clinical or biochemical hypogonadism. FSH elevated this pattern indicates seminiferous tubule damage, but normal testosterone production by the Leydig cells. Other possible contributing factor is advanced CKD.  He agreed on continue holding finasteride and monitoring.    Test and/or medications prescribed today:  No orders of the defined types were placed in this encounter.    Follow up: PRN if symptoms worsen    Matthew Cabrera MD  Endocrinology, Diabetes and Metabolism  [facility]

## 2022-11-02 ASSESSMENT — ENCOUNTER SYMPTOMS
SEIZURES: 1
DYSRHYTHMIAS: 1

## 2022-11-02 ASSESSMENT — LIFESTYLE VARIABLES: TOBACCO_USE: 1

## 2022-11-02 NOTE — ANESTHESIA PREPROCEDURE EVALUATION
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      Paroxysmal atrial fibrillation (H)      PVD (posterior vitreous detachment)      Seizure disorder (H)     last seizure       Past Surgical History:   Procedure Laterality Date     NO HISTORY OF SURGERY        No Known Allergies   Social History     Tobacco Use     Smoking status: Former     Packs/day: 0.00     Types: Cigarettes     Smokeless tobacco: Never   Substance Use Topics     Alcohol use: No     Comment: History of alcohol dependence, in remission for 20 years      Wt Readings from Last 1 Encounters:   22 79.2 kg (174 lb 11.2 oz)        Anesthesia Evaluation   Pt has not had prior anesthetic         ROS/MED HX  ENT/Pulmonary: Comment: 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 Recent Zio patch without a fib), a-fib, Previous cardiac testing   Echo: Date: 22 Results:    Stress Test: Date: Results:    ECG Reviewed: Date: 22 Results:  Sinus bradycardia with 1st degree A-V block   Septal infarct , age undetermined   Abnormal ECG    Cath: Date: Results:      METS/Exercise Tolerance: 4 - Raking leaves, gardening    Hematologic:     (+) anemia  (normal iron studies),  (-) history of blood clots and history of blood transfusion   Musculoskeletal:       GI/Hepatic:  - neg GI/hepatic ROS     Renal/Genitourinary: Comment: IgA nephropathy     Baseline creatinine 3.3-3.9    (+) renal disease, type: CRI, Pt does not require dialysis,     Endo:  - neg endo ROS     Psychiatric/Substance Use:     (+) psychiatric history depression and anxiety alcohol abuse (sober)     Infectious Disease: Comment: COVID-19 infection 11/16/20 and 9/2022      Malignancy:  - neg malignancy ROS     Other:  - neg other ROS             OUTSIDE LABS:  CBC:   Lab Results   Component Value Date    WBC 4.3 09/15/2022    WBC 4.8 07/29/2022    HGB 9.4 (L) 09/15/2022    HGB 9.7 (L) 07/29/2022    HCT 29.1 (L) 09/15/2022    HCT 29.4 (L) 07/29/2022     09/15/2022     07/29/2022     BMP:   Lab Results   Component Value Date     09/15/2022     07/29/2022    POTASSIUM 4.6 09/15/2022    POTASSIUM 4.8 07/29/2022    CHLORIDE 104 09/15/2022    CHLORIDE 106 07/29/2022    CO2 23 09/15/2022    CO2 28 07/29/2022    BUN 36.0 (H) 09/15/2022    BUN 55 (H) 07/29/2022    CR 3.40 (H) 09/15/2022    CR 3.91 (H) 07/29/2022    GLC 96 09/15/2022    GLC 89 07/29/2022     COAGS:   Lab Results   Component Value Date    PTT 30 10/27/2020    INR 1.04 01/20/2022    FIBR 514 (H) 11/02/2020     POC: No results found for: BGM, HCG, HCGS  HEPATIC:   Lab Results   Component Value Date    ALBUMIN 4.3 09/15/2022    PROTTOTAL 7.3 07/29/2022    ALT 18 07/29/2022    AST 20 07/29/2022    ALKPHOS 79 07/29/2022    BILITOTAL 0.3 07/29/2022     OTHER:   Lab Results   Component Value Date    LACT 0.8 01/20/2022    A1C 5.8 (H) 10/18/2021    GIANNI 9.4 09/15/2022    PHOS 3.3 09/15/2022    MAG 2.4 (H) 10/19/2021    LIPASE 97 10/27/2020    TSH 1.15 07/29/2022    CRP 4.1 (H) 11/02/2020    SED 30 (H) 10/27/2020       Anesthesia Plan    ASA Status:  3      Anesthesia Type: MAC.     - Reason for MAC: straight local not  clinically adequate   Induction: Intravenous.           Consents         - Extended Intubation/Ventilatory Support Discussed: No.      - Patient is DNR/DNI Status: No    Use of blood products discussed: No .     Postoperative Care    Pain management: Multi-modal analgesia.   PONV prophylaxis: Ondansetron (or other 5HT-3)     Comments:                Live Herndon MD

## 2022-11-03 ENCOUNTER — OFFICE VISIT (OUTPATIENT)
Dept: OPHTHALMOLOGY | Facility: CLINIC | Age: 82
End: 2022-11-03

## 2022-11-03 ENCOUNTER — ANESTHESIA (OUTPATIENT)
Dept: SURGERY | Facility: AMBULATORY SURGERY CENTER | Age: 82
End: 2022-11-03
Payer: MEDICARE

## 2022-11-03 ENCOUNTER — HOSPITAL ENCOUNTER (OUTPATIENT)
Facility: AMBULATORY SURGERY CENTER | Age: 82
Discharge: HOME | End: 2022-11-03
Attending: OPHTHALMOLOGY
Payer: MEDICARE

## 2022-11-03 VITALS
WEIGHT: 178 LBS | DIASTOLIC BLOOD PRESSURE: 73 MMHG | BODY MASS INDEX: 24.92 KG/M2 | SYSTOLIC BLOOD PRESSURE: 154 MMHG | HEIGHT: 71 IN | HEART RATE: 52 BPM | RESPIRATION RATE: 14 BRPM | OXYGEN SATURATION: 98 % | TEMPERATURE: 97 F

## 2022-11-03 DIAGNOSIS — H21.81 INTRAOPERATIVE FLOPPY IRIS SYNDROME OF RIGHT EYE: ICD-10-CM

## 2022-11-03 DIAGNOSIS — Z96.1 PSEUDOPHAKIA, RIGHT EYE: Primary | ICD-10-CM

## 2022-11-03 DIAGNOSIS — H57.03 MIOSIS: ICD-10-CM

## 2022-11-03 DIAGNOSIS — H25.811 COMBINED FORM OF AGE-RELATED CATARACT, RIGHT EYE: Primary | ICD-10-CM

## 2022-11-03 PROCEDURE — 66984 XCAPSL CTRC RMVL W/O ECP: CPT | Mod: RT

## 2022-11-03 PROCEDURE — 99024 POSTOP FOLLOW-UP VISIT: CPT | Performed by: OPHTHALMOLOGY

## 2022-11-03 PROCEDURE — 66984 XCAPSL CTRC RMVL W/O ECP: CPT | Mod: RT | Performed by: OPHTHALMOLOGY

## 2022-11-03 DEVICE — ACRYSOF(R) ASPHERIC IOL, SINGLE-PIECE ACRYLICFOLDABLE POSTERIOR CHAMBER LENS,UV-ABSORBING, 13.0MM LENGTH, 6.0MM ANTERIORASYMMETRIC BICONVEX OPTIC, PLANAR HAPTICS.
Type: IMPLANTABLE DEVICE | Site: EYE | Status: FUNCTIONAL
Brand: ACRYSOF®

## 2022-11-03 RX ORDER — ACETAZOLAMIDE 500 MG/5ML
500 INJECTION, POWDER, LYOPHILIZED, FOR SOLUTION INTRAVENOUS ONCE
Status: COMPLETED | OUTPATIENT
Start: 2022-11-03 | End: 2022-11-03

## 2022-11-03 RX ORDER — PROPARACAINE HYDROCHLORIDE 5 MG/ML
1 SOLUTION/ DROPS OPHTHALMIC
Status: DISCONTINUED | OUTPATIENT
Start: 2022-11-03 | End: 2022-11-03 | Stop reason: HOSPADM

## 2022-11-03 RX ORDER — MOXIFLOXACIN IN NACL,ISO-OS/PF 0.3MG/0.3
SYRINGE (ML) INTRAOCULAR PRN
Status: DISCONTINUED | OUTPATIENT
Start: 2022-11-03 | End: 2022-11-03 | Stop reason: HOSPADM

## 2022-11-03 RX ORDER — FENTANYL CITRATE 50 UG/ML
25 INJECTION, SOLUTION INTRAMUSCULAR; INTRAVENOUS
Status: DISCONTINUED | OUTPATIENT
Start: 2022-11-03 | End: 2022-11-04 | Stop reason: HOSPADM

## 2022-11-03 RX ORDER — LIDOCAINE 40 MG/G
CREAM TOPICAL
Status: DISCONTINUED | OUTPATIENT
Start: 2022-11-03 | End: 2022-11-03 | Stop reason: HOSPADM

## 2022-11-03 RX ORDER — PREDNISOLONE ACETATE 10 MG/ML
1-2 SUSPENSION/ DROPS OPHTHALMIC 4 TIMES DAILY
Qty: 10 ML | Refills: 0 | Status: SHIPPED | OUTPATIENT
Start: 2022-11-03 | End: 2022-12-12

## 2022-11-03 RX ORDER — BALANCED SALT SOLUTION 6.4; .75; .48; .3; 3.9; 1.7 MG/ML; MG/ML; MG/ML; MG/ML; MG/ML; MG/ML
SOLUTION OPHTHALMIC PRN
Status: DISCONTINUED | OUTPATIENT
Start: 2022-11-03 | End: 2022-11-03 | Stop reason: HOSPADM

## 2022-11-03 RX ORDER — FENTANYL CITRATE 50 UG/ML
25 INJECTION, SOLUTION INTRAMUSCULAR; INTRAVENOUS EVERY 5 MIN PRN
Status: DISCONTINUED | OUTPATIENT
Start: 2022-11-03 | End: 2022-11-03 | Stop reason: HOSPADM

## 2022-11-03 RX ORDER — PROPARACAINE HYDROCHLORIDE 5 MG/ML
1 SOLUTION/ DROPS OPHTHALMIC ONCE
Status: COMPLETED | OUTPATIENT
Start: 2022-11-03 | End: 2022-11-03

## 2022-11-03 RX ORDER — SODIUM CHLORIDE, SODIUM LACTATE, POTASSIUM CHLORIDE, CALCIUM CHLORIDE 600; 310; 30; 20 MG/100ML; MG/100ML; MG/100ML; MG/100ML
INJECTION, SOLUTION INTRAVENOUS CONTINUOUS
Status: DISCONTINUED | OUTPATIENT
Start: 2022-11-03 | End: 2022-11-04 | Stop reason: HOSPADM

## 2022-11-03 RX ORDER — FENTANYL CITRATE 50 UG/ML
INJECTION, SOLUTION INTRAMUSCULAR; INTRAVENOUS PRN
Status: DISCONTINUED | OUTPATIENT
Start: 2022-11-03 | End: 2022-11-03

## 2022-11-03 RX ORDER — TETRACAINE HYDROCHLORIDE 5 MG/ML
SOLUTION OPHTHALMIC PRN
Status: DISCONTINUED | OUTPATIENT
Start: 2022-11-03 | End: 2022-11-03 | Stop reason: HOSPADM

## 2022-11-03 RX ORDER — MEPERIDINE HYDROCHLORIDE 25 MG/ML
12.5 INJECTION INTRAMUSCULAR; INTRAVENOUS; SUBCUTANEOUS
Status: DISCONTINUED | OUTPATIENT
Start: 2022-11-03 | End: 2022-11-04 | Stop reason: HOSPADM

## 2022-11-03 RX ORDER — ONDANSETRON 4 MG/1
4 TABLET, ORALLY DISINTEGRATING ORAL EVERY 30 MIN PRN
Status: DISCONTINUED | OUTPATIENT
Start: 2022-11-03 | End: 2022-11-04 | Stop reason: HOSPADM

## 2022-11-03 RX ORDER — ACETAMINOPHEN 325 MG/1
975 TABLET ORAL ONCE
Status: COMPLETED | OUTPATIENT
Start: 2022-11-03 | End: 2022-11-03

## 2022-11-03 RX ORDER — MOXIFLOXACIN 5 MG/ML
1 SOLUTION/ DROPS OPHTHALMIC 4 TIMES DAILY
Qty: 3 ML | Refills: 0 | Status: SHIPPED | OUTPATIENT
Start: 2022-11-03 | End: 2022-12-12

## 2022-11-03 RX ORDER — DICLOFENAC SODIUM 1 MG/ML
1 SOLUTION/ DROPS OPHTHALMIC
Status: COMPLETED | OUTPATIENT
Start: 2022-11-03 | End: 2022-11-03

## 2022-11-03 RX ORDER — CYCLOPENTOLAT/TROPIC/PHENYLEPH 1%-1%-2.5%
1 DROPS (EA) OPHTHALMIC (EYE)
Status: COMPLETED | OUTPATIENT
Start: 2022-11-03 | End: 2022-11-03

## 2022-11-03 RX ORDER — ONDANSETRON 2 MG/ML
4 INJECTION INTRAMUSCULAR; INTRAVENOUS EVERY 30 MIN PRN
Status: DISCONTINUED | OUTPATIENT
Start: 2022-11-03 | End: 2022-11-04 | Stop reason: HOSPADM

## 2022-11-03 RX ORDER — KETOROLAC TROMETHAMINE 5 MG/ML
1 SOLUTION OPHTHALMIC 4 TIMES DAILY
Qty: 10 ML | Refills: 0 | Status: SHIPPED | OUTPATIENT
Start: 2022-11-03 | End: 2022-12-12

## 2022-11-03 RX ORDER — MOXIFLOXACIN 5 MG/ML
1 SOLUTION/ DROPS OPHTHALMIC
Status: COMPLETED | OUTPATIENT
Start: 2022-11-03 | End: 2022-11-03

## 2022-11-03 RX ORDER — SODIUM CHLORIDE, SODIUM LACTATE, POTASSIUM CHLORIDE, CALCIUM CHLORIDE 600; 310; 30; 20 MG/100ML; MG/100ML; MG/100ML; MG/100ML
INJECTION, SOLUTION INTRAVENOUS CONTINUOUS
Status: DISCONTINUED | OUTPATIENT
Start: 2022-11-03 | End: 2022-11-03 | Stop reason: HOSPADM

## 2022-11-03 RX ADMIN — Medication 1 DROP: at 10:09

## 2022-11-03 RX ADMIN — Medication 1 DROP: at 09:56

## 2022-11-03 RX ADMIN — SODIUM CHLORIDE, SODIUM LACTATE, POTASSIUM CHLORIDE, CALCIUM CHLORIDE: 600; 310; 30; 20 INJECTION, SOLUTION INTRAVENOUS at 10:09

## 2022-11-03 RX ADMIN — DICLOFENAC SODIUM 1 DROP: 1 SOLUTION/ DROPS OPHTHALMIC at 09:56

## 2022-11-03 RX ADMIN — MOXIFLOXACIN 1 DROP: 5 SOLUTION/ DROPS OPHTHALMIC at 10:01

## 2022-11-03 RX ADMIN — DICLOFENAC SODIUM 1 DROP: 1 SOLUTION/ DROPS OPHTHALMIC at 10:01

## 2022-11-03 RX ADMIN — Medication 1 DROP: at 10:01

## 2022-11-03 RX ADMIN — MOXIFLOXACIN 1 DROP: 5 SOLUTION/ DROPS OPHTHALMIC at 10:09

## 2022-11-03 RX ADMIN — ACETAMINOPHEN 975 MG: 325 TABLET ORAL at 10:00

## 2022-11-03 RX ADMIN — ACETAZOLAMIDE 500 MG: 500 INJECTION, POWDER, LYOPHILIZED, FOR SOLUTION INTRAVENOUS at 10:07

## 2022-11-03 RX ADMIN — MOXIFLOXACIN 1 DROP: 5 SOLUTION/ DROPS OPHTHALMIC at 09:56

## 2022-11-03 RX ADMIN — PROPARACAINE HYDROCHLORIDE 1 DROP: 5 SOLUTION/ DROPS OPHTHALMIC at 09:56

## 2022-11-03 RX ADMIN — FENTANYL CITRATE 25 MCG: 50 INJECTION, SOLUTION INTRAMUSCULAR; INTRAVENOUS at 10:37

## 2022-11-03 RX ADMIN — DICLOFENAC SODIUM 1 DROP: 1 SOLUTION/ DROPS OPHTHALMIC at 10:09

## 2022-11-03 ASSESSMENT — VISUAL ACUITY
METHOD: SNELLEN - LINEAR
OD_SC: 20/63

## 2022-11-03 ASSESSMENT — TONOMETRY
IOP_METHOD: TONOPEN
OD_IOP_MMHG: 21

## 2022-11-03 ASSESSMENT — SLIT LAMP EXAM - LIDS: COMMENTS: NORMAL

## 2022-11-03 NOTE — OP NOTE
PREOPERATIVE DIAGNOSIS:   1. Combined form of age-related cataract, right eye    2. Miosis    3. 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 Complex Cataract extraction with intraocular lens implant Right eye.  SURGEON: Best Padilla M.D.  INDICATIONS: The patient Melo Dangelo presented to the eye clinic with decreased vision secondary to cataract in the Right eye. The risks, benefits and alternatives to cataract extraction were discussed. The patient elected to proceed. All questions were answered to the patient's satisfaction.   DESCRIPTION OF PROCEDURE:   Prior to the procedure, IV Diamox 500mg was administered, appropriate cardiac and respiratory monitors were applied to the patient.  In the pre-operative holding area, a drop of topical tetracaine was placed in the operative eye.  The patient was brought to the operating room.  With adequate anesthesia, the Right eye was prepped and draped in the usual sterile fashion. A lid speculum was placed, and the operating microscope was rotated into position. A surgical pause was carried out to identify with all members of the surgical team the correct surgical site. A paracentesis was created.  Through this limbal paracentesis, the anterior chamber was filled with preservative-free lidocaine followed by epi-shugarcaine and viscoelastic.  A temporal wound was created at the limbus using a 2.6 mm blade. A capsulorrhexis was initiated using a bent 25-gauge needle and was completed in continuous and circular fashion using the capsulorrhexis forceps. The lens nucleus was hydrodissected using balanced salt solution.  The lens nucleus was rotated and removed using phacoemulsification in a divide and conquer technique.  Residual cortical material was removed using irrigation-aspiration.  The capsular bag was reinflated to its maximal extent with cohesive viscoelastic.  A 27.0 diopter [id number] was inserted  into the capsular bag.  The lens power selected was reviewed using the intraocular lens power measurements that were obtained preoperatively to confirm that the correct lens was selected for the desired post-operative refractive state. The residual viscoelastic was removed in its entirety, the wound were hydrated and found to be self-sealing.  Intracameral moxifloxacin was administered. Tactile pressure was confirmed to be in a normal range.  The lid speculum was removed and a shield was applied.  The patient tolerated the procedure well, and there were no complications.    PLAN: The patient will be discharged to home and will follow up today  EBL:  None  Complications:  None  Implant Name Type Inv. Item Serial No.  Lot No. LRB No. Used Action   EYE IMP IOL SWATHI ACYSOF UV SA60WF 27.0D - [id number] Lens/Eye Implant EYE IMP IOL SWATHI ACYSOF UV SA60WF 27.0D [id number] [employer]  Right 1 Implanted

## 2022-11-03 NOTE — PROGRESS NOTES
Pseudophakia, right eye, day 0  Padilla OD 11/03/22   Doing well  Keep patch in place at night for 7 days  Start post-operative drops and taper according to instructions  Post-operative do's and don'ts reviewed, questions answered    Recheck 1 week    Attending Physician Attestation:  Complete documentation of historical and exam elements from today's encounter can be found in the full encounter summary report (not reduplicated in this progress note).  I personally obtained the chief complaint(s) and history of present illness.  I confirmed and edited as necessary the review of systems, past medical/surgical history, family history, social history, and examination findings as documented by others; and I examined the patient myself.  I personally reviewed the relevant tests, images, and reports as documented above.  I formulated and edited as necessary the assessment and plan and discussed the findings and management plan with the patient and family. - Best Padilla MD

## 2022-11-03 NOTE — ANESTHESIA PREPROCEDURE EVALUATION
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      Paroxysmal atrial fibrillation (H)      PVD (posterior vitreous detachment)      Seizure disorder (H)     last seizure       Past Surgical History:   Procedure Laterality Date     NO HISTORY OF SURGERY        No Known Allergies   Social History     Tobacco Use     Smoking status: Former     Packs/day: 0.00     Types: Cigarettes     Smokeless tobacco: Never   Substance Use Topics     Alcohol use: No     Comment: History of alcohol dependence, in remission for 20 years      Wt Readings from Last 1 Encounters:   22 80.7 kg (178 lb)        Anesthesia Evaluation            ROS/MED HX  ENT/Pulmonary:  - neg pulmonary ROS     Neurologic:  - neg neurologic ROS     Cardiovascular:     (+) hypertension-----    METS/Exercise Tolerance:     Hematologic:  - neg hematologic  ROS     Musculoskeletal:  - neg musculoskeletal ROS     GI/Hepatic:  - neg GI/hepatic ROS     Renal/Genitourinary:     (+) renal disease, type: CRI, Pt does not require dialysis,     Endo:  - neg endo ROS     Psychiatric/Substance Use:  - neg psychiatric ROS     Infectious Disease:  - neg infectious disease ROS     Malignancy:       Other:               OUTSIDE LABS:  CBC:   Lab Results   Component Value Date    WBC 4.3 09/15/2022    WBC 4.8 2022    HGB 9.4 (L) 09/15/2022    HGB 9.7 (L) 2022    HCT 29.1  (L) 09/15/2022    HCT 29.4 (L) 07/29/2022     09/15/2022     07/29/2022     BMP:   Lab Results   Component Value Date     09/15/2022     07/29/2022    POTASSIUM 4.6 09/15/2022    POTASSIUM 4.8 07/29/2022    CHLORIDE 104 09/15/2022    CHLORIDE 106 07/29/2022    CO2 23 09/15/2022    CO2 28 07/29/2022    BUN 36.0 (H) 09/15/2022    BUN 55 (H) 07/29/2022    CR 3.40 (H) 09/15/2022    CR 3.91 (H) 07/29/2022    GLC 96 09/15/2022    GLC 89 07/29/2022     COAGS:   Lab Results   Component Value Date    PTT 30 10/27/2020    INR 1.04 01/20/2022    FIBR 514 (H) 11/02/2020     POC: No results found for: BGM, HCG, HCGS  HEPATIC:   Lab Results   Component Value Date    ALBUMIN 4.3 09/15/2022    PROTTOTAL 7.3 07/29/2022    ALT 18 07/29/2022    AST 20 07/29/2022    ALKPHOS 79 07/29/2022    BILITOTAL 0.3 07/29/2022     OTHER:   Lab Results   Component Value Date    LACT 0.8 01/20/2022    A1C 5.8 (H) 10/18/2021    GIANNI 9.4 09/15/2022    PHOS 3.3 09/15/2022    MAG 2.4 (H) 10/19/2021    LIPASE 97 10/27/2020    TSH 1.15 07/29/2022    CRP 4.1 (H) 11/02/2020    SED 30 (H) 10/27/2020       Anesthesia Plan    ASA Status:  2      Anesthesia Type: MAC.     - Reason for MAC: immobility needed              Consents    Anesthesia Plan(s) and associated risks, benefits, and realistic alternatives discussed. Questions answered and patient/representative(s) expressed understanding.     - Discussed: Risks, Benefits and Alternatives for BOTH SEDATION and the PROCEDURE were discussed     - Discussed with:  Patient      - Extended Intubation/Ventilatory Support Discussed: No.      - Patient is DNR/DNI Status: No    Use of blood products discussed: No .     Postoperative Care            Comments:           H&P reviewed: Unable to attach H&P to encounter due to EHR limitations. H&P Update: appropriate H&P reviewed, patient examined. No interval changes since H&P (within 30 days).         Tico Berrios MD, MD

## 2022-11-03 NOTE — DISCHARGE INSTRUCTIONS
[facility]  Home Care Following Anesthesia  For 24 hours after surgery:  Get plenty of rest.  A responsible adult must stay with you for at least 24 hours after you leave the surgery center.  Do not drive or use heavy equipment.  If you have weakness or tingling, don't drive or use heavy equipment until this feeling goes away.   Do not drink alcohol.   Avoid strenuous or risky activities.  Ask for help when climbing stairs.  You may feel lightheaded.  IF so, sit for a few minutes before standing.  Have someone help you get up.   If you have nausea (feel sick to your stomach): Drink only clear liquids such as apple juice, ginger ale, broth or 7-Up.  Rest may also help.  Be sure to drink enough fluids.  Move to a regular diet as you feel able.   You may have a slight fever.  Call the doctor if your fever is over 100 F (37.7 C) (taken under the tongue) or lasts longer than 24 hours.  You may have a dry mouth, a sore throat, muscle aches or trouble sleeping. These should go away after 24 hours.  Do not make important or legal decisions.   It is recommended to avoid smoking.   Tips for taking pain medications  To get the best pain relief possible, remember these points:  Take pain medications as directed, before pain becomes severe.  Pain medication can upset your stomach: taking it with food may help.  Constipation is a common side effect of pain medication. Drink plenty of  fluids.  Eat foods high in fiber. Take a stool softener if recommended by your doctor or pharmacist.  Do not drink alcohol, drive or operate machinery while taking pain medications.  Ask about other ways to control pain, such as with heat, ice or relaxation.    Tylenol/Acetaminophen Consumption  To help encourage the safe use of acetaminophen, the makers of TYLENOL  have lowered the maximum daily dose for single-ingredient Extra Strength TYLENOL  (acetaminophen) products sold in the [place] from 8 pills per day  (4,000 mg) to 6 pills per day (3,000 mg). The dosing interval has also changed from 2 pills every 4-6 hours to 2 pills every 6 hours.  If you feel your pain relief is insufficient, you may take Tylenol/Acetaminophen in addition to your narcotic pain medication.   Be careful not to exceed 3,000 mg of Tylenol/Acetaminophen in a 24 hour period from all sources.  If you are taking extra strength Tylenol/acetaminophen (500 mg), the maximum dose is 6 tablets in 24 hours.  If you are taking regular strength acetaminophen (325 mg), the maximum dose is 9 tablets in 24 hours.  Tylenol last given at 10:00am. Next available anytime after 4:00pm.     Call a doctor for any of the following:  Signs of infection (fever, growing tenderness at the surgery site, a large amount of drainage or bleeding, severe pain, foul-smelling drainage, redness, swelling).  It has been over 8 to 10 hours since surgery and you are still not able to urinate (pass water).  Headache for over 24 hours.  Numbness, tingling or weakness the day after surgery (if you had spinal anesthesia).  Signs of Covid-19 infection (temperature over 100 degrees, shortness of breath, cough, loss of taste/smell, generalized body aches, persistent headache, chills, sore throat, nausea/vomiting/diarrhea)  Your doctor is:  Dr. Best Padilla, Ophthalmology: [phone]                    Or dial [phone] and ask for the resident on call for:  Ophthalmology  For emergency care, call the:  [facility]:  [phone] (TTY for hearing impaired: [phone])

## 2022-11-03 NOTE — ANESTHESIA CARE TRANSFER NOTE
Patient: Melo Dangelo    Procedure: Procedure(s):  RIGHT EYE PHACOEMULSIFICATION, CATARACT, WITH INTRAOCULAR LENS IMPLANT COMPLEX CASE        Diagnosis: Combined forms of age-related cataract of both eyes [H25.813]  Diagnosis Additional Information: No value filed.    Anesthesia Type:   MAC     Note:    Oropharynx: oropharynx clear of all foreign objects and spontaneously breathing  Level of Consciousness: awake  Oxygen Supplementation: room air    Independent Airway: airway patency satisfactory and stable  Dentition: dentition unchanged  Vital Signs Stable: post-procedure vital signs reviewed and stable  Report to RN Given: handoff report given  Patient transferred to: Phase II    Handoff Report: Identifed the Patient, Identified the Reponsible Provider, Reviewed the pertinent medical history, Discussed the surgical course, Reviewed Intra-OP anesthesia mangement and issues during anesthesia, Set expectations for post-procedure period and Allowed opportunity for questions and acknowledgement of understanding      Vitals:  Vitals Value Taken Time   /75    Temp 36.2    Pulse 49    Resp 16    SpO2 97        Electronically Signed By: RAGHU MONTGOMERY  November 3, 2022  11:22 AM

## 2022-11-03 NOTE — ANESTHESIA POSTPROCEDURE EVALUATION
Patient: Melo Dangelo    Procedure: Procedure(s):  RIGHT EYE PHACOEMULSIFICATION, CATARACT, WITH INTRAOCULAR LENS IMPLANT COMPLEX CASE        Anesthesia Type:  MAC    Note:  Disposition: Outpatient   Postop Pain Control: Uneventful            Sign Out: Well controlled pain   PONV: No   Neuro/Psych: Uneventful            Sign Out: Acceptable/Baseline neuro status   Airway/Respiratory: Uneventful            Sign Out: Acceptable/Baseline resp. status   CV/Hemodynamics: Uneventful            Sign Out: Acceptable CV status; No obvious hypovolemia; No obvious fluid overload   Other NRE: NONE   DID A NON-ROUTINE EVENT OCCUR? No           Last vitals:  Vitals Value Taken Time   /73 11/03/22 1150   Temp 36.1  C (97  F) 11/03/22 1135   Pulse 52 11/03/22 1150   Resp 14 11/03/22 1150   SpO2 98 % 11/03/22 1150       Electronically Signed By: Tico Berrios MD, MD  November 3, 2022  1:59 PM

## 2022-11-15 ENCOUNTER — ANESTHESIA EVENT (OUTPATIENT)
Dept: SURGERY | Facility: AMBULATORY SURGERY CENTER | Age: 82
End: 2022-11-15
Payer: MEDICARE

## 2022-11-16 ENCOUNTER — OFFICE VISIT (OUTPATIENT)
Dept: OPHTHALMOLOGY | Facility: CLINIC | Age: 82
End: 2022-11-16
Attending: OPHTHALMOLOGY
Payer: MEDICARE

## 2022-11-16 DIAGNOSIS — H25.812 COMBINED FORM OF AGE-RELATED CATARACT, LEFT EYE: Primary | ICD-10-CM

## 2022-11-16 PROCEDURE — 99024 POSTOP FOLLOW-UP VISIT: CPT | Performed by: OPHTHALMOLOGY

## 2022-11-16 PROCEDURE — G0463 HOSPITAL OUTPT CLINIC VISIT: HCPCS

## 2022-11-16 RX ORDER — MOXIFLOXACIN 5 MG/ML
1 SOLUTION/ DROPS OPHTHALMIC 4 TIMES DAILY
Qty: 3 ML | Refills: 0 | Status: SHIPPED | OUTPATIENT
Start: 2022-11-16 | End: 2022-11-17

## 2022-11-16 RX ORDER — KETOROLAC TROMETHAMINE 5 MG/ML
1 SOLUTION OPHTHALMIC 4 TIMES DAILY
Qty: 10 ML | Refills: 0 | Status: SHIPPED | OUTPATIENT
Start: 2022-11-16 | End: 2022-11-17

## 2022-11-16 RX ORDER — PREDNISOLONE ACETATE 10 MG/ML
1-2 SUSPENSION/ DROPS OPHTHALMIC 4 TIMES DAILY
Qty: 10 ML | Refills: 0 | Status: SHIPPED | OUTPATIENT
Start: 2022-11-16 | End: 2022-11-17

## 2022-11-16 ASSESSMENT — VISUAL ACUITY
OD_SC: 20/50
OD_PH_SC: 20/20
OD_SC+: -2
OD_PH_SC+: -2
METHOD: SNELLEN - LINEAR

## 2022-11-16 ASSESSMENT — CONF VISUAL FIELD
OD_SUPERIOR_NASAL_RESTRICTION: 0
OS_SUPERIOR_TEMPORAL_RESTRICTION: 0
OD_INFERIOR_TEMPORAL_RESTRICTION: 0
OD_SUPERIOR_TEMPORAL_RESTRICTION: 3
METHOD: COUNTING FINGERS
OS_SUPERIOR_NASAL_RESTRICTION: 0
OD_INFERIOR_NASAL_RESTRICTION: 0
OS_INFERIOR_NASAL_RESTRICTION: 0
OS_INFERIOR_TEMPORAL_RESTRICTION: 3

## 2022-11-16 ASSESSMENT — EXTERNAL EXAM - LEFT EYE: OS_EXAM: NORMAL

## 2022-11-16 ASSESSMENT — TONOMETRY
IOP_METHOD: TONOPEN
OD_IOP_MMHG: 16
OS_IOP_MMHG: 16

## 2022-11-16 ASSESSMENT — EXTERNAL EXAM - RIGHT EYE: OD_EXAM: NORMAL

## 2022-11-16 ASSESSMENT — SLIT LAMP EXAM - LIDS: COMMENTS: MEIBOMIAN GLAND INSPISSATION

## 2022-11-16 NOTE — NURSING NOTE
Chief Complaints and History of Present Illnesses   Patient presents with     Follow Up For Surgery Of Eye     Chief Complaint(s) and History of Present Illness(es)     Follow Up For Surgery Of Eye            Laterality: right eye    Associated symptoms: Negative for dryness, eye pain, flashes and floaters    Treatments tried: eye drops    Pain scale: 0/10          Comments    Patient states things seem to be more clear right eye. Patient denies pain and discomfort right eye. Patient compliant with post surgical drops right eye.     VANDANA Martinez November 16, 2022 2:29 PM

## 2022-11-16 NOTE — PROGRESS NOTES
HPI     Follow Up For Surgery Of Eye    In right eye.  Associated symptoms include Negative for dryness, eye pain, flashes and floaters.  Treatments tried include eye drops.  Pain was noted as 0/10.           Comments    Patient states things seem to be more clear right eye. Patient denies pain and discomfort right eye. Patient compliant with post surgical drops right eye.     VANDANA Martinez November 16, 2022 2:29 PM           Last edited by Keerthi Siegel on 11/16/2022  2:31 PM.         Review of systems for the eyes was negative other than the pertinent positives/negatives listed in the HPI.      Assessment & Plan    HPI:  Melo Dangelo is a 82 year old male with history of CKD, IgA Nephropathy, MDD, sensorineural hearing loss, HTN, BPH, seizure history, Central serous chorioretinopathy, hyperopia with astigmatism and presbopia presents for followup Cataract extraction/iol right eye. left eye scheduled for tomorrow.     POHx: Central serous chorioretinopathy right eye, hyperopia with astigmatism and presbyopia  PMHx: CKD, IgA Nephropathy, MDD, sensorineural hearing loss, Central serous chorioretinopathy, HTN, BPH, seizure  Current Medications: aspirin (ASA) 81 MG EC tablet, Take 1 tablet (81 mg) by mouth daily (Patient not taking: Reported on 11/1/2022)  calcium carb-cholecalciferol (OS-GIANNI) 500-200 MG-UNIT tablet, Take 1 tablet by mouth 2 times daily (with meals) (Patient taking differently: Take 1 tablet by mouth every morning)  cyanocobalamin (VITAMIN B-12) 100 MCG tablet, Take 1,000 mcg by mouth every morning  escitalopram (LEXAPRO) 20 MG tablet, Take 20 mg by mouth every morning  hydrOXYzine (ATARAX) 10 MG tablet, Take 1 tablet by mouth 3 times daily as needed (Patient not taking: Reported on 11/1/2022)  ketorolac (ACULAR) 0.5 % ophthalmic solution, Place 1 drop into the right eye 4 times daily  lamoTRIgine (LAMICTAL) 100 MG tablet, Take 2 tablets (200 mg) by mouth 2 times daily  metoprolol succinate ER  (TOPROL XL) 25 MG 24 hr tablet, Take 1/2 tab a day (12.5 mg) (Patient taking differently: 12.5 mg every morning Take 1/2 tab a day (12.5 mg))  molnupiravir (LAGEVRIO) 200 MG capsule, Take 4 capsules (800 mg) by mouth every 12 hours (Patient not taking: Reported on 11/1/2022)  moxifloxacin (VIGAMOX) 0.5 % ophthalmic solution, Place 1 drop into the right eye 4 times daily  Multiple Vitamins-Minerals (EYE VITAMINS PO), Take by mouth daily  ondansetron (ZOFRAN ODT) 8 MG ODT tab, Take 1 tablet (8 mg) by mouth every 8 hours as needed for nausea  prednisoLONE acetate (PRED FORTE) 1 % ophthalmic suspension, Place 1-2 drops into the right eye 4 times daily  simvastatin (ZOCOR) 20 MG tablet, Take 1 tablet (20 mg) by mouth At Bedtime  Sodium Chloride-Sodium Bicarb (SINUS WASH SALINE REFILLS) 2300-700 MG PACK, Wash out sinuses up to twice daily as needed for comfort.  tamsulosin (FLOMAX) 0.4 MG capsule, Take 2 capsules (0.8 mg) by mouth daily (Patient taking differently: Take 0.8 mg by mouth every evening)    bevacizumab (AVASTIN) intravitreal inj 1.25 mg      FHx: denies family history of ocular conditions   PSHx: Cataract extraction/iol right eye 11/3/22 (Padilla)      Current Eye Medications:  PFATs as needed  pred forte/ketorolac four times a day        Assessment & Plan:  Pseudophakia, right eye, week 2  [facility] OD 11/3/22   Doing well  Continue drop taper    (H25.813) Combined forms of age-related cataract of both eyes  (primary encounter diagnosis)  Special equipment/needs:  Eye: left  Anesthesia:topical  Dilates to: 6.5  Iris expansion:  Possible  Pseudoexfoliation: No  Trypan Blue: No  Trauma: No    Able lay to flat: Yes  Blood Thinner: No   Tamsulosin: Yes  DM: No  Guttae: No    Dominant Eye: left    Plan: -0.75 right eye, plano left eye, patient defers toric left eye due to cost  Resident involvement discussed    (H35.711) Central serous chorioretinopathy of right eye  Initially noted 10/2019  Follows with   [facility]    (H47.231) Cupping of optic disc, right  IOP within normal limits, no appreciable thinning to dilated exam  Observe for now      Return for as scheduled.        Best Padilla MD     Attending Physician Attestation:  Complete documentation of historical and exam elements from today's encounter can be found in the full encounter summary report (not reduplicated in this progress note).  I personally obtained the chief complaint(s) and history of present illness.  I confirmed and edited as necessary the review of systems, past medical/surgical history, family history, social history, and examination findings as documented by others; and I examined the patient myself.  I personally reviewed the relevant tests, images, and reports as documented above.  I formulated and edited as necessary the assessment and plan and discussed the findings and management plan with the patient and family. - Best Padilla MD

## 2022-11-17 ENCOUNTER — HOSPITAL ENCOUNTER (OUTPATIENT)
Facility: AMBULATORY SURGERY CENTER | Age: 82
Discharge: HOME | End: 2022-11-17
Attending: OPHTHALMOLOGY
Payer: MEDICARE

## 2022-11-17 ENCOUNTER — OFFICE VISIT (OUTPATIENT)
Dept: OPHTHALMOLOGY | Facility: CLINIC | Age: 82
End: 2022-11-17

## 2022-11-17 ENCOUNTER — ANESTHESIA (OUTPATIENT)
Dept: SURGERY | Facility: AMBULATORY SURGERY CENTER | Age: 82
End: 2022-11-17
Payer: MEDICARE

## 2022-11-17 VITALS
HEART RATE: 50 BPM | OXYGEN SATURATION: 98 % | TEMPERATURE: 97 F | BODY MASS INDEX: 24.92 KG/M2 | WEIGHT: 178 LBS | HEIGHT: 71 IN | SYSTOLIC BLOOD PRESSURE: 134 MMHG | RESPIRATION RATE: 16 BRPM | DIASTOLIC BLOOD PRESSURE: 67 MMHG

## 2022-11-17 DIAGNOSIS — H25.812 COMBINED FORM OF AGE-RELATED CATARACT, LEFT EYE: Primary | ICD-10-CM

## 2022-11-17 DIAGNOSIS — Z98.890 POSTOPERATIVE EYE STATE: Primary | ICD-10-CM

## 2022-11-17 DIAGNOSIS — H25.813 COMBINED FORMS OF AGE-RELATED CATARACT OF BOTH EYES: ICD-10-CM

## 2022-11-17 PROCEDURE — 66984 XCAPSL CTRC RMVL W/O ECP: CPT | Mod: 79 | Performed by: OPHTHALMOLOGY

## 2022-11-17 PROCEDURE — 66984 XCAPSL CTRC RMVL W/O ECP: CPT | Mod: LT

## 2022-11-17 PROCEDURE — 99024 POSTOP FOLLOW-UP VISIT: CPT | Mod: GC | Performed by: OPHTHALMOLOGY

## 2022-11-17 DEVICE — ACRYSOF(R) ASPHERIC IOL, SINGLE-PIECE ACRYLICFOLDABLE POSTERIOR CHAMBER LENS,UV-ABSORBING, 13.0MM LENGTH, 6.0MM ANTERIORASYMMETRIC BICONVEX OPTIC, PLANAR HAPTICS.
Type: IMPLANTABLE DEVICE | Site: EYE | Status: FUNCTIONAL
Brand: ACRYSOF®

## 2022-11-17 RX ORDER — PREDNISOLONE ACETATE 10 MG/ML
1-2 SUSPENSION/ DROPS OPHTHALMIC 4 TIMES DAILY
Qty: 10 ML | Refills: 0 | Status: SHIPPED | OUTPATIENT
Start: 2022-11-17 | End: 2022-12-12

## 2022-11-17 RX ORDER — FENTANYL CITRATE 50 UG/ML
25 INJECTION, SOLUTION INTRAMUSCULAR; INTRAVENOUS
Status: DISCONTINUED | OUTPATIENT
Start: 2022-11-17 | End: 2022-11-18 | Stop reason: HOSPADM

## 2022-11-17 RX ORDER — HYDROMORPHONE HYDROCHLORIDE 1 MG/ML
0.2 INJECTION, SOLUTION INTRAMUSCULAR; INTRAVENOUS; SUBCUTANEOUS EVERY 5 MIN PRN
Status: DISCONTINUED | OUTPATIENT
Start: 2022-11-17 | End: 2022-11-17 | Stop reason: HOSPADM

## 2022-11-17 RX ORDER — MOXIFLOXACIN 5 MG/ML
1 SOLUTION/ DROPS OPHTHALMIC 4 TIMES DAILY
Qty: 3 ML | Refills: 0 | Status: SHIPPED | OUTPATIENT
Start: 2022-11-17 | End: 2022-12-12

## 2022-11-17 RX ORDER — ONDANSETRON 4 MG/1
4 TABLET, ORALLY DISINTEGRATING ORAL EVERY 30 MIN PRN
Status: DISCONTINUED | OUTPATIENT
Start: 2022-11-17 | End: 2022-11-18 | Stop reason: HOSPADM

## 2022-11-17 RX ORDER — DICLOFENAC SODIUM 1 MG/ML
1 SOLUTION/ DROPS OPHTHALMIC
Status: COMPLETED | OUTPATIENT
Start: 2022-11-17 | End: 2022-11-17

## 2022-11-17 RX ORDER — MOXIFLOXACIN 5 MG/ML
1 SOLUTION/ DROPS OPHTHALMIC
Status: COMPLETED | OUTPATIENT
Start: 2022-11-17 | End: 2022-11-17

## 2022-11-17 RX ORDER — MEPERIDINE HYDROCHLORIDE 25 MG/ML
12.5 INJECTION INTRAMUSCULAR; INTRAVENOUS; SUBCUTANEOUS
Status: DISCONTINUED | OUTPATIENT
Start: 2022-11-17 | End: 2022-11-18 | Stop reason: HOSPADM

## 2022-11-17 RX ORDER — FENTANYL CITRATE 50 UG/ML
INJECTION, SOLUTION INTRAMUSCULAR; INTRAVENOUS PRN
Status: DISCONTINUED | OUTPATIENT
Start: 2022-11-17 | End: 2022-11-17

## 2022-11-17 RX ORDER — SODIUM CHLORIDE, SODIUM LACTATE, POTASSIUM CHLORIDE, CALCIUM CHLORIDE 600; 310; 30; 20 MG/100ML; MG/100ML; MG/100ML; MG/100ML
INJECTION, SOLUTION INTRAVENOUS CONTINUOUS
Status: DISCONTINUED | OUTPATIENT
Start: 2022-11-17 | End: 2022-11-17 | Stop reason: HOSPADM

## 2022-11-17 RX ORDER — MOXIFLOXACIN IN NACL,ISO-OS/PF 0.3MG/0.3
SYRINGE (ML) INTRAOCULAR PRN
Status: DISCONTINUED | OUTPATIENT
Start: 2022-11-17 | End: 2022-11-17 | Stop reason: HOSPADM

## 2022-11-17 RX ORDER — ACETAZOLAMIDE 500 MG/5ML
500 INJECTION, POWDER, LYOPHILIZED, FOR SOLUTION INTRAVENOUS ONCE
Status: COMPLETED | OUTPATIENT
Start: 2022-11-17 | End: 2022-11-17

## 2022-11-17 RX ORDER — CYCLOPENTOLAT/TROPIC/PHENYLEPH 1%-1%-2.5%
1 DROPS (EA) OPHTHALMIC (EYE)
Status: COMPLETED | OUTPATIENT
Start: 2022-11-17 | End: 2022-11-17

## 2022-11-17 RX ORDER — ACETAMINOPHEN 325 MG/1
975 TABLET ORAL ONCE
Status: COMPLETED | OUTPATIENT
Start: 2022-11-17 | End: 2022-11-17

## 2022-11-17 RX ORDER — BALANCED SALT SOLUTION 6.4; .75; .48; .3; 3.9; 1.7 MG/ML; MG/ML; MG/ML; MG/ML; MG/ML; MG/ML
SOLUTION OPHTHALMIC PRN
Status: DISCONTINUED | OUTPATIENT
Start: 2022-11-17 | End: 2022-11-17 | Stop reason: HOSPADM

## 2022-11-17 RX ORDER — PROPARACAINE HYDROCHLORIDE 5 MG/ML
1 SOLUTION/ DROPS OPHTHALMIC ONCE
Status: COMPLETED | OUTPATIENT
Start: 2022-11-17 | End: 2022-11-17

## 2022-11-17 RX ORDER — SODIUM CHLORIDE, SODIUM LACTATE, POTASSIUM CHLORIDE, CALCIUM CHLORIDE 600; 310; 30; 20 MG/100ML; MG/100ML; MG/100ML; MG/100ML
INJECTION, SOLUTION INTRAVENOUS CONTINUOUS
Status: DISCONTINUED | OUTPATIENT
Start: 2022-11-17 | End: 2022-11-18 | Stop reason: HOSPADM

## 2022-11-17 RX ORDER — KETOROLAC TROMETHAMINE 5 MG/ML
1 SOLUTION OPHTHALMIC 4 TIMES DAILY
Qty: 10 ML | Refills: 0 | Status: SHIPPED | OUTPATIENT
Start: 2022-11-17 | End: 2022-12-12

## 2022-11-17 RX ORDER — LIDOCAINE 40 MG/G
CREAM TOPICAL
Status: DISCONTINUED | OUTPATIENT
Start: 2022-11-17 | End: 2022-11-17 | Stop reason: HOSPADM

## 2022-11-17 RX ORDER — FENTANYL CITRATE 50 UG/ML
25 INJECTION, SOLUTION INTRAMUSCULAR; INTRAVENOUS EVERY 5 MIN PRN
Status: DISCONTINUED | OUTPATIENT
Start: 2022-11-17 | End: 2022-11-17 | Stop reason: HOSPADM

## 2022-11-17 RX ORDER — LIDOCAINE HYDROCHLORIDE 10 MG/ML
INJECTION, SOLUTION EPIDURAL; INFILTRATION; INTRACAUDAL; PERINEURAL PRN
Status: DISCONTINUED | OUTPATIENT
Start: 2022-11-17 | End: 2022-11-17 | Stop reason: HOSPADM

## 2022-11-17 RX ORDER — PROPARACAINE HYDROCHLORIDE 5 MG/ML
1 SOLUTION/ DROPS OPHTHALMIC
Status: DISCONTINUED | OUTPATIENT
Start: 2022-11-17 | End: 2022-11-17 | Stop reason: HOSPADM

## 2022-11-17 RX ORDER — TETRACAINE HYDROCHLORIDE 5 MG/ML
SOLUTION OPHTHALMIC PRN
Status: DISCONTINUED | OUTPATIENT
Start: 2022-11-17 | End: 2022-11-17 | Stop reason: HOSPADM

## 2022-11-17 RX ORDER — ONDANSETRON 2 MG/ML
4 INJECTION INTRAMUSCULAR; INTRAVENOUS EVERY 30 MIN PRN
Status: DISCONTINUED | OUTPATIENT
Start: 2022-11-17 | End: 2022-11-18 | Stop reason: HOSPADM

## 2022-11-17 RX ORDER — OXYCODONE HYDROCHLORIDE 5 MG/1
5 TABLET ORAL EVERY 4 HOURS PRN
Status: DISCONTINUED | OUTPATIENT
Start: 2022-11-17 | End: 2022-11-18 | Stop reason: HOSPADM

## 2022-11-17 RX ADMIN — MOXIFLOXACIN 1 DROP: 5 SOLUTION/ DROPS OPHTHALMIC at 09:10

## 2022-11-17 RX ADMIN — Medication 1 DROP: at 08:53

## 2022-11-17 RX ADMIN — Medication 1 DROP: at 09:01

## 2022-11-17 RX ADMIN — PROPARACAINE HYDROCHLORIDE 1 DROP: 5 SOLUTION/ DROPS OPHTHALMIC at 08:53

## 2022-11-17 RX ADMIN — DICLOFENAC SODIUM 1 DROP: 1 SOLUTION/ DROPS OPHTHALMIC at 09:10

## 2022-11-17 RX ADMIN — PROPARACAINE HYDROCHLORIDE 1 DROP: 5 SOLUTION/ DROPS OPHTHALMIC at 09:10

## 2022-11-17 RX ADMIN — Medication 1 DROP: at 09:10

## 2022-11-17 RX ADMIN — DICLOFENAC SODIUM 1 DROP: 1 SOLUTION/ DROPS OPHTHALMIC at 08:54

## 2022-11-17 RX ADMIN — FENTANYL CITRATE 25 MCG: 50 INJECTION, SOLUTION INTRAMUSCULAR; INTRAVENOUS at 09:47

## 2022-11-17 RX ADMIN — ACETAMINOPHEN 975 MG: 325 TABLET ORAL at 09:09

## 2022-11-17 RX ADMIN — PROPARACAINE HYDROCHLORIDE 1 DROP: 5 SOLUTION/ DROPS OPHTHALMIC at 09:01

## 2022-11-17 RX ADMIN — MOXIFLOXACIN 1 DROP: 5 SOLUTION/ DROPS OPHTHALMIC at 09:01

## 2022-11-17 RX ADMIN — ACETAZOLAMIDE 500 MG: 500 INJECTION, POWDER, LYOPHILIZED, FOR SOLUTION INTRAVENOUS at 09:18

## 2022-11-17 RX ADMIN — DICLOFENAC SODIUM 1 DROP: 1 SOLUTION/ DROPS OPHTHALMIC at 09:01

## 2022-11-17 RX ADMIN — SODIUM CHLORIDE, SODIUM LACTATE, POTASSIUM CHLORIDE, CALCIUM CHLORIDE: 600; 310; 30; 20 INJECTION, SOLUTION INTRAVENOUS at 09:18

## 2022-11-17 RX ADMIN — MOXIFLOXACIN 1 DROP: 5 SOLUTION/ DROPS OPHTHALMIC at 08:53

## 2022-11-17 ASSESSMENT — VISUAL ACUITY
OS_SC: 20/63
METHOD: SNELLEN - LINEAR

## 2022-11-17 ASSESSMENT — SLIT LAMP EXAM - LIDS: COMMENTS: NORMAL

## 2022-11-17 ASSESSMENT — ENCOUNTER SYMPTOMS
DYSRHYTHMIAS: 1
SEIZURES: 1

## 2022-11-17 ASSESSMENT — TONOMETRY
IOP_METHOD: TONOPEN
OS_IOP_MMHG: 19

## 2022-11-17 ASSESSMENT — LIFESTYLE VARIABLES: TOBACCO_USE: 1

## 2022-11-17 NOTE — DISCHARGE INSTRUCTIONS
[facility]  Home Care Following Anesthesia  For 24 hours after surgery:  Get plenty of rest.  A responsible adult must stay with you for at least 24 hours after you leave the surgery center.  Do not drive or use heavy equipment.  If you have weakness or tingling, don't drive or use heavy equipment until this feeling goes away.   Do not drink alcohol.   Avoid strenuous or risky activities.  Ask for help when climbing stairs.  You may feel lightheaded.  IF so, sit for a few minutes before standing.  Have someone help you get up.   If you have nausea (feel sick to your stomach): Drink only clear liquids such as apple juice, ginger ale, broth or 7-Up.  Rest may also help.  Be sure to drink enough fluids.  Move to a regular diet as you feel able.   You may have a slight fever.  Call the doctor if your fever is over 100 F (37.7 C) (taken under the tongue) or lasts longer than 24 hours.  You may have a dry mouth, a sore throat, muscle aches or trouble sleeping. These should go away after 24 hours.  Do not make important or legal decisions.   It is recommended to avoid smoking.               Tips for taking pain medications  To get the best pain relief possible, remember these points:  Take pain medications as directed, before pain becomes severe.  Pain medication can upset your stomach: taking it with food may help.  Constipation is a common side effect of pain medication. Drink plenty of  fluids.  Eat foods high in fiber. Take a stool softener if recommended by your doctor or pharmacist.  Do not drink alcohol, drive or operate machinery while taking pain medications.  Ask about other ways to control pain, such as with heat, ice or relaxation.    Tylenol/Acetaminophen Consumption  To help encourage the safe use of acetaminophen, the makers of TYLENOL  have lowered the maximum daily dose for single-ingredient Extra Strength TYLENOL  (acetaminophen) products sold in the [place] from 8 pills  per day (4,000 mg) to 6 pills per day (3,000 mg). The dosing interval has also changed from 2 pills every 4-6 hours to 2 pills every 6 hours.  If you feel your pain relief is insufficient, you may take Tylenol/Acetaminophen in addition to your narcotic pain medication.   Be careful not to exceed 3,000 mg of Tylenol/Acetaminophen in a 24 hour period from all sources.  If you are taking extra strength Tylenol/acetaminophen (500 mg), the maximum dose is 6 tablets in 24 hours.  If you are taking regular strength acetaminophen (325 mg), the maximum dose is 9 tablets in 24 hours.    Call a doctor for any of the following:  Signs of infection (fever, growing tenderness at the surgery site, a large amount of drainage or bleeding, severe pain, foul-smelling drainage, redness, swelling).  It has been over 8 to 10 hours since surgery and you are still not able to urinate (pass water).  Headache for over 24 hours.  Numbness, tingling or weakness the day after surgery (if you had spinal anesthesia).  Signs of Covid-19 infection (temperature over 100 degrees, shortness of breath, cough, loss of taste/smell, generalized body aches, persistent headache, chills, sore throat, nausea/vomiting/diarrhea)  Your doctor is:  Dr. Best Padilla, Ophthalmology: [phone]                    Or dial [phone] and ask for the resident on call for:  Ophthalmology  For emergency care, call the:  [facility]:  [phone] (TTY for hearing impaired: [phone])                   LLE not tested/no Active ROM deficits were identified

## 2022-11-17 NOTE — ANESTHESIA POSTPROCEDURE EVALUATION
Patient: Melo Dangelo    Procedure: Procedure(s):  LEFT EYE PHACOEMULSIFICATION, CATARACT, WITH INTRAOCULAR LENS IMPLANT       Anesthesia Type:  MAC    Note:  Disposition: Outpatient   Postop Pain Control: Uneventful            Sign Out: Well controlled pain   PONV: No   Neuro/Psych: Uneventful            Sign Out: Acceptable/Baseline neuro status   Airway/Respiratory: Uneventful            Sign Out: Acceptable/Baseline resp. status   CV/Hemodynamics: Uneventful            Sign Out: Acceptable CV status; No obvious hypovolemia; No obvious fluid overload   Other NRE: NONE   DID A NON-ROUTINE EVENT OCCUR? No           Last vitals:  Vitals Value Taken Time   /67 11/17/22 1045   Temp 36.1  C (97  F) 11/17/22 1028   Pulse 50 11/17/22 1045   Resp 16 11/17/22 1045   SpO2 98 % 11/17/22 1045       Electronically Signed By: Carlos Wagner MD  November 17, 2022  1:08 PM

## 2022-11-17 NOTE — PROGRESS NOTES
POD#0, status post cataract surgery, LEFT eye     Impression/Plan:  Pseudophakia, LEFT EYE: POD0, good post-operative appearance. IOP reasonable.     Eye protection at all times and eye shield at night for 1 week.     Limited activities with no exercise or heavy lifting for 1 week.     Instructed patient to contact us for decreasing vision, eye pain, new floaters or flashes of light or other concerning symptoms.     Written instructions given    Drops:  Moxifloxacin QID for 7 days in operative eye  Prednisolone 4/3/2/1 taper every 7 days in operative eye  Ketorolac 4/3/2/1 taper every 7 days in operative eye    Follow up scheduled on 11/23/22    All questions were answered    Qiana Garsia MD  Ophthalmology Resident, PGY-4  [facility]

## 2022-11-17 NOTE — OP NOTE
PREOPERATIVE DIAGNOSIS:   1. Combined form of age-related cataract, left eye         Left eye   POSTOPERATIVE DIAGNOSIS: Same   PROCEDURES:   1. Cataract extraction with intraocular lens implant Left eye.  SURGEON: Best Padilla M.D.  ASSISTANT: Jatin Shahid MD; Qiana Garsia MD  INDICATIONS: The patient Melo Dangelo presented to the eye clinic with decreased vision secondary to cataract in the Left eye. The risks, benefits and alternatives to cataract extraction were discussed. The patient elected to proceed. All questions were answered to the patient's satisfaction.   DESCRIPTION OF PROCEDURE:   Prior to the procedure, appropriate cardiac and respiratory monitors were applied to the patient.  In the pre-operative holding area, a drop of topical tetracaine was placed in the operative eye. The patient received 500mg IV diamox.  The patient was brought to the operating room.  With adequate anesthesia, the Left eye was prepped and draped in the usual sterile fashion. A lid speculum was placed, and the operating microscope was rotated into position. A surgical pause was carried out to identify with all members of the surgical team the correct surgical site. A paracentesis was created.  Through this limbal paracentesis, the anterior chamber was filled with preservative-free lidocaine followed by epi-shugarcaine and viscoelastic.  A temporal wound was created at the limbus using a 2.6 mm blade. A capsulorrhexis was initiated using a bent 25-gauge needle and was completed in continuous and circular fashion using the capsulorrhexis forceps. The lens nucleus was hydrodissected using balanced salt solution.  The lens nucleus was rotated and removed using phacoemulsification in a divide and conquer technique.  Residual cortical material was removed using irrigation-aspiration.  The capsular bag was reinflated to its maximal extent with cohesive viscoelastic.  A 26.5 diopter SA60WF was inserted into the capsular bag.   The lens power selected was reviewed using the intraocular lens power measurements that were obtained preoperatively to confirm that the correct lens was selected for the desired post-operative refractive state. The residual viscoelastic was removed in its entirety, the wound were hydrated and found to be self-sealing.  Intracameral moxifloxacin was administered. Tactile pressure was confirmed to be in a normal range.  The lid speculum was removed and a shield was applied.  The patient tolerated the procedure well, and there were no complications.    PLAN: The patient will be discharged to home and will follow up today  EBL:  None  Complications:  None  Implant Name Type Inv. Item Serial No.  Lot No. LRB No. Used Action   EYE IMP IOL SWATHI ACYSOF UV SA60WF 26.5D - [id number] Lens/Eye Implant EYE IMP IOL SWATHI ACYSOF UV SA60WF 26.5D [id number] [employer]  Left 1 Implanted     Attending Physician Procedure Attestation: I was present for the entire procedure and was available to assist as needed-Best Padilla MD

## 2022-11-17 NOTE — ANESTHESIA PREPROCEDURE EVALUATION
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      Paroxysmal atrial fibrillation (H)      PVD (posterior vitreous detachment)      Seizure disorder (H)     last seizure       Past Surgical History:   Procedure Laterality Date     NO HISTORY OF SURGERY       PHACOEMULSIFICATION CLEAR CORNEA WITH STANDARD INTRAOCULAR LENS IMPLANT Right 11/3/2022    Procedure: RIGHT EYE PHACOEMULSIFICATION, CATARACT, WITH INTRAOCULAR LENS IMPLANT COMPLEX CASE ;  Surgeon: Best Padilla MD;  Location: [facility] OR      No Known Allergies   Social History     Tobacco Use     Smoking status: Former     Packs/day: 0.00     Types: Cigarettes     Smokeless tobacco: Never   Substance Use Topics     Alcohol use: No     Comment: History of alcohol dependence, in remission for 20 years      Wt Readings from Last 1 Encounters:   22 80.7 kg (178 lb)        Anesthesia Evaluation   Pt has not had prior anesthetic         ROS/MED HX  ENT/Pulmonary: Comment: 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 Recent Zio patch without a fib), a-fib, Previous cardiac testing   Echo: Date: 22 Results:    Stress Test: Date: Results:    ECG  Reviewed: Date: 8/22/22 Results:  Sinus bradycardia with 1st degree A-V block   Septal infarct , age undetermined   Abnormal ECG    Cath: Date: Results:      METS/Exercise Tolerance: 4 - Raking leaves, gardening    Hematologic:     (+) anemia (normal iron studies),  (-) history of blood clots and history of blood transfusion   Musculoskeletal:       GI/Hepatic:  - neg GI/hepatic ROS     Renal/Genitourinary: Comment: IgA nephropathy     Baseline creatinine 3.3-3.9    (+) renal disease, type: CRI, Pt does not require dialysis,     Endo:  - neg endo ROS     Psychiatric/Substance Use:     (+) psychiatric history depression and anxiety alcohol abuse (sober)     Infectious Disease: Comment: COVID-19 infection 11/16/20 and 9/2022      Malignancy:  - neg malignancy ROS     Other:  - neg other ROS          Physical Exam    Airway  airway exam normal           Respiratory Devices and Support         Dental  no notable dental history         Cardiovascular   cardiovascular exam normal          Pulmonary   pulmonary exam normal                OUTSIDE LABS:  CBC:   Lab Results   Component Value Date    WBC 4.3 09/15/2022    WBC 4.8 07/29/2022    HGB 9.4 (L) 09/15/2022    HGB 9.7 (L) 07/29/2022    HCT 29.1 (L) 09/15/2022    HCT 29.4 (L) 07/29/2022     09/15/2022     07/29/2022     BMP:   Lab Results   Component Value Date     09/15/2022     07/29/2022    POTASSIUM 4.6 09/15/2022    POTASSIUM 4.8 07/29/2022    CHLORIDE 104 09/15/2022    CHLORIDE 106 07/29/2022    CO2 23 09/15/2022    CO2 28 07/29/2022    BUN 36.0 (H) 09/15/2022    BUN 55 (H) 07/29/2022    CR 3.40 (H) 09/15/2022    CR 3.91 (H) 07/29/2022    GLC 96 09/15/2022    GLC 89 07/29/2022     COAGS:   Lab Results   Component Value Date    PTT 30 10/27/2020    INR 1.04 01/20/2022    FIBR 514 (H) 11/02/2020     POC: No results found for: BGM, HCG, HCGS  HEPATIC:   Lab Results   Component Value Date    ALBUMIN 4.3 09/15/2022    PROTTOTAL 7.3 07/29/2022     ALT 18 07/29/2022    AST 20 07/29/2022    ALKPHOS 79 07/29/2022    BILITOTAL 0.3 07/29/2022     OTHER:   Lab Results   Component Value Date    LACT 0.8 01/20/2022    A1C 5.8 (H) 10/18/2021    GIANNI 9.4 09/15/2022    PHOS 3.3 09/15/2022    MAG 2.4 (H) 10/19/2021    LIPASE 97 10/27/2020    TSH 1.15 07/29/2022    CRP 4.1 (H) 11/02/2020    SED 30 (H) 10/27/2020       Anesthesia Plan    ASA Status:  2   NPO Status:  NPO Appropriate    Anesthesia Type: MAC.     - Reason for MAC: straight local not clinically adequate   Induction: Intravenous.   Maintenance: TIVA.        Consents    Anesthesia Plan(s) and associated risks, benefits, and realistic alternatives discussed. Questions answered and patient/representative(s) expressed understanding.    - Discussed:     - Discussed with:  Patient      - Extended Intubation/Ventilatory Support Discussed: No.      - Patient is DNR/DNI Status: No    Use of blood products discussed: No .     Postoperative Care       PONV prophylaxis: Ondansetron (or other 5HT-3)     Comments:                Carlos Wagner MD

## 2022-11-17 NOTE — ANESTHESIA CARE TRANSFER NOTE
Patient: Melo Dangelo    Procedure: Procedure(s):  LEFT EYE PHACOEMULSIFICATION, CATARACT, WITH INTRAOCULAR LENS IMPLANT       Diagnosis: Combined forms of age-related cataract of both eyes [H25.813]  Diagnosis Additional Information: No value filed.    Anesthesia Type:   No value filed.     Note:    Oropharynx: oropharynx clear of all foreign objects and spontaneously breathing  Level of Consciousness: awake  Oxygen Supplementation: room air    Independent Airway: airway patency satisfactory and stable  Dentition: dentition unchanged  Vital Signs Stable: post-procedure vital signs reviewed and stable  Report to RN Given: handoff report given  Patient transferred to: Phase II    Handoff Report: Identifed the Patient, Identified the Reponsible Provider, Reviewed the pertinent medical history, Discussed the surgical course, Reviewed Intra-OP anesthesia mangement and issues during anesthesia, Set expectations for post-procedure period and Allowed opportunity for questions and acknowledgement of understanding      Vitals:  Vitals Value Taken Time   /76 11/17/22 1028   Temp 36.1  C (97  F) 11/17/22 1028   Pulse 52 11/17/22 1028   Resp 16 11/17/22 1028   SpO2 99 % 11/17/22 1028       Electronically Signed By: ELIZABETH Salas CRNA  November 17, 2022  10:29 AM

## 2022-11-23 ENCOUNTER — OFFICE VISIT (OUTPATIENT)
Dept: OPHTHALMOLOGY | Facility: CLINIC | Age: 82
End: 2022-11-23
Attending: OPHTHALMOLOGY
Payer: MEDICARE

## 2022-11-23 DIAGNOSIS — H25.812 COMBINED FORM OF AGE-RELATED CATARACT, LEFT EYE: ICD-10-CM

## 2022-11-23 DIAGNOSIS — Z98.890 POSTOPERATIVE EYE STATE: Primary | ICD-10-CM

## 2022-11-23 PROCEDURE — G0463 HOSPITAL OUTPT CLINIC VISIT: HCPCS

## 2022-11-23 ASSESSMENT — EXTERNAL EXAM - RIGHT EYE: OD_EXAM: NORMAL

## 2022-11-23 ASSESSMENT — TONOMETRY
OS_IOP_MMHG: 17
IOP_METHOD: TONOPEN
OD_IOP_MMHG: 18

## 2022-11-23 ASSESSMENT — EXTERNAL EXAM - LEFT EYE: OS_EXAM: NORMAL

## 2022-11-23 ASSESSMENT — VISUAL ACUITY
OD_SC: 20/100
OD_SC+: -1
OD_PH_SC: 20/30
OS_PH_SC: 20/30
OS_SC: 20/70
OD_PH_SC+: +2
OS_SC+: -1
METHOD: SNELLEN - LINEAR

## 2022-11-23 ASSESSMENT — SLIT LAMP EXAM - LIDS
COMMENTS: MEIBOMIAN GLAND INSPISSATION
COMMENTS: MEIBOMIAN GLAND DYSFUNCTION

## 2022-11-23 NOTE — NURSING NOTE
Chief Complaints and History of Present Illnesses   Patient presents with     Post Op (Ophthalmology) Both Eyes     Cataract extraction with IOL in the left eye on 11/17/2022    Cataract extraction with IOL in the right eye on 11/03/2022     Chief Complaint(s) and History of Present Illness(es)     Post Op (Ophthalmology) Both Eyes            Laterality: both eyes    Course: gradually improving    Associated symptoms: dryness (mild, intermittent).  Negative for eye pain, redness and headache    Treatments tried: eye drops    Pain scale: 0/10    Comments: Cataract extraction with IOL in the left eye on 11/17/2022    Cataract extraction with IOL in the right eye on 11/03/2022          Comments    Patient returns for a post operative eye exam.  His vision seems a bit blurred in his right eye.      He is using:  Moxifloxacin 4x a day in the left eye, 2-3 times a day in the right eye -restarted drop in right eye because he was waking with eye dryness  Prednisolone 4x a day in the left eye, 2-3 x right eye  Ketorolac 4x a day in the left eye, 2-3x right eye    Naila Valente, COT 1:29 PM  November 23, 2022

## 2022-11-23 NOTE — PROGRESS NOTES
HPI     Post Op (Ophthalmology) Both Eyes    In both eyes.  Since onset it is gradually improving.  Associated symptoms include dryness (mild, intermittent).  Negative for eye pain, redness and headache.  Treatments tried include eye drops.  Pain was noted as 0/10. Additional comments: Cataract extraction with IOL in the left eye on 11/17/2022    Cataract extraction with IOL in the right eye on 11/03/2022           Comments    Patient returns for a post operative eye exam.  His vision seems a bit blurred in his right eye.      He is using:  Moxifloxacin 4x a day in the left eye, 2-3 times a day in the right eye -restarted drop in right eye because he was waking with eye dryness  Prednisolone 4x a day in the left eye, 2-3 x right eye  Ketorolac 4x a day in the left eye, 2-3x right eye    MICHAELA Sloan 1:29 PM  November 23, 2022               Last edited by Naila Valente COT on 11/23/2022  1:30 PM.         Review of systems for the eyes was negative other than the pertinent positives/negatives listed in the HPI.      Assessment & Plan    HPI:  Melo Dangelo is a 82 year old male with history of CKD, IgA Nephropathy, MDD, sensorineural hearing loss, HTN, BPH, seizure history, Central serous chorioretinopathy, hyperopia with astigmatism and presbopia presents for followup Cataract extraction/iol both eyes Padilla     POHx: Central serous chorioretinopathy right eye, hyperopia with astigmatism and presbyopia  PMHx: CKD, IgA Nephropathy, MDD, sensorineural hearing loss, Central serous chorioretinopathy, HTN, BPH, seizure  Current Medications: calcium carb-cholecalciferol (OS-GIANNI) 500-200 MG-UNIT tablet, Take 1 tablet by mouth 2 times daily (with meals) (Patient taking differently: Take 1 tablet by mouth every morning)  cyanocobalamin (VITAMIN B-12) 100 MCG tablet, Take 1,000 mcg by mouth every morning  escitalopram (LEXAPRO) 20 MG tablet, Take 20 mg by mouth every morning  ketorolac (ACULAR) 0.5 % ophthalmic  solution, Place 1 drop Into the left eye 4 times daily Start 2 days prior to surgery four times a day, after surgery: Four times a day x 1 week, three times a day x 1 week, twice a day x 1 week, daily x 1 week then stop  ketorolac (ACULAR) 0.5 % ophthalmic solution, Place 1 drop into the right eye 4 times daily  lamoTRIgine (LAMICTAL) 100 MG tablet, Take 2 tablets (200 mg) by mouth 2 times daily  metoprolol succinate ER (TOPROL XL) 25 MG 24 hr tablet, Take 1/2 tab a day (12.5 mg) (Patient taking differently: 12.5 mg every morning Take 1/2 tab a day (12.5 mg))  moxifloxacin (VIGAMOX) 0.5 % ophthalmic solution, Place 1 drop Into the left eye 4 times daily Start 2 days prior to surgery four times a day. After Surgery: four times a day  X 1 week  moxifloxacin (VIGAMOX) 0.5 % ophthalmic solution, Place 1 drop into the right eye 4 times daily  Multiple Vitamins-Minerals (EYE VITAMINS PO), Take by mouth daily  prednisoLONE acetate (PRED FORTE) 1 % ophthalmic suspension, Place 1-2 drops Into the left eye 4 times daily After surgery: Four times a day x 1 week, three times a day x 1 week, twice a day x 1 week, daily x 1 week then stop  prednisoLONE acetate (PRED FORTE) 1 % ophthalmic suspension, Place 1-2 drops into the right eye 4 times daily  simvastatin (ZOCOR) 20 MG tablet, Take 1 tablet (20 mg) by mouth At Bedtime  tamsulosin (FLOMAX) 0.4 MG capsule, Take 2 capsules (0.8 mg) by mouth daily (Patient taking differently: Take 0.8 mg by mouth every evening)  aspirin (ASA) 81 MG EC tablet, Take 1 tablet (81 mg) by mouth daily  hydrOXYzine (ATARAX) 10 MG tablet, Take 1 tablet by mouth 3 times daily as needed (Patient not taking: Reported on 11/1/2022)  molnupiravir (LAGEVRIO) 200 MG capsule, Take 4 capsules (800 mg) by mouth every 12 hours (Patient not taking: Reported on 11/1/2022)  ondansetron (ZOFRAN ODT) 8 MG ODT tab, Take 1 tablet (8 mg) by mouth every 8 hours as needed for nausea  Sodium Chloride-Sodium Bicarb (SINUS  WASH SALINE REFILLS) 2300-700 MG PACK, Wash out sinuses up to twice daily as needed for comfort.    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 Julien    (H47.231) Cupping of optic disc, right  IOP within normal limits, no appreciable thinning to dilated exam  Observe for now      Return in about 3 weeks (around 12/14/2022), or if symptoms worsen or fail to improve, for v/t/d/mrx final post-op.        Best Padilla MD     Attending Physician Attestation:  Complete documentation of historical and exam elements from today's encounter can be found in the full encounter summary report (not reduplicated in this progress note).  I personally obtained the chief complaint(s) and history of present illness.  I confirmed and edited as necessary the review of systems, past medical/surgical history, family history, social history, and examination findings as documented by others; and I examined the patient myself.  I personally reviewed the relevant tests, images, and reports as documented above.  I formulated and edited as necessary the assessment and plan and discussed the findings and management plan with the patient and family. - Best Padilla MD

## 2022-12-06 ENCOUNTER — NURSE TRIAGE (OUTPATIENT)
Dept: NURSING | Facility: CLINIC | Age: 82
End: 2022-12-06

## 2022-12-06 NOTE — TELEPHONE ENCOUNTER
Spoke to pt at 1515    Pt chopping onions last night and with new irriation and tearing last night    Pt using Preservative free artificial tears and had some improvement this morning and by afternoon today even more improvement    Recommended continuing with frequent PF artificial tears every couple hours for a few days and then may decrease frequency.    No new vision changes and patient still having the area in lower/temporal vision that is blurrier/transulent    Was present last pos-op visit 11- and discussed with Dr. Padilla    No new vision changes    Pt has f/u appt next week and able to discuss vision next visit.    Reviewed to call for any worsening symptoms/vision changes    Pt seemed comfortable with plan    Lenin Garcia, RN 3:27 PM 12/06/22

## 2022-12-06 NOTE — TELEPHONE ENCOUNTER
"Nurse Triage SBAR    Is this a 2nd Level Triage? YES, LICENSED PRACTITIONER REVIEW IS REQUIRED    Situation:  Left eye complaints 19 days ago cataract and IOL  - he notes a\"Toenail moon on left eye- just a little one\"- stopped medications maybe a week ago, restarted today at 4 x daily each LEFT eye.  Using Prednisolone; using four daily LEFT eye  After surgery: Four times a day x 1 week, three times a day x 1 week, twice a day x 1 week, daily x 1 week then stop - Left Eye  Vigamox using four times per day LEFT eye  Start 2 days prior to surgery four times a day. After Surgery: four times a day  X 1 week - Left Eye  Ketoralac using four times per day  Start 2 days prior to surgery four times a day, after surgery: Four times a day x 1 week, three times a day x 1 week, twice a day x 1 week, daily x 1 week then stop   Next visit  12/14/22    Background:  Cataract extraction with IOL in the left eye on 11/17/2022    Cataract extraction with IOL in the right eye on 11/03/2022  Post Op (Ophthalmology) Both Eyes     Last visit 11/23/22  Next appt Dr Padilla 12/14/22  Using Prednisolone; using four daily LEFT eye  After surgery: Four times a day x 1 week, three times a day x 1 week, twice a day x 1 week, daily x 1 week then stop - Left Eye  Vigamox using four times per day LEFT eye  Start 2 days prior to surgery four times a day. After Surgery: four times a day  X 1 week - Left Eye  Ketoralac using four times per day  Start 2 days prior to surgery four times a day, after surgery: Four times a day x 1 week, three times a day x 1 week, twice a day x 1 week, daily x 1 week then stop   Next visit  12/14/22    Assessment: Concerned for gravelly, irritated area of left eye- not photophobic but was very senstive to cutting onions- stinging and tearing x 6 hours.   He wants to vbe sure he is healing properly.  Closing left eye and using drops gives relief    Protocol Recommended Disposition:   Recommendation: Review left eye " irritation and eye drop use. It seems like he stopped these earlier ? And now has  Left eye irritation  Routed to provider    Does the patient meet one of the following criteria for ADS visit consideration? No    Reason for Disposition    MILD eye pains are a recurrent problem    Additional Information    Negative: Followed an eye injury    Negative: Eye pain from chemical in the eye    Negative: Eye pain from foreign body in eye    Negative: Has sinus pain or pressure    Negative: SEVERE eye pain    Negative: Complete loss of vision in one or both eyes    Negative: Eyelids are very swollen (shut or almost) and fever    Negative: Eyelid (outer) is very red and fever    Negative: Foreign body sensation ('feels like something is in there') and irrigation didn't help    Negative: Vomiting    Negative: Ulcer or sore seen on the cornea (clear center part of the eye)    Negative: Recent eye surgery and increasing eye pain    Negative: Blurred vision and new or worsening    Negative: Patient sounds very sick or weak to the triager    Negative: MODERATE eye pain or discomfort (e.g., interferes with normal activities or awakens from sleep; more than mild)    Negative: Looking at light causes MODERATE to SEVERE eye pain (i.e., photophobia)    Negative: Eyelids are very swollen (shut or almost) and no fever    Negative: Painful rash near eye and multiple small blisters grouped together    Protocols used: EYE PAIN AND OTHER SYMPTOMS-A-[place]

## 2022-12-12 ENCOUNTER — OFFICE VISIT (OUTPATIENT)
Dept: CARDIOLOGY | Facility: CLINIC | Age: 82
End: 2022-12-12
Attending: INTERNAL MEDICINE
Payer: MEDICARE

## 2022-12-12 VITALS
WEIGHT: 178.9 LBS | BODY MASS INDEX: 24.95 KG/M2 | OXYGEN SATURATION: 97 % | DIASTOLIC BLOOD PRESSURE: 70 MMHG | SYSTOLIC BLOOD PRESSURE: 149 MMHG | HEART RATE: 55 BPM

## 2022-12-12 DIAGNOSIS — I48.0 PAROXYSMAL ATRIAL FIBRILLATION (H): Primary | ICD-10-CM

## 2022-12-12 PROCEDURE — G0463 HOSPITAL OUTPT CLINIC VISIT: HCPCS | Mod: 25

## 2022-12-12 PROCEDURE — 99213 OFFICE O/P EST LOW 20 MIN: CPT | Performed by: INTERNAL MEDICINE

## 2022-12-12 PROCEDURE — 93005 ELECTROCARDIOGRAM TRACING: CPT

## 2022-12-12 ASSESSMENT — PAIN SCALES - GENERAL: PAINLEVEL: NO PAIN (0)

## 2022-12-12 NOTE — PATIENT INSTRUCTIONS
You were seen in the Electrophysiology Clinic today by: Dr Ventura    Plan:     Follow up Visit:  6 months with Dr Ventura          If you have further questions, please utilize [employer] to contact us.     Your Care Team:    [facility] Cardiology   Telephone Number     Nurse Line  Yeimi Dang, RN   Ct Farr, RN   [phone]     For scheduling appointments:   [facility]   [phone]   For procedure scheduling:    Vania Saini     [phone]   For the Device Clinic (Pacemakers, ICDs, Loop Recorders)    During business hours: [phone]  After business hours:   [phone]- select option 4 and ask for job code [id number].       On-call cardiologist for after hours or on weekends:   [phone], option #4, and ask to speak to the on-call cardiologist.     Cardiovascular Clinic:   [address] [place] [address]. [place] [address]      As always, Thank you for trusting us with your health care needs!

## 2022-12-12 NOTE — LETTER
12/12/2022      RE: Melo Dangelo  [address]  [place] [address]       Dear Colleague,    Thank you for the opportunity to participate in the care of your patient, Melo Dangelo, at the [facility]. Please see a copy of my visit note below.    HPI:   Melo Dangelo is an 82 year old Male with a PMH of HTN, IgA nephropathy, Hx of COVID-19 infection x 2 and PAF.  He was referred for a cardiology follow up.  He was diagnosed with AF in setting of hospitalization for COVID-19 infection in October 2020, at which time Apixaban was restarted, with plans to possibly stop med 1+ months after discharge. Is CHADS2-Vasc score = 3.    He has been suffering from skipped beats and also episodes lasting for an hour that happened a couple of times a week.  When the lasting episode occurred, he felt tired and sluggish.  He denied any chest pain or SOB.  Eliquis was discontinued due to CKD.  Zio patch in 10/2021 was not significant, but he is now having more symptoms.  Zio patch in 2 revealed 2% of AF burden.  He has been doing well since the last visit.  He is now seen for a follow up.    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      Paroxysmal atrial fibrillation (H)      PVD (posterior vitreous detachment)      Seizure disorder (H)     last seizure 2008       CURRENT MEDICATIONS:  Current Outpatient Medications   Medication Sig Dispense Refill     aspirin (ASA) 81 MG EC tablet Take 1 tablet (81 mg) by mouth daily        calcium carb-cholecalciferol (OS-GIANNI) 500-200 MG-UNIT tablet Take 1 tablet by mouth 2 times daily (with meals) (Patient taking differently: Take 1 tablet by mouth every morning) 60 tablet 11     cyanocobalamin (VITAMIN B-12) 100 MCG tablet Take 1,000 mcg by mouth every morning       escitalopram (LEXAPRO) 20 MG tablet Take 20 mg by mouth every morning       hydrOXYzine (ATARAX) 10 MG tablet Take 1 tablet by mouth 3 times daily as needed       lamoTRIgine (LAMICTAL) 100 MG tablet Take 2 tablets (200 mg) by mouth 2 times daily 360 tablet 3     metoprolol succinate ER (TOPROL XL) 25 MG 24 hr tablet Take 1/2 tab a day (12.5 mg) (Patient taking differently: 12.5 mg every morning Take 1/2 tab a day (12.5 mg)) 90 tablet 1     Multiple Vitamins-Minerals (EYE VITAMINS PO) Take by mouth daily       simvastatin (ZOCOR) 20 MG tablet Take 1 tablet (20 mg) by mouth At Bedtime 90 tablet 3     Sodium Chloride-Sodium Bicarb (SINUS WASH SALINE REFILLS) 2300-700 MG PACK Wash out sinuses up to twice daily as needed for comfort. 60 each 0     tamsulosin (FLOMAX) 0.4 MG capsule Take 2 capsules (0.8 mg) by mouth daily (Patient taking differently: Take 0.8 mg by mouth every evening) 180 capsule 3     ketorolac (ACULAR) 0.5 % ophthalmic solution Place 1 drop Into the left eye 4 times daily Start 2 days prior to surgery four times a day, after surgery: Four times a day x 1 week, three times a day x 1 week, twice a day x 1 week, daily x 1 week then stop (Patient not taking: Reported on 12/12/2022) 10 mL 0     ketorolac (ACULAR) 0.5 % ophthalmic solution Place 1 drop into the right eye 4 times daily (Patient not taking: Reported on 12/12/2022) 10 mL 0     molnupiravir (LAGEVRIO) 200 MG capsule Take 4 capsules (800 mg) by mouth every 12 hours (Patient not taking: Reported on 11/1/2022) 40 each 0     moxifloxacin (VIGAMOX) 0.5 % ophthalmic solution Place 1 drop Into the left eye 4 times daily Start 2 days prior to surgery four times a day.  After Surgery: four times a day  X 1 week (Patient not taking: Reported on 12/12/2022) 3 mL 0     moxifloxacin (VIGAMOX) 0.5 % ophthalmic solution Place 1 drop into the right eye 4 times daily (Patient not taking: Reported on 12/12/2022) 3 mL 0     ondansetron (ZOFRAN ODT) 8 MG ODT tab Take 1 tablet (8 mg) by mouth every 8 hours as needed for nausea (Patient not taking: Reported on 12/12/2022) 15 tablet 0     prednisoLONE acetate (PRED FORTE) 1 % ophthalmic suspension Place 1-2 drops Into the left eye 4 times daily After surgery: Four times a day x 1 week, three times a day x 1 week, twice a day x 1 week, daily x 1 week then stop (Patient not taking: Reported on 12/12/2022) 10 mL 0     prednisoLONE acetate (PRED FORTE) 1 % ophthalmic suspension Place 1-2 drops into the right eye 4 times daily (Patient not taking: Reported on 12/12/2022) 10 mL 0       PAST SURGICAL HISTORY:  Past Surgical History:   Procedure Laterality Date     NO HISTORY OF SURGERY       PHACOEMULSIFICATION CLEAR CORNEA WITH STANDARD INTRAOCULAR LENS IMPLANT Right 11/3/2022    Procedure: RIGHT EYE PHACOEMULSIFICATION, CATARACT, WITH INTRAOCULAR LENS IMPLANT COMPLEX CASE ;  Surgeon: Best Padilla MD;  Location: [facility] OR     PHACOEMULSIFICATION CLEAR CORNEA WITH STANDARD INTRAOCULAR LENS IMPLANT Left 11/17/2022    Procedure: LEFT EYE PHACOEMULSIFICATION, CATARACT, WITH INTRAOCULAR LENS IMPLANT;  Surgeon: Best Padilla MD;  Location: [facility] OR       ALLERGIES:   No Known Allergies    FAMILY HISTORY:  - Premature coronary artery disease  + Atrial fibrillation  + Sudden cardiac death     SOCIAL HISTORY:  Social History     Tobacco Use     Smoking status: Former     Packs/day: 0.00     Types: Cigarettes     Smokeless tobacco: Never   Vaping Use     Vaping Use: Never used   Substance Use Topics     Alcohol use: No     Comment: History of alcohol dependence, in remission for 20 years     Drug use: No       ROS:   Constitutional: No  fever, chills, or sweats. Weight stable.   ENT: No visual disturbance, ear ache, epistaxis, sore throat.   Cardiovascular: As per HPI.   Respiratory: No cough, hemoptysis.    GI: No nausea, vomiting, hematemesis, melena, or hematochezia.   : No hematuria.   Integument: Negative.   Psychiatric: Negative.   Hematologic:  Easy bruising, no easy bleeding.  Neuro: Negative.   Endocrinology: No significant heat or cold intolerance   Musculoskeletal: No myalgia.    Exam:  BP (!) 149/70 (BP Location: Right arm, Patient Position: Supine, Cuff Size: Adult Regular)   Pulse 55   Wt 81.1 kg (178 lb 14.4 oz)   SpO2 97%   BMI 24.95 kg/m    GENERAL APPEARANCE: healthy, alert and no distress  HEENT: no icterus, no xanthelasmas, normal pupil size and reaction, normal palate, mucosa moist, no central cyanosis  NECK: no adenopathy, no asymmetry, masses, or scars, thyroid normal to palpation and no bruits, JVP not elevated  RESPIRATORY: lungs clear to auscultation - no rales, rhonchi or wheezes, no use of accessory muscles, no retractions, respirations are unlabored, normal respiratory rate  CARDIOVASCULAR: regular rhythm, normal S1 with physiologic split S2, no S3 or S4 and no murmur, click or rub, precordium quiet with normal PMI.  ABDOMEN: soft, non tender, without hepatosplenomegaly, no masses palpable, bowel sounds normal, aorta not enlarged by palpation, no abdominal bruits  EXTREMITIES: peripheral pulses normal, no edema, no bruits  NEURO: alert and oriented to person/place/time, normal speech, gait and affect  VASC: Radial, femoral, dorsalis pedis and posterior tibialis pulses are normal in volumes and symmetric bilaterally. No bruits are heard.  SKIN: no ecchymoses, no rashes    Labs:  CBC RESULTS:   Lab Results   Component Value Date    WBC 4.3 09/15/2022    WBC 7.7 10/29/2020    RBC 3.12 (L) 09/15/2022    RBC 3.85 (L) 10/29/2020    HGB 9.4 (L) 09/15/2022    HGB 11.6 (L) 10/29/2020    HCT 29.1 (L) 09/15/2022    HCT 35.6  (L) 10/29/2020    MCV 93 09/15/2022    MCV 93 10/29/2020    MCH 30.1 09/15/2022    MCH 30.1 10/29/2020    MCHC 32.3 09/15/2022    MCHC 32.6 10/29/2020    RDW 13.1 09/15/2022    RDW 13.7 10/29/2020     09/15/2022     10/29/2020       BMP RESULTS:  Lab Results   Component Value Date     09/15/2022     10/29/2020    POTASSIUM 4.6 09/15/2022    POTASSIUM 4.8 07/29/2022    POTASSIUM 4.7 10/29/2020    CHLORIDE 104 09/15/2022    CHLORIDE 106 07/29/2022    CHLORIDE 110 (H) 10/29/2020    CO2 23 09/15/2022    CO2 28 07/29/2022    CO2 25 10/29/2020    ANIONGAP 13 09/15/2022    ANIONGAP 6 07/29/2022    ANIONGAP 5 10/29/2020    GLC 96 09/15/2022    GLC 89 07/29/2022     (H) 10/29/2020    BUN 36.0 (H) 09/15/2022    BUN 55 (H) 07/29/2022    BUN 31 (H) 10/29/2020    CR 3.40 (H) 09/15/2022    CR 1.33 (H) 10/29/2020    GFRESTIMATED 17 (L) 09/15/2022    GFRESTIMATED 56 (L) 11/04/2020    GFRESTIMATED 50 (L) 10/29/2020    GFRESTBLACK >60 11/04/2020    GFRESTBLACK 58 (L) 10/29/2020    GIANNI 9.4 09/15/2022    GIANNI 8.5 10/29/2020        INR RESULTS:  Lab Results   Component Value Date    INR 1.04 01/20/2022    INR 1.09 10/19/2021    INR 1.09 11/02/2020    INR 1.18 (H) 11/01/2020    INR 1.15 (H) 10/29/2020    INR 1.05 10/27/2020       Procedures:  ECG on 2/4/2022: Reviewed.  WNL.    Zio patch in 10/2021: reviewed.      Zio patch in 2/2022: Reviewed.          ECG on 8/22/2022: Reviewed.      Zio patch in 9/2022: Reviewed.          ECG on 12/12/2022: Reviewed.      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 CHADS2-Vasc score = 3.  Eliquis was discontinued due to CKD.  # Skipped beats and also episodes lasting for an hour that happened a couple of times a week.  When the lasting  episode occurred, he felt tired and sluggish.  Zio patch in 10/2021 was not significant, but he is now having more symptoms.  Zio patch in 2/2022 revealed that the AF burden was 2% with the longest episode of 1.5 hrs.  Zio patch in 9/2022 revealed no AF burden.  He has been doing well since the last visit.  I advised him to take ASA 81 mg daily for stroke prophylaxis.    I will see him back in 6 months.    I spent a total of 20 min today to review the records, see the patient, and complete the documents.          Please do not hesitate to contact me if you have any questions/concerns.     Sincerely,     Adis Ventura MD        CC  Patient Care Team:  Francis Smith MD as PCP - General (Family Medicine)  Inderjit Grier as Malik Cordoba MD as Assigned Surgical Provider  Esperanza Guillaume CNP as Nurse Practitioner (Urology)  Pushpa Alvarado RN as Registered Nurse  Daria Raza MD as Assigned Neuroscience Provider  Fernando Chong MD as MD (Nephrology)  Merry Barrow MD as MD (Nephrology)  Nithin Tolbert RPH as Pharmacist (Pharmacist)  Adis Ventura MD (Cardiovascular Disease)  Yeimi Dang RN as Specialty Care Coordinator  Matthew Cabrera MD as MD (Endocrinology, Diabetes, and Metabolism)  Nithin Tolbert RPH as Assigned [facility] Pharmacist

## 2022-12-12 NOTE — NURSING NOTE
Chief Complaint   Patient presents with     Follow Up     Return EP- 4 mo f/u for paf     Vitals were taken, medications reconciled, and EKG was performed.    SUE Emmanuel  9:39 AM

## 2022-12-12 NOTE — PROGRESS NOTES
HPI:   Melo Dangelo is an 82 year old Male with a PMH of HTN, IgA nephropathy, Hx of COVID-19 infection x 2 and PAF.  He was referred for a cardiology follow up.  He was diagnosed with AF in setting of hospitalization for COVID-19 infection in October 2020, at which time Apixaban was restarted, with plans to possibly stop med 1+ months after discharge. Is CHADS2-Vasc score = 3.    He has been suffering from skipped beats and also episodes lasting for an hour that happened a couple of times a week.  When the lasting episode occurred, he felt tired and sluggish.  He denied any chest pain or SOB.  Eliquis was discontinued due to CKD.  Zio patch in 10/2021 was not significant, but he is now having more symptoms.  Zio patch in 2 revealed 2% of AF burden.  He has been doing well since the last visit.  He is now seen for a follow up.    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      Paroxysmal atrial fibrillation (H)      PVD (posterior vitreous detachment)      Seizure disorder (H)     last seizure 2008       CURRENT MEDICATIONS:  Current Outpatient Medications   Medication Sig Dispense Refill     aspirin (ASA) 81 MG EC tablet Take 1 tablet (81 mg) by mouth daily       calcium carb-cholecalciferol (OS-GIANNI) 500-200 MG-UNIT tablet Take 1 tablet by mouth 2 times daily (with meals) (Patient taking differently: Take 1 tablet by mouth every morning) 60 tablet 11     cyanocobalamin (VITAMIN B-12) 100 MCG tablet Take 1,000 mcg by mouth every morning       escitalopram (LEXAPRO) 20 MG tablet Take 20 mg by mouth every morning        hydrOXYzine (ATARAX) 10 MG tablet Take 1 tablet by mouth 3 times daily as needed       lamoTRIgine (LAMICTAL) 100 MG tablet Take 2 tablets (200 mg) by mouth 2 times daily 360 tablet 3     metoprolol succinate ER (TOPROL XL) 25 MG 24 hr tablet Take 1/2 tab a day (12.5 mg) (Patient taking differently: 12.5 mg every morning Take 1/2 tab a day (12.5 mg)) 90 tablet 1     Multiple Vitamins-Minerals (EYE VITAMINS PO) Take by mouth daily       simvastatin (ZOCOR) 20 MG tablet Take 1 tablet (20 mg) by mouth At Bedtime 90 tablet 3     Sodium Chloride-Sodium Bicarb (SINUS WASH SALINE REFILLS) 2300-700 MG PACK Wash out sinuses up to twice daily as needed for comfort. 60 each 0     tamsulosin (FLOMAX) 0.4 MG capsule Take 2 capsules (0.8 mg) by mouth daily (Patient taking differently: Take 0.8 mg by mouth every evening) 180 capsule 3     ketorolac (ACULAR) 0.5 % ophthalmic solution Place 1 drop Into the left eye 4 times daily Start 2 days prior to surgery four times a day, after surgery: Four times a day x 1 week, three times a day x 1 week, twice a day x 1 week, daily x 1 week then stop (Patient not taking: Reported on 12/12/2022) 10 mL 0     ketorolac (ACULAR) 0.5 % ophthalmic solution Place 1 drop into the right eye 4 times daily (Patient not taking: Reported on 12/12/2022) 10 mL 0     molnupiravir (LAGEVRIO) 200 MG capsule Take 4 capsules (800 mg) by mouth every 12 hours (Patient not taking: Reported on 11/1/2022) 40 each 0     moxifloxacin (VIGAMOX) 0.5 % ophthalmic solution Place 1 drop Into the left eye 4 times daily Start 2 days prior to surgery four times a day. After Surgery: four times a day  X 1 week (Patient not taking: Reported on 12/12/2022) 3 mL 0     moxifloxacin (VIGAMOX) 0.5 % ophthalmic solution Place 1 drop into the right eye 4 times daily (Patient not taking: Reported on 12/12/2022) 3 mL 0     ondansetron (ZOFRAN ODT) 8 MG ODT tab Take 1 tablet (8 mg) by mouth every 8 hours as needed for nausea  (Patient not taking: Reported on 12/12/2022) 15 tablet 0     prednisoLONE acetate (PRED FORTE) 1 % ophthalmic suspension Place 1-2 drops Into the left eye 4 times daily After surgery: Four times a day x 1 week, three times a day x 1 week, twice a day x 1 week, daily x 1 week then stop (Patient not taking: Reported on 12/12/2022) 10 mL 0     prednisoLONE acetate (PRED FORTE) 1 % ophthalmic suspension Place 1-2 drops into the right eye 4 times daily (Patient not taking: Reported on 12/12/2022) 10 mL 0       PAST SURGICAL HISTORY:  Past Surgical History:   Procedure Laterality Date     NO HISTORY OF SURGERY       PHACOEMULSIFICATION CLEAR CORNEA WITH STANDARD INTRAOCULAR LENS IMPLANT Right 11/3/2022    Procedure: RIGHT EYE PHACOEMULSIFICATION, CATARACT, WITH INTRAOCULAR LENS IMPLANT COMPLEX CASE ;  Surgeon: Best Padilla MD;  Location: [facility] OR     PHACOEMULSIFICATION CLEAR CORNEA WITH STANDARD INTRAOCULAR LENS IMPLANT Left 11/17/2022    Procedure: LEFT EYE PHACOEMULSIFICATION, CATARACT, WITH INTRAOCULAR LENS IMPLANT;  Surgeon: Best Padilla MD;  Location: [facility] OR       ALLERGIES:   No Known Allergies    FAMILY HISTORY:  - Premature coronary artery disease  + Atrial fibrillation  + Sudden cardiac death     SOCIAL HISTORY:  Social History     Tobacco Use     Smoking status: Former     Packs/day: 0.00     Types: Cigarettes     Smokeless tobacco: Never   Vaping Use     Vaping Use: Never used   Substance Use Topics     Alcohol use: No     Comment: History of alcohol dependence, in remission for 20 years     Drug use: No       ROS:   Constitutional: No fever, chills, or sweats. Weight stable.   ENT: No visual disturbance, ear ache, epistaxis, sore throat.   Cardiovascular: As per HPI.   Respiratory: No cough, hemoptysis.    GI: No nausea, vomiting, hematemesis, melena, or hematochezia.   : No hematuria.   Integument: Negative.   Psychiatric: Negative.   Hematologic:  Easy bruising, no easy  bleeding.  Neuro: Negative.   Endocrinology: No significant heat or cold intolerance   Musculoskeletal: No myalgia.    Exam:  BP (!) 149/70 (BP Location: Right arm, Patient Position: Supine, Cuff Size: Adult Regular)   Pulse 55   Wt 81.1 kg (178 lb 14.4 oz)   SpO2 97%   BMI 24.95 kg/m    GENERAL APPEARANCE: healthy, alert and no distress  HEENT: no icterus, no xanthelasmas, normal pupil size and reaction, normal palate, mucosa moist, no central cyanosis  NECK: no adenopathy, no asymmetry, masses, or scars, thyroid normal to palpation and no bruits, JVP not elevated  RESPIRATORY: lungs clear to auscultation - no rales, rhonchi or wheezes, no use of accessory muscles, no retractions, respirations are unlabored, normal respiratory rate  CARDIOVASCULAR: regular rhythm, normal S1 with physiologic split S2, no S3 or S4 and no murmur, click or rub, precordium quiet with normal PMI.  ABDOMEN: soft, non tender, without hepatosplenomegaly, no masses palpable, bowel sounds normal, aorta not enlarged by palpation, no abdominal bruits  EXTREMITIES: peripheral pulses normal, no edema, no bruits  NEURO: alert and oriented to person/place/time, normal speech, gait and affect  VASC: Radial, femoral, dorsalis pedis and posterior tibialis pulses are normal in volumes and symmetric bilaterally. No bruits are heard.  SKIN: no ecchymoses, no rashes    Labs:  CBC RESULTS:   Lab Results   Component Value Date    WBC 4.3 09/15/2022    WBC 7.7 10/29/2020    RBC 3.12 (L) 09/15/2022    RBC 3.85 (L) 10/29/2020    HGB 9.4 (L) 09/15/2022    HGB 11.6 (L) 10/29/2020    HCT 29.1 (L) 09/15/2022    HCT 35.6 (L) 10/29/2020    MCV 93 09/15/2022    MCV 93 10/29/2020    MCH 30.1 09/15/2022    MCH 30.1 10/29/2020    MCHC 32.3 09/15/2022    MCHC 32.6 10/29/2020    RDW 13.1 09/15/2022    RDW 13.7 10/29/2020     09/15/2022     10/29/2020       BMP RESULTS:  Lab Results   Component Value Date     09/15/2022     10/29/2020     POTASSIUM 4.6 09/15/2022    POTASSIUM 4.8 07/29/2022    POTASSIUM 4.7 10/29/2020    CHLORIDE 104 09/15/2022    CHLORIDE 106 07/29/2022    CHLORIDE 110 (H) 10/29/2020    CO2 23 09/15/2022    CO2 28 07/29/2022    CO2 25 10/29/2020    ANIONGAP 13 09/15/2022    ANIONGAP 6 07/29/2022    ANIONGAP 5 10/29/2020    GLC 96 09/15/2022    GLC 89 07/29/2022     (H) 10/29/2020    BUN 36.0 (H) 09/15/2022    BUN 55 (H) 07/29/2022    BUN 31 (H) 10/29/2020    CR 3.40 (H) 09/15/2022    CR 1.33 (H) 10/29/2020    GFRESTIMATED 17 (L) 09/15/2022    GFRESTIMATED 56 (L) 11/04/2020    GFRESTIMATED 50 (L) 10/29/2020    GFRESTBLACK >60 11/04/2020    GFRESTBLACK 58 (L) 10/29/2020    GIANNI 9.4 09/15/2022    GIANNI 8.5 10/29/2020        INR RESULTS:  Lab Results   Component Value Date    INR 1.04 01/20/2022    INR 1.09 10/19/2021    INR 1.09 11/02/2020    INR 1.18 (H) 11/01/2020    INR 1.15 (H) 10/29/2020    INR 1.05 10/27/2020       Procedures:  ECG on 2/4/2022: Reviewed.  WNL.    Zio patch in 10/2021: reviewed.      Zio patch in 2/2022: Reviewed.          ECG on 8/22/2022: Reviewed.      Zio patch in 9/2022: Reviewed.          ECG on 12/12/2022: Reviewed.      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 CHADS2-Vasc score = 3.  Eliquis was discontinued due to CKD.  # Skipped beats and also episodes lasting for an hour that happened a couple of times a week.  When the lasting episode occurred, he felt tired and sluggish.  Zio patch in 10/2021 was not significant, but he is now having more symptoms.  Zio patch in 2/2022 revealed that the AF burden was 2% with the longest episode of 1.5 hrs.  Zio patch in 9/2022 revealed no AF burden.  He has been doing well since the last visit.  I advised him to take ASA 81 mg  daily for stroke prophylaxis.    I will see him back in 6 months.    I spent a total of 20 min today to review the records, see the patient, and complete the documents.    CC  Patient Care Team:  Francis Smith MD as PCP - General (Family Medicine)  Inderjit Grier as Malik Cordoba MD as Assigned Surgical Provider  Esepranza Guillaume CNP as Nurse Practitioner (Urology)  Pushpa Alvarado RN as Registered Nurse  Francis Smith MD as Assigned PCP  Daria Raza MD as Assigned Neuroscience Provider  Fernando Chong MD as MD (Nephrology)  Merry Barrow MD as MD (Nephrology)  Merry Barrow MD as Assigned Nephrology Provider  Nithin Tolbert RPH as Pharmacist (Pharmacist)  Adis Ventura MD (Cardiovascular Disease)  Yeimi Dang, RN as Specialty Care Coordinator  Adis Ventura MD as Assigned Heart and Vascular Provider  Matthew Cabrera MD as MD (Endocrinology, Diabetes, and Metabolism)  Nithin Tolbert RPH as Assigned MTM Pharmacist  Matthew Cabrera MD as Assigned Endocrinology Provider  FRANCIS SMITH

## 2022-12-14 ENCOUNTER — OFFICE VISIT (OUTPATIENT)
Dept: OPHTHALMOLOGY | Facility: CLINIC | Age: 82
End: 2022-12-14
Attending: OPHTHALMOLOGY
Payer: MEDICARE

## 2022-12-14 DIAGNOSIS — H52.13 MYOPIA OF BOTH EYES WITH ASTIGMATISM AND PRESBYOPIA: ICD-10-CM

## 2022-12-14 DIAGNOSIS — H35.711 CENTRAL SEROUS CHORIORETINOPATHY OF RIGHT EYE: Primary | ICD-10-CM

## 2022-12-14 DIAGNOSIS — Z96.1 PSEUDOPHAKIA OF BOTH EYES: ICD-10-CM

## 2022-12-14 DIAGNOSIS — H52.4 MYOPIA OF BOTH EYES WITH ASTIGMATISM AND PRESBYOPIA: ICD-10-CM

## 2022-12-14 DIAGNOSIS — Z96.1 PSEUDOPHAKIA OF BOTH EYES: Primary | ICD-10-CM

## 2022-12-14 DIAGNOSIS — H52.203 MYOPIA OF BOTH EYES WITH ASTIGMATISM AND PRESBYOPIA: ICD-10-CM

## 2022-12-14 DIAGNOSIS — H04.123 DRY EYE SYNDROME OF BOTH EYES: ICD-10-CM

## 2022-12-14 DIAGNOSIS — H47.231 CUPPING OF OPTIC DISC, RIGHT: ICD-10-CM

## 2022-12-14 LAB
ATRIAL RATE - MUSE: 56 BPM
DIASTOLIC BLOOD PRESSURE - MUSE: NORMAL MMHG
INTERPRETATION ECG - MUSE: NORMAL
P AXIS - MUSE: 64 DEGREES
PR INTERVAL - MUSE: 198 MS
QRS DURATION - MUSE: 84 MS
QT - MUSE: 418 MS
QTC - MUSE: 403 MS
R AXIS - MUSE: 17 DEGREES
SYSTOLIC BLOOD PRESSURE - MUSE: NORMAL MMHG
T AXIS - MUSE: 45 DEGREES
VENTRICULAR RATE- MUSE: 56 BPM

## 2022-12-14 PROCEDURE — 92015 DETERMINE REFRACTIVE STATE: CPT | Mod: GY

## 2022-12-14 PROCEDURE — 99024 POSTOP FOLLOW-UP VISIT: CPT | Performed by: OPHTHALMOLOGY

## 2022-12-14 PROCEDURE — 92014 COMPRE OPH EXAM EST PT 1/>: CPT | Mod: 27 | Performed by: OPHTHALMOLOGY

## 2022-12-14 PROCEDURE — 999N000103 HC STATISTIC NO CHARGE FACILITY FEE

## 2022-12-14 PROCEDURE — G0463 HOSPITAL OUTPT CLINIC VISIT: HCPCS

## 2022-12-14 PROCEDURE — 92134 CPTRZ OPH DX IMG PST SGM RTA: CPT | Performed by: OPHTHALMOLOGY

## 2022-12-14 ASSESSMENT — CONF VISUAL FIELD
OS_SUPERIOR_TEMPORAL_RESTRICTION: 0
OS_INFERIOR_TEMPORAL_RESTRICTION: 0
OS_NORMAL: 1
OD_SUPERIOR_NASAL_RESTRICTION: 0
OS_INFERIOR_TEMPORAL_RESTRICTION: 0
OD_NORMAL: 1
OD_SUPERIOR_NASAL_RESTRICTION: 0
OD_INFERIOR_TEMPORAL_RESTRICTION: 0
OS_NORMAL: 1
OS_SUPERIOR_NASAL_RESTRICTION: 0
OD_SUPERIOR_TEMPORAL_RESTRICTION: 0
OD_INFERIOR_NASAL_RESTRICTION: 0
OS_SUPERIOR_TEMPORAL_RESTRICTION: 0
OS_SUPERIOR_NASAL_RESTRICTION: 0
OD_INFERIOR_NASAL_RESTRICTION: 0
METHOD: COUNTING FINGERS
OD_INFERIOR_TEMPORAL_RESTRICTION: 0
OS_INFERIOR_NASAL_RESTRICTION: 0
OD_NORMAL: 1
OD_SUPERIOR_TEMPORAL_RESTRICTION: 0
OS_INFERIOR_NASAL_RESTRICTION: 0

## 2022-12-14 ASSESSMENT — CUP TO DISC RATIO
OS_RATIO: 0.5
OD_RATIO: 0.6
OD_RATIO: 0.6
OS_RATIO: 0.5

## 2022-12-14 ASSESSMENT — REFRACTION_MANIFEST
OD_CYLINDER: +0.75
OD_AXIS: 165
OS_ADD: +2.00
OD_SPHERE: -1.25
OS_CYLINDER: +1.50
OS_SPHERE: -1.75
OS_AXIS: 005
OD_ADD: +2.00

## 2022-12-14 ASSESSMENT — VISUAL ACUITY
OD_SC: 20/70
OD_PH_SC+: +2
OD_PH_SC: 20/30
CORRECTION_TYPE: GLASSES
OS_PH_SC: 20/40
OS_SC: 20/125
OS_PH_SC: 20/40
OS_SC: 20/125
OD_SC+: -1
OD_PH_SC: 20/30
METHOD: SNELLEN - LINEAR
OD_PH_SC+: +2
OS_PH_SC+: -1
OS_PH_SC+: -1
OD_SC+: -1
METHOD: SNELLEN - LINEAR
OD_SC: 20/70

## 2022-12-14 ASSESSMENT — EXTERNAL EXAM - LEFT EYE
OS_EXAM: NORMAL
OS_EXAM: NORMAL

## 2022-12-14 ASSESSMENT — REFRACTION_WEARINGRX
OS_AXIS: 010
OD_SPHERE: +2.50
OD_ADD: +2.50
OD_AXIS: 179
OD_CYLINDER: +1.00
OS_SPHERE: +2.50
SPECS_TYPE: BIFOCAL
OS_ADD: +2.50
OS_CYLINDER: +1.75

## 2022-12-14 ASSESSMENT — TONOMETRY
OD_IOP_MMHG: 9
OS_IOP_MMHG: 15
IOP_METHOD: TONOPEN
OS_IOP_MMHG: 15
IOP_METHOD: TONOPEN
OD_IOP_MMHG: 9

## 2022-12-14 ASSESSMENT — SLIT LAMP EXAM - LIDS
COMMENTS: MEIBOMIAN GLAND INSPISSATION
COMMENTS: MEIBOMIAN GLAND INSPISSATION

## 2022-12-14 ASSESSMENT — EXTERNAL EXAM - RIGHT EYE
OD_EXAM: NORMAL
OD_EXAM: NORMAL

## 2022-12-14 NOTE — PROGRESS NOTES
HPI     Post Op (Ophthalmology) Both Eyes    In both eyes. Additional comments: Cataract extraction with IOL in the left eye on 11/17/2022    Cataract extraction with IOL in the right eye on 11/03/2022           Comments    Patient returns for a post operative eye exam.    Pt notes his LE is still a little irritated on the side, but as the day goes on his eye feels better.   No pain in either eye at this time.  No flashes or floaters in either eye.  Pt is not taking any drops currently.     RONAN BLACKBURN COA December 14, 2022 9:50 AM             Last edited by RONAN BLACKBURN on 12/14/2022  9:56 AM.         Review of systems for the eyes was negative other than the pertinent positives/negatives listed in the HPI.      Assessment & Plan    HPI:  Melo Dangelo is a 82 year old male with history of CKD, IgA Nephropathy, MDD, sensorineural hearing loss, HTN, BPH, seizure history, Central serous chorioretinopathy, hyperopia with astigmatism and presbopia presents for followup Cataract extraction/iol both eyes Padilla. Doing well    POHx: Central serous chorioretinopathy right eye, hyperopia with astigmatism and presbyopia  PMHx: CKD, IgA Nephropathy, MDD, sensorineural hearing loss, Central serous chorioretinopathy, HTN, BPH, seizure  Current Medications: aspirin (ASA) 81 MG EC tablet, Take 1 tablet (81 mg) by mouth daily  calcium carb-cholecalciferol (OS-GIANNI) 500-200 MG-UNIT tablet, Take 1 tablet by mouth 2 times daily (with meals) (Patient taking differently: Take 1 tablet by mouth every morning)  cyanocobalamin (VITAMIN B-12) 100 MCG tablet, Take 1,000 mcg by mouth every morning  escitalopram (LEXAPRO) 20 MG tablet, Take 20 mg by mouth every morning  hydrOXYzine (ATARAX) 10 MG tablet, Take 1 tablet by mouth 3 times daily as needed  lamoTRIgine (LAMICTAL) 100 MG tablet, Take 2 tablets (200 mg) by mouth 2 times daily  metoprolol succinate ER (TOPROL XL) 25 MG 24 hr tablet, Take 1/2 tab a day (12.5 mg) (Patient  taking differently: 12.5 mg every morning Take 1/2 tab a day (12.5 mg))  Multiple Vitamins-Minerals (EYE VITAMINS PO), Take by mouth daily  simvastatin (ZOCOR) 20 MG tablet, Take 1 tablet (20 mg) by mouth At Bedtime  Sodium Chloride-Sodium Bicarb (SINUS WASH SALINE REFILLS) 2300-700 MG PACK, Wash out sinuses up to twice daily as needed for comfort.  tamsulosin (FLOMAX) 0.4 MG capsule, Take 2 capsules (0.8 mg) by mouth daily (Patient taking differently: Take 0.8 mg by mouth every evening)    bevacizumab (AVASTIN) intravitreal inj 1.25 mg      FHx: denies family history of ocular conditions   PSHx: Cataract extraction/iol Padilla right eye 11/3/22, Padilla left eye 11/17/22        Current Eye Medications:  Artificial tears 6-10x times a day left eye     Assessment & Plan:  Pseudophakia, both eyes  Padilla right eye 11/3/22  Padilla left eye 11/17/22    Doing well    (H35.711) Central serous chorioretinopathy of right eye  Initially noted 10/2019  Follows with Dr. Julien  To see today    (H47.231) Cupping of optic disc, right  IOP within normal limits, no appreciable thinning to dilated exam  Observe for now    (H04.123) Dry eye syndrome of both eyes  Continue artificial tears  Use refresh pm at bedtime     (H52.13,  H52.203,  H52.4) Myopia of both eyes with astigmatism and presbyopia  Patient has minimal change in myopia but a copy of today's glasses prescription was given.  The patient may wish to update the glasses if the lenses are scratched or the frames are too small.  Presbyopia is difficulty seeing up close and is treated with bifocals or over the counter reading glasses      Return in about 4 weeks (around 1/11/2023) for Anaya v/t/d/mac oct.        Best Padilla MD     Attending Physician Attestation:  Complete documentation of historical and exam elements from today's encounter can be found in the full encounter summary report (not reduplicated in this progress note).  I personally  obtained the chief complaint(s) and history of present illness.  I confirmed and edited as necessary the review of systems, past medical/surgical history, family history, social history, and examination findings as documented by others; and I examined the patient myself.  I personally reviewed the relevant tests, images, and reports as documented above.  I formulated and edited as necessary the assessment and plan and discussed the findings and management plan with the patient and family. - Best Padilla MD

## 2022-12-14 NOTE — LETTER
12/14/2022       RE: Melo Dangelo  [address]  [place] [address]     Dear Colleague,    Thank you for referring your patient, Melo Dangelo, to the [facility] at [facility]. Please see a copy of my visit note below.    Chief Complaint/Presenting Concern:  Retina follow up    Interval History of Present Ocular Illness:  Melo Dangelo is a 82 year old patient who returns for follow up of his  after cataract surgery with Dr. Padilla. He reports things are doing well right eye without distortion, left eye has a subtle crescent left side of the vision but gets better as day goes on. Dr. Padilla felt things were doing well and updated glasses.     Interval Updates to Medical/Family/Social History:    1. Creatinine being monitored with Dr. Barrow. Last one was 3.4 in Sept 2022. Next visit Jan 2023.  2. Wife doing through some health issues.     Relevant Review of Systems Updates:  No coughs/colds, no nausea.    Current eye related medications: Completed eye drops per Dr. Padilla, artificial tears left eye    Retina/Uveitis Imaging:   OCT Spectralis Macula December 14, 2022  right eye: Outer retinal deposits, no recurrence of fluid. Stable thick choroid  left eye: Normal contour, no fluid    Assessment:     1. Central serous chorioretinopathy of right eye  No recurrence after cataract surgery    2. Cupping of optic disc, right  With normal eye pressure    3. Pseudophakia of both eyes  Doing great and likely even better with updated prescription    Plan/Recommendations:      Discussed findings with patient. There is no recurrence of the  now one month after cataract surgery. We can continue to monitor    Eye pressure is normal in each eye     Recommend additional testing: None     Continue artificial tears left eye. Dr. Padilla also recommended over the counter lubrication at night called Refresh PM or  Genteal Gel    No other treatment needed    Okay to get glasses anytime with your new prescription from Dr. Padilla    [facility] 4 months tonopen, ludmilaate, OCT (ordered)    Physician Attestation     Attending Physician Attestation:  Complete documentation of historical and exam elements from today's encounter can be found in the full encounter summary report (not reduplicated in this progress note). I personally obtained the chief complaint(s) and history of present illness. I confirmed and edited as necessary the review of systems, past medical/surgical history, family history, social history, and examination findings as documented by others; and I examined the patient myself. I personally reviewed the relevant tests, images, and reports as documented above. I formulated and edited as necessary the assessment and plan and discussed the findings and management plan with the patient and family members present at the time of this visit.  Malik Julien M.D., Uveitis and Medical Retina, December 14, 2022     Again, thank you for allowing me to participate in the care of your patient.      Sincerely,    Malik Julien MD  [facility] Dept of Ophthalmology  Uveitis and Medical Retina

## 2022-12-14 NOTE — PATIENT INSTRUCTIONS
Continue artificial tears left eye. Dr. Padilla also recommended over the counter lubrication at night called Refresh PM or Genteal Gel  No other treatment needed  Okay to get glasses anytime with your new prescription from Dr. Padilla

## 2022-12-14 NOTE — NURSING NOTE
Chief Complaints and History of Present Illnesses   Patient presents with     Retinal Evaluation     Chief Complaint(s) and History of Present Illness(es)     Retinal Evaluation            Laterality: right eye    Onset: gradual    Onset: months ago    Severity: moderate    Frequency: intermittently    Pain scale: 0/10          Comments    Here for Central serous retinopathy, right   Patient returns for a post operative eye exam.    Pt notes his LE is still a little irritated on the side, but as the day goes on his eye feels better.   No pain in either eye at this time.  No flashes or floaters in either eye.  Pt is not taking any drops currently.     RONAN BLACKBURN COA December 14, 2022 9:50 AM

## 2022-12-14 NOTE — NURSING NOTE
Chief Complaints and History of Present Illnesses   Patient presents with     Post Op (Ophthalmology) Both Eyes     Cataract extraction with IOL in the left eye on 11/17/2022    Cataract extraction with IOL in the right eye on 11/03/2022     Chief Complaint(s) and History of Present Illness(es)     Post Op (Ophthalmology) Both Eyes            Laterality: both eyes    Comments: Cataract extraction with IOL in the left eye on 11/17/2022    Cataract extraction with IOL in the right eye on 11/03/2022          Comments    Patient returns for a post operative eye exam.    Pt notes his LE is still a little irritated on the side, but as the day goes on his eye feels better.   No pain in either eye at this time.  No flashes or floaters in either eye.  Pt is not taking any drops currently.     RONAN BLACKBURN COA December 14, 2022 9:50 AM

## 2022-12-14 NOTE — PROGRESS NOTES
Chief Complaint/Presenting Concern:  Retina follow up    Interval History of Present Ocular Illness:  Melo Dangelo is a 82 year old patient who returns for follow up of his  after cataract surgery with Dr. Padilla. He reports things are doing well right eye without distortion, left eye has a subtle crescent left side of the vision but gets better as day goes on. Dr. Padilla felt things were doing well and updated glasses.     Interval Updates to Medical/Family/Social History:    1. Creatinine being monitored with Dr. Barrow. Last one was 3.4 in Sept 2022. Next visit Jan 2023.  2. Wife doing through some health issues.     Relevant Review of Systems Updates:  No coughs/colds, no nausea.    Current eye related medications: Completed eye drops per Dr. Padilla, artificial tears left eey    Retina/Uveitis Imaging:   OCT Spectralis Macula December 14, 2022  right eye: Outer retinal deposits, no recurrence of fluid. Stable thick choroid  left eye: Normal contour, no fluid    Assessment:     1. Central serous chorioretinopathy of right eye  No recurrence after cataract surgery    2. Cupping of optic disc, right  With normal eye pressure    3. Pseudophakia of both eyes  Doing great and likely even better with updated prescription    Plan/Recommendations:      Discussed findings with patient. There is no recurrence of the  now one month after cataract surgery. We can continue to monitor    Eye pressure is normal in each eye     Recommend additional testing: None     Continue artificial tears left eye. Dr. Padilla also recommended over the counter lubrication at night called Refresh PM or Genteal Gel    No other treatment needed    Okay to get glasses anytime with your new prescription from Dr. Padilla    RTC 4 months tonopen, dilate, OCT (ordered)    Physician Attestation     Attending Physician Attestation:  Complete documentation of historical and exam elements from today's encounter can be found in the  full encounter summary report (not reduplicated in this progress note). I personally obtained the chief complaint(s) and history of present illness. I confirmed and edited as necessary the review of systems, past medical/surgical history, family history, social history, and examination findings as documented by others; and I examined the patient myself. I personally reviewed the relevant tests, images, and reports as documented above. I formulated and edited as necessary the assessment and plan and discussed the findings and management plan with the patient and family members present at the time of this visit.  Malik Julien M.D., Uveitis and Medical Retina, December 14, 2022

## 2023-01-13 ENCOUNTER — LAB (OUTPATIENT)
Dept: LAB | Facility: CLINIC | Age: 83
End: 2023-01-13
Payer: MEDICARE

## 2023-01-13 ENCOUNTER — OFFICE VISIT (OUTPATIENT)
Dept: FAMILY MEDICINE | Facility: CLINIC | Age: 83
End: 2023-01-13
Payer: MEDICARE

## 2023-01-13 VITALS
OXYGEN SATURATION: 97 % | TEMPERATURE: 97.8 F | HEIGHT: 71 IN | HEART RATE: 63 BPM | BODY MASS INDEX: 24.95 KG/M2 | SYSTOLIC BLOOD PRESSURE: 146 MMHG | DIASTOLIC BLOOD PRESSURE: 69 MMHG

## 2023-01-13 DIAGNOSIS — E78.00 HIGH CHOLESTEROL: ICD-10-CM

## 2023-01-13 DIAGNOSIS — Z23 ENCOUNTER FOR IMMUNIZATION: ICD-10-CM

## 2023-01-13 DIAGNOSIS — N18.4 CKD (CHRONIC KIDNEY DISEASE) STAGE 4, GFR 15-29 ML/MIN (H): ICD-10-CM

## 2023-01-13 DIAGNOSIS — M25.551 HIP PAIN, RIGHT: Primary | ICD-10-CM

## 2023-01-13 DIAGNOSIS — I10 BENIGN ESSENTIAL HYPERTENSION: ICD-10-CM

## 2023-01-13 DIAGNOSIS — N62 GYNECOMASTIA: ICD-10-CM

## 2023-01-13 DIAGNOSIS — F41.9 ANXIETY: ICD-10-CM

## 2023-01-13 LAB
ALBUMIN SERPL BCG-MCNC: 4.4 G/DL (ref 3.5–5.2)
ALP SERPL-CCNC: 79 U/L (ref 40–129)
ALT SERPL W P-5'-P-CCNC: 14 U/L (ref 10–50)
ANION GAP SERPL CALCULATED.3IONS-SCNC: 9 MMOL/L (ref 7–15)
AST SERPL W P-5'-P-CCNC: 21 U/L (ref 10–50)
BASOPHILS # BLD AUTO: 0 10E3/UL (ref 0–0.2)
BASOPHILS NFR BLD AUTO: 1 %
BILIRUB SERPL-MCNC: 0.2 MG/DL
BUN SERPL-MCNC: 40.6 MG/DL (ref 8–23)
CALCIUM SERPL-MCNC: 9.4 MG/DL (ref 8.8–10.2)
CHLORIDE SERPL-SCNC: 106 MMOL/L (ref 98–107)
CHOLEST SERPL-MCNC: 220 MG/DL
CREAT SERPL-MCNC: 3.59 MG/DL (ref 0.67–1.17)
DEPRECATED HCO3 PLAS-SCNC: 25 MMOL/L (ref 22–29)
EOSINOPHIL # BLD AUTO: 0.1 10E3/UL (ref 0–0.7)
EOSINOPHIL NFR BLD AUTO: 2 %
ERYTHROCYTE [DISTWIDTH] IN BLOOD BY AUTOMATED COUNT: 13.7 % (ref 10–15)
GFR SERPL CREATININE-BSD FRML MDRD: 16 ML/MIN/1.73M2
GLUCOSE SERPL-MCNC: 95 MG/DL (ref 70–99)
HCT VFR BLD AUTO: 30.5 % (ref 40–53)
HDLC SERPL-MCNC: 43 MG/DL
HGB BLD-MCNC: 9.9 G/DL (ref 13.3–17.7)
IMM GRANULOCYTES # BLD: 0 10E3/UL
IMM GRANULOCYTES NFR BLD: 0 %
LDLC SERPL CALC-MCNC: 144 MG/DL
LYMPHOCYTES # BLD AUTO: 1.2 10E3/UL (ref 0.8–5.3)
LYMPHOCYTES NFR BLD AUTO: 26 %
MCH RBC QN AUTO: 30.4 PG (ref 26.5–33)
MCHC RBC AUTO-ENTMCNC: 32.5 G/DL (ref 31.5–36.5)
MCV RBC AUTO: 94 FL (ref 78–100)
MONOCYTES # BLD AUTO: 0.7 10E3/UL (ref 0–1.3)
MONOCYTES NFR BLD AUTO: 15 %
NEUTROPHILS # BLD AUTO: 2.6 10E3/UL (ref 1.6–8.3)
NEUTROPHILS NFR BLD AUTO: 56 %
NONHDLC SERPL-MCNC: 177 MG/DL
NRBC # BLD AUTO: 0 10E3/UL
NRBC BLD AUTO-RTO: 0 /100
PLATELET # BLD AUTO: 154 10E3/UL (ref 150–450)
POTASSIUM SERPL-SCNC: 4.9 MMOL/L (ref 3.4–5.3)
PROT SERPL-MCNC: 6.9 G/DL (ref 6.4–8.3)
RBC # BLD AUTO: 3.26 10E6/UL (ref 4.4–5.9)
SODIUM SERPL-SCNC: 140 MMOL/L (ref 136–145)
TRIGL SERPL-MCNC: 167 MG/DL
WBC # BLD AUTO: 4.7 10E3/UL (ref 4–11)

## 2023-01-13 PROCEDURE — 99214 OFFICE O/P EST MOD 30 MIN: CPT | Mod: 25 | Performed by: FAMILY MEDICINE

## 2023-01-13 PROCEDURE — 80061 LIPID PANEL: CPT | Performed by: PATHOLOGY

## 2023-01-13 PROCEDURE — 85025 COMPLETE CBC W/AUTO DIFF WBC: CPT | Performed by: PATHOLOGY

## 2023-01-13 PROCEDURE — 0124A COVID-19 VACCINE BIVALENT BOOSTER 12+ (PFIZER): CPT | Performed by: FAMILY MEDICINE

## 2023-01-13 PROCEDURE — 36415 COLL VENOUS BLD VENIPUNCTURE: CPT | Performed by: PATHOLOGY

## 2023-01-13 PROCEDURE — 91312 COVID-19 VACCINE BIVALENT BOOSTER 12+ (PFIZER): CPT | Performed by: FAMILY MEDICINE

## 2023-01-13 PROCEDURE — 80053 COMPREHEN METABOLIC PANEL: CPT | Performed by: PATHOLOGY

## 2023-01-13 NOTE — NURSING NOTE
Melo Dangelo is a 82 year old male that presents in clinic today for the following:     Chief Complaint   Patient presents with     RECHECK     Back Pain     Pain in back muscles        The patient's allergies and medications were reviewed. The patient's vitals were obtained without incident. The patient does not have any other questions for the provider.     AMANDA Olivarez at 8:29 AM on 1/13/2023.  Primary Care Clinic: [phone]

## 2023-01-13 NOTE — PROGRESS NOTES
Assessment & Plan     Hip pain, right  Offered PT. He'd rather see Ortho.  - Orthopedic  Referral; Future    Anxiety  See therpay cont current meds  - Adult Mental Health  Referral; Future    CKD (chronic kidney disease) stage 4, GFR 15-29 ml/min (H)  Labs before see Renal  - CBC with platelets and differential; Future  - Comprehensive metabolic panel (BMP + Alb, Alk Phos, ALT, AST, Total. Bili, TP); Future  - COVID-19 VACCINE BIVALENT BOOSTER 12+ (PFIZER)    High cholesterol  Due  - Lipid panel reflex to direct LDL Fasting; Future  - Comprehensive metabolic panel (BMP + Alb, Alk Phos, ALT, AST, Total. Bili, TP); Future    Htn: cont as is, see renal soon    Gynecomastia: cont monitor off of PRoscar      35 minutes spent on the date of the encounter doing chart review, history and exam, documentation and further activities per the note      No follow-ups on file.    Francis Smith MD  [facility] [place]    Armand Alejo is a 82 year old, presenting for the following health issues:  RECHECK and Back Pain (Pain in back muscles )      HPI Here  In f/u  1-anxiety: on lexapro buspar made anxiety worse. Atarax makes him too sleepy. Not in therapy now. Under immense stress; his own health issues, but moreso lives w/ wife, now on dialysis, some memory loss, many medical issues, he is caring for her mainly alone. Today phq is six, jeannette 4, no SI  2-CKD: due for recheck-sees renal soon  3-he can do covid booster we discuss, was inpt w/ covid but last august. Recovered fully.  4-pain; chronic, same spot, certain movement trigger, R posterior hip region, not in SI area, lateral to that, now has to do a lot of physical help w/ ill wife and run the home.  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      Paroxysmal atrial fibrillation (H)      PVD (posterior vitreous detachment)      Seizure disorder (H)     last seizure 2008     Past Surgical History:   Procedure Laterality Date     NO HISTORY OF SURGERY       PHACOEMULSIFICATION CLEAR CORNEA WITH STANDARD INTRAOCULAR LENS IMPLANT Right 11/3/2022    Procedure: RIGHT EYE PHACOEMULSIFICATION, CATARACT, WITH INTRAOCULAR LENS IMPLANT COMPLEX CASE ;  Surgeon: Best Padilla MD;  Location: [facility] OR     PHACOEMULSIFICATION CLEAR CORNEA WITH STANDARD INTRAOCULAR LENS IMPLANT Left 11/17/2022    Procedure: LEFT EYE PHACOEMULSIFICATION, CATARACT, WITH INTRAOCULAR LENS IMPLANT;  Surgeon: Best Padilla MD;  Location: [facility] OR     Current Outpatient Medications   Medication     aspirin (ASA) 81 MG EC tablet     calcium carb-cholecalciferol (OS-GIANNI) 500-200 MG-UNIT tablet     cyanocobalamin (VITAMIN B-12) 100 MCG tablet     escitalopram (LEXAPRO) 20 MG tablet     hydrOXYzine (ATARAX) 10 MG tablet     lamoTRIgine (LAMICTAL) 100 MG tablet     metoprolol succinate ER (TOPROL XL) 25 MG 24 hr tablet     Multiple Vitamins-Minerals (EYE VITAMINS PO)     simvastatin (ZOCOR) 20 MG tablet     Sodium Chloride-Sodium Bicarb (SINUS WASH SALINE REFILLS) 2300-700 MG PACK     tamsulosin (FLOMAX) 0.4 MG capsule     Current Facility-Administered Medications   Medication     bevacizumab (AVASTIN) intravitreal inj 1.25 mg     No Known Allergies        Review of Systems         Objective    BP (!) 146/69 (BP Location: Left arm, Patient Position: Sitting, Cuff Size: Adult Large)   Pulse 63   Temp 97.8  " F (36.6  C) (Axillary)   Ht 1.803 m (5' 11\")   SpO2 97%   BMI 24.95 kg/m    Body mass index is 24.95 kg/m .  Physical Exam   GENERAL: healthy, alert and no distress  NECK: no adenopathy, no asymmetry, masses, or scars and thyroid normal to palpation  RESP: lungs clear to auscultation - no rales, rhonchi or wheezes  CV: regular rate and rhythm, normal S1 S2, no S3 or S4, no murmur, click or rub, no peripheral edema and peripheral pulses strong  ABDOMEN: soft, nontender, no hepatosplenomegaly, no masses and bowel sounds normal  MS: no gross musculoskeletal defects noted, no edema, back/hip nontender but if twists trunk a certain way can trigger the pain, R posterior hip area              "

## 2023-01-13 NOTE — NURSING NOTE
Melo Dangelo is a 82 year old male that presents in clinic today for the following:     Chief Complaint   Patient presents with     RECHECK       The patient's allergies and medications were reviewed. The patient's vitals were obtained without incident. The patient does not have any other questions for the provider.     AMANDA Olivarez at 8:28 AM on 1/13/2023.  Primary Care Clinic: [phone]

## 2023-01-16 DIAGNOSIS — N18.4 CKD (CHRONIC KIDNEY DISEASE) STAGE 4, GFR 15-29 ML/MIN (H): Primary | ICD-10-CM

## 2023-01-18 NOTE — TELEPHONE ENCOUNTER
DIAGNOSIS: R hip pain/   APPOINTMENT DATE: 1.24.23   NOTES STATUS DETAILS   OFFICE NOTE from referring provider Internal 1.13.23 Francis Smith MD   MEDICATION LIST Internal

## 2023-01-19 ENCOUNTER — LAB (OUTPATIENT)
Dept: LAB | Facility: CLINIC | Age: 83
End: 2023-01-19
Payer: MEDICARE

## 2023-01-19 ENCOUNTER — OFFICE VISIT (OUTPATIENT)
Dept: NEPHROLOGY | Facility: CLINIC | Age: 83
End: 2023-01-19
Attending: INTERNAL MEDICINE
Payer: MEDICARE

## 2023-01-19 VITALS
DIASTOLIC BLOOD PRESSURE: 74 MMHG | OXYGEN SATURATION: 97 % | SYSTOLIC BLOOD PRESSURE: 159 MMHG | HEART RATE: 59 BPM | BODY MASS INDEX: 24.84 KG/M2 | HEIGHT: 71 IN | WEIGHT: 177.4 LBS

## 2023-01-19 DIAGNOSIS — N18.4 CKD (CHRONIC KIDNEY DISEASE) STAGE 4, GFR 15-29 ML/MIN (H): ICD-10-CM

## 2023-01-19 DIAGNOSIS — E78.00 HIGH CHOLESTEROL: ICD-10-CM

## 2023-01-19 LAB
ALBUMIN MFR UR ELPH: 29.7 MG/DL (ref 1–14)
ALBUMIN SERPL BCG-MCNC: 4.4 G/DL (ref 3.5–5.2)
ALBUMIN UR-MCNC: 30 MG/DL
ANION GAP SERPL CALCULATED.3IONS-SCNC: 8 MMOL/L (ref 7–15)
APPEARANCE UR: CLEAR
BILIRUB UR QL STRIP: NEGATIVE
BUN SERPL-MCNC: 37.2 MG/DL (ref 8–23)
CALCIUM SERPL-MCNC: 9.4 MG/DL (ref 8.8–10.2)
CHLORIDE SERPL-SCNC: 107 MMOL/L (ref 98–107)
COLOR UR AUTO: ABNORMAL
CREAT SERPL-MCNC: 3.79 MG/DL (ref 0.67–1.17)
CREAT UR-MCNC: 114 MG/DL
DEPRECATED HCO3 PLAS-SCNC: 25 MMOL/L (ref 22–29)
ERYTHROCYTE [DISTWIDTH] IN BLOOD BY AUTOMATED COUNT: 13.9 % (ref 10–15)
FERRITIN SERPL-MCNC: 247 NG/ML (ref 31–409)
GFR SERPL CREATININE-BSD FRML MDRD: 15 ML/MIN/1.73M2
GLUCOSE SERPL-MCNC: 91 MG/DL (ref 70–99)
GLUCOSE UR STRIP-MCNC: NEGATIVE MG/DL
HCT VFR BLD AUTO: 29 % (ref 40–53)
HGB BLD-MCNC: 9.5 G/DL (ref 13.3–17.7)
HGB UR QL STRIP: ABNORMAL
IRON BINDING CAPACITY (ROCHE): 287 UG/DL (ref 240–430)
IRON SATN MFR SERPL: 33 % (ref 15–46)
IRON SERPL-MCNC: 95 UG/DL (ref 61–157)
KETONES UR STRIP-MCNC: NEGATIVE MG/DL
LEUKOCYTE ESTERASE UR QL STRIP: NEGATIVE
MCH RBC QN AUTO: 30.4 PG (ref 26.5–33)
MCHC RBC AUTO-ENTMCNC: 32.8 G/DL (ref 31.5–36.5)
MCV RBC AUTO: 93 FL (ref 78–100)
MUCOUS THREADS #/AREA URNS LPF: PRESENT /LPF
NITRATE UR QL: NEGATIVE
PH UR STRIP: 5.5 [PH] (ref 5–7)
PHOSPHATE SERPL-MCNC: 3.4 MG/DL (ref 2.5–4.5)
PLATELET # BLD AUTO: 150 10E3/UL (ref 150–450)
POTASSIUM SERPL-SCNC: 5 MMOL/L (ref 3.4–5.3)
PROT/CREAT 24H UR: 0.26 MG/MG CR (ref 0–0.2)
RBC # BLD AUTO: 3.13 10E6/UL (ref 4.4–5.9)
RBC URINE: 1 /HPF
SODIUM SERPL-SCNC: 140 MMOL/L (ref 136–145)
SP GR UR STRIP: 1.02 (ref 1–1.03)
SQUAMOUS EPITHELIAL: <1 /HPF
UROBILINOGEN UR STRIP-MCNC: NORMAL MG/DL
WBC # BLD AUTO: 5.3 10E3/UL (ref 4–11)
WBC URINE: <1 /HPF

## 2023-01-19 PROCEDURE — 80069 RENAL FUNCTION PANEL: CPT | Performed by: PATHOLOGY

## 2023-01-19 PROCEDURE — 81001 URINALYSIS AUTO W/SCOPE: CPT | Performed by: PATHOLOGY

## 2023-01-19 PROCEDURE — 83550 IRON BINDING TEST: CPT | Performed by: PATHOLOGY

## 2023-01-19 PROCEDURE — 82728 ASSAY OF FERRITIN: CPT | Performed by: INTERNAL MEDICINE

## 2023-01-19 PROCEDURE — 84156 ASSAY OF PROTEIN URINE: CPT | Performed by: PATHOLOGY

## 2023-01-19 PROCEDURE — 36415 COLL VENOUS BLD VENIPUNCTURE: CPT | Performed by: PATHOLOGY

## 2023-01-19 PROCEDURE — G0463 HOSPITAL OUTPT CLINIC VISIT: HCPCS | Performed by: INTERNAL MEDICINE

## 2023-01-19 PROCEDURE — 99214 OFFICE O/P EST MOD 30 MIN: CPT | Mod: 24 | Performed by: INTERNAL MEDICINE

## 2023-01-19 PROCEDURE — 83540 ASSAY OF IRON: CPT | Performed by: PATHOLOGY

## 2023-01-19 PROCEDURE — 85027 COMPLETE CBC AUTOMATED: CPT | Performed by: PATHOLOGY

## 2023-01-19 RX ORDER — SIMVASTATIN 20 MG
20 TABLET ORAL AT BEDTIME
Qty: 90 TABLET | Refills: 3 | Status: SHIPPED | OUTPATIENT
Start: 2023-01-19 | End: 2023-04-17

## 2023-01-19 ASSESSMENT — PAIN SCALES - GENERAL: PAINLEVEL: NO PAIN (0)

## 2023-01-19 NOTE — NURSING NOTE
"Chief Complaint   Patient presents with     RECHECK     Follow-up     BP (!) 159/74 (BP Location: Left arm, Patient Position: Sitting, Cuff Size: Adult Regular)   Pulse 59   Ht 1.803 m (5' 10.98\")   Wt 80.5 kg (177 lb 6.4 oz)   SpO2 97%   BMI 24.75 kg/m      Folres Borrego on 1/19/2023 at 1:25 PM    "

## 2023-01-19 NOTE — LETTER
"1/19/2023       RE: Melo Dangelo  [address]  [place] [address]     Dear Colleague,    Thank you for referring your patient, Melo Dangelo, to the [facility] [place] [facility]. Please see a copy of my visit note below.          Nephrology Clinic Follow up  1/19/23     Melo Dangelo MRN:[id number] YOB: 1940  Date of Admission:(Not on file)  Primary care provider: Francis Smith  Requesting physician: Merry Barrow, *       REASON FOR CONSULT: IgA nephropathy       HISTORY OF PRESENT ILLNESS:       PAST MEDICAL HISTORY:  Reviewed with patient on 01/19/2023   As per HPI    MEDICATIONS:  Reviewed with the patient in detail    ALLERGIES:    Reviewed with the patient in detail    REVIEW OF SYSTEMS:  A comprehensive of systems was negative except as noted above.    SOCIAL HISTORY:   Reviewed with patient, no smoking and no alcohol use     FAMILY MEDICAL HISTORY:   Reviewed, no family history of need for dialysis, transplant or CKD    PHYSICAL EXAM:   Vital signs:BP (!) 159/74 (BP Location: Left arm, Patient Position: Sitting, Cuff Size: Adult Regular)   Pulse 59   Ht 1.803 m (5' 10.98\")   Wt 80.5 kg (177 lb 6.4 oz)   SpO2 97%   BMI 24.75 kg/m      Physical Exam     Gen: Appears well  Neck: No JVD  Lungs: CTA  CVS: S1S2 normal, no murmurs heard  LE: no edema  Skin: no rashes  CNS: Grossly normal       Interval History: He denies any new symptoms. He went to [place] for whale watching on a cruise ship and developed COVID infection after. This was 2 weeks ago and other than having some low energy, he is doing okay.     Interval history 1/19/23: He has bene doing well overall. He sometimes feels a little short of breath when he climbs about 2 flights of stairs but otherwise has minimal symptoms. He has not seen any dark urine or LE edema.       ASSESSMENT AND RECOMMENDATIONS:     # IgA " nephropathy [id number]   # CKD stage 4    # Anemia of CKD, iron replete 2/22   # Paroxysmal A fib      Patient was first diagnosed with IgA nephropathy when he presented to the hospital with gross hematuria after his second COVID vaccine. He presented with a Sr creatinine of 3.5 which peaked to 4.4 mg/dl.  A kidney bx was done which showed IgA nephropathy with some fibrocellular crescents.  7/14 gloms were globally sclerosed.   He was started on Pozzi protocol with solumedrol 500 mg/3 days followed by oral prednisone 40 mg every other day with bactrim single strength 1 tab PO every other day, pepcid 40 mg PO daily and calcium carbonate 9132-9551 mg PO daily.    Unfortunately, he did not have a significant improvement in his creatinine and had persistent active sediment. He was started on  Cyclophosphamide 100 mg daily in 11/21 along with prednisone taper. His cyclophosphamide was discontinued 5/2/22 and his prednisone decreased to 5 mg Q other day and now has been discontinued     His creatinine improved initially and had a new baseline of 3.3 mg/dl, now fluctuates from 3.6-3.8 mg/dl. His creatinine was higher which I suspect is only a fluctuation related to starting eplerenone in 4/22. His eplerenone has been discontinued and his creatinine is now back to his baseline. His proteinuria remains minimal and his hematuria has improved significantly.       He has small blood on his UA with no RBC's, no protein. His blood pressures remain above the goal of < 140/90 mm of Hg and he has no LE edema. Currently only on Metoprolol  12.5 mg daily   He is anemic and has thrombocytopenia. Iron studies asha. There is no indication for RAMA at this time.     I asked him to check his blood pressures at home and send us the readings in 2 weeks to see if he would need any tweaking of his antihypertensive regimen.    F/up in clinic in 6 months       Merry Barrow MD

## 2023-01-19 NOTE — PROGRESS NOTES
"      Nephrology Clinic Follow up  1/19/23     Melo Dangelo MRN:[id number] YOB: 1940  Date of Admission:(Not on file)  Primary care provider: Francis Smith  Requesting physician: Merry Barrow, *       REASON FOR CONSULT: IgA nephropathy       HISTORY OF PRESENT ILLNESS:       PAST MEDICAL HISTORY:  Reviewed with patient on 01/19/2023   As per HPI    MEDICATIONS:  Reviewed with the patient in detail    ALLERGIES:    Reviewed with the patient in detail    REVIEW OF SYSTEMS:  A comprehensive of systems was negative except as noted above.    SOCIAL HISTORY:   Reviewed with patient, no smoking and no alcohol use     FAMILY MEDICAL HISTORY:   Reviewed, no family history of need for dialysis, transplant or CKD    PHYSICAL EXAM:   Vital signs:BP (!) 159/74 (BP Location: Left arm, Patient Position: Sitting, Cuff Size: Adult Regular)   Pulse 59   Ht 1.803 m (5' 10.98\")   Wt 80.5 kg (177 lb 6.4 oz)   SpO2 97%   BMI 24.75 kg/m      Physical Exam     Gen: Appears well  Neck: No JVD  Lungs: CTA  CVS: S1S2 normal, no murmurs heard  LE: no edema  Skin: no rashes  CNS: Grossly normal       Interval History: He denies any new symptoms. He went to [place] for whale watching on a cruise ship and developed COVID infection after. This was 2 weeks ago and other than having some low energy, he is doing okay.     Interval history 1/19/23: He has bene doing well overall. He sometimes feels a little short of breath when he climbs about 2 flights of stairs but otherwise has minimal symptoms. He has not seen any dark urine or LE edema.       ASSESSMENT AND RECOMMENDATIONS:     # IgA nephropathy [id number]   # CKD stage 4    # Anemia of CKD, iron replete 2/22   # Paroxysmal A fib      Patient was first diagnosed with IgA nephropathy when he presented to the hospital with gross hematuria after his second COVID vaccine. He presented with a Sr creatinine of 3.5 which peaked to 4.4 mg/dl.  A kidney bx was done " which showed IgA nephropathy with some fibrocellular crescents.  7/14 gloms were globally sclerosed.   He was started on Pozzi protocol with solumedrol 500 mg/3 days followed by oral prednisone 40 mg every other day with bactrim single strength 1 tab PO every other day, pepcid 40 mg PO daily and calcium carbonate 1886-1691 mg PO daily.    Unfortunately, he did not have a significant improvement in his creatinine and had persistent active sediment. He was started on  Cyclophosphamide 100 mg daily in 11/21 along with prednisone taper. His cyclophosphamide was discontinued 5/2/22 and his prednisone decreased to 5 mg Q other day and now has been discontinued     His creatinine improved initially and had a new baseline of 3.3 mg/dl, now fluctuates from 3.6-3.8 mg/dl. His creatinine was higher which I suspect is only a fluctuation related to starting eplerenone in 4/22. His eplerenone has been discontinued and his creatinine is now back to his baseline. His proteinuria remains minimal and his hematuria has improved significantly.       He has small blood on his UA with no RBC's, no protein. His blood pressures remain above the goal of < 140/90 mm of Hg and he has no LE edema. Currently only on Metoprolol  12.5 mg daily   He is anemic and has thrombocytopenia. Iron studies asha. There is no indication for RAMA at this time.     I asked him to check his blood pressures at home and send us the readings in 2 weeks to see if he would need any tweaking of his antihypertensive regimen.    F/up in clinic in 6 months       Merry Barrow MD

## 2023-01-23 DIAGNOSIS — M25.551 RIGHT HIP PAIN: Primary | ICD-10-CM

## 2023-01-23 NOTE — PROGRESS NOTES
Sports Medicine Clinic Visit    PCP: Francis Smith Antolin is a 82 year old male who is seen  in consultation at the request of Dr. Smith presenting with right-sided low back pain.  Is been bothersome in the last 2 weeks.  He had to sleep on a cot in the hospital.  His wife is dealing with advanced medical problems, including dialysis.  He is her caregiver at home.  He will have to do bending and stooping to help with daily cares for her and transfers.  He denies radicular discomfort into his lower extremities.  He indicates problems with his low back dating back to childhood, that also bothered him when he was in the .    X-rays of the bilateral hips today suggest mild bilateral hip DJD, mild bilateral SI joint DJD.  CT scan of the abdomen and pelvis suggests mild multilevel degenerative disc and joint disease of the lumbar spine 10/2021.    Location of Pain: SI joint and lateral hip pain bilaterally  Duration of Pain: Chronic, worsening in the last 2 weeks  Rating of Pain: 3/10  Pain is better with: Rest  Pain is worse with: Lumbar rotation and walking longer distances   Additional Features:  Treatment so far consists of: Nothing  Prior History of related problems: Low back injury many years ago     There were no vitals taken for this visit.    History of chronic kidney disease, stage IV, hypertension, IgA nephropathy, paroxysmal atrial fibrillation    Imaging studies below reviewed by me:  23-Sep-2008 09:26:00  Exam: US Retroperitoneal Limited   Indications: abdominal aortic aneurysm   ORIGINAL REPORT - 23-Sep-2008 10:10:00   Ultrasound demonstrates slight ectasia of the distal abdominal aorta with atheromatous changes; the distal abdominal aorta measures 1.8cm in greatest AP diameter. Normal caliber common iliac arteries, where seen.         EXAM: CT ABDOMEN AND PELVIS WITHOUT CONTRAST - RENAL STONE PROTOCOL  LOCATION: [facility]  CENTER  DATE/TIME: 10/18/2021 10:17 PM     INDICATION: Hematuria.  COMPARISON: None.     TECHNIQUE: CT scan of the abdomen and pelvis was performed without oral or IV contrast. Multiplanar reformats were obtained. Dose reduction techniques were used.  CONTRAST: None.     FINDINGS:     LOWER CHEST: Coronary artery calcification.     HEPATOBILIARY: Unremarkable.     SPLEEN: Unremarkable.     PANCREAS: Unremarkable.     ADRENAL GLANDS: Unremarkable.     KIDNEYS/BLADDER: No renal or ureteral calculi. No dilatation of the intrarenal collecting systems or ureters. No visualized bladder calculi.     BOWEL: Possible tiny hiatal hernia. Normal appendix.     LYMPH NODES: Unremarkable.     PELVIC ORGANS: No acute findings.     MUSCULOSKELETAL: No acute findings.     OTHER: Atherosclerotic calcification in the abdominal aorta.                                                                      IMPRESSION:   1. No acute abnormality identified in the abdomen or pelvis.  2. No renal or ureteral calculi or evidence of urinary obstruction.           [facility] 9/7/2016:  DX Hip And Pelvis Left 2+ Views    Impression     Mild degenerative arthritis in the left hip. No abnormality in the region of the greater trochanter or adjacent soft tissues. There may be a small amount of air in the soft tissues lateral to the femoral head and lower ilium. In the absence of previous instrumentation, infection could be considered. Recommend clinical correlation.       Degenerative change noted in the lower lumbar spine and right hip as well.         PMH:  Past Medical History:   Diagnosis Date     2019 novel coronavirus disease (COVID-19) 10/27/2020    also on 9/8/22     Alcohol dependence (H)      Chronic kidney disease, stage IV (severe) (H)      Combined forms of age-related cataract of both eyes      Concussion with loss of consciousness     x10 going back to childhood     HTN (hypertension)      IgA nephropathy      Inactive central serous  chorioretinopathy of right eye      Paroxysmal atrial fibrillation (H)      PVD (posterior vitreous detachment)      Seizure disorder (H)     last seizure 2008       Active problem list:  Patient Active Problem List   Diagnosis     Central serous chorioretinopathy of right eye     Cupping of optic disc, right     Peripheral drusen of both eyes     Posterior vitreous detachment, bilateral     Age-related nuclear cataract of both eyes     Major depressive disorder, recurrent episode, moderate (H)     Major depressive disorder     Arthritis of knee     Hematuria, unspecified type     IgA nephropathy     CKD (chronic kidney disease) stage 4, GFR 15-29 ml/min (H)     High risk medication use     Acute bronchitis due to respiratory syncytial virus (RSV)     Sensorineural hearing loss (SNHL) of both ears     Combined forms of age-related cataract of both eyes     Alcohol dependence in remission (H)       FH:  Family History   Problem Relation Age of Onset     Glaucoma No family hx of      Macular Degeneration No family hx of      Anesthesia Reaction No family hx of      Deep Vein Thrombosis (DVT) No family hx of        SH:  Social History     Socioeconomic History     Marital status:      Spouse name: Not on file     Number of children: Not on file     Years of education: Not on file     Highest education level: Bachelor's degree (e.g., BA, AB, BS)   Occupational History     Not on file   Tobacco Use     Smoking status: Former     Packs/day: 0.00     Types: Cigarettes     Smokeless tobacco: Never   Vaping Use     Vaping Use: Never used   Substance and Sexual Activity     Alcohol use: No     Comment: History of alcohol dependence, in remission for 20 years     Drug use: No     Sexual activity: Not Currently   Other Topics Concern     Not on file   Social History Narrative     Not on file     Social Determinants of Health     Financial Resource Strain: Not on file   Food Insecurity: Not on file   Transportation Needs:  Not on file   Physical Activity: Not on file   Stress: Not on file   Social Connections: Not on file   Intimate Partner Violence: Not on file   Housing Stability: Not on file       MEDS:  See EMR, reviewed  ALL:  See EMR, reviewed    REVIEW OF SYSTEMS:  CONSTITUTIONAL:NEGATIVE for fever, chills, change in weight  INTEGUMENTARY/SKIN: NEGATIVE for worrisome rashes, moles or lesions  EYES: NEGATIVE for vision changes or irritation  ENT/MOUTH: NEGATIVE for ear, mouth and throat problems  RESP:NEGATIVE for significant cough or SOB  BREAST: NEGATIVE for masses, tenderness or discharge  CV: NEGATIVE for chest pain, palpitations or peripheral edema  GI: NEGATIVE for nausea, abdominal pain, heartburn, or change in bowel habits  :NEGATIVE for frequency, dysuria, or hematuria  :NEGATIVE for frequency, dysuria, or hematuria  NEURO: NEGATIVE for weakness, dizziness or paresthesias  ENDOCRINE: NEGATIVE for temperature intolerance, skin/hair changes  HEME/ALLERGY/IMMUNE: NEGATIVE for bleeding problems  PSYCHIATRIC: NEGATIVE for changes in mood or affect      Objective: He points to the musculature in his low back on the right side at approximately L4-L5 level as the area that is uncomfortable.  No superficial rashes.  Mildly tender to touch.  No swelling.  Nontender directly over the posterior spinous processes.  Nontender over the bilateral SI joints.  Normal range of motion of the bilateral hips without discomfort.  Straight leg raise is negative bilaterally.  5 out of 5 strength bilaterally at hip, knee, ankle and foot.  Sensation is intact distally without numbness and tingling today.  Appropriate conversation affect.    I personally reviewed with the patient his x-rays of the bilateral hips, and his previous imaging studies of the lumbar spine on CT scan in 2021.    Assessment: Mild bilateral hip DJD.  Mild multilevel degenerative disc and joint disease of the lumbar spine.  Musculoligamentous back discomfort.    Plan:  Low back pain does not wake him from sleep.  He feels better on a supportive mattress than on a cot in the hospital.  He needs to avoid nonsteroidal anti-inflammatories in the setting of his kidney disease.  We discussed the proper use of Tylenol, heating pad, and lidocaine patches.  Lidocaine patches were prescribed.  He knows not to use them longer than 12 hours at a time.  He knows that insurance may not pay for them because they are over-the-counter.  He believes he will be able to place it on his low back in the area of discomfort.  We discussed physical therapy options, declined for now.  Patient indicates he does not have time because of the care is needed for his wife.  He was given a set of lumbar maintenance exercises and stretches for use at home.  He will avoid the exercises that are uncomfortable to do.  An MRI of the lumbar spine could be considered by his primary doctor by me if not improving over the next 6 weeks.

## 2023-01-24 ENCOUNTER — OFFICE VISIT (OUTPATIENT)
Dept: ORTHOPEDICS | Facility: CLINIC | Age: 83
End: 2023-01-24
Payer: MEDICARE

## 2023-01-24 ENCOUNTER — PRE VISIT (OUTPATIENT)
Dept: ORTHOPEDICS | Facility: CLINIC | Age: 83
End: 2023-01-24

## 2023-01-24 ENCOUNTER — ANCILLARY PROCEDURE (OUTPATIENT)
Dept: GENERAL RADIOLOGY | Facility: CLINIC | Age: 83
End: 2023-01-24
Attending: FAMILY MEDICINE
Payer: MEDICARE

## 2023-01-24 DIAGNOSIS — M25.551 BILATERAL HIP PAIN: ICD-10-CM

## 2023-01-24 DIAGNOSIS — M54.50 CHRONIC RIGHT-SIDED LOW BACK PAIN WITHOUT SCIATICA: Primary | ICD-10-CM

## 2023-01-24 DIAGNOSIS — M25.552 BILATERAL HIP PAIN: ICD-10-CM

## 2023-01-24 DIAGNOSIS — G89.29 CHRONIC RIGHT-SIDED LOW BACK PAIN WITHOUT SCIATICA: Primary | ICD-10-CM

## 2023-01-24 DIAGNOSIS — N18.4 CKD (CHRONIC KIDNEY DISEASE) STAGE 4, GFR 15-29 ML/MIN (H): ICD-10-CM

## 2023-01-24 DIAGNOSIS — M51.369 LUMBAR DEGENERATIVE DISC DISEASE: ICD-10-CM

## 2023-01-24 PROCEDURE — 73522 X-RAY EXAM HIPS BI 3-4 VIEWS: CPT | Performed by: RADIOLOGY

## 2023-01-24 PROCEDURE — 99203 OFFICE O/P NEW LOW 30 MIN: CPT | Performed by: FAMILY MEDICINE

## 2023-01-24 RX ORDER — LIDOCAINE 4 G/G
1 PATCH TOPICAL EVERY 24 HOURS
Qty: 20 PATCH | Refills: 1 | Status: SHIPPED | OUTPATIENT
Start: 2023-01-24 | End: 2023-03-03

## 2023-01-24 NOTE — LETTER
1/24/2023      RE: Melo Dangelo  [address]  [place] [address]     Dear Colleague,    Thank you for referring your patient, Melo Dangelo, to the [facility] [place]. Please see a copy of my visit note below.    Sports Medicine Clinic Visit    PCP: Francis Smith    Melo Dangelo is a 82 year old male who is seen  in consultation at the request of Dr. Smith presenting with right-sided low back pain.  Is been bothersome in the last 2 weeks.  He had to sleep on a cot in the hospital.  His wife is dealing with advanced medical problems, including dialysis.  He is her caregiver at home.  He will have to do bending and stooping to help with daily cares for her and transfers.  He denies radicular discomfort into his lower extremities.  He indicates problems with his low back dating back to childhood, that also bothered him when he was in the .    X-rays of the bilateral hips today suggest mild bilateral hip DJD, mild bilateral SI joint DJD.  CT scan of the abdomen and pelvis suggests mild multilevel degenerative disc and joint disease of the lumbar spine 10/2021.    Location of Pain: SI joint and lateral hip pain bilaterally  Duration of Pain: Chronic, worsening in the last 2 weeks  Rating of Pain: 3/10  Pain is better with: Rest  Pain is worse with: Lumbar rotation and walking longer distances   Additional Features:  Treatment so far consists of: Nothing  Prior History of related problems: Low back injury many years ago     There were no vitals taken for this visit.    History of chronic kidney disease, stage IV, hypertension, IgA nephropathy, paroxysmal atrial fibrillation    Imaging studies below reviewed by me:  23-Sep-2008 09:26:00  Exam: US Retroperitoneal Limited   Indications: abdominal aortic aneurysm   ORIGINAL REPORT - 23-Sep-2008 10:10:00   Ultrasound demonstrates slight ectasia of the distal abdominal aorta with atheromatous  changes; the distal abdominal aorta measures 1.8cm in greatest AP diameter. Normal caliber common iliac arteries, where seen.         EXAM: CT ABDOMEN AND PELVIS WITHOUT CONTRAST - RENAL STONE PROTOCOL  LOCATION: [facility]  DATE/TIME: 10/18/2021 10:17 PM     INDICATION: Hematuria.  COMPARISON: None.     TECHNIQUE: CT scan of the abdomen and pelvis was performed without oral or IV contrast. Multiplanar reformats were obtained. Dose reduction techniques were used.  CONTRAST: None.     FINDINGS:     LOWER CHEST: Coronary artery calcification.     HEPATOBILIARY: Unremarkable.     SPLEEN: Unremarkable.     PANCREAS: Unremarkable.     ADRENAL GLANDS: Unremarkable.     KIDNEYS/BLADDER: No renal or ureteral calculi. No dilatation of the intrarenal collecting systems or ureters. No visualized bladder calculi.     BOWEL: Possible tiny hiatal hernia. Normal appendix.     LYMPH NODES: Unremarkable.     PELVIC ORGANS: No acute findings.     MUSCULOSKELETAL: No acute findings.     OTHER: Atherosclerotic calcification in the abdominal aorta.                                                                      IMPRESSION:   1. No acute abnormality identified in the abdomen or pelvis.  2. No renal or ureteral calculi or evidence of urinary obstruction.           [facility] 9/7/2016:  DX Hip And Pelvis Left 2+ Views    Impression     Mild degenerative arthritis in the left hip. No abnormality in the region of the greater trochanter or adjacent soft tissues. There may be a small amount of air in the soft tissues lateral to the femoral head and lower ilium. In the absence of previous instrumentation, infection could be considered. Recommend clinical correlation.       Degenerative change noted in the lower lumbar spine and right hip as well.         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      Paroxysmal atrial fibrillation (H)      PVD (posterior vitreous detachment)      Seizure disorder (H)     last seizure 2008       Active problem list:  Patient Active Problem List   Diagnosis     Central serous chorioretinopathy of right eye     Cupping of optic disc, right     Peripheral drusen of both eyes     Posterior vitreous detachment, bilateral     Age-related nuclear cataract of both eyes     Major depressive disorder, recurrent episode, moderate (H)     Major depressive disorder     Arthritis of knee     Hematuria, unspecified type     IgA nephropathy     CKD (chronic kidney disease) stage 4, GFR 15-29 ml/min (H)     High risk medication use     Acute bronchitis due to respiratory syncytial virus (RSV)     Sensorineural hearing loss (SNHL) of both ears     Combined forms of age-related cataract of both eyes     Alcohol dependence in remission (H)       FH:  Family History   Problem Relation Age of Onset     Glaucoma No family hx of      Macular Degeneration No family hx of      Anesthesia Reaction No family hx of      Deep Vein Thrombosis (DVT) No family hx of        SH:  Social History     Socioeconomic History     Marital status:      Spouse name: Not on file     Number of children: Not on file     Years of education: Not on file     Highest education level: Bachelor's degree (e.g., BA, AB, BS)   Occupational History     Not on file   Tobacco Use     Smoking status: Former     Packs/day: 0.00     Types: Cigarettes     Smokeless tobacco: Never   Vaping Use     Vaping Use: Never used   Substance and Sexual Activity     Alcohol use: No     Comment: History of alcohol dependence, in remission for 20  years     Drug use: No     Sexual activity: Not Currently   Other Topics Concern     Not on file   Social History Narrative     Not on file     Social Determinants of Health     Financial Resource Strain: Not on file   Food Insecurity: Not on file   Transportation Needs: Not on file   Physical Activity: Not on file   Stress: Not on file   Social Connections: Not on file   Intimate Partner Violence: Not on file   Housing Stability: Not on file       MEDS:  See EMR, reviewed  ALL:  See EMR, reviewed    REVIEW OF SYSTEMS:  CONSTITUTIONAL:NEGATIVE for fever, chills, change in weight  INTEGUMENTARY/SKIN: NEGATIVE for worrisome rashes, moles or lesions  EYES: NEGATIVE for vision changes or irritation  ENT/MOUTH: NEGATIVE for ear, mouth and throat problems  RESP:NEGATIVE for significant cough or SOB  BREAST: NEGATIVE for masses, tenderness or discharge  CV: NEGATIVE for chest pain, palpitations or peripheral edema  GI: NEGATIVE for nausea, abdominal pain, heartburn, or change in bowel habits  :NEGATIVE for frequency, dysuria, or hematuria  :NEGATIVE for frequency, dysuria, or hematuria  NEURO: NEGATIVE for weakness, dizziness or paresthesias  ENDOCRINE: NEGATIVE for temperature intolerance, skin/hair changes  HEME/ALLERGY/IMMUNE: NEGATIVE for bleeding problems  PSYCHIATRIC: NEGATIVE for changes in mood or affect      Objective: He points to the musculature in his low back on the right side at approximately L4-L5 level as the area that is uncomfortable.  No superficial rashes.  Mildly tender to touch.  No swelling.  Nontender directly over the posterior spinous processes.  Nontender over the bilateral SI joints.  Normal range of motion of the bilateral hips without discomfort.  Straight leg raise is negative bilaterally.  5 out of 5 strength bilaterally at hip, knee, ankle and foot.  Sensation is intact distally without numbness and tingling today.  Appropriate conversation affect.    I personally reviewed with the  patient his x-rays of the bilateral hips, and his previous imaging studies of the lumbar spine on CT scan in 2021.    Assessment: Mild bilateral hip DJD.  Mild multilevel degenerative disc and joint disease of the lumbar spine.  Musculoligamentous back discomfort.    Plan: Low back pain does not wake him from sleep.  He feels better on a supportive mattress than on a cot in the hospital.  He needs to avoid nonsteroidal anti-inflammatories in the setting of his kidney disease.  We discussed the proper use of Tylenol, heating pad, and lidocaine patches.  Lidocaine patches were prescribed.  He knows not to use them longer than 12 hours at a time.  He knows that insurance may not pay for them because they are over-the-counter.  He believes he will be able to place it on his low back in the area of discomfort.  We discussed physical therapy options, declined for now.  Patient indicates he does not have time because of the care is needed for his wife.  He was given a set of lumbar maintenance exercises and stretches for use at home.  He will avoid the exercises that are uncomfortable to do.  An MRI of the lumbar spine could be considered by his primary doctor by me if not improving over the next 6 weeks.        Again, thank you for allowing me to participate in the care of your patient.      Sincerely,    Luis Sales MD

## 2023-01-25 ENCOUNTER — PATIENT OUTREACH (OUTPATIENT)
Dept: NEPHROLOGY | Facility: CLINIC | Age: 83
End: 2023-01-25
Payer: MEDICARE

## 2023-01-25 DIAGNOSIS — N18.4 CKD (CHRONIC KIDNEY DISEASE) STAGE 4, GFR 15-29 ML/MIN (H): Primary | ICD-10-CM

## 2023-01-25 NOTE — PROGRESS NOTES
Patient has been added to CKD [facility] due to GFR less than 35 and CKCC.     Neph Tracking Flowsheet Last Filled Values     Patient's Referral Dates Auto Populate Patient's Referral Dates    MTM Referral 12/22/21    Journey Referral 1/25/23        Katerine Gonzalez RN, BSN   Nephrology RN Care Coordinator   [facility]

## 2023-02-01 ENCOUNTER — PATIENT OUTREACH (OUTPATIENT)
Dept: NEPHROLOGY | Facility: CLINIC | Age: 83
End: 2023-02-01
Payer: MEDICARE

## 2023-02-01 DIAGNOSIS — D63.1 ANEMIA IN STAGE 4 CHRONIC KIDNEY DISEASE (H): Primary | ICD-10-CM

## 2023-02-01 DIAGNOSIS — N18.4 CKD (CHRONIC KIDNEY DISEASE) STAGE 4, GFR 15-29 ML/MIN (H): ICD-10-CM

## 2023-02-01 DIAGNOSIS — N18.4 ANEMIA IN STAGE 4 CHRONIC KIDNEY DISEASE (H): Primary | ICD-10-CM

## 2023-02-01 NOTE — PROGRESS NOTES
2/1/23-Per Dr. Barrow to check in to see has been keeping track of blood pressures and make follow up return Glomerular appointment for 6 months.  Sent [employer]   --------------------------------------------------------    2/6/23- Patient did not respond or read  [employer] message. Writer to call Melo.     2/10/23-  150/ 75 average, no sob or swelling. Set up appointment for June 1st at 1130 with labs prior at 1030am. In addition patient to be seen by Vania Washington 3/3/23 at 930am with 9am labs prior to visit. Lab orders have been placed    Per patient request, [employer] message sent with appointments      Katerine Gonzalez RN, BSN   Nephrology RN Care Coordinator   [facility]

## 2023-02-06 ENCOUNTER — TELEPHONE (OUTPATIENT)
Dept: PHARMACY | Facility: CLINIC | Age: 83
End: 2023-02-06
Payer: MEDICARE

## 2023-02-06 DIAGNOSIS — N18.4 ANEMIA OF CHRONIC RENAL FAILURE, STAGE 4 (SEVERE) (H): ICD-10-CM

## 2023-02-06 DIAGNOSIS — D63.1 ANEMIA OF CHRONIC RENAL FAILURE, STAGE 4 (SEVERE) (H): ICD-10-CM

## 2023-02-06 DIAGNOSIS — N18.4 CKD (CHRONIC KIDNEY DISEASE) STAGE 4, GFR 15-29 ML/MIN (H): Primary | ICD-10-CM

## 2023-02-06 NOTE — TELEPHONE ENCOUNTER
Follow-up with anemia management service:    Referral was Canceled.  Opened in Error.     Anemia Latest Ref Rng & Units 2/17/2022 4/25/2022 6/2/2022 7/29/2022 9/15/2022 1/13/2023 1/19/2023   Hemoglobin 13.3 - 17.7 g/dL 9.3(L) 8.8(L) 9.3(L) 9.7(L) 9.4(L) 9.9(L) 9.5(L)   TSAT 15 - 46 % 26 - - - - - -   Ferritin 31 - 409 ng/mL 351 - - - 405 - 247           Amy Hurd RN   Anemia Services  [address] [address]   [email or web address]   Office : [phone]  Fax: [phone]

## 2023-02-15 ENCOUNTER — PATIENT OUTREACH (OUTPATIENT)
Dept: NEPHROLOGY | Facility: CLINIC | Age: 83
End: 2023-02-15
Payer: MEDICARE

## 2023-02-15 NOTE — PROGRESS NOTES
Dr. Kim would like to offer her CKD education class to all [facility] patients with a GFR of 45 or less. As the [facility], [facility] will be sending out [employer] messages offering either the early or late class depending on the patient's GFR. Patients last GRF 15. Sent [employer] message.     Katerine Gonzalez RN, BSN   Nephrology RN Care Coordinator   [facility]

## 2023-02-24 ENCOUNTER — OFFICE VISIT (OUTPATIENT)
Dept: FAMILY MEDICINE | Facility: CLINIC | Age: 83
End: 2023-02-24
Payer: MEDICARE

## 2023-02-24 ENCOUNTER — LAB (OUTPATIENT)
Dept: LAB | Facility: CLINIC | Age: 83
End: 2023-02-24
Payer: MEDICARE

## 2023-02-24 VITALS
HEART RATE: 62 BPM | WEIGHT: 179 LBS | HEIGHT: 71 IN | DIASTOLIC BLOOD PRESSURE: 74 MMHG | OXYGEN SATURATION: 97 % | SYSTOLIC BLOOD PRESSURE: 145 MMHG | BODY MASS INDEX: 25.06 KG/M2

## 2023-02-24 DIAGNOSIS — N18.4 CKD (CHRONIC KIDNEY DISEASE) STAGE 4, GFR 15-29 ML/MIN (H): ICD-10-CM

## 2023-02-24 DIAGNOSIS — F41.9 ANXIETY: ICD-10-CM

## 2023-02-24 DIAGNOSIS — I10 BENIGN ESSENTIAL HYPERTENSION: ICD-10-CM

## 2023-02-24 DIAGNOSIS — N62 GYNECOMASTIA: ICD-10-CM

## 2023-02-24 DIAGNOSIS — F43.23 ADJUSTMENT DISORDER WITH MIXED ANXIETY AND DEPRESSED MOOD: Primary | ICD-10-CM

## 2023-02-24 LAB
ANION GAP SERPL CALCULATED.3IONS-SCNC: 9 MMOL/L (ref 7–15)
BUN SERPL-MCNC: 44.1 MG/DL (ref 8–23)
CALCIUM SERPL-MCNC: 9.4 MG/DL (ref 8.8–10.2)
CHLORIDE SERPL-SCNC: 105 MMOL/L (ref 98–107)
CREAT SERPL-MCNC: 4 MG/DL (ref 0.67–1.17)
DEPRECATED HCO3 PLAS-SCNC: 25 MMOL/L (ref 22–29)
GFR SERPL CREATININE-BSD FRML MDRD: 14 ML/MIN/1.73M2
GLUCOSE SERPL-MCNC: 94 MG/DL (ref 70–99)
POTASSIUM SERPL-SCNC: 4.9 MMOL/L (ref 3.4–5.3)
SODIUM SERPL-SCNC: 139 MMOL/L (ref 136–145)

## 2023-02-24 PROCEDURE — 80048 BASIC METABOLIC PNL TOTAL CA: CPT | Performed by: PATHOLOGY

## 2023-02-24 PROCEDURE — 36415 COLL VENOUS BLD VENIPUNCTURE: CPT | Performed by: PATHOLOGY

## 2023-02-24 PROCEDURE — 99214 OFFICE O/P EST MOD 30 MIN: CPT | Performed by: FAMILY MEDICINE

## 2023-02-24 RX ORDER — GABAPENTIN 100 MG/1
CAPSULE ORAL
Qty: 30 CAPSULE | Refills: 1 | Status: SHIPPED | OUTPATIENT
Start: 2023-02-24 | End: 2023-06-20

## 2023-02-24 RX ORDER — ACETAMINOPHEN 325 MG/1
325-650 TABLET ORAL EVERY 6 HOURS PRN
COMMUNITY

## 2023-02-24 ASSESSMENT — ANXIETY QUESTIONNAIRES
2. NOT BEING ABLE TO STOP OR CONTROL WORRYING: NOT AT ALL
5. BEING SO RESTLESS THAT IT IS HARD TO SIT STILL: NOT AT ALL
7. FEELING AFRAID AS IF SOMETHING AWFUL MIGHT HAPPEN: NOT AT ALL
3. WORRYING TOO MUCH ABOUT DIFFERENT THINGS: SEVERAL DAYS
IF YOU CHECKED OFF ANY PROBLEMS ON THIS QUESTIONNAIRE, HOW DIFFICULT HAVE THESE PROBLEMS MADE IT FOR YOU TO DO YOUR WORK, TAKE CARE OF THINGS AT HOME, OR GET ALONG WITH OTHER PEOPLE: SOMEWHAT DIFFICULT
GAD7 TOTAL SCORE: 4
GAD7 TOTAL SCORE: 4
6. BECOMING EASILY ANNOYED OR IRRITABLE: SEVERAL DAYS
1. FEELING NERVOUS, ANXIOUS, OR ON EDGE: SEVERAL DAYS

## 2023-02-24 ASSESSMENT — PATIENT HEALTH QUESTIONNAIRE - PHQ9
5. POOR APPETITE OR OVEREATING: SEVERAL DAYS
SUM OF ALL RESPONSES TO PHQ QUESTIONS 1-9: 4

## 2023-02-24 NOTE — NURSING NOTE
Melo Dangelo is a 82 year old male patient that presents today in clinic for the following:    Chief Complaint   Patient presents with     Follow Up     Pt would like to discuss kidneys and blood pressure     The patient's allergies and medications were reviewed as noted. A set of vitals were recorded as noted without incident. The patient does not have any other questions for the provider.    Allyson Gonzalez, EMT at 8:22 AM on 2/24/2023

## 2023-02-24 NOTE — PROGRESS NOTES
Assessment & Plan     Adjustment disorder with mixed anxiety and depressed mood  We will see if can move up therapy visit, now set up for May, he'd like earlier  - Adult Mental Health  Referral; Future  Discussed stressors at length his own health but mainly care giving for wife    Anxiety  Cont ssri, use this prn one/day, rvwd se  - gabapentin (NEURONTIN) 100 MG capsule; Take one po every day prn anxiety, watch for grogginess or dizziness    CKD (chronic kidney disease) stage 4, GFR 15-29 ml/min (H)  A bit worse, I've messaged his renal team in case should adjust before next week  - Basic metabolic panel  (Ca, Cl, CO2, Creat, Gluc, K, Na, BUN); Future    Htn: he'll rvw w/ renal next week, if he can he'll bring list home bp's      32 minutes spent on the date of the encounter doing chart review, history and exam, documentation and further activities per the note    Return in about 1 month (around 3/24/2023).    Francis Smith MD  [facility] [place]    Armand Alejo is a 82 year old, presenting for the following health issues:  Follow Up (Pt would like to discuss kidneys and blood pressure)      HPI Here in f/u  1-anxiety: lives w/ wife who is quite ill, on dialysis, 24 hour care giving w/ some help  Meds rvwd, he'd like some prn anxiety med for sparing use, discussed Dr Solano note, will try low dose gabapaneint (safe w/ renal)  And f/u soon. Cont ssri we agree. 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      Paroxysmal atrial fibrillation (H)      PVD (posterior vitreous detachment)      Seizure disorder (H)     last seizure 2008     Past Surgical History:   Procedure Laterality Date     NO HISTORY OF SURGERY       PHACOEMULSIFICATION CLEAR CORNEA WITH STANDARD INTRAOCULAR LENS IMPLANT Right 11/3/2022    Procedure: RIGHT EYE PHACOEMULSIFICATION, CATARACT, WITH INTRAOCULAR LENS IMPLANT COMPLEX CASE ;  Surgeon: Best Padilla MD;  Location: [facility] OR     PHACOEMULSIFICATION CLEAR CORNEA WITH STANDARD INTRAOCULAR LENS IMPLANT Left 11/17/2022    Procedure: LEFT EYE PHACOEMULSIFICATION, CATARACT, WITH INTRAOCULAR LENS IMPLANT;  Surgeon: Best Padilla MD;  Location: [facility] OR     Current Outpatient Medications   Medication     acetaminophen (TYLENOL) 325 MG tablet     calcium carb-cholecalciferol (OS-GIANNI) 500-200 MG-UNIT tablet     cyanocobalamin (VITAMIN B-12) 100 MCG tablet     escitalopram (LEXAPRO) 20 MG tablet     gabapentin (NEURONTIN) 100 MG capsule     lamoTRIgine (LAMICTAL) 100 MG tablet     metoprolol succinate ER (TOPROL XL) 25 MG 24 hr tablet     Multiple Vitamins-Minerals (EYE VITAMINS PO)     simvastatin (ZOCOR) 20 MG tablet     Sodium Chloride-Sodium Bicarb (SINUS WASH SALINE REFILLS) 2300-700 MG PACK     tamsulosin (FLOMAX) 0.4 MG capsule     aspirin (ASA) 81 MG EC tablet     hydrOXYzine (ATARAX) 10 MG tablet     Lidocaine (LIDOCARE) 4 % Patch     Current Facility-Administered Medications   Medication     bevacizumab (AVASTIN) intravitreal inj 1.25 mg     No Known Allergies          Review of Systems         Objective    BP (!) 145/74   Pulse 62   Ht 1.803 m (5' 10.98\")   Wt 81.2 kg (179 lb)   SpO2 97%   BMI 24.98 kg/m    Body mass index is 24.98 kg/m .  Physical Exam   GENERAL: healthy, alert and no distress  NECK: no adenopathy, no asymmetry, masses, or " scars and thyroid normal to palpation  RESP: lungs clear to auscultation - no rales, rhonchi or wheezes  CV: regular rate and rhythm, normal S1 S2, no S3 or S4, no murmur, click or rub, no peripheral edema and peripheral pulses strong  ABDOMEN: soft, nontender, no hepatosplenomegaly, no masses and bowel sounds normal  MS: no gross musculoskeletal defects noted, no edema

## 2023-03-03 ENCOUNTER — OFFICE VISIT (OUTPATIENT)
Dept: NEPHROLOGY | Facility: CLINIC | Age: 83
End: 2023-03-03
Payer: MEDICARE

## 2023-03-03 ENCOUNTER — LAB (OUTPATIENT)
Dept: LAB | Facility: CLINIC | Age: 83
End: 2023-03-03
Payer: MEDICARE

## 2023-03-03 VITALS
SYSTOLIC BLOOD PRESSURE: 141 MMHG | BODY MASS INDEX: 24.99 KG/M2 | TEMPERATURE: 97.7 F | WEIGHT: 179.1 LBS | HEART RATE: 57 BPM | OXYGEN SATURATION: 96 % | DIASTOLIC BLOOD PRESSURE: 73 MMHG

## 2023-03-03 DIAGNOSIS — N18.4 ANEMIA IN STAGE 4 CHRONIC KIDNEY DISEASE (H): ICD-10-CM

## 2023-03-03 DIAGNOSIS — N18.4 CKD (CHRONIC KIDNEY DISEASE) STAGE 4, GFR 15-29 ML/MIN (H): Primary | ICD-10-CM

## 2023-03-03 DIAGNOSIS — D63.1 ANEMIA IN STAGE 4 CHRONIC KIDNEY DISEASE (H): ICD-10-CM

## 2023-03-03 DIAGNOSIS — N18.4 CKD (CHRONIC KIDNEY DISEASE) STAGE 4, GFR 15-29 ML/MIN (H): ICD-10-CM

## 2023-03-03 LAB
ALBUMIN MFR UR ELPH: 23.8 MG/DL (ref 1–14)
ALBUMIN SERPL BCG-MCNC: 4.4 G/DL (ref 3.5–5.2)
ALBUMIN UR-MCNC: 20 MG/DL
ANION GAP SERPL CALCULATED.3IONS-SCNC: 8 MMOL/L (ref 7–15)
APPEARANCE UR: CLEAR
BILIRUB UR QL STRIP: NEGATIVE
BUN SERPL-MCNC: 43.7 MG/DL (ref 8–23)
CALCIUM SERPL-MCNC: 9.1 MG/DL (ref 8.8–10.2)
CHLORIDE SERPL-SCNC: 105 MMOL/L (ref 98–107)
COLOR UR AUTO: ABNORMAL
CREAT SERPL-MCNC: 3.71 MG/DL (ref 0.67–1.17)
CREAT UR-MCNC: 76.6 MG/DL
CREAT UR-MCNC: 77.1 MG/DL
DEPRECATED HCO3 PLAS-SCNC: 24 MMOL/L (ref 22–29)
GFR SERPL CREATININE-BSD FRML MDRD: 16 ML/MIN/1.73M2
GLUCOSE SERPL-MCNC: 102 MG/DL (ref 70–99)
GLUCOSE UR STRIP-MCNC: NEGATIVE MG/DL
GRANULAR CAST: 3 /LPF
HGB UR QL STRIP: ABNORMAL
HYALINE CASTS: 1 /LPF
KETONES UR STRIP-MCNC: NEGATIVE MG/DL
LEUKOCYTE ESTERASE UR QL STRIP: NEGATIVE
MICROALBUMIN UR-MCNC: 48.5 MG/L
MICROALBUMIN/CREAT UR: 62.91 MG/G CR (ref 0–17)
MUCOUS THREADS #/AREA URNS LPF: PRESENT /LPF
NITRATE UR QL: NEGATIVE
PH UR STRIP: 5.5 [PH] (ref 5–7)
PHOSPHATE SERPL-MCNC: 3.5 MG/DL (ref 2.5–4.5)
POTASSIUM SERPL-SCNC: 4.8 MMOL/L (ref 3.4–5.3)
PROT/CREAT 24H UR: 0.31 MG/MG CR (ref 0–0.2)
RBC URINE: 3 /HPF
SODIUM SERPL-SCNC: 137 MMOL/L (ref 136–145)
SP GR UR STRIP: 1.01 (ref 1–1.03)
UROBILINOGEN UR STRIP-MCNC: NORMAL MG/DL
WBC URINE: <1 /HPF

## 2023-03-03 PROCEDURE — 84156 ASSAY OF PROTEIN URINE: CPT | Performed by: PATHOLOGY

## 2023-03-03 PROCEDURE — G0463 HOSPITAL OUTPT CLINIC VISIT: HCPCS

## 2023-03-03 PROCEDURE — 99214 OFFICE O/P EST MOD 30 MIN: CPT

## 2023-03-03 PROCEDURE — 82570 ASSAY OF URINE CREATININE: CPT

## 2023-03-03 PROCEDURE — 81001 URINALYSIS AUTO W/SCOPE: CPT | Performed by: PATHOLOGY

## 2023-03-03 PROCEDURE — 36415 COLL VENOUS BLD VENIPUNCTURE: CPT | Performed by: PATHOLOGY

## 2023-03-03 PROCEDURE — 80069 RENAL FUNCTION PANEL: CPT | Performed by: PATHOLOGY

## 2023-03-03 ASSESSMENT — PAIN SCALES - GENERAL: PAINLEVEL: NO PAIN (0)

## 2023-03-03 ASSESSMENT — PATIENT HEALTH QUESTIONNAIRE - PHQ9: SUM OF ALL RESPONSES TO PHQ QUESTIONS 1-9: 1

## 2023-03-03 NOTE — LETTER
3/3/2023       RE: Melo Dangelo  [address]  [place] [address]     Dear Colleague,    Thank you for referring your patient, Melo Dangelo, to the [facility] [place] at [facility]. Please see a copy of my visit note below.    Nephrology Clinic Visit 3/3/23    [facility] Annual Evaluation    Assessment and Plan:    1. CKD4 w/proteinuria - Stable. Creat 3.7, eGFR 16 ml/mn, UPCR 0.3 g/gCr  Etiology for his CKD is IgA Nephropathy  Baseline creat 3.6-3.8 since 10/21  Please refer to Dr Barrow's note 1/19/23 for review of his previous treatment for IgA Nephropathy  UA today is consistent with most recent studies with small blood and protein 20  Blood pressure is borderline  BPH is well controlled  On statin for CV risk reduction  Discussed his wishes regarding RRT in the event that his CKD progresses. He is not opposed and would lean more toward PD artemio given wife's experience with HD.     2. Volume status - No edema/dyspnea. B/ps borderline in the clinic. No voiding concerns. Albumin 4.4. Weight 81.2 kg   - No indication for diuretic therapy at this time    3. CV/HTN - Borderline control w/o edema in clinic. Home readings about the same but his device has not been checked. He was primarily in the 140's/ but has bradycardia on low dose Metoprolol. Would need addition of another agent. He is also under great stress at home with wife's cares. Echo 7/22 with EF 60%, normal IVC  Current regimen: Metoprolol XL 12.5 mg every day    - Next agent would be low dose CCB    - Reassess next visit    - Asked that he have his device checked for accuracy    4. BPH - Well controlled on Flomax    5. Electrolytes - No acute concerns. K 4.8 Na 137    6. Acid base - No acute concerns. Bicarb 24    7. BMD - Ca 9.4 Phos 3.5 albumin 4.4   Vit D 36 PTH 74 ( 9/22)   OK to continue Ca Carb 500 mg every day    8. Anemia - Hgb 9.5 and stable since 10/21    Iron studies 1/23: Ferritin 247, Fe 98, IS 33   Etiology for his anemia is likely chronic dz/Epo deficiency   Would begin RAAM when Hgb < 9    9. Seizure d/o - Stable on Lamictal. Na prob with hyponatremia. Na 137    10. Depression - Well controlled on Lexapro. He appears to be coping with the stresses of his wife's ailing health appropriately. Has counseling appt in May    11. Disposition - Has follow up scheduled with Dr Barrow 6/1/23. I'll see him back in 6 months.     Assessment and plan was discussed with patient and he voiced his understanding and agreement.    Reason for Visit:  CKD 4 follow up    HPI:  Mr Dangelo is an 81 yo male with CKD4, pAfib, Anemia, BPH, Depression, COVID 1/23, IgA nephropathy, present today for routine CKD follow up. Last seen in clinic by Dr Barrow 1/19/23.   Baseline creat 3.6-3.8 since 10/21    ROS:   No acute renal concerns  Melo is busy caring for his wife who has steadily declined this year, and can do little for herself, and is now on dialysis.   He denies CP/dyspnea  No GI concerns  Voiding w/o difficulty  Appetite is good  Energy level is good  No edema  No home blood pressures    Chronic Health Problems:    CKD4  pAfib  Anemia  BPH  Depression  COVID 1/23  IgA nephropathy  Alcohol use d/o ( in remission)  Bilateral hearing loss  Seizure disorder ( last seizure 2008)    PSH:   Past Surgical History:   Procedure Laterality Date     NO HISTORY OF SURGERY       PHACOEMULSIFICATION CLEAR CORNEA WITH STANDARD INTRAOCULAR LENS IMPLANT Right 11/3/2022    Procedure: RIGHT EYE PHACOEMULSIFICATION, CATARACT, WITH INTRAOCULAR LENS IMPLANT COMPLEX CASE ;  Surgeon: Best Padilla MD;  [place] [facility] [place]     PHACOEMULSIFICATION CLEAR CORNEA WITH STANDARD INTRAOCULAR LENS IMPLANT Left 11/17/2022    Procedure: LEFT EYE PHACOEMULSIFICATION, CATARACT, WITH INTRAOCULAR LENS IMPLANT;  Surgeon: Best Padilla MD;  [place] 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 Has home health nurse weekly for wife and food assistance    Allergies:  No Known Allergies    Medications:  Current Outpatient Medications   Medication Sig     acetaminophen (TYLENOL) 325 MG tablet Take 325-650 mg by mouth every 6 hours as needed for mild pain     calcium carbonate-vitamin D (OSCAL) 500-5 MG-MCG tablet Take 1 tablet by mouth daily     cyanocobalamin (VITAMIN B-12) 100 MCG tablet Take 1,000 mcg by mouth every morning     escitalopram (LEXAPRO) 20 MG tablet Take 20 mg by mouth every morning     gabapentin (NEURONTIN) 100 MG capsule Take one po every day prn anxiety, watch for grogginess or dizziness     lamoTRIgine (LAMICTAL) 100 MG tablet Take 2 tablets (200 mg) by mouth 2 times daily     metoprolol succinate ER (TOPROL XL) 25 MG 24 hr tablet Take 1/2 tab a day (12.5 mg) (Patient taking differently: 12.5 mg every morning Take 1/2 tab a day (12.5 mg))     Multiple Vitamins-Minerals (EYE VITAMINS PO) Take by mouth daily     simvastatin (ZOCOR) 20 MG tablet Take 1 tablet (20 mg) by mouth At Bedtime     tamsulosin (FLOMAX) 0.4 MG capsule Take 2 capsules (0.8 mg) by mouth daily (Patient taking differently: Take 0.8 mg by mouth every evening)     No current facility-administered medications for this visit.      Vitals:  BP (!) 141/73   Pulse 57   Temp 97.7  F (36.5  C) (Oral)   Wt 81.2 kg (179 lb 1.6 oz)   SpO2 96%   BMI 24.99 kg/m      Exam:  GEN: Pleasant male in NAD  CARDIAC: Mild Bradycardia  LUNGS: CTA  ABDOMEN: Soft NT  EXT: No edema  NEURO: A/O  PSYCH: Calm, engaging    LABS:   CMP  Recent Labs   Lab Test 03/03/23  0859 02/24/23  0915 01/19/23  1254 01/13/23  0934 10/15/21  1023 11/04/20  0703 11/02/20  0900  11/01/20  0816 10/31/20  0838    139 140 140   < > 138 139 137 138   POTASSIUM 4.8 4.9 5.0 4.9   < > 4.2 4.1 4.7 4.1   CHLORIDE 105 105 107 106   < > 105 108* 107 106   CO2 24 25 25 25   < > 26 24 20* 23   ANIONGAP 8 9 8 9   < > 7 7 10 9   * 94 91 95   < > 75 76 125 86   BUN 43.7* 44.1* 37.2* 40.6*   < > 22 26 21 19   CR 3.71* 4.00* 3.79* 3.59*   < > 1.25 1.24 1.01 1.13   GFRESTIMATED 16* 14* 15* 16*   < > 56* 56* >60 >60   GFRESTBLACK  --   --   --   --   --  >60 >60 >60 >60   GIANNI 9.1 9.4 9.4 9.4   < > 8.5 8.7 8.8 8.5    < > = values in this interval not displayed.     Recent Labs   Lab Test 01/13/23  0934 07/29/22  1338 01/21/22  0647 01/20/22  1346   BILITOTAL 0.2 0.3 0.4 0.4   ALKPHOS 79 79 44 59   ALT 14 18 19 22   AST 21 20 19 21     CBC  Recent Labs   Lab Test 01/19/23  1254 01/13/23  0934 09/15/22  0733 07/29/22  1338   HGB 9.5* 9.9* 9.4* 9.7*   WBC 5.3 4.7 4.3 4.8   RBC 3.13* 3.26* 3.12* 3.12*   HCT 29.0* 30.5* 29.1* 29.4*   MCV 93 94 93 94   MCH 30.4 30.4 30.1 31.1   MCHC 32.8 32.5 32.3 33.0   RDW 13.9 13.7 13.1 12.4    154 179 151     URINE STUDIES  Recent Labs   Lab Test 03/03/23  0908 01/19/23  1300 09/15/22  0735 07/29/22  1323   COLOR Light Yellow Light Yellow Straw Yellow   APPEARANCE Clear Clear Clear Clear   URINEGLC Negative Negative Negative Negative   URINEBILI Negative Negative Negative Negative   URINEKETONE Negative Negative Negative Negative   SG 1.013 1.017 1.010 1.010   UBLD Small* Small* Trace* Small*   URINEPH 5.5 5.5 6.0 5.0   PROTEIN 20* 30* Negative Trace*   NITRITE Negative Negative Negative Negative   LEUKEST Negative Negative Negative Negative   RBCU 3* 1 1 1   WBCU <1 <1 1 0     Recent Labs   Lab Test 06/02/22  0956 04/25/22  1529 02/17/22  1041 01/11/22  1011   UTPG 0.31* 0.40* 0.37* 0.48*     PTH  Recent Labs   Lab Test 09/15/22  0733 02/17/22  1025 10/20/21  0646   PTHI 74* 58 78     IRON STUDIES  Recent Labs   Lab Test 01/19/23  1254 09/15/22  0733  22  1024   IRON 95 88 78  78    277 297   IRONSAT 33 32 26   ALBERT 247 405 351                            RASHMI  No data recorded  RASHMI Survey  No data recorded    PHQ-9  PHQ-9 Total Score: 1 (3/3/2023  9:18 AM)    PHQ-9 Developed by Elda Trinh,Bria Arias,Igor Luz and Colleagues,with an Educational Mikie From [employer]  1.  Little interest or pleasure in doing things: 0 (3/3/2023  9:18 AM)  2.  Feeling down, depressed, or hopeless: 0 (3/3/2023  9:18 AM)  3.  Trouble falling or staying asleep, or sleeping too much: 0 (3/3/2023  9:18 AM)  4.  Feeling tired or having little energy: 1 (3/3/2023  9:18 AM)  5.  Poor appetite or overeatin (3/3/2023  9:18 AM)  6.  Feeling bad about yourself - or that you are a failure or have let yourself or your family down: 0 (3/3/2023  9:18 AM)  7.  Trouble concentrating on things, such as reading the newspaper or watching television: 0 (3/3/2023  9:18 AM)  8.  Moving or speaking so slowly that other people could have noticed. Or the opposite - being so fidgety or restless that you have been moving around a lot more than usual: 0 (3/3/2023  9:18 AM)  9.  Thoughts that you would be better off dead, or of hurting yourself in some way: 0 (3/3/2023  9:18 AM)  PHQ-9 Total Score: 1 (3/3/2023  9:18 AM)  If you checked off any problems, how difficult have these problems made it for you to do your work, take care of things at home, or get along with other people?: Somewhat difficult (3/3/2023  9:18 AM)           Immunization History   Administered Date(s) Administered     COVID-19 Vaccine 12+ (Pfizer) 2021, 2021, 10/15/2021     COVID-19 Vaccine Bivalent Booster 12+ (Pfizer) 2023     FLU 6-35 months 10/25/2008, 2009     Flu, Unspecified 10/04/1999, 10/22/2001, 10/15/2012     [id number] Novel Flu 2010     HepA-Adult 1998     HepB, Unspecified 1989, 1989, 1989     HepB-Adult 1989, 1989, 1989      Influenza (H1N1) 01/08/2010     Influenza (High Dose) 3 valent vaccine 10/19/2010, 10/31/2011, 10/09/2012, 11/08/2013, 10/28/2014, 02/01/2015, 10/20/2015, 11/10/2016, 10/04/2018, 10/07/2019     Influenza (IIV3) PF 12/13/1996, 10/06/1997, 10/15/1998, 10/04/1999, 10/22/2001, 11/04/2002, 10/20/2005, 10/25/2006, 10/31/2007     Influenza Vaccine 65+ (Fluzone HD) 11/02/2020, 10/07/2021, 09/21/2022     MMR 04/16/1991     Pneumococcal 23 valent 10/20/2005     TDAP Vaccine (Adacel) 09/23/2018     Td (Adult), Adsorbed 06/24/2002     Tdap (Adacel,Boostrix) 04/30/2012      Health Maintenance   Topic Date Due     DEPRESSION ACTION PLAN  Never done     ZOSTER IMMUNIZATION (1 of 2) Never done     MEDICARE ANNUAL WELLNESS VISIT  Never done     Pneumococcal Vaccine: 65+ Years (2 - PCV) 10/20/2006     BMP  06/03/2023     MICROALBUMIN  06/03/2023     HEMOGLOBIN  07/19/2023     FALL RISK ASSESSMENT  08/10/2023     PHQ-9  09/03/2023     LIPID  01/13/2024     ADVANCE CARE PLANNING  06/10/2027     DTAP/TDAP/TD IMMUNIZATION (3 - Td or Tdap) 09/23/2028     PARATHYROID  Completed     PHOSPHORUS  Completed     INFLUENZA VACCINE  Completed     URINALYSIS  Completed     ALK PHOS  Completed     COVID-19 Vaccine  Completed     IPV IMMUNIZATION  Aged Out     MENINGITIS IMMUNIZATION  Aged Out               Again, thank you for allowing me to participate in the care of your patient.      Sincerely,    Gini Washington, NP

## 2023-03-03 NOTE — NURSING NOTE
Chief Complaint   Patient presents with     RECHECK     EUNICE / RASHMI / PHQ9       BP (!) 141/73   Pulse 57   Temp 97.7  F (36.5  C) (Oral)   Wt 81.2 kg (179 lb 1.6 oz)   SpO2 96%   BMI 24.99 kg/m    Vadim Alvarado on 3/3/2023 at 9:23 AM    EUNICE Visit Time Tracking  Role: MA/LATRICE  Type: Time with patient  Time in minutes: 15     Vadim Alvarado on 3/3/2023 at 9:23 AM

## 2023-03-04 NOTE — PROGRESS NOTES
Nephrology Clinic Visit 3/3/23    [facility] Annual Evaluation    Assessment and Plan:    1. CKD4 w/proteinuria - Stable. Creat 3.7, eGFR 16 ml/mn, UPCR 0.3 g/gCr  Etiology for his CKD is IgA Nephropathy  Baseline creat 3.6-3.8 since 10/21  Please refer to Dr Barrow's note 1/19/23 for review of his previous treatment for IgA Nephropathy  UA today is consistent with most recent studies with small blood and protein 20  Blood pressure is borderline  BPH is well controlled  On statin for CV risk reduction  Discussed his wishes regarding RRT in the event that his CKD progresses. He is not opposed and would lean more toward PD artemio given wife's experience with HD.     2. Volume status - No edema/dyspnea. B/ps borderline in the clinic. No voiding concerns. Albumin 4.4. Weight 81.2 kg   - No indication for diuretic therapy at this time    3. CV/HTN - Borderline control w/o edema in clinic. Home readings about the same but his device has not been checked. He was primarily in the 140's/ but has bradycardia on low dose Metoprolol. Would need addition of another agent. He is also under great stress at home with wife's cares. Echo 7/22 with EF 60%, normal IVC  Current regimen: Metoprolol XL 12.5 mg every day    - Next agent would be low dose CCB    - Reassess next visit    - Asked that he have his device checked for accuracy    4. BPH - Well controlled on Flomax    5. Electrolytes - No acute concerns. K 4.8 Na 137    6. Acid base - No acute concerns. Bicarb 24    7. BMD - Ca 9.4 Phos 3.5 albumin 4.4   Vit D 36 PTH 74 ( 9/22)   OK to continue Ca Carb 500 mg every day    8. Anemia - Hgb 9.5 and stable since 10/21   Iron studies 1/23: Ferritin 247, Fe 98, IS 33   Etiology for his anemia is likely chronic dz/Epo deficiency   Would begin RAMA when Hgb < 9    9. Seizure d/o - Stable on Lamictal. Na prob with hyponatremia. Na 137    10. Depression - Well controlled on Lexapro. He appears to be coping with the stresses of his wife's ailing  health appropriately. Has counseling appt in May    11. Disposition - Has follow up scheduled with Dr Barrow 6/1/23. I'll see him back in 6 months.     Assessment and plan was discussed with patient and he voiced his understanding and agreement.    Reason for Visit:  CKD 4 follow up    HPI:  Mr Dangelo is an 83 yo male with CKD4, pAfib, Anemia, BPH, Depression, COVID 1/23, IgA nephropathy, present today for routine CKD follow up. Last seen in clinic by Dr Barrow 1/19/23.   Baseline creat 3.6-3.8 since 10/21    ROS:   No acute renal concerns  Melo is busy caring for his wife who has steadily declined this year, and can do little for herself, and is now on dialysis.   He denies CP/dyspnea  No GI concerns  Voiding w/o difficulty  Appetite is good  Energy level is good  No edema  No home blood pressures    Chronic Health Problems:    CKD4  pAfib  Anemia  BPH  Depression  COVID 1/23  IgA nephropathy  Alcohol use d/o ( in remission)  Bilateral hearing loss  Seizure disorder ( last seizure 2008)    PSH:   Past Surgical History:   Procedure Laterality Date     NO HISTORY OF SURGERY       PHACOEMULSIFICATION CLEAR CORNEA WITH STANDARD INTRAOCULAR LENS IMPLANT Right 11/3/2022    Procedure: RIGHT EYE PHACOEMULSIFICATION, CATARACT, WITH INTRAOCULAR LENS IMPLANT COMPLEX CASE ;  Surgeon: Best Padilla MD;  [place] [facility] [place]     PHACOEMULSIFICATION CLEAR CORNEA WITH STANDARD INTRAOCULAR LENS IMPLANT Left 11/17/2022    Procedure: LEFT EYE PHACOEMULSIFICATION, CATARACT, WITH INTRAOCULAR LENS IMPLANT;  Surgeon: Best Padilla MD;  [place] [facility] [place]       Family Hx:   Family History   Problem Relation Age of Onset     Glaucoma No family hx of      Macular Degeneration No family hx of      Anesthesia Reaction No family hx of      Deep Vein Thrombosis (DVT) No family hx of      Personal Hx:   , 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 Has home health nurse weekly for wife and food  assistance    Allergies:  No Known Allergies    Medications:  Current Outpatient Medications   Medication Sig     acetaminophen (TYLENOL) 325 MG tablet Take 325-650 mg by mouth every 6 hours as needed for mild pain     calcium carbonate-vitamin D (OSCAL) 500-5 MG-MCG tablet Take 1 tablet by mouth daily     cyanocobalamin (VITAMIN B-12) 100 MCG tablet Take 1,000 mcg by mouth every morning     escitalopram (LEXAPRO) 20 MG tablet Take 20 mg by mouth every morning     gabapentin (NEURONTIN) 100 MG capsule Take one po every day prn anxiety, watch for grogginess or dizziness     lamoTRIgine (LAMICTAL) 100 MG tablet Take 2 tablets (200 mg) by mouth 2 times daily     metoprolol succinate ER (TOPROL XL) 25 MG 24 hr tablet Take 1/2 tab a day (12.5 mg) (Patient taking differently: 12.5 mg every morning Take 1/2 tab a day (12.5 mg))     Multiple Vitamins-Minerals (EYE VITAMINS PO) Take by mouth daily     simvastatin (ZOCOR) 20 MG tablet Take 1 tablet (20 mg) by mouth At Bedtime     tamsulosin (FLOMAX) 0.4 MG capsule Take 2 capsules (0.8 mg) by mouth daily (Patient taking differently: Take 0.8 mg by mouth every evening)     No current facility-administered medications for this visit.      Vitals:  BP (!) 141/73   Pulse 57   Temp 97.7  F (36.5  C) (Oral)   Wt 81.2 kg (179 lb 1.6 oz)   SpO2 96%   BMI 24.99 kg/m      Exam:  GEN: Pleasant male in NAD  CARDIAC: Mild Bradycardia  LUNGS: CTA  ABDOMEN: Soft NT  EXT: No edema  NEURO: A/O  PSYCH: Calm, engaging    LABS:   CMP  Recent Labs   Lab Test 03/03/23  0859 02/24/23  0915 01/19/23  1254 01/13/23  0934 10/15/21  1023 11/04/20  0703 11/02/20  0900 11/01/20  0816 10/31/20  0838    139 140 140   < > 138 139 137 138   POTASSIUM 4.8 4.9 5.0 4.9   < > 4.2 4.1 4.7 4.1   CHLORIDE 105 105 107 106   < > 105 108* 107 106   CO2 24 25 25 25   < > 26 24 20* 23   ANIONGAP 8 9 8 9   < > 7 7 10 9   * 94 91 95   < > 75 76 125 86   BUN 43.7* 44.1* 37.2* 40.6*   < > 22 26 21 19   CR  3.71* 4.00* 3.79* 3.59*   < > 1.25 1.24 1.01 1.13   GFRESTIMATED 16* 14* 15* 16*   < > 56* 56* >60 >60   GFRESTBLACK  --   --   --   --   --  >60 >60 >60 >60   GIANNI 9.1 9.4 9.4 9.4   < > 8.5 8.7 8.8 8.5    < > = values in this interval not displayed.     Recent Labs   Lab Test 01/13/23  0934 07/29/22  1338 01/21/22  0647 01/20/22  1346   BILITOTAL 0.2 0.3 0.4 0.4   ALKPHOS 79 79 44 59   ALT 14 18 19 22   AST 21 20 19 21     CBC  Recent Labs   Lab Test 01/19/23  1254 01/13/23  0934 09/15/22  0733 07/29/22  1338   HGB 9.5* 9.9* 9.4* 9.7*   WBC 5.3 4.7 4.3 4.8   RBC 3.13* 3.26* 3.12* 3.12*   HCT 29.0* 30.5* 29.1* 29.4*   MCV 93 94 93 94   MCH 30.4 30.4 30.1 31.1   MCHC 32.8 32.5 32.3 33.0   RDW 13.9 13.7 13.1 12.4    154 179 151     URINE STUDIES  Recent Labs   Lab Test 03/03/23  0908 01/19/23  1300 09/15/22  0735 07/29/22  1323   COLOR Light Yellow Light Yellow Straw Yellow   APPEARANCE Clear Clear Clear Clear   URINEGLC Negative Negative Negative Negative   URINEBILI Negative Negative Negative Negative   URINEKETONE Negative Negative Negative Negative   SG 1.013 1.017 1.010 1.010   UBLD Small* Small* Trace* Small*   URINEPH 5.5 5.5 6.0 5.0   PROTEIN 20* 30* Negative Trace*   NITRITE Negative Negative Negative Negative   LEUKEST Negative Negative Negative Negative   RBCU 3* 1 1 1   WBCU <1 <1 1 0     Recent Labs   Lab Test 06/02/22  0956 04/25/22  1529 02/17/22  1041 01/11/22  1011   UTPG 0.31* 0.40* 0.37* 0.48*     PTH  Recent Labs   Lab Test 09/15/22  0733 02/17/22  1025 10/20/21  0646   PTHI 74* 58 78     IRON STUDIES  Recent Labs   Lab Test 01/19/23  1254 09/15/22  0733 02/17/22  1024   IRON 95 88 78  78    277 297   IRONSAT 33 32 26   ALBERT 247 405 351                              RASHMI  No data recorded  RASHMI Survey  No data recorded    PHQ-9  PHQ-9 Total Score: 1 (3/3/2023  9:18 AM)    PHQ-9 Developed by Elda Trinh,Bria Arias,Igor Luz and Colleagues,with an Educational Mikie  From [employer]  1.  Little interest or pleasure in doing things: 0 (3/3/2023  9:18 AM)  2.  Feeling down, depressed, or hopeless: 0 (3/3/2023  9:18 AM)  3.  Trouble falling or staying asleep, or sleeping too much: 0 (3/3/2023  9:18 AM)  4.  Feeling tired or having little energy: 1 (3/3/2023  9:18 AM)  5.  Poor appetite or overeatin (3/3/2023  9:18 AM)  6.  Feeling bad about yourself - or that you are a failure or have let yourself or your family down: 0 (3/3/2023  9:18 AM)  7.  Trouble concentrating on things, such as reading the newspaper or watching television: 0 (3/3/2023  9:18 AM)  8.  Moving or speaking so slowly that other people could have noticed. Or the opposite - being so fidgety or restless that you have been moving around a lot more than usual: 0 (3/3/2023  9:18 AM)  9.  Thoughts that you would be better off dead, or of hurting yourself in some way: 0 (3/3/2023  9:18 AM)  PHQ-9 Total Score: 1 (3/3/2023  9:18 AM)  If you checked off any problems, how difficult have these problems made it for you to do your work, take care of things at home, or get along with other people?: Somewhat difficult (3/3/2023  9:18 AM)           Immunization History   Administered Date(s) Administered     COVID-19 Vaccine 12+ (Pfizer) 2021, 2021, 10/15/2021     COVID-19 Vaccine Bivalent Booster 12+ (Pfizer) 2023     FLU 6-35 months 10/25/2008, 2009     Flu, Unspecified 10/04/1999, 10/22/2001, 10/15/2012     [id number] Novel Flu 2010     HepA-Adult 1998     HepB, Unspecified 1989, 1989, 1989     HepB-Adult 1989, 1989, 1989     Influenza (H1N1) 2010     Influenza (High Dose) 3 valent vaccine 10/19/2010, 10/31/2011, 10/09/2012, 2013, 10/28/2014, 2015, 10/20/2015, 11/10/2016, 10/04/2018, 10/07/2019     Influenza (IIV3) PF 1996, 10/06/1997, 10/15/1998, 10/04/1999, 10/22/2001, 2002, 10/20/2005, 10/25/2006, 10/31/2007     Influenza  Vaccine 65+ (Fluzone HD) 11/02/2020, 10/07/2021, 09/21/2022     MMR 04/16/1991     Pneumococcal 23 valent 10/20/2005     TDAP Vaccine (Adacel) 09/23/2018     Td (Adult), Adsorbed 06/24/2002     Tdap (Adacel,Boostrix) 04/30/2012      Health Maintenance   Topic Date Due     DEPRESSION ACTION PLAN  Never done     ZOSTER IMMUNIZATION (1 of 2) Never done     MEDICARE ANNUAL WELLNESS VISIT  Never done     Pneumococcal Vaccine: 65+ Years (2 - PCV) 10/20/2006     BMP  06/03/2023     MICROALBUMIN  06/03/2023     HEMOGLOBIN  07/19/2023     FALL RISK ASSESSMENT  08/10/2023     PHQ-9  09/03/2023     LIPID  01/13/2024     ADVANCE CARE PLANNING  06/10/2027     DTAP/TDAP/TD IMMUNIZATION (3 - Td or Tdap) 09/23/2028     PARATHYROID  Completed     PHOSPHORUS  Completed     INFLUENZA VACCINE  Completed     URINALYSIS  Completed     ALK PHOS  Completed     COVID-19 Vaccine  Completed     IPV IMMUNIZATION  Aged Out     MENINGITIS IMMUNIZATION  Aged Out           Gini Washington, DOMINICK  [facility] [place]

## 2023-03-24 ENCOUNTER — OFFICE VISIT (OUTPATIENT)
Dept: FAMILY MEDICINE | Facility: CLINIC | Age: 83
End: 2023-03-24
Payer: MEDICARE

## 2023-03-24 VITALS
DIASTOLIC BLOOD PRESSURE: 69 MMHG | BODY MASS INDEX: 25.3 KG/M2 | SYSTOLIC BLOOD PRESSURE: 126 MMHG | OXYGEN SATURATION: 96 % | WEIGHT: 181.3 LBS | HEART RATE: 55 BPM

## 2023-03-24 DIAGNOSIS — F41.9 ANXIETY: Primary | ICD-10-CM

## 2023-03-24 DIAGNOSIS — R41.3 MEMORY DIFFICULTIES: ICD-10-CM

## 2023-03-24 DIAGNOSIS — F33.1 MAJOR DEPRESSIVE DISORDER, RECURRENT EPISODE, MODERATE (H): ICD-10-CM

## 2023-03-24 PROCEDURE — 99214 OFFICE O/P EST MOD 30 MIN: CPT | Performed by: FAMILY MEDICINE

## 2023-03-24 NOTE — NURSING NOTE
Melo Dangelo is a 82 year old male patient that presents today in clinic for the following:    Chief Complaint   Patient presents with     Follow Up     1 month follow up     The patient's allergies and medications were reviewed as noted. A set of vitals were recorded as noted without incident: /69 (BP Location: Left arm, Patient Position: Sitting, Cuff Size: Adult Regular)   Pulse 55   Wt 82.2 kg (181 lb 4.8 oz)   SpO2 (!) 69%   BMI 25.30 kg/m  . The patient does not have any other questions for the provider.    Maria De Jesus Grimaldo, EMT at 8:27 AM on 3/24/2023

## 2023-03-27 ENCOUNTER — PATIENT OUTREACH (OUTPATIENT)
Dept: NEPHROLOGY | Facility: CLINIC | Age: 83
End: 2023-03-27
Payer: MEDICARE

## 2023-03-27 NOTE — PROGRESS NOTES
Following up on patient status. Sent [employer] message about virtual education, blood pressures and symptoms.    ----------------------------------------------------------------------------  Patient reports feeing good. Bps have been 120-130s/ 60-70s typically. Today they were elevated to 150/81 celeste was not sure as to why but noted that it did drop to 130's after a few minuets.   Signed patient up for Dr. Kim class 4/6/23. Denies n/v/d, Sob, edema.   Pt is leaning towards PD, wife is going for PD placement 4/5/23.   No concerns, questions or complaints at this time.     Neph Tracking Flowsheet Last Filled Values     CKD Education Referral Date 04/03/23    CKD Education Other Dr. Kim Virtual class 4/6/23    Preferred Modality Peritoneal Dialysis    Patient's Referral Dates Auto Populate Patient's Referral Dates    MTM Referral 12/22/21    Journey Referral 1/25/23            Katerine Gonzalez RN, BSN   Nephrology RN Care Coordinator   [facility]

## 2023-03-30 DIAGNOSIS — N40.1 BENIGN PROSTATIC HYPERPLASIA WITH URINARY FREQUENCY: ICD-10-CM

## 2023-03-30 DIAGNOSIS — E78.00 HIGH CHOLESTEROL: ICD-10-CM

## 2023-03-30 DIAGNOSIS — R35.0 BENIGN PROSTATIC HYPERPLASIA WITH URINARY FREQUENCY: ICD-10-CM

## 2023-03-30 DIAGNOSIS — G40.909 SEIZURE DISORDER (H): ICD-10-CM

## 2023-03-30 RX ORDER — LAMOTRIGINE 100 MG/1
200 TABLET ORAL 2 TIMES DAILY
Qty: 360 TABLET | Refills: 0 | Status: SHIPPED | OUTPATIENT
Start: 2023-03-30 | End: 2023-06-20

## 2023-03-30 RX ORDER — TAMSULOSIN HYDROCHLORIDE 0.4 MG/1
0.8 CAPSULE ORAL DAILY
Qty: 180 CAPSULE | Refills: 0 | Status: SHIPPED | OUTPATIENT
Start: 2023-03-30 | End: 2023-06-20

## 2023-03-30 NOTE — TELEPHONE ENCOUNTER
Pharmacy transfer: remaining rf sent    tamsulosin (FLOMAX) 0.4 MG capsule   180 capsule 3 7/29/2022  No  Sig - Route: Take 2 capsules (0.8 mg) by mouth daily - Oral  Prescribing Provider's NPI: [id number]  Francis Smith  ----------  Neurology: Pharmacy transfer: remaining rf sent  lamoTRIgine (LAMICTAL) 100 MG tablet   360 tablet 3 7/26/2022  No  Sig - Route: Take 2 tablets (200 mg) by mouth 2 times daily - Oral  Prescribing Provider's NPI: [id number]  Daria Raza    ---------------  FYI to nephrology: please address   simvastatin (ZOCOR) 20 MG tablet  Rx filled by: Merry Barrow

## 2023-03-31 ENCOUNTER — TELEPHONE (OUTPATIENT)
Dept: PSYCHOLOGY | Facility: CLINIC | Age: 83
End: 2023-03-31
Payer: MEDICARE

## 2023-03-31 NOTE — TELEPHONE ENCOUNTER
Health Psychology                     Department of Medicine  Zuleika Chong, Ph.D., L.P. [phone]                          [facility] Paige Nguyen, Ph.D., L.P. [phone]                   [place] Mail Code 893   Bc Malik, Ph.D., L.P. [phone]       [address] [place] [address] Valerie Xiong, Ph.D., A.B.P.P., L.P. [phone]         [place] [address]      Blake Dai, Ph.D., A.B.P.P., L.P. [phone]     Bria Zuniga, Ph.D., L.P. [phone]  [facility]   Azalea Augustine, Ph.D., A.B.P.P., L.P. [phone]       Telephone Note      I contacted patient after brief discussion with Dr. Smith.  By time I was able to call him (I.e., after I returned from a meeting out of state), he was scheduled with a  in the [facility].  We decided for him to keep that appointment, but if my services are desired beyond that, he would call me to schedule.  He has my contact information..    Blake Dai, PhD ,  A.B.P.P.  Director, Health Psychology  [phone]

## 2023-04-17 RX ORDER — SIMVASTATIN 20 MG
20 TABLET ORAL AT BEDTIME
Qty: 90 TABLET | Refills: 3 | Status: SHIPPED | OUTPATIENT
Start: 2023-04-17 | End: 2023-06-21

## 2023-04-25 DIAGNOSIS — I48.0 PAROXYSMAL ATRIAL FIBRILLATION (H): ICD-10-CM

## 2023-04-28 RX ORDER — METOPROLOL SUCCINATE 25 MG/1
12.5 TABLET, EXTENDED RELEASE ORAL DAILY
Qty: 90 TABLET | Refills: 1 | Status: SHIPPED | OUTPATIENT
Start: 2023-04-28 | End: 2023-06-20

## 2023-06-01 ENCOUNTER — OFFICE VISIT (OUTPATIENT)
Dept: NEPHROLOGY | Facility: CLINIC | Age: 83
End: 2023-06-01
Attending: INTERNAL MEDICINE
Payer: MEDICARE

## 2023-06-01 ENCOUNTER — LAB (OUTPATIENT)
Dept: LAB | Facility: CLINIC | Age: 83
End: 2023-06-01
Payer: MEDICARE

## 2023-06-01 ENCOUNTER — HEALTH MAINTENANCE LETTER (OUTPATIENT)
Age: 83
End: 2023-06-01

## 2023-06-01 VITALS
BODY MASS INDEX: 25.13 KG/M2 | SYSTOLIC BLOOD PRESSURE: 135 MMHG | HEART RATE: 57 BPM | DIASTOLIC BLOOD PRESSURE: 66 MMHG | TEMPERATURE: 98.1 F | WEIGHT: 180.1 LBS | OXYGEN SATURATION: 95 %

## 2023-06-01 DIAGNOSIS — N18.5 CKD (CHRONIC KIDNEY DISEASE) STAGE 5, GFR LESS THAN 15 ML/MIN (H): Primary | ICD-10-CM

## 2023-06-01 DIAGNOSIS — D63.1 ANEMIA IN STAGE 4 CHRONIC KIDNEY DISEASE (H): ICD-10-CM

## 2023-06-01 DIAGNOSIS — N18.4 CKD (CHRONIC KIDNEY DISEASE) STAGE 4, GFR 15-29 ML/MIN (H): ICD-10-CM

## 2023-06-01 DIAGNOSIS — N18.4 ANEMIA IN STAGE 4 CHRONIC KIDNEY DISEASE (H): ICD-10-CM

## 2023-06-01 LAB
ALBUMIN MFR UR ELPH: 31.5 MG/DL (ref 1–14)
ALBUMIN SERPL BCG-MCNC: 4.4 G/DL (ref 3.5–5.2)
ALBUMIN UR-MCNC: 20 MG/DL
ANION GAP SERPL CALCULATED.3IONS-SCNC: 10 MMOL/L (ref 7–15)
APPEARANCE UR: CLEAR
BILIRUB UR QL STRIP: NEGATIVE
BUN SERPL-MCNC: 44.8 MG/DL (ref 8–23)
CALCIUM SERPL-MCNC: 9.3 MG/DL (ref 8.8–10.2)
CHLORIDE SERPL-SCNC: 107 MMOL/L (ref 98–107)
COLOR UR AUTO: ABNORMAL
CREAT SERPL-MCNC: 4.47 MG/DL (ref 0.67–1.17)
CREAT UR-MCNC: 109 MG/DL
DEPRECATED HCO3 PLAS-SCNC: 22 MMOL/L (ref 22–29)
ERYTHROCYTE [DISTWIDTH] IN BLOOD BY AUTOMATED COUNT: 13.3 % (ref 10–15)
FERRITIN SERPL-MCNC: 257 NG/ML (ref 31–409)
GFR SERPL CREATININE-BSD FRML MDRD: 12 ML/MIN/1.73M2
GLUCOSE SERPL-MCNC: 101 MG/DL (ref 70–99)
GLUCOSE UR STRIP-MCNC: 30 MG/DL
HCT VFR BLD AUTO: 28.3 % (ref 40–53)
HGB BLD-MCNC: 9.5 G/DL (ref 13.3–17.7)
HGB UR QL STRIP: ABNORMAL
HYALINE CASTS: 1 /LPF
IRON BINDING CAPACITY (ROCHE): 276 UG/DL (ref 240–430)
IRON SATN MFR SERPL: 30 % (ref 15–46)
IRON SERPL-MCNC: 83 UG/DL (ref 61–157)
KETONES UR STRIP-MCNC: NEGATIVE MG/DL
LEUKOCYTE ESTERASE UR QL STRIP: NEGATIVE
MCH RBC QN AUTO: 30.6 PG (ref 26.5–33)
MCHC RBC AUTO-ENTMCNC: 33.6 G/DL (ref 31.5–36.5)
MCV RBC AUTO: 91 FL (ref 78–100)
MUCOUS THREADS #/AREA URNS LPF: PRESENT /LPF
NITRATE UR QL: NEGATIVE
PH UR STRIP: 5.5 [PH] (ref 5–7)
PHOSPHATE SERPL-MCNC: 3.9 MG/DL (ref 2.5–4.5)
PLATELET # BLD AUTO: 142 10E3/UL (ref 150–450)
POTASSIUM SERPL-SCNC: 4.8 MMOL/L (ref 3.4–5.3)
PROT/CREAT 24H UR: 0.29 MG/MG CR (ref 0–0.2)
RBC # BLD AUTO: 3.1 10E6/UL (ref 4.4–5.9)
RBC URINE: 2 /HPF
SODIUM SERPL-SCNC: 139 MMOL/L (ref 136–145)
SP GR UR STRIP: 1.02 (ref 1–1.03)
SQUAMOUS EPITHELIAL: <1 /HPF
UROBILINOGEN UR STRIP-MCNC: NORMAL MG/DL
WBC # BLD AUTO: 5.6 10E3/UL (ref 4–11)
WBC URINE: <1 /HPF

## 2023-06-01 PROCEDURE — 82728 ASSAY OF FERRITIN: CPT | Performed by: PATHOLOGY

## 2023-06-01 PROCEDURE — 83540 ASSAY OF IRON: CPT | Performed by: PATHOLOGY

## 2023-06-01 PROCEDURE — 83550 IRON BINDING TEST: CPT | Performed by: PATHOLOGY

## 2023-06-01 PROCEDURE — 81001 URINALYSIS AUTO W/SCOPE: CPT | Performed by: PATHOLOGY

## 2023-06-01 PROCEDURE — 84156 ASSAY OF PROTEIN URINE: CPT | Performed by: PATHOLOGY

## 2023-06-01 PROCEDURE — G0463 HOSPITAL OUTPT CLINIC VISIT: HCPCS | Performed by: INTERNAL MEDICINE

## 2023-06-01 PROCEDURE — 85027 COMPLETE CBC AUTOMATED: CPT | Performed by: PATHOLOGY

## 2023-06-01 PROCEDURE — 99214 OFFICE O/P EST MOD 30 MIN: CPT | Performed by: INTERNAL MEDICINE

## 2023-06-01 PROCEDURE — 36415 COLL VENOUS BLD VENIPUNCTURE: CPT | Performed by: PATHOLOGY

## 2023-06-01 PROCEDURE — 80069 RENAL FUNCTION PANEL: CPT | Performed by: PATHOLOGY

## 2023-06-01 ASSESSMENT — PAIN SCALES - GENERAL: PAINLEVEL: NO PAIN (0)

## 2023-06-01 NOTE — NURSING NOTE
Chief Complaint   Patient presents with     RECHECK     Return visit.     Blood pressure 135/66, pulse 57, temperature 98.1  F (36.7  C), weight 81.7 kg (180 lb 1.6 oz), SpO2 95 %.    FLOR SCOTT

## 2023-06-01 NOTE — PROGRESS NOTES
Nephrology Clinic Follow up  6/1/23     Melo Dangelo MRN:[id number] YOB: 1940  Date of Admission:(Not on file)  Primary care provider: Francis Smith  Requesting physician: Merry Barrow, *       REASON FOR CONSULT: IgA nephropathy       HISTORY OF PRESENT ILLNESS:       PAST MEDICAL HISTORY:  Reviewed with patient on 06/01/2023   As per HPI    MEDICATIONS:  Reviewed with the patient in detail    ALLERGIES:    Reviewed with the patient in detail    REVIEW OF SYSTEMS:  A comprehensive of systems was negative except as noted above.    SOCIAL HISTORY:   Reviewed with patient, no smoking and no alcohol use     FAMILY MEDICAL HISTORY:   Reviewed, no family history of need for dialysis, transplant or CKD    PHYSICAL EXAM:   Vital signs:/66   Pulse 57   Temp 98.1  F (36.7  C)   Wt 81.7 kg (180 lb 1.6 oz)   SpO2 95%   BMI 25.13 kg/m      Physical Exam     Gen: Appears well  Neck: No JVD  Lungs: CTA  CVS: S1S2 normal, no murmurs heard  LE: no edema  Skin: no rashes  CNS: Grossly normal       Interval History: He denies any new symptoms. He went to [place] for whale watching on a cruise ship and developed COVID infection after. This was 2 weeks ago and other than having some low energy, he is doing okay.     Interval history 1/19/23: He has bene doing well overall. He sometimes feels a little short of breath when he climbs about 2 flights of stairs but otherwise has minimal symptoms. He has not seen any dark urine or LE edema.     Interval history 6/1/23: Melo does report that he is feeling more tired these days. There is also a lot on his plate since his wife is now transitioning to PD from HD and he is her primary caregiver. He denies any LE edema or shortness of breath.       ASSESSMENT AND RECOMMENDATIONS:     # IgA nephropathy [id number]   # CKD stage 4    # Anemia of CKD, iron replete 2/22   # Paroxysmal A fib      Patient was first diagnosed with IgA nephropathy when  he presented to the hospital with gross hematuria after his second COVID vaccine. He presented with a Sr creatinine of 3.5 which peaked to 4.4 mg/dl. His creatinine a year ago was 1.1-1.2 mg/dl.  A kidney bx was done which showed IgA nephropathy with some fibrocellular crescents.  7/14 gloms were globally sclerosed.   He was started on Pozzi protocol with solumedrol 500 mg/3 days followed by oral prednisone 40 mg every other day with bactrim single strength 1 tab PO every other day, pepcid 40 mg PO daily and calcium carbonate 1113-8882 mg PO daily.    Unfortunately, he did not have a significant improvement in his creatinine and had persistent active sediment. He was started on  Cyclophosphamide 100 mg daily in 11/21 along with prednisone taper. His cyclophosphamide was discontinued 5/2/22 and his prednisone decreased to 5 mg Q other day and now has been discontinued     His creatinine improved initially and had a new baseline of 3.3 mg/dl, however now has continued progression. His creatinine now is at 4.47 mg.dl with eGFR of 12.  His proteinuria remains minimal and his hematuria has improved significantly.       He has small blood on his UA with no RBC's, no protein. His blood pressures remain above the goal of < 140/90 mm of Hg and he has no LE edema. Currently only on Metoprolol  12.5 mg daily   He is anemic and has thrombocytopenia. Iron studies asha. There is no indication for RAMA at this time.     Today we discussed that he now has continued progression of his CKD and I do not identify any reversible factors. When comes to dialysis, he is interested in PD. He is also set up for educational classes on the 6th of June. He would also consider a transplant since he is the primary caregiver for his wife who has multiple co morbidities and would like to be physically fit to be there for her.   --I will set up an appointment for PD catheter placement evaluation and transplant eval  --will also look into whether a  PCA/home health care can be arranged for help since both, patient and his wife will be on dialysis   --follow up labs in 1 month and f/up in clinic in 3 months     Merry Barrow MD

## 2023-06-01 NOTE — LETTER
6/1/2023       RE: Melo Dangelo  [address]  [place] [address]     Dear Colleague,    Thank you for referring your patient, Melo Dangelo, to the [facility] [place] at [facility]. Please see a copy of my visit note below.          Nephrology Clinic Follow up  6/1/23     Melo Dangelo MRN:[id number] YOB: 1940  Date of Admission:(Not on file)  Primary care provider: Francis Smith  Requesting physician: Merry Barrow, *       REASON FOR CONSULT: IgA nephropathy       HISTORY OF PRESENT ILLNESS:       PAST MEDICAL HISTORY:  Reviewed with patient on 06/01/2023   As per HPI    MEDICATIONS:  Reviewed with the patient in detail    ALLERGIES:    Reviewed with the patient in detail    REVIEW OF SYSTEMS:  A comprehensive of systems was negative except as noted above.    SOCIAL HISTORY:   Reviewed with patient, no smoking and no alcohol use     FAMILY MEDICAL HISTORY:   Reviewed, no family history of need for dialysis, transplant or CKD    PHYSICAL EXAM:   Vital signs:/66   Pulse 57   Temp 98.1  F (36.7  C)   Wt 81.7 kg (180 lb 1.6 oz)   SpO2 95%   BMI 25.13 kg/m      Physical Exam     Gen: Appears well  Neck: No JVD  Lungs: CTA  CVS: S1S2 normal, no murmurs heard  LE: no edema  Skin: no rashes  CNS: Grossly normal       Interval History: He denies any new symptoms. He went to [place] for whale watching on a cruise ship and developed COVID infection after. This was 2 weeks ago and other than having some low energy, he is doing okay.     Interval history 1/19/23: He has bene doing well overall. He sometimes feels a little short of breath when he climbs about 2 flights of stairs but otherwise has minimal symptoms. He has not seen any dark urine or LE edema.     Interval history 6/1/23: Melo does report that he is feeling more tired these days. There is also a lot on his plate since  his wife is now transitioning to PD from HD and he is her primary caregiver. He denies any LE edema or shortness of breath.       ASSESSMENT AND RECOMMENDATIONS:     # IgA nephropathy [id number]   # CKD stage 4    # Anemia of CKD, iron replete 2/22   # Paroxysmal A fib      Patient was first diagnosed with IgA nephropathy when he presented to the hospital with gross hematuria after his second COVID vaccine. He presented with a Sr creatinine of 3.5 which peaked to 4.4 mg/dl. His creatinine a year ago was 1.1-1.2 mg/dl.  A kidney bx was done which showed IgA nephropathy with some fibrocellular crescents.  7/14 gloms were globally sclerosed.   He was started on Pozzi protocol with solumedrol 500 mg/3 days followed by oral prednisone 40 mg every other day with bactrim single strength 1 tab PO every other day, pepcid 40 mg PO daily and calcium carbonate 8355-5662 mg PO daily.    Unfortunately, he did not have a significant improvement in his creatinine and had persistent active sediment. He was started on  Cyclophosphamide 100 mg daily in 11/21 along with prednisone taper. His cyclophosphamide was discontinued 5/2/22 and his prednisone decreased to 5 mg Q other day and now has been discontinued     His creatinine improved initially and had a new baseline of 3.3 mg/dl, however now has continued progression. His creatinine now is at 4.47 mg.dl with eGFR of 12.  His proteinuria remains minimal and his hematuria has improved significantly.       He has small blood on his UA with no RBC's, no protein. His blood pressures remain above the goal of < 140/90 mm of Hg and he has no LE edema. Currently only on Metoprolol  12.5 mg daily   He is anemic and has thrombocytopenia. Iron studies saha. There is no indication for RAMA at this time.     Today we discussed that he now has continued progression of his CKD and I do not identify any reversible factors. When comes to dialysis, he is interested in PD. He is also set up for  educational classes on the 6th of June. He would also consider a transplant since he is the primary caregiver for his wife who has multiple co morbidities and would like to be physically fit to be there for her.   --I will set up an appointment for PD catheter placement evaluation and transplant eval  --will also look into whether a PCA/home health care can be arranged for help since both, patient and his wife will be on dialysis   --follow up labs in 1 month and f/up in clinic in 3 months     Merry Barrow MD        Again, thank you for allowing me to participate in the care of your patient.      Sincerely,    Merry Barrow MD

## 2023-06-01 NOTE — PATIENT INSTRUCTIONS
--will send a referral for you to be evaluated for Peritoneal catheter placement  --will send a referral for transplant evaluation   --will recheck blood work in 1 month

## 2023-06-06 ENCOUNTER — PATIENT OUTREACH (OUTPATIENT)
Dept: NEPHROLOGY | Facility: CLINIC | Age: 83
End: 2023-06-06
Payer: MEDICARE

## 2023-06-06 DIAGNOSIS — N18.5 CKD (CHRONIC KIDNEY DISEASE) STAGE 5, GFR LESS THAN 15 ML/MIN (H): ICD-10-CM

## 2023-06-06 DIAGNOSIS — N18.4 CKD (CHRONIC KIDNEY DISEASE) STAGE 4, GFR 15-29 ML/MIN (H): Primary | ICD-10-CM

## 2023-06-06 NOTE — PROGRESS NOTES
Dialysis Access Care Coordination: Initial Outreach    REASON FOR CALL:     REASON FOR CALL: Clinic Care Coordination - Initial (Dialysis Access Referral)       Merry Barrow MD Guidarelli, Kelsey, RN  HI,   He is interested in PD, Can you arrange for a surgery evaluation for PD catheter placement?   Thanks,   -S                            SITUATION/BACKROUND:   Patient is being referred for dialysis access consult by Dr. Barrow requesting a PD consult. Patient is CKD Stage 4 secondary to IGA nephropathy.    Per Dr. Barrow's note on 6/1/23:  When comes to dialysis, he is interested in PD. He is also set up for educational classes on the 6th of June. He would also consider a transplant since he is the primary caregiver for his wife who has multiple co morbidities and would like to be physically fit to be there for her.     Pt is familiar to Dr. Zimmer and Dialysis Access Care Coordinator in relation to pt spouse who recently started PD.    Neph Tracking Flowsheet Last Filled Values     CKD Education Referral Date 04/03/23    CKD Education Other Dr. Kim Virtual class 4/6/23    Preferred Modality Peritoneal Dialysis    Patient's Referral Dates Auto Populate Patient's Referral Dates    MTM Referral 12/22/21    Home Care Referral 6/1/23    Journey Referral 1/25/23    Access Surgeon Referral Status  Referred  PD consult with Dr. Zimmer    Dialysis Access Referral 06/06/23        ASSESSMENT:     Current Outpatient Medications (Antihypertensive, Cardiovascular, Diuretics, Beta blockers, Calcium blockers, Anticoagulants)   Medication Sig     metoprolol succinate ER (TOPROL XL) 25 MG 24 hr tablet Take 0.5 tablets (12.5 mg) by mouth daily TAKE ONE-HALF TABLET BY MOUTH DAILY     Current Outpatient Medications (Other)   Medication Sig     acetaminophen (TYLENOL) 325 MG tablet Take 325-650 mg by mouth every 6 hours as needed for mild pain     calcium carbonate-vitamin D (OSCAL) 500-5 MG-MCG tablet Take  1 tablet by mouth daily     cyanocobalamin (VITAMIN B-12) 100 MCG tablet Take 1,000 mcg by mouth every morning     escitalopram (LEXAPRO) 20 MG tablet Take 20 mg by mouth every morning     gabapentin (NEURONTIN) 100 MG capsule Take one po every day prn anxiety, watch for grogginess or dizziness     lamoTRIgine (LAMICTAL) 100 MG tablet Take 2 tablets (200 mg) by mouth 2 times daily     Multiple Vitamins-Minerals (EYE VITAMINS PO) Take by mouth daily     simvastatin (ZOCOR) 20 MG tablet Take 1 tablet (20 mg) by mouth At Bedtime     tamsulosin (FLOMAX) 0.4 MG capsule Take 2 capsules (0.8 mg) by mouth daily       Past Surgical History:   Procedure Laterality Date     NO HISTORY OF SURGERY       PHACOEMULSIFICATION CLEAR CORNEA WITH STANDARD INTRAOCULAR LENS IMPLANT Right 11/3/2022    Procedure: RIGHT EYE PHACOEMULSIFICATION, CATARACT, WITH INTRAOCULAR LENS IMPLANT COMPLEX CASE ;  Surgeon: Best Padilla MD;  Location: [facility] OR     PHACOEMULSIFICATION CLEAR CORNEA WITH STANDARD INTRAOCULAR LENS IMPLANT Left 11/17/2022    Procedure: LEFT EYE PHACOEMULSIFICATION, CATARACT, WITH INTRAOCULAR LENS IMPLANT;  Surgeon: Best Padilla MD;  Location: [facility] OR         Immunosuppression:  none     Anticoagulation:  none     Antiplatelet:  none     Recent Labs:  Lab Results   Component Value Date    CR 4.47 06/01/2023    CR 3.71 03/03/2023    CR 4.00 02/24/2023    CR 1.33 10/29/2020    CR 1.40 10/28/2020    CR 1.67 10/27/2020    Lab Results   Component Value Date    GFRESTIMATED 12 06/01/2023    GFRESTIMATED 16 03/03/2023    GFRESTIMATED 14 02/24/2023    GFRESTIMATED 56 11/04/2020    GFRESTIMATED 56 11/02/2020    GFRESTIMATED >60 11/01/2020    GFRESTIMATED 50 10/29/2020    GFRESTIMATED 47 10/28/2020    GFRESTIMATED 38 10/27/2020        Lab Results   Component Value Date    BUN 44.8 06/01/2023    BUN 43.7 03/03/2023    BUN 44.1 02/24/2023    BUN 55 07/29/2022    BUN 46 06/02/2022    BUN 38 04/25/2022    BUN 31  10/29/2020    BUN 28 10/28/2020    BUN 24 10/27/2020    Lab Results   Component Value Date    ALBUMIN 4.4 06/01/2023    ALBUMIN 4.4 03/03/2023    ALBUMIN 4.4 01/19/2023    ALBUMIN 4.0 07/29/2022    ALBUMIN 3.7 06/02/2022    ALBUMIN 3.6 04/25/2022    ALBUMIN 2.7 10/29/2020    ALBUMIN 2.9 10/28/2020    ALBUMIN 3.2 10/27/2020        Lab Results   Component Value Date    A1C 5.8 10/18/2021           Dialysis Access Assessments:  No assessment indicated    PLAN:     Appointments in Next Year    Jul 19, 2023  8:00 AM  (Arrive by 7:45 AM)  Return EP with ELIZABETH Borja CNP  [facility] ([facility] ) [phone]          Follow Up:   Request sent to schedule PD consult with Dr. Zimmer.    OWEN DIAZ RN  Dialysis Access Care Coordinator  Phone: [phone]  Pool: P_Dialysis_Access_Nurse

## 2023-06-08 ENCOUNTER — TELEPHONE (OUTPATIENT)
Dept: TRANSPLANT | Facility: CLINIC | Age: 83
End: 2023-06-08
Payer: MEDICARE

## 2023-06-09 ENCOUNTER — TELEPHONE (OUTPATIENT)
Dept: FAMILY MEDICINE | Facility: CLINIC | Age: 83
End: 2023-06-09
Payer: MEDICARE

## 2023-06-09 NOTE — TELEPHONE ENCOUNTER
did not indicate belonging to [facility] or [facility].  Left message for Christianbartolome to return call to clinic to give verbal orders and confirm medication.      Hydroxyzine is not on pt current medication list.  Pt shouldn't be taking hydroxyzine.        Scout Calderón CMA ([facility]) at 2:54 PM on 6/9/2023

## 2023-06-09 NOTE — TELEPHONE ENCOUNTER
[facility]    Phone Message    May a detailed message be left on voicemail: yes     Reason for Call: Order(s): Home Care Orders: Skilled Nursin times a week for 3 weeks and every other week for 6 weeks, also needs a Social Work Evaluation as well.    Action Taken: Other: PCC    Travel Screening: Not Applicable

## 2023-06-09 NOTE — TELEPHONE ENCOUNTER
VM did not indicate belonging to [facility] or [facility].  Left message for [facility] to return call to clinic to give verbal orders and confirm medication.       Scout Calderón CMA ([facility]) at 2:56 PM on 6/9/2023

## 2023-06-09 NOTE — TELEPHONE ENCOUNTER
[facility]    Phone Message    May a detailed message be left on voicemail: yes     Reason for Call: Order(s): Home Care Orders: Other: Delay of care until 6/9 per patient request     Action Taken: Message routed to:  [facility] pcp    Travel Screening: Not Applicable

## 2023-06-09 NOTE — TELEPHONE ENCOUNTER
Called Luis and gave verbal orders per Dr. Smith for Order(s): Home Care Orders: Other: Delay of care until 6/9 per patient request         Scout Calderón CMA ([facility]) at 9:02 AM on 6/9/2023

## 2023-06-09 NOTE — TELEPHONE ENCOUNTER
[facility]    Phone Message    May a detailed message be left on voicemail: yes     Reason for Call: Other: Is he still to be taking the hydroxyzine 10 mg, this coinsides with his Lexapro, please call back to confirm, thank you!       Action Taken: Other: PCC    Travel Screening: Not Applicable

## 2023-06-09 NOTE — TELEPHONE ENCOUNTER
[facility]    Phone Message    May a detailed message be left on voicemail: yes     Reason for Call: Order(s): Home Care Orders: Skilled Nursing:     Action Taken: Message routed to:  [facility] pcc    Travel Screening: Not Applicable

## 2023-06-12 NOTE — TELEPHONE ENCOUNTER
Gabe returned call into the clinic. Gave verbal orders per Dr. Smith for Order(s): Home Care Orders: Skilled Nursin times a week for 3 weeks and every other week for 6 weeks, also needs a Social Work Evaluation as well.    Confirm ed Hydroxyzine is not on pt current medication list.  Pt shouldn't be taking hydroxyzine.        cSout Calderón CMA ([facility]) at 3:12 PM on 2023

## 2023-06-12 NOTE — TELEPHONE ENCOUNTER
Gabe returned call into the clinic. Gave verbal orders per Dr. Smith for Order(s): Home Care Orders: Skilled Nursin times a week for 3 weeks and every other week for 6 weeks, also needs a Social Work Evaluation as well.     Confirm ed Hydroxyzine is not on pt current medication list.  Pt shouldn't be taking hydroxyzine        Scout Calderón CMA ([facility]) at 3:13 PM on 2023

## 2023-06-12 NOTE — TELEPHONE ENCOUNTER
did not indicate belonging to [facility] or [facility].   states no longer active.  Left message for Gabe to return call to clinic to give verbal orders and confirm medication. Request to speak to Scout.  I will be in the office today until 4 PM today.      Scout Calderón CMA ([facility]) at 8:59 AM on 6/12/2023

## 2023-06-15 ENCOUNTER — TELEPHONE (OUTPATIENT)
Dept: FAMILY MEDICINE | Facility: CLINIC | Age: 83
End: 2023-06-15
Payer: MEDICARE

## 2023-06-15 ENCOUNTER — DOCUMENTATION ONLY (OUTPATIENT)
Dept: FAMILY MEDICINE | Facility: CLINIC | Age: 83
End: 2023-06-15
Payer: MEDICARE

## 2023-06-15 DIAGNOSIS — N18.4 CKD (CHRONIC KIDNEY DISEASE) STAGE 4, GFR 15-29 ML/MIN (H): ICD-10-CM

## 2023-06-15 DIAGNOSIS — N40.1 BENIGN PROSTATIC HYPERPLASIA WITH URINARY FREQUENCY: ICD-10-CM

## 2023-06-15 DIAGNOSIS — G40.909 SEIZURE DISORDER (H): ICD-10-CM

## 2023-06-15 DIAGNOSIS — E78.00 HIGH CHOLESTEROL: ICD-10-CM

## 2023-06-15 DIAGNOSIS — I48.0 PAROXYSMAL ATRIAL FIBRILLATION (H): ICD-10-CM

## 2023-06-15 DIAGNOSIS — R35.0 BENIGN PROSTATIC HYPERPLASIA WITH URINARY FREQUENCY: ICD-10-CM

## 2023-06-15 DIAGNOSIS — F10.21 ALCOHOL DEPENDENCE IN REMISSION (H): Primary | ICD-10-CM

## 2023-06-15 DIAGNOSIS — F41.9 ANXIETY: ICD-10-CM

## 2023-06-15 NOTE — TELEPHONE ENCOUNTER
"I spoke with Sheree (home care RN). She is with Melo currently. She notes that his pulse has ranged from 49 to low 60s. She notes BP this morning of 144/62 with heart rate of 58. BP over the past week of 140/74 (HR 54), 149/78, 172/100, 136/74, 148/76 (HR not noted with these BPs). She reports he has been overall asymptomatic, denying lightheadedness or palpitations, though he does report feeling somewhat \"foggy\" at times, feeling his memory might be a bit worse at times but he then \"snaps out of it.\" She notes rhythm has been regular. She confirmed he is taking metoprolol succinate 12.5 mg daily.    I will update PCP.    Nikki Galloway) DINH Javed  "

## 2023-06-15 NOTE — TELEPHONE ENCOUNTER
[facility]    Phone Message    May a detailed message be left on voicemail: yes     Reason for Call: Other: Reporting that his pulse was running 49- 53, asking if medication adjust is needed.     Action Taken: Message routed to:  [facility] sarah    Travel Screening: Not Applicable

## 2023-06-15 NOTE — TELEPHONE ENCOUNTER
Message left for Sheree with recommendation to continue current medication, follow up with cardiology as scheduled.    Nikki Javed RN ([facility])

## 2023-06-16 DIAGNOSIS — Z53.9 DIAGNOSIS NOT YET DEFINED: Primary | ICD-10-CM

## 2023-06-17 NOTE — CONSULTS
Urology Consult History and Physical    Name: Melo Dangelo    MRN: [id number]   YOB: 1940       We were asked to see Melo Dangelo at the request of Dr. Arora for evaluation and treatment of the following chief complaint:          Chief Complaint:   Hematuria  History is obtained from patient and review of records          History of Present Illness:   Melo Dangelo is a 81 year old male with pmh significant for HTN, HLD, GERD, depression/anxiety who was admitted yesterday with hematuria and MARYANN on CKD.      Labs are showing:   - sCr 3.85mg/dL today up from 3.80mg/dL yesterday and baseline potentially 1.25mg/dL in 11/2020.   - Hgb today is 9.3g/dL, was 11.9g/dL in 11/2020.   - UA: 11/18/21 (WBC 1, RBC 84).      He tells me that he had an episode of gross hematuria in ~10/2020 when he had just recovered from COVID and was taking eliquis (for thrombus prevention) and also took a dose of ibuprofen.  Bleeding was self-limiting at that time.  Interestingly there is a UA from around that time (10/27/20) that showed microhematuria (WBC 2-5, RBC 10-25, neg nitrites).     More recently he had an episode of gross hematuria that lasted 24 hours (10/15 - 10/16/21), and occurred after he had just gotten his COVID booster. Here in the hospital he has been seen by Nephrology on consult - there is concern for IgA nephrology flare post COVID vaccine.  Their team recommended a Urology consult to consider cystoscopy prior to doing a renal biopsy.  His primary team has also started him on finasteride.     Review of records shows he saw Esperanza Guillaume CNP [facility] on 1/22/21 for weak stream and nocturia and urgency.  A Tamsulosin trial was begun and patient was asked to follow up in 2-3 months but this was never arranged.  The patient sts his symptoms have been much better after starting tamsulosin - he feels he is emptying properly but still has nocturia x 4.  He has never had prostatitis, UTI,  stones.           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 No pertinent surgical history.         Social History:     Social History     Tobacco Use     Smoking status: Never Smoker     Smokeless tobacco: Never Used   Substance Use Topics     Alcohol use: No   Smoked between the ages of 21 - 29. But smoked 3 packs per day, therefore 24 pack years  Grew up on a farm then went into the [employer] and learned to fly.  Worked as a  for several years.  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        Family History:     Family History   Problem Relation Age of Onset     Glaucoma No family hx of      Macular Degeneration No family hx of    No FHX  malignancy         Allergies:   No Known Allergies         Medications:     Current Facility-Administered Medications   Medication     acetaminophen (TYLENOL) tablet 650 mg    Or     acetaminophen (TYLENOL) Suppository 650 mg     cyanocobalamin (VITAMIN B-12) tablet 1,000 mcg     escitalopram (LEXAPRO) tablet 20 mg     finasteride (PROSCAR) tablet 5 mg     labetalol (NORMODYNE/TRANDATE) injection 20 mg     lamoTRIgine (LaMICtal) tablet 200 mg     lidocaine (LMX4) cream     lidocaine 1 % 0.1-1 mL     melatonin tablet 3 mg     simvastatin (ZOCOR) tablet 20 mg     sodium chloride (PF) 0.9% PF flush 3 mL     sodium chloride (PF) 0.9% PF flush 3 mL     tamsulosin (FLOMAX) capsule 0.4 mg             Review of Systems:    ROS: See HPI for pertinent details.  Remainder of 10-point ROS negative.  No nightsweats, chills  No weight loss, anorexia, nausea, emesis, diarrhea, constipation  No edema  Has had lower energy lately         Physical  Exam:   VS:  T: 97.5    HR: 72    BP: 167/67    RR: 16   GEN:  AOx3.  NAD.  Pleasant.  HEENT:  Sclerae anicteric.  Conjunctivae pink.  Moist mucous membranes  NECK:  Supple.  No lymphadenopathy.  LUNGS: Non-labored breathing.  BACK:  No costoverterbral tenderness.  ABD:  Soft.  NT.  ND.  No rebound or guarding.  No masses.    EXT:  Warm, well perfused.  Trace lower extremity edema bilaterally  SKIN:  Warm.  Dry.  No rashes.  NEURO:  CN grossly intact.           Data:   All laboratory data reviewed:    No results found for: PSA  Recent Labs   Lab 10/20/21  0646 10/19/21  1442 10/19/21  0552 10/18/21  1832   WBC 6.0 5.2 5.1 5.5   HGB 9.3* 10.1* 9.7* 10.2*    169 161 161     Recent Labs   Lab 10/20/21  0646 10/19/21  0552 10/18/21  1832 10/18/21  1302    141 139 138   POTASSIUM 4.4 4.3 4.7 4.5   CHLORIDE 110* 111* 108 108   CO2 23 23 27 27   BUN 50* 42* 42* 40*   CR 3.85* 3.80* 3.92* 3.85*   GLC 84 80 86 91   GIANNI 8.7 8.6 9.1 8.8   MAG  --  2.4*  --   --    PHOS  --   --  4.6*  --      Recent Labs   Lab 10/18/21  2126   COLOR Straw   APPEARANCE Clear   URINEGLC Negative   URINEBILI Negative   URINEKETONE Negative   SG 1.009   URINEPH 5.5   PROTEIN 20 *   NITRITE Negative   LEUKEST Negative   RBCU 84*   WBCU 1     Results for orders placed or performed during the hospital encounter of 10/27/20   Urine Culture Aerobic Bacterial    Specimen: Midstream Urine   Result Value Ref Range    Specimen Description Midstream Urine     Special Requests Specimen received in preservative     Culture Micro No growth        All pertinent imaging reviewed:  EXAM: CT ABDOMEN AND PELVIS WITHOUT CONTRAST - RENAL STONE PROTOCOL  LOCATION: [facility]  DATE/TIME: 10/18/2021 10:17 PM     INDICATION: Hematuria.  COMPARISON: None.     TECHNIQUE: CT scan of the abdomen and pelvis was performed without oral or IV contrast. Multiplanar reformats were obtained. Dose reduction techniques  were used.  CONTRAST: None.     FINDINGS:     LOWER CHEST: Coronary artery calcification.     HEPATOBILIARY: Unremarkable.     SPLEEN: Unremarkable.     PANCREAS: Unremarkable.     ADRENAL GLANDS: Unremarkable.     KIDNEYS/BLADDER: No renal or ureteral calculi. No dilatation of the intrarenal collecting systems or ureters. No visualized bladder calculi.     BOWEL: Possible tiny hiatal hernia. Normal appendix.     LYMPH NODES: Unremarkable.     PELVIC ORGANS: No acute findings.     MUSCULOSKELETAL: No acute findings.     OTHER: Atherosclerotic calcification in the abdominal aorta.                                                                      IMPRESSION:   1. No acute abnormality identified in the abdomen or pelvis.  2. No renal or ureteral calculi or evidence of urinary obstruction.          Impression and Plan:   Impression / Plan:   Melo Dangelo is a 81 year old male with hematuria - had an episode of gross hematuria in 10/2020 when he was recovering from COVID, taking eliquis and ibuprofen.     Now admitted with gross/ micro hematuria and MARYANN following a COVID Booster.       - If renal fxn improves, would benefit from a CT urogram.  But contrast would currently be contraindicated given renal function  - Please obtain URINE CYTOLOGY (ordered)  - Will need outpatient cystoscopy to rule out bladder pathology.  Will send message to clinic to coordinate appointment  - Continue tamsulosin, as you are.  Agree that finasteride could be beneficial (for BPH urinary symptoms or prostatic bleeding.  He should follow up with Urology regarding voiding symptoms.     Urology will sign off  Will discuss with Dr. Ralph    Thank you for the opportunity to participate in the care of Melo Dangelo.     Anneliese Castelan PA-C  Urology Physician Assistant  Personal Pager: [phone]    Please call Job Code:   [phone] to reach the Urology resident or PA on call - Weekdays  [phone] to reach the Urology resident or PA on call -  Weeknights and weekends            Xray Elbow AP + Lateral + Oblique, Right

## 2023-06-20 ENCOUNTER — DOCUMENTATION ONLY (OUTPATIENT)
Dept: FAMILY MEDICINE | Facility: CLINIC | Age: 83
End: 2023-06-20
Payer: MEDICARE

## 2023-06-20 RX ORDER — SIMVASTATIN 20 MG
20 TABLET ORAL AT BEDTIME
Qty: 90 TABLET | Refills: 3 | OUTPATIENT
Start: 2023-06-20

## 2023-06-20 RX ORDER — GABAPENTIN 100 MG/1
CAPSULE ORAL
Qty: 90 CAPSULE | Refills: 1 | Status: SHIPPED | OUTPATIENT
Start: 2023-06-20 | End: 2024-02-06

## 2023-06-20 RX ORDER — METOPROLOL SUCCINATE 25 MG/1
12.5 TABLET, EXTENDED RELEASE ORAL DAILY
Qty: 90 TABLET | Refills: 1 | Status: SHIPPED | OUTPATIENT
Start: 2023-06-20 | End: 2024-05-21

## 2023-06-20 RX ORDER — ESCITALOPRAM OXALATE 20 MG/1
20 TABLET ORAL EVERY MORNING
Qty: 90 TABLET | Refills: 1 | Status: SHIPPED | OUTPATIENT
Start: 2023-06-20 | End: 2024-05-21

## 2023-06-20 RX ORDER — TAMSULOSIN HYDROCHLORIDE 0.4 MG/1
0.8 CAPSULE ORAL DAILY
Qty: 180 CAPSULE | Refills: 3 | Status: SHIPPED | OUTPATIENT
Start: 2023-06-20 | End: 2024-06-21

## 2023-06-20 NOTE — TELEPHONE ENCOUNTER
3/24/2023  [facility] Francis Spain MD  Family Medicine     tamsulosin (FLOMAX) 0.4 MG capsule  Alpha Blockers Passed     metoprolol succinate ER (TOPROL XL) 25 MG 24 hr tablet  Last Written Prescription Date:  04-  Last Fill Quantity: 90,   # refills: 1     Refused, refills on file    calcium carbonate-vitamin D (OSCAL) 500-5 MG-MCG tablet  Last Written Prescription Date:  03-  Last Fill Quantity: 90,   # refills: 3  Refuse, refills on file    gabapentin (NEURONTIN) 100 MG capsule  Last Written Prescription Date:  02-  Last Fill Quantity: 30,   # refills: 1  Last Office Visit : 3-  Future Office visit:  6-  Routing refill request to provider for review/approval because:  Drug not on the [facility], [facility] or [facility] refill protocol or controlled substance      lamoTRIgine (LAMICTAL) 100 MG tablet  Anti-Seizure Meds Protocol  Failed 06/19/2023 02:36 PM   Protocol Details  Review Authorizing provider's last note.     Normal CBC on file in past 26 months    Normal platelet count on file in past 26 months     Lab Test 06/01/23  1036   WBC 5.6   RBC 3.10*   HGB 9.5*   HCT 28.3*   *     Lab Test 06/01/23  1036   *         simvastatin (ZOCOR) 20 MG tablet  Last Written Prescription Date:  04-  Last Fill Quantity: 90,   # refills: 3  Refused, refills on file    escitalopram (LEXAPRO) 20 MG tablet  Discontinued  Last Written Prescription Date:  1-  Last Fill Quantity: ,   # refills:   Routing refill request to provider for review/approval because:  Medication is reported/historical      Multiple Vitamins-Minerals (EYE VITAMINS PO   Last Written Prescription Date:  unknown  Last Fill Quantity: ,   # refills:  Last Office Visit : 03-  Future Office visit:  Not on file  Routing refill request to provider for review/approval because:  Medication is reported/historical      cyanocobalamin (VITAMIN B-12) 100 MCG  tablet  Discontinued  Last Written Prescription Date:  10- to  10-  Last Fill Quantity: ,   # refills:   Last Office Visit : 03-  Future Office visit:  Not on file  Routing refill request to provider for review/approval because:  Medication is reported/historical

## 2023-06-20 NOTE — PROGRESS NOTES
Type of Form Received: Order    Form Received (Date) 06/16/2023   Form Filled out Yes 6/27/23   Placed in provider folder Yes

## 2023-06-21 RX ORDER — SIMVASTATIN 20 MG
20 TABLET ORAL AT BEDTIME
Qty: 90 TABLET | Refills: 2 | Status: SHIPPED | OUTPATIENT
Start: 2023-06-21 | End: 2024-02-21

## 2023-06-21 RX ORDER — LAMOTRIGINE 100 MG/1
200 TABLET ORAL 2 TIMES DAILY
Qty: 360 TABLET | Refills: 1 | Status: SHIPPED | OUTPATIENT
Start: 2023-06-21 | End: 2023-12-20

## 2023-06-21 RX ORDER — UBIDECARENONE 75 MG
CAPSULE ORAL
Qty: 90 TABLET | Refills: 1 | Status: SHIPPED | OUTPATIENT
Start: 2023-06-21 | End: 2023-07-07

## 2023-06-21 NOTE — TELEPHONE ENCOUNTER
simvastatin (ZOCOR) 20 MG tablet 90 tablet 3 4/17/2023     PHARMACY CHANGE - Remaining refills sent to requested pharmacy.      cyanocobalamin (VITAMIN B-12) 100 MCG tablet  HISTORICAL ONLY ON MEDICATION LIST    Multiple Vitamins-Minerals (EYE VITAMINS PO)  HISTORICAL ONLY ON MEDICATION LIST  Kathleen M Doege RN

## 2023-06-21 NOTE — TELEPHONE ENCOUNTER
Pharmacy called again requesting refills formetoprolol succinate ER (TOPROL XL) 25 MG 24 hr tablet  And simvastatin (ZOCOR) 20 MG tablet     Pharmacy : [facility] ph: [phone]    Please refill or contact pharmacy.

## 2023-06-22 ENCOUNTER — VIRTUAL VISIT (OUTPATIENT)
Dept: INTERNAL MEDICINE | Facility: CLINIC | Age: 83
End: 2023-06-22
Payer: MEDICARE

## 2023-06-22 DIAGNOSIS — N18.5 CKD (CHRONIC KIDNEY DISEASE) STAGE 5, GFR LESS THAN 15 ML/MIN (H): ICD-10-CM

## 2023-06-22 DIAGNOSIS — N02.B9 IGA NEPHROPATHY: Primary | ICD-10-CM

## 2023-06-22 PROCEDURE — 99213 OFFICE O/P EST LOW 20 MIN: CPT | Mod: 95 | Performed by: INTERNAL MEDICINE

## 2023-06-22 NOTE — PROGRESS NOTES
"Virtual Visit Details    Melo is a 82 year old who is being evaluated via a billable video visit.      How would you like to obtain your AVS? MyChart  If the video visit is dropped, the invitation should be resent by: Text to cell phone: [phone]  Will anyone else be joining your video visit? No    Assessment & Plan     IgA nephropathy  and  CKD (chronic kidney disease) stage 5, GFR less than 15 ml/min (H)    Melo is going to be starting peritoneal dialysis.  His wife is already on this and is receiving home care from [facility], so he would also like to start services through them for RN and HHA.  He does not feel that he needs PT or OT at this time.  He is not homebound but indicates that insurance will cover services.  Referral placed.    - Home Care Referral    Adolfo Alvarez MD  [facility] [place]    Subjective   Melo is a 82 year old, presenting for the following health issues:  RECHECK and home care services    HPI     Melo presents today for a visit to discuss home care.  He needs RN and HHA services.  Currently he is trying to get services through [facility]; they are already providing services for his wife.  He reports he and his wife were hospitalized with COVID with kidney complications.  His wife is on peritoneal dialysis and he is going to be having a catheter placed to also start the same.  He is not home bound.        Review of Systems   Constitutional, HEENT, cardiovascular, pulmonary, gi and gu systems are negative, except as otherwise noted.      Objective    Vitals - Patient Reported  Weight (Patient Reported): 79.4 kg (175 lb)  Height (Patient Reported): 177.8 cm (5' 10\")  BMI (Based on Pt Reported Ht/Wt): 25.11  Pain Score: No Pain (0)      Vitals:  No vitals were obtained today due to virtual visit.    Physical Exam   GENERAL: Healthy, alert and no distress  EYES: Eyes grossly normal to inspection.  No discharge or erythema, or obvious " scleral/conjunctival abnormalities.  RESP: No audible wheeze, cough, or visible cyanosis.  No visible retractions or increased work of breathing.    SKIN: Visible skin clear. No significant rash, abnormal pigmentation or lesions.  NEURO: Cranial nerves grossly intact.  Mentation and speech appropriate for age.  PSYCH: Mentation appears normal, affect normal/bright, judgement and insight intact, normal speech and appearance well-groomed.          Video-Visit Details    Type of service:  Video Visit   Video Start Time: 10:00 AM  Video End Time:10:08 AM    Originating Location (pt. Location): Home  Distant Location (provider location):  Off-site  Platform used for Video Visit: [place]avern

## 2023-06-22 NOTE — NURSING NOTE
Is the patient currently in the state of [place]? YES    Visit mode:VIDEO    If the visit is dropped, the patient can be reconnected by: VIDEO VISIT: Text to cell phone: [phone]    Will anyone else be joining the visit? NO      How would you like to obtain your AVS? MyChart    Are changes needed to the allergy or medication list? NO    Reason for visit: RECHECK and home care services      Cathi De Los Santos VF

## 2023-06-27 ENCOUNTER — DOCUMENTATION ONLY (OUTPATIENT)
Dept: FAMILY MEDICINE | Facility: CLINIC | Age: 83
End: 2023-06-27
Payer: MEDICARE

## 2023-06-27 NOTE — PROGRESS NOTES
Type of Form Received:     Form Received (Date) 6/27/23   Form Filled out Yes   Placed in provider folder Yes

## 2023-06-28 ENCOUNTER — MEDICAL CORRESPONDENCE (OUTPATIENT)
Dept: HEALTH INFORMATION MANAGEMENT | Facility: CLINIC | Age: 83
End: 2023-06-28
Payer: MEDICARE

## 2023-07-05 ENCOUNTER — PATIENT OUTREACH (OUTPATIENT)
Dept: NEPHROLOGY | Facility: CLINIC | Age: 83
End: 2023-07-05
Payer: MEDICARE

## 2023-07-05 DIAGNOSIS — N18.5 CKD (CHRONIC KIDNEY DISEASE) STAGE 5, GFR LESS THAN 15 ML/MIN (H): ICD-10-CM

## 2023-07-05 DIAGNOSIS — N18.4 CKD (CHRONIC KIDNEY DISEASE) STAGE 4, GFR 15-29 ML/MIN (H): Primary | ICD-10-CM

## 2023-07-07 ENCOUNTER — TELEPHONE (OUTPATIENT)
Dept: FAMILY MEDICINE | Facility: CLINIC | Age: 83
End: 2023-07-07
Payer: MEDICARE

## 2023-07-07 DIAGNOSIS — F10.21 ALCOHOL DEPENDENCE IN REMISSION (H): ICD-10-CM

## 2023-07-07 RX ORDER — UBIDECARENONE 75 MG
100 CAPSULE ORAL DAILY
Qty: 90 TABLET | Refills: 1 | Status: SHIPPED | OUTPATIENT
Start: 2023-07-07 | End: 2024-09-03

## 2023-07-07 NOTE — TELEPHONE ENCOUNTER
[facility] Call Center    Phone Message    May a detailed message be left on voicemail: yes     Reason for Call: Medication Question or concern regarding medication   Prescription Clarification  Name of Medication: cyanocobalamin (VITAMIN B-12) 100 MCG tablet     Prescribing Provider: Dr. Smith   Pharmacy: [facility]   What on the order needs clarification? Pharmacy requesting call back to verify dosing on the above medication

## 2023-07-07 NOTE — TELEPHONE ENCOUNTER
Old notes say 100 mcg  Can you call pharmacy and confirm it has been 100 mcg they've been dispensing as if so we'd continue that dose

## 2023-07-11 NOTE — PROGRESS NOTES
Patient to be seen 7/18 by PD consult. Will reach out once seen.     Katerine Gonzalez RN, BSN   Nephrology RN Care Coordinator   [facility]

## 2023-07-13 DIAGNOSIS — N18.4 CKD (CHRONIC KIDNEY DISEASE) STAGE 4, GFR 15-29 ML/MIN (H): ICD-10-CM

## 2023-07-13 DIAGNOSIS — F10.21 ALCOHOL DEPENDENCE IN REMISSION (H): ICD-10-CM

## 2023-07-17 RX ORDER — A/C/E/ZINC OX/CUPRIC OX/LUTEIN 7160-113
TABLET ORAL
Qty: 30 TABLET | Refills: 10 | Status: SHIPPED | OUTPATIENT
Start: 2023-07-17 | End: 2024-07-17

## 2023-07-17 NOTE — TELEPHONE ENCOUNTER
OCULAR VITAMINS TABS Tablet    OYST GIANNI+D 500-200MG -200 Tablet    Virtual Visit    6/22/2023  [facility] [place]   Adolfo Alvarez MD  Internal Medicine

## 2023-07-18 ENCOUNTER — MYC MEDICAL ADVICE (OUTPATIENT)
Dept: NEPHROLOGY | Facility: CLINIC | Age: 83
End: 2023-07-18

## 2023-07-18 ENCOUNTER — TELEPHONE (OUTPATIENT)
Dept: NEPHROLOGY | Facility: CLINIC | Age: 83
End: 2023-07-18
Payer: MEDICARE

## 2023-07-18 ENCOUNTER — OFFICE VISIT (OUTPATIENT)
Dept: TRANSPLANT | Facility: CLINIC | Age: 83
End: 2023-07-18
Attending: SURGERY
Payer: MEDICARE

## 2023-07-18 ENCOUNTER — LAB (OUTPATIENT)
Dept: LAB | Facility: CLINIC | Age: 83
End: 2023-07-18
Payer: MEDICARE

## 2023-07-18 VITALS
OXYGEN SATURATION: 97 % | BODY MASS INDEX: 25.04 KG/M2 | SYSTOLIC BLOOD PRESSURE: 132 MMHG | DIASTOLIC BLOOD PRESSURE: 72 MMHG | WEIGHT: 179.4 LBS | HEART RATE: 57 BPM

## 2023-07-18 DIAGNOSIS — N18.4 CKD (CHRONIC KIDNEY DISEASE) STAGE 4, GFR 15-29 ML/MIN (H): Primary | ICD-10-CM

## 2023-07-18 DIAGNOSIS — N18.5 CKD (CHRONIC KIDNEY DISEASE) STAGE 5, GFR LESS THAN 15 ML/MIN (H): ICD-10-CM

## 2023-07-18 LAB
ALBUMIN MFR UR ELPH: 42.7 MG/DL
ALBUMIN SERPL BCG-MCNC: 4.5 G/DL (ref 3.5–5.2)
ANION GAP SERPL CALCULATED.3IONS-SCNC: 9 MMOL/L (ref 7–15)
BUN SERPL-MCNC: 47.5 MG/DL (ref 8–23)
CALCIUM SERPL-MCNC: 9.6 MG/DL (ref 8.8–10.2)
CHLORIDE SERPL-SCNC: 106 MMOL/L (ref 98–107)
CREAT SERPL-MCNC: 4.73 MG/DL (ref 0.67–1.17)
CREAT UR-MCNC: 126 MG/DL
CREAT UR-MCNC: 126 MG/DL
DEPRECATED HCO3 PLAS-SCNC: 24 MMOL/L (ref 22–29)
ERYTHROCYTE [DISTWIDTH] IN BLOOD BY AUTOMATED COUNT: 13.3 % (ref 10–15)
GFR SERPL CREATININE-BSD FRML MDRD: 12 ML/MIN/1.73M2
GLUCOSE SERPL-MCNC: 94 MG/DL (ref 70–99)
HCT VFR BLD AUTO: 29.5 % (ref 40–53)
HGB BLD-MCNC: 9.5 G/DL (ref 13.3–17.7)
MCH RBC QN AUTO: 30.4 PG (ref 26.5–33)
MCHC RBC AUTO-ENTMCNC: 32.2 G/DL (ref 31.5–36.5)
MCV RBC AUTO: 95 FL (ref 78–100)
MICROALBUMIN UR-MCNC: 64.9 MG/L
MICROALBUMIN/CREAT UR: 51.51 MG/G CR (ref 0–17)
PHOSPHATE SERPL-MCNC: 3.8 MG/DL (ref 2.5–4.5)
PLATELET # BLD AUTO: 133 10E3/UL (ref 150–450)
POTASSIUM SERPL-SCNC: 5.1 MMOL/L (ref 3.4–5.3)
PROT/CREAT 24H UR: 0.34 MG/MG CR (ref 0–0.2)
RBC # BLD AUTO: 3.12 10E6/UL (ref 4.4–5.9)
SODIUM SERPL-SCNC: 139 MMOL/L (ref 136–145)
WBC # BLD AUTO: 5 10E3/UL (ref 4–11)

## 2023-07-18 PROCEDURE — 84156 ASSAY OF PROTEIN URINE: CPT | Performed by: PATHOLOGY

## 2023-07-18 PROCEDURE — 36415 COLL VENOUS BLD VENIPUNCTURE: CPT | Performed by: PATHOLOGY

## 2023-07-18 PROCEDURE — 80069 RENAL FUNCTION PANEL: CPT | Performed by: PATHOLOGY

## 2023-07-18 PROCEDURE — G0463 HOSPITAL OUTPT CLINIC VISIT: HCPCS | Performed by: SURGERY

## 2023-07-18 PROCEDURE — 82570 ASSAY OF URINE CREATININE: CPT | Performed by: INTERNAL MEDICINE

## 2023-07-18 PROCEDURE — 99000 SPECIMEN HANDLING OFFICE-LAB: CPT | Performed by: PATHOLOGY

## 2023-07-18 PROCEDURE — 85027 COMPLETE CBC AUTOMATED: CPT | Performed by: PATHOLOGY

## 2023-07-18 PROCEDURE — 99204 OFFICE O/P NEW MOD 45 MIN: CPT | Performed by: SURGERY

## 2023-07-18 NOTE — PROGRESS NOTES
Dialysis Access Service  Consult Note    Referred by Dr. Barrow for placement of peritoneal dialysis access.    HPI: Mr. 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  He is right handed. Mr. Dangelo is not dialyzing at (Not currently on dialysis).        Risk factors for complications of PD catheter access are:         Yes No  Hx of abdominal surgery  []   [x]    Comment:       Hx of  hernia repair/hydrocele []         [x]  Comment:  History of previous PD catheter []     [x]  Comment:     Respiratory problems   []     [x]  Comment:   Obesity (BMI >30)  []     [x]  Comment:  Diabetes    []        [x]  Comment:  Anticoagulation:   []         [x]   Agent:                                      Current immunosuppression []     [x]  Comment:         Past Medical History:   Diagnosis Date     2019 novel coronavirus disease (COVID-19) 10/27/2020    also on 9/8/22     Alcohol dependence (H)      Chronic kidney disease, stage IV (severe) (H)      Combined forms of age-related cataract of both eyes      Concussion with loss of consciousness     x10 going back to childhood     HTN (hypertension)      IgA nephropathy      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      Paroxysmal atrial fibrillation (H)      PVD (posterior vitreous detachment)      Seizure disorder (H)     last seizure 2008       Past Surgical History:   Procedure Laterality Date     NO HISTORY OF SURGERY       PHACOEMULSIFICATION CLEAR CORNEA WITH STANDARD INTRAOCULAR LENS IMPLANT Right 11/3/2022    Procedure: RIGHT EYE PHACOEMULSIFICATION, CATARACT, WITH INTRAOCULAR LENS IMPLANT COMPLEX CASE ;  Surgeon: Best Padilla MD;  Location: [facility] OR     PHACOEMULSIFICATION CLEAR CORNEA WITH STANDARD INTRAOCULAR LENS IMPLANT Left 11/17/2022    Procedure: LEFT EYE PHACOEMULSIFICATION, CATARACT, WITH INTRAOCULAR LENS IMPLANT;  Surgeon: Best Padilla MD;   Location: [facility] OR       Family History   Problem Relation Age of Onset     Glaucoma No family hx of      Macular Degeneration No family hx of      Anesthesia Reaction No family hx of      Deep Vein Thrombosis (DVT) No family hx of        Social History     Tobacco Use     Smoking status: Former     Packs/day: 0.00     Types: Cigarettes     Smokeless tobacco: Never   Substance Use Topics     Alcohol use: No     Comment: History of alcohol dependence, in remission for 20 years         ROS: 10 point ROS neg other than the symptoms noted above in the HPI.                 Current Outpatient Medications:      acetaminophen (TYLENOL) 325 MG tablet, Take 325-650 mg by mouth every 6 hours as needed for mild pain, Disp: , Rfl:      calcium carbonate-vitamin D (OSCAL) 500-5 MG-MCG tablet, TAKE 1 TABLET BY MOUTH DAILY, Disp: 30 tablet, Rfl: 10     cyanocobalamin (VITAMIN B-12) 100 MCG tablet, Take 1 tablet (100 mcg) by mouth daily Take 1,000 mcg by mouth every morning -, Disp: 90 tablet, Rfl: 1     escitalopram (LEXAPRO) 20 MG tablet, Take 1 tablet (20 mg) by mouth every morning, Disp: 90 tablet, Rfl: 1     gabapentin (NEURONTIN) 100 MG capsule, Take one po every day prn anxiety, watch for grogginess or dizziness, Disp: 90 capsule, Rfl: 1     lamoTRIgine (LAMICTAL) 100 MG tablet, Take 2 tablets (200 mg) by mouth 2 times daily, Disp: 360 tablet, Rfl: 1     metoprolol succinate ER (TOPROL XL) 25 MG 24 hr tablet, Take 0.5 tablets (12.5 mg) by mouth daily TAKE ONE-HALF TABLET BY MOUTH DAILY, Disp: 90 tablet, Rfl: 1     Multiple Vitamins-Minerals (OCULAR VITAMINS) TABS, TAKE 1 TABLET BY MOUTH DAILY, Disp: 30 tablet, Rfl: 10     simvastatin (ZOCOR) 20 MG tablet, Take 1 tablet (20 mg) by mouth At Bedtime, Disp: 90 tablet, Rfl: 2     tamsulosin (FLOMAX) 0.4 MG capsule, Take 2 capsules (0.8 mg) by mouth daily, Disp: 180 capsule, Rfl: 3                  PHYSICAL EXAM:  Pulse:  [57] 57  BP: (132)/(72) 132/72  SpO2:  [97 %] 97  %  Constitutional: Alert and oriented in no acute distress  HEENT: sclera anicteric, MMM, conjunctiva pink  Chest: HD catheter absent.  CV:  NSR  : No CVA tenderness  Abdomen: No previous incisions ascites absent and no hernias  Beltline location in relation to umbilicus: slightly above  Extremities:  No edema  Skin:  No rashes or jaundice  Neuro: normal gait  Psych: normal mood and affect    Lab on 06/01/2023   Component Date Value Ref Range Status     Color Urine 06/01/2023 Light Yellow  Colorless, Straw, Light Yellow, Yellow Final     Appearance Urine 06/01/2023 Clear  Clear Final     Glucose Urine 06/01/2023 30 (A)  Negative mg/dL Final     Bilirubin Urine 06/01/2023 Negative  Negative Final     Ketones Urine 06/01/2023 Negative  Negative mg/dL Final     Specific Gravity Urine 06/01/2023 1.016  1.003 - 1.035 Final     Blood Urine 06/01/2023 Small (A)  Negative Final     pH Urine 06/01/2023 5.5  5.0 - 7.0 Final     Protein Albumin Urine 06/01/2023 20 (A)  Negative mg/dL Final     Urobilinogen Urine 06/01/2023 Normal  Normal, 2.0 mg/dL Final     Nitrite Urine 06/01/2023 Negative  Negative Final     Leukocyte Esterase Urine 06/01/2023 Negative  Negative Final     Mucus Urine 06/01/2023 Present (A)  None Seen /LPF Final     RBC Urine 06/01/2023 2  <=2 /HPF Final     WBC Urine 06/01/2023 <1  <=5 /HPF Final     Squamous Epithelials Urine 06/01/2023 <1  <=1 /HPF Final     Hyaline Casts Urine 06/01/2023 1  <=2 /LPF Final     Sodium 06/01/2023 139  136 - 145 mmol/L Final     Potassium 06/01/2023 4.8  3.4 - 5.3 mmol/L Final     Chloride 06/01/2023 107  98 - 107 mmol/L Final     Carbon Dioxide (CO2) 06/01/2023 22  22 - 29 mmol/L Final     Anion Gap 06/01/2023 10  7 - 15 mmol/L Final     Glucose 06/01/2023 101 (H)  70 - 99 mg/dL Final     Urea Nitrogen 06/01/2023 44.8 (H)  8.0 - 23.0 mg/dL Final     Creatinine 06/01/2023 4.47 (H)  0.67 - 1.17 mg/dL Final     GFR Estimate 06/01/2023 12 (L)  >60 mL/min/1.73m2 Final    eGFR  calculated using 2021 CKD-EPI equation.     Calcium 06/01/2023 9.3  8.8 - 10.2 mg/dL Final     Albumin 06/01/2023 4.4  3.5 - 5.2 g/dL Final     Phosphorus 06/01/2023 3.9  2.5 - 4.5 mg/dL Final     WBC Count 06/01/2023 5.6  4.0 - 11.0 10e3/uL Final     RBC Count 06/01/2023 3.10 (L)  4.40 - 5.90 10e6/uL Final     Hemoglobin 06/01/2023 9.5 (L)  13.3 - 17.7 g/dL Final     Hematocrit 06/01/2023 28.3 (L)  40.0 - 53.0 % Final     MCV 06/01/2023 91  78 - 100 fL Final     MCH 06/01/2023 30.6  26.5 - 33.0 pg Final     MCHC 06/01/2023 33.6  31.5 - 36.5 g/dL Final     RDW 06/01/2023 13.3  10.0 - 15.0 % Final     Platelet Count 06/01/2023 142 (L)  150 - 450 10e3/uL Final     Total Protein Urine mg/dL 06/01/2023 31.5 (H)  1.0 - 14.0 mg/dL Final     Total Protein UR MG/MG CR 06/01/2023 0.29 (H)  0.00 - 0.20 mg/mg Cr Final     Creatinine Urine mg/dL 06/01/2023 109.0  mg/dL Final     Ferritin 06/01/2023 257  31 - 409 ng/mL Final     Iron 06/01/2023 83  61 - 157 ug/dL Final     Iron Binding Capacity 06/01/2023 276  240 - 430 ug/dL Final     Iron Sat Index 06/01/2023 30  15 - 46 % Final            Assessment & Plan: Mr. Dangelo is a good candidate for peritoneal  dialysis access.  I would recommend laparoscopic PD catheter placement with left sided exit, created under General.     The surgical risks and benefits were reviewed and questions were answered. We discussed the day of surgery plan, anesthesia, postop care, risk of infection, bleeding, thrombosis, possible need for intraoperative omentectomy or newly detected hernia repair or obliteration of patent processus vaginalis (if male), drain pain or catheter dysfunction, and possible future need for surgical revision or removal. This was contrasted with morbidity and mortality risk of long-term catheter based hemodialysis access. The patient does wish to proceed with surgery for permanent access creation at this time.  The patient was counselled to contact one of the nurse  coordinators, Nidhi Schilling RN or Flores Arias RN at [phone] with any questions or concerns.  Thank you for the opportunity to participate in Mr. Dangelo's care.        Naya Zimmer MD FACS  Associate Professor of Surgery  Director, [facility].

## 2023-07-18 NOTE — LETTER
7/18/2023         RE: Melo Dangelo  [address]  [place] [address]        Dear Colleague,    Thank you for referring your patient, Melo Dangelo, to the [facility]. Please see a copy of my visit note below.    Dialysis Access Service  Consult Note    Referred by Dr. Barrow for placement of peritoneal dialysis access.    HPI: Mr. 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  He is right handed. Mr. Dangelo is not dialyzing at (Not currently on dialysis).        Risk factors for complications of PD catheter access are:         Yes No  Hx of abdominal surgery  []   [x]    Comment:       Hx of  hernia repair/hydrocele []         [x]  Comment:  History of previous PD catheter []     [x]  Comment:     Respiratory problems   []     [x]  Comment:   Obesity (BMI >30)  []     [x]  Comment:  Diabetes    []        [x]  Comment:  Anticoagulation:   []         [x]   Agent:                                      Current immunosuppression []     [x]  Comment:         Past Medical History:   Diagnosis Date     2019 novel coronavirus disease (COVID-19) 10/27/2020    also on 9/8/22     Alcohol dependence (H)      Chronic kidney disease, stage IV (severe) (H)      Combined forms of age-related cataract of both eyes      Concussion with loss of consciousness     x10 going back to childhood     HTN (hypertension)      IgA nephropathy      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      Paroxysmal atrial fibrillation (H)      PVD (posterior vitreous detachment)      Seizure disorder (H)     last seizure 2008       Past Surgical History:   Procedure Laterality Date     NO HISTORY OF SURGERY       PHACOEMULSIFICATION CLEAR CORNEA WITH STANDARD INTRAOCULAR LENS IMPLANT Right 11/3/2022    Procedure: RIGHT EYE PHACOEMULSIFICATION, CATARACT, WITH INTRAOCULAR LENS IMPLANT COMPLEX CASE ;  Surgeon:  Best Padilla MD;  Location: [facility] OR     PHACOEMULSIFICATION CLEAR CORNEA WITH STANDARD INTRAOCULAR LENS IMPLANT Left 11/17/2022    Procedure: LEFT EYE PHACOEMULSIFICATION, CATARACT, WITH INTRAOCULAR LENS IMPLANT;  Surgeon: Best Padilla MD;  Location: [facility] OR       Family History   Problem Relation Age of Onset     Glaucoma No family hx of      Macular Degeneration No family hx of      Anesthesia Reaction No family hx of      Deep Vein Thrombosis (DVT) No family hx of        Social History     Tobacco Use     Smoking status: Former     Packs/day: 0.00     Types: Cigarettes     Smokeless tobacco: Never   Substance Use Topics     Alcohol use: No     Comment: History of alcohol dependence, in remission for 20 years         ROS: 10 point ROS neg other than the symptoms noted above in the HPI.                 Current Outpatient Medications:      acetaminophen (TYLENOL) 325 MG tablet, Take 325-650 mg by mouth every 6 hours as needed for mild pain, Disp: , Rfl:      calcium carbonate-vitamin D (OSCAL) 500-5 MG-MCG tablet, TAKE 1 TABLET BY MOUTH DAILY, Disp: 30 tablet, Rfl: 10     cyanocobalamin (VITAMIN B-12) 100 MCG tablet, Take 1 tablet (100 mcg) by mouth daily Take 1,000 mcg by mouth every morning -, Disp: 90 tablet, Rfl: 1     escitalopram (LEXAPRO) 20 MG tablet, Take 1 tablet (20 mg) by mouth every morning, Disp: 90 tablet, Rfl: 1     gabapentin (NEURONTIN) 100 MG capsule, Take one po every day prn anxiety, watch for grogginess or dizziness, Disp: 90 capsule, Rfl: 1     lamoTRIgine (LAMICTAL) 100 MG tablet, Take 2 tablets (200 mg) by mouth 2 times daily, Disp: 360 tablet, Rfl: 1     metoprolol succinate ER (TOPROL XL) 25 MG 24 hr tablet, Take 0.5 tablets (12.5 mg) by mouth daily TAKE ONE-HALF TABLET BY MOUTH DAILY, Disp: 90 tablet, Rfl: 1     Multiple Vitamins-Minerals (OCULAR VITAMINS) TABS, TAKE 1 TABLET BY MOUTH DAILY, Disp: 30 tablet, Rfl: 10     simvastatin (ZOCOR) 20 MG tablet, Take  1 tablet (20 mg) by mouth At Bedtime, Disp: 90 tablet, Rfl: 2     tamsulosin (FLOMAX) 0.4 MG capsule, Take 2 capsules (0.8 mg) by mouth daily, Disp: 180 capsule, Rfl: 3                  PHYSICAL EXAM:  Pulse:  [57] 57  BP: (132)/(72) 132/72  SpO2:  [97 %] 97 %  Constitutional: Alert and oriented in no acute distress  HEENT: sclera anicteric, MMM, conjunctiva pink  Chest: HD catheter absent.  CV:  NSR  : No CVA tenderness  Abdomen: No previous incisions ascites absent and no hernias  Beltline location in relation to umbilicus: slightly above  Extremities:  No edema  Skin:  No rashes or jaundice  Neuro: normal gait  Psych: normal mood and affect    Lab on 06/01/2023   Component Date Value Ref Range Status     Color Urine 06/01/2023 Light Yellow  Colorless, Straw, Light Yellow, Yellow Final     Appearance Urine 06/01/2023 Clear  Clear Final     Glucose Urine 06/01/2023 30 (A)  Negative mg/dL Final     Bilirubin Urine 06/01/2023 Negative  Negative Final     Ketones Urine 06/01/2023 Negative  Negative mg/dL Final     Specific Gravity Urine 06/01/2023 1.016  1.003 - 1.035 Final     Blood Urine 06/01/2023 Small (A)  Negative Final     pH Urine 06/01/2023 5.5  5.0 - 7.0 Final     Protein Albumin Urine 06/01/2023 20 (A)  Negative mg/dL Final     Urobilinogen Urine 06/01/2023 Normal  Normal, 2.0 mg/dL Final     Nitrite Urine 06/01/2023 Negative  Negative Final     Leukocyte Esterase Urine 06/01/2023 Negative  Negative Final     Mucus Urine 06/01/2023 Present (A)  None Seen /LPF Final     RBC Urine 06/01/2023 2  <=2 /HPF Final     WBC Urine 06/01/2023 <1  <=5 /HPF Final     Squamous Epithelials Urine 06/01/2023 <1  <=1 /HPF Final     Hyaline Casts Urine 06/01/2023 1  <=2 /LPF Final     Sodium 06/01/2023 139  136 - 145 mmol/L Final     Potassium 06/01/2023 4.8  3.4 - 5.3 mmol/L Final     Chloride 06/01/2023 107  98 - 107 mmol/L Final     Carbon Dioxide (CO2) 06/01/2023 22  22 - 29 mmol/L Final     Anion Gap 06/01/2023 10  7 -  15 mmol/L Final     Glucose 06/01/2023 101 (H)  70 - 99 mg/dL Final     Urea Nitrogen 06/01/2023 44.8 (H)  8.0 - 23.0 mg/dL Final     Creatinine 06/01/2023 4.47 (H)  0.67 - 1.17 mg/dL Final     GFR Estimate 06/01/2023 12 (L)  >60 mL/min/1.73m2 Final    eGFR calculated using 2021 CKD-EPI equation.     Calcium 06/01/2023 9.3  8.8 - 10.2 mg/dL Final     Albumin 06/01/2023 4.4  3.5 - 5.2 g/dL Final     Phosphorus 06/01/2023 3.9  2.5 - 4.5 mg/dL Final     WBC Count 06/01/2023 5.6  4.0 - 11.0 10e3/uL Final     RBC Count 06/01/2023 3.10 (L)  4.40 - 5.90 10e6/uL Final     Hemoglobin 06/01/2023 9.5 (L)  13.3 - 17.7 g/dL Final     Hematocrit 06/01/2023 28.3 (L)  40.0 - 53.0 % Final     MCV 06/01/2023 91  78 - 100 fL Final     MCH 06/01/2023 30.6  26.5 - 33.0 pg Final     MCHC 06/01/2023 33.6  31.5 - 36.5 g/dL Final     RDW 06/01/2023 13.3  10.0 - 15.0 % Final     Platelet Count 06/01/2023 142 (L)  150 - 450 10e3/uL Final     Total Protein Urine mg/dL 06/01/2023 31.5 (H)  1.0 - 14.0 mg/dL Final     Total Protein UR MG/MG CR 06/01/2023 0.29 (H)  0.00 - 0.20 mg/mg Cr Final     Creatinine Urine mg/dL 06/01/2023 109.0  mg/dL Final     Ferritin 06/01/2023 257  31 - 409 ng/mL Final     Iron 06/01/2023 83  61 - 157 ug/dL Final     Iron Binding Capacity 06/01/2023 276  240 - 430 ug/dL Final     Iron Sat Index 06/01/2023 30  15 - 46 % Final            Assessment & Plan: Mr. Dangelo is a good candidate for peritoneal  dialysis access.  I would recommend laparoscopic PD catheter placement with left sided exit, created under General.     The surgical risks and benefits were reviewed and questions were answered. We discussed the day of surgery plan, anesthesia, postop care, risk of infection, bleeding, thrombosis, possible need for intraoperative omentectomy or newly detected hernia repair or obliteration of patent processus vaginalis (if male), drain pain or catheter dysfunction, and possible future need for surgical revision or removal.  This was contrasted with morbidity and mortality risk of long-term catheter based hemodialysis access. The patient does wish to proceed with surgery for permanent access creation at this time.  The patient was counselled to contact one of the nurse coordinators, Nidhi Schilling RN or Flores Arias RN at [phone] with any questions or concerns.  Thank you for the opportunity to participate in Mr. Dangelo's care.        Naya Zimmer MD FACS  Associate Professor of Surgery  Director, [facility].      Again, thank you for allowing me to participate in the care of your patient.        Sincerely,        Naya Zimmer MD, MD

## 2023-07-18 NOTE — PATIENT INSTRUCTIONS
Thank you so much for choosing us for your care. It was a pleasure to see you at clinic today.     Plan/ follow-up recommendations: You are a good candidate for PD catheter surgery.  Think about where you would like your catheter to come out on your abdomen.  We will work with you and your nephrologist to determine when to schedule surgery.    Continue to watch your labs with your nephrologist and communicate any increase in uremic symptoms you experience.  Let your nephrology nurse know if you notice any of the following symptoms:  Uremic Symptoms to watch for:  Increased fatigue  Brain fog/ confusion  Nausea, vomiting, diarrhea  Metallic taste in your mouth  Swelling/ edema    Make a plan to have assistance with lifting/ bending/ twisting activities while you are healing after your PD catheter surgery (healing takes about 1-2 weeks).    Additional testing/imaging ordered today: Nephrology ordered labs after today's visit.    Discussed at today's visit:  - Basic PD process.  PD is done at home every day.   - Positioning of PD catheter is in the lower abdomen.  - Leave your PD catheter dressing in place for 1 week after surgery until your PD nurse changes it.  The dressing needs to remain sterile and in place until your site is healed.  The dressing should be changed by the PD nurse weekly until the site is healed (approx. 2 - 4 weeks).  - PD training will start once your site is healed or approximately 2 weeks after surgery.  PD training takes 1-2 weeks and is done at the dialysis clinic with a PD nurse.  - No lifting, twisting, driving, bathing/showering until PD catheter is healed.  - You will get 1 month of supplies delivered at a time (approximately 30 boxes).  You will need a clean, dry, climate controlled environment to store your supplies.  - If you have pets in the home, the pets need to be kept out of the room where PD is performed and away from your supplies.  - Soaking or submerging your PD catheter site  is contraindicated.  (No taking baths, no swimming in lakes, streams, hot tubs, or public pools).  - Importance of preventing constipation to avoid displacement of PD catheter.  You will likely need to take daily Miralax while on PD.  - Dialysate solution contains sugar, which adds calories and can affect blood glucose levels if diabetic.  - You will have monthly dialysis clinic visits with your nephrologist and PD nurse; your PD nurse is available 24/7 for questions and concerns.  - Possible complications include infection, displacement of catheter, or occlusion.     Dialysis Access Care Coordinators  DINH Singh RN  Direct: [phone]    Our scheduling team will get in touch with you to set up any follow-up testing/imaging and/or appointments. Please be aware that any testing/imaging recommended today will need to completed prior to your next visit with the provider. If testing/imaging is not completed prior to your next visit, your visit may be rescheduled.        If you have any questions, please call the Dialysis Access Care Coordinator or contact our clinic at [phone].  We also encourage the use of AIM to communicate with your HealthCare Provider.      If you have an urgent need after business hours (8:00 am to 4:30 pm) please call [phone], option 4, and ask for the Transplant Surgery Fellow on call. For non-urgent after hours needs, please call the dialysis access nurse at [phone] and leave a detailed voicemail. For scheduling needs, please call our clinic directly at [phone].

## 2023-07-18 NOTE — TELEPHONE ENCOUNTER
MARIMAR and sent My Chart message to schedule follow up with Dr. Barrow on or around 09/01/2023 // first attempt 07/18/2023 MCE

## 2023-07-19 ENCOUNTER — OFFICE VISIT (OUTPATIENT)
Dept: CARDIOLOGY | Facility: CLINIC | Age: 83
End: 2023-07-19
Attending: INTERNAL MEDICINE
Payer: MEDICARE

## 2023-07-19 VITALS
DIASTOLIC BLOOD PRESSURE: 69 MMHG | HEART RATE: 56 BPM | OXYGEN SATURATION: 96 % | BODY MASS INDEX: 26.82 KG/M2 | SYSTOLIC BLOOD PRESSURE: 134 MMHG | HEIGHT: 69 IN | WEIGHT: 181.1 LBS

## 2023-07-19 DIAGNOSIS — I48.0 PAROXYSMAL ATRIAL FIBRILLATION (H): ICD-10-CM

## 2023-07-19 PROCEDURE — G0463 HOSPITAL OUTPT CLINIC VISIT: HCPCS | Mod: 25 | Performed by: NURSE PRACTITIONER

## 2023-07-19 PROCEDURE — 93005 ELECTROCARDIOGRAM TRACING: CPT

## 2023-07-19 PROCEDURE — 93010 ELECTROCARDIOGRAM REPORT: CPT | Performed by: INTERNAL MEDICINE

## 2023-07-19 PROCEDURE — 99213 OFFICE O/P EST LOW 20 MIN: CPT | Performed by: NURSE PRACTITIONER

## 2023-07-19 ASSESSMENT — PAIN SCALES - GENERAL: PAINLEVEL: NO PAIN (0)

## 2023-07-19 NOTE — PROGRESS NOTES
ELECTROPHYSIOLOGY CLINIC VISIT    Assessment/Recommendations   Assessment/Plan:    Mr. Dangelo is an 82 year old male who has a past medical history significant for PAF (CHADSVASC 3 on ASA), HTN, IgA nephropathy, CKD IV, and ETOH abuse.     Paroxsymal Atrial Fibrillation  1. Stroke Prophylaxis:  CHADSVASC=+HTN, ++age  3, corresponding to a 3.2% annual stroke / systemic emolism event rate. indicating need for long term oral anticoagulation.  He was previously on Eliquis but this was stopped due to renal dysfunction. Discussed he does have indication for anticoagulation. He states understanding but wants to keep on ASA only.   2. Rate Control: Continue Toprol XL.   3. Rhythm Control: Cardioversion, Antiarrhythmics and/or ablation are options for rhythm control. Low overall burden with minimal symptoms at this time. LVEF is preserved. Continue with conservative measures.   4. Risk Factor Management: Statin, BP control, and STEPHEN evaluation as indicated.        Follow up in 6 months.        History of Present Illness/Subjective    Mr. Melo Dangelo is a 82 year old male who comes in today for EP follow-up of PAF.    Mr. Dangelo is an 82 year old male who has a past medical history significant for PAF (CHADSVASC 3 on ASA), HTN, IgA nephropathy, CKD IV, and ETOH abuse.     He was first diagnosed with AF in 10/2020 when he was hospitalized with COVID infection. He was started on Eliquis. This was later discontinued due to CKD. A Zio patch from 10/2021 showed no significant arrhythmias. A repeat zio patch monitor from 2/2022 showed 2% PAF burden. A zio patch monitor from 9/2022 showed no arrhythmias. He had reported having skipped beats/palpitations lasting for an hour that occurred a couple times a week. He has seen Dr. Ventura and advised to take ASA.     He reports feeling well. He states he met with Nephrologist recently and is likely going to need to start PD. He says overall his activity level has improved. He used  "to have to stop and rest with 1 flight of stairs but now can do 3. Does report some leg tiredness. He denies chest discomfort, palpitations, abdominal fullness/bloating or peripheral edema, shortness of breath, paroxysmal nocturnal dyspnea, orthopnea, lightheadedness, dizziness, pre-syncope, or syncope. Presenting 12 lead ECG shows SB 1 AVB Vent Rate 53 bpm,  ms, QRS 92 ms, QTc 412 ms. Current cardiac medications include: Toprol XL and Simvastatin.     I have reviewed and updated the patient's Past Medical History, Social History, Family History and Medication List.     Cardiographics (Personally Reviewed) :   7/6/22 Echo:   Interpretation Summary  Global and regional left ventricular function is normal with an EF of 60-65%.  Right ventricular function, chamber size, wall motion, and thickness are  normal.  Pulmonary artery systolic pressure is normal.  The inferior vena cava is normal.  No pericardial effusion is present.  No significant changes noted.       Physical Examination   /69 (BP Location: Left arm, Patient Position: Sitting, Cuff Size: Adult Regular)   Pulse 56   Ht 1.753 m (5' 9.02\")   Wt 82.1 kg (181 lb 1.6 oz)   SpO2 96%   BMI 26.73 kg/m    Wt Readings from Last 3 Encounters:   07/18/23 81.4 kg (179 lb 6.4 oz)   06/01/23 81.7 kg (180 lb 1.6 oz)   03/24/23 82.2 kg (181 lb 4.8 oz)     CONSITUTIONAL: no acute distress  HEENT: no icterus, no redness or discharge, neck supple  CV: no visible edema of visualized extremities. No JVD.   RESPIRATORY: respirations nonlabored, no cough  NEURO: AA&Ox3, speech fluent/appropriate, motor grossly nonfocal  PSYCH: cooperative, affect appropriate  DERM: no rashes on visualized face/neck/upper extremities         Medications  Allergies   Current Outpatient Medications   Medication Sig Dispense Refill     acetaminophen (TYLENOL) 325 MG tablet Take 325-650 mg by mouth every 6 hours as needed for mild pain       calcium carbonate-vitamin D (OSCAL) 500-5 " MG-MCG tablet TAKE 1 TABLET BY MOUTH DAILY 30 tablet 10     cyanocobalamin (VITAMIN B-12) 100 MCG tablet Take 1 tablet (100 mcg) by mouth daily Take 1,000 mcg by mouth every morning - 90 tablet 1     escitalopram (LEXAPRO) 20 MG tablet Take 1 tablet (20 mg) by mouth every morning 90 tablet 1     gabapentin (NEURONTIN) 100 MG capsule Take one po every day prn anxiety, watch for grogginess or dizziness 90 capsule 1     lamoTRIgine (LAMICTAL) 100 MG tablet Take 2 tablets (200 mg) by mouth 2 times daily 360 tablet 1     metoprolol succinate ER (TOPROL XL) 25 MG 24 hr tablet Take 0.5 tablets (12.5 mg) by mouth daily TAKE ONE-HALF TABLET BY MOUTH DAILY 90 tablet 1     Multiple Vitamins-Minerals (OCULAR VITAMINS) TABS TAKE 1 TABLET BY MOUTH DAILY 30 tablet 10     simvastatin (ZOCOR) 20 MG tablet Take 1 tablet (20 mg) by mouth At Bedtime 90 tablet 2     tamsulosin (FLOMAX) 0.4 MG capsule Take 2 capsules (0.8 mg) by mouth daily 180 capsule 3    No Known Allergies      Lab Results (Personally Reviewed)    Chemistry/lipid CBC Cardiac Enzymes/BNP/TSH/INR   Lab Results   Component Value Date    BUN 47.5 (H) 07/18/2023     07/18/2023    CO2 24 07/18/2023     Creatinine   Date Value Ref Range Status   07/18/2023 4.73 (H) 0.67 - 1.17 mg/dL Final   10/29/2020 1.33 (H) 0.66 - 1.25 mg/dL Final       Lab Results   Component Value Date    CHOL 220 (H) 01/13/2023    HDL 43 01/13/2023     (H) 01/13/2023      Lab Results   Component Value Date    WBC 5.0 07/18/2023    HGB 9.5 (L) 07/18/2023    HCT 29.5 (L) 07/18/2023    MCV 95 07/18/2023     (L) 07/18/2023    Lab Results   Component Value Date    TROPONINI <0.01 11/01/2020    TSH 1.15 07/29/2022    INR 1.04 01/20/2022        The patient states understanding and is agreeable with the plan.   ELIZABETH Gallego CNP  Electrophysiology Consult Service  Pager: Text Page

## 2023-07-19 NOTE — NURSING NOTE
Chief Complaint   Patient presents with     Follow Up     6 mo follow-up paroxysmal atrial fibrillation          Vitals were taken, medications reconciled and EKG performed.    Nani Tyler CMA  8:16 AM

## 2023-07-19 NOTE — LETTER
7/19/2023      RE: Melo Dangelo  [address]  [place] [address]       Dear Colleague,    Thank you for the opportunity to participate in the care of your patient, Melo Dangelo, at the [facility] [place] at [facility]. Please see a copy of my visit note below.        ELECTROPHYSIOLOGY CLINIC VISIT    Assessment/Recommendations   Assessment/Plan:    Mr. Dangelo is an 82 year old male who has a past medical history significant for PAF (CHADSVASC 3 on ASA), HTN, IgA nephropathy, CKD IV, and ETOH abuse.     Paroxsymal Atrial Fibrillation  1. Stroke Prophylaxis:  CHADSVASC=+HTN, ++age  3, corresponding to a 3.2% annual stroke / systemic emolism event rate. indicating need for long term oral anticoagulation.  He was previously on Eliquis but this was stopped due to renal dysfunction. Discussed he does have indication for anticoagulation. He states understanding but wants to keep on ASA only.   2. Rate Control: Continue Toprol XL.   3. Rhythm Control: Cardioversion, Antiarrhythmics and/or ablation are options for rhythm control. Low overall burden with minimal symptoms at this time. LVEF is preserved. Continue with conservative measures.   4. Risk Factor Management: Statin, BP control, and STEPHEN evaluation as indicated.        Follow up in 6 months.        History of Present Illness/Subjective    Mr. Melo Dangelo is a 82 year old male who comes in today for EP follow-up of PAF.    Mr. Dangelo is an 82 year old male who has a past medical history significant for PAF (CHADSVASC 3 on ASA), HTN, IgA nephropathy, CKD IV, and ETOH abuse.     He was first diagnosed with AF in 10/2020 when he was hospitalized with COVID infection. He was started on Eliquis. This was later discontinued due to CKD. A Zio patch from 10/2021 showed no significant arrhythmias. A repeat zio patch monitor from 2/2022 showed 2% PAF burden. A zio patch monitor from  "9/2022 showed no arrhythmias. He had reported having skipped beats/palpitations lasting for an hour that occurred a couple times a week. He has seen Dr. Ventura and advised to take ASA.     He reports feeling well. He states he met with Nephrologist recently and is likely going to need to start PD. He says overall his activity level has improved. He used to have to stop and rest with 1 flight of stairs but now can do 3. Does report some leg tiredness. He denies chest discomfort, palpitations, abdominal fullness/bloating or peripheral edema, shortness of breath, paroxysmal nocturnal dyspnea, orthopnea, lightheadedness, dizziness, pre-syncope, or syncope. Presenting 12 lead ECG shows SB 1 AVB Vent Rate 53 bpm,  ms, QRS 92 ms, QTc 412 ms. Current cardiac medications include: Toprol XL and Simvastatin.     I have reviewed and updated the patient's Past Medical History, Social History, Family History and Medication List.     Cardiographics (Personally Reviewed) :   7/6/22 Echo:   Interpretation Summary  Global and regional left ventricular function is normal with an EF of 60-65%.  Right ventricular function, chamber size, wall motion, and thickness are  normal.  Pulmonary artery systolic pressure is normal.  The inferior vena cava is normal.  No pericardial effusion is present.  No significant changes noted.       Physical Examination   /69 (BP Location: Left arm, Patient Position: Sitting, Cuff Size: Adult Regular)   Pulse 56   Ht 1.753 m (5' 9.02\")   Wt 82.1 kg (181 lb 1.6 oz)   SpO2 96%   BMI 26.73 kg/m    Wt Readings from Last 3 Encounters:   07/18/23 81.4 kg (179 lb 6.4 oz)   06/01/23 81.7 kg (180 lb 1.6 oz)   03/24/23 82.2 kg (181 lb 4.8 oz)     CONSITUTIONAL: no acute distress  HEENT: no icterus, no redness or discharge, neck supple  CV: no visible edema of visualized extremities. No JVD.   RESPIRATORY: respirations nonlabored, no cough  NEURO: AA&Ox3, speech fluent/appropriate, motor grossly " nonfocal  PSYCH: cooperative, affect appropriate  DERM: no rashes on visualized face/neck/upper extremities         Medications  Allergies   Current Outpatient Medications   Medication Sig Dispense Refill    acetaminophen (TYLENOL) 325 MG tablet Take 325-650 mg by mouth every 6 hours as needed for mild pain      calcium carbonate-vitamin D (OSCAL) 500-5 MG-MCG tablet TAKE 1 TABLET BY MOUTH DAILY 30 tablet 10    cyanocobalamin (VITAMIN B-12) 100 MCG tablet Take 1 tablet (100 mcg) by mouth daily Take 1,000 mcg by mouth every morning - 90 tablet 1    escitalopram (LEXAPRO) 20 MG tablet Take 1 tablet (20 mg) by mouth every morning 90 tablet 1    gabapentin (NEURONTIN) 100 MG capsule Take one po every day prn anxiety, watch for grogginess or dizziness 90 capsule 1    lamoTRIgine (LAMICTAL) 100 MG tablet Take 2 tablets (200 mg) by mouth 2 times daily 360 tablet 1    metoprolol succinate ER (TOPROL XL) 25 MG 24 hr tablet Take 0.5 tablets (12.5 mg) by mouth daily TAKE ONE-HALF TABLET BY MOUTH DAILY 90 tablet 1    Multiple Vitamins-Minerals (OCULAR VITAMINS) TABS TAKE 1 TABLET BY MOUTH DAILY 30 tablet 10    simvastatin (ZOCOR) 20 MG tablet Take 1 tablet (20 mg) by mouth At Bedtime 90 tablet 2    tamsulosin (FLOMAX) 0.4 MG capsule Take 2 capsules (0.8 mg) by mouth daily 180 capsule 3    No Known Allergies      Lab Results (Personally Reviewed)    Chemistry/lipid CBC Cardiac Enzymes/BNP/TSH/INR   Lab Results   Component Value Date    BUN 47.5 (H) 07/18/2023     07/18/2023    CO2 24 07/18/2023     Creatinine   Date Value Ref Range Status   07/18/2023 4.73 (H) 0.67 - 1.17 mg/dL Final   10/29/2020 1.33 (H) 0.66 - 1.25 mg/dL Final       Lab Results   Component Value Date    CHOL 220 (H) 01/13/2023    HDL 43 01/13/2023     (H) 01/13/2023      Lab Results   Component Value Date    WBC 5.0 07/18/2023    HGB 9.5 (L) 07/18/2023    HCT 29.5 (L) 07/18/2023    MCV 95 07/18/2023     (L) 07/18/2023    Lab Results    Component Value Date    TROPONINI <0.01 11/01/2020    TSH 1.15 07/29/2022    INR 1.04 01/20/2022        The patient states understanding and is agreeable with the plan.   ELIZABETH Gallego CNP  Electrophysiology Consult Service  Pager: Text Page

## 2023-07-20 LAB
ATRIAL RATE - MUSE: 53 BPM
DIASTOLIC BLOOD PRESSURE - MUSE: NORMAL MMHG
INTERPRETATION ECG - MUSE: NORMAL
P AXIS - MUSE: 74 DEGREES
PR INTERVAL - MUSE: 230 MS
QRS DURATION - MUSE: 92 MS
QT - MUSE: 440 MS
QTC - MUSE: 412 MS
R AXIS - MUSE: 10 DEGREES
SYSTOLIC BLOOD PRESSURE - MUSE: NORMAL MMHG
T AXIS - MUSE: 46 DEGREES
VENTRICULAR RATE- MUSE: 53 BPM

## 2023-07-25 NOTE — TELEPHONE ENCOUNTER
Spoke to patient and scheduled follow up with Dr. Barrow on 09/11/2023 with labs prior // 07/25/2023

## 2023-07-31 ENCOUNTER — PATIENT OUTREACH (OUTPATIENT)
Dept: NEPHROLOGY | Facility: CLINIC | Age: 83
End: 2023-07-31
Payer: MEDICARE

## 2023-07-31 DIAGNOSIS — N18.5 CKD (CHRONIC KIDNEY DISEASE) STAGE 5, GFR LESS THAN 15 ML/MIN (H): ICD-10-CM

## 2023-07-31 DIAGNOSIS — N18.4 CKD (CHRONIC KIDNEY DISEASE) STAGE 4, GFR 15-29 ML/MIN (H): Primary | ICD-10-CM

## 2023-07-31 NOTE — PROGRESS NOTES
Nephrology Note: Patient Outreach Encounter    REASON FOR CALL:     REASON FOR CALL: No chief complaint on file.                                  SITUATION/BACKROUND:   Patient is being treated for CKD Stage 5.    Patient Outreach, PD follow up    Patient Journey Status:  Active:   Neph Tracking Flowsheet Last Filled Values       CKD Education Referral Date 04/03/23    CKD Education Other Dr. Kim Virtual class 4/6/23    Preferred Modality Peritoneal Dialysis    Patient's Referral Dates Auto Populate Patient's Referral Dates    MTM Referral 12/22/21    Home Care Referral 6/1/23    Journey Referral 1/25/23    Access Surgeon Referral Status  Referred  PD consult with Dr. Zimmer    Dialysis Access Referral 06/06/23    Access Surgical Consult 07/18/23            ASSESSMENT:     7/31- Ibelem message sent   8/17-Called patient. Patient is scheduled for follow up appointment with Dr. Barrow on 9/11 with labs prior. Lab orders have been placed. Melo has decided to not schedule the PD surgery at this time is waiting to feel worse. He states that there are days that he feels great and then days that he feels terrible but finds it hard to determine if it is age related or disease progression. He will wait to see what his labs show in September. He asked about timing for PD placement, explained it was determined by labs and how he was feeling. Traditional track could take a couple of months while urgent start could be a couple of weeks. He quested how his diet has been, went over recent labs from last month which have been stable. Provided contact information if he has any additional questions or concerns.     Neph Assessments:  Recent Labs  Lab Results   Component Value Date     07/18/2023     06/01/2023     03/03/2023     10/29/2020     10/28/2020     10/27/2020    Lab Results   Component Value Date    GFRESTIMATED 12 07/18/2023    GFRESTIMATED 12 06/01/2023    GFRESTIMATED 16  03/03/2023    GFRESTIMATED 56 11/04/2020    GFRESTIMATED 56 11/02/2020    GFRESTIMATED >60 11/01/2020    GFRESTIMATED 50 10/29/2020    GFRESTIMATED 47 10/28/2020    GFRESTIMATED 38 10/27/2020        Lab Results   Component Value Date    POTASSIUM 5.1 07/18/2023    POTASSIUM 4.8 06/01/2023    POTASSIUM 4.8 03/03/2023    POTASSIUM 4.8 07/29/2022    POTASSIUM 3.9 06/02/2022    POTASSIUM 4.0 04/25/2022    POTASSIUM 4.7 10/29/2020    POTASSIUM 4.4 10/28/2020    POTASSIUM 4.4 10/28/2020    Lab Results   Component Value Date    CR 4.73 07/18/2023    CR 4.47 06/01/2023    CR 3.71 03/03/2023    CR 1.33 10/29/2020    CR 1.40 10/28/2020    CR 1.67 10/27/2020      Lab Results   Component Value Date    MAG 2.4 10/19/2021    MAG 2.1 11/03/2020    MAG 2.3 11/02/2020    MAG 2.9 10/29/2020    MAG 2.3 10/27/2020    Lab Results   Component Value Date    BUN 47.5 07/18/2023    BUN 44.8 06/01/2023    BUN 43.7 03/03/2023    BUN 55 07/29/2022    BUN 46 06/02/2022    BUN 38 04/25/2022    BUN 31 10/29/2020    BUN 28 10/28/2020    BUN 24 10/27/2020          Lab Results   Component Value Date    HGB 9.5 07/18/2023    HGB 9.5 06/01/2023    HGB 9.5 01/19/2023    HGB 11.6 10/29/2020    HGB 11.7 10/28/2020    HGB 13.4 10/27/2020    Lab Results   Component Value Date    A1C 5.8 10/18/2021      Lab Results   Component Value Date    ALBERT 257 06/01/2023    ALBERT 247 01/19/2023    ALBERT 405 09/15/2022    ALBERT 444 10/27/2020       Lab Results   Component Value Date     06/01/2023     01/19/2023     09/15/2022     02/17/2022     10/19/2021     10/15/2021    Lab Results   Component Value Date    PTHI 74 09/15/2022    PTHI 58 02/17/2022    PTHI 78 10/20/2021      Lab Results   Component Value Date    IRON 83 06/01/2023    IRON 95 01/19/2023    IRON 88 09/15/2022    No results found for: DTOT           PLAN:     Follow Up:   Patient to follow up as scheduled at next appointment  Patient to call/Appformat message with  updates     Patient verbalized understanding and will follow up as recommended.    Katerine Gonzalez RN

## 2023-09-11 ENCOUNTER — LAB (OUTPATIENT)
Dept: LAB | Facility: CLINIC | Age: 83
End: 2023-09-11
Attending: INTERNAL MEDICINE
Payer: MEDICARE

## 2023-09-11 ENCOUNTER — PATIENT OUTREACH (OUTPATIENT)
Dept: CARE COORDINATION | Facility: CLINIC | Age: 83
End: 2023-09-11

## 2023-09-11 ENCOUNTER — OFFICE VISIT (OUTPATIENT)
Dept: NEPHROLOGY | Facility: CLINIC | Age: 83
End: 2023-09-11
Attending: INTERNAL MEDICINE
Payer: MEDICARE

## 2023-09-11 VITALS
OXYGEN SATURATION: 98 % | HEART RATE: 60 BPM | WEIGHT: 182.7 LBS | SYSTOLIC BLOOD PRESSURE: 125 MMHG | BODY MASS INDEX: 26.97 KG/M2 | DIASTOLIC BLOOD PRESSURE: 68 MMHG | TEMPERATURE: 97.8 F

## 2023-09-11 DIAGNOSIS — N18.4 CKD (CHRONIC KIDNEY DISEASE) STAGE 4, GFR 15-29 ML/MIN (H): Primary | ICD-10-CM

## 2023-09-11 DIAGNOSIS — N18.5 CKD (CHRONIC KIDNEY DISEASE) STAGE 5, GFR LESS THAN 15 ML/MIN (H): Primary | ICD-10-CM

## 2023-09-11 DIAGNOSIS — N18.5 CKD (CHRONIC KIDNEY DISEASE) STAGE 5, GFR LESS THAN 15 ML/MIN (H): ICD-10-CM

## 2023-09-11 LAB
ALBUMIN MFR UR ELPH: 35.7 MG/DL
ALBUMIN SERPL BCG-MCNC: 4.3 G/DL (ref 3.5–5.2)
ALBUMIN UR-MCNC: 30 MG/DL
ANION GAP SERPL CALCULATED.3IONS-SCNC: 9 MMOL/L (ref 7–15)
APPEARANCE UR: CLEAR
BILIRUB UR QL STRIP: NEGATIVE
BUN SERPL-MCNC: 49 MG/DL (ref 8–23)
CALCIUM SERPL-MCNC: 9.2 MG/DL (ref 8.8–10.2)
CHLORIDE SERPL-SCNC: 106 MMOL/L (ref 98–107)
COLOR UR AUTO: ABNORMAL
CREAT SERPL-MCNC: 4.36 MG/DL (ref 0.67–1.17)
CREAT UR-MCNC: 103 MG/DL
DEPRECATED HCO3 PLAS-SCNC: 23 MMOL/L (ref 22–29)
EGFRCR SERPLBLD CKD-EPI 2021: 13 ML/MIN/1.73M2
ERYTHROCYTE [DISTWIDTH] IN BLOOD BY AUTOMATED COUNT: 13.4 % (ref 10–15)
GLUCOSE SERPL-MCNC: 97 MG/DL (ref 70–99)
GLUCOSE UR STRIP-MCNC: NEGATIVE MG/DL
HCT VFR BLD AUTO: 27.1 % (ref 40–53)
HGB BLD-MCNC: 9 G/DL (ref 13.3–17.7)
HGB UR QL STRIP: ABNORMAL
KETONES UR STRIP-MCNC: NEGATIVE MG/DL
LEUKOCYTE ESTERASE UR QL STRIP: NEGATIVE
MCH RBC QN AUTO: 30.8 PG (ref 26.5–33)
MCHC RBC AUTO-ENTMCNC: 33.2 G/DL (ref 31.5–36.5)
MCV RBC AUTO: 93 FL (ref 78–100)
NITRATE UR QL: NEGATIVE
PH UR STRIP: 5.5 [PH] (ref 5–7)
PHOSPHATE SERPL-MCNC: 3.2 MG/DL (ref 2.5–4.5)
PLATELET # BLD AUTO: 142 10E3/UL (ref 150–450)
POTASSIUM SERPL-SCNC: 5 MMOL/L (ref 3.4–5.3)
PROT/CREAT 24H UR: 0.35 MG/MG CR (ref 0–0.2)
RBC # BLD AUTO: 2.92 10E6/UL (ref 4.4–5.9)
RBC URINE: 2 /HPF
SODIUM SERPL-SCNC: 138 MMOL/L (ref 136–145)
SP GR UR STRIP: 1.02 (ref 1–1.03)
SQUAMOUS EPITHELIAL: <1 /HPF
UROBILINOGEN UR STRIP-MCNC: NORMAL MG/DL
WBC # BLD AUTO: 5.1 10E3/UL (ref 4–11)
WBC URINE: <1 /HPF

## 2023-09-11 PROCEDURE — 84156 ASSAY OF PROTEIN URINE: CPT | Performed by: PATHOLOGY

## 2023-09-11 PROCEDURE — 80069 RENAL FUNCTION PANEL: CPT | Performed by: PATHOLOGY

## 2023-09-11 PROCEDURE — 36415 COLL VENOUS BLD VENIPUNCTURE: CPT | Performed by: PATHOLOGY

## 2023-09-11 PROCEDURE — G0463 HOSPITAL OUTPT CLINIC VISIT: HCPCS | Performed by: INTERNAL MEDICINE

## 2023-09-11 PROCEDURE — 99214 OFFICE O/P EST MOD 30 MIN: CPT | Performed by: INTERNAL MEDICINE

## 2023-09-11 PROCEDURE — 85027 COMPLETE CBC AUTOMATED: CPT | Performed by: PATHOLOGY

## 2023-09-11 PROCEDURE — 81001 URINALYSIS AUTO W/SCOPE: CPT | Performed by: PATHOLOGY

## 2023-09-11 ASSESSMENT — PAIN SCALES - GENERAL: PAINLEVEL: NO PAIN (0)

## 2023-09-11 NOTE — LETTER
9/11/2023       RE: Melo Dangelo  [address]  [place] [address]     Dear Colleague,    Thank you for referring your patient, Melo Dangelo, to the [facility] NEPHROLOGY [facility] [place] at [facility]. Please see a copy of my visit note below.          Nephrology Clinic Follow up  9/11/23    Melo Dangelo MRN:[id number] YOB: 1940  Date of Admission:(Not on file)  Primary care provider: Francis Smith  Requesting physician: Merry Barrow, *       REASON FOR CONSULT: IgA nephropathy       HISTORY OF PRESENT ILLNESS:       PAST MEDICAL HISTORY:  Reviewed with patient on 09/11/2023   As per HPI    MEDICATIONS:  Reviewed with the patient in detail    ALLERGIES:    Reviewed with the patient in detail    REVIEW OF SYSTEMS:  A comprehensive of systems was negative except as noted above.    SOCIAL HISTORY:   Reviewed with patient, no smoking and no alcohol use     FAMILY MEDICAL HISTORY:   Reviewed, no family history of need for dialysis, transplant or CKD    PHYSICAL EXAM:   Vital signs:/68   Pulse 60   Temp 97.8  F (36.6  C) (Oral)   Wt 82.9 kg (182 lb 11.2 oz)   SpO2 98%   BMI 26.97 kg/m      Physical Exam     Gen: Appears well  Neck: No JVD  Lungs: CTA  CVS: S1S2 normal, no murmurs heard  LE: no edema  Skin: no rashes  CNS: Grossly normal       Interval History: He denies any new symptoms. He went to [place] for whale watching on a cruise ship and developed COVID infection after. This was 2 weeks ago and other than having some low energy, he is doing okay.     Interval history 1/19/23: He has bene doing well overall. He sometimes feels a little short of breath when he climbs about 2 flights of stairs but otherwise has minimal symptoms. He has not seen any dark urine or LE edema.     Interval history 6/1/23: Melo does report that he is feeling more tired these days. There is also a lot on his  plate since his wife is now transitioning to PD from HD and he is her primary caregiver. He denies any LE edema or shortness of breath.     Interval history 9/11/23: he says he is doing well overall. His wife has now started PD. She initially had PT come in but now they have stopped and she has been getting weaker. He has been managing all the household chores and helping her with tasks. He says his energy is okay for now, still has a good appetite, no nausea or other uremic symptoms. He denies any LE edema or shortness of breath      ASSESSMENT AND RECOMMENDATIONS:     # IgA nephropathy [id number]   # CKD stage 4    # Anemia of CKD, iron replete 2/22   # Paroxysmal A fib      Patient was first diagnosed with IgA nephropathy when he presented to the hospital with gross hematuria after his second COVID vaccine. He presented with a Sr creatinine of 3.5 which peaked to 4.4 mg/dl. His creatinine a year ago was 1.1-1.2 mg/dl.  A kidney bx was done which showed IgA nephropathy with some fibrocellular crescents.  7/14 gloms were globally sclerosed.   He was started on Pozzi protocol with solumedrol 500 mg/3 days followed by oral prednisone 40 mg every other day with bactrim single strength 1 tab PO every other day, pepcid 40 mg PO daily and calcium carbonate 5253-8011 mg PO daily.    Unfortunately, he did not have a significant improvement in his creatinine and had persistent active sediment. He was started on  Cyclophosphamide 100 mg daily in 11/21 along with prednisone taper. His cyclophosphamide was discontinued 5/2/22 and his prednisone decreased to 5 mg Q other day and now has been discontinued     His creatinine improved initially and had a new baseline of 3.3 mg/dl, however now has continued progression. His gfr is now 12. His proteinuria remains minimal and his hematuria has improved significantly.       He has small blood on his UA with no RBC's, no protein. His blood pressures remain above the goal of < 140/90 mm  of Hg and he has no LE edema. Currently only on Metoprolol  12.5 mg daily   He is anemic and has thrombocytopenia. Iron studies asha. There is no indication for RAMA at this time.     Today we discussed that he now has continued progression of his CKD and I do not identify any reversible factors. When comes to dialysis, he is interested in PD. He saw Dr Zimmer and has been deemed to be a good candidate for PD.  He has not been seen by the transplant team.    At this time, he does not have any indications to start dialysis and he would like to wait for as long as he can.   --check monthly labs  --continue to follow low K and low Na diet   --f/up in clinic in 3 months     Merry Barrow MD         no

## 2023-09-11 NOTE — PROGRESS NOTES
Nephrology Clinic Follow up  9/11/23    Melo Dangelo MRN:[id number] YOB: 1940  Date of Admission:(Not on file)  Primary care provider: Francis Smith  Requesting physician: Merry Barrow, *       REASON FOR CONSULT: IgA nephropathy       HISTORY OF PRESENT ILLNESS:       PAST MEDICAL HISTORY:  Reviewed with patient on 09/11/2023   As per HPI    MEDICATIONS:  Reviewed with the patient in detail    ALLERGIES:    Reviewed with the patient in detail    REVIEW OF SYSTEMS:  A comprehensive of systems was negative except as noted above.    SOCIAL HISTORY:   Reviewed with patient, no smoking and no alcohol use     FAMILY MEDICAL HISTORY:   Reviewed, no family history of need for dialysis, transplant or CKD    PHYSICAL EXAM:   Vital signs:/68   Pulse 60   Temp 97.8  F (36.6  C) (Oral)   Wt 82.9 kg (182 lb 11.2 oz)   SpO2 98%   BMI 26.97 kg/m      Physical Exam     Gen: Appears well  Neck: No JVD  Lungs: CTA  CVS: S1S2 normal, no murmurs heard  LE: no edema  Skin: no rashes  CNS: Grossly normal       Interval History: He denies any new symptoms. He went to [place] for whale watching on a cruise ship and developed COVID infection after. This was 2 weeks ago and other than having some low energy, he is doing okay.     Interval history 1/19/23: He has bene doing well overall. He sometimes feels a little short of breath when he climbs about 2 flights of stairs but otherwise has minimal symptoms. He has not seen any dark urine or LE edema.     Interval history 6/1/23: Melo does report that he is feeling more tired these days. There is also a lot on his plate since his wife is now transitioning to PD from HD and he is her primary caregiver. He denies any LE edema or shortness of breath.     Interval history 9/11/23: he says he is doing well overall. His wife has now started PD. She initially had PT come in but now they have stopped and she has been getting weaker. He has been  managing all the household chores and helping her with tasks. He says his energy is okay for now, still has a good appetite, no nausea or other uremic symptoms. He denies any LE edema or shortness of breath      ASSESSMENT AND RECOMMENDATIONS:     # IgA nephropathy [id number]   # CKD stage 4    # Anemia of CKD, iron replete 2/22   # Paroxysmal A fib      Patient was first diagnosed with IgA nephropathy when he presented to the hospital with gross hematuria after his second COVID vaccine. He presented with a Sr creatinine of 3.5 which peaked to 4.4 mg/dl. His creatinine a year ago was 1.1-1.2 mg/dl.  A kidney bx was done which showed IgA nephropathy with some fibrocellular crescents.  7/14 gloms were globally sclerosed.   He was started on Pozzi protocol with solumedrol 500 mg/3 days followed by oral prednisone 40 mg every other day with bactrim single strength 1 tab PO every other day, pepcid 40 mg PO daily and calcium carbonate 6819-9304 mg PO daily.    Unfortunately, he did not have a significant improvement in his creatinine and had persistent active sediment. He was started on  Cyclophosphamide 100 mg daily in 11/21 along with prednisone taper. His cyclophosphamide was discontinued 5/2/22 and his prednisone decreased to 5 mg Q other day and now has been discontinued     His creatinine improved initially and had a new baseline of 3.3 mg/dl, however now has continued progression. His gfr is now 12. His proteinuria remains minimal and his hematuria has improved significantly.       He has small blood on his UA with no RBC's, no protein. His blood pressures remain above the goal of < 140/90 mm of Hg and he has no LE edema. Currently only on Metoprolol  12.5 mg daily   He is anemic and has thrombocytopenia. Iron studies asha. There is no indication for RAMA at this time.     Today we discussed that he now has continued progression of his CKD and I do not identify any reversible factors. When comes to dialysis, he is  interested in PD. He saw Dr Zimmer and has been deemed to be a good candidate for PD.  He has not been seen by the transplant team.    At this time, he does not have any indications to start dialysis and he would like to wait for as long as he can.   --check monthly labs  --continue to follow low K and low Na diet   --f/up in clinic in 3 months     Merry Barrow MD

## 2023-09-11 NOTE — PROGRESS NOTES
Social Work - Psychosocial Assessment  [facility]    Patient Demographics/Appt Information:     Patient Name: Melo Dangelo Goes By: Melo    /Age: 1940 (82 year old)    Visit Type: telephone  Referral Source: Nephrology  Reason for Referral:  Pt/caregiver supports. Melo is primary caregiver for his wife who is declining, and he himself needs dialysis. DINH Garcias informed me that Melo is prolonging his own need for dialysis being caregiver for his wife.    Collaborated With:    -Melo- [phone]    Additional Patient Demographics:   Address:    [facility] [place] [address]  Permanent Living Situation:   Lives with spouse Valeria  Transportation Mode:   Private Car  Family Constellation and Support Network:   Children  Melo reporting having 3 daughters all of whom live within 4 miles of him, but they all work. Melo did note that he doesn't want to bother his daughters.   Community services receiving at home: [employer] delivery.   Melo reported that Valeria had been getting home care after leaving the hospital but these services have since stopped. Melo reported that Valeria was doing ok when services stopped but now she has gotten weaker. Valeria was a part of the conversation at this point and Melo said she is refusing PT, which Valeria confirmed. I let Melo know that if Valeria is agreeable at some point, he should talk with Valeria's PCP to have them enter home care orders. Melo expressed understanding.     Melo reported looking for help with cleaning, laundry, and companionship for someone to be with Valeria if he needs to go to an appointment or run an errand. Melo noted that Valeria cannot be left alone at home.     I explained that the services Melo is seeking would be considered assisted care, so not things insurance will pay for.   I offered to provide Melo with a list of agencies that provide these services on a private pay  demarcus and Melo asked that I mail this to the address on file, which I did. I let Melo know there is no cost information on the list I will provide and it will take calling on his part to explore this as cost will depend on what type of schedule he is looking for including hours of service and how many days a week. Melo expressed understanding and interest. I also let Melo know I will mail him information about [facility] and noted they provide help with things like transportation and grocery shopping.   /Care Coordinator: NA  Relationship Status:       Insurance/Financial/Legal Info:   Payor: MEDICARE / Plan: MEDICARE / Product Type: Medicare /   Insurance:   No Insurance issues identified  I assets for eligibility for MA and Zara are over income. I also assessed for eligibility for the Alternative Care Program and Zara are over the asset limit.   Financial Concerns: None Identified  How does the patient describe their current finances?  Doing okay    Mental/Chemical Health:   Mental Health:   Pt has had previous mental health diagnosis/treatment:   Diagnosis: Depression  Support/Services in Place: Unknown  Services Needed/Recommended: NA- no concerns reported or identified    Chemical/Substance Use:  Current Use:   Per chart review Melo has a history of alcohol dependence in remission and reports no current use.     ADLs/IADLs/Mobility Info:  Dependent ADLs/Mobility:   Independent with ADLs  Dependent IADLs:   Independent with IADLs    Advanced Care Planning:   Decision Making:   Self  Health Care Documents:   Copy in Chart    Interventions:    - Provided brief psychosocial assessment of patient/family coping, resource needs and current supports.  - Provided supportive listening and counseling as needed.  - Facilitated service linkage with clinic and community resources as needed.  - Collaborated with healthcare team and professionals in community  to meet patient/family needs as appropriate.  - Addressed all of patient/family s concerns at this time.    Assessment/Plan:  I will mail Melo information on [facility] as well as a list of private duty home care agencies.   Melo denied further needs or questions at this time and knows he can call me in the future if other things do come up.     Provided patient/family with contact information and availability.      RICA Avelar, [facility]    [facility]  Ph: [phone], Pgr: [phone]  9/11/2023

## 2023-09-11 NOTE — PROGRESS NOTES
Per provider to set patient up with Standing order for CBC and BMP, in addition sent referral to TIN Gonzalez RN, BSN   Nephrology RN Care Coordinator   [facility]

## 2023-09-11 NOTE — NURSING NOTE
Chief Complaint   Patient presents with    RECHECK       /68   Pulse 60   Temp 97.8  F (36.6  C) (Oral)   Wt 82.9 kg (182 lb 11.2 oz)   SpO2 98%   BMI 26.97 kg/m  stacey Alvarado on 9/11/2023 at 9:29 AM

## 2023-11-07 ENCOUNTER — PATIENT OUTREACH (OUTPATIENT)
Dept: NEPHROLOGY | Facility: CLINIC | Age: 83
End: 2023-11-07
Payer: MEDICARE

## 2023-12-07 DIAGNOSIS — N18.4 CKD (CHRONIC KIDNEY DISEASE) STAGE 4, GFR 15-29 ML/MIN (H): Primary | ICD-10-CM

## 2023-12-11 ENCOUNTER — PATIENT OUTREACH (OUTPATIENT)
Dept: NEPHROLOGY | Facility: CLINIC | Age: 83
End: 2023-12-11

## 2023-12-11 ENCOUNTER — PATIENT OUTREACH (OUTPATIENT)
Dept: CARE COORDINATION | Facility: CLINIC | Age: 83
End: 2023-12-11

## 2023-12-11 ENCOUNTER — OFFICE VISIT (OUTPATIENT)
Dept: NEPHROLOGY | Facility: CLINIC | Age: 83
End: 2023-12-11
Attending: INTERNAL MEDICINE
Payer: MEDICARE

## 2023-12-11 ENCOUNTER — LAB (OUTPATIENT)
Dept: LAB | Facility: CLINIC | Age: 83
End: 2023-12-11
Attending: INTERNAL MEDICINE
Payer: MEDICARE

## 2023-12-11 VITALS
OXYGEN SATURATION: 97 % | WEIGHT: 176.3 LBS | DIASTOLIC BLOOD PRESSURE: 64 MMHG | BODY MASS INDEX: 26.02 KG/M2 | SYSTOLIC BLOOD PRESSURE: 131 MMHG | HEART RATE: 66 BPM

## 2023-12-11 DIAGNOSIS — N18.5 CKD (CHRONIC KIDNEY DISEASE) STAGE 5, GFR LESS THAN 15 ML/MIN (H): Primary | ICD-10-CM

## 2023-12-11 DIAGNOSIS — N18.4 CKD (CHRONIC KIDNEY DISEASE) STAGE 4, GFR 15-29 ML/MIN (H): ICD-10-CM

## 2023-12-11 DIAGNOSIS — N18.5 CKD (CHRONIC KIDNEY DISEASE) STAGE 5, GFR LESS THAN 15 ML/MIN (H): ICD-10-CM

## 2023-12-11 LAB
ALBUMIN MFR UR ELPH: 23.5 MG/DL
ALBUMIN SERPL BCG-MCNC: 4.2 G/DL (ref 3.5–5.2)
ALBUMIN UR-MCNC: 20 MG/DL
ANION GAP SERPL CALCULATED.3IONS-SCNC: 10 MMOL/L (ref 7–15)
APPEARANCE UR: CLEAR
BILIRUB UR QL STRIP: NEGATIVE
BUN SERPL-MCNC: 44.2 MG/DL (ref 8–23)
CALCIUM SERPL-MCNC: 9.3 MG/DL (ref 8.8–10.2)
CHLORIDE SERPL-SCNC: 105 MMOL/L (ref 98–107)
COLOR UR AUTO: ABNORMAL
CREAT SERPL-MCNC: 4.22 MG/DL (ref 0.67–1.17)
CREAT UR-MCNC: 97 MG/DL
DEPRECATED HCO3 PLAS-SCNC: 25 MMOL/L (ref 22–29)
EGFRCR SERPLBLD CKD-EPI 2021: 13 ML/MIN/1.73M2
ERYTHROCYTE [DISTWIDTH] IN BLOOD BY AUTOMATED COUNT: 13.2 % (ref 10–15)
FERRITIN SERPL-MCNC: 263 NG/ML (ref 31–409)
GLUCOSE SERPL-MCNC: 103 MG/DL (ref 70–99)
GLUCOSE UR STRIP-MCNC: NEGATIVE MG/DL
HCT VFR BLD AUTO: 28.9 % (ref 40–53)
HGB BLD-MCNC: 9.6 G/DL (ref 13.3–17.7)
HGB UR QL STRIP: NEGATIVE
IRON BINDING CAPACITY (ROCHE): 273 UG/DL (ref 240–430)
IRON SATN MFR SERPL: 28 % (ref 15–46)
IRON SERPL-MCNC: 77 UG/DL (ref 61–157)
KETONES UR STRIP-MCNC: NEGATIVE MG/DL
LEUKOCYTE ESTERASE UR QL STRIP: NEGATIVE
MCH RBC QN AUTO: 30.9 PG (ref 26.5–33)
MCHC RBC AUTO-ENTMCNC: 33.2 G/DL (ref 31.5–36.5)
MCV RBC AUTO: 93 FL (ref 78–100)
NITRATE UR QL: NEGATIVE
PH UR STRIP: 6 [PH] (ref 5–7)
PHOSPHATE SERPL-MCNC: 3.3 MG/DL (ref 2.5–4.5)
PLATELET # BLD AUTO: 139 10E3/UL (ref 150–450)
POTASSIUM SERPL-SCNC: 4.6 MMOL/L (ref 3.4–5.3)
PROT/CREAT 24H UR: 0.24 MG/MG CR (ref 0–0.2)
PTH-INTACT SERPL-MCNC: 95 PG/ML (ref 15–65)
RBC # BLD AUTO: 3.11 10E6/UL (ref 4.4–5.9)
RBC URINE: <1 /HPF
SODIUM SERPL-SCNC: 140 MMOL/L (ref 135–145)
SP GR UR STRIP: 1.01 (ref 1–1.03)
SQUAMOUS EPITHELIAL: <1 /HPF
UROBILINOGEN UR STRIP-MCNC: NORMAL MG/DL
VIT D+METAB SERPL-MCNC: 38 NG/ML (ref 20–50)
WBC # BLD AUTO: 5.6 10E3/UL (ref 4–11)
WBC URINE: 1 /HPF

## 2023-12-11 PROCEDURE — 83540 ASSAY OF IRON: CPT | Performed by: PATHOLOGY

## 2023-12-11 PROCEDURE — 36415 COLL VENOUS BLD VENIPUNCTURE: CPT | Performed by: PATHOLOGY

## 2023-12-11 PROCEDURE — 82728 ASSAY OF FERRITIN: CPT | Performed by: PATHOLOGY

## 2023-12-11 PROCEDURE — 83970 ASSAY OF PARATHORMONE: CPT | Performed by: PATHOLOGY

## 2023-12-11 PROCEDURE — 99214 OFFICE O/P EST MOD 30 MIN: CPT | Performed by: INTERNAL MEDICINE

## 2023-12-11 PROCEDURE — 82306 VITAMIN D 25 HYDROXY: CPT | Performed by: INTERNAL MEDICINE

## 2023-12-11 PROCEDURE — 85027 COMPLETE CBC AUTOMATED: CPT | Performed by: PATHOLOGY

## 2023-12-11 PROCEDURE — G0463 HOSPITAL OUTPT CLINIC VISIT: HCPCS | Performed by: INTERNAL MEDICINE

## 2023-12-11 PROCEDURE — 84156 ASSAY OF PROTEIN URINE: CPT | Performed by: PATHOLOGY

## 2023-12-11 PROCEDURE — 83550 IRON BINDING TEST: CPT | Performed by: PATHOLOGY

## 2023-12-11 PROCEDURE — 99000 SPECIMEN HANDLING OFFICE-LAB: CPT | Performed by: PATHOLOGY

## 2023-12-11 PROCEDURE — 80069 RENAL FUNCTION PANEL: CPT | Performed by: PATHOLOGY

## 2023-12-11 PROCEDURE — 81001 URINALYSIS AUTO W/SCOPE: CPT | Performed by: PATHOLOGY

## 2023-12-11 ASSESSMENT — PAIN SCALES - GENERAL: PAINLEVEL: NO PAIN (0)

## 2023-12-11 NOTE — PROGRESS NOTES
Social Work - Intervention  [facility]  Data/Intervention:    Patient Name: Melo Dangelo Goes By: Melo    /Age: 1940 (83 year old)     Visit Type: telephone  Referral Source: Nephrology  Reason for Referral: Mental health/therapist resources    Collaborated With:    -Melo- [phone]     Psychosocial Information/Concerns:  Referral received indicating that Melo's wife recently passed away, and he will need to start dialysis very soon. Referral indicates that Melo is agreeable to seeing a therapist and SW is asked to assist with resources.      Intervention/Education/Resources Provided:  I contacted Melo and am familiar with him from a previous phone call. Melo said he is doing ok and discussed his kidney condition and how things are being monitored and he is being vigilant but doesn't currently need to start dialysis and hopes it stays that way.   Melo tended to down play the loss of his wife (of 53 years) Valeria and said that other people have bigger problems. I reminded him that this doesn't take away from what he is experiencing and Melo agreed. Melo noted that Valeria's celebration of life was just last week. Melo discussed how things are very different around the house, and discussed how even driving the car is different because the passenger seat is empty. Melo discussed how he did a lot of care giving for Valeria and now his days are so different and it is hard for him to focus in the mornings and at night when he doesn't have anything to do, and he said it tends to be better during the day when there is more going on. Melo noted this is an adjustment for him in terms of daily schedule and will take time. Melo noted that his 3 daughters have been very supportive through everything.     Melo noted that he is still getting food delivery through [employer] and is still getting financial help from the kidney association. I noted seeing (per chart  "review) that Melo noted having stress related to the passing of Valeria, but also other new stressors. I asked if there is anything specific or anything that I can help with in this regard and Melo said no. Melo said the stressors are things he has to deal with himself and there are other responsibilities to face in the future, and he discussed a Social Security appointment he has been avoiding but is tomorrow.   Melo reported he has always had a \"little problem\" with anxiety and discussed how when his anxiety is bad he can act impulsively, be jumpy, move around too quickly, swear, and throw things (but not at people). Melo discussed how he works through this and ends up getting better. Melo noted he has tried medications in the past but the last one made him really tired. Melo noted he is open to seeing a therapist and said he last saw someone 2 years ago. Melo noted wanting to keep therapist type conversations separate from conversations he has with his kids. Melo questioned if he should try meds again or therapy and I explained that this can vary so much from one person to the next and sometimes it is a combination that ends up being the best. I explained that talking with a therapist could be a good starting point and they may have other suggestions from there and Melo expressed agreement with this. I noted that part of what Melo is experiencing could very well be the adjustment of having just lost his wife of 53 years and we discussed how that in itself can take time. Melo did say he thinks that things may get better as he adjusts to his loss. Melo denied suicidal ideation or homicidal ideation and reported not having any firearms in the home.   Melo discussed having things he wants to do, said he likes to design and build furniture, and noted the support from his kids. 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 Melo presented as forward thinking and noted several motivating factors.     I provided Melo with the phone number for [facility], which Melo was receptive to and said he thinks that seeing a therapist is a good starting point and will go from there.   I asked if Valeria was enrolled in hospice care and Melo said yes. I explained the bereavement support part of hospice programming and that this is available to Melo. I encouraged Melo to reach out to the hospice agency to see how he can get support at this time and Melo said he hadn't thought of this and would do so.      Assessment/Plan:  Melo was very appropriately engaged and responded well to our conversation and resources provided. Melo presented as adjusting appropriately given his situation and recent loss.     eMlo did not have any further SW needs or questions at this time but has my phone number and knows he can reach out anytime.      Provided patient/family with contact information and availability.    RICA Avelar, [facility]    [facility]  Ph: [phone], Pgr: [phone]  12/11/2023

## 2023-12-11 NOTE — LETTER
12/11/2023       RE: Melo Dangelo  [address]  [place] [address]     Dear Colleague,    Thank you for referring your patient, Melo Dangelo, to the [facility] [place] [facility]. Please see a copy of my visit note below.          Nephrology Clinic Follow up  9/11/23    Melo Dangelo MRN:[id number] YOB: 1940  Date of Admission:(Not on file)  Primary care provider: Francis Smith  Requesting physician: Merry Barrow, *       REASON FOR CONSULT: IgA nephropathy       HISTORY OF PRESENT ILLNESS:       PAST MEDICAL HISTORY:  Reviewed with patient on 12/11/2023   As per HPI    MEDICATIONS:  Reviewed with the patient in detail    ALLERGIES:    Reviewed with the patient in detail    REVIEW OF SYSTEMS:  A comprehensive of systems was negative except as noted above.    SOCIAL HISTORY:   Reviewed with patient, no smoking and no alcohol use     FAMILY MEDICAL HISTORY:   Reviewed, no family history of need for dialysis, transplant or CKD    PHYSICAL EXAM:   Vital signs:/64   Pulse 66   Wt 80 kg (176 lb 4.8 oz)   SpO2 97%   BMI 26.02 kg/m      Physical Exam     Gen: Appears well  Neck: No JVD  Lungs: CTA  CVS: S1S2 normal, no murmurs heard  LE: no edema  Skin: no rashes  CNS: Grossly normal       Interval History: He denies any new symptoms. He went to [place] for whale watching on a cruise ship and developed COVID infection after. This was 2 weeks ago and other than having some low energy, he is doing okay.     Interval history 1/19/23: He has bene doing well overall. He sometimes feels a little short of breath when he climbs about 2 flights of stairs but otherwise has minimal symptoms. He has not seen any dark urine or LE edema.     Interval history 6/1/23: Melo does report that he is feeling more tired these days. There is also a lot on his plate since his wife is now  transitioning to PD from HD and he is her primary caregiver. He denies any LE edema or shortness of breath.     Interval history 9/11/23: he says he is doing well overall. His wife has now started PD. She initially had PT come in but now they have stopped and she has been getting weaker. He has been managing all the household chores and helping her with tasks. He says his energy is okay for now, still has a good appetite, no nausea or other uremic symptoms. He denies any LE edema or shortness of breath    12/11/23: Melo has been doing okay however unfortunately he lost his wife a couple of months ago and has been dealing with some stress regarding that and has some other stressors that are new.  He does report that he takes naps multiple times and feels better after his naps but otherwise has okay energy.  He cooks for himself does his own chores and finds it not very difficult to do that.  Does have a decent appetite and has been trying to switch more from meat to plant-based diet.  He denies any lower extremity edema shortness of breath.  He does report a little itching that he has noticed recently.      ASSESSMENT AND RECOMMENDATIONS:     # IgA nephropathy [id number]   # CKD stage 4    # Anemia of CKD, iron replete 2/22   # Paroxysmal A fib      Patient was first diagnosed with IgA nephropathy when he presented to the hospital with gross hematuria after his second COVID vaccine. He presented with a Sr creatinine of 3.5 which peaked to 4.4 mg/dl. His creatinine a year ago was 1.1-1.2 mg/dl.  A kidney bx was done which showed IgA nephropathy with some fibrocellular crescents.  7/14 gloms were globally sclerosed.   He was started on Pozzi protocol with solumedrol 500 mg/3 days followed by oral prednisone 40 mg every other day with bactrim single strength 1 tab PO every other day, pepcid 40 mg PO daily and calcium carbonate 1158-3815 mg PO daily.    Unfortunately, he did not have a significant improvement in his  creatinine and had persistent active sediment. He was started on  Cyclophosphamide 100 mg daily in 11/21 along with prednisone taper. His cyclophosphamide was discontinued 5/2/22 and his prednisone decreased to 5 mg Q other day and now has been discontinued     His creatinine improved initially and had a new baseline of 3.3 mg/dl, however now has continued to worsen  His gfr is now 12-13. His proteinuria remains minimal and his hematuria has improved significantly.       He has no hematuria, no protein. His blood pressures remain above the goal of < 130/90 mm of Hg and he has no LE edema. Currently only on Metoprolol  12.5 mg daily   He is anemic and has thrombocytopenia. Iron studies normal. There is no indication for RAMA at this time.     Today we discussed that he now has continued progression of his CKD and I do not identify any reversible factors. When comes to dialysis, he is interested in PD. He saw Dr Zimmer and has been deemed to be a good candidate for PD.  He has not been seen by the transplant team.    --We discussed that his GFR remains stable at 13 and there are no absolute indications today to start dialysis.  We also went over the timeline that once we make a decision to start PD it might take around 1-1/2-month to get him fully settled on dialysis which includes time to schedule his exit surgery.    -- For now he would like to hold off on any surgery planning and continue getting labs every month along with phone calls to assess him for urinary symptoms. I did express the concern that I would not want him to require emergent dialysis at any cost and we need to keep a close eye on his labs and if his GFR declines below 10 mL/min, it would be a good time to schedule the PD access surgery.  -- For his ongoing stressors and recent loss of his spouse, I recommended that he sees a licensed therapist and he is agreeable to that.  I will ask our  to collect some resources and send them to  him.    F/up in clinic in 3 months.     Merry Barrow MD

## 2023-12-11 NOTE — NURSING NOTE
Chief Complaint   Patient presents with    RECHECK     3 month follow up     /64   Pulse 66   Wt 80 kg (176 lb 4.8 oz)   SpO2 97%   BMI 26.02 kg/m    Paola Cox on 12/11/2023 at 10:09 AM

## 2023-12-11 NOTE — PROGRESS NOTES
Nephrology Clinic Follow up  9/11/23    Melo Dangelo MRN:[id number] YOB: 1940  Date of Admission:(Not on file)  Primary care provider: Francis Smith  Requesting physician: Merry Barrow, *       REASON FOR CONSULT: IgA nephropathy       HISTORY OF PRESENT ILLNESS:       PAST MEDICAL HISTORY:  Reviewed with patient on 12/11/2023   As per HPI    MEDICATIONS:  Reviewed with the patient in detail    ALLERGIES:    Reviewed with the patient in detail    REVIEW OF SYSTEMS:  A comprehensive of systems was negative except as noted above.    SOCIAL HISTORY:   Reviewed with patient, no smoking and no alcohol use     FAMILY MEDICAL HISTORY:   Reviewed, no family history of need for dialysis, transplant or CKD    PHYSICAL EXAM:   Vital signs:/64   Pulse 66   Wt 80 kg (176 lb 4.8 oz)   SpO2 97%   BMI 26.02 kg/m      Physical Exam     Gen: Appears well  Neck: No JVD  Lungs: CTA  CVS: S1S2 normal, no murmurs heard  LE: no edema  Skin: no rashes  CNS: Grossly normal       Interval History: He denies any new symptoms. He went to [place] for [place] on a cruise ship and developed COVID infection after. This was 2 weeks ago and other than having some low energy, he is doing okay.     Interval history 1/19/23: He has bene doing well overall. He sometimes feels a little short of breath when he climbs about 2 flights of stairs but otherwise has minimal symptoms. He has not seen any dark urine or LE edema.     Interval history 6/1/23: Melo does report that he is feeling more tired these days. There is also a lot on his plate since his wife is now transitioning to PD from HD and he is her primary caregiver. He denies any LE edema or shortness of breath.     Interval history 9/11/23: he says he is doing well overall. His wife has now started PD. She initially had PT come in but now they have stopped and she has been getting weaker. He has been managing all the household chores and  helping her with tasks. He says his energy is okay for now, still has a good appetite, no nausea or other uremic symptoms. He denies any LE edema or shortness of breath    12/11/23: Melo has been doing okay however unfortunately he lost his wife a couple of months ago and has been dealing with some stress regarding that and has some other stressors that are new.  He does report that he takes naps multiple times and feels better after his naps but otherwise has okay energy.  He cooks for himself does his own chores and finds it not very difficult to do that.  Does have a decent appetite and has been trying to switch more from meat to plant-based diet.  He denies any lower extremity edema shortness of breath.  He does report a little itching that he has noticed recently.      ASSESSMENT AND RECOMMENDATIONS:     # IgA nephropathy [id number]   # CKD stage 4    # Anemia of CKD, iron replete 2/22   # Paroxysmal A fib      Patient was first diagnosed with IgA nephropathy when he presented to the hospital with gross hematuria after his second COVID vaccine. He presented with a Sr creatinine of 3.5 which peaked to 4.4 mg/dl. His creatinine a year ago was 1.1-1.2 mg/dl.  A kidney bx was done which showed IgA nephropathy with some fibrocellular crescents.  7/14 gloms were globally sclerosed.   He was started on Pozzi protocol with solumedrol 500 mg/3 days followed by oral prednisone 40 mg every other day with bactrim single strength 1 tab PO every other day, pepcid 40 mg PO daily and calcium carbonate 6567-4398 mg PO daily.    Unfortunately, he did not have a significant improvement in his creatinine and had persistent active sediment. He was started on  Cyclophosphamide 100 mg daily in 11/21 along with prednisone taper. His cyclophosphamide was discontinued 5/2/22 and his prednisone decreased to 5 mg Q other day and now has been discontinued     His creatinine improved initially and had a new baseline of 3.3 mg/dl, however  now has continued to worsen  His gfr is now 12-13. His proteinuria remains minimal and his hematuria has improved significantly.       He has no hematuria, no protein. His blood pressures remain above the goal of < 130/90 mm of Hg and he has no LE edema. Currently only on Metoprolol  12.5 mg daily   He is anemic and has thrombocytopenia. Iron studies normal. There is no indication for RAMA at this time.     Today we discussed that he now has continued progression of his CKD and I do not identify any reversible factors. When comes to dialysis, he is interested in PD. He saw Dr Zimmer and has been deemed to be a good candidate for PD.  He has not been seen by the transplant team.    --We discussed that his GFR remains stable at 13 and there are no absolute indications today to start dialysis.  We also went over the timeline that once we make a decision to start PD it might take around 1-1/2-month to get him fully settled on dialysis which includes time to schedule his exit surgery.    -- For now he would like to hold off on any surgery planning and continue getting labs every month along with phone calls to assess him for urinary symptoms. I did express the concern that I would not want him to require emergent dialysis at any cost and we need to keep a close eye on his labs and if his GFR declines below 10 mL/min, it would be a good time to schedule the PD access surgery.  -- For his ongoing stressors and recent loss of his spouse, I recommended that he sees a licensed therapist and he is agreeable to that.  I will ask our  to collect some resources and send them to him.    F/up in clinic in 3 months.     Merry Barrow MD

## 2023-12-11 NOTE — PROGRESS NOTES
Per Dr. Barrow: Monthly standing labs, monthly check-in for uremic symptoms, follow up with SW for resources for behavioral health referral.    -Referral placed for SW for behavioral health appts/resources.

## 2023-12-15 DIAGNOSIS — G40.909 SEIZURE DISORDER (H): ICD-10-CM

## 2023-12-20 RX ORDER — LAMOTRIGINE 100 MG/1
200 TABLET ORAL 2 TIMES DAILY
Qty: 360 TABLET | Refills: 1 | Status: SHIPPED | OUTPATIENT
Start: 2023-12-20 | End: 2024-06-21

## 2023-12-20 NOTE — TELEPHONE ENCOUNTER
lamoTRIgine (LAMICTAL) 100 MG tablet 360 tablet 1 6/21/2023  Last Office Visit : 6/22/2023  [facility] Adolfo Mi MD     Future Office visit:  0    Routing refill request to provider for review/approval because:  Last CBC abnormal  CBC RESULTS:   Recent Labs   Lab Test 12/11/23  0930   WBC 5.6   RBC 3.11*   HGB 9.6*   HCT 28.9*   MCV 93   MCH 30.9   MCHC 33.2   RDW 13.2   *

## 2023-12-29 ENCOUNTER — PATIENT OUTREACH (OUTPATIENT)
Dept: NEPHROLOGY | Facility: CLINIC | Age: 83
End: 2023-12-29
Payer: MEDICARE

## 2023-12-29 NOTE — TELEPHONE ENCOUNTER
Dialysis Access Care Coordination: General Outreach    REASON FOR CALL:     REASON FOR CALL: Clinic Care Coordination - Chart Review (Dialysis Access)                                   SITUATION/BACKROUND:     Enrollment status: Potential  Dialysis Access Consult: 7/18/2023  Surgeon: Dr. Zimmer  Access Plan: laparoscopic PD catheter placement with left sided exit, created under General    Last Nephrology Visit: 12/11/2023 with Dr. Barrow  -No indications to start dialysis  -Potential to start PD once GFR<10    Neph Tracking Flowsheet Last Filled Values       CKD Education Referral Date 04/03/23    CKD Education Other Dr. Kim Virtual class 4/6/23    Preferred Modality Peritoneal Dialysis    Patient's Referral Dates Auto Populate Patient's Referral Dates    MTM Referral 12/22/21    Home Care Referral 6/1/23    Journey Referral 1/25/23    Access Surgeon Referral Status  Referred  PD consult with Dr. Zimmer    Dialysis Access Referral 06/06/23    Access Surgical Consult 07/18/23          Dialysis History    No dialysis history on file.       Patient is not followed by Solid Organ Transplant.  Coordinator: No matching coordinators    ASSESSMENT:     Patient had no hospitalizations, surgeries, or changes to medications since dialysis access consult.     Latest Reference Range & Units 09/11/23 08:56 12/11/23 09:30   Urea Nitrogen 8.0 - 23.0 mg/dL 49.0 (H) 44.2 (H)   Creatinine 0.67 - 1.17 mg/dL 4.36 (H) 4.22 (H)   GFR Estimate >60 mL/min/1.73m2 13 (L) 13 (L)       Dialysis Access Assessments:  No assessment indicated    PLAN:     Future Appts Next 180 days       Visit Type Date Time Department    RETURN NEPHROLOGY 3/11/2024  9:00 AM  MEDICINE RENAL          Follow Up:     RN CC will follow up in approximately 6 months.     Flores Arias RN  Dialysis Access Care Coordinator  Phone: [phone]  Pool: P_Dialysis_Access_Nurse

## 2024-01-04 ENCOUNTER — PATIENT OUTREACH (OUTPATIENT)
Dept: NEPHROLOGY | Facility: CLINIC | Age: 84
End: 2024-01-04
Payer: MEDICARE

## 2024-01-04 NOTE — PROGRESS NOTES
Nephrology Note: Patient Outreach Encounter    REASON FOR CALL:     REASON FOR CALL: Chronic Disease Management                                  SITUATION/BACKROUND:   Patient is being treated for CKD Stage 5.    Dr. Barrow wanted monthly check in and labs for Melo who is stage 5 CKD.      Patient Journey Status:  Active:   Neph Tracking Flowsheet Last Filled Values       CKD Education Referral Date 04/03/23    CKD Education Other Dr. Kim Virtual class 4/6/23    Preferred Modality Peritoneal Dialysis    Patient's Referral Dates Auto Populate Patient's Referral Dates    MTM Referral 12/22/21    Home Care Referral 6/1/23    Journey Referral 1/25/23    Access Surgeon Referral Status  Referred  PD consult with Dr. Zimmer    Dialysis Access Referral 06/06/23    Access Surgical Consult 07/18/23            ASSESSMENT:     Spoke to Melo who is doing well. He is not abnormally fatigued until about 9pm, still doing wood working in the evening. Getting used to an empty house now that he lost his wife.  We talked about monthly check in's and monthly labs- writer set him up for labs on 1/9 and 2/6 and then follow up with Dr. Barrow in March.      Neph Assessments:  Uremic Symptoms  Uremic Symptoms  Fatigue: no  Cold: no  Nausea: no  Vomiting: no  Diarrhea: no  Edema: yes (If he eats too much salt.)  Poor appetite: no  Metallic Taste: no  Decreased urine output: no  Confusion: no    PLAN:     Education:  Method:  general discussion/verbal explanation  Discussed: labs  low sodium diet  low potassium diet  These interventions were used: Empathy/Understanding  Education material provided and patient was given an opportunity to ask questions.      Follow Up:   Provided him his RNHUMZA Brit's number as she will be returning from a leave next week, in case he has any questions or concerning symptoms.      He will get labs on 1/9/24- nursing to conact him if they are not stable- message sent to EMELY Han.    Patient  verbalized understanding and will follow up as recommended.    ARIANNA HASTINGS RN

## 2024-01-09 ENCOUNTER — LAB (OUTPATIENT)
Dept: LAB | Facility: CLINIC | Age: 84
End: 2024-01-09
Payer: MEDICARE

## 2024-01-09 DIAGNOSIS — N18.5 CKD (CHRONIC KIDNEY DISEASE) STAGE 5, GFR LESS THAN 15 ML/MIN (H): ICD-10-CM

## 2024-01-09 LAB
ALBUMIN SERPL BCG-MCNC: 4.3 G/DL (ref 3.5–5.2)
ANION GAP SERPL CALCULATED.3IONS-SCNC: 10 MMOL/L (ref 7–15)
BUN SERPL-MCNC: 48.3 MG/DL (ref 8–23)
CALCIUM SERPL-MCNC: 9.3 MG/DL (ref 8.8–10.2)
CHLORIDE SERPL-SCNC: 104 MMOL/L (ref 98–107)
CREAT SERPL-MCNC: 4.31 MG/DL (ref 0.67–1.17)
DEPRECATED HCO3 PLAS-SCNC: 24 MMOL/L (ref 22–29)
EGFRCR SERPLBLD CKD-EPI 2021: 13 ML/MIN/1.73M2
ERYTHROCYTE [DISTWIDTH] IN BLOOD BY AUTOMATED COUNT: 13.2 % (ref 10–15)
GLUCOSE SERPL-MCNC: 101 MG/DL (ref 70–99)
HCT VFR BLD AUTO: 27.8 % (ref 40–53)
HGB BLD-MCNC: 9.2 G/DL (ref 13.3–17.7)
MCH RBC QN AUTO: 30.6 PG (ref 26.5–33)
MCHC RBC AUTO-ENTMCNC: 33.1 G/DL (ref 31.5–36.5)
MCV RBC AUTO: 92 FL (ref 78–100)
PHOSPHATE SERPL-MCNC: 3.5 MG/DL (ref 2.5–4.5)
PLATELET # BLD AUTO: 138 10E3/UL (ref 150–450)
POTASSIUM SERPL-SCNC: 5 MMOL/L (ref 3.4–5.3)
RBC # BLD AUTO: 3.01 10E6/UL (ref 4.4–5.9)
SODIUM SERPL-SCNC: 138 MMOL/L (ref 135–145)
WBC # BLD AUTO: 5.7 10E3/UL (ref 4–11)

## 2024-01-09 PROCEDURE — 80069 RENAL FUNCTION PANEL: CPT | Performed by: PATHOLOGY

## 2024-01-09 PROCEDURE — 85027 COMPLETE CBC AUTOMATED: CPT | Performed by: PATHOLOGY

## 2024-01-09 PROCEDURE — 36415 COLL VENOUS BLD VENIPUNCTURE: CPT | Performed by: PATHOLOGY

## 2024-01-15 ENCOUNTER — PATIENT OUTREACH (OUTPATIENT)
Dept: NEPHROLOGY | Facility: CLINIC | Age: 84
End: 2024-01-15
Payer: MEDICARE

## 2024-01-15 NOTE — PROGRESS NOTES
Nephrology Note: Follow Up    REASON FOR CALL:      REASON FOR CALL: No chief complaint on file.                                       SITUATION/BACKROUND:   Patient is being treated for CKD Stage 5.        ASSESSMENT:   Dr. Barrow wanted monthly check in's and standing labs completed on Melo. Monthly labs scheduled for 1/9 and 2/6 and then he will follow up with Pushpa in early March. He is doing well (I checked in with him on 1/4). I told him you would call if his labs are not stable after his checks.   --------------------------------------------------------  1/15- [employer] message sent, labs are stable.     Neph Assessments:  Recent Labs      Latest Ref Rng & Units 1/9/2024 12/11/2023 9/11/2023   Neph Labs   Sodium 135 - 145 mmol/L 138  140  138    GFR Estimate >60 mL/min/1.73m2 13  13  13    Potassium 3.4 - 5.3 mmol/L 5.0  4.6  5.0    Creatinine 0.67 - 1.17 mg/dL 4.31  4.22  4.36    Urea Nitrogen 8.0 - 23.0 mg/dL 48.3  44.2  49.0    Hemoglobin 13.3 - 17.7 g/dL 9.2  9.6  9.0    Ferritin 31 - 409 ng/mL  263     Iron Binding Cap 240 - 430 ug/dL  273     Parathyroid Hormone Intact 15 - 65 pg/mL  95     Iron 61 - 157 ug/dL  77         PLAN:     Follow Up:   Patient to follow up as scheduled at next appointment  Patient to call/[email or web address] message with updates     Patient verbalized understanding and will follow up as recommended.    Katerine Gonzalez, RN

## 2024-02-06 ENCOUNTER — LAB (OUTPATIENT)
Dept: LAB | Facility: CLINIC | Age: 84
End: 2024-02-06
Payer: MEDICARE

## 2024-02-06 ENCOUNTER — OFFICE VISIT (OUTPATIENT)
Dept: CARDIOLOGY | Facility: CLINIC | Age: 84
End: 2024-02-06
Attending: NURSE PRACTITIONER
Payer: MEDICARE

## 2024-02-06 VITALS
BODY MASS INDEX: 27.53 KG/M2 | OXYGEN SATURATION: 92 % | HEART RATE: 66 BPM | DIASTOLIC BLOOD PRESSURE: 71 MMHG | SYSTOLIC BLOOD PRESSURE: 137 MMHG | WEIGHT: 186.5 LBS

## 2024-02-06 DIAGNOSIS — I48.0 PAROXYSMAL ATRIAL FIBRILLATION (H): ICD-10-CM

## 2024-02-06 DIAGNOSIS — N18.5 CKD (CHRONIC KIDNEY DISEASE) STAGE 5, GFR LESS THAN 15 ML/MIN (H): ICD-10-CM

## 2024-02-06 LAB
ALBUMIN SERPL BCG-MCNC: 4.4 G/DL (ref 3.5–5.2)
ANION GAP SERPL CALCULATED.3IONS-SCNC: 10 MMOL/L (ref 7–15)
BUN SERPL-MCNC: 47.7 MG/DL (ref 8–23)
CALCIUM SERPL-MCNC: 9.3 MG/DL (ref 8.8–10.2)
CHLORIDE SERPL-SCNC: 106 MMOL/L (ref 98–107)
CREAT SERPL-MCNC: 4.4 MG/DL (ref 0.67–1.17)
DEPRECATED HCO3 PLAS-SCNC: 22 MMOL/L (ref 22–29)
EGFRCR SERPLBLD CKD-EPI 2021: 13 ML/MIN/1.73M2
ERYTHROCYTE [DISTWIDTH] IN BLOOD BY AUTOMATED COUNT: 13.4 % (ref 10–15)
GLUCOSE SERPL-MCNC: 88 MG/DL (ref 70–99)
HCT VFR BLD AUTO: 28.9 % (ref 40–53)
HGB BLD-MCNC: 9.6 G/DL (ref 13.3–17.7)
MCH RBC QN AUTO: 30.7 PG (ref 26.5–33)
MCHC RBC AUTO-ENTMCNC: 33.2 G/DL (ref 31.5–36.5)
MCV RBC AUTO: 92 FL (ref 78–100)
PHOSPHATE SERPL-MCNC: 4.5 MG/DL (ref 2.5–4.5)
PLATELET # BLD AUTO: 141 10E3/UL (ref 150–450)
POTASSIUM SERPL-SCNC: 5 MMOL/L (ref 3.4–5.3)
RBC # BLD AUTO: 3.13 10E6/UL (ref 4.4–5.9)
SODIUM SERPL-SCNC: 138 MMOL/L (ref 135–145)
WBC # BLD AUTO: 5.4 10E3/UL (ref 4–11)

## 2024-02-06 PROCEDURE — 99214 OFFICE O/P EST MOD 30 MIN: CPT

## 2024-02-06 PROCEDURE — 85027 COMPLETE CBC AUTOMATED: CPT | Performed by: PATHOLOGY

## 2024-02-06 PROCEDURE — 80069 RENAL FUNCTION PANEL: CPT | Performed by: PATHOLOGY

## 2024-02-06 PROCEDURE — 93005 ELECTROCARDIOGRAM TRACING: CPT

## 2024-02-06 PROCEDURE — G0463 HOSPITAL OUTPT CLINIC VISIT: HCPCS

## 2024-02-06 PROCEDURE — 36415 COLL VENOUS BLD VENIPUNCTURE: CPT | Performed by: PATHOLOGY

## 2024-02-06 PROCEDURE — 93010 ELECTROCARDIOGRAM REPORT: CPT | Performed by: INTERNAL MEDICINE

## 2024-02-06 ASSESSMENT — PAIN SCALES - GENERAL: PAINLEVEL: NO PAIN (0)

## 2024-02-06 NOTE — LETTER
2/6/2024      RE: Melo Dangelo  [address]  [place] [address]       Dear Colleague,    Thank you for the opportunity to participate in the care of your patient, Melo Dangelo, at the [facility] [place] [facility]. Please see a copy of my visit note below.        ELECTROPHYSIOLOGY CLINIC VISIT    Assessment/Recommendations   Assessment/Plan:    Mr. Dangelo is an 83 year old male who has a past medical history significant for PAF (CHADSVASC 3), HTN, IgA nephropathy, CKD IV, and ETOH abuse.      Paroxsymal Atrial Fibrillation  1. Stroke Prophylaxis: CHADSVASC=+HTN, ++age  3, corresponding to a 3.2% annual stroke / systemic emolism event rate. indicating need for long term oral anticoagulation.  He was previously on Eliquis but this was stopped due to renal dysfunction. Discussed he does have indication for anticoagulation, went over CVA/clot vs bleeding risk in detail. He states understanding but wishes to defer this for now given intolerance to eliquis and end stage renal disease.     2. Rate Control: Continue Toprol XL.     3. Rhythm Control: Cardioversion, Antiarrhythmics and/or ablation are options for rhythm control. Low overall burden with minimal symptoms at this time. LVEF is preserved. Continue with conservative measures.     4. Risk Factor Management: Statin, BP control, and STEPHEN evaluation as indicated.       Follow up in 6 months.      History of Present Illness/Subjective    Mr. Melo Dangelo is a 83 year old male who comes in today for EP follow-up of A fib.    Mr. Dangelo is an 83 year old male who has a past medical history significant for PAF (CHADSVASC 3), HTN, IgA nephropathy, CKD IV, and ETOH abuse.      He was first diagnosed with AF in 10/2020 when he was hospitalized with COVID infection. He was started on Eliquis. This was later discontinued due to CKD. A Zio patch from 10/2021 showed no significant  arrhythmias. A repeat zio patch monitor from 2/2022 showed 2% PAF burden. A zio patch monitor from 9/2022 showed no arrhythmias. He had reported having skipped beats/palpitations lasting for an hour that occurred a couple times a week. He has seen Dr. Ventura and advised to take ASA.      EP Visit 7/19/23: He reports feeling well. He states he met with Nephrologist recently and is likely going to need to start PD. He says overall his activity level has improved. He used to have to stop and rest with 1 flight of stairs but now can do 3. Does report some leg tiredness. He denies chest discomfort, palpitations, abdominal fullness/bloating or peripheral edema, shortness of breath, paroxysmal nocturnal dyspnea, orthopnea, lightheadedness, dizziness, pre-syncope, or syncope. Presenting 12 lead ECG shows SB 1 AVB Vent Rate 53 bpm,  ms, QRS 92 ms, QTc 412 ms. Current cardiac medications include: Toprol XL and Simvastatin.    He presents for follow up today. His GFR has remained ~14, he has not started PD. His activity level has remained consistent, can do three flights of stairs before becoming short of breath. His wife passed several months ago, he has felt well supported through this. He has no new cardiac symptoms, denies chest discomfort, palpitations, peripheral edema, shortness of breath, pre-syncope, or syncope. Presenting 12 lead ECG shows sinus Vent Rate 59 bpm,  ms, QRS 90 ms, QTc 427 ms.     I have reviewed and updated the patient's Past Medical History, Social History, Family History and Medication List.     Cardiographics (Personally Reviewed) :   7/6/22 Echo:   Interpretation Summary  Global and regional left ventricular function is normal with an EF of 60-65%.  Right ventricular function, chamber size, wall motion, and thickness are  normal.  Pulmonary artery systolic pressure is normal.  The inferior vena cava is normal.  No pericardial effusion is present.  No significant changes noted.        Physical Examination   /71 (BP Location: Left arm, Patient Position: Sitting, Cuff Size: Adult Regular)   Pulse 66   Wt 84.6 kg (186 lb 8 oz)   SpO2 92%   BMI 27.53 kg/m    Wt Readings from Last 3 Encounters:   02/06/24 84.6 kg (186 lb 8 oz)   12/11/23 80 kg (176 lb 4.8 oz)   09/11/23 82.9 kg (182 lb 11.2 oz)     General Appearance:   Alert, well-appearing and in no acute distress.   HEENT: Atraumatic, normocephalic. MMM.   Chest/Lungs:   Respirations unlabored.  Lungs are clear to auscultation.   Cardiovascular:   Regular rate and rhythm.  S1/S2. No murmur.    Abdomen:  Soft, nontender, nondistended.   Extremities: No cyanosis or clubbing. No edema.    Musculoskeletal: Moves all extremities.     Skin: Warm, dry, intact.    Neurologic: Mood and affect are appropriate.  Alert and oriented to person, place, time, and situation.          Medications  Allergies   Toprol XL 12.5 mg daily   Simvastatin 20 mg daily     Tamsulosin   Lexapro   Gabapentin   Lamictal   Vitamins    No Known Allergies      Lab Results (Personally Reviewed)    Chemistry/lipid CBC Cardiac Enzymes/BNP/TSH/INR   Lab Results   Component Value Date    BUN 47.7 (H) 02/06/2024     02/06/2024    CO2 22 02/06/2024     Creatinine   Date Value Ref Range Status   02/06/2024 4.40 (H) 0.67 - 1.17 mg/dL Final   10/29/2020 1.33 (H) 0.66 - 1.25 mg/dL Final       Lab Results   Component Value Date    CHOL 220 (H) 01/13/2023    HDL 43 01/13/2023     (H) 01/13/2023      Lab Results   Component Value Date    WBC 5.4 02/06/2024    HGB 9.6 (L) 02/06/2024    HCT 28.9 (L) 02/06/2024    MCV 92 02/06/2024     (L) 02/06/2024    Lab Results   Component Value Date    TROPONINI <0.01 11/01/2020    TSH 1.15 07/29/2022    INR 1.04 01/20/2022        The patient states understanding and is agreeable with the plan.     Brittanie Etienne PA-C  [facility]  Electrophysiology Consult Service  Pager: [phone]    I spent a total of 20  minutes face to face with Melo Dangelo during today's office visit. I have spent an additional 15 minutes today on chart review and documentation.

## 2024-02-06 NOTE — PATIENT INSTRUCTIONS
You were seen in the Electrophysiology Clinic today by: Brittanie HILL    Plan:       Follow up Visit:  6 months         If you have further questions, please utilize [employer] to contact us.     Your Care Team:    EP Cardiology   Telephone Number     Nurse Line  Yeimi Dang, RN   Ct Farr, DINH Robertson RN   [phone]     For scheduling appointments:   Holly   [phone]   For procedure scheduling:    Vania Saini     [phone]   For the Device Clinic (Pacemakers, ICDs, Loop Recorders)    During business hours: [phone]  After business hours:   [phone]- select option 4 and ask for job code [id number].       On-call cardiologist for after hours or on weekends:   [phone], option #4, and ask to speak to the on-call cardiologist.     Cardiovascular Clinic:   [address] [place] [address]. [place] [address]      As always, Thank you for trusting us with your health care needs!

## 2024-02-06 NOTE — PROGRESS NOTES
ELECTROPHYSIOLOGY CLINIC VISIT    Assessment/Recommendations   Assessment/Plan:    Mr. Dangelo is an 83 year old male who has a past medical history significant for PAF (CHADSVASC 3), HTN, IgA nephropathy, CKD IV, and ETOH abuse.      Paroxsymal Atrial Fibrillation  1. Stroke Prophylaxis: CHADSVASC=+HTN, ++age  3, corresponding to a 3.2% annual stroke / systemic emolism event rate. indicating need for long term oral anticoagulation.  He was previously on Eliquis but this was stopped due to renal dysfunction. Discussed he does have indication for anticoagulation, went over CVA/clot vs bleeding risk in detail. He states understanding but wishes to defer this for now given intolerance to eliquis and end stage renal disease.     2. Rate Control: Continue Toprol XL.     3. Rhythm Control: Cardioversion, Antiarrhythmics and/or ablation are options for rhythm control. Low overall burden with minimal symptoms at this time. LVEF is preserved. Continue with conservative measures.     4. Risk Factor Management: Statin, BP control, and STEPHEN evaluation as indicated.       Follow up in 6 months.      History of Present Illness/Subjective    Mr. Melo Dangelo is a 83 year old male who comes in today for EP follow-up of A fib.    Mr. Dangelo is an 83 year old male who has a past medical history significant for PAF (CHADSVASC 3), HTN, IgA nephropathy, CKD IV, and ETOH abuse.      He was first diagnosed with AF in 10/2020 when he was hospitalized with COVID infection. He was started on Eliquis. This was later discontinued due to CKD. A Zio patch from 10/2021 showed no significant arrhythmias. A repeat zio patch monitor from 2/2022 showed 2% PAF burden. A zio patch monitor from 9/2022 showed no arrhythmias. He had reported having skipped beats/palpitations lasting for an hour that occurred a couple times a week. He has seen Dr. Ventura and advised to take ASA.      EP Visit 7/19/23: He reports feeling well. He states he met with  Nephrologist recently and is likely going to need to start PD. He says overall his activity level has improved. He used to have to stop and rest with 1 flight of stairs but now can do 3. Does report some leg tiredness. He denies chest discomfort, palpitations, abdominal fullness/bloating or peripheral edema, shortness of breath, paroxysmal nocturnal dyspnea, orthopnea, lightheadedness, dizziness, pre-syncope, or syncope. Presenting 12 lead ECG shows SB 1 AVB Vent Rate 53 bpm,  ms, QRS 92 ms, QTc 412 ms. Current cardiac medications include: Toprol XL and Simvastatin.    He presents for follow up today. His GFR has remained ~14, he has not started PD. His activity level has remained consistent, can do three flights of stairs before becoming short of breath. His wife passed several months ago, he has felt well supported through this. He has no new cardiac symptoms, denies chest discomfort, palpitations, peripheral edema, shortness of breath, pre-syncope, or syncope. Presenting 12 lead ECG shows sinus Vent Rate 59 bpm,  ms, QRS 90 ms, QTc 427 ms.     I have reviewed and updated the patient's Past Medical History, Social History, Family History and Medication List.     Cardiographics (Personally Reviewed) :   7/6/22 Echo:   Interpretation Summary  Global and regional left ventricular function is normal with an EF of 60-65%.  Right ventricular function, chamber size, wall motion, and thickness are  normal.  Pulmonary artery systolic pressure is normal.  The inferior vena cava is normal.  No pericardial effusion is present.  No significant changes noted.       Physical Examination   /71 (BP Location: Left arm, Patient Position: Sitting, Cuff Size: Adult Regular)   Pulse 66   Wt 84.6 kg (186 lb 8 oz)   SpO2 92%   BMI 27.53 kg/m    Wt Readings from Last 3 Encounters:   02/06/24 84.6 kg (186 lb 8 oz)   12/11/23 80 kg (176 lb 4.8 oz)   09/11/23 82.9 kg (182 lb 11.2 oz)     General Appearance:   Alert,  well-appearing and in no acute distress.   HEENT: Atraumatic, normocephalic. MMM.   Chest/Lungs:   Respirations unlabored.  Lungs are clear to auscultation.   Cardiovascular:   Regular rate and rhythm.  S1/S2. No murmur.    Abdomen:  Soft, nontender, nondistended.   Extremities: No cyanosis or clubbing. No edema.    Musculoskeletal: Moves all extremities.     Skin: Warm, dry, intact.    Neurologic: Mood and affect are appropriate.  Alert and oriented to person, place, time, and situation.          Medications  Allergies   Toprol XL 12.5 mg daily   Simvastatin 20 mg daily     Tamsulosin   Lexapro   Gabapentin   Lamictal   Vitamins    No Known Allergies      Lab Results (Personally Reviewed)    Chemistry/lipid CBC Cardiac Enzymes/BNP/TSH/INR   Lab Results   Component Value Date    BUN 47.7 (H) 02/06/2024     02/06/2024    CO2 22 02/06/2024     Creatinine   Date Value Ref Range Status   02/06/2024 4.40 (H) 0.67 - 1.17 mg/dL Final   10/29/2020 1.33 (H) 0.66 - 1.25 mg/dL Final       Lab Results   Component Value Date    CHOL 220 (H) 01/13/2023    HDL 43 01/13/2023     (H) 01/13/2023      Lab Results   Component Value Date    WBC 5.4 02/06/2024    HGB 9.6 (L) 02/06/2024    HCT 28.9 (L) 02/06/2024    MCV 92 02/06/2024     (L) 02/06/2024    Lab Results   Component Value Date    TROPONINI <0.01 11/01/2020    TSH 1.15 07/29/2022    INR 1.04 01/20/2022        The patient states understanding and is agreeable with the plan.     Brittanie Etienne PA-C  [facility]  Electrophysiology Consult Service  Pager: [phone]    I spent a total of 20 minutes face to face with Melo Dangelo during today's office visit. I have spent an additional 15 minutes today on chart review and documentation.

## 2024-02-06 NOTE — NURSING NOTE
Chief Complaint   Patient presents with    Follow Up     6 mo follow up for paroxysmal atrial fibrillation     Vitals were taken, medications reconciled, and EKG was performed.    Marilyn Santos CNA  4:21 PM

## 2024-02-07 LAB
ATRIAL RATE - MUSE: 59 BPM
DIASTOLIC BLOOD PRESSURE - MUSE: NORMAL MMHG
INTERPRETATION ECG - MUSE: NORMAL
P AXIS - MUSE: 60 DEGREES
PR INTERVAL - MUSE: 210 MS
QRS DURATION - MUSE: 90 MS
QT - MUSE: 432 MS
QTC - MUSE: 427 MS
R AXIS - MUSE: 10 DEGREES
SYSTOLIC BLOOD PRESSURE - MUSE: NORMAL MMHG
T AXIS - MUSE: 46 DEGREES
VENTRICULAR RATE- MUSE: 59 BPM

## 2024-02-15 DIAGNOSIS — E78.00 HIGH CHOLESTEROL: ICD-10-CM

## 2024-02-19 ENCOUNTER — PATIENT OUTREACH (OUTPATIENT)
Dept: NEPHROLOGY | Facility: CLINIC | Age: 84
End: 2024-02-19
Payer: MEDICARE

## 2024-02-19 NOTE — PROGRESS NOTES
Nephrology Note: RN CC Chart Review    REASON FOR ENCOUNTER:     Chart reviewed by nephrology RN CC                          SITUATION/BACKROUND:     Last nephrology visit:    Last Renal Panel:  Sodium   Date Value Ref Range Status   02/06/2024 138 135 - 145 mmol/L Final     Comment:     Reference intervals for this test were updated on 09/26/2023 to more accurately reflect our healthy population. There may be differences in the flagging of prior results with similar values performed with this method. Interpretation of those prior results can be made in the context of the updated reference intervals.    10/29/2020 139 133 - 144 mmol/L Final     Potassium   Date Value Ref Range Status   02/06/2024 5.0 3.4 - 5.3 mmol/L Final   07/29/2022 4.8 3.4 - 5.3 mmol/L Final   10/29/2020 4.7 3.4 - 5.3 mmol/L Final     Chloride   Date Value Ref Range Status   02/06/2024 106 98 - 107 mmol/L Final   07/29/2022 106 94 - 109 mmol/L Final   10/29/2020 110 (H) 94 - 109 mmol/L Final     Carbon Dioxide   Date Value Ref Range Status   10/29/2020 25 20 - 32 mmol/L Final     Carbon Dioxide (CO2)   Date Value Ref Range Status   02/06/2024 22 22 - 29 mmol/L Final   07/29/2022 28 20 - 32 mmol/L Final     Anion Gap   Date Value Ref Range Status   02/06/2024 10 7 - 15 mmol/L Final   07/29/2022 6 3 - 14 mmol/L Final   10/29/2020 5 3 - 14 mmol/L Final     Glucose   Date Value Ref Range Status   02/06/2024 88 70 - 99 mg/dL Final   07/29/2022 89 70 - 99 mg/dL Final   10/29/2020 136 (H) 70 - 99 mg/dL Final     Urea Nitrogen   Date Value Ref Range Status   02/06/2024 47.7 (H) 8.0 - 23.0 mg/dL Final   07/29/2022 55 (H) 7 - 30 mg/dL Final   10/29/2020 31 (H) 7 - 30 mg/dL Final     Creatinine   Date Value Ref Range Status   02/06/2024 4.40 (H) 0.67 - 1.17 mg/dL Final   10/29/2020 1.33 (H) 0.66 - 1.25 mg/dL Final     GFR Estimate   Date Value Ref Range Status   02/06/2024 13 (L) >60 mL/min/1.73m2 Final   11/04/2020 56 (L) >60 mL/min/1.73m2 Final    10/29/2020 50 (L) >60 mL/min/[1.73_m2] Final     Comment:     Non  GFR Calc  Starting 12/18/2018, serum creatinine based estimated GFR (eGFR) will be   calculated using the Chronic Kidney Disease Epidemiology Collaboration   (CKD-EPI) equation.       Calcium   Date Value Ref Range Status   02/06/2024 9.3 8.8 - 10.2 mg/dL Final   10/29/2020 8.5 8.5 - 10.1 mg/dL Final     Phosphorus   Date Value Ref Range Status   02/06/2024 4.5 2.5 - 4.5 mg/dL Final     Albumin   Date Value Ref Range Status   02/06/2024 4.4 3.5 - 5.2 g/dL Final   07/29/2022 4.0 3.4 - 5.0 g/dL Final   10/29/2020 2.7 (L) 3.4 - 5.0 g/dL Final         Neph Tracking Status:  Neph Tracking Flowsheet Last Filled Values       CKD Education Referral Date 04/03/23    CKD Education Other Dr. Kim Virtual class 4/6/23    Preferred Modality Peritoneal Dialysis    Patient's Referral Dates Auto Populate Patient's Referral Dates    MTM Referral 12/22/21    Home Care Referral 6/1/23    Journey Referral 1/25/23    Access Surgeon Referral Status  Referred  PD consult with Dr. Zimmer    Dialysis Access Referral 06/06/23    Access Surgical Consult 07/18/23              ASSESSMENT/PLAN     Patient to follow up as scheduled at next appointment, labs remain stable.     Upcoming Appointments:  Future Appts Next 180 days       Visit Type Date Time Department    RETURN NEPHROLOGY 3/11/2024  9:00 AM  MEDICINE RENAL              Katerine Gonzalez RN

## 2024-02-21 DIAGNOSIS — E78.00 HIGH CHOLESTEROL: ICD-10-CM

## 2024-02-21 RX ORDER — SIMVASTATIN 20 MG
20 TABLET ORAL AT BEDTIME
Qty: 90 TABLET | Refills: 0 | Status: SHIPPED | OUTPATIENT
Start: 2024-02-21 | End: 2024-05-21

## 2024-02-21 NOTE — TELEPHONE ENCOUNTER
Last Clinic Visit: 6/22/2023  [facility] [place]    Due for lipids    Message sent to clinic to orders lipid panel /ALT  Scheduled for labs on 3/11/24.

## 2024-02-23 RX ORDER — SIMVASTATIN 20 MG
20 TABLET ORAL AT BEDTIME
Qty: 30 TABLET | Refills: 10 | OUTPATIENT
Start: 2024-02-23

## 2024-03-11 ENCOUNTER — LAB (OUTPATIENT)
Dept: LAB | Facility: CLINIC | Age: 84
End: 2024-03-11
Attending: INTERNAL MEDICINE
Payer: MEDICARE

## 2024-03-11 ENCOUNTER — OFFICE VISIT (OUTPATIENT)
Dept: NEPHROLOGY | Facility: CLINIC | Age: 84
End: 2024-03-11
Attending: INTERNAL MEDICINE
Payer: MEDICARE

## 2024-03-11 VITALS
WEIGHT: 182 LBS | TEMPERATURE: 98.2 F | OXYGEN SATURATION: 96 % | SYSTOLIC BLOOD PRESSURE: 129 MMHG | BODY MASS INDEX: 26.86 KG/M2 | HEART RATE: 62 BPM | DIASTOLIC BLOOD PRESSURE: 69 MMHG

## 2024-03-11 DIAGNOSIS — N18.5 CKD (CHRONIC KIDNEY DISEASE) STAGE 5, GFR LESS THAN 15 ML/MIN (H): ICD-10-CM

## 2024-03-11 DIAGNOSIS — N02.B9 IGA NEPHROPATHY: Primary | ICD-10-CM

## 2024-03-11 LAB
ALBUMIN SERPL BCG-MCNC: 4.3 G/DL (ref 3.5–5.2)
ANION GAP SERPL CALCULATED.3IONS-SCNC: 12 MMOL/L (ref 7–15)
BUN SERPL-MCNC: 52.7 MG/DL (ref 8–23)
CALCIUM SERPL-MCNC: 9.2 MG/DL (ref 8.8–10.2)
CHLORIDE SERPL-SCNC: 106 MMOL/L (ref 98–107)
CREAT SERPL-MCNC: 4.49 MG/DL (ref 0.67–1.17)
DEPRECATED HCO3 PLAS-SCNC: 23 MMOL/L (ref 22–29)
EGFRCR SERPLBLD CKD-EPI 2021: 12 ML/MIN/1.73M2
ERYTHROCYTE [DISTWIDTH] IN BLOOD BY AUTOMATED COUNT: 13.4 % (ref 10–15)
GLUCOSE SERPL-MCNC: 138 MG/DL (ref 70–99)
HCT VFR BLD AUTO: 29.4 % (ref 40–53)
HGB BLD-MCNC: 9.6 G/DL (ref 13.3–17.7)
HOLD SPECIMEN: NORMAL
MCH RBC QN AUTO: 30.4 PG (ref 26.5–33)
MCHC RBC AUTO-ENTMCNC: 32.7 G/DL (ref 31.5–36.5)
MCV RBC AUTO: 93 FL (ref 78–100)
PHOSPHATE SERPL-MCNC: 3.9 MG/DL (ref 2.5–4.5)
PLATELET # BLD AUTO: 145 10E3/UL (ref 150–450)
POTASSIUM SERPL-SCNC: 5 MMOL/L (ref 3.4–5.3)
RBC # BLD AUTO: 3.16 10E6/UL (ref 4.4–5.9)
SODIUM SERPL-SCNC: 141 MMOL/L (ref 135–145)
WBC # BLD AUTO: 4.7 10E3/UL (ref 4–11)

## 2024-03-11 PROCEDURE — 80069 RENAL FUNCTION PANEL: CPT | Performed by: PATHOLOGY

## 2024-03-11 PROCEDURE — 85027 COMPLETE CBC AUTOMATED: CPT | Performed by: PATHOLOGY

## 2024-03-11 PROCEDURE — 99214 OFFICE O/P EST MOD 30 MIN: CPT | Performed by: INTERNAL MEDICINE

## 2024-03-11 PROCEDURE — G0463 HOSPITAL OUTPT CLINIC VISIT: HCPCS | Performed by: INTERNAL MEDICINE

## 2024-03-11 PROCEDURE — 36415 COLL VENOUS BLD VENIPUNCTURE: CPT | Performed by: PATHOLOGY

## 2024-03-11 RX ORDER — UBIDECARENONE 100 MG
1 CAPSULE ORAL DAILY
COMMUNITY
Start: 2023-10-17

## 2024-03-11 ASSESSMENT — PAIN SCALES - GENERAL: PAINLEVEL: NO PAIN (0)

## 2024-03-11 ASSESSMENT — PATIENT HEALTH QUESTIONNAIRE - PHQ9: SUM OF ALL RESPONSES TO PHQ QUESTIONS 1-9: 5

## 2024-03-11 NOTE — LETTER
3/11/2024       RE: Melo Dangelo  [address]  [place] [address]     Dear Colleague,    Thank you for referring your patient, Melo Dangelo, to the [facility] [place] at [facility]. Please see a copy of my visit note below.          Nephrology Clinic Follow up  3/11/24    Melo Dangelo MRN:[id number] YOB: 1940  Date of Admission:(Not on file)  Primary care provider: Francis Smith  Requesting physician: Merry Barrow, *       REASON FOR CONSULT: IgA nephropathy       HISTORY OF PRESENT ILLNESS:       PAST MEDICAL HISTORY:  Reviewed with patient on 03/11/2024   As per HPI    MEDICATIONS:  Reviewed with the patient in detail    ALLERGIES:    Reviewed with the patient in detail    REVIEW OF SYSTEMS:  A comprehensive of systems was negative except as noted above.    SOCIAL HISTORY:   Reviewed with patient, no smoking and no alcohol use     FAMILY MEDICAL HISTORY:   Reviewed, no family history of need for dialysis, transplant or CKD    PHYSICAL EXAM:   Vital signs:/69 (BP Location: Left arm, Patient Position: Sitting, Cuff Size: Adult Regular)   Pulse 62   Temp 98.2  F (36.8  C) (Oral)   Wt 82.6 kg (182 lb)   SpO2 96%   BMI 26.86 kg/m      Physical Exam     Gen: Appears well  Neck: No JVD  Lungs: CTA  CVS: S1S2 normal, no murmurs heard  LE: no edema  Skin: no rashes  CNS: Grossly normal       Interval History: He denies any new symptoms. He went to [place] for whale watching on a cruise ship and developed COVID infection after. This was 2 weeks ago and other than having some low energy, he is doing okay.     Interval history 1/19/23: He has bene doing well overall. He sometimes feels a little short of breath when he climbs about 2 flights of stairs but otherwise has minimal symptoms. He has not seen any dark urine or LE edema.     Interval history 6/1/23: Melo does report  that he is feeling more tired these days. There is also a lot on his plate since his wife is now transitioning to PD from HD and he is her primary caregiver. He denies any LE edema or shortness of breath.     Interval history 9/11/23: he says he is doing well overall. His wife has now started PD. She initially had PT come in but now they have stopped and she has been getting weaker. He has been managing all the household chores and helping her with tasks. He says his energy is okay for now, still has a good appetite, no nausea or other uremic symptoms. He denies any LE edema or shortness of breath    12/11/23: Melo has been doing okay however unfortunately he lost his wife a couple of months ago and has been dealing with some stress regarding that and has some other stressors that are new.  He does report that he takes naps multiple times and feels better after his naps but otherwise has okay energy.  He cooks for himself does his own chores and finds it not very difficult to do that.  Does have a decent appetite and has been trying to switch more from meat to plant-based diet.  He denies any lower extremity edema shortness of breath.  He does report a little itching that he has noticed recently.    3/11/24: Melo continues to struggle with depression since he lost his wife. He will be seeing a psychiatrist soon as he is concerned that he may have bipolar disorder where he loses his temper in the morning when he gets frustrated. He continues to have a good appetite and denies any nausea, vomiting. He continues to work on his woodwork.       ASSESSMENT AND RECOMMENDATIONS:     # IgA nephropathy [id number]   # CKD stage 4    # Anemia of CKD, iron replete 2/22   # Paroxysmal A fib    # Seizure disorder     Patient was first diagnosed with IgA nephropathy when he presented to the hospital with gross hematuria after his second COVID vaccine. He presented with a Sr creatinine of 3.5 which peaked to 4.4 mg/dl. His  creatinine a year ago was 1.1-1.2 mg/dl.  A kidney bx was done which showed IgA nephropathy with some fibrocellular crescents.  7/14 gloms were globally sclerosed.   He was started on Pozzi protocol with solumedrol 500 mg/3 days followed by oral prednisone 40 mg every other day with bactrim single strength 1 tab PO every other day, pepcid 40 mg PO daily and calcium carbonate 8879-7469 mg PO daily.    Unfortunately, he did not have a significant improvement in his creatinine and had persistent active sediment. He was started on  Cyclophosphamide 100 mg daily in 11/21 along with prednisone taper. His cyclophosphamide was discontinued 5/2/22 and his prednisone decreased to 5 mg Q other day and now has been discontinued     His creatinine improved initially and had a new baseline of 3.3 mg/dl, however now has continued to worsen  His gfr is now 12-13. His proteinuria remains minimal and he has no microscopic hematuria.        His blood pressures remain above the goal of < 130/90 mm of Hg and he has no LE edema. Currently only on Metoprolol  12.5 mg daily   He is anemic and has thrombocytopenia. Iron studies normal. There is no indication for RAMA at this time.     Today we discussed that he now has continued progression of his CKD and I do not identify any reversible factors. When comes to dialysis, he is interested in PD. He saw Dr Zimmer and has been deemed to be a good candidate for PD.  He has not been seen by the transplant team.    --We discussed that his GFR remains stable and there are no absolute indications today to start dialysis.  We also went over the timeline that once we make a decision to start PD it might take around 1-1/2-month to get him fully settled on dialysis which includes time to schedule his exit surgery.    -- For now he would like to hold off on any surgery planning and continue getting labs every month along with phone calls to assess him for urinary symptoms. I did express the concern  that I would not want him to require emergent dialysis at any cost and we need to keep a close eye on his labs and if his GFR declines below 10 mL/min, it would be a good time to schedule the PD access surgery.      F/up in clinic in 3 months.     Merry Barrow MD

## 2024-03-11 NOTE — NURSING NOTE
Chief Complaint   Patient presents with    RECHECK     RETURN NEPHROLOGY     BP (!) 153/81 (BP Location: Left arm, Patient Position: Sitting, Cuff Size: Adult Regular)   Pulse 62   Temp 98.2  F (36.8  C) (Oral)   Wt 82.6 kg (182 lb)   SpO2 96%   BMI 26.86 kg/m      EUNICE Visit Time Tracking  Role: MA/LATRICE  Type: Pre-visit  Time in minutes: 10    Shane Love CMA on 3/11/2024 at 8:53 AM

## 2024-03-11 NOTE — PROGRESS NOTES
Nephrology Clinic Follow up  3/11/24    Melo Dangelo MRN:[id number] YOB: 1940  Date of Admission:(Not on file)  Primary care provider: Francis Smith  Requesting physician: Merry Barrow, *       REASON FOR CONSULT: IgA nephropathy       HISTORY OF PRESENT ILLNESS:       PAST MEDICAL HISTORY:  Reviewed with patient on 03/11/2024   As per HPI    MEDICATIONS:  Reviewed with the patient in detail    ALLERGIES:    Reviewed with the patient in detail    REVIEW OF SYSTEMS:  A comprehensive of systems was negative except as noted above.    SOCIAL HISTORY:   Reviewed with patient, no smoking and no alcohol use     FAMILY MEDICAL HISTORY:   Reviewed, no family history of need for dialysis, transplant or CKD    PHYSICAL EXAM:   Vital signs:/69 (BP Location: Left arm, Patient Position: Sitting, Cuff Size: Adult Regular)   Pulse 62   Temp 98.2  F (36.8  C) (Oral)   Wt 82.6 kg (182 lb)   SpO2 96%   BMI 26.86 kg/m      Physical Exam     Gen: Appears well  Neck: No JVD  Lungs: CTA  CVS: S1S2 normal, no murmurs heard  LE: no edema  Skin: no rashes  CNS: Grossly normal       Interval History: He denies any new symptoms. He went to [place] for whale watching on a cruise ship and developed COVID infection after. This was 2 weeks ago and other than having some low energy, he is doing okay.     Interval history 1/19/23: He has bene doing well overall. He sometimes feels a little short of breath when he climbs about 2 flights of stairs but otherwise has minimal symptoms. He has not seen any dark urine or LE edema.     Interval history 6/1/23: Melo does report that he is feeling more tired these days. There is also a lot on his plate since his wife is now transitioning to PD from HD and he is her primary caregiver. He denies any LE edema or shortness of breath.     Interval history 9/11/23: he says he is doing well overall. His wife has now started PD. She initially had PT come in but  now they have stopped and she has been getting weaker. He has been managing all the household chores and helping her with tasks. He says his energy is okay for now, still has a good appetite, no nausea or other uremic symptoms. He denies any LE edema or shortness of breath    12/11/23: Melo has been doing okay however unfortunately he lost his wife a couple of months ago and has been dealing with some stress regarding that and has some other stressors that are new.  He does report that he takes naps multiple times and feels better after his naps but otherwise has okay energy.  He cooks for himself does his own chores and finds it not very difficult to do that.  Does have a decent appetite and has been trying to switch more from meat to plant-based diet.  He denies any lower extremity edema shortness of breath.  He does report a little itching that he has noticed recently.    3/11/24: Melo continues to struggle with depression since he lost his wife. He will be seeing a psychiatrist soon as he is concerned that he may have bipolar disorder where he loses his temper in the morning when he gets frustrated. He continues to have a good appetite and denies any nausea, vomiting. He continues to work on his woodwork.       ASSESSMENT AND RECOMMENDATIONS:     # IgA nephropathy [id number]   # CKD stage 4    # Anemia of CKD, iron replete 2/22   # Paroxysmal A fib    # Seizure disorder     Patient was first diagnosed with IgA nephropathy when he presented to the hospital with gross hematuria after his second COVID vaccine. He presented with a Sr creatinine of 3.5 which peaked to 4.4 mg/dl. His creatinine a year ago was 1.1-1.2 mg/dl.  A kidney bx was done which showed IgA nephropathy with some fibrocellular crescents.  7/14 gloms were globally sclerosed.   He was started on Pozzi protocol with solumedrol 500 mg/3 days followed by oral prednisone 40 mg every other day with bactrim single strength 1 tab PO every other day,  pepcid 40 mg PO daily and calcium carbonate 1421-7954 mg PO daily.    Unfortunately, he did not have a significant improvement in his creatinine and had persistent active sediment. He was started on  Cyclophosphamide 100 mg daily in 11/21 along with prednisone taper. His cyclophosphamide was discontinued 5/2/22 and his prednisone decreased to 5 mg Q other day and now has been discontinued     His creatinine improved initially and had a new baseline of 3.3 mg/dl, however now has continued to worsen  His gfr is now 12-13. His proteinuria remains minimal and he has no microscopic hematuria.        His blood pressures remain above the goal of < 130/90 mm of Hg and he has no LE edema. Currently only on Metoprolol  12.5 mg daily   He is anemic and has thrombocytopenia. Iron studies normal. There is no indication for RAMA at this time.     Today we discussed that he now has continued progression of his CKD and I do not identify any reversible factors. When comes to dialysis, he is interested in PD. He saw Dr Zimmer and has been deemed to be a good candidate for PD.  He has not been seen by the transplant team.    --We discussed that his GFR remains stable and there are no absolute indications today to start dialysis.  We also went over the timeline that once we make a decision to start PD it might take around 1-1/2-month to get him fully settled on dialysis which includes time to schedule his exit surgery.    -- For now he would like to hold off on any surgery planning and continue getting labs every month along with phone calls to assess him for urinary symptoms. I did express the concern that I would not want him to require emergent dialysis at any cost and we need to keep a close eye on his labs and if his GFR declines below 10 mL/min, it would be a good time to schedule the PD access surgery.      F/up in clinic in 3 months.     Merry Barrow MD

## 2024-04-12 NOTE — PATIENT INSTRUCTIONS
Thank you for visiting the Primary Care Center today at the [facility]! The following is some information about our clinic:     Primary Care Center Frequently-Asked Questions    (1) How do I schedule appointments at the [facility]?     Primary Care--to schedule or make changes to an existing appointment, please call our primary care line at [phone].    Labs--to schedule a lab appointment at the [facility] you can use [employer] or call [phone]. If you have a [place] location that is closer to home, you can reach out to that location for scheduling options.     Imaging--if you need to schedule a CT, X-ray, MRI, ultrasound, or other imaging study you can call [phone] to schedule at the [facility] or any other [facility] imaging location.     Referrals--if a referral to another specialty was ordered you can expect a phone call from their scheduling team. If you have not heard from them in a week, please call us or send us a [employer] message to check the status or get a scheduling number. Please note that this only applies to internal [facility] referrals. If the referral is external you would need to contact their office for scheduling.     (2) I have a question about my visit, who do I contact?     You can call us at the primary care line at [phone] to ask questions about your visit. You can also send a secure message through [employer], which is reviewed by clinic staff. Please note that [employer] messages have a twenty-four to forty-eight business hour turnaround time and should not be used for urgent concerns.    (3) How will I get the results of my tests?    If you are signed up for [employer] all tests will be released to you within twenty-four hours of resulting. Please allow three to five days for your doctor to review your results and place a note interpreting the results. If you do not have [employer] [employer] you will receive your  results through mail seven to ten business days following the return of the tests. Please note that if there should be any urgent or concerning results that your doctor or their registered nurse will reach out to you the same day as the tests come back. If you have follow up questions about your results or would like to discuss the results in detail please schedule a follow up with your provider either in person or virtually.     (4) How do I get refills of my prescriptions?     You should always first contact your pharmacy for refills of your medications. If submitting a refill request on [employer], please be sure to submit the request only once--repeat requests can cause delays in refill. If you are requesting a NEW medication or a medication related to new symptoms you will need to schedule an appointment with a provider prior to approval. Please note: Routine medication refills have up to one to three business day turnaround whereas controlled substances refills have up to five to seven business day turnaround.    (5) I have new symptoms, what do I do?     If you are having an immediate medical emergency, you should dial 911 for assistance.   For anything urgent that needs to be seen within a few hours to one day you should visit a local urgent care for assistance.  For non-urgent symptoms that need to be seen within a few days to a week you can schedule with an available provider in primary care by going to [employer] or calling [phone].   If you are not sure how serious your symptoms are or you would like to receive medical advice you can always call [phone] to speak with a triage nurse.     Detail Level: Simple

## 2024-04-16 ENCOUNTER — APPOINTMENT (OUTPATIENT)
Dept: GENERAL RADIOLOGY | Facility: CLINIC | Age: 84
DRG: 194 | End: 2024-04-16
Attending: EMERGENCY MEDICINE
Payer: MEDICARE

## 2024-04-16 ENCOUNTER — HOSPITAL ENCOUNTER (INPATIENT)
Facility: CLINIC | Age: 84
LOS: 2 days | Discharge: HOME | DRG: 194 | End: 2024-04-18
Attending: EMERGENCY MEDICINE | Admitting: STUDENT IN AN ORGANIZED HEALTH CARE EDUCATION/TRAINING PROGRAM
Payer: MEDICARE

## 2024-04-16 ENCOUNTER — APPOINTMENT (OUTPATIENT)
Dept: ULTRASOUND IMAGING | Facility: CLINIC | Age: 84
DRG: 194 | End: 2024-04-16
Payer: MEDICARE

## 2024-04-16 DIAGNOSIS — I49.3 FREQUENT PVCS: ICD-10-CM

## 2024-04-16 DIAGNOSIS — N18.9 ACUTE KIDNEY INJURY SUPERIMPOSED ON CKD (H): ICD-10-CM

## 2024-04-16 DIAGNOSIS — J15.9 COMMUNITY ACQUIRED BACTERIAL PNEUMONIA: Primary | ICD-10-CM

## 2024-04-16 DIAGNOSIS — N17.9 ACUTE KIDNEY INJURY SUPERIMPOSED ON CKD (H): ICD-10-CM

## 2024-04-16 DIAGNOSIS — J96.01 ACUTE RESPIRATORY FAILURE WITH HYPOXIA (H): ICD-10-CM

## 2024-04-16 DIAGNOSIS — R79.89 ELEVATED TROPONIN: ICD-10-CM

## 2024-04-16 DIAGNOSIS — N18.4 CKD (CHRONIC KIDNEY DISEASE) STAGE 4, GFR 15-29 ML/MIN (H): ICD-10-CM

## 2024-04-16 DIAGNOSIS — J18.9 PNEUMONIA OF LEFT LOWER LOBE DUE TO INFECTIOUS ORGANISM: ICD-10-CM

## 2024-04-16 LAB
ALBUMIN MFR UR ELPH: 22.6 MG/DL
ALBUMIN SERPL BCG-MCNC: 4 G/DL (ref 3.5–5.2)
ALBUMIN UR-MCNC: 30 MG/DL
ALBUMIN UR-MCNC: 30 MG/DL
ALP SERPL-CCNC: 61 U/L (ref 40–150)
ALT SERPL W P-5'-P-CCNC: 11 U/L (ref 0–70)
ANION GAP SERPL CALCULATED.3IONS-SCNC: 14 MMOL/L (ref 7–15)
APPEARANCE UR: CLEAR
APPEARANCE UR: CLEAR
AST SERPL W P-5'-P-CCNC: 18 U/L (ref 0–45)
ATRIAL RATE - MUSE: 64 BPM
ATRIAL RATE - MUSE: 66 BPM
BACTERIA #/AREA URNS HPF: ABNORMAL /HPF
BACTERIA #/AREA URNS HPF: ABNORMAL /HPF
BASE EXCESS BLDV CALC-SCNC: -7 MMOL/L (ref -3–3)
BASOPHILS # BLD AUTO: 0 10E3/UL (ref 0–0.2)
BASOPHILS NFR BLD AUTO: 0 %
BILIRUB SERPL-MCNC: 0.4 MG/DL
BILIRUB UR QL STRIP: NEGATIVE
BILIRUB UR QL STRIP: NEGATIVE
BUN SERPL-MCNC: 55.4 MG/DL (ref 8–23)
C PNEUM DNA SPEC QL NAA+PROBE: NOT DETECTED
CALCIUM SERPL-MCNC: 8.7 MG/DL (ref 8.8–10.2)
CHLORIDE SERPL-SCNC: 102 MMOL/L (ref 98–107)
CHOLEST SERPL-MCNC: 127 MG/DL
COLOR UR AUTO: ABNORMAL
COLOR UR AUTO: ABNORMAL
CREAT SERPL-MCNC: 5.39 MG/DL (ref 0.67–1.17)
CREAT SERPL-MCNC: 5.51 MG/DL (ref 0.67–1.17)
CREAT UR-MCNC: 60.3 MG/DL
CREAT UR-MCNC: 85 MG/DL
DEPRECATED HCO3 PLAS-SCNC: 19 MMOL/L (ref 22–29)
DIASTOLIC BLOOD PRESSURE - MUSE: NORMAL MMHG
DIASTOLIC BLOOD PRESSURE - MUSE: NORMAL MMHG
EGFRCR SERPLBLD CKD-EPI 2021: 10 ML/MIN/1.73M2
EGFRCR SERPLBLD CKD-EPI 2021: 10 ML/MIN/1.73M2
EOSINOPHIL # BLD AUTO: 0.2 10E3/UL (ref 0–0.7)
EOSINOPHIL NFR BLD AUTO: 3 %
ERYTHROCYTE [DISTWIDTH] IN BLOOD BY AUTOMATED COUNT: 13.6 % (ref 10–15)
FLUAV H1 2009 PAND RNA SPEC QL NAA+PROBE: NOT DETECTED
FLUAV H1 RNA SPEC QL NAA+PROBE: NOT DETECTED
FLUAV H3 RNA SPEC QL NAA+PROBE: NOT DETECTED
FLUAV RNA SPEC QL NAA+PROBE: NEGATIVE
FLUAV RNA SPEC QL NAA+PROBE: NOT DETECTED
FLUBV RNA RESP QL NAA+PROBE: NEGATIVE
FLUBV RNA SPEC QL NAA+PROBE: NOT DETECTED
FRACT EXCRET NA UR+SERPL-RTO: 2.4 %
GLUCOSE SERPL-MCNC: 126 MG/DL (ref 70–99)
GLUCOSE UR STRIP-MCNC: NEGATIVE MG/DL
GLUCOSE UR STRIP-MCNC: NEGATIVE MG/DL
HADV DNA SPEC QL NAA+PROBE: NOT DETECTED
HCO3 BLDV-SCNC: 17 MMOL/L (ref 21–28)
HCOV PNL SPEC NAA+PROBE: NOT DETECTED
HCT VFR BLD AUTO: 26 % (ref 40–53)
HDLC SERPL-MCNC: 34 MG/DL
HGB BLD-MCNC: 8.7 G/DL (ref 13.3–17.7)
HGB UR QL STRIP: ABNORMAL
HGB UR QL STRIP: ABNORMAL
HMPV RNA SPEC QL NAA+PROBE: NOT DETECTED
HPIV1 RNA SPEC QL NAA+PROBE: NOT DETECTED
HPIV2 RNA SPEC QL NAA+PROBE: NOT DETECTED
HPIV3 RNA SPEC QL NAA+PROBE: DETECTED
HPIV4 RNA SPEC QL NAA+PROBE: NOT DETECTED
IMM GRANULOCYTES # BLD: 0 10E3/UL
IMM GRANULOCYTES NFR BLD: 1 %
INTERPRETATION ECG - MUSE: NORMAL
INTERPRETATION ECG - MUSE: NORMAL
KETONES UR STRIP-MCNC: NEGATIVE MG/DL
KETONES UR STRIP-MCNC: NEGATIVE MG/DL
L PNEUMO1 AG UR QL IA: NEGATIVE
LACTATE BLD-SCNC: 0.6 MMOL/L
LDLC SERPL CALC-MCNC: 70 MG/DL
LEUKOCYTE ESTERASE UR QL STRIP: NEGATIVE
LEUKOCYTE ESTERASE UR QL STRIP: NEGATIVE
LYMPHOCYTES # BLD AUTO: 1.2 10E3/UL (ref 0.8–5.3)
LYMPHOCYTES NFR BLD AUTO: 15 %
M PNEUMO DNA SPEC QL NAA+PROBE: NOT DETECTED
MAGNESIUM SERPL-MCNC: 2.2 MG/DL (ref 1.7–2.3)
MCH RBC QN AUTO: 30.4 PG (ref 26.5–33)
MCHC RBC AUTO-ENTMCNC: 33.5 G/DL (ref 31.5–36.5)
MCV RBC AUTO: 91 FL (ref 78–100)
MONOCYTES # BLD AUTO: 1.1 10E3/UL (ref 0–1.3)
MONOCYTES NFR BLD AUTO: 13 %
MUCOUS THREADS #/AREA URNS LPF: PRESENT /LPF
MUCOUS THREADS #/AREA URNS LPF: PRESENT /LPF
NEUTROPHILS # BLD AUTO: 5.6 10E3/UL (ref 1.6–8.3)
NEUTROPHILS NFR BLD AUTO: 68 %
NITRATE UR QL: NEGATIVE
NITRATE UR QL: NEGATIVE
NONHDLC SERPL-MCNC: 93 MG/DL
NRBC # BLD AUTO: 0 10E3/UL
NRBC BLD AUTO-RTO: 0 /100
NT-PROBNP SERPL-MCNC: 2997 PG/ML (ref 0–1800)
P AXIS - MUSE: 64 DEGREES
P AXIS - MUSE: 83 DEGREES
PCO2 BLDV: 27 MM HG (ref 40–50)
PH BLDV: 7.4 [PH] (ref 7.32–7.43)
PH UR STRIP: 6 [PH] (ref 5–7)
PH UR STRIP: 6 [PH] (ref 5–7)
PLATELET # BLD AUTO: 111 10E3/UL (ref 150–450)
PO2 BLDV: 54 MM HG (ref 25–47)
POTASSIUM SERPL-SCNC: 4.3 MMOL/L (ref 3.4–5.3)
PR INTERVAL - MUSE: 198 MS
PR INTERVAL - MUSE: 204 MS
PROCALCITONIN SERPL IA-MCNC: 0.17 NG/ML
PROT SERPL-MCNC: 6.3 G/DL (ref 6.4–8.3)
PROT/CREAT 24H UR: 0.27 MG/MG CR (ref 0–0.2)
QRS DURATION - MUSE: 86 MS
QRS DURATION - MUSE: 90 MS
QT - MUSE: 396 MS
QT - MUSE: 422 MS
QTC - MUSE: 408 MS
QTC - MUSE: 442 MS
R AXIS - MUSE: 41 DEGREES
R AXIS - MUSE: 68 DEGREES
RBC # BLD AUTO: 2.86 10E6/UL (ref 4.4–5.9)
RBC URINE: 1 /HPF
RBC URINE: 1 /HPF
RSV RNA SPEC NAA+PROBE: NEGATIVE
RSV RNA SPEC QL NAA+PROBE: NOT DETECTED
RSV RNA SPEC QL NAA+PROBE: NOT DETECTED
RV+EV RNA SPEC QL NAA+PROBE: NOT DETECTED
S PNEUM AG SPEC QL: NEGATIVE
SAO2 % BLDV: 88 % (ref 70–75)
SARS-COV-2 RNA RESP QL NAA+PROBE: NEGATIVE
SODIUM SERPL-SCNC: 135 MMOL/L (ref 135–145)
SODIUM SERPL-SCNC: 138 MMOL/L (ref 135–145)
SODIUM UR-SCNC: 37 MMOL/L
SP GR UR STRIP: 1.01 (ref 1–1.03)
SP GR UR STRIP: 1.01 (ref 1–1.03)
SYSTOLIC BLOOD PRESSURE - MUSE: NORMAL MMHG
SYSTOLIC BLOOD PRESSURE - MUSE: NORMAL MMHG
T AXIS - MUSE: 58 DEGREES
T AXIS - MUSE: 80 DEGREES
TRIGL SERPL-MCNC: 115 MG/DL
TROPONIN T SERPL HS-MCNC: 52 NG/L
TROPONIN T SERPL HS-MCNC: 60 NG/L
TSH SERPL DL<=0.005 MIU/L-ACNC: 0.7 UIU/ML (ref 0.3–4.2)
UROBILINOGEN UR STRIP-MCNC: NORMAL MG/DL
UROBILINOGEN UR STRIP-MCNC: NORMAL MG/DL
VENTRICULAR RATE- MUSE: 64 BPM
VENTRICULAR RATE- MUSE: 66 BPM
WBC # BLD AUTO: 8 10E3/UL (ref 4–11)
WBC URINE: 0 /HPF
WBC URINE: <1 /HPF

## 2024-04-16 PROCEDURE — 87637 SARSCOV2&INF A&B&RSV AMP PRB: CPT | Performed by: EMERGENCY MEDICINE

## 2024-04-16 PROCEDURE — 87899 AGENT NOS ASSAY W/OPTIC: CPT

## 2024-04-16 PROCEDURE — 81001 URINALYSIS AUTO W/SCOPE: CPT

## 2024-04-16 PROCEDURE — 93010 ELECTROCARDIOGRAM REPORT: CPT | Performed by: EMERGENCY MEDICINE

## 2024-04-16 PROCEDURE — 94640 AIRWAY INHALATION TREATMENT: CPT

## 2024-04-16 PROCEDURE — 258N000003 HC RX IP 258 OP 636: Performed by: EMERGENCY MEDICINE

## 2024-04-16 PROCEDURE — 36415 COLL VENOUS BLD VENIPUNCTURE: CPT

## 2024-04-16 PROCEDURE — 250N000011 HC RX IP 250 OP 636: Performed by: EMERGENCY MEDICINE

## 2024-04-16 PROCEDURE — 99223 1ST HOSP IP/OBS HIGH 75: CPT | Mod: AI | Performed by: STUDENT IN AN ORGANIZED HEALTH CARE EDUCATION/TRAINING PROGRAM

## 2024-04-16 PROCEDURE — G0378 HOSPITAL OBSERVATION PER HR: HCPCS

## 2024-04-16 PROCEDURE — 84156 ASSAY OF PROTEIN URINE: CPT

## 2024-04-16 PROCEDURE — 83735 ASSAY OF MAGNESIUM: CPT | Performed by: EMERGENCY MEDICINE

## 2024-04-16 PROCEDURE — 80053 COMPREHEN METABOLIC PANEL: CPT | Performed by: EMERGENCY MEDICINE

## 2024-04-16 PROCEDURE — 83880 ASSAY OF NATRIURETIC PEPTIDE: CPT | Performed by: EMERGENCY MEDICINE

## 2024-04-16 PROCEDURE — 250N000013 HC RX MED GY IP 250 OP 250 PS 637: Performed by: EMERGENCY MEDICINE

## 2024-04-16 PROCEDURE — 250N000013 HC RX MED GY IP 250 OP 250 PS 637

## 2024-04-16 PROCEDURE — 82570 ASSAY OF URINE CREATININE: CPT

## 2024-04-16 PROCEDURE — 87040 BLOOD CULTURE FOR BACTERIA: CPT | Performed by: EMERGENCY MEDICINE

## 2024-04-16 PROCEDURE — 84443 ASSAY THYROID STIM HORMONE: CPT | Performed by: EMERGENCY MEDICINE

## 2024-04-16 PROCEDURE — 84295 ASSAY OF SERUM SODIUM: CPT | Performed by: STUDENT IN AN ORGANIZED HEALTH CARE EDUCATION/TRAINING PROGRAM

## 2024-04-16 PROCEDURE — 82565 ASSAY OF CREATININE: CPT | Performed by: STUDENT IN AN ORGANIZED HEALTH CARE EDUCATION/TRAINING PROGRAM

## 2024-04-16 PROCEDURE — 84145 PROCALCITONIN (PCT): CPT | Performed by: EMERGENCY MEDICINE

## 2024-04-16 PROCEDURE — 71046 X-RAY EXAM CHEST 2 VIEWS: CPT | Mod: 26 | Performed by: RADIOLOGY

## 2024-04-16 PROCEDURE — 76770 US EXAM ABDO BACK WALL COMP: CPT | Mod: 26 | Performed by: RADIOLOGY

## 2024-04-16 PROCEDURE — 76770 US EXAM ABDO BACK WALL COMP: CPT

## 2024-04-16 PROCEDURE — 87205 SMEAR GRAM STAIN: CPT

## 2024-04-16 PROCEDURE — 84484 ASSAY OF TROPONIN QUANT: CPT | Performed by: EMERGENCY MEDICINE

## 2024-04-16 PROCEDURE — 93005 ELECTROCARDIOGRAM TRACING: CPT | Performed by: EMERGENCY MEDICINE

## 2024-04-16 PROCEDURE — 87633 RESP VIRUS 12-25 TARGETS: CPT

## 2024-04-16 PROCEDURE — 99285 EMERGENCY DEPT VISIT HI MDM: CPT | Mod: 25 | Performed by: EMERGENCY MEDICINE

## 2024-04-16 PROCEDURE — 85048 AUTOMATED LEUKOCYTE COUNT: CPT | Performed by: EMERGENCY MEDICINE

## 2024-04-16 PROCEDURE — 250N000009 HC RX 250

## 2024-04-16 PROCEDURE — 81001 URINALYSIS AUTO W/SCOPE: CPT | Performed by: EMERGENCY MEDICINE

## 2024-04-16 PROCEDURE — 84484 ASSAY OF TROPONIN QUANT: CPT

## 2024-04-16 PROCEDURE — 96365 THER/PROPH/DIAG IV INF INIT: CPT

## 2024-04-16 PROCEDURE — 999N000157 HC STATISTIC RCP TIME EA 10 MIN

## 2024-04-16 PROCEDURE — 83718 ASSAY OF LIPOPROTEIN: CPT

## 2024-04-16 PROCEDURE — 120N000005 HC R&B MS OVERFLOW UMMC

## 2024-04-16 PROCEDURE — 36415 COLL VENOUS BLD VENIPUNCTURE: CPT | Performed by: EMERGENCY MEDICINE

## 2024-04-16 PROCEDURE — 82803 BLOOD GASES ANY COMBINATION: CPT

## 2024-04-16 PROCEDURE — 87040 BLOOD CULTURE FOR BACTERIA: CPT

## 2024-04-16 PROCEDURE — 71046 X-RAY EXAM CHEST 2 VIEWS: CPT

## 2024-04-16 PROCEDURE — 99222 1ST HOSP IP/OBS MODERATE 55: CPT | Mod: GC | Performed by: STUDENT IN AN ORGANIZED HEALTH CARE EDUCATION/TRAINING PROGRAM

## 2024-04-16 RX ORDER — ACETAMINOPHEN 325 MG/1
325-650 TABLET ORAL EVERY 6 HOURS PRN
Status: DISCONTINUED | OUTPATIENT
Start: 2024-04-16 | End: 2024-04-18 | Stop reason: HOSPADM

## 2024-04-16 RX ORDER — ALBUTEROL SULFATE 0.83 MG/ML
2.5 SOLUTION RESPIRATORY (INHALATION)
Status: DISCONTINUED | OUTPATIENT
Start: 2024-04-16 | End: 2024-04-18 | Stop reason: HOSPADM

## 2024-04-16 RX ORDER — LIDOCAINE 40 MG/G
CREAM TOPICAL
Status: DISCONTINUED | OUTPATIENT
Start: 2024-04-16 | End: 2024-04-18 | Stop reason: HOSPADM

## 2024-04-16 RX ORDER — CALCIUM CARBONATE 500 MG/1
1000 TABLET, CHEWABLE ORAL 4 TIMES DAILY PRN
Status: DISCONTINUED | OUTPATIENT
Start: 2024-04-16 | End: 2024-04-18 | Stop reason: HOSPADM

## 2024-04-16 RX ORDER — VITS A,C,E/LUTEIN/MINERALS 300MCG-200
1 TABLET ORAL DAILY
Status: DISCONTINUED | OUTPATIENT
Start: 2024-04-17 | End: 2024-04-18 | Stop reason: HOSPADM

## 2024-04-16 RX ORDER — ACETYLCYSTEINE 200 MG/ML
2 SOLUTION ORAL; RESPIRATORY (INHALATION) EVERY 4 HOURS
Status: DISCONTINUED | OUTPATIENT
Start: 2024-04-16 | End: 2024-04-16

## 2024-04-16 RX ORDER — AZITHROMYCIN 250 MG/1
500 TABLET, FILM COATED ORAL ONCE
Status: COMPLETED | OUTPATIENT
Start: 2024-04-16 | End: 2024-04-16

## 2024-04-16 RX ORDER — CEFTRIAXONE 2 G/1
2 INJECTION, POWDER, FOR SOLUTION INTRAMUSCULAR; INTRAVENOUS EVERY 24 HOURS
Status: DISCONTINUED | OUTPATIENT
Start: 2024-04-17 | End: 2024-04-17

## 2024-04-16 RX ORDER — AMOXICILLIN 250 MG
1 CAPSULE ORAL 2 TIMES DAILY PRN
Status: DISCONTINUED | OUTPATIENT
Start: 2024-04-16 | End: 2024-04-18 | Stop reason: HOSPADM

## 2024-04-16 RX ORDER — PROCHLORPERAZINE MALEATE 5 MG
5 TABLET ORAL EVERY 6 HOURS PRN
Status: DISCONTINUED | OUTPATIENT
Start: 2024-04-16 | End: 2024-04-18 | Stop reason: HOSPADM

## 2024-04-16 RX ORDER — CEFTRIAXONE 1 G/1
1 INJECTION, POWDER, FOR SOLUTION INTRAMUSCULAR; INTRAVENOUS ONCE
Status: COMPLETED | OUTPATIENT
Start: 2024-04-16 | End: 2024-04-16

## 2024-04-16 RX ORDER — BENZONATATE 100 MG/1
100 CAPSULE ORAL 3 TIMES DAILY PRN
Status: DISCONTINUED | OUTPATIENT
Start: 2024-04-16 | End: 2024-04-18 | Stop reason: HOSPADM

## 2024-04-16 RX ORDER — ONDANSETRON 4 MG/1
4 TABLET, ORALLY DISINTEGRATING ORAL EVERY 6 HOURS PRN
Status: DISCONTINUED | OUTPATIENT
Start: 2024-04-16 | End: 2024-04-18 | Stop reason: HOSPADM

## 2024-04-16 RX ORDER — ONDANSETRON 2 MG/ML
4 INJECTION INTRAMUSCULAR; INTRAVENOUS EVERY 6 HOURS PRN
Status: DISCONTINUED | OUTPATIENT
Start: 2024-04-16 | End: 2024-04-18 | Stop reason: HOSPADM

## 2024-04-16 RX ORDER — PROCHLORPERAZINE 25 MG
12.5 SUPPOSITORY, RECTAL RECTAL EVERY 12 HOURS PRN
Status: DISCONTINUED | OUTPATIENT
Start: 2024-04-16 | End: 2024-04-18 | Stop reason: HOSPADM

## 2024-04-16 RX ORDER — VITAMIN B COMPLEX
25 TABLET ORAL DAILY
Status: DISCONTINUED | OUTPATIENT
Start: 2024-04-17 | End: 2024-04-18 | Stop reason: HOSPADM

## 2024-04-16 RX ORDER — UBIDECARENONE 75 MG
100 CAPSULE ORAL DAILY
Status: DISCONTINUED | OUTPATIENT
Start: 2024-04-17 | End: 2024-04-18 | Stop reason: HOSPADM

## 2024-04-16 RX ORDER — ALBUTEROL SULFATE 0.83 MG/ML
2.5 SOLUTION RESPIRATORY (INHALATION) EVERY 4 HOURS PRN
Status: DISCONTINUED | OUTPATIENT
Start: 2024-04-16 | End: 2024-04-16

## 2024-04-16 RX ORDER — SALIVA STIMULANT COMB. NO.3
1 SPRAY, NON-AEROSOL (ML) MUCOUS MEMBRANE 4 TIMES DAILY PRN
Status: DISCONTINUED | OUTPATIENT
Start: 2024-04-16 | End: 2024-04-18 | Stop reason: HOSPADM

## 2024-04-16 RX ORDER — AMOXICILLIN 250 MG
2 CAPSULE ORAL 2 TIMES DAILY PRN
Status: DISCONTINUED | OUTPATIENT
Start: 2024-04-16 | End: 2024-04-18 | Stop reason: HOSPADM

## 2024-04-16 RX ORDER — POLYETHYLENE GLYCOL 3350 17 G/17G
17 POWDER, FOR SOLUTION ORAL 2 TIMES DAILY PRN
Status: DISCONTINUED | OUTPATIENT
Start: 2024-04-16 | End: 2024-04-18 | Stop reason: HOSPADM

## 2024-04-16 RX ORDER — ACETYLCYSTEINE 200 MG/ML
2 SOLUTION ORAL; RESPIRATORY (INHALATION) 2 TIMES DAILY
Status: DISCONTINUED | OUTPATIENT
Start: 2024-04-16 | End: 2024-04-18 | Stop reason: HOSPADM

## 2024-04-16 RX ORDER — LAMOTRIGINE 200 MG/1
200 TABLET ORAL 2 TIMES DAILY
Status: DISCONTINUED | OUTPATIENT
Start: 2024-04-16 | End: 2024-04-18 | Stop reason: HOSPADM

## 2024-04-16 RX ORDER — SIMVASTATIN 10 MG
20 TABLET ORAL AT BEDTIME
Status: DISCONTINUED | OUTPATIENT
Start: 2024-04-16 | End: 2024-04-18 | Stop reason: HOSPADM

## 2024-04-16 RX ORDER — CEFTRIAXONE 2 G/1
2 INJECTION, POWDER, FOR SOLUTION INTRAMUSCULAR; INTRAVENOUS EVERY 24 HOURS
Status: DISCONTINUED | OUTPATIENT
Start: 2024-04-17 | End: 2024-04-16

## 2024-04-16 RX ORDER — ESCITALOPRAM OXALATE 10 MG/1
20 TABLET ORAL EVERY MORNING
Status: DISCONTINUED | OUTPATIENT
Start: 2024-04-17 | End: 2024-04-18 | Stop reason: HOSPADM

## 2024-04-16 RX ORDER — TAMSULOSIN HYDROCHLORIDE 0.4 MG/1
0.8 CAPSULE ORAL DAILY
Status: DISCONTINUED | OUTPATIENT
Start: 2024-04-16 | End: 2024-04-18 | Stop reason: HOSPADM

## 2024-04-16 RX ADMIN — AZITHROMYCIN DIHYDRATE 500 MG: 250 TABLET ORAL at 14:04

## 2024-04-16 RX ADMIN — CEFTRIAXONE SODIUM 1 G: 1 INJECTION, POWDER, FOR SOLUTION INTRAMUSCULAR; INTRAVENOUS at 13:32

## 2024-04-16 RX ADMIN — SODIUM CHLORIDE 250 ML: 9 INJECTION, SOLUTION INTRAVENOUS at 11:10

## 2024-04-16 RX ADMIN — LAMOTRIGINE 200 MG: 200 TABLET ORAL at 20:51

## 2024-04-16 RX ADMIN — SIMVASTATIN 20 MG: 20 TABLET, FILM COATED ORAL at 21:00

## 2024-04-16 RX ADMIN — ALBUTEROL SULFATE 2.5 MG: 2.5 SOLUTION RESPIRATORY (INHALATION) at 20:28

## 2024-04-16 RX ADMIN — ACETYLCYSTEINE 2 ML: 200 SOLUTION ORAL; RESPIRATORY (INHALATION) at 20:28

## 2024-04-16 ASSESSMENT — ACTIVITIES OF DAILY LIVING (ADL)
ADLS_ACUITY_SCORE: 35

## 2024-04-16 ASSESSMENT — COLUMBIA-SUICIDE SEVERITY RATING SCALE - C-SSRS
1. IN THE PAST MONTH, HAVE YOU WISHED YOU WERE DEAD OR WISHED YOU COULD GO TO SLEEP AND NOT WAKE UP?: NO
2. HAVE YOU ACTUALLY HAD ANY THOUGHTS OF KILLING YOURSELF IN THE PAST MONTH?: NO
6. HAVE YOU EVER DONE ANYTHING, STARTED TO DO ANYTHING, OR PREPARED TO DO ANYTHING TO END YOUR LIFE?: NO

## 2024-04-16 NOTE — ED PROVIDER NOTES
ED Provider Note  [facility]      History     Chief Complaint   Patient presents with    Shortness of Breath    Palpitations     HPI  Melo Dangelo is a 83-year-old male with a history of IgA nephropathy, stage IV chronic kidney disease, alcohol dependence in remission and paroxysmal atrial fibrillation who presents to the emergency department with cough and palpitations.  He reports for the last 2 days he has had significant rhinorrhea and noted a sore throat.  He has had a productive cough as well.  He feels that he has had thick sputum has not noted any changes in color hemoptysis.  Today he checked his pulse and noted that he seemed to be skipping a beat.  He also was feeling somewhat lightheaded.  He called EMS who brought him to the emergency department.  He denies any chest pain.  He does report intermittent shortness of breath.  He denies any recent fevers.  He has not had any vomiting, abdominal pain, diarrhea.  He has not noted any increased welling in his lower extremities.  Denies any recent trips or travel.  He has not had any changes in medication.  He does drink 8 cups of coffee a day.  He denies any alcohol use.        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     Paroxysmal atrial fibrillation (H)     PVD (posterior vitreous detachment)     Seizure disorder (H)     last seizure 2008     Past Surgical History:   Procedure Laterality Date    NO HISTORY OF SURGERY      PHACOEMULSIFICATION CLEAR CORNEA WITH STANDARD INTRAOCULAR LENS IMPLANT Right 11/3/2022    Procedure: RIGHT EYE  PHACOEMULSIFICATION, CATARACT, WITH INTRAOCULAR LENS IMPLANT COMPLEX CASE ;  Surgeon: Best Padilla MD;  Location: [facility] OR    PHACOEMULSIFICATION CLEAR CORNEA WITH STANDARD INTRAOCULAR LENS IMPLANT Left 11/17/2022    Procedure: LEFT EYE PHACOEMULSIFICATION, CATARACT, WITH INTRAOCULAR LENS IMPLANT;  Surgeon: Best Padilla MD;  Location: [facility] OR     acetaminophen (TYLENOL) 325 MG tablet  calcium carbonate-vitamin D (OSCAL) 500-5 MG-MCG tablet  Cholecalciferol (D3-1000) 25 MCG (1000 UT) CAPS  cyanocobalamin (VITAMIN B-12) 100 MCG tablet  escitalopram (LEXAPRO) 20 MG tablet  lamoTRIgine (LAMICTAL) 100 MG tablet  metoprolol succinate ER (TOPROL XL) 25 MG 24 hr tablet  Multiple Vitamins-Minerals (OCULAR VITAMINS) TABS  simvastatin (ZOCOR) 20 MG tablet  tamsulosin (FLOMAX) 0.4 MG capsule      No Known Allergies  Family History  Family History   Problem Relation Age of Onset    Glaucoma No family hx of     Macular Degeneration No family hx of     Anesthesia Reaction No family hx of     Deep Vein Thrombosis (DVT) No family hx of      Social History   Social History     Tobacco Use    Smoking status: Former     Types: Cigarettes    Smokeless tobacco: Never   Vaping Use    Vaping status: Never Used   Substance Use Topics    Alcohol use: No     Comment: History of alcohol dependence, in remission for 20 years    Drug use: No      Past medical history, past surgical history, medications, allergies, family history, and social history were reviewed with the patient. No additional pertinent items.      A medically appropriate review of systems was performed with pertinent positives and negatives noted in the HPI, and all other systems negative.    Physical Exam   BP: 129/67  Pulse: 68  Temp: 98.5  F (36.9  C)  Resp: 16  SpO2: 95 %  Physical Exam  General: patient is alert and oriented and in no acute distress, saturations borderline 91-93%  Head: atraumatic and normocephalic   EENT: tacky mucus membranes  without tonsillar erythema or exudates, pupils round and reactive   Neck: supple   Cardiovascular: regular rate and rhythm, extremities warm and well perfused, no lower extremity edema  Pulmonary: crackles and coarse breath sounds bilaterally, no wheezing  Abdomen: soft, non-tender   Musculoskeletal: normal range of motion   Neurological: alert and oriented, moving all extremities symmetrically, gait normal   Skin: warm, dry     ED Course, Procedures, & Data        Procedures            EKG Interpretation:      Interpreted by Mary Merlos MD  Time reviewed: 1006  Symptoms at time of EKG: dyspnea   Rhythm: normal sinus   Rate: Normal  Axis: Normal  Ectopy: premature ventricular contractions (frequent)  Conduction: normal  ST Segments/ T Waves: No acute ischemic changes  Q Waves: none  Comparison to prior: frequent PVCs    Clinical Impression: normal EKG with frequent PVCs       No results found for any visits on 04/16/24.  Medications - No data to display  Labs Ordered and Resulted from Time of ED Arrival to Time of ED Departure - No data to display  No orders to display          Critical care was not performed.     Medical Decision Making  The patient's presentation was of high complexity (an acute health issue posing potential threat to life or bodily function).    The patient's evaluation involved:  ordering and/or review of 3+ test(s) in this encounter (see separate area of note for details)  discussion of management or test interpretation with another health professional (IM. Nephrology)    The patient's management necessitated moderate risk (prescription drug management including medications given in the ED) and high risk (a decision regarding hospitalization).    Assessment & Plan    83-year-old male with history of IgA nephropathy who presents to the emergency department due to increased cough and palpitations.  He is noted to be hemodynamically stable, afebrile and in no respiratory distress.  His  ECG shows sinus rhythm with frequent PVCs, no QT prolongation or evidence of high degree AV block.  Differential diagnosis includes but is not limited to viral URI, pneumonia, dysrhythmia, anemia, electrolyte derangement.  Laboratory evaluation shows uptrending creatinine to 5.5, troponin 60, BNP just short of 3000.  His bicarb is 19, no other significant electrolyte abnormalities, no leukocytosis, hemoglobin stable at 8.7, normal lactate.  Chest x-ray shows left lower lobe infiltrate.  Bedside cardiac ultrasound shows grossly normal function.  He does become quite short of breath with any activity and desaturations to 90%.  I did discuss with nephrology and recommended holding on diuresis at this time.  He was given ceftriaxone and azithromycin.  Plan to admit to medicine for ongoing management.    I have reviewed the nursing notes. I have reviewed the findings, diagnosis, plan and need for follow up with the patient.    New Prescriptions    No medications on file       Final diagnoses:   Pneumonia of left lower lobe due to infectious organism   Elevated troponin   Acute kidney injury superimposed on CKD  (H24)   Frequent PVCs   Acute respiratory failure with hypoxia (H)     I, Fernando Mendoza, am serving as a trained medical scribe to document services personally performed by Mary Merlos MD, based on the provider's statements to me.      I, Mary Merlos MD, was physically present and have reviewed and verified the accuracy of this note documented by Fernando Mendoza.     Mary Merlos MD  [facility] EMERGENCY DEPARTMENT  4/16/2024        Mary Merlos MD  04/16/24 [id number]

## 2024-04-16 NOTE — ED NOTES
Bed: ED15  Expected date:   Expected time:   Means of arrival:   Comments:  H412 83M  Weakness, cough, EKG changes

## 2024-04-16 NOTE — ED TRIAGE NOTES
BIBA from home. Called today shortness of breath and arrhythmia. A few days ago pt. Started having persistent hacking cough, was up last night due to cough. Also felt like heart was skipping a beat, every 3rd beat per pt. Report. Mild shortness of breath with exertion, sore throat, and fatigue with movement. Frequent PVCs, (non-perfusing). Watching kidneys for kidney failure. .

## 2024-04-16 NOTE — CONSULTS
Nephrology Initial Consult  April 16, 2024      Melo Dangelo MRN:[id number] YOB: 1940  Date of Admission:4/16/2024  Primary care provider: Francis Smith  Requesting physician: Jose Back DO    ASSESSMENT AND RECOMMENDATIONS:   Melo Dangelo is a 83-year-old male with a history of IgA nephropathy, stage IV chronic kidney disease, alcohol dependence in remission and paroxysmal atrial fibrillation who presents to the emergency department with cough and palpitations. He was found to have MARYANN on CKD.     # IgA nephropathy [id number]   # CKD stage 5  Baseline Cr appears to be 4.0-4.5. he presented with cr 5.51 in the setting URI and decreased oral intake. Etiology of the MARYANN is likely secondary to hemodynamic instability. CXR shows possible LLL pneumonia.   --HOLD diuretics   --continue with supportive care  --agree with pneumonia treatments     Hypertension   A-fib  May continue metoprolol if allowed by BP     Electrolytes :  Normal sodium and K     BMD :  Ca 8.7, Phos and PTH not done  --obtain PTH, phos, and vit D levels     Metabolic acidosis :  Bicarb 19, mildly low.   -continue to monitor     Anemia   Hgb 8.7, stable   -obtain; iron studies, folate, b12     Other problems  URI  Pneumonia   On broad spectrum abx       Recommendations were communicated to primary team via verbal/note    Seen and discussed with Dr. Diony Fletcher MD   Division of Renal Disease and Hypertension  [facility]  [email or web address]  Vocera Web Console      REASON FOR CONSULT: MARYANN on CKD     HISTORY OF PRESENT ILLNESS:  Admitting provider and nursing notes reviewed  Melo Dangelo is a 83-year-old male with a history of IgA nephropathy, stage IV chronic kidney disease, alcohol dependence in remission and paroxysmal atrial fibrillation who presents to the emergency department with cough and palpitations.      Patient reports having rhinorrhea, SOB, cough, and sore throat. He reports having yellow thick mucous  without blood. He reports having lightheadedness and palpitations. He denies any fever, nausea, or vomiting. He denies any abd pain, diarrhea, or constipation.     Patient has long history of IgA nephropathy that progressed to CKD5 despite multiple rounds of immunosuppressive treatments. Patient and his nephrologist has discussed the possibility of initiating RRT. Patient was agreeable to PD treatments. However, there is was no immediate indication for dialysis. His baseline cr appears to be in 4s. He presented with MARYANN on CKD which is likely secondary to hemodynamic instability due to decrease intake.       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     Paroxysmal atrial fibrillation (H)     PVD (posterior vitreous detachment)     Seizure disorder (H)     last seizure 2008       Past Surgical History:   Procedure Laterality Date    NO HISTORY OF SURGERY      PHACOEMULSIFICATION CLEAR CORNEA WITH STANDARD INTRAOCULAR LENS IMPLANT Right 11/3/2022    Procedure: RIGHT EYE PHACOEMULSIFICATION, CATARACT, WITH INTRAOCULAR LENS IMPLANT COMPLEX CASE ;  Surgeon: Best Padilla MD;  [place] [facility] [place]    PHACOEMULSIFICATION CLEAR CORNEA WITH STANDARD INTRAOCULAR LENS IMPLANT Left 11/17/2022    Procedure: LEFT EYE PHACOEMULSIFICATION, CATARACT, WITH INTRAOCULAR LENS IMPLANT;  Surgeon: Best Padilla MD;  Location: [facility] [place]        MEDICATIONS:  PTA Meds  Prior to Admission medications    Medication Sig Last Dose Taking? Auth Provider Long Term End Date   acetaminophen  (TYLENOL) 325 MG tablet Take 325-650 mg by mouth every 6 hours as needed for mild pain More than a month Yes Reported, Patient     calcium carbonate-vitamin D (OSCAL) 500-5 MG-MCG tablet TAKE 1 TABLET BY MOUTH DAILY 4/15/2024 at 1200 Yes Francis Smith MD     Cholecalciferol (D3-1000) 25 MCG (1000 UT) CAPS Take 1 capsule by mouth daily 4/16/2024 at 0800 Yes Reported, Patient     cyanocobalamin (VITAMIN B-12) 100 MCG tablet Take 1 tablet (100 mcg) by mouth daily Take 1,000 mcg by mouth every morning - 4/16/2024 at 0800 Yes Francis Smith MD     escitalopram (LEXAPRO) 20 MG tablet Take 1 tablet (20 mg) by mouth every morning 4/16/2024 at 0800 Yes Francis Smith MD Yes    lamoTRIgine (LAMICTAL) 100 MG tablet Take 2 tablets (200 mg) by mouth 2 times daily 4/16/2024 at 0800 Yes Francis Smith MD Yes    metoprolol succinate ER (TOPROL XL) 25 MG 24 hr tablet Take 0.5 tablets (12.5 mg) by mouth daily TAKE ONE-HALF TABLET BY MOUTH DAILY 4/16/2024 at 0800 Yes Francis Smith MD Yes    Multiple Vitamins-Minerals (OCULAR VITAMINS) TABS TAKE 1 TABLET BY MOUTH DAILY 4/16/2024 at 0800 Yes Francis Smith MD     simvastatin (ZOCOR) 20 MG tablet Take 1 tablet (20 mg) by mouth at bedtime 4/15/2024 at 2200 Yes Francis Smith MD Yes    tamsulosin (FLOMAX) 0.4 MG capsule Take 2 capsules (0.8 mg) by mouth daily 4/15/2024 at 2200 Yes Francis Smith MD        Current Meds  Current Facility-Administered Medications   Medication Dose Route Frequency Provider Last Rate Last Admin    acetylcysteine (MUCOMYST) 20 % nebulizer solution 2 mL  2 mL Nebulization BID Kevin Flores MD        albuterol (PROVENTIL) neb solution 2.5 mg  2.5 mg Nebulization 2 times daily Kevin Flores MD        [START ON 4/17/2024] azithromycin (ZITHROMAX) 500 mg in sodium chloride 0.9 % 250 mL intermittent infusion  500 mg Intravenous Q24H Kevin Flores MD        calcium carbonate-vitamin D (OSCAL) 500-5 MG-MCG per  tablet 1 tablet  1 tablet Oral Daily Kevin Flores MD        [START ON 4/17/2024] cefTRIAXone (ROCEPHIN) 2 g vial to attach to  ml bag for ADULTS or NS 50 ml bag for PEDS  2 g Intravenous Q24H Kevin Flores MD        [START ON 4/17/2024] cyanocobalamin (VITAMIN B-12) tablet 100 mcg  100 mcg Oral Daily Kevin Flores MD        [START ON 4/17/2024] escitalopram (LEXAPRO) tablet 20 mg  20 mg Oral QAM Kevin Flores MD        lamoTRIgine (LaMICtal) tablet 200 mg  200 mg Oral BID Kevin Flores MD        [START ON 4/17/2024] metoprolol succinate ER (TOPROL-XL) 24 hr half-tab 12.5 mg  12.5 mg Oral Daily Kevin Flores MD        [START ON 4/17/2024] multivitamin (OCUVITE) tablet 1 tablet  1 tablet Oral Daily Kevin Flores MD        simvastatin (ZOCOR) tablet 20 mg  20 mg Oral At Bedtime Kevin Flores MD        sodium chloride (PF) 0.9% PF flush 3 mL  3 mL Intracatheter Q8H Kevin Flores MD   3 mL at 04/16/24 1622    tamsulosin (FLOMAX) capsule 0.8 mg  0.8 mg Oral Daily Kevin Flores MD        [START ON 4/17/2024] Vitamin D3 (CHOLECALCIFEROL) tablet 25 mcg  25 mcg Oral Daily Kevin Flores MD         Infusion Meds  Current Facility-Administered Medications   Medication Dose Route Frequency Provider Last Rate Last Admin       ALLERGIES:    No Known Allergies    REVIEW OF SYSTEMS:  A comprehensive of systems was negative except as noted above.    SOCIAL HISTORY:   Social History     Socioeconomic History    Marital status:      Spouse name: Not on file    Number of children: Not on file    Years of education: Not on file    Highest education level: Bachelor's degree (e.g., BA, AB, BS)   Occupational History    Not on file   Tobacco Use    Smoking status: Former     Types: Cigarettes    Smokeless tobacco: Never   Vaping Use    Vaping status: Never Used   Substance and Sexual Activity    Alcohol use: No     Comment: History of alcohol dependence, in remission for 20  years    Drug use: No    Sexual activity: Not Currently   Other Topics Concern    Not on file   Social History Narrative    Not on file     Social Determinants of Health     Financial Resource Strain: Low Risk  (7/21/2020)    Overall Financial Resource Strain (CARDIA)     Difficulty of Paying Living Expenses: Not hard at all   Food Insecurity: No Food Insecurity (7/21/2020)    Hunger Vital Sign     Worried About Running Out of Food in the Last Year: Never true     Ran Out of Food in the Last Year: Never true   Transportation Needs: No Transportation Needs (7/21/2020)    PRAPARE - Transportation     Lack of Transportation (Medical): No     Lack of Transportation (Non-Medical): No   Physical Activity: Insufficiently Active (7/21/2020)    Exercise Vital Sign     Days of Exercise per Week: 2 days     Minutes of Exercise per Session: 10 min   Stress: No Stress Concern Present (7/21/2020)    [employer] - Occupational Stress Questionnaire     Feeling of Stress : Not at all   Social Connections: Moderately Integrated (7/21/2020)    Social Connection and Isolation Panel [NHANES]     Frequency of Communication with Friends and Family: Three times a week     Frequency of Social Gatherings with Friends and Family: Once a week     Attends Religion Services: Never     Active Member of Clubs or Organizations: No     Attends Club or Organization Meetings: 1 to 4 times per year     Marital Status:    Interpersonal Safety: Not on file   Housing Stability: Low Risk  (7/21/2020)    Housing Stability Vital Sign     Unable to Pay for Housing in the Last Year: No     Number of Places Lived in the Last Year: 1     Unstable Housing in the Last Year: No     Reviewed with patient   No family accompanies Melo Dangelo in hospital room    FAMILY MEDICAL HISTORY:   Family History   Problem Relation Age of Onset    Glaucoma No family hx of     Macular Degeneration No family hx of     Anesthesia Reaction No  family hx of     Deep Vein Thrombosis (DVT) No family hx of      Unknown Family history of kidney disease  Reviewed with patient     PHYSICAL EXAM:   Temp  Av.4  F (36.9  C)  Min: 98.2  F (36.8  C)  Max: 98.5  F (36.9  C)      Pulse  Av.9  Min: 66  Max: 76 Resp  Av.3  Min: 16  Max: 18  SpO2  Av.4 %  Min: 94 %  Max: 97 %       /67   Pulse 68   Temp 98.2  F (36.8  C) (Oral)   Resp 18   SpO2 97%    Date 24 0700 - 24 0659   Shift 4317-4436 0982-7213 7969-4912 24 Hour Total   INTAKE   Shift Total       OUTPUT   Urine  325  325   Shift Total  325  325   Weight (kg)          Admit       GENERAL APPEARANCE: no distress,  awake  EYES: no scleral icterus, pupils equal  Lymphatics: no cervical or supraclavicular LAD  Pulmonary: lungs clear to auscultation with equal breath sounds bilaterally, no clubbing  CV: regular rhythm, normal rate, no rub   - JVD -ve   - Edema -ve  GI: soft, nontender, normal bowel sounds  MS: no evidence of inflammation in joints, no muscle tenderness  : no pompa  SKIN: no rash, warm, dry, no cyanosis  NEURO: face symmetric, no asterixis     LABS:   I have reviewed the following labs:  CMP  Recent Labs   Lab 24  1028      POTASSIUM 4.3   CHLORIDE 102   CO2 19*   ANIONGAP 14   *   BUN 55.4*   CR 5.51*   GFRESTIMATED 10*   GIANNI 8.7*   MAG 2.2   PROTTOTAL 6.3*   ALBUMIN 4.0   BILITOTAL 0.4   ALKPHOS 61   AST 18   ALT 11     CBC  Recent Labs   Lab 24  1028   HGB 8.7*   WBC 8.0   RBC 2.86*   HCT 26.0*   MCV 91   MCH 30.4   MCHC 33.5   RDW 13.6   *     INRNo lab results found in last 7 days.  ABGNo lab results found in last 7 days.   URINE STUDIES  Recent Labs   Lab Test 24  1334 23  0939 23  0922 23  1042   COLOR Light Yellow  Light Yellow Light Yellow Light Yellow Light Yellow   APPEARANCE Clear  Clear Clear Clear Clear   URINEGLC Negative  Negative Negative Negative 30*   URINEBILI Negative  Negative  Negative Negative Negative   URINEKETONE Negative  Negative Negative Negative Negative   SG 1.012  1.012 1.014 1.017 1.016   UBLD Trace*  Trace* Negative Small* Small*   URINEPH 6.0  6.0 6.0 5.5 5.5   PROTEIN 30*  30* 20* 30* 20*   NITRITE Negative  Negative Negative Negative Negative   LEUKEST Negative  Negative Negative Negative Negative   RBCU 1  1 <1 2 2   WBCU 0  <1 1 <1 <1     Recent Labs   Lab Test 06/02/22  0956 04/25/22  1529 02/17/22  1041 01/11/22  1011 12/08/21  1118 11/29/21  1546 10/18/21  2126   UTPG 0.31* 0.40* 0.37* 0.48* 0.31* 0.46* 0.69*     PTH  Recent Labs   Lab Test 12/11/23  0930 09/15/22  0733 02/17/22  1025 10/20/21  0646   PTHI 95* 74* 58 78     IRON STUDIES  Recent Labs   Lab Test 12/11/23  0930 06/01/23  1036 01/19/23  1254 09/15/22  0733 02/17/22  1024 10/19/21  0552 10/15/21  1023 10/27/20  2008   IRON 77 83 95 88 78  78 72 81  --     276 287 277 297 246 331  --    IRONSAT 28 30 33 32 26 29 24  --    ALBERT 263 257 247 405 351 242 260 444*       IMAGING:  All imaging studies reviewed by me.     Nrianjan Fletcher MD

## 2024-04-16 NOTE — ED TRIAGE NOTES
Triage Assessment (Adult)       Row Name 04/16/24 1011          Triage Assessment    Airway WDL WDL        Respiratory WDL    Respiratory WDL X;rhythm/pattern     Rhythm/Pattern, Respiratory shortness of breath        Skin Circulation/Temperature WDL    Skin Circulation/Temperature WDL WDL        Cardiac WDL    Cardiac WDL X  PVCs and increased fatigue per pt. report        Peripheral/Neurovascular WDL    Peripheral Neurovascular WDL WDL        Cognitive/Neuro/Behavioral WDL    Cognitive/Neuro/Behavioral WDL WDL

## 2024-04-16 NOTE — H&P
Resident/Fellow Attestation   I, Kevin Flores MD, was present with the medical/KASSANDRA student who participated in the service and in the documentation of the note.  I have verified the history and personally performed the physical exam and medical decision making.  I agree with the assessment and plan of care as documented in the note.      Kevin Flores MD  PGY2  Date of Service (when I saw the patient): 04/16/24    [facility]    History and Physical - Medicine Service, [facility] TEAM 5       Date of Admission:  4/16/2024    Assessment & Plan      Melo Dangelo is a 83 year old male admitted on 4/16/2024. He has a past medical history of IgA nephropathy, CKD Stage IV, anemia 2/2 CKD, paroxysmal atrial fibrillation, and seizure disorder. He presented with 3-4 days of progressive cough, dyspnea on exertion, decreasing urine output, found to have an MARYANN on CKD with parainfluenza virus and likely superimposed CAP, admitted for further management.    # MARYANN on CKD Stage 5  # NAGMA  # Uremia  # IgA nephropathy  Follows outpatient with Dr. Barrow. Was diagnosed with IgA nephropathy after presenting with gross hematuria in 2021. Kidney biopsy at that point showed IgA nephropathy. Baseline creatinine initially improved with a new baseline of 3.3 mg/dL, has continued to worsen over last few months (4.0-4.4 mg/dL). GFR is now 12-13 with minimal proteinuria and no microscopic hematuria. Determined to be a good candidate for PD, threshold for initiation is set for GFR<10. As of 3/11, Melo had opted to defer surgery planning and preferred continuing with monthly labs. Creatinine at admission 5.51. UA unremarkable. Etiology of MARYANN most likely pre-renal given given decreased PO intake, alternatives include intrinsic renal insults leading to nephrotic/nephritic syndrome including new infectious vs. rheumatologic conditions.  - Nephrology consulted appreciate recommendations   -  Hold diuretics   - Continue supportive care  - FeNa pending  - Renal US without hydronephrosis. Findings c/w CKD.  - Monitor electrolytes  - Lipid panel w normal triglycerides, cholesterol, and LDL, decreased HDL    # Parainfluenza virus  # Community acquired pneumonia  Presenting with 4-5 days with initial URI symptoms worsening to increased productive cough with associated dyspnea on exertion and easier fatiguability. Denies pleurisy. No fevers or leukocytosis. Viral panel positive for parainfluenza virus 3. Focally diminished breath sounds c/f superimposed bacterial pneumonia. CURB-65 of 2.  - Ceftriaxone (4/16-20) and azithromycin (4/16-4/18)  - Sputum cultures pending  - Blood cultures NGTD.  - Strep pneumo and legionella urine antigen pending  - Pulmonary toilet scheduled BID.  - Tessalon     # Symptomatic PVCs  # History of paroxysmal atrial fibrillation  Noted palpitations during the day 4/16 which prompted presentation. CHADS-VASC 3 for age and hypertension. No anticoagulation PTA. Symptomatic PVCs during the day 4/16 likely related to stress response to renal injury. Low suspicion for PE given sating well on room air without tachycardia or tachypnea, will continue to monitor clinically and reassess if destabilizes.  - Continue metoprolol succinate 12.5 mg qday  - POCUS echo limited read pending    # Hypertension  Not meeting blood pressure goals (130/90) with nephro PTA.  - Metoprolol as above    #  Hypocalcemia  8.7 on admission. Consider secondary hyperparathyroidism 2/2 renal dysfunction vs. Vitamin D deficiency  - Phos, PTH, and Vitmain D panel pending    # Acute on chronic anemia  Baseline hemoglobin 9-9.5.8.7 at admission. Likely 2/2 CKD. Iron studies have historically been WNL.  - Iron and iron binding pending    # Acute on chronic thrombocytopenia  Baseline 130-145  in 3 months PTA. 111 at admission.   - CTM      # Headache  Not overly bothersome  - PRN tylenol    # Elevated troponin,  peaked  Normal EKG. Has variable chest pain that has been waxing and waning and appears to be positional than related to exertion. Notes palpitations as above. Low suspicion for ACS. May be related to renal insufficiency with inadequate troponin clearance.    # Left knee pain  Chronic problem, not overly bothersome today.  - PRN tylenol available, could consider voltaren gel if flares    Chronic problems:      #BPH   - Continue PTA tamsulosin    # History of seizure disorder  - Continue PTA lamotrigine, per pharmacy no indication for dose adjustment with renal impairment    # Depression  Working to manage grief after the recent loss of his wife.   - Continue PTA escitalopram        Diet: Advance Diet as Tolerated: Clear Liquid Diet    DVT Prophylaxis: Low Risk/Ambulatory with no VTE prophylaxis indicated  Barclay Catheter: Not present  Fluids: none  Lines: None     Cardiac Monitoring: None  Code Status: No CPR- Pre-arrest intubation OK      Clinically Significant Risk Factors Present on Admission                # Thrombocytopenia: Lowest platelets = 111 in last 2 days, will monitor for bleeding  # Acute Kidney Injury, unspecified: based on a >150% or 0.3 mg/dL increase in last creatinine compared to past 90 day average, will monitor renal function                  Disposition Plan      Expected Discharge Date: 04/17/2024                The patient's care was discussed with the Attending Physician, Dr. Back .      Carmen Lua  Medical Student [employer] 5  ______________________________________________________________________    Chief Complaint   Palpitations    History is obtained from the patient    History of Present Illness   Melo Dangelo is a 83 year old male who presents with palpitations. He was in his usual state of isa until 4/12 when he developed sore throat. His symptoms progressed since with increasingly aggressive and productive cough. No recent fevers. He has occasional chest pain on the left  side of his anterior chest wall, seem to be more related to shoulder pain and positioning than to exertion. No pleurisy or orthopnea. Some dyspnea with exertion and notes he is more easily fatigued overall. Starting 4/16 he began to notice that he felt like his heart was skipping a beat. Notes some associated lightheadedness but no near-syncope, worsening chest pain or dyspnea associated with the palpitations specifically.  He has also had decreased appetite and notes decreased PO hydration. He has had slightly decreased urine output without blood or urine color change. No dysuria or urgency. Endorses stable nocturia and ability to initiate urine stream. Patient has noticed some increased LE edema above his socks. Endorses stable headache. Denies vision changes, new weakness, abdominal pain, diarrhea/constipation, N/V.    Lives in a condo independently. Ambulates without assistive devices. No alcohol, tobacco, or drug use.      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     Paroxysmal atrial fibrillation (H)     PVD (posterior vitreous detachment)     Seizure disorder (H)     last seizure 2008       Past Surgical History   Past Surgical History:   Procedure Laterality Date    NO HISTORY OF SURGERY      PHACOEMULSIFICATION CLEAR CORNEA WITH STANDARD INTRAOCULAR LENS IMPLANT Right 11/3/2022    Procedure: RIGHT EYE PHACOEMULSIFICATION, CATARACT, WITH INTRAOCULAR LENS IMPLANT COMPLEX CASE ;  Surgeon: Best Padilla MD;  Location: [facility] OR    PHACOEMULSIFICATION CLEAR CORNEA WITH STANDARD INTRAOCULAR LENS IMPLANT  Left 11/17/2022    Procedure: LEFT EYE PHACOEMULSIFICATION, CATARACT, WITH INTRAOCULAR LENS IMPLANT;  Surgeon: Best Padilla MD;  Location: [facility] OR       Prior to Admission Medications   Prior to Admission Medications   Prescriptions Last Dose Informant Patient Reported? Taking?   Cholecalciferol (D3-1000) 25 MCG (1000 UT) CAPS 4/16/2024 at 0800  Yes Yes   Sig: Take 1 capsule by mouth daily   Multiple Vitamins-Minerals (OCULAR VITAMINS) TABS 4/16/2024 at 0800  No Yes   Sig: TAKE 1 TABLET BY MOUTH DAILY   acetaminophen (TYLENOL) 325 MG tablet More than a month  Yes Yes   Sig: Take 325-650 mg by mouth every 6 hours as needed for mild pain   calcium carbonate-vitamin D (OSCAL) 500-5 MG-MCG tablet 4/15/2024 at 1200  No Yes   Sig: TAKE 1 TABLET BY MOUTH DAILY   cyanocobalamin (VITAMIN B-12) 100 MCG tablet 4/16/2024 at 0800  No Yes   Sig: Take 1 tablet (100 mcg) by mouth daily Take 1,000 mcg by mouth every morning -   escitalopram (LEXAPRO) 20 MG tablet 4/16/2024 at 0800  No Yes   Sig: Take 1 tablet (20 mg) by mouth every morning   lamoTRIgine (LAMICTAL) 100 MG tablet 4/16/2024 at 0800  No Yes   Sig: Take 2 tablets (200 mg) by mouth 2 times daily   metoprolol succinate ER (TOPROL XL) 25 MG 24 hr tablet 4/16/2024 at 0800  No Yes   Sig: Take 0.5 tablets (12.5 mg) by mouth daily TAKE ONE-HALF TABLET BY MOUTH DAILY   simvastatin (ZOCOR) 20 MG tablet 4/15/2024 at 2200  No Yes   Sig: Take 1 tablet (20 mg) by mouth at bedtime   tamsulosin (FLOMAX) 0.4 MG capsule 4/15/2024 at 2200  No Yes   Sig: Take 2 capsules (0.8 mg) by mouth daily      Facility-Administered Medications: None        Social History   I have reviewed this patient's social history and updated it with pertinent information if needed.  Social History     Tobacco Use    Smoking status: Former     Types: Cigarettes    Smokeless tobacco: Never   Vaping Use    Vaping status: Never Used   Substance Use Topics    Alcohol use: No     Comment: History of  alcohol dependence, in remission for 20 years    Drug use: No         Family History         Allergies   No Known Allergies     Physical Exam   Vital Signs: Temp: 98.2  F (36.8  C) Temp src: Oral BP: 133/75 Pulse: 76   Resp: 18 SpO2: 96 % O2 Device: None (Room air)    Weight: 0 lbs 0 oz    General Appearance: Sitting in bed. NAD. Non-toxic.  Respiratory: Normal WOB on RA. Intermittent cough. No wheezles or crackles to auscultation. Diminished breath sounds on LLL. No tenderness to palpation R anterior chest wall.   Cardiovascular: RRR. No murmurs appreciated. No LE edema.  GI: Bowel sounds present. No tenderness to palpation.  Skin: No rashes or lesions on visualzied skin  Other: Alert and oriented to conversation. No word finding difficulty. No dysarthria. Pupils equal in size. Normal EOM. Facial muscles full and symmetric, no facial droop. 5/5 elbow flexion/extension, hand , hip flexion, ankle flexion.     Medical Decision Making           Data     I have personally reviewed the following data over the past 24 hrs:    8.0  \   8.7 (L)   / 111 (L)     135 102 55.4 (H) /  126 (H)   4.3 19 (L) 5.51 (H) \     ALT: 11 AST: 18 AP: 61 TBILI: 0.4   ALB: 4.0 TOT PROTEIN: 6.3 (L) LIPASE: N/A     Trop: 52 (H) BNP: 2,997 (H)     TSH: 0.70 T4: N/A A1C: N/A     Procal: 0.17 CRP: N/A Lactic Acid: 0.6         Imaging results reviewed over the past 24 hrs:   Recent Results (from the past 24 hour(s))   XR Chest 2 Views    Narrative    Exam: XR CHEST 2 VIEWS, 4/16/2024 10:47 AM    Indication: cough    Comparison: 1/20/2022 CT    Findings:   2 views of the chest. Aortic atherosclerosis.. No pneumothorax or  pleural effusion. Left basilar volume loss and airspace opacity in the  left lung base lingular in the lateral view though also partially  silhouetting the left hemidiaphragm.      Impression    Impression: Findings suspicious for lingular/left lower lobe  pneumonia.    I have personally reviewed the examination and initial  interpretation  and I agree with the findings.    VIRGEN VOGT MD         SYSTEM ID:  [id number]   US Renal Complete Non-Vascular    Narrative    EXAMINATION: US RENAL COMPLETE NON-VASCULAR, 4/16/2024 4:40 PM     COMPARISON: 11/2/2021    HISTORY: MARYANN on CKD    TECHNIQUE: The kidneys and bladder were scanned in the standard  fashion with specialized ultrasound transducer(s) using both gray  scale and limited color/spectral Doppler techniques.    FINDINGS:    Right kidney: Measures 8.0 cm in length. Mildly atrophic with  diffusely echogenic parenchyma. No focal mass. No hydronephrosis.    Left kidney: Measures 9.7 cm in length. Mildly atrophic with diffusely  echogenic parenchyma. No focal mass. No hydronephrosis.     Bladder: Minimally distended and unremarkable.      Impression    IMPRESSION:  Findings compatible with chronic kidney disease. No hydronephrosis.    I have personally reviewed the examination and initial interpretation  and I agree with the findings.    MARI POPE DO         SYSTEM ID:  [id number]

## 2024-04-17 LAB
ALBUMIN SERPL BCG-MCNC: 3.8 G/DL (ref 3.5–5.2)
ALP SERPL-CCNC: 53 U/L (ref 40–150)
ALT SERPL W P-5'-P-CCNC: 8 U/L (ref 0–70)
ANION GAP SERPL CALCULATED.3IONS-SCNC: 12 MMOL/L (ref 7–15)
AST SERPL W P-5'-P-CCNC: 20 U/L (ref 0–45)
BILIRUB DIRECT SERPL-MCNC: <0.2 MG/DL (ref 0–0.3)
BILIRUB SERPL-MCNC: 0.3 MG/DL
BUN SERPL-MCNC: 56.4 MG/DL (ref 8–23)
CALCIUM SERPL-MCNC: 8.8 MG/DL (ref 8.8–10.2)
CHLORIDE SERPL-SCNC: 107 MMOL/L (ref 98–107)
CREAT SERPL-MCNC: 5.37 MG/DL (ref 0.67–1.17)
DEPRECATED HCO3 PLAS-SCNC: 20 MMOL/L (ref 22–29)
EGFRCR SERPLBLD CKD-EPI 2021: 10 ML/MIN/1.73M2
ERYTHROCYTE [DISTWIDTH] IN BLOOD BY AUTOMATED COUNT: 13.8 % (ref 10–15)
FERRITIN SERPL-MCNC: 223 NG/ML (ref 31–409)
FOLATE SERPL-MCNC: 16.6 NG/ML (ref 4.6–34.8)
GLUCOSE SERPL-MCNC: 89 MG/DL (ref 70–99)
HCT VFR BLD AUTO: 26.8 % (ref 40–53)
HGB BLD-MCNC: 8.6 G/DL (ref 13.3–17.7)
IRON BINDING CAPACITY (ROCHE): 223 UG/DL (ref 240–430)
IRON SATN MFR SERPL: 12 % (ref 15–46)
IRON SERPL-MCNC: 27 UG/DL (ref 61–157)
MAGNESIUM SERPL-MCNC: 2.4 MG/DL (ref 1.7–2.3)
MCH RBC QN AUTO: 30.1 PG (ref 26.5–33)
MCHC RBC AUTO-ENTMCNC: 32.1 G/DL (ref 31.5–36.5)
MCV RBC AUTO: 94 FL (ref 78–100)
PHOSPHATE SERPL-MCNC: 3.9 MG/DL (ref 2.5–4.5)
PLATELET # BLD AUTO: 106 10E3/UL (ref 150–450)
POTASSIUM SERPL-SCNC: 4.7 MMOL/L (ref 3.4–5.3)
PROT SERPL-MCNC: 6.3 G/DL (ref 6.4–8.3)
PTH-INTACT SERPL-MCNC: 101 PG/ML (ref 15–65)
RBC # BLD AUTO: 2.86 10E6/UL (ref 4.4–5.9)
SODIUM SERPL-SCNC: 139 MMOL/L (ref 135–145)
VIT B12 SERPL-MCNC: 1045 PG/ML (ref 232–1245)
WBC # BLD AUTO: 5.6 10E3/UL (ref 4–11)

## 2024-04-17 PROCEDURE — 82607 VITAMIN B-12: CPT

## 2024-04-17 PROCEDURE — 82746 ASSAY OF FOLIC ACID SERUM: CPT

## 2024-04-17 PROCEDURE — 94640 AIRWAY INHALATION TREATMENT: CPT

## 2024-04-17 PROCEDURE — 80053 COMPREHEN METABOLIC PANEL: CPT

## 2024-04-17 PROCEDURE — 99233 SBSQ HOSP IP/OBS HIGH 50: CPT | Mod: GC | Performed by: STUDENT IN AN ORGANIZED HEALTH CARE EDUCATION/TRAINING PROGRAM

## 2024-04-17 PROCEDURE — 250N000011 HC RX IP 250 OP 636

## 2024-04-17 PROCEDURE — 82306 VITAMIN D 25 HYDROXY: CPT

## 2024-04-17 PROCEDURE — 250N000013 HC RX MED GY IP 250 OP 250 PS 637: Performed by: STUDENT IN AN ORGANIZED HEALTH CARE EDUCATION/TRAINING PROGRAM

## 2024-04-17 PROCEDURE — 120N000005 HC R&B MS OVERFLOW UMMC

## 2024-04-17 PROCEDURE — 99232 SBSQ HOSP IP/OBS MODERATE 35: CPT | Mod: GC | Performed by: STUDENT IN AN ORGANIZED HEALTH CARE EDUCATION/TRAINING PROGRAM

## 2024-04-17 PROCEDURE — 94640 AIRWAY INHALATION TREATMENT: CPT | Mod: 76

## 2024-04-17 PROCEDURE — 999N000157 HC STATISTIC RCP TIME EA 10 MIN

## 2024-04-17 PROCEDURE — 83540 ASSAY OF IRON: CPT

## 2024-04-17 PROCEDURE — 999N000128 HC STATISTIC PERIPHERAL IV START W/O US GUIDANCE

## 2024-04-17 PROCEDURE — 82728 ASSAY OF FERRITIN: CPT

## 2024-04-17 PROCEDURE — 250N000011 HC RX IP 250 OP 636: Performed by: STUDENT IN AN ORGANIZED HEALTH CARE EDUCATION/TRAINING PROGRAM

## 2024-04-17 PROCEDURE — 250N000013 HC RX MED GY IP 250 OP 250 PS 637

## 2024-04-17 PROCEDURE — 36415 COLL VENOUS BLD VENIPUNCTURE: CPT

## 2024-04-17 PROCEDURE — 83550 IRON BINDING TEST: CPT

## 2024-04-17 PROCEDURE — 83970 ASSAY OF PARATHORMONE: CPT

## 2024-04-17 PROCEDURE — 258N000003 HC RX IP 258 OP 636

## 2024-04-17 PROCEDURE — 83735 ASSAY OF MAGNESIUM: CPT

## 2024-04-17 PROCEDURE — 250N000009 HC RX 250

## 2024-04-17 PROCEDURE — 84100 ASSAY OF PHOSPHORUS: CPT

## 2024-04-17 PROCEDURE — 85027 COMPLETE CBC AUTOMATED: CPT

## 2024-04-17 RX ORDER — AMOXICILLIN AND CLAVULANATE POTASSIUM 250; 125 MG/1; MG/1
1 TABLET, FILM COATED ORAL DAILY
Qty: 6 TABLET | Refills: 0 | Status: DISCONTINUED | OUTPATIENT
Start: 2024-04-18 | End: 2024-04-18 | Stop reason: HOSPADM

## 2024-04-17 RX ORDER — AZITHROMYCIN 250 MG/1
500 TABLET, FILM COATED ORAL DAILY
Status: COMPLETED | OUTPATIENT
Start: 2024-04-18 | End: 2024-04-18

## 2024-04-17 RX ORDER — SODIUM BICARBONATE 650 MG/1
650 TABLET ORAL 2 TIMES DAILY
Status: DISCONTINUED | OUTPATIENT
Start: 2024-04-17 | End: 2024-04-18 | Stop reason: HOSPADM

## 2024-04-17 RX ORDER — BUSPIRONE HYDROCHLORIDE 5 MG/1
5 TABLET ORAL 2 TIMES DAILY
COMMUNITY

## 2024-04-17 RX ADMIN — ESCITALOPRAM OXALATE 20 MG: 10 TABLET ORAL at 08:20

## 2024-04-17 RX ADMIN — TAMSULOSIN HYDROCHLORIDE 0.8 MG: 0.4 CAPSULE ORAL at 09:59

## 2024-04-17 RX ADMIN — ACETYLCYSTEINE 2 ML: 200 SOLUTION ORAL; RESPIRATORY (INHALATION) at 20:33

## 2024-04-17 RX ADMIN — ALBUTEROL SULFATE 2.5 MG: 2.5 SOLUTION RESPIRATORY (INHALATION) at 20:33

## 2024-04-17 RX ADMIN — SODIUM BICARBONATE 650 MG TABLET 650 MG: at 20:14

## 2024-04-17 RX ADMIN — Medication 1 TABLET: at 12:00

## 2024-04-17 RX ADMIN — Medication 12.5 MG: at 11:57

## 2024-04-17 RX ADMIN — VITAM B12 100 MCG: 100 TAB at 11:59

## 2024-04-17 RX ADMIN — LAMOTRIGINE 200 MG: 200 TABLET ORAL at 20:14

## 2024-04-17 RX ADMIN — LAMOTRIGINE 200 MG: 200 TABLET ORAL at 11:59

## 2024-04-17 RX ADMIN — Medication 1 TABLET: at 08:21

## 2024-04-17 RX ADMIN — ACETYLCYSTEINE 2 ML: 200 SOLUTION ORAL; RESPIRATORY (INHALATION) at 08:31

## 2024-04-17 RX ADMIN — CEFTRIAXONE SODIUM 2 G: 2 INJECTION, POWDER, FOR SOLUTION INTRAMUSCULAR; INTRAVENOUS at 12:38

## 2024-04-17 RX ADMIN — Medication 25 MCG: at 08:22

## 2024-04-17 RX ADMIN — SIMVASTATIN 20 MG: 20 TABLET, FILM COATED ORAL at 22:45

## 2024-04-17 RX ADMIN — AZITHROMYCIN MONOHYDRATE 500 MG: 500 INJECTION, POWDER, LYOPHILIZED, FOR SOLUTION INTRAVENOUS at 14:59

## 2024-04-17 RX ADMIN — ALBUTEROL SULFATE 2.5 MG: 2.5 SOLUTION RESPIRATORY (INHALATION) at 08:30

## 2024-04-17 ASSESSMENT — ACTIVITIES OF DAILY LIVING (ADL)
ADLS_ACUITY_SCORE: 24
ADLS_ACUITY_SCORE: 22
ADLS_ACUITY_SCORE: 24

## 2024-04-17 NOTE — PROGRESS NOTES
Nephrology Progress Note  04/17/2024         Assessment & Recommendations:   Melo Dangelo is a 83-year-old male with a history of IgA nephropathy, stage IV chronic kidney disease, alcohol dependence in remission and paroxysmal atrial fibrillation who presents to the emergency department with cough and palpitations. He was found to have MARYANN on CKD.      # IgA nephropathy [id number]   # CKD stage 5  Baseline Cr appears to be 4.0-4.5. he presented with cr 5.51 in the setting URI and decreased oral intake. Etiology of the MARYANN is likely secondary to hemodynamic instability. CXR shows possible LLL pneumonia.   --HOLD diuretics   --continue with supportive care  --agree with pneumonia treatments   --follow with outpatient nephrology; discuss PD cath placement arrangement and PD initiation with Pushpa.   --there is no urgent indication of RRT at this time.       Hypertension   A-fib  May continue metoprolol if allowed by BP      Electrolytes :  Normal sodium and K      BMD :  Ca 8.7, Phos and PTH not done  --obtain PTH, phos, and vit D levels      Metabolic acidosis :  Bicarb 19-20, mildly low.   -continue to monitor   -start bicarb 650 mg bid      Anemia   Hgb 8.6, stable. Normal folate and B12. Iron 27 with sat 12.   The etiology of anemia include SITA and CKD. In the setting of infection iron infusion should be avoid. However, may consider oral iron.      Other problems  URI  Pneumonia   On broad spectrum abx         Recommendations were communicated to primary team via verbal/note    Seen and discussed with Dr. Diony Fletcher MD   Division of Renal Disease and Hypertension  [facility]  [email or web address]  Vocera Web Console    Interval History :   Nursing and provider notes from last 24 hours reviewed.  In the last 24 hours Melo Dangelo has been clinically improving. SOB and coughing is improving. Renal function is stable. He is agreeable to initiating PD.   Review of Systems:   I reviewed the following  systems:  GI: no change appetite. no nausea or vomiting or diarrhea.   Neuro:  no confusion  Constitutional:  no fever or chills  CV: mild dyspnea, no edema.  no chest pain.    Physical Exam:   I/O last 3 completed shifts:  In: 420 [P.O.:420]  Out: 725 [Urine:725]   /58 (BP Location: Right arm)   Pulse 65   Temp 98  F (36.7  C) (Oral)   Resp 18   Wt 78.3 kg (172 lb 11.2 oz)   SpO2 98%   BMI 25.49 kg/m       GENERAL APPEARANCE: alert and oriented  EYES:  no scleral icterus, pupils equal  PULM: lungs bibasilar rackles to auscultation bilaterally, equal air movement, no clubbing  CV: regular rhythm, normal rate, no rub     -JVD -ve     -edema -ve   GI: soft, nontender, nondistended, bowel sounds are present  INTEGUMENT: no cyanosis, no rash  NEURO:  no asterixis   Access     Labs:   All labs reviewed by me  Electrolytes/Renal -   Recent Labs   Lab Test 04/17/24  0652 04/16/24  1542 04/16/24  1028 03/11/24  0756 02/06/24  1507 10/20/21  0646 10/19/21  0552    138 135 141 138   < > 141   POTASSIUM 4.7  --  4.3 5.0 5.0   < > 4.3   CHLORIDE 107  --  102 106 106   < > 111*   CO2 20*  --  19* 23 22   < > 23   BUN 56.4*  --  55.4* 52.7* 47.7*   < > 42*   CR 5.37* 5.39* 5.51* 4.49* 4.40*   < > 3.80*   GLC 89  --  126* 138* 88   < > 80   GIANNI 8.8  --  8.7* 9.2 9.3   < > 8.6   MAG 2.4*  --  2.2  --   --   --  2.4*   PHOS 3.9  --   --  3.9 4.5   < >  --     < > = values in this interval not displayed.       CBC -   Recent Labs   Lab Test 04/17/24  0652 04/16/24  1028 03/11/24  0755   WBC 5.6 8.0 4.7   HGB 8.6* 8.7* 9.6*   * 111* 145*       LFTs -   Recent Labs   Lab Test 04/17/24  0652 04/16/24  1028 03/11/24  0756 01/19/23  1254 01/13/23  0934   ALKPHOS 53 61  --   --  79   BILITOTAL 0.3 0.4  --   --  0.2   ALT 8 11  --   --  14   AST 20 18  --   --  21   PROTTOTAL 6.3* 6.3*  --   --  6.9   ALBUMIN 3.8 4.0 4.3   < > 4.4    < > = values in this interval not displayed.       Iron Panel -   Recent Labs   Lab  Test 04/17/24  0652 12/11/23  0930 06/01/23  1036   IRON 27* 77 83   IRONSAT 12* 28 30   ALBERT 223 263 257         Imaging:  All imaging studies reviewed by me.     Current Medications:  Current Facility-Administered Medications   Medication Dose Route Frequency Provider Last Rate Last Admin    acetylcysteine (MUCOMYST) 20 % nebulizer solution 2 mL  2 mL Nebulization BID Kevin Flores MD   2 mL at 04/17/24 0831    albuterol (PROVENTIL) neb solution 2.5 mg  2.5 mg Nebulization 2 times daily Kevin Flores MD   2.5 mg at 04/17/24 0830    amoxicillin-clavulanate (AUGMENTIN) 875-125 MG per tablet 1 tablet  1 tablet Oral BID Kevin Flores MD        azithromycin (ZITHROMAX) 500 mg in sodium chloride 0.9 % 250 mL intermittent infusion  500 mg Intravenous Q24H Kevin Flores MD   500 mg at 04/17/24 1459    calcium carbonate-vitamin D (OSCAL) 500-5 MG-MCG per tablet 1 tablet  1 tablet Oral Daily Kevin Flores MD   1 tablet at 04/17/24 0821    cyanocobalamin (VITAMIN B-12) tablet 100 mcg  100 mcg Oral Daily Kevin Flores MD   100 mcg at 04/17/24 1159    escitalopram (LEXAPRO) tablet 20 mg  20 mg Oral QAM Kevin Flores MD   20 mg at 04/17/24 0820    lamoTRIgine (LaMICtal) tablet 200 mg  200 mg Oral BID Kevin Flores MD   200 mg at 04/17/24 1159    metoprolol succinate ER (TOPROL-XL) 24 hr half-tab 12.5 mg  12.5 mg Oral Daily Kevin Flores MD   12.5 mg at 04/17/24 1157    multivitamin (OCUVITE) tablet 1 tablet  1 tablet Oral Daily Kevin Flores MD   1 tablet at 04/17/24 1200    simvastatin (ZOCOR) tablet 20 mg  20 mg Oral At Bedtime Kevin Flores MD   20 mg at 04/16/24 2100    sodium chloride (PF) 0.9% PF flush 3 mL  3 mL Intracatheter Q8H Kevin Flores MD   3 mL at 04/16/24 1622    tamsulosin (FLOMAX) capsule 0.8 mg  0.8 mg Oral Daily Kevin Flores MD   0.8 mg at 04/17/24 0959    Vitamin D3 (CHOLECALCIFEROL) tablet 25 mcg  25 mcg Oral Daily Kevin Flores MD   25  mcg at 04/17/24 0822     Current Facility-Administered Medications   Medication Dose Route Frequency Provider Last Rate Last Admin     Niranjan Fletcher MD

## 2024-04-17 NOTE — PROGRESS NOTES
Admission          4/16/2024  2350  -----------------------------------------------------------  Diagnosis: Elevated trop    Admitted from: ED  Report given from: ED RN  Via: Phone   Accompanied by: Escort   Family Aware of Admission: Yes  Belongings: Personal clothing  Admission Profile: Complete  Teaching: Orientation to unit, call don't fall, use of call light, meal times, visiting hours,  when to call for the RN (angina/sob/dizzyness, etc.), and enforced importance of safety.  Access: PIV on right hand - SL  Telemetry: Placed on pt  Ht./Wt.: Complete  2 RN skin assessment: Skin intact. Completed by PN + BL.    Temp:  [98.1  F (36.7  C)-99  F (37.2  C)] 99  F (37.2  C)  Pulse:  [66-76] 75  Resp:  [16-18] 18  BP: (115-133)/(44-90) 122/90  SpO2:  [94 %-97 %] 94 %

## 2024-04-17 NOTE — PROGRESS NOTES
[id number]    Neuro: A&Ox4  Cardiac: Occasional PVCs, HRs in the 50s-60s   Resp: Room air, denies SOB, occasional productive cough  GI/: Renal diet order, low urine output, BM 4/17/24.  Skin: WDL  Pain: Denies   Mobility: Up with assist, calls appropriately   Access: PIV on left forearm saline locked  Precautions: Droplet and contact (parainfluenza)    Plan: Continue antibiotic course, electrolyte and fluid intake/output monitoring, update the team with changes.

## 2024-04-17 NOTE — PROGRESS NOTES
Resident/Fellow Attestation   I, Kevin Flores MD, was present with the medical/KASSANDRA student who participated in the service and in the documentation of the note.  I have verified the history and personally performed the physical exam and medical decision making.  I agree with the assessment and plan of care as documented in the note.      Kevin Flores MD  PGY2  Date of Service (when I saw the patient): 04/17/24      [facility]    Progress Note - Medicine Service, [facility] TEAM 5       Date of Admission:  4/16/2024    Assessment & Plan   Melo Dangelo is a 83 year old male admitted on 4/16/2024. He has a past medical history of IgA nephropathy, CKD Stage IV, anemia 2/2 CKD, paroxysmal atrial fibrillation, and seizure disorder. He presented with 3-4 days of progressive cough, dyspnea on exertion, decreasing urine output, found to have an MARYANN on CKD with parainfluenza virus and potentially concurrent bacterial pneumonia, admitted for further management.       Today's changes  - Bicarb 650 mg BID per nephrology  - Outpatient nephrology follow up  - Switch from CTX to Augmentin (to complete 5 day course), continue azithromycin (for one more day)  - Hopeful discharge tomorrow pending stable kidney function and stability on PO abx    # MARYANN on CKD Stage 5  # NAGMA  # Uremia  # IgA nephropathy  Follows outpatient with Dr. Barrow. Was diagnosed with IgA nephropathy after presenting with gross hematuria in 2021. Kidney biopsy at that point showed IgA nephropathy. Baseline creatinine initially improved with a new baseline of 3.3 mg/dL, has continued to worsen over last few months (4.0-4.4 mg/dL). GFR is now 12-13 with minimal proteinuria and no microscopic hematuria. Determined to be a good candidate for PD, threshold for initiation is set for GFR<10. As of 3/11, Melo had opted to defer surgery planning and preferred continuing with monthly labs. Creatinine at admission  5.51. UA unremarkable. FeNa 2.4. Renal US without hydronephrosis. Findings c/w CKD. Lipid panel w normal triglycerides, cholesterol, and LDL, decreased HDL. Etiology of MARYANN most likely pre-renal given given decreased PO intake, alternatives include intrinsic renal insults leading to nephrotic/nephritic syndrome including new infectious vs. rheumatologic conditions.   - Nephrology consulted appreciate recommendations              - 650 mg bicarbonate BID              - Continue supportive care   - Follow up with PD planning to be conducted outpatient  - Monitor electrolytes  - Strict I/O     # Parainfluenza virus  # Community acquired pneumonia  Presenting with 4-5 days with initial URI symptoms worsening to increased productive cough with associated dyspnea on exertion and easier fatiguability. Denies pleurisy. No fevers or leukocytosis. Viral panel positive for parainfluenza virus 3. Focally diminished breath sounds/crackles c/f lobar bacterial pneumonia. CURB-65 of 2.  - Switch Ceftriaxone to Augmentin (end date 4/20). Continue azithromycin (end date 4/18)  - Sputum cultures with mixed chloe  - Blood cultures NGTD.  - Strep pneumo and legionella urine antigen negative  - Pulmonary toilet scheduled BID.  - Tessalon pearls PRN      # Symptomatic PVCs  # History of paroxysmal atrial fibrillation  Noted palpitations during the day 4/16 which prompted presentation. CHADS-VASC 3 for age and hypertension. No anticoagulation PTA. Symptomatic PVCs during the day 4/16 likely related to stress response to renal injury, appears to be having fewer 4/17. Feels like his heart is skipping a beat but no sudden chest pain or dyspnea. Low suspicion for PE given sating well on room air without tachycardia or tachypnea, will continue to monitor clinically and reassess if destabilizes.  - Continue metoprolol succinate 12.5 mg qday  - POCUS echo limited read pending     # Hypertension  Not meeting blood pressure goals (130/90) with  nephro PTA.  - Metoprolol as above     #  Hypocalcemia  8.7 on admission, normalized 4/17. Consider secondary hyperparathyroidism 2/2 renal dysfunction vs. Vitamin D deficiency. PTH elevated (101), normal phos.   - Vitamin D pending     # Acute on chronic normocytic anemia  Baseline hemoglobin 9-9.5.8.7 at admission. Likely 2/2 CKD. Iron studies have historically been WNL, this admission appears to be a mixed chronic disease/iron deficiency picture.     # Acute on chronic thrombocytopenia  Baseline 130-145  in 3 months PTA. 111 at admission.   - CTM     # Headache  Not overly bothersome  - PRN tylenol     # Elevated troponin, peaked  Normal EKG. Has variable chest pain that has been waxing and waning and appears to be positional than related to exertion. Notes palpitations as above. Low suspicion for ACS. May be related to renal insufficiency with inadequate troponin clearance.     # Left knee pain  Chronic problem, not overly bothersome today. May be related to or independent of renal disease.   - PRN tylenol available, could consider voltaren gel if flares     Chronic problems:     #BPH   - Continue PTA tamsulosin     # History of seizure disorder  - Continue PTA lamotrigine, per pharmacy no indication for dose adjustment with renal impairment     # Depression  Working to manage grief after the recent loss of his wife.   - Continue PTA escitalopram         Diet: Renal Diet (non-dialysis)    DVT Prophylaxis: Low Risk/Ambulatory with no VTE prophylaxis indicated  Barclay Catheter: Not present  Fluids: None  Lines: None     Cardiac Monitoring: None  Code Status: No CPR- Pre-arrest intubation OK      Clinically Significant Risk Factors Present on Admission                # Thrombocytopenia: Lowest platelets = 106 in last 2 days, will monitor for bleeding  # Acute Kidney Injury, unspecified: based on a >150% or 0.3 mg/dL increase in last creatinine compared to past 90 day average, will monitor renal function                   Disposition Plan     Expected Discharge Date: 04/18/2024                The patient's care was discussed with the Attending Physician, Dr. Back, and senior resident, Dr. Flores .    Carmen JEZ Lua  Medical Student [facility]ay[facility] 5  ______________________________________________________________________    Interval History   Doing well today. Feeling less fatigueable in short walks to the bathroom. Endorses subjective improvement in UOP. Endorses concern about palpitations.     Physical Exam   Vital Signs: Temp: 98  F (36.7  C) Temp src: Oral BP: 105/58 Pulse: 65   Resp: 18 SpO2: 98 % O2 Device: None (Room air)    Weight: 172 lbs 11.2 oz    General Appearance: Sitting in bed. NAD. Non-toxic.  Respiratory: Normal WOB on RA. R lung CTA. L lung with crackles and diminished breath sounds, more prominent basilar fields.  Cardiovascular: RRR. No murmurs appreciated. Appears to be having fewer PVCs on monitor.   GI: Bowel sounds present. Soft, non-tender, non-distended.  Skin: No rashes or lesions on visualized skin.   Other: Alert and oriented to conversation.     Medical Decision Making           Data     I have personally reviewed the following data over the past 24 hrs:    5.6  \   8.6 (L)   / 106 (L)     139 107 56.4 (H) /  89   4.7 20 (L) 5.37 (H) \     ALT: 8 AST: 20 AP: 53 TBILI: 0.3   ALB: 3.8 TOT PROTEIN: 6.3 (L) LIPASE: N/A     Ferritin:  223 % Retic:  N/A LDH:  N/A       Imaging results reviewed over the past 24 hrs:   Recent Results (from the past 24 hour(s))   US Renal Complete Non-Vascular    Narrative    EXAMINATION: US RENAL COMPLETE NON-VASCULAR, 4/16/2024 4:40 PM     COMPARISON: 11/2/2021    HISTORY: MARYANN on CKD    TECHNIQUE: The kidneys and bladder were scanned in the standard  fashion with specialized ultrasound transducer(s) using both gray  scale and limited color/spectral Doppler techniques.    FINDINGS:    Right kidney: Measures 8.0 cm in length. Mildly atrophic with  diffusely echogenic  parenchyma. No focal mass. No hydronephrosis.    Left kidney: Measures 9.7 cm in length. Mildly atrophic with diffusely  echogenic parenchyma. No focal mass. No hydronephrosis.     Bladder: Minimally distended and unremarkable.      Impression    IMPRESSION:  Findings compatible with chronic kidney disease. No hydronephrosis.    I have personally reviewed the examination and initial interpretation  and I agree with the findings.    MARI POPE DO         SYSTEM ID:  [id number]

## 2024-04-17 NOTE — PHARMACY-ADMISSION MEDICATION HISTORY
Pharmacy Intern Admission Medication History    Admission medication history is complete. The information provided in this note is only as accurate as the sources available at the time of the update.    Information Source(s): Patient via in-person    Pertinent Information:     Spoke directly with the patient and reviewed the [employer] dispense report for relevant information.     Changes made to PTA medication list:  Added:   Buspirone 5 mg tablet - Take 1 tablet by mouth twice daily. The patient has not started this medication and has not picked it up from the pharmacy. They plan to start it after discharge.   Deleted: None  Changed: None    Allergies reviewed with patient and updates made in EHR: yes    Medication History Completed By: Best Grier 4/17/2024 9:34 AM    PTA Med List   Medication Sig Note Last Dose    acetaminophen (TYLENOL) 325 MG tablet Take 325-650 mg by mouth every 6 hours as needed for mild pain  More than a month    busPIRone (BUSPAR) 5 MG tablet Take 5 mg by mouth 2 times daily 4/17/2024: Has NOT started. Plans to start after discharge.      calcium carbonate-vitamin D (OSCAL) 500-5 MG-MCG tablet TAKE 1 TABLET BY MOUTH DAILY  4/15/2024 at 1200    Cholecalciferol (D3-1000) 25 MCG (1000 UT) CAPS Take 1 capsule by mouth daily  4/16/2024 at 0800    cyanocobalamin (VITAMIN B-12) 100 MCG tablet Take 1 tablet (100 mcg) by mouth daily Take 1,000 mcg by mouth every morning -  4/16/2024 at 0800    escitalopram (LEXAPRO) 20 MG tablet Take 1 tablet (20 mg) by mouth every morning  4/16/2024 at 0800    lamoTRIgine (LAMICTAL) 100 MG tablet Take 2 tablets (200 mg) by mouth 2 times daily  4/16/2024 at 0800    metoprolol succinate ER (TOPROL XL) 25 MG 24 hr tablet Take 0.5 tablets (12.5 mg) by mouth daily TAKE ONE-HALF TABLET BY MOUTH DAILY  4/16/2024 at 0800    Multiple Vitamins-Minerals (OCULAR VITAMINS) TABS TAKE 1 TABLET BY MOUTH DAILY  4/16/2024 at 0800    simvastatin (ZOCOR) 20 MG tablet Take 1  tablet (20 mg) by mouth at bedtime  4/15/2024 at 2200    tamsulosin (FLOMAX) 0.4 MG capsule Take 2 capsules (0.8 mg) by mouth daily  4/15/2024 at 2200      Best Duong D4 student

## 2024-04-18 VITALS
OXYGEN SATURATION: 99 % | DIASTOLIC BLOOD PRESSURE: 64 MMHG | SYSTOLIC BLOOD PRESSURE: 120 MMHG | TEMPERATURE: 97.9 F | RESPIRATION RATE: 20 BRPM | BODY MASS INDEX: 25.3 KG/M2 | HEART RATE: 70 BPM | WEIGHT: 171.4 LBS

## 2024-04-18 LAB
ANION GAP SERPL CALCULATED.3IONS-SCNC: 14 MMOL/L (ref 7–15)
BUN SERPL-MCNC: 56.7 MG/DL (ref 8–23)
CALCIUM SERPL-MCNC: 8.5 MG/DL (ref 8.8–10.2)
CHLORIDE SERPL-SCNC: 108 MMOL/L (ref 98–107)
CREAT SERPL-MCNC: 5.08 MG/DL (ref 0.67–1.17)
DEPRECATED HCO3 PLAS-SCNC: 19 MMOL/L (ref 22–29)
EGFRCR SERPLBLD CKD-EPI 2021: 11 ML/MIN/1.73M2
ERYTHROCYTE [DISTWIDTH] IN BLOOD BY AUTOMATED COUNT: 13.8 % (ref 10–15)
GLUCOSE SERPL-MCNC: 107 MG/DL (ref 70–99)
HCT VFR BLD AUTO: 25.6 % (ref 40–53)
HGB BLD-MCNC: 8.5 G/DL (ref 13.3–17.7)
MAGNESIUM SERPL-MCNC: 2.4 MG/DL (ref 1.7–2.3)
MCH RBC QN AUTO: 30.9 PG (ref 26.5–33)
MCHC RBC AUTO-ENTMCNC: 33.2 G/DL (ref 31.5–36.5)
MCV RBC AUTO: 93 FL (ref 78–100)
PHOSPHATE SERPL-MCNC: 4.7 MG/DL (ref 2.5–4.5)
PLATELET # BLD AUTO: 107 10E3/UL (ref 150–450)
POTASSIUM SERPL-SCNC: 4.5 MMOL/L (ref 3.4–5.3)
RBC # BLD AUTO: 2.75 10E6/UL (ref 4.4–5.9)
SODIUM SERPL-SCNC: 141 MMOL/L (ref 135–145)
WBC # BLD AUTO: 4.7 10E3/UL (ref 4–11)

## 2024-04-18 PROCEDURE — 99232 SBSQ HOSP IP/OBS MODERATE 35: CPT | Mod: GC | Performed by: STUDENT IN AN ORGANIZED HEALTH CARE EDUCATION/TRAINING PROGRAM

## 2024-04-18 PROCEDURE — 83735 ASSAY OF MAGNESIUM: CPT

## 2024-04-18 PROCEDURE — 80048 BASIC METABOLIC PNL TOTAL CA: CPT

## 2024-04-18 PROCEDURE — 36415 COLL VENOUS BLD VENIPUNCTURE: CPT

## 2024-04-18 PROCEDURE — 99238 HOSP IP/OBS DSCHRG MGMT 30/<: CPT | Mod: GC | Performed by: STUDENT IN AN ORGANIZED HEALTH CARE EDUCATION/TRAINING PROGRAM

## 2024-04-18 PROCEDURE — 250N000013 HC RX MED GY IP 250 OP 250 PS 637

## 2024-04-18 PROCEDURE — 94640 AIRWAY INHALATION TREATMENT: CPT

## 2024-04-18 PROCEDURE — 85014 HEMATOCRIT: CPT

## 2024-04-18 PROCEDURE — 84100 ASSAY OF PHOSPHORUS: CPT

## 2024-04-18 PROCEDURE — 250N000009 HC RX 250

## 2024-04-18 PROCEDURE — 250N000013 HC RX MED GY IP 250 OP 250 PS 637: Performed by: STUDENT IN AN ORGANIZED HEALTH CARE EDUCATION/TRAINING PROGRAM

## 2024-04-18 RX ORDER — SODIUM BICARBONATE 650 MG/1
650 TABLET ORAL 2 TIMES DAILY
Qty: 90 TABLET | Refills: 0 | Status: SHIPPED | OUTPATIENT
Start: 2024-04-18 | End: 2024-06-03

## 2024-04-18 RX ORDER — BENZONATATE 100 MG/1
100 CAPSULE ORAL 3 TIMES DAILY PRN
Qty: 20 CAPSULE | Refills: 0 | Status: SHIPPED | OUTPATIENT
Start: 2024-04-18

## 2024-04-18 RX ORDER — AMOXICILLIN AND CLAVULANATE POTASSIUM 250; 125 MG/1; MG/1
1 TABLET, FILM COATED ORAL DAILY
Qty: 2 TABLET | Refills: 0 | Status: ACTIVE | OUTPATIENT
Start: 2024-04-19 | End: 2024-04-21

## 2024-04-18 RX ADMIN — ACETYLCYSTEINE 2 ML: 200 SOLUTION ORAL; RESPIRATORY (INHALATION) at 08:33

## 2024-04-18 RX ADMIN — LAMOTRIGINE 200 MG: 200 TABLET ORAL at 09:07

## 2024-04-18 RX ADMIN — Medication 12.5 MG: at 09:08

## 2024-04-18 RX ADMIN — Medication 1 TABLET: at 09:06

## 2024-04-18 RX ADMIN — TAMSULOSIN HYDROCHLORIDE 0.8 MG: 0.4 CAPSULE ORAL at 09:06

## 2024-04-18 RX ADMIN — SODIUM BICARBONATE 650 MG TABLET 650 MG: at 09:06

## 2024-04-18 RX ADMIN — AZITHROMYCIN DIHYDRATE 500 MG: 250 TABLET ORAL at 09:06

## 2024-04-18 RX ADMIN — ALBUTEROL SULFATE 2.5 MG: 2.5 SOLUTION RESPIRATORY (INHALATION) at 08:34

## 2024-04-18 RX ADMIN — ESCITALOPRAM OXALATE 20 MG: 10 TABLET ORAL at 09:06

## 2024-04-18 RX ADMIN — Medication 25 MCG: at 09:07

## 2024-04-18 RX ADMIN — AMOXICILLIN AND CLAVULANATE POTASSIUM 1 TABLET: 250; 125 TABLET, FILM COATED ORAL at 09:06

## 2024-04-18 RX ADMIN — VITAM B12 100 MCG: 100 TAB at 09:06

## 2024-04-18 RX ADMIN — Medication 1 TABLET: at 09:08

## 2024-04-18 ASSESSMENT — ACTIVITIES OF DAILY LIVING (ADL)
ADLS_ACUITY_SCORE: 24

## 2024-04-18 NOTE — PROGRESS NOTES
Hours of Care:  1900 - 0700    Changed: no changes overnight  Tele: SB/ SR w/BBB 50-90s, rare ectopy  Mobility: up ad toni    Neuro: A/O x 4. Call light appropriate. Able to make needs known.  Respiratory: On room air. Lung sounds clear, diminished in bases. IS encouraged 10x q1h, had pt demonstrate use successfully. Denies shortness of breath at rest. Some DUE when ambulating, but has improved since admission per pt.  Cardiac: VSS. Denies chest pain.  GI/: Last BM 4/17. No report of N/V. Urinating adequate amounts of clear, yellow urine.  Skin: See PCS for assessment and treatment of wounds and surgical incisions.  LDA: L PIV   Pain: Denies  Diet: renal diet    P: Potential discharge today 4/18. Continue to follow POC and report changes to [facility]ayne 5.

## 2024-04-18 NOTE — PROGRESS NOTES
Nephrology Progress Note  04/18/2024         Assessment & Recommendations:   Melo Dangelo is a 83-year-old male with a history of IgA nephropathy, stage IV chronic kidney disease, alcohol dependence in remission and paroxysmal atrial fibrillation who presents to the emergency department with cough and palpitations. He was found to have MARYANN on CKD.      # IgA nephropathy [id number]   # CKD stage 5  Baseline Cr appears to be 4.0-4.5. he presented with cr 5.51 in the setting URI and decreased oral intake. Etiology of the MARYANN is likely secondary to hemodynamic instability. CXR shows LLL pneumonia.   --continue with supportive care  --agree with pneumonia treatments   --follow with outpatient nephrology; discuss PD cath placement arrangement and PD initiation with [facility]. He has an appointment with Dr. Barrow in 2-3 weeks.   --there is no urgent indication of RRT at this time.       Hypertension   A-fib  - continue metoprolol      Electrolytes :  Normal sodium and K      BMD :  Ca 8.7, Phos 4.7and ,  vit D 38   -continue vit D supplement   -no indication for phos binders at this time       Metabolic acidosis :  Bicarb 19-20, mildly low.   -continue to monitor   -continue bicarb 650 mg bid      Anemia   Hgb 8.6, stable. Normal folate and B12. Iron 27 with sat 12.   The etiology of anemia include SITA and CKD. In the setting of infection iron infusion should be avoid. However, may consider oral iron.      Other problems  URI  Pneumonia   Respiratory panel shows parainfluenza infection  -On broad spectrum abx         Recommendations were communicated to primary team via verbal/note    Seen and discussed with Dr. Diony Fletcher MD   Division of Renal Disease and Hypertension  [facility]  [facility]  Vocera Web Console    Interval History :   Nursing and provider notes from last 24 hours reviewed.  In the last 24 hours Melo Dangelo has been clinically improving. SOB and coughing is improving. Renal  function is stable. He is agreeable to initiating PD.   Review of Systems:   I reviewed the following systems:  GI: no change appetite. no nausea or vomiting or diarrhea.   Neuro:  no confusion  Constitutional:  no fever or chills  CV: mild dyspnea, no edema.  no chest pain.    Physical Exam:   I/O last 3 completed shifts:  In: 500 [P.O.:500]  Out: 875 [Urine:875]   /64 (Cuff Size: Adult Regular)   Pulse 70   Temp 97.9  F (36.6  C) (Oral)   Resp 20   Wt 77.7 kg (171 lb 6.4 oz)   SpO2 99%   BMI 25.30 kg/m       GENERAL APPEARANCE: alert and oriented  EYES:  no scleral icterus, pupils equal  PULM: lungs bibasilar rackles to auscultation bilaterally, equal air movement, no clubbing  CV: regular rhythm, normal rate, no rub     -JVD -ve     -edema -ve   GI: soft, nontender, nondistended, bowel sounds are present  INTEGUMENT: no cyanosis, no rash  NEURO:  no asterixis   Access     Labs:   All labs reviewed by me  Electrolytes/Renal -   Recent Labs   Lab Test 04/18/24  0612 04/17/24  0652 04/16/24  1542 04/16/24  1028 03/11/24  0756    139 138 135 141   POTASSIUM 4.5 4.7  --  4.3 5.0   CHLORIDE 108* 107  --  102 106   CO2 19* 20*  --  19* 23   BUN 56.7* 56.4*  --  55.4* 52.7*   CR 5.08* 5.37* 5.39* 5.51* 4.49*   * 89  --  126* 138*   GIANNI 8.5* 8.8  --  8.7* 9.2   MAG 2.4* 2.4*  --  2.2  --    PHOS 4.7* 3.9  --   --  3.9       CBC -   Recent Labs   Lab Test 04/18/24 0612 04/17/24  0652 04/16/24  1028   WBC 4.7 5.6 8.0   HGB 8.5* 8.6* 8.7*   * 106* 111*       LFTs -   Recent Labs   Lab Test 04/17/24  0652 04/16/24  1028 03/11/24  0756 01/19/23  1254 01/13/23  0934   ALKPHOS 53 61  --   --  79   BILITOTAL 0.3 0.4  --   --  0.2   ALT 8 11  --   --  14   AST 20 18  --   --  21   PROTTOTAL 6.3* 6.3*  --   --  6.9   ALBUMIN 3.8 4.0 4.3   < > 4.4    < > = values in this interval not displayed.       Iron Panel -   Recent Labs   Lab Test 04/17/24  0652 12/11/23  0930 06/01/23  1036   IRON 27* 77 83    ALISA 12* 28 30   ALBERT 223 263 257         Imaging:  All imaging studies reviewed by me.     Current Medications:  Current Facility-Administered Medications   Medication Dose Route Frequency Provider Last Rate Last Admin    acetylcysteine (MUCOMYST) 20 % nebulizer solution 2 mL  2 mL Nebulization BID Kevin Flores MD   2 mL at 04/18/24 0833    albuterol (PROVENTIL) neb solution 2.5 mg  2.5 mg Nebulization 2 times daily Kevin Flores MD   2.5 mg at 04/18/24 0834    amoxicillin-clavulanate (AUGMENTIN) 250-125 MG per tablet 1 tablet  1 tablet Oral Daily Jose Back DO   1 tablet at 04/18/24 0906    calcium carbonate-vitamin D (OSCAL) 500-5 MG-MCG per tablet 1 tablet  1 tablet Oral Daily Kevin Flores MD   1 tablet at 04/18/24 0906    cyanocobalamin (VITAMIN B-12) tablet 100 mcg  100 mcg Oral Daily Kevin Flores MD   100 mcg at 04/18/24 0906    escitalopram (LEXAPRO) tablet 20 mg  20 mg Oral QAM Kevin Flores MD   20 mg at 04/18/24 0906    lamoTRIgine (LaMICtal) tablet 200 mg  200 mg Oral BID Kevin Flores MD   200 mg at 04/18/24 0907    metoprolol succinate ER (TOPROL-XL) 24 hr half-tab 12.5 mg  12.5 mg Oral Daily Kevin Flores MD   12.5 mg at 04/18/24 0908    multivitamin (OCUVITE) tablet 1 tablet  1 tablet Oral Daily Kevin Flores MD   1 tablet at 04/18/24 0908    simvastatin (ZOCOR) tablet 20 mg  20 mg Oral At Bedtime Kevin Flores MD   20 mg at 04/17/24 2245    sodium bicarbonate tablet 650 mg  650 mg Oral BID Jose Back DO   650 mg at 04/18/24 0906    sodium chloride (PF) 0.9% PF flush 3 mL  3 mL Intracatheter Q8H Kevin Flores MD   3 mL at 04/18/24 0410    tamsulosin (FLOMAX) capsule 0.8 mg  0.8 mg Oral Daily Kevin Flores MD   0.8 mg at 04/18/24 0906    Vitamin D3 (CHOLECALCIFEROL) tablet 25 mcg  25 mcg Oral Daily Kevin Flores MD   25 mcg at 04/18/24 0907     Current Facility-Administered Medications   Medication Dose Route Frequency  Provider Last Rate Last Admin     Niranjan Fletcher MD

## 2024-04-18 NOTE — PROGRESS NOTES
DISCHARGE   Discharged to: Home  Via: Automobile (daughter will pick him up)  Accompanied by: Family  Discharge Instructions: diet, activity, medications, follow up appointments, when to call the MD, and what to watchout for (i.e. s/s of infection, increasing SOB, palpitations, chest pain,)  Prescriptions: To be filled by discharge pharmacy per pt's request; medication list reviewed & sent with pt  Follow Up Appointments: arranged; information given  Belongings: All sent with pt  IV: out  Telemetry: off  Pt exhibits understanding of above discharge instructions; all questions answered.  Discharge Paperwork: Given to patient.    Patient walked downstairs with writer. No shortness of breath noted. Patient reported improved coughing since admission. Patient's daughter will  patient.

## 2024-04-19 ENCOUNTER — MYC MEDICAL ADVICE (OUTPATIENT)
Dept: CARE COORDINATION | Facility: CLINIC | Age: 84
End: 2024-04-19
Payer: MEDICARE

## 2024-04-19 ENCOUNTER — PATIENT OUTREACH (OUTPATIENT)
Dept: CARE COORDINATION | Facility: CLINIC | Age: 84
End: 2024-04-19
Payer: MEDICARE

## 2024-04-19 LAB
BACTERIA SPT CULT: NORMAL
GRAM STAIN RESULT: NORMAL

## 2024-04-19 NOTE — PROGRESS NOTES
Clinic Care Coordination Contact    Situation: Patient chart reviewed by care coordinator.    Background: Referred by Inpatient RN for Care Transition related to Inpatient to Outpatient on 4/18/24.     Assessment: [employer] message sent to patient offering CC services.     Plan/Recommendations: RN will await pt's reply for further outreach.     CHARLY CLARK RN on 4/19/2024 at 9:30 AM

## 2024-04-19 NOTE — DISCHARGE SUMMARY
I, Jose Back DO, was present with the medical/KASSANDRA student who participated in the service and in the documentation of the note.  I have verified the history and personally performed the physical exam and medical decision making.  I agree with the assessment and plan of care as documented in the note.     Jose Back DO 04/18/24     [facility]  Discharge Summary - Medicine & Pediatrics       Date of Admission:  4/16/2024  Date of Discharge:  4/18/2024  4:32 PM  Discharging Provider: Dr. 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  The following labs/tests are recommended: BMP   and magnesium to monitor renal function.      Follow up with your kidney doctor, Dr. Barrow, at the your scheduled   appointment with labs 5/6/24     Appointments on [place] and/or [facility] (with [facility] or [facility]   provider or service). Call [phone] if you haven't heard regarding   these appointments within 7 days of discharge.            Unresulted Labs Ordered in the Past 30 Days of this Admission       Date and Time Order Name Status Description    4/17/2024  5:00 AM 25 Hydroxyvitamin D2 and D3 In process     4/16/2024  3:25 PM Blood Culture Arm, Right Preliminary     4/16/2024  3:25 PM Blood Culture Arm, Left Preliminary     4/16/2024  3:25 PM Respiratory Aerobic Bacterial Culture with Gram Stain Preliminary     4/16/2024 12:38 PM Blood  Culture Peripheral Blood Preliminary     4/16/2024 12:38 PM Blood Culture Peripheral Blood Preliminary         These results will be followed up by PCP.    Discharge Disposition   Discharged to home  Condition at discharge: Stable    Hospital Course   Melo Dangelo is an 83 year old male with a past medical history of IgA nephropathy, CKD Stage IV, anemia 2/2 CKD, paroxysmal atrial fibrillation, and seizure disorder. He was admitted to [facility] after 3-4 days of progressive cough, dyspnea on exertion, decreasing urine output, found to have an MARYANN on CKD (Cr 5.5, recent baseline 4.0-4.5)  with parainfluenza virus and potentially concurrent bacterial pneumonia. Melo responded well to antibiotics. Nephro consulted. He had urine output restored to baseline with improving creatinine. Was deemed stable for discharge by nephro with close outpatient nephro follow up. Pt in agreement with plan.     # MARYANN on CKD Stage 5, improved  # NAGMA  # Uremia  # IgA nephropathy  Follows outpatient with Dr. Barrow. Was diagnosed with IgA nephropathy after presenting with gross hematuria in 2021. Kidney biopsy at that point showed IgA nephropathy. Baseline creatinine initially improved with a new baseline of 3.3 mg/dL, has continued to worsen over last few months (4.0-4.4 mg/dL). GFR is now 12-13 with minimal proteinuria and no microscopic hematuria. Determined to be a good candidate for PD, threshold for initiation is set for GFR<10. As of 3/11, Melo had opted to defer surgery planning and preferred continuing with monthly labs. Creatinine at admission 5.51. UA unremarkable. FeNa 2.4. Renal US without hydronephrosis. Findings c/w CKD. Lipid panel w normal triglycerides, cholesterol, and LDL, decreased HDL. Etiology of MARYANN most likely pre-renal given given decreased PO intake, alternatives include intrinsic renal insults leading to nephrotic/nephritic syndrome including new infectious vs. rheumatologic conditions.   - Nephrology  consulted appreciate recommendations              - 650 mg bicarbonate BID              - Close follow up with outpatient nephro regarding continued dialysis planning as was already being done  - I/O     # Parainfluenza virus  # Community acquired pneumonia  Presenting with 4-5 days with initial URI symptoms worsening to increased productive cough with associated dyspnea on exertion and easier fatiguability. Denies pleurisy. No fevers or leukocytosis. Viral panel positive for parainfluenza virus 3. Focally diminished breath sounds/crackles c/f lobar bacterial pneumonia. CURB-65 of 2.  - Switch Ceftriaxone to renally-dosed Augmentin (end date 4/20). Continue azithromycin (end date 4/18)  - Sputum cultures with mixed chloe  - Blood cultures NGTD.  - Strep pneumo and legionella urine antigen negative  - Pulmonary toilet scheduled BID while inpatient, no need to continue on discharge  - Tessalon pearls PRN      # Symptomatic PVCs  # History of paroxysmal atrial fibrillation  Noted palpitations during the day 4/16 which prompted presentation. CHADS-VASC 3 for age and hypertension. No anticoagulation PTA. Symptomatic PVCs during the day 4/16 likely related to stress response to renal injury, appears to be having fewer 4/17. Feels like his heart is skipping a beat but no sudden chest pain or dyspnea. Improving over hospital course, likely related to physiologic stress.  - Continue metoprolol succinate 12.5 mg qday     # Hypertension  Not meeting blood pressure goals (130/90) with nephro PTA.  - Metoprolol as above     #  Hypocalcemia  8.7 on admission, normalized 4/17. Consider secondary hyperparathyroidism 2/2 renal dysfunction vs. Vitamin D deficiency. PTH elevated (101), normal phos.   - Vitamin D pending     # Acute on chronic normocytic anemia  Baseline hemoglobin 9-9.5.8.7 at admission. Likely 2/2 CKD. Iron studies have historically been WNL, this admission appears to be a mixed chronic disease/iron deficiency  picture.     # Acute on chronic thrombocytopenia  Baseline 130-145  in 3 months PTA. 111 at admission.   - CTM     # Headache  Not overly bothersome  - PRN tylenol     # Elevated troponin, peaked  Normal EKG. Has variable chest pain that has been waxing and waning and appears to be positional than related to exertion. Notes palpitations as above. Low suspicion for ACS. May be related to renal insufficiency with inadequate troponin clearance.     # Left knee pain  Chronic problem, not overly bothersome today. May be related to or independent of renal disease.   - PRN tylenol available, could consider voltaren gel if flares     Chronic problems:     #BPH   - Continue PTA tamsulosin     # History of seizure disorder  - Continue PTA lamotrigine, per pharmacy no indication for dose adjustment with renal impairment     # Depression  Working to manage grief after the recent loss of his wife.   - Continue PTA escitalopram      Consultations This Hospital Stay   NEPHROLOGY GENERAL ADULT IP CONSULT  NURSING TO CONSULT FOR VASCULAR ACCESS CARE IP CONSULT    Code Status   Prior       The patient was discussed with Dr. Back and senior resident, Dr. Flores.    Carmen Lua  Medical Student [facility] 5   ______________________________________________________________________    Physical Exam   Vital Signs: Temp: 97.9  F (36.6  C) Temp src: Oral BP: 120/64 Pulse: 70   Resp: 20 SpO2: 99 % O2 Device: None (Room air)    Weight: 171 lbs 6.4 oz  General Appearance: Sitting upright in bed. Comfortable-appearing, NAD.  Respiratory: Normal WOB on RA. Lungs with faint crackles and mildly diminished breath sounds in LLL. No wheezes.  Cardiovascular: RRR  GI: Bowel sounds present. Soft, non-distended.  Skin: No rashes or lesions on visualized skin.  Other: Alert and oriented to conversation. No dysarthria. Moving all extremities spontaneously.        Primary Care Physician   Francis Smith    Discharge Orders      Primary Care -  Care Coordination Referral      Reason for your hospital stay    You were admitted to the hospital after feeling short of breath, experiencing palpitations, with decreased urine output. You were found to have worsening kidney damage, which improved over your admission, but remained worse than your baseline at time of discharge. We think that your kidney damage occurred because you have a viral (parainfluenza virus) and bacterial pneumonia, for which we prescribed a five day course of antibiotics. We believe the irregular heart rhythm you were experiencing was related to the stress your body endured with the kidney damage, we saw fewer early beats as your kidney function improved. You were discharged in stable condition, advised to follow up with your nephrologist.     Activity    Your activity upon discharge: activity as tolerated     Follow Up ([facility])    Follow up with primary care provider, Francis Smith, within 7 days for hospital follow- up.  The following labs/tests are recommended: BMP and magnesium to monitor renal function.      Follow up with your kidney doctor, Dr. Barrow, at the your scheduled appointment with labs 5/6/24     Appointments on [place] and/or [facility] (with [facility] or [facility] provider or service). Call [phone] if you haven't heard regarding these appointments within 7 days of discharge.     When to contact your care team    Seek immediate medical attention if you have markedly decreased urine output, cannot tolerate eating or drinking, or have worsening palpitations causing you to have chest pain or shortness of breath.     Discharge Instructions    - Continue taking Augmentin (antibiotic to treat pneumonia), one pill daily starting 4/19. Your last pill will be 4/20. Please take all pills.  - Continue taking sodium bicarbonate tablets  - Continue all other medications as they were prescribed to you prior to your hospitalization.     Diet    Follow this diet upon discharge:  Regular       Significant Results and Procedures   Most Recent 3 BMP's:  Recent Labs   Lab Test 04/18/24  0612 04/17/24  0652 04/16/24  1542 04/16/24  1028    139 138 135   POTASSIUM 4.5 4.7  --  4.3   CHLORIDE 108* 107  --  102   CO2 19* 20*  --  19*   BUN 56.7* 56.4*  --  55.4*   CR 5.08* 5.37* 5.39* 5.51*   ANIONGAP 14 12  --  14   GIANNI 8.5* 8.8  --  8.7*   * 89  --  126*     Most Recent 3 Creatinines:  Recent Labs   Lab Test 04/18/24  0612 04/17/24  0652 04/16/24  1542   CR 5.08* 5.37* 5.39*       Discharge Medications   Discharge Medication List as of 4/18/2024 12:30 PM        START taking these medications    Details   amoxicillin-clavulanate (AUGMENTIN) 250-125 MG tablet Take 1 tablet by mouth daily for 2 days, Disp-2 tablet, R-0, E-Prescribe      benzonatate (TESSALON) 100 MG capsule Take 1 capsule (100 mg) by mouth 3 times daily as needed for cough, Disp-20 capsule, R-0, E-Prescribe      sodium bicarbonate 650 MG tablet Take 1 tablet (650 mg) by mouth 2 times daily, Disp-90 tablet, R-0, E-Prescribe           CONTINUE these medications which have NOT CHANGED    Details   acetaminophen (TYLENOL) 325 MG tablet Take 325-650 mg by mouth every 6 hours as needed for mild pain, Historical      busPIRone (BUSPAR) 5 MG tablet Take 5 mg by mouth 2 times daily, Historical      calcium carbonate-vitamin D (OSCAL) 500-5 MG-MCG tablet TAKE 1 TABLET BY MOUTH DAILY, Disp-30 tablet, R-10, E-Prescribe      Cholecalciferol (D3-1000) 25 MCG (1000 UT) CAPS Take 1 capsule by mouth daily, Historical      cyanocobalamin (VITAMIN B-12) 100 MCG tablet Take 1 tablet (100 mcg) by mouth daily Take 1,000 mcg by mouth every morning -, Disp-90 tablet, R-1, E-PrescribeThis is the correct direction.      escitalopram (LEXAPRO) 20 MG tablet Take 1 tablet (20 mg) by mouth every morning, Disp-90 tablet, R-1, E-Prescribe      lamoTRIgine (LAMICTAL) 100 MG tablet Take 2 tablets (200 mg) by mouth 2 times daily, Disp-360 tablet, R-1,  E-Prescribe      metoprolol succinate ER (TOPROL XL) 25 MG 24 hr tablet Take 0.5 tablets (12.5 mg) by mouth daily TAKE ONE-HALF TABLET BY MOUTH DAILY, Disp-90 tablet, R-1, E-Prescribe      Multiple Vitamins-Minerals (OCULAR VITAMINS) TABS TAKE 1 TABLET BY MOUTH DAILY, Disp-30 tablet, R-10, E-Prescribe      simvastatin (ZOCOR) 20 MG tablet Take 1 tablet (20 mg) by mouth at bedtime, Disp-90 tablet, R-0, E-Prescribe      tamsulosin (FLOMAX) 0.4 MG capsule Take 2 capsules (0.8 mg) by mouth daily, Disp-180 capsule, R-3, E-Prescribe           Allergies   No Known Allergies

## 2024-04-20 LAB
DEPRECATED CALCIDIOL+CALCIFEROL SERPL-MC: <53 UG/L (ref 20–75)
VITAMIN D2 SERPL-MCNC: <5 UG/L
VITAMIN D3 SERPL-MCNC: 48 UG/L

## 2024-04-20 NOTE — RESULT ENCOUNTER NOTE
Result(s) reviewed by SOC triage MD. Blood cultures collected on 4/16/2024 are NGTD. No further action needed at this time.

## 2024-04-20 NOTE — RESULT ENCOUNTER NOTE
Result(s) reviewed by SOC triage MD. Total 25 OH vitamin D reported as <53 with normal range of 20-75, so it appears result could be in the normal range. Forwarding to PCP and outpatient nephrologist for further review.

## 2024-04-21 LAB
BACTERIA BLD CULT: NO GROWTH

## 2024-04-23 ENCOUNTER — PATIENT OUTREACH (OUTPATIENT)
Dept: CARE COORDINATION | Facility: CLINIC | Age: 84
End: 2024-04-23
Payer: MEDICARE

## 2024-04-23 ASSESSMENT — ACTIVITIES OF DAILY LIVING (ADL): DEPENDENT_IADLS:: INDEPENDENT

## 2024-04-23 NOTE — LETTER
E CARE COORDINATION   [facility]  [address] [place] [address]    April 23, 2024    Melo Dangelo  [address]   [place] [address]      Dear Melo,        I am a clinic care coordinator who works with Francis Smith MD with the Primary Care clinic at the [facility] I wanted to introduce myself and provide you with my contact information for you to be able to call me with any questions or concerns. I wanted to thank you for spending the time to talk with me.  Below is a description of clinic care coordination and how I can further assist you.       The clinic care coordinator nurse is a nurse who understands the health care system. The goal of clinic care coordination is to help you manage your health and improve access to the health care system. Our team works alongside your provider to assist you in determining your health and social needs. We can help you obtain health care and community resources, providing you with necessary information and education. We can work with you through any barriers and develop a care plan that helps coordinate and strengthen the communication between you and your care team. Our services are voluntary and are offered without charge to you personally.    Please feel free to contact me with any questions or concerns regarding care coordination and what we can offer.      We are focused on providing you with the highest-quality healthcare experience possible.    Sincerely,     DINH Aguirre Care Manager  Primary Care Clinic   Phone: [phone]  Fax: [phone]      Enclosed: I have enclosed a copy of a 24 Hour Access Plan. This has helpful phone numbers for you to call when needed. Please keep this in an easy to access place to use as needed. and I have enclosed a copy of the Patient Centered Plan of Care. This has helpful information and goals that we have talked about. Please keep this in an easy to access place  to use as needed.

## 2024-04-23 NOTE — PROGRESS NOTES
Clinic Care Coordination Contact  Clinic Care Coordination Contact  OUTREACH    Referral Information:  Referral Source: IP Handoff    Referred by Inpatient RN for Care Transition related to Inpatient to Outpatient on 4/18/24.     Primary Diagnosis: CKD    Chief Complaint   Patient presents with    Clinic Care Coordination - Initial        Universal Utilization: 71.1% Admission or ED Risk   Clinic Utilization  Difficulty keeping appointments:: No  Compliance Concerns: No  No-Show Concerns: No  No PCP office visit in Past Year: Yes (RN educated patient after declining visit with PCP that pt is due for annual visit with PCP for  medication refills. patient declined at this time and stated would call back to schedule this.)  Utilization      No Show Count (past year)  3             ED Visits  1             Hospital Admissions  1                    Current as of: 4/22/2024  7:59 PM                Clinical Concerns:  Current Medical Concerns:    Patient states that currently, he is struggling with making the decision to go on dialysis. Patient states that one step is the peritoneal dialysis and he will have the tube ahead of time, will have to wait for that to heal for 2 weeks, and then start using it for dialysis. Patient states that he is hesitating due to having something coming out of his body, and the fact that it would be a big change. Patient is working on this decision with nephrology and the nephrology team.     Current Behavioral Concerns: N/A    Education Provided to patient: RN provided patient with supportive listening and communication regarding patient's shared concerns.    Pain  Pain (GOAL):: No  Health Maintenance Reviewed: Due/Overdue   Health Maintenance Due   Topic Date Due    DEPRESSION ACTION PLAN  Never done    ZOSTER IMMUNIZATION (1 of 2) Never done    RSV VACCINE (Pregnancy & 60+) (1 - 1-dose 60+ series) Never done    MEDICARE ANNUAL WELLNESS VISIT  Never done    Pneumococcal Vaccine: 65+ Years (2  of 2 - PCV) 10/20/2006    MICROALBUMIN  10/18/2023       Clinical Pathway: None - CKD but patient is managing symptoms with nephrology regularly     Medication Management:  Medication review status: Medications reviewed and no changes reported per patient.           Functional Status:  Dependent ADLs:: Independent  Dependent IADLs:: Independent  Mobility Status: Independent  Fallen 2 or more times in the past year?: No  Any fall with injury in the past year?: No    Living Situation:       Lifestyle & Psychosocial Needs:    Social Determinants of Health     Food Insecurity: No Food Insecurity (7/21/2020)    Hunger Vital Sign     Worried About Running Out of Food in the Last Year: Never true     Ran Out of Food in the Last Year: Never true   Depression: Not at risk (3/11/2024)    PHQ-2     PHQ-2 Score: 1   Housing Stability: Low Risk  (7/21/2020)    Housing Stability Vital Sign     Unable to Pay for Housing in the Last Year: No     Number of Places Lived in the Last Year: 1     Unstable Housing in the Last Year: No   Tobacco Use: Medium Risk (3/11/2024)    Patient History     Smoking Tobacco Use: Former     Smokeless Tobacco Use: Never     Passive Exposure: Not on file   Financial Resource Strain: Low Risk  (7/21/2020)    Overall Financial Resource Strain (CARDIA)     Difficulty of Paying Living Expenses: Not hard at all   Alcohol Use: Not At Risk (7/21/2020)    AUDIT-C     Frequency of Alcohol Consumption: Never     Average Number of Drinks: 5 or 6     Frequency of Binge Drinking: Never   Transportation Needs: No Transportation Needs (7/21/2020)    PRAPARE - Transportation     Lack of Transportation (Medical): No     Lack of Transportation (Non-Medical): No   Physical Activity: Insufficiently Active (7/21/2020)    Exercise Vital Sign     Days of Exercise per Week: 2 days     Minutes of Exercise per Session: 10 min   Interpersonal Safety: Not on file   Stress: No Stress Concern Present (7/21/2020)    [employer] - Occupational Stress Questionnaire     Feeling of Stress : Not at all   Social Connections: Moderately Integrated (7/21/2020)    Social Connection and Isolation Panel [NHANES]     Frequency of Communication with Friends and Family: Three times a week     Frequency of Social Gatherings with Friends and Family: Once a week     Attends Anglican Services: Never     Active Member of Clubs or Organizations: No     Attends Club or Organization Meetings: 1 to 4 times per year     Marital Status:    Health Literacy: Not on file     Diet:: Regular  Inadequate nutrition (GOAL):: No  Tube Feeding: No  Inadequate activity/exercise (GOAL):: No  Significant changes in sleep pattern (GOAL): No  Transportation means:: Regular car     Anglican or spiritual beliefs that impact treatment:: No  Mental health DX:: Yes  Mental health DX how managed:: Medication  Mental health management concern (GOAL):: No  Chemical Dependency Status: No Current Concerns        Resources and Interventions:  Current Resources:      Community Resources: None  Supplies Currently Used at Home: None  Equipment Currently Used at Home: none  Employment Status: retired         Advance Care Plan/Directive  Advanced Care Plans/Directives on file:: Yes  Status of record:: On File and Validated  Type Advanced Care Plans/Directives: Advanced Directive - On File    Referrals Placed: None     Care Plan:  Care Plan: Health Maintenance       Problem: Health Maintenance Due or Overdue       Goal: Become up-to-date with health maintenance visit(s)       Start Date: 4/23/2024    This Visit's Progress: On track    Priority: High    Note:     Barriers: patient is due for annual appt with PCP  Strengths: patient is seeing providers regularly with nephrology  Patient expressed understanding of goal: yes  Action steps to achieve this goal:  1. I will schedule my annual appointment with Dr. Smith by calling [phone].                                    Patient/Caregiver understanding: Patient states understanding. Patient has no further questions or concerns regarding above assessment. Patient is aware to call the clinic and reach out to the care team with any further questions.     Outreach Frequency: monthly, more frequently as needed  Future Appointments                In 1 week UC LAB [facility] Lab [facility]    In 1 week Merry Barrow MD [facility] Nephrology Clinic [facility]    In 3 months Brittanie Etinene PA-C [facility] Heart Clinic [facility]            Plan: Patient reports having no needs currently. RN offered to call patient in 1 month to check in and stated if patient had no further needs at that time, we could graduate patient from program. Patient agreed to this plan and took writer's name and contact information for any further concerns.      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 He was admitted to [facility] after 3-4 days of progressive cough, dyspnea on exertion, decreasing urine output, found to have an MARYANN on CKD (Cr 5.5, recent baseline 4.0-4.5)  with parainfluenza virus and potentially concurrent bacterial pneumonia. Melo responded well to antibiotics. Nephro consulted. He had urine output restored to baseline with improving creatinine. Was deemed stable for discharge by nephro with close outpatient nephro follow up. Pt in agreement with plan.      # MARAYNN on CKD Stage 5, improved  # NAGMA  # Uremia  # IgA nephropathy  Follows outpatient with Dr. Barrow. Was diagnosed with IgA nephropathy after presenting with gross hematuria in 2021. Kidney biopsy at that point showed IgA nephropathy. Baseline creatinine initially improved with a new baseline of 3.3 mg/dL, has continued to worsen over last few months (4.0-4.4 mg/dL). GFR is now 12-13 with minimal proteinuria and no microscopic hematuria. Determined to be a good candidate for PD, threshold for initiation is set for GFR<10. As of 3/11, Melo had opted to defer surgery planning and preferred continuing with monthly labs. Creatinine at admission 5.51. UA unremarkable. FeNa 2.4. Renal US without hydronephrosis. Findings c/w CKD. Lipid panel w normal triglycerides, cholesterol, and LDL, decreased HDL. Etiology of MARYANN most likely pre-renal given given decreased PO intake, alternatives include intrinsic renal insults leading to nephrotic/nephritic syndrome including new infectious vs. rheumatologic conditions.   - Nephrology consulted appreciate recommendations              - 650 mg bicarbonate BID              - Close follow up with outpatient nephro regarding continued dialysis planning as was already being done  - I/O     # Parainfluenza virus  # Community acquired pneumonia  Presenting with 4-5 days with initial URI symptoms worsening to increased productive cough with  associated dyspnea on exertion and easier fatiguability. Denies pleurisy. No fevers or leukocytosis. Viral panel positive for parainfluenza virus 3. Focally diminished breath sounds/crackles c/f lobar bacterial pneumonia. CURB-65 of 2.  - Switch Ceftriaxone to renally-dosed Augmentin (end date 4/20). Continue azithromycin (end date 4/18)  - Sputum cultures with mixed chloe  - Blood cultures NGTD.  - Strep pneumo and legionella urine antigen negative  - Pulmonary toilet scheduled BID while inpatient, no need to continue on discharge  - Tessalon pearls PRN      # Symptomatic PVCs  # History of paroxysmal atrial fibrillation  Noted palpitations during the day 4/16 which prompted presentation. CHADS-VASC 3 for age and hypertension. No anticoagulation PTA. Symptomatic PVCs during the day 4/16 likely related to stress response to renal injury, appears to be having fewer 4/17. Feels like his heart is skipping a beat but no sudden chest pain or dyspnea. Improving over hospital course, likely related to physiologic stress.  - Continue metoprolol succinate 12.5 mg qday     # Hypertension  Not meeting blood pressure goals (130/90) with nephro PTA.  - Metoprolol as above     #  Hypocalcemia  8.7 on admission, normalized 4/17. Consider secondary hyperparathyroidism 2/2 renal dysfunction vs. Vitamin D deficiency. PTH elevated (101), normal phos.   - Vitamin D pending     # Acute on chronic normocytic anemia  Baseline hemoglobin 9-9.5.8.7 at admission. Likely 2/2 CKD. Iron studies have historically been WNL, this admission appears to be a mixed chronic disease/iron deficiency picture.     # Acute on chronic thrombocytopenia  Baseline 130-145  in 3 months PTA. 111 at admission.   - CTM     # Headache  Not overly bothersome  - PRN tylenol     # Elevated troponin, peaked  Normal EKG. Has variable chest pain that has been waxing and waning and appears to be positional than related to exertion. Notes palpitations as above. Low  suspicion for ACS. May be related to renal insufficiency with inadequate troponin clearance.     # Left knee pain  Chronic problem, not overly bothersome today. May be related to or independent of renal disease.   - PRN tylenol available, could consider voltaren gel if flares     Chronic problems:     #BPH   - Continue PTA tamsulosin     # History of seizure disorder  - Continue PTA lamotrigine, per pharmacy no indication for dose adjustment with renal impairment     # Depression  Working to manage grief after the recent loss of his wife.   - Continue PTA escitalopram     ASSESSMENT      Enrollment  Outreach Frequency: monthly, more frequently as needed    Discharge Assessment  How are you doing now that you are home?: patient states that is doing okay, ended up getting the flu and pneumonia but is getting on his feet again and feeling better.  How are your symptoms? (Red Flag symptoms escalate to triage hotline per guidelines): Improved  Do you feel your condition is stable enough to be safe at home until your provider visit?: Yes  Does the patient have their discharge instructions? : Yes  Does the patient have questions regarding their discharge instructions? : No  Were you started on any new medications or were there changes to any of your previous medications? : Yes  Does the patient have all of their medications?: Yes  Do you have questions regarding any of your medications? : No  Do you have all of your needed medical supplies or equipment (DME)?  (i.e. oxygen tank, CPAP, cane, etc.): Yes  Discharge follow-up appointment scheduled within 14 calendar days? : Yes  Discharge Follow Up Appointment Date: 05/06/24  Discharge Follow Up Appointment Scheduled with?: Specialty Care Provider  Is patient agreeable to assistance with scheduling? : No (RN educated patient on seeing PCP after hosptial stay. pt declined, stating would follow up with nephrology. per nephrology message to patient, would follow up with visit and  labs after hospitalization (MyChart sent).)           PLAN                                                      Outpatient Plan:  Please see above CC note- pt encouraged to schedule with PCP for health maintenance.     Future Appointments   Date Time Provider Department Center   5/6/2024  9:00 AM  LAB [facility]   5/6/2024 10:00 AM Merry Barrow MD [facility]   8/20/2024  8:30 AM Brittanie Etienne PA-C [facility]         For any urgent concerns, please contact our 24 hour nurse triage line: [phone]       CHARLY CLARK RN

## 2024-04-23 NOTE — LETTER
PRIMARY CARE CARE COORDINATION   - CLINICS AND SURGERY CENTER  Patient Centered Plan of Care  About Me:        Patient Name:  Melo Dangelo    YOB: 1940  Age:         83 year old   [facility] MRN:    [id number] Telephone Information:  Home Phone [phone]   Mobile [phone]       Address:  [address]  [place] [address] Email address:  [email or web address]      Emergency Contact(s)    Name Relationship Lgl Grd Work Phone Home Phone Mobile Phone   1. ELANA DANGELO Spouse No  [phone]   2. JOSE WALSH Daughter No  [phone]    3. DIANNA DANGELO Daughter No  [phone]    4. DELMAR COSTELLO Daughter No  [phone]            Primary language:  English     needed? No   [facility] [employer]:  [phone] op. 1  Other communication barriers:None    Preferred Method of Communication:     Current living arrangement: No data recorded  Mobility Status/ Medical Equipment: Independent        Health Maintenance  Health Maintenance Reviewed: Due/Overdue   Health Maintenance Due   Topic Date Due    DEPRESSION ACTION PLAN  Never done    ZOSTER IMMUNIZATION (1 of 2) Never done    RSV VACCINE (Pregnancy & 60+) (1 - 1-dose 60+ series) Never done    MEDICARE ANNUAL WELLNESS VISIT  Never done    Pneumococcal Vaccine: 65+ Years (2 of 2 - PCV) 10/20/2006    MICROALBUMIN  10/18/2023           My Access Plan  Medical Emergency 911   Primary Clinic Line [phone] to speak with clinic staff or schedule appointment   24 Hour Nurse Line [phone] (toll-free)   Preferred Urgent Care [facility] [phone]             My Care Team Members  Patient Care Team         Relationship Specialty Notifications Start End    Francis Smith MD PCP - General Family Medicine  10/28/20     Phone: [phone] Fax: [phone]         [address] [facility] [address]    Inderjit Grier MD   10/24/19     Phone: [phone]  Fax: [phone]         [facility] [address]  [facility] [address]    Esperanza Guillaume CNP Nurse Practitioner Urology  12/15/20     Phone: [phone] Fax: [phone]         [address] [place] [address]    Pushpa Alvarado, RN Registered Nurse   12/15/20     Francis Smith MD Assigned PCP   12/27/20     Phone: [phone] Fax: [phone]         [address] [place] [address]    Fernando Chong MD MD Nephrology  11/18/21     Phone: [phone] Fax: [phone]         [address] [place] [address]    Merry Barrow MD MD Nephrology  11/29/21     Phone: [phone] Fax: [phone]         [address] [place] [address]    Merry Barrow MD Assigned Nephrology Provider   1/2/22     Phone: [phone] Fax: [phone]         [address] [place] [address]    Nithin Tolbert [facility] Pharmacist Pharmacist  1/6/22     Phone: [phone] Fax: [phone]         [address] [place] [address]    Adis Ventura MD  Cardiovascular Disease  2/3/22     Matthew Cabrera MD MD Endocrinology, Diabetes, and Metabolism  6/29/22     Phone: [phone] Fax: [phone]         [address] [place] [address]    Matthew Cabrera MD Assigned Endocrinology Provider   11/5/22     Phone: [phone] Fax: [phone]         [address] [place] [address]    Katerine Gonzalez, RN Specialty Care Coordinator Nephrology All results, Admissions 1/25/23     Luis Sales MD Assigned Musculoskeletal Provider   2/4/23     Phone: [phone] Fax: [phone]         [address] [facility] [address]    Flores Arias, RN Specialty Care Coordinator Nephrology Admissions 6/9/23     Dialysis Access Care Coordinator    Phone: [phone]         Nidhi Schilling, RN Specialty Care Coordinator Nephrology Admissions 6/9/23     Dialysis Access Care Coordinator    Phone: [phone]         eddie Samaniego,  ELIZABETH Bajwa CNP Nurse Practitioner Cardiovascular Disease  7/17/23     Phone: [phone] Fax: [phone] [address] [facility] [address]    Best Padilla MD Assigned Surgical Provider   9/9/23     Phone: [phone] Fax: [phone]         [address] [facility], [facility] [address] [facility] [address]    Brittanie Etienne PA-C Assigned Heart and Vascular Provider   2/23/24     Phone: [phone] Fax: [phone] [address] [facility] [address]    Carla Fam, RN Lead Care Coordinator Primary Care - CC Admissions 4/19/24               My Care Plans  Self Management and Treatment Plan  Care Plan  Care Plan: Health Maintenance       Problem: Health Maintenance Due or Overdue       Goal: Become up-to-date with health maintenance visit(s)       Start Date: 4/23/2024    This Visit's Progress: On track    Priority: High    Note:     Barriers: patient is due for annual appt with PCP  Strengths: patient is seeing providers regularly with nephrology  Patient expressed understanding of goal: yes  Action steps to achieve this goal:  1. I will schedule my annual appointment with Dr. Smith by calling [phone].                                    Action Plans on File:                       Advance Care Plans/Directives Type:   Advanced Directive - On File      My Medical and Care Information  Problem List   Patient Active Problem List   Diagnosis    Central serous chorioretinopathy of right eye    Cupping of optic disc, right    Peripheral drusen of both eyes    Posterior vitreous detachment, bilateral    Age-related nuclear cataract of both eyes    Major depressive disorder, recurrent episode, moderate (H)    Major depressive disorder    Arthritis of knee    Hematuria, unspecified type    IgA nephropathy    CKD (chronic kidney disease) stage 4, GFR 15-29 ml/min (H)    High risk medication use    Acute bronchitis due to respiratory syncytial virus (RSV)    Sensorineural hearing loss  (SNHL) of both ears    Combined forms of age-related cataract of both eyes    Alcohol dependence in remission (H)    Elevated troponin    Frequent PVCs    Pneumonia of left lower lobe due to infectious organism    Acute kidney injury superimposed on CKD  (H24)    Acute respiratory failure with hypoxia (H)      Current Medications and Allergies:  See printed Medication Report.    Care Coordination Start Date: No linked episodes   Frequency of Care Coordination: monthly, more frequently as needed     Form Last Updated: 04/23/2024

## 2024-05-06 ENCOUNTER — LAB (OUTPATIENT)
Dept: LAB | Facility: CLINIC | Age: 84
End: 2024-05-06
Attending: INTERNAL MEDICINE
Payer: MEDICARE

## 2024-05-06 ENCOUNTER — PATIENT OUTREACH (OUTPATIENT)
Dept: CARE COORDINATION | Facility: CLINIC | Age: 84
End: 2024-05-06

## 2024-05-06 ENCOUNTER — OFFICE VISIT (OUTPATIENT)
Dept: NEPHROLOGY | Facility: CLINIC | Age: 84
End: 2024-05-06
Attending: INTERNAL MEDICINE
Payer: MEDICARE

## 2024-05-06 VITALS
OXYGEN SATURATION: 97 % | WEIGHT: 183.1 LBS | DIASTOLIC BLOOD PRESSURE: 58 MMHG | SYSTOLIC BLOOD PRESSURE: 110 MMHG | BODY MASS INDEX: 27.03 KG/M2 | HEART RATE: 62 BPM

## 2024-05-06 DIAGNOSIS — N18.5 ANEMIA OF CHRONIC RENAL FAILURE, STAGE 5 (H): Primary | ICD-10-CM

## 2024-05-06 DIAGNOSIS — N18.5 CKD (CHRONIC KIDNEY DISEASE) STAGE 5, GFR LESS THAN 15 ML/MIN (H): Primary | ICD-10-CM

## 2024-05-06 DIAGNOSIS — D63.1 ANEMIA OF CHRONIC RENAL FAILURE, STAGE 5 (H): Primary | ICD-10-CM

## 2024-05-06 DIAGNOSIS — D63.1 ANEMIA IN STAGE 5 CHRONIC KIDNEY DISEASE (H): ICD-10-CM

## 2024-05-06 DIAGNOSIS — N18.4 ANEMIA IN STAGE 4 CHRONIC KIDNEY DISEASE (H): ICD-10-CM

## 2024-05-06 DIAGNOSIS — N18.5 CKD (CHRONIC KIDNEY DISEASE) STAGE 5, GFR LESS THAN 15 ML/MIN (H): ICD-10-CM

## 2024-05-06 DIAGNOSIS — D63.1 ANEMIA IN STAGE 4 CHRONIC KIDNEY DISEASE (H): ICD-10-CM

## 2024-05-06 DIAGNOSIS — N18.5 ANEMIA IN STAGE 5 CHRONIC KIDNEY DISEASE (H): ICD-10-CM

## 2024-05-06 LAB
ALBUMIN SERPL BCG-MCNC: 3.9 G/DL (ref 3.5–5.2)
ANION GAP SERPL CALCULATED.3IONS-SCNC: 11 MMOL/L (ref 7–15)
BUN SERPL-MCNC: 46 MG/DL (ref 8–23)
CALCIUM SERPL-MCNC: 8.8 MG/DL (ref 8.8–10.2)
CHLORIDE SERPL-SCNC: 107 MMOL/L (ref 98–107)
CREAT SERPL-MCNC: 4.91 MG/DL (ref 0.67–1.17)
DEPRECATED HCO3 PLAS-SCNC: 23 MMOL/L (ref 22–29)
EGFRCR SERPLBLD CKD-EPI 2021: 11 ML/MIN/1.73M2
ERYTHROCYTE [DISTWIDTH] IN BLOOD BY AUTOMATED COUNT: 13.7 % (ref 10–15)
GLUCOSE SERPL-MCNC: 117 MG/DL (ref 70–99)
HCT VFR BLD AUTO: 26.9 % (ref 40–53)
HGB BLD-MCNC: 8.8 G/DL (ref 13.3–17.7)
MCH RBC QN AUTO: 30.6 PG (ref 26.5–33)
MCHC RBC AUTO-ENTMCNC: 32.7 G/DL (ref 31.5–36.5)
MCV RBC AUTO: 93 FL (ref 78–100)
PHOSPHATE SERPL-MCNC: 3.7 MG/DL (ref 2.5–4.5)
PLATELET # BLD AUTO: 136 10E3/UL (ref 150–450)
POTASSIUM SERPL-SCNC: 4.5 MMOL/L (ref 3.4–5.3)
RBC # BLD AUTO: 2.88 10E6/UL (ref 4.4–5.9)
SODIUM SERPL-SCNC: 141 MMOL/L (ref 135–145)
WBC # BLD AUTO: 5.4 10E3/UL (ref 4–11)

## 2024-05-06 PROCEDURE — 80069 RENAL FUNCTION PANEL: CPT | Performed by: PATHOLOGY

## 2024-05-06 PROCEDURE — 99214 OFFICE O/P EST MOD 30 MIN: CPT | Mod: GC | Performed by: INTERNAL MEDICINE

## 2024-05-06 PROCEDURE — 36415 COLL VENOUS BLD VENIPUNCTURE: CPT | Performed by: PATHOLOGY

## 2024-05-06 PROCEDURE — 85027 COMPLETE CBC AUTOMATED: CPT | Performed by: PATHOLOGY

## 2024-05-06 PROCEDURE — G0463 HOSPITAL OUTPT CLINIC VISIT: HCPCS | Performed by: INTERNAL MEDICINE

## 2024-05-06 RX ORDER — ALBUTEROL SULFATE 0.83 MG/ML
2.5 SOLUTION RESPIRATORY (INHALATION)
Status: CANCELLED | OUTPATIENT
Start: 2024-05-06

## 2024-05-06 RX ORDER — METHYLPREDNISOLONE SODIUM SUCCINATE 125 MG/2ML
125 INJECTION, POWDER, LYOPHILIZED, FOR SOLUTION INTRAMUSCULAR; INTRAVENOUS
Status: CANCELLED
Start: 2024-05-06

## 2024-05-06 RX ORDER — ALBUTEROL SULFATE 90 UG/1
1-2 AEROSOL, METERED RESPIRATORY (INHALATION)
Status: CANCELLED
Start: 2024-05-06

## 2024-05-06 RX ORDER — HEPARIN SODIUM (PORCINE) LOCK FLUSH IV SOLN 100 UNIT/ML 100 UNIT/ML
5 SOLUTION INTRAVENOUS
Status: CANCELLED | OUTPATIENT
Start: 2024-05-06

## 2024-05-06 RX ORDER — HEPARIN SODIUM,PORCINE 10 UNIT/ML
5-20 VIAL (ML) INTRAVENOUS DAILY PRN
Status: CANCELLED | OUTPATIENT
Start: 2024-05-06

## 2024-05-06 RX ORDER — DIPHENHYDRAMINE HYDROCHLORIDE 50 MG/ML
50 INJECTION INTRAMUSCULAR; INTRAVENOUS
Status: CANCELLED
Start: 2024-05-06

## 2024-05-06 RX ORDER — MEPERIDINE HYDROCHLORIDE 25 MG/ML
25 INJECTION INTRAMUSCULAR; INTRAVENOUS; SUBCUTANEOUS EVERY 30 MIN PRN
Status: CANCELLED | OUTPATIENT
Start: 2024-05-06

## 2024-05-06 RX ORDER — EPINEPHRINE 1 MG/ML
0.3 INJECTION, SOLUTION, CONCENTRATE INTRAVENOUS EVERY 5 MIN PRN
Status: CANCELLED | OUTPATIENT
Start: 2024-05-06

## 2024-05-06 ASSESSMENT — PAIN SCALES - GENERAL: PAINLEVEL: NO PAIN (0)

## 2024-05-06 NOTE — PROGRESS NOTES
Nephrology Clinic Follow up  3/11/24     REASON FOR CONSULT: IgA nephropathy     HISTORY OF PRESENT ILLNESS:   Melo Dangelo is a 83-year-old male with a history of IgA nephropathy, stage IV chronic kidney disease, alcohol dependence in remission and paroxysmal atrial fibrillation who recently had a hospital admission for MARYANN on CKD. Suspected to be related to hemodynamic instability in the setting of poor PO intake.     Today he reports that he has been feeling better. Has been outside exercising more. Now walking over 10 blocks per day. Reports breathing has improved. He has been monitoring his diet and reduced his salt intake. With this has noticed that BP has improved. No other specific changes to his medications. Was started on bicarbonate in the hospital.     PAST MEDICAL HISTORY:  Reviewed with patient on 05/06/2024   As per HPI    MEDICATIONS:  Reviewed with the patient in detail    ALLERGIES:    Reviewed with the patient in detail    REVIEW OF SYSTEMS:  A comprehensive of systems was negative except as noted above.    SOCIAL HISTORY:   Reviewed with patient, no smoking and no alcohol use     FAMILY MEDICAL HISTORY:   Reviewed, no family history of need for dialysis, transplant or CKD    PHYSICAL EXAM:   Vital signs:/58 (BP Location: Right arm, Patient Position: Sitting, Cuff Size: Adult Regular)   Pulse 62   Wt 83.1 kg (183 lb 1.6 oz)   SpO2 97%   BMI 27.03 kg/m      General appearance: alert, with no acute distress  HEENT: Atraumatic. Normocephalic. BELINDA. Moist mucus membranes   Lungs: clear to auscultation bilaterally. No wheezes or rales.   Heart: regular rate and rhythm, with no audible murmur  Abdomen: soft, non-tender, nondistanded. No organomegaly  Extremities: Normal tone. trace edema.   Skin: No rashes or lesions  Neurologic: Speech normal. No gross motor deficit  Psych: AOx3. Cooperative. Appropriate mood and affect. Intact insight.        Neph Tracking Flowsheet Last Filled  Values       CKD Education Referral Date 04/03/23    CKD Education Other Dr. Kim Virtual class 4/6/23    Preferred Modality Peritoneal Dialysis    Patient's Referral Dates Auto Populate Patient's Referral Dates    MTM Referral 12/22/21    Home Care Referral 6/1/23    Journey Referral 1/25/23    Access Surgeon Referral Status  Referred  PD consult with Dr. Zimmer    Dialysis Access Referral 06/06/23    Access Surgical Consult 07/18/23          Interval History: He denies any new symptoms. He went to [place] for whale watching on a cruise ship and developed COVID infection after. This was 2 weeks ago and other than having some low energy, he is doing okay.     Interval history 1/19/23: He has bene doing well overall. He sometimes feels a little short of breath when he climbs about 2 flights of stairs but otherwise has minimal symptoms. He has not seen any dark urine or LE edema.     Interval history 6/1/23: Melo does report that he is feeling more tired these days. There is also a lot on his plate since his wife is now transitioning to PD from HD and he is her primary caregiver. He denies any LE edema or shortness of breath.     Interval history 9/11/23: he says he is doing well overall. His wife has now started PD. She initially had PT come in but now they have stopped and she has been getting weaker. He has been managing all the household chores and helping her with tasks. He says his energy is okay for now, still has a good appetite, no nausea or other uremic symptoms. He denies any LE edema or shortness of breath    12/11/23: Melo has been doing okay however unfortunately he lost his wife a couple of months ago and has been dealing with some stress regarding that and has some other stressors that are new.  He does report that he takes naps multiple times and feels better after his naps but otherwise has okay energy.  He cooks for himself does his own chores and finds it not very difficult to do  that.  Does have a decent appetite and has been trying to switch more from meat to plant-based diet.  He denies any lower extremity edema shortness of breath.  He does report a little itching that he has noticed recently.    3/11/24: Melo continues to struggle with depression since he lost his wife. He will be seeing a psychiatrist soon as he is concerned that he may have bipolar disorder where he loses his temper in the morning when he gets frustrated. He continues to have a good appetite and denies any nausea, vomiting. He continues to work on his woodwork.     5/6/2024: Recent admission for concern for PNA. MARYANN on CKD. Now with some improvement in his kidney function. Had several discussions about planning for PD. Today he actually appears to be doing quite well. Main complaint is fatigue, but he has been exercising more. Lives by himself, but would feel comfortable doing his own PD at some point. Doesn't believe he would have any issues with the weight of the bags. Has some family in town.       ASSESSMENT AND RECOMMENDATIONS:     # IgA nephropathy [id number]   # CKD stage 4    # Anemia of CKD, iron replete 2/22   # Paroxysmal A fib    # Seizure disorder     Patient was first diagnosed with IgA nephropathy when he presented to the hospital with gross hematuria after his second COVID vaccine. He presented with a Sr creatinine of 3.5 which peaked to 4.4 mg/dl. His creatinine a year ago was 1.1-1.2 mg/dl.  A kidney bx was done which showed IgA nephropathy with some fibrocellular crescents.  7/14 gloms were globally sclerosed.   He was started on Pozzi protocol with solumedrol 500 mg/3 days followed by oral prednisone 40 mg every other day with bactrim single strength 1 tab PO every other day, pepcid 40 mg PO daily and calcium carbonate 7850-8244 mg PO daily.    Unfortunately, he did not have a significant improvement in his creatinine and had persistent active sediment. He was started on  Cyclophosphamide 100 mg  daily in 11/21 along with prednisone taper. His cyclophosphamide was discontinued 5/2/22 and his prednisone decreased to 5 mg Q other day and now has been discontinued     His creatinine improved initially and had a new baseline of 3.3 mg/dl, however now has continued to worsen  His gfr is now 10-11. His proteinuria remains minimal and he has no microscopic hematuria.        His blood pressures remain above the goal of < 130/90 mm of Hg and he has no LE edema. Currently only on Metoprolol  12.5 mg daily     He is anemic and has thrombocytopenia. Iron studies show iron depletion. Referral to anemia management.    For his acidosis, he remains on bicarbonate 650mg BID.    Today we discussed that he now has continued progression of his CKD and I do not identify any reversible factors. When comes to dialysis, he is interested in PD. He saw Dr Zimmer and has been deemed to be a good candidate for PD.  He has not been seen by the transplant team.    --We discussed that his GFR remains stable after recent hospitalization and there are no absolute indications start dialysis.  We also went over the timeline that once we make a decision to start PD it might take around 1-1/2-month to get him fully settled on dialysis which includes time to schedule his surgery for PD catheter placement.    --Referral for anemia management  -- For now he would like to hold off on any surgery planning and continue getting labs every month (standing orders placed) along with phone calls to assess him for symptoms. I did express the concern that I would not want him to require emergent dialysis at any cost and we need to keep a close eye on his labs and if his GFR declines below 10 mL/min, it would be a good time to schedule the PD access surgery.      -F/up in clinic in 3 months.     Patient seen and discussed with Dr. Pushpa Lu MD  Division of Renal Disease and Hypertension  [facility]  samina Kelly Web Console

## 2024-05-06 NOTE — NURSING NOTE
Chief Complaint   Patient presents with    RECHECK     [facility] follow up. Is concerned about loss of muscle mass. Has concerns about prostate and heart skipping. Onset 1 month ago.     Vitals:    05/06/24 0956   BP: 110/58   BP Location: Right arm   Patient Position: Sitting   Cuff Size: Adult Regular   Pulse: 62   SpO2: 97%   Weight: 83.1 kg (183 lb 1.6 oz)       BP Readings from Last 3 Encounters:   05/06/24 110/58   04/18/24 120/64   03/11/24 129/69       /58 (BP Location: Right arm, Patient Position: Sitting, Cuff Size: Adult Regular)   Pulse 62   Wt 83.1 kg (183 lb 1.6 oz)   SpO2 97%   BMI 27.03 kg/m       Paola Cox

## 2024-05-06 NOTE — LETTER
5/6/2024       RE: Melo Dangelo  [address]  [place] [address]     Dear Colleague,    Thank you for referring your patient, Melo Dangelo, to the [facility] [place] [facility]. Please see a copy of my visit note below.      Nephrology Clinic Follow up  3/11/24     REASON FOR CONSULT: IgA nephropathy     HISTORY OF PRESENT ILLNESS:   Melo Dangelo is a 83-year-old male with a history of IgA nephropathy, stage IV chronic kidney disease, alcohol dependence in remission and paroxysmal atrial fibrillation who recently had a hospital admission for MARYANN on CKD. Suspected to be related to hemodynamic instability in the setting of poor PO intake.     Today he reports that he has been feeling better. Has been outside exercising more. Now walking over 10 blocks per day. Reports breathing has improved. He has been monitoring his diet and reduced his salt intake. With this has noticed that BP has improved. No other specific changes to his medications. Was started on bicarbonate in the hospital.     PAST MEDICAL HISTORY:  Reviewed with patient on 05/06/2024   As per HPI    MEDICATIONS:  Reviewed with the patient in detail    ALLERGIES:    Reviewed with the patient in detail    REVIEW OF SYSTEMS:  A comprehensive of systems was negative except as noted above.    SOCIAL HISTORY:   Reviewed with patient, no smoking and no alcohol use     FAMILY MEDICAL HISTORY:   Reviewed, no family history of need for dialysis, transplant or CKD    PHYSICAL EXAM:   Vital signs:/58 (BP Location: Right arm, Patient Position: Sitting, Cuff Size: Adult Regular)   Pulse 62   Wt 83.1 kg (183 lb 1.6 oz)   SpO2 97%   BMI 27.03 kg/m      General appearance: alert, with no acute distress  HEENT: Atraumatic. Normocephalic. BELINDA. Moist mucus membranes   Lungs: clear to auscultation bilaterally. No wheezes or rales.   Heart: regular rate and  rhythm, with no audible murmur  Abdomen: soft, non-tender, nondistanded. No organomegaly  Extremities: Normal tone. trace edema.   Skin: No rashes or lesions  Neurologic: Speech normal. No gross motor deficit  Psych: AOx3. Cooperative. Appropriate mood and affect. Intact insight.        Neph Tracking Flowsheet Last Filled Values       CKD Education Referral Date 04/03/23    CKD Education Other Dr. Kim Virtual class 4/6/23    Preferred Modality Peritoneal Dialysis    Patient's Referral Dates Auto Populate Patient's Referral Dates    MTM Referral 12/22/21    Home Care Referral 6/1/23    Journey Referral 1/25/23    Access Surgeon Referral Status  Referred  PD consult with Dr. Zimmer    Dialysis Access Referral 06/06/23    Access Surgical Consult 07/18/23          Interval History: He denies any new symptoms. He went to [place] for whale watching on a cruise ship and developed COVID infection after. This was 2 weeks ago and other than having some low energy, he is doing okay.     Interval history 1/19/23: He has bene doing well overall. He sometimes feels a little short of breath when he climbs about 2 flights of stairs but otherwise has minimal symptoms. He has not seen any dark urine or LE edema.     Interval history 6/1/23: Melo does report that he is feeling more tired these days. There is also a lot on his plate since his wife is now transitioning to PD from HD and he is her primary caregiver. He denies any LE edema or shortness of breath.     Interval history 9/11/23: he says he is doing well overall. His wife has now started PD. She initially had PT come in but now they have stopped and she has been getting weaker. He has been managing all the household chores and helping her with tasks. He says his energy is okay for now, still has a good appetite, no nausea or other uremic symptoms. He denies any LE edema or shortness of breath    12/11/23: Melo has been doing okay however unfortunately he lost  his wife a couple of months ago and has been dealing with some stress regarding that and has some other stressors that are new.  He does report that he takes naps multiple times and feels better after his naps but otherwise has okay energy.  He cooks for himself does his own chores and finds it not very difficult to do that.  Does have a decent appetite and has been trying to switch more from meat to plant-based diet.  He denies any lower extremity edema shortness of breath.  He does report a little itching that he has noticed recently.    3/11/24: Melo continues to struggle with depression since he lost his wife. He will be seeing a psychiatrist soon as he is concerned that he may have bipolar disorder where he loses his temper in the morning when he gets frustrated. He continues to have a good appetite and denies any nausea, vomiting. He continues to work on his woodwork.     5/6/2024: Recent admission for concern for PNA. MARYANN on CKD. Now with some improvement in his kidney function. Had several discussions about planning for PD. Today he actually appears to be doing quite well. Main complaint is fatigue, but he has been exercising more. Lives by himself, but would feel comfortable doing his own PD at some point. Doesn't believe he would have any issues with the weight of the bags. Has some family in town.       ASSESSMENT AND RECOMMENDATIONS:     # IgA nephropathy [id number]   # CKD stage 4    # Anemia of CKD, iron replete 2/22   # Paroxysmal A fib    # Seizure disorder     Patient was first diagnosed with IgA nephropathy when he presented to the hospital with gross hematuria after his second COVID vaccine. He presented with a Sr creatinine of 3.5 which peaked to 4.4 mg/dl. His creatinine a year ago was 1.1-1.2 mg/dl.  A kidney bx was done which showed IgA nephropathy with some fibrocellular crescents.  7/14 gloms were globally sclerosed.   He was started on Pozzi protocol with solumedrol 500 mg/3 days  followed by oral prednisone 40 mg every other day with bactrim single strength 1 tab PO every other day, pepcid 40 mg PO daily and calcium carbonate 3408-2220 mg PO daily.    Unfortunately, he did not have a significant improvement in his creatinine and had persistent active sediment. He was started on  Cyclophosphamide 100 mg daily in 11/21 along with prednisone taper. His cyclophosphamide was discontinued 5/2/22 and his prednisone decreased to 5 mg Q other day and now has been discontinued     His creatinine improved initially and had a new baseline of 3.3 mg/dl, however now has continued to worsen  His gfr is now 10-11. His proteinuria remains minimal and he has no microscopic hematuria.        His blood pressures remain above the goal of < 130/90 mm of Hg and he has no LE edema. Currently only on Metoprolol  12.5 mg daily     He is anemic and has thrombocytopenia. Iron studies show iron depletion. Referral to anemia management.    For his acidosis, he remains on bicarbonate 650mg BID.    Today we discussed that he now has continued progression of his CKD and I do not identify any reversible factors. When comes to dialysis, he is interested in PD. He saw Dr Zimmer and has been deemed to be a good candidate for PD.  He has not been seen by the transplant team.    --We discussed that his GFR remains stable after recent hospitalization and there are no absolute indications start dialysis.  We also went over the timeline that once we make a decision to start PD it might take around 1-1/2-month to get him fully settled on dialysis which includes time to schedule his surgery for PD catheter placement.    --Referral for anemia management  -- For now he would like to hold off on any surgery planning and continue getting labs every month (standing orders placed) along with phone calls to assess him for symptoms. I did express the concern that I would not want him to require emergent dialysis at any cost and we need  to keep a close eye on his labs and if his GFR declines below 10 mL/min, it would be a good time to schedule the PD access surgery.      -F/up in clinic in 3 months.     Patient seen and discussed with Dr. Pushpa Lu MD  Division of Renal Disease and Hypertension  [facility]  portermanarda Kelly Web Console        Attestation signed by Merry Barrow MD at 5/12/2024  8:58 AM:  I have seen and evaluated the patient. Discussed with the fellow and agree with fellow's findings and plan as documented in the fellow's note.  I have reviewed today's vital signs, notes, medications, labs and imaging.   Merry Barrow MD

## 2024-05-06 NOTE — PATIENT INSTRUCTIONS
-No changes today to medications  -Please get labs done monthly (standing orders were placed, so go ahead and go to lab monthly at your discretion)  -Anemia management services will call you to schedule an appointment.   -We will follow-up in 3 months

## 2024-05-06 NOTE — PROGRESS NOTES
Anemia Management Note - Enrollment    SUBJECTIVE/OBJECTIVE:    Referred by Dr. Merry Barrow on 2024  Primary Diagnosis: Anemia in Chronic Kidney Disease (N18.5, D63.1)     Secondary Diagnosis: Chronic Kidney Disease, Stage 5 (N18.5)    Hgb goal range:9-10  RAMA:TBD; TSAT must be 20% or > before Insurance will approve RAMA.   Iron regimen: Treat with IV Iron- Melo Prefers Infed at the [facility] [place].     Labs : 2025  RX/TX plans : TBD    Recent RAMA use, transfusion, IV iron: NA  No history of stroke, MI, and blood clots or cancers    Contact: No Boxes Checked on Consent to Communicate scanned on 2021.         Latest Ref Rng & Units 2024 2024 3/11/2024 2024 2024 2024 2024   Anemia   Hemoglobin 13.3 - 17.7 g/dL 9.2  9.6  9.6  8.7  8.6  8.5  8.8    TSAT 15 - 46 %     12      Ferritin 31 - 409 ng/mL     223        BP Readings from Last 3 Encounters:   24 110/58   24 120/64   24 129/69     Wt Readings from Last 2 Encounters:   24 83.1 kg (183 lb 1.6 oz)   24 77.7 kg (171 lb 6.4 oz)     Current Outpatient Medications   Medication Sig Dispense Refill    acetaminophen (TYLENOL) 325 MG tablet Take 325-650 mg by mouth every 6 hours as needed for mild pain      benzonatate (TESSALON) 100 MG capsule Take 1 capsule (100 mg) by mouth 3 times daily as needed for cough 20 capsule 0    busPIRone (BUSPAR) 5 MG tablet Take 5 mg by mouth 2 times daily Has started      calcium carbonate-vitamin D (OSCAL) 500-5 MG-MCG tablet TAKE 1 TABLET BY MOUTH DAILY 30 tablet 10    Cholecalciferol (D3-1000) 25 MCG (1000 UT) CAPS Take 1 capsule by mouth daily      cyanocobalamin (VITAMIN B-12) 100 MCG tablet Take 1 tablet (100 mcg) by mouth daily Take 1,000 mcg by mouth every morning - 90 tablet 1    escitalopram (LEXAPRO) 20 MG tablet Take 1 tablet (20 mg) by mouth every morning 90 tablet 1    lamoTRIgine (LAMICTAL) 100 MG tablet Take 2 tablets (200 mg) by mouth 2 times daily  360 tablet 1    metoprolol succinate ER (TOPROL XL) 25 MG 24 hr tablet Take 0.5 tablets (12.5 mg) by mouth daily TAKE ONE-HALF TABLET BY MOUTH DAILY 90 tablet 1    Multiple Vitamins-Minerals (OCULAR VITAMINS) TABS TAKE 1 TABLET BY MOUTH DAILY 30 tablet 10    simvastatin (ZOCOR) 20 MG tablet Take 1 tablet (20 mg) by mouth at bedtime 90 tablet 0    sodium bicarbonate 650 MG tablet Take 1 tablet (650 mg) by mouth 2 times daily 90 tablet 0    tamsulosin (FLOMAX) 0.4 MG capsule Take 2 capsules (0.8 mg) by mouth daily 180 capsule 3       ASSESSMENT:  Hgb At goal   Ferritin: At goal (>100ng/mL)  TSat: not at goal of >30%  Iron regimen recommended: Treat with IV iron.   Recommended RAMA regimen: TSAT must be 20% or > before Insurance will approve RAMA.   Blood Pressure: Stable        PLAN:  1. Patient called today for enrollment in Anemia Management Service.  2. Discussed: anemia overview, monitoring service, and goal hemoglobin range and rationale and risks of RAMA blood clots, stroke, and increase in blood pressure  3. Dose location: in clinic [facility]  4. Labs: [facility]  5. Pharmacy: [facility]      Spoke with Melo.  Reviewed options for IV Iron. Prefers Infed. Orders placed. Message sent to [facility] scheduling to call Melo and schedule the Infed.     Next call date:  5/13/24    Amy Hurd RN   Anemia Services  [facility]  [email or web address]   Office : [phone]  Fax: [phone]

## 2024-05-14 DIAGNOSIS — F41.9 ANXIETY: ICD-10-CM

## 2024-05-14 DIAGNOSIS — I48.0 PAROXYSMAL ATRIAL FIBRILLATION (H): ICD-10-CM

## 2024-05-14 DIAGNOSIS — E78.00 HIGH CHOLESTEROL: Primary | ICD-10-CM

## 2024-05-21 RX ORDER — SIMVASTATIN 20 MG
20 TABLET ORAL AT BEDTIME
Qty: 30 TABLET | Refills: 2 | Status: SHIPPED | OUTPATIENT
Start: 2024-05-21 | End: 2024-08-19

## 2024-05-21 RX ORDER — ESCITALOPRAM OXALATE 20 MG/1
20 TABLET ORAL EVERY MORNING
Qty: 30 TABLET | Refills: 2 | Status: SHIPPED | OUTPATIENT
Start: 2024-05-21 | End: 2024-08-19

## 2024-05-21 RX ORDER — METOPROLOL SUCCINATE 25 MG/1
TABLET, EXTENDED RELEASE ORAL
Qty: 15 TABLET | Refills: 2 | Status: SHIPPED | OUTPATIENT
Start: 2024-05-21 | End: 2024-08-19

## 2024-05-29 ENCOUNTER — INFUSION THERAPY VISIT (OUTPATIENT)
Dept: INFUSION THERAPY | Facility: CLINIC | Age: 84
End: 2024-05-29
Attending: INTERNAL MEDICINE
Payer: MEDICARE

## 2024-05-29 VITALS
RESPIRATION RATE: 16 BRPM | OXYGEN SATURATION: 98 % | SYSTOLIC BLOOD PRESSURE: 167 MMHG | HEART RATE: 61 BPM | DIASTOLIC BLOOD PRESSURE: 76 MMHG | TEMPERATURE: 97.8 F

## 2024-05-29 DIAGNOSIS — N18.5 ANEMIA OF CHRONIC RENAL FAILURE, STAGE 5 (H): Primary | ICD-10-CM

## 2024-05-29 DIAGNOSIS — D63.1 ANEMIA OF CHRONIC RENAL FAILURE, STAGE 5 (H): Primary | ICD-10-CM

## 2024-05-29 PROCEDURE — 250N000011 HC RX IP 250 OP 636: Performed by: INTERNAL MEDICINE

## 2024-05-29 PROCEDURE — 96365 THER/PROPH/DIAG IV INF INIT: CPT

## 2024-05-29 PROCEDURE — 96366 THER/PROPH/DIAG IV INF ADDON: CPT

## 2024-05-29 PROCEDURE — 96376 TX/PRO/DX INJ SAME DRUG ADON: CPT

## 2024-05-29 PROCEDURE — 258N000003 HC RX IP 258 OP 636: Performed by: INTERNAL MEDICINE

## 2024-05-29 RX ORDER — HEPARIN SODIUM (PORCINE) LOCK FLUSH IV SOLN 100 UNIT/ML 100 UNIT/ML
5 SOLUTION INTRAVENOUS
OUTPATIENT
Start: 2024-05-29

## 2024-05-29 RX ORDER — MEPERIDINE HYDROCHLORIDE 25 MG/ML
25 INJECTION INTRAMUSCULAR; INTRAVENOUS; SUBCUTANEOUS EVERY 30 MIN PRN
OUTPATIENT
Start: 2024-05-29

## 2024-05-29 RX ORDER — EPINEPHRINE 1 MG/ML
0.3 INJECTION, SOLUTION INTRAMUSCULAR; SUBCUTANEOUS EVERY 5 MIN PRN
OUTPATIENT
Start: 2024-05-29

## 2024-05-29 RX ORDER — METHYLPREDNISOLONE SODIUM SUCCINATE 125 MG/2ML
125 INJECTION, POWDER, LYOPHILIZED, FOR SOLUTION INTRAMUSCULAR; INTRAVENOUS
Start: 2024-05-29

## 2024-05-29 RX ORDER — HEPARIN SODIUM,PORCINE 10 UNIT/ML
5-20 VIAL (ML) INTRAVENOUS DAILY PRN
OUTPATIENT
Start: 2024-05-29

## 2024-05-29 RX ORDER — ALBUTEROL SULFATE 90 UG/1
1-2 AEROSOL, METERED RESPIRATORY (INHALATION)
Start: 2024-05-29

## 2024-05-29 RX ORDER — ALBUTEROL SULFATE 0.83 MG/ML
2.5 SOLUTION RESPIRATORY (INHALATION)
OUTPATIENT
Start: 2024-05-29

## 2024-05-29 RX ORDER — DIPHENHYDRAMINE HYDROCHLORIDE 50 MG/ML
50 INJECTION INTRAMUSCULAR; INTRAVENOUS
Start: 2024-05-29

## 2024-05-29 RX ADMIN — SODIUM CHLORIDE 25 MG: 9 INJECTION, SOLUTION INTRAVENOUS at 07:21

## 2024-05-29 RX ADMIN — SODIUM CHLORIDE 975 MG: 9 INJECTION, SOLUTION INTRAVENOUS at 09:11

## 2024-05-29 NOTE — PROGRESS NOTES
Infusion Nursing Note:  Melo Dangelo presents today for Infed.    Patient seen by provider today: No   present during visit today: Not Applicable.    Note: Infed test dose given, patient observed for 1 hr then full dose infused per orders.  V/S monitored every 15 minutes during test dose and for another hr after starting the full dose, the every hr for the remainder.  Administrations This Visit       iron dextran complex (INFED) 25 mg in sodium chloride 0.9 % 55.5 mL intermittent infusion (test dose)       Admin Date  05/29/2024 Action  $New Bag Dose  25 mg Rate  666 mL/hr Route  Intravenous Documented By  Janet Mark RN              iron dextran complex (INFED) 975 mg in sodium chloride 0.9 % 569.5 mL intermittent infusion       Admin Date  05/29/2024 Action  $New Bag Dose  975 mg Rate  209.6 mL/hr Route  Intravenous Documented By  Janet Mark RN                   Intravenous Access:  Peripheral IV placed.    Treatment Conditions:  None.      Post Infusion Assessment:  Patient tolerated infusion without incident.  Blood return noted pre and post infusion.  Site patent and intact, free from redness, edema or discomfort.  No evidence of extravasations.  Access discontinued per protocol.       Discharge Plan:   Discharge instructions reviewed with: Patient.  Patient and/or family verbalized understanding of discharge instructions and all questions answered.  Copy of AVS reviewed with patient and/or family. Copy of Infed information given to patient and health education done. Patient will return to his provider as needed for next appointment.  Patient discharged by Kristin TAO in stable condition accompanied by: self.  Departure Mode: Ambulatory.    BP (!) 167/76 (BP Location: Right arm)   Pulse 61   Temp 97.8  F (36.6  C) (Oral)   Resp 16   SpO2 98%  - Asymptomatic.    Janet Mark RN

## 2024-05-29 NOTE — PATIENT INSTRUCTIONS
Dear Melo Dangelo    Thank you for choosing [facility] ([facility]) for your infusion.  The following information is a summary of our appointment as well as important reminders.          If you are a transplant patient and require transplant education, please click on this link: [email or web address].    We look forward in seeing you on your next appointment here at [facility] ([facility]).  Please don t hesitate to call us at [phone] to reschedule any of your appointments or to speak with one of the [facility] registered nurses.  It was a pleasure taking care of you today.    Sincerely,    [facility]  [facility]  [address] [place] [address]  [address]  Phone:  [phone]

## 2024-05-30 ENCOUNTER — PATIENT OUTREACH (OUTPATIENT)
Dept: CARE COORDINATION | Facility: CLINIC | Age: 84
End: 2024-05-30
Payer: MEDICARE

## 2024-05-30 NOTE — PROGRESS NOTES
Anemia Management Note - Follow Up      SUBJECTIVE/OBJECTIVE:    Referred by Dr. Merry Barrow on 2024  Primary Diagnosis: Anemia in Chronic Kidney Disease (N18.5, D63.1)     Secondary Diagnosis: Chronic Kidney Disease, Stage 5 (N18.5)     Hgb goal range:9-10  RAMA:TBD; TSAT must be 20% or > before Insurance will approve RAMA.   Iron regimen: Treat with IV Iron- Melo Prefers Infed at the [facility] [place]. -Competed Infed on 24.      Labs : 2025  RX/TX plans : TBD     Recent RAMA use, transfusion, IV iron: NA  No history of stroke, MI, and blood clots or cancers     Contact: No Boxes Checked on Consent to Communicate scanned on 2021.         Latest Ref Rng & Units 2024 3/11/2024 2024 2024 2024 2024 2024   Anemia   Hemoglobin 13.3 - 17.7 g/dL 9.6  9.6  8.7  8.6  8.5  8.8     IV Iron Dose        1000mg   TSAT 15 - 46 %    12       Ferritin 31 - 409 ng/mL    223         BP Readings from Last 3 Encounters:   24 (!) 167/76   24 110/58   24 120/64     Wt Readings from Last 2 Encounters:   24 83.1 kg (183 lb 1.6 oz)   24 77.7 kg (171 lb 6.4 oz)           ASSESSMENT:    Hgb:Due  TSat: Due for iron studies 4 weeks after last IV iron infusion Ferritin: Due for iron studies 4 weeks after last IV iron infusion        PLAN:  Check iron studies in 4 week(s).    Orders needed to be renewed (for next follow-up date) in EPIC: None    Iron labs due:  End of 2024    Plan discussed with:  No call, chart review       NEXT FOLLOW-UP DATE:  24    Amy Hurd RN   Anemia Services  [facility]  [email or web address]   Office : [phone]  Fax: [phone]

## 2024-05-31 DIAGNOSIS — N18.4 CKD (CHRONIC KIDNEY DISEASE) STAGE 4, GFR 15-29 ML/MIN (H): ICD-10-CM

## 2024-05-31 RX ORDER — BUSPIRONE HYDROCHLORIDE 5 MG/1
5 TABLET ORAL 3 TIMES DAILY
Qty: 360 TABLET | Refills: 0 | OUTPATIENT
Start: 2024-05-31

## 2024-06-06 RX ORDER — SODIUM BICARBONATE 650 MG/1
650 TABLET ORAL 2 TIMES DAILY
Qty: 60 TABLET | Refills: 1 | Status: SHIPPED | OUTPATIENT
Start: 2024-06-06 | End: 2024-06-17

## 2024-06-06 NOTE — TELEPHONE ENCOUNTER
sodium bicarbonate 650 MG tablet 90 tablet 0 4/18/2024 -- No   Sig - Route: Take 1 tablet (650 mg) by mouth 2 times daily - Oral     ----------------------  Last Office Visit : 6/22/23 Kim  Future Office visit:  0  ----------------------    Routing refill request to provider for review/approval because:  Medication not on protocol.

## 2024-06-09 ENCOUNTER — HEALTH MAINTENANCE LETTER (OUTPATIENT)
Age: 84
End: 2024-06-09

## 2024-06-11 ENCOUNTER — PATIENT OUTREACH (OUTPATIENT)
Dept: CARE COORDINATION | Facility: CLINIC | Age: 84
End: 2024-06-11
Payer: MEDICARE

## 2024-06-11 NOTE — PROGRESS NOTES
Clinic Care Coordination Contact    Situation: Patient chart reviewed by care coordinator.    Background: Patient enrolled in Care Coordination.    Assessment: [employer] message sent to patient for follow up.     Plan/Recommendations: RN will await pt's reply for further outreach.    CHARLY CLARK RN on 6/11/2024 at 3:59 PM    Addendum:  Pt read [employer] message and did not reply. RN will attempt to reach patient again in 30 days. If unable to reach patient, will close CC and send disenrollment letter.     CHARLY CLARK RN on 6/13/2024 at 10:36 AM

## 2024-06-13 DIAGNOSIS — N40.1 BENIGN PROSTATIC HYPERPLASIA WITH URINARY FREQUENCY: ICD-10-CM

## 2024-06-13 DIAGNOSIS — R35.0 BENIGN PROSTATIC HYPERPLASIA WITH URINARY FREQUENCY: ICD-10-CM

## 2024-06-13 DIAGNOSIS — G40.909 SEIZURE DISORDER (H): ICD-10-CM

## 2024-06-13 NOTE — PROGRESS NOTES
Clinic Care Coordination Contact  Follow Up Progress Note      Assessment: RN called and spoke with patient and would like to continue with care coordination services. Patient states that today he is tired, his heart rate skips a few beats but when he checks with cardiology, things are fine. He states he got some information from nephrology regarding a survey and wanted more information, RN provided patient with the nephrology department phone number for him to call them further regarding this. Pt reports still trying to figure out if dialysis is right for him. RN encouraged patient to call the clinic if he has any concerns before next outreach.     Care Gaps:    Health Maintenance Due   Topic Date Due    DEPRESSION ACTION PLAN  Never done    ZOSTER IMMUNIZATION (1 of 2) Never done    RSV VACCINE (Pregnancy & 60+) (1 - 1-dose 60+ series) Never done    MEDICARE ANNUAL WELLNESS VISIT  Never done    Pneumococcal Vaccine: 65+ Years (2 of 2 - PCV) 10/20/2006    MICROALBUMIN  10/18/2023    COVID-19 Vaccine (7 - 2023-24 season) 05/09/2024       Currently there are no Care Gaps.    Care Plans  Care Plan: Health Maintenance       Problem: Health Maintenance Due or Overdue       Goal: Become up-to-date with health maintenance visit(s)       Start Date: 4/23/2024    This Visit's Progress: On track    Priority: High    Note:     Barriers: patient is due for annual appt with PCP  Strengths: patient is seeing providers regularly with nephrology  Patient expressed understanding of goal: yes  Action steps to achieve this goal:  1. I will schedule my annual appointment with Dr. Smith by calling [phone].                                   Intervention/Education provided during outreach: Discussed patients plan of care. CC RN asked open ended questions, provided support, resources, and encouragement as needed. Patient will reach out to care team sooner than planned with new questions or concerns.         Outreach Frequency:  monthly, more frequently as needed      Plan:     Care Coordinator will follow up in 1 month.     CHARLY CLARK RN on 6/13/2024 at 3:39 PM

## 2024-06-13 NOTE — TELEPHONE ENCOUNTER
[facility] Call Center    Phone Message    May a detailed message be left on voicemail: yes     Reason for Call: Other: Per pt has called back and would like to speak with Carla. Please call pt back. Thank you!     Action Taken: Message routed to:  [facility] P[facility]    Travel Screening: Not Applicable     Date of Service:

## 2024-06-17 ENCOUNTER — PATIENT OUTREACH (OUTPATIENT)
Dept: NEPHROLOGY | Facility: CLINIC | Age: 84
End: 2024-06-17
Payer: MEDICARE

## 2024-06-17 DIAGNOSIS — N18.4 CKD (CHRONIC KIDNEY DISEASE) STAGE 4, GFR 15-29 ML/MIN (H): ICD-10-CM

## 2024-06-17 RX ORDER — SODIUM BICARBONATE 650 MG/1
650 TABLET ORAL 2 TIMES DAILY
Qty: 180 TABLET | Refills: 3 | Status: SHIPPED | OUTPATIENT
Start: 2024-06-17 | End: 2024-07-24

## 2024-06-17 NOTE — PROGRESS NOTES
[facility] Call Center     Phone Message     May a detailed message be left on voicemail: no      Reason for Call: Other: pt calling received a letter from Medicare about program for kidney care, pt has no clue with this, please call pt if possible      Action Taken: Other: neph     Travel Screening: Not Applicable          Date of Service:                                       Patient requested refill on sodium bicarb. Per protocol bicarb of 24 continue to monitor. Refilled script per protocol. Message sent to provider.   Nephrology Note: RN CC Chart Review    REASON FOR ENCOUNTER:     Chart reviewed by nephrology RN CC                          SITUATION/BACKROUND:     Last nephrology visit:    Last Renal Panel:  Sodium   Date Value Ref Range Status   05/06/2024 141 135 - 145 mmol/L Final     Comment:     Reference intervals for this test were updated on 09/26/2023 to more accurately reflect our healthy population. There may be differences in the flagging of prior results with similar values performed with this method. Interpretation of those prior results can be made in the context of the updated reference intervals.    10/29/2020 139 133 - 144 mmol/L Final     Potassium   Date Value Ref Range Status   05/06/2024 4.5 3.4 - 5.3 mmol/L Final   07/29/2022 4.8 3.4 - 5.3 mmol/L Final   10/29/2020 4.7 3.4 - 5.3 mmol/L Final     Chloride   Date Value Ref Range Status   05/06/2024 107 98 - 107 mmol/L Final   07/29/2022 106 94 - 109 mmol/L Final   10/29/2020 110 (H) 94 - 109 mmol/L Final     Carbon Dioxide   Date Value Ref Range Status   10/29/2020 25 20 - 32 mmol/L Final     Carbon Dioxide (CO2)   Date Value Ref Range Status   05/06/2024 23 22 - 29 mmol/L Final   07/29/2022 28 20 - 32 mmol/L Final     Anion Gap   Date Value Ref Range Status   05/06/2024 11 7 - 15 mmol/L Final   07/29/2022 6 3 - 14 mmol/L Final   10/29/2020 5 3 - 14 mmol/L Final     Glucose   Date Value Ref Range Status   05/06/2024 117 (H) 70 - 99 mg/dL  Final   07/29/2022 89 70 - 99 mg/dL Final   10/29/2020 136 (H) 70 - 99 mg/dL Final     Urea Nitrogen   Date Value Ref Range Status   05/06/2024 46.0 (H) 8.0 - 23.0 mg/dL Final   07/29/2022 55 (H) 7 - 30 mg/dL Final   10/29/2020 31 (H) 7 - 30 mg/dL Final     Creatinine   Date Value Ref Range Status   05/06/2024 4.91 (H) 0.67 - 1.17 mg/dL Final   10/29/2020 1.33 (H) 0.66 - 1.25 mg/dL Final     GFR Estimate   Date Value Ref Range Status   05/06/2024 11 (L) >60 mL/min/1.73m2 Final   11/04/2020 56 (L) >60 mL/min/1.73m2 Final   10/29/2020 50 (L) >60 mL/min/[1.73_m2] Final     Comment:     Non  GFR Calc  Starting 12/18/2018, serum creatinine based estimated GFR (eGFR) will be   calculated using the Chronic Kidney Disease Epidemiology Collaboration   (CKD-EPI) equation.       Calcium   Date Value Ref Range Status   05/06/2024 8.8 8.8 - 10.2 mg/dL Final   10/29/2020 8.5 8.5 - 10.1 mg/dL Final     Phosphorus   Date Value Ref Range Status   05/06/2024 3.7 2.5 - 4.5 mg/dL Final     Albumin   Date Value Ref Range Status   05/06/2024 3.9 3.5 - 5.2 g/dL Final   07/29/2022 4.0 3.4 - 5.0 g/dL Final   10/29/2020 2.7 (L) 3.4 - 5.0 g/dL Final         Neph Tracking Status:  Neph Tracking Flowsheet Last Filled Values       CKD Education Referral Date 04/03/23    CKD Education Other Dr. Kim Virtual class 4/6/23    Preferred Modality Peritoneal Dialysis    Patient's Referral Dates Auto Populate Patient's Referral Dates    MTM Referral 12/22/21    Home Care Referral 6/1/23    Journey Referral 1/25/23    Access Surgeon Referral Status  Referred  PD consult with Dr. Zimmer    Dialysis Access Referral 06/06/23    Access Surgical Consult 07/18/23              ASSESSMENT/PLAN   No further questions or concerns at this time.   Patient to follow up as scheduled at next appointment  Patient to call/MyChart message with updates    Upcoming Appointments:  Future Appts Next 180 days       Visit Type Date Time Department     RETURN NEPHROLOGY 9/30/2024  9:00 AM  MEDICINE RENAL              Katerine Gonzalez RN

## 2024-06-21 RX ORDER — LAMOTRIGINE 100 MG/1
200 TABLET ORAL 2 TIMES DAILY
Qty: 120 TABLET | Refills: 0 | Status: SHIPPED | OUTPATIENT
Start: 2024-06-21 | End: 2024-07-17

## 2024-06-21 RX ORDER — TAMSULOSIN HYDROCHLORIDE 0.4 MG/1
0.8 CAPSULE ORAL DAILY
Qty: 60 CAPSULE | Refills: 0 | Status: SHIPPED | OUTPATIENT
Start: 2024-06-21 | End: 2024-07-17

## 2024-06-21 NOTE — TELEPHONE ENCOUNTER
tamsulosin (FLOMAX) 0.4 MG capsule 180 capsule 3 6/20/2023       Alpha Blockers Pouaoz7506/21/2024 12:49 PM   Protocol Details Blood pressure under 140/90 in past 12 months    Recent (12 mo) or future (90 days) visit within the authorizing provider's specialty    Medication indicated for associated diagnosis        BP Readings from Last 3 Encounters:   05/29/24 (!) 167/76   05/06/24 110/58   04/18/24 120/64         lamoTRIgine (LAMICTAL) 100 MG tablet 360 tablet 1 12/20/2023     Anti-Seizure Meds Protocol  Kjzvmm7806/21/2024 12:49 PM   Protocol Details Review Authorizing provider's last note.    Has GFR on file in past 12 months and most recent value is normal    Recent (12 mo) or future (90 days) visit within the authorizing provider's specialty     Component      Latest Ref Rng 5/6/2024  9:06 AM   Sodium      135 - 145 mmol/L 141    Potassium      3.4 - 5.3 mmol/L 4.5    Chloride      98 - 107 mmol/L 107    Carbon Dioxide (CO2)      22 - 29 mmol/L 23    Anion Gap      7 - 15 mmol/L 11    Glucose      70 - 99 mg/dL 117 (H)    Urea Nitrogen      8.0 - 23.0 mg/dL 46.0 (H)    Creatinine      0.67 - 1.17 mg/dL 4.91 (H)    GFR Estimate      >60 mL/min/1.73m2 11 (L)        Last Office Visit: 3/24/23  Future Office visit:   NONE    Routing refill request to provider for review/approval because:  Diagnosis does not match medication indication, cannot fill per protocol.   ABNORMAL LAB  OVERDUE APPT    Marjorie Christy RN  P Red Flag Triage/MRT

## 2024-06-27 ENCOUNTER — PATIENT OUTREACH (OUTPATIENT)
Dept: CARE COORDINATION | Facility: CLINIC | Age: 84
End: 2024-06-27
Payer: MEDICARE

## 2024-06-27 DIAGNOSIS — N18.5 ANEMIA OF CHRONIC RENAL FAILURE, STAGE 5 (H): Primary | ICD-10-CM

## 2024-06-27 DIAGNOSIS — D63.1 ANEMIA OF CHRONIC RENAL FAILURE, STAGE 5 (H): Primary | ICD-10-CM

## 2024-06-27 NOTE — PROGRESS NOTES
Anemia Management Note - Reminder     Follow-up with anemia management service:    Melo is due to have his Anemia Labs drawn.  Completed Iron infusion on 5/29/24.   Sent reminder via [facility].     Updated lab orders.          Latest Ref Rng & Units 2/6/2024 3/11/2024 4/16/2024 4/17/2024 4/18/2024 5/6/2024 5/29/2024   Anemia   Hemoglobin 13.3 - 17.7 g/dL 9.6  9.6  8.7  8.6  8.5  8.8     IV Iron Dose        1000mg   TSAT 15 - 46 %    12       Ferritin 31 - 409 ng/mL    223                  Follow-up call date: 7/11/24    Amy Hurd RN   Anemia Services  [facility]  [email or web address]   Office : [phone]  Fax: [phone]

## 2024-07-15 DIAGNOSIS — R35.0 BENIGN PROSTATIC HYPERPLASIA WITH URINARY FREQUENCY: ICD-10-CM

## 2024-07-15 DIAGNOSIS — G40.909 SEIZURE DISORDER (H): ICD-10-CM

## 2024-07-15 DIAGNOSIS — F10.21 ALCOHOL DEPENDENCE IN REMISSION (H): ICD-10-CM

## 2024-07-15 DIAGNOSIS — N40.1 BENIGN PROSTATIC HYPERPLASIA WITH URINARY FREQUENCY: ICD-10-CM

## 2024-07-16 ENCOUNTER — PATIENT OUTREACH (OUTPATIENT)
Dept: CARE COORDINATION | Facility: CLINIC | Age: 84
End: 2024-07-16
Payer: COMMERCIAL

## 2024-07-16 NOTE — PROGRESS NOTES
"Clinic Care Coordination Contact  Follow Up Progress Note      Assessment:   RN called and spoke with patient. Pt reports lately he has been having some concerns with his R knee. He states he worse a compression stocking and it made a big difference. RN educated patient this likely could have been a fluid shift concern- but encouraged patient to get this looked at if it causes further problems. Pt also states that his head has been a little foggy lately, and he has been more tired, sleeping more often. Pt reports being out of sodium bicarbonate (RN informed that it has been refilled last month) and pt states that he has been checking his oxygen saturation, which is at 96%. Pt thinks that maybe this could be related to needing more \"hemoglobin\". RN informed patient to contact his nephrology team related to the sodium bicarbonate and some of his symptoms to see if they would like him to get any labwork since he was without that medication. Pt reported that he had the phone number for nephrology and that he would give them a call. Message also sent to Katerine Gonzalez RN CC with Nephrology.    Care Gaps:    Health Maintenance Due   Topic Date Due    DEPRESSION ACTION PLAN  Never done    ZOSTER IMMUNIZATION (1 of 2) Never done    RSV VACCINE (Pregnancy & 60+) (1 - 1-dose 60+ series) Never done    MEDICARE ANNUAL WELLNESS VISIT  Never done    Pneumococcal Vaccine: 65+ Years (2 of 2 - PCV) 10/20/2006    MICROALBUMIN  10/18/2023    COVID-19 Vaccine (7 - 2023-24 season) 05/09/2024    BMP  08/06/2024       Postponed to most health maintenance items are immunizations, labs monitored by nephrology      Care Plans  Care Plan: Health Maintenance       Problem: Health Maintenance Due or Overdue       Goal: Become up-to-date with health maintenance visit(s)       Start Date: 4/23/2024    This Visit's Progress: On track    Priority: High    Note:     Barriers: patient is due for annual appt with PCP  Strengths: patient is seeing " providers regularly with nephrology  Patient expressed understanding of goal: yes  Action steps to achieve this goal:  1. I will schedule my annual appointment with Dr. Smith by calling [phone].                                   Intervention/Education provided during outreach: Discussed patients plan of care. CC RN asked open ended questions, provided support, resources, and encouragement as needed. Patient will reach out to care team sooner than planned with new questions or concerns.      Plan:   Care Coordinator will follow up in 1 month.     CHARLY CLARK RN on 7/16/2024 at 3:28 PM

## 2024-07-17 ENCOUNTER — PATIENT OUTREACH (OUTPATIENT)
Dept: NEPHROLOGY | Facility: CLINIC | Age: 84
End: 2024-07-17
Payer: COMMERCIAL

## 2024-07-17 DIAGNOSIS — N18.4 CKD (CHRONIC KIDNEY DISEASE) STAGE 4, GFR 15-29 ML/MIN (H): ICD-10-CM

## 2024-07-17 RX ORDER — VITS A,C,E/LUTEIN/MINERALS 300MCG-200
1 TABLET ORAL DAILY
Qty: 30 TABLET | Refills: 0 | Status: SHIPPED | OUTPATIENT
Start: 2024-07-17 | End: 2024-08-19

## 2024-07-17 RX ORDER — TAMSULOSIN HYDROCHLORIDE 0.4 MG/1
0.8 CAPSULE ORAL DAILY
Qty: 60 CAPSULE | Refills: 0 | Status: SHIPPED | OUTPATIENT
Start: 2024-07-17 | End: 2024-08-19

## 2024-07-17 RX ORDER — LAMOTRIGINE 100 MG/1
200 TABLET ORAL 2 TIMES DAILY
Qty: 120 TABLET | Refills: 0 | Status: SHIPPED | OUTPATIENT
Start: 2024-07-17 | End: 2024-08-19

## 2024-07-17 NOTE — TELEPHONE ENCOUNTER
Last Office visit: 6/22/23  Future: NONE    lamoTRIgine (LAMICTAL) 100 MG tablet       Last Written Prescription Date:  6/21/24  Last Fill Quantity: 120,   # refills: 0    Routing refill request to provider for review/approval because:  Anti-Seizure Meds Protocol  Qoxlez97/15/2024 05:45 PM   Protocol Details Review Authorizing provider's last note.    Has GFR on file in past 12 months and most recent value is normal    Recent (12 mo) or future (90 days) visit within the authorizing provider's specialty       Multiple Vitamins-Minerals (OCULAR VITAMINS) TABS       Last Written Prescription Date:  7/17/23  Last Fill Quantity: 30,   # refills: 10  Routing refill request to provider for review/approval because:  Vitamin Supplements Protocol Ltliax92/15/2024 05:45 PM   Protocol Details Medication indicated for associated diagnosis    Recent (12 mo) or future (90 days) visit within the authorizing provider's specialty       tamsulosin (FLOMAX) 0.4 MG capsule       Last Written Prescription Date:  6/21/24  Last Fill Quantity: 60,   # refills: 0    Routing refill request to provider for review/approval because:  Blood pressure out of range   BP Readings from Last 3 Encounters:   05/29/24 (!) 167/76   05/06/24 110/58   04/18/24 120/64     Alpha Blockers Woezzq12/15/2024 05:45 PM   Protocol Details Blood pressure under 140/90 in past 12 months    Recent (12 mo) or future (90 days) visit within the authorizing provider's specialty    Medication indicated for associated diagnosis

## 2024-07-17 NOTE — PROGRESS NOTES
ELY Ventura I talked to Melo, it seems he hasn't taken his Sodium Bicarbonate since last month when it was re-ordered and he's been sleepy, falling asleep more easily at home, etc. I encouraged him to call you guys to see if maybe you wanted some lab work done at all- just heads up that he should be calling but just incase he doesn't as well. Let me know if there's anything I can do to help.  Thanks,  Carla MASTERS, RN Care Manager  [facility]  Phone: [phone]  Fax: [phone]    Per PCP RN Coordinator writer attempted to reach out to patient to see how he has been feeling. Left voice mail for patient to call back.     Katerine Gonzalez RN, BSN   Nephrology RN Care Coordinator   [facility]

## 2024-07-18 ENCOUNTER — PATIENT OUTREACH (OUTPATIENT)
Dept: CARE COORDINATION | Facility: CLINIC | Age: 84
End: 2024-07-18
Payer: COMMERCIAL

## 2024-07-18 NOTE — PROGRESS NOTES
Anemia Management Note - Reminder     Follow-up with anemia management service:    Melo is due to have his Anemia Labs drawn. LVM for Melo to call Anemia Services to check in.         Latest Ref Rng & Units 2/6/2024 3/11/2024 4/16/2024 4/17/2024 4/18/2024 5/6/2024 5/29/2024   Anemia   Hemoglobin 13.3 - 17.7 g/dL 9.6  9.6  8.7  8.6  8.5  8.8     IV Iron Dose        1000mg   TSAT 15 - 46 %    12       Ferritin 31 - 409 ng/mL    223                Follow-up call date: 7/24/24    Amy Hurd RN   Anemia Services  [facility]  [email or web address]   Office : [phone]  Fax: [phone]

## 2024-07-19 NOTE — TELEPHONE ENCOUNTER
calcium carbonate-vitamin D (OSCAL) 500-5 MG-MCG tablet 30 tablet 10 7/17/2023           Last Office Visit : 6/22/23  Future Office visit:  0    Routing refill request to provider for review/approval because:    Vitamin Supplements Protocol Failed   Medication indicated for associated diagnosis    Recent (12 mo) or future (90 days) visit within the authorizing provider's specialty

## 2024-07-23 DIAGNOSIS — N18.4 CKD (CHRONIC KIDNEY DISEASE) STAGE 4, GFR 15-29 ML/MIN (H): ICD-10-CM

## 2024-07-24 ENCOUNTER — LAB (OUTPATIENT)
Dept: LAB | Facility: CLINIC | Age: 84
End: 2024-07-24
Payer: MEDICARE

## 2024-07-24 ENCOUNTER — PATIENT OUTREACH (OUTPATIENT)
Dept: CARE COORDINATION | Facility: CLINIC | Age: 84
End: 2024-07-24
Payer: COMMERCIAL

## 2024-07-24 DIAGNOSIS — N18.5 CKD (CHRONIC KIDNEY DISEASE) STAGE 5, GFR LESS THAN 15 ML/MIN (H): ICD-10-CM

## 2024-07-24 DIAGNOSIS — N18.4 CKD (CHRONIC KIDNEY DISEASE) STAGE 4, GFR 15-29 ML/MIN (H): ICD-10-CM

## 2024-07-24 DIAGNOSIS — D63.1 ANEMIA OF CHRONIC RENAL FAILURE, STAGE 5 (H): ICD-10-CM

## 2024-07-24 DIAGNOSIS — N18.5 ANEMIA OF CHRONIC RENAL FAILURE, STAGE 5 (H): ICD-10-CM

## 2024-07-24 LAB
ALBUMIN SERPL BCG-MCNC: 4.4 G/DL (ref 3.5–5.2)
ANION GAP SERPL CALCULATED.3IONS-SCNC: 11 MMOL/L (ref 7–15)
BUN SERPL-MCNC: 41.3 MG/DL (ref 8–23)
CALCIUM SERPL-MCNC: 9.1 MG/DL (ref 8.8–10.4)
CHLORIDE SERPL-SCNC: 106 MMOL/L (ref 98–107)
CREAT SERPL-MCNC: 4.34 MG/DL (ref 0.67–1.17)
EGFRCR SERPLBLD CKD-EPI 2021: 13 ML/MIN/1.73M2
ERYTHROCYTE [DISTWIDTH] IN BLOOD BY AUTOMATED COUNT: 14.2 % (ref 10–15)
FERRITIN SERPL-MCNC: 675 NG/ML (ref 31–409)
GLUCOSE SERPL-MCNC: 96 MG/DL (ref 70–99)
HCO3 SERPL-SCNC: 21 MMOL/L (ref 22–29)
HCT VFR BLD AUTO: 30.2 % (ref 40–53)
HGB BLD-MCNC: 9.8 G/DL (ref 13.3–17.7)
IRON BINDING CAPACITY (ROCHE): 249 UG/DL (ref 240–430)
IRON SATN MFR SERPL: 41 % (ref 15–46)
IRON SERPL-MCNC: 101 UG/DL (ref 61–157)
MCH RBC QN AUTO: 30.2 PG (ref 26.5–33)
MCHC RBC AUTO-ENTMCNC: 32.5 G/DL (ref 31.5–36.5)
MCV RBC AUTO: 93 FL (ref 78–100)
PHOSPHATE SERPL-MCNC: 3.6 MG/DL (ref 2.5–4.5)
PLATELET # BLD AUTO: 135 10E3/UL (ref 150–450)
POTASSIUM SERPL-SCNC: 4.8 MMOL/L (ref 3.4–5.3)
RBC # BLD AUTO: 3.25 10E6/UL (ref 4.4–5.9)
SODIUM SERPL-SCNC: 138 MMOL/L (ref 135–145)
WBC # BLD AUTO: 4.8 10E3/UL (ref 4–11)

## 2024-07-24 PROCEDURE — 85027 COMPLETE CBC AUTOMATED: CPT | Performed by: PATHOLOGY

## 2024-07-24 PROCEDURE — 83540 ASSAY OF IRON: CPT | Performed by: PATHOLOGY

## 2024-07-24 PROCEDURE — 36415 COLL VENOUS BLD VENIPUNCTURE: CPT | Performed by: PATHOLOGY

## 2024-07-24 PROCEDURE — 80069 RENAL FUNCTION PANEL: CPT | Performed by: PATHOLOGY

## 2024-07-24 PROCEDURE — 82728 ASSAY OF FERRITIN: CPT | Performed by: PATHOLOGY

## 2024-07-24 PROCEDURE — 83550 IRON BINDING TEST: CPT | Performed by: PATHOLOGY

## 2024-07-24 RX ORDER — SODIUM BICARBONATE 650 MG/1
650 TABLET ORAL 2 TIMES DAILY
Qty: 180 TABLET | Refills: 3 | Status: SHIPPED | OUTPATIENT
Start: 2024-07-24

## 2024-07-24 NOTE — PROGRESS NOTES
Anemia Management Note - Reminder     Follow-up with anemia management service:    2nd message left for Melo.  He is due to have his Anemia Labs.  Has not read the [employer] message that was sent.          Latest Ref Rng & Units 2/6/2024 3/11/2024 4/16/2024 4/17/2024 4/18/2024 5/6/2024 5/29/2024   Anemia   Hemoglobin 13.3 - 17.7 g/dL 9.6  9.6  8.7  8.6  8.5  8.8     IV Iron Dose        1000mg   TSAT 15 - 46 %    12       Ferritin 31 - 409 ng/mL    223              Orders needed to be renewed (for next follow-up date) in EPIC: None    Follow-up call date: 8/7/24    Amy Hurd RN   Anemia Services  [facility]  [email or web address]   Office : [phone]  Fax: [phone]

## 2024-07-24 NOTE — PROGRESS NOTES
Spoke with Melo. He will try and get in for labs today or tomorrow.     Amy Hurd RN   [facility] Services  [facility]  [email or web address]   Office : [phone]  Fax: [phone]

## 2024-07-25 ENCOUNTER — PATIENT OUTREACH (OUTPATIENT)
Dept: CARE COORDINATION | Facility: CLINIC | Age: 84
End: 2024-07-25
Payer: COMMERCIAL

## 2024-07-25 NOTE — PROGRESS NOTES
Anemia Management Note - Follow Up      SUBJECTIVE/OBJECTIVE:    Referred by Dr. Merry Barrow on 2024  Primary Diagnosis: Anemia in Chronic Kidney Disease (N18.5, D63.1)     Secondary Diagnosis: Chronic Kidney Disease, Stage 5 (N18.5)     Hgb goal range:9-10  RAMA:TBD; Hgb 9.8  Iron regimen: Treat with IV Iron- [place] [facility] [place].      Labs : 2025  RX/TX plans : TBD     Recent RAMA use, transfusion, IV iron: NA  No history of stroke, MI, and blood clots or cancers     Contact: No Boxes Checked on Consent to Communicate scanned on 2021.         Latest Ref Rng & Units 3/11/2024 2024 2024 2024 2024 2024 2024   Anemia   Hemoglobin 13.3 - 17.7 g/dL 9.6  8.7  8.6  8.5  8.8   9.8    IV Iron Dose       1000mg    TSAT 15 - 46 %   12     41    Ferritin 31 - 409 ng/mL   223     675         BP Readings from Last 3 Encounters:   24 (!) 167/76   24 110/58   24 120/64     Wt Readings from Last 2 Encounters:   24 83.1 kg (183 lb 1.6 oz)   24 77.7 kg (171 lb 6.4 oz)           ASSESSMENT:    Hgb:at goal - continue to monitor  TSat: at goal >30% Ferritin: At goal (>100ng/mL)        PLAN:  RTC for Anemia Labs in one month.     Orders needed to be renewed (for next follow-up date) in EPIC: None    Iron labs due:  End of Aug 2024    Plan discussed with:  Melo via [employer]      NEXT FOLLOW-UP DATE:  24    Amy Hurd RN   Anemia Services  [facility]  [email or web address]   Office : [phone]  Fax: [phone]

## 2024-08-13 DIAGNOSIS — F10.21 ALCOHOL DEPENDENCE IN REMISSION (H): ICD-10-CM

## 2024-08-13 DIAGNOSIS — I48.0 PAROXYSMAL ATRIAL FIBRILLATION (H): ICD-10-CM

## 2024-08-13 DIAGNOSIS — F41.9 ANXIETY: ICD-10-CM

## 2024-08-13 DIAGNOSIS — R35.0 BENIGN PROSTATIC HYPERPLASIA WITH URINARY FREQUENCY: ICD-10-CM

## 2024-08-13 DIAGNOSIS — G40.909 SEIZURE DISORDER (H): ICD-10-CM

## 2024-08-13 DIAGNOSIS — N40.1 BENIGN PROSTATIC HYPERPLASIA WITH URINARY FREQUENCY: ICD-10-CM

## 2024-08-13 DIAGNOSIS — E78.00 HIGH CHOLESTEROL: ICD-10-CM

## 2024-08-14 ENCOUNTER — PATIENT OUTREACH (OUTPATIENT)
Dept: NEPHROLOGY | Facility: CLINIC | Age: 84
End: 2024-08-14
Payer: MEDICARE

## 2024-08-14 ENCOUNTER — PATIENT OUTREACH (OUTPATIENT)
Dept: CARE COORDINATION | Facility: CLINIC | Age: 84
End: 2024-08-14
Payer: MEDICARE

## 2024-08-14 NOTE — PROGRESS NOTES
"Clinic Care Coordination Contact  Follow Up Progress Note      Assessment: RN received the following message from Claudette MONK: \"This Patient called me this morning with concerns about his blood levels because he was feeling off and wanted to get labs done.\" RN connected with Katerine Gonzalez RN with Nephrology (see 8/14 outreach). Per Katerine, working on pt's symptoms and have planned outreaches for assessing need for labs. RN will not complete outreach this month due to duplicative nature of outreach- will re-evaluate duplication of care management for next month's outreach to patient. RN will send message to clinic coordinators to get patient scheduled for follow up with PCP as pt hasn't seen Dr. Smith since 3/24/23.     Care Gaps:    Health Maintenance Due   Topic Date Due    DEPRESSION ACTION PLAN  Never done    ZOSTER IMMUNIZATION (1 of 2) Never done    RSV VACCINE (Pregnancy & 60+) (1 - 1-dose 60+ series) Never done    MEDICARE ANNUAL WELLNESS VISIT  Never done    Pneumococcal Vaccine: 65+ Years (2 of 2 - PCV) 10/20/2006    MICROALBUMIN  10/18/2023    COVID-19 Vaccine (7 - 2023-24 season) 05/09/2024    PHQ-9  09/11/2024       Postponed to visit with PCP     Care Plans  Care Plan: Health Maintenance       Problem: Health Maintenance Due or Overdue       Goal: Become up-to-date with health maintenance visit(s)       Start Date: 4/23/2024    This Visit's Progress: On track    Priority: High    Note:     Barriers: patient is due for annual appt with PCP  Strengths: patient is seeing providers regularly with nephrology  Patient expressed understanding of goal: yes  Action steps to achieve this goal:  1. I will schedule my annual appointment with Dr. Smith by calling [phone].                                   Intervention/Education provided during outreach: Discussed patients plan of care. CC RN asked open ended questions, provided support, resources, and encouragement as needed. Patient will reach " out to care team sooner than planned with new questions or concerns.        Plan:   Care Coordinator will follow up in 1 month.     CHARLY CLARK RN on 8/14/2024 at 3:52 PM

## 2024-08-14 NOTE — TELEPHONE ENCOUNTER
Patient confirmed scheduled appointment:  Date: 9/3/2024  Time: 8:00am  Visit type: [facility] ANNUAL MEDICARE WELLNESS  Provider: PCP  Location: [facility] [place]

## 2024-08-14 NOTE — PROGRESS NOTES
"Received a message from anemia RN, she got a call from the patient about feeling \"mentally Challenged\" which be believes may be a result of his K+ being off. Last K + taken on 7/24 within normal range 4.8. Called Melo. He has recently started Row boating and has not been able to react as quickly as he used to, notes that he stays hydrated so does not believe that fogginess is related to dehydration. Notes that he has been eating a lot of beans lately and maybe his K+ has increased. He will stop eating beans for the next couple of days to see if that helps with symptom. Outside of fogginess not other symptoms, no shortness of breath or edema. Message sent to provider. Patient has writer contact if symptoms worsen. Patient to update writer in a couple of days.     Per provider does not believe that K+ but disease progression. Spoke with patient, he is feeling better. He has been taking more naps but could be related to new projects he has been working on.     Katerine Gonzalez RN, BSN   Nephrology RN Care Coordinator   [facility]       "

## 2024-08-16 NOTE — TELEPHONE ENCOUNTER
Last Office Visit : 6/22/23  Future Office visit:  9/3/24, Was given courtesy refills already, Please advise.   metoprolol succinate ER (TOPROL XL) 25 MG 24 hr tablet       Last Written Prescription Date:  5/21/24  Last Fill Quantity: 15,   # refills: 2  Routing refill request to provider for review/approval because: Last visit . 12 months  Blood pressure out of range   BP Readings from Last 3 Encounters:   05/29/24 (!) 167/76   05/06/24 110/58   04/18/24 120/64     escitalopram (LEXAPRO) 20 MG tablet       Last Written Prescription Date:  5/21/24  Last Fill Quantity: 30,   # refills: 2  Routing refill request to provider for review/approval because: Last SHAINA 2/4/23, Last visit > 12 mon  tamsulosin (FLOMAX) 0.4 MG capsule       Last Written Prescription Date:  7/17/24  Last Fill Quantity: 60,   # refills: 0  Routing refill request to provider for review/approval because:  Alpha Blockers [id number]/13/2024 05:51 PM   Protocol Details Blood pressure under 140/90 in past 12 months    Recent (12 mo) or future (90 days) visit within the authorizing provider's specialty    Medication indicated for associated diagnosis     multivitamin (OCUVITE) TABS tablet      Last Written Prescription Date:  7/17/24  Last Fill Quantity: 30,   # refills: 0  Routing refill request to provider for review/approval because:  Vitamin Supplements Protocol [id number]/13/2024 05:51 PM   Protocol Details Medication indicated for associated diagnosis   Last visit > 12 mon  lamoTRIgine (LAMICTAL) 100 MG tablet       Last Written Prescription Date:  7/17/24  Last Fill Quantity: 120,   # refills: 0  Routing refill request to provider for review/approval because: Last SHAINA 2/4/23, Last visit > 12 mo, BP not in range  simvastatin (ZOCOR) 20 MG tablet       Last Written Prescription Date:  5/21/24  Last Fill Quantity: 30,   # refills: 2    Routing refill request to provider for review/approval because:Last LDL was 4/16/24  Cheli TIM RN  [facility]  Nursing/Red Flag Triage & Med Refill Team

## 2024-08-19 DIAGNOSIS — G40.909 SEIZURE DISORDER (H): ICD-10-CM

## 2024-08-19 DIAGNOSIS — R35.0 BENIGN PROSTATIC HYPERPLASIA WITH URINARY FREQUENCY: ICD-10-CM

## 2024-08-19 DIAGNOSIS — F10.21 ALCOHOL DEPENDENCE IN REMISSION (H): ICD-10-CM

## 2024-08-19 DIAGNOSIS — I48.0 PAROXYSMAL ATRIAL FIBRILLATION (H): ICD-10-CM

## 2024-08-19 DIAGNOSIS — N40.1 BENIGN PROSTATIC HYPERPLASIA WITH URINARY FREQUENCY: ICD-10-CM

## 2024-08-19 DIAGNOSIS — E78.00 HIGH CHOLESTEROL: ICD-10-CM

## 2024-08-19 DIAGNOSIS — F41.9 ANXIETY: ICD-10-CM

## 2024-08-19 RX ORDER — LAMOTRIGINE 100 MG/1
200 TABLET ORAL 2 TIMES DAILY
Qty: 360 TABLET | Refills: 0 | Status: SHIPPED | OUTPATIENT
Start: 2024-08-19 | End: 2024-09-03

## 2024-08-19 RX ORDER — SIMVASTATIN 20 MG
20 TABLET ORAL AT BEDTIME
Qty: 90 TABLET | Refills: 0 | Status: SHIPPED | OUTPATIENT
Start: 2024-08-19 | End: 2024-09-03

## 2024-08-19 RX ORDER — ESCITALOPRAM OXALATE 20 MG/1
20 TABLET ORAL EVERY MORNING
Qty: 90 TABLET | Refills: 0 | Status: SHIPPED | OUTPATIENT
Start: 2024-08-19 | End: 2024-09-03

## 2024-08-19 RX ORDER — TAMSULOSIN HYDROCHLORIDE 0.4 MG/1
0.8 CAPSULE ORAL DAILY
Qty: 180 CAPSULE | Refills: 0 | Status: SHIPPED | OUTPATIENT
Start: 2024-08-19 | End: 2024-09-03

## 2024-08-19 RX ORDER — METOPROLOL SUCCINATE 25 MG/1
TABLET, EXTENDED RELEASE ORAL
Qty: 45 TABLET | Refills: 0 | Status: SHIPPED | OUTPATIENT
Start: 2024-08-19 | End: 2024-09-03

## 2024-08-19 RX ORDER — VITS A,C,E/LUTEIN/MINERALS 300MCG-200
1 TABLET ORAL DAILY
Qty: 90 TABLET | Refills: 0 | Status: SHIPPED | OUTPATIENT
Start: 2024-08-19 | End: 2024-09-03

## 2024-08-22 ENCOUNTER — TELEPHONE (OUTPATIENT)
Dept: NEPHROLOGY | Facility: CLINIC | Age: 84
End: 2024-08-22
Payer: MEDICARE

## 2024-08-22 RX ORDER — VITS A,C,E/LUTEIN/MINERALS 300MCG-200
1 TABLET ORAL DAILY
Qty: 30 TABLET | Refills: 10 | OUTPATIENT
Start: 2024-08-22

## 2024-08-22 RX ORDER — SIMVASTATIN 20 MG
TABLET ORAL
Qty: 30 TABLET | Refills: 10 | OUTPATIENT
Start: 2024-08-22

## 2024-08-22 RX ORDER — ESCITALOPRAM OXALATE 20 MG/1
20 TABLET ORAL EVERY MORNING
Qty: 30 TABLET | Refills: 10 | OUTPATIENT
Start: 2024-08-22

## 2024-08-22 RX ORDER — LAMOTRIGINE 100 MG/1
TABLET ORAL
Qty: 120 TABLET | Refills: 10 | OUTPATIENT
Start: 2024-08-22

## 2024-08-22 RX ORDER — TAMSULOSIN HYDROCHLORIDE 0.4 MG/1
CAPSULE ORAL
Qty: 60 CAPSULE | Refills: 10 | OUTPATIENT
Start: 2024-08-22

## 2024-08-22 RX ORDER — METOPROLOL SUCCINATE 25 MG/1
TABLET, EXTENDED RELEASE ORAL
Qty: 15 TABLET | Refills: 10 | OUTPATIENT
Start: 2024-08-22

## 2024-08-22 NOTE — TELEPHONE ENCOUNTER
Patient Contacted for the patient to call back and schedule the following:    Appointment type: Return Nephrology  Provider: Dr. Scott  Return date: 11/4  Specialty phone number: [phone]  Additional appointment(s) needed: Labs prior  Additonal Notes: 9/30 resched

## 2024-09-03 ENCOUNTER — OFFICE VISIT (OUTPATIENT)
Dept: FAMILY MEDICINE | Facility: CLINIC | Age: 84
End: 2024-09-03
Payer: MEDICARE

## 2024-09-03 VITALS
RESPIRATION RATE: 14 BRPM | SYSTOLIC BLOOD PRESSURE: 152 MMHG | WEIGHT: 175 LBS | BODY MASS INDEX: 24.5 KG/M2 | DIASTOLIC BLOOD PRESSURE: 71 MMHG | HEART RATE: 57 BPM | HEIGHT: 71 IN | OXYGEN SATURATION: 97 %

## 2024-09-03 DIAGNOSIS — Z00.00 ENCOUNTER FOR MEDICARE ANNUAL WELLNESS EXAM: ICD-10-CM

## 2024-09-03 DIAGNOSIS — F41.9 ANXIETY AND DEPRESSION: ICD-10-CM

## 2024-09-03 DIAGNOSIS — F32.A ANXIETY AND DEPRESSION: ICD-10-CM

## 2024-09-03 DIAGNOSIS — F10.21 ALCOHOL DEPENDENCE IN REMISSION (H): ICD-10-CM

## 2024-09-03 DIAGNOSIS — N18.4 CKD (CHRONIC KIDNEY DISEASE) STAGE 4, GFR 15-29 ML/MIN (H): ICD-10-CM

## 2024-09-03 DIAGNOSIS — R41.3 MEMORY CHANGE: Primary | ICD-10-CM

## 2024-09-03 DIAGNOSIS — I48.0 PAROXYSMAL ATRIAL FIBRILLATION (H): ICD-10-CM

## 2024-09-03 DIAGNOSIS — H91.93 BILATERAL HEARING LOSS, UNSPECIFIED HEARING LOSS TYPE: ICD-10-CM

## 2024-09-03 DIAGNOSIS — F41.9 ANXIETY: ICD-10-CM

## 2024-09-03 DIAGNOSIS — E78.00 HIGH CHOLESTEROL: ICD-10-CM

## 2024-09-03 DIAGNOSIS — R35.0 BENIGN PROSTATIC HYPERPLASIA WITH URINARY FREQUENCY: ICD-10-CM

## 2024-09-03 DIAGNOSIS — G40.909 SEIZURE DISORDER (H): ICD-10-CM

## 2024-09-03 DIAGNOSIS — N40.1 BENIGN PROSTATIC HYPERPLASIA WITH URINARY FREQUENCY: ICD-10-CM

## 2024-09-03 PROCEDURE — 99214 OFFICE O/P EST MOD 30 MIN: CPT | Mod: 25 | Performed by: FAMILY MEDICINE

## 2024-09-03 PROCEDURE — G0439 PPPS, SUBSEQ VISIT: HCPCS | Performed by: FAMILY MEDICINE

## 2024-09-03 RX ORDER — LAMOTRIGINE 100 MG/1
200 TABLET ORAL 2 TIMES DAILY
Qty: 360 TABLET | Refills: 3 | Status: SHIPPED | OUTPATIENT
Start: 2024-09-03

## 2024-09-03 RX ORDER — SIMVASTATIN 20 MG
20 TABLET ORAL AT BEDTIME
Qty: 90 TABLET | Refills: 3 | Status: SHIPPED | OUTPATIENT
Start: 2024-09-03

## 2024-09-03 RX ORDER — METOPROLOL SUCCINATE 25 MG/1
TABLET, EXTENDED RELEASE ORAL
Qty: 45 TABLET | Refills: 3 | Status: SHIPPED | OUTPATIENT
Start: 2024-09-03

## 2024-09-03 RX ORDER — ESCITALOPRAM OXALATE 20 MG/1
20 TABLET ORAL EVERY MORNING
Qty: 90 TABLET | Refills: 3 | Status: SHIPPED | OUTPATIENT
Start: 2024-09-03

## 2024-09-03 RX ORDER — UBIDECARENONE 75 MG
100 CAPSULE ORAL DAILY
Qty: 90 TABLET | Refills: 3 | Status: SHIPPED | OUTPATIENT
Start: 2024-09-03

## 2024-09-03 RX ORDER — TAMSULOSIN HYDROCHLORIDE 0.4 MG/1
0.8 CAPSULE ORAL DAILY
Qty: 180 CAPSULE | Refills: 3 | Status: SHIPPED | OUTPATIENT
Start: 2024-09-03

## 2024-09-03 RX ORDER — VITS A,C,E/LUTEIN/MINERALS 300MCG-200
1 TABLET ORAL DAILY
Qty: 90 TABLET | Refills: 3 | Status: SHIPPED | OUTPATIENT
Start: 2024-09-03

## 2024-09-03 ASSESSMENT — ANXIETY QUESTIONNAIRES
GAD7 TOTAL SCORE: 14
1. FEELING NERVOUS, ANXIOUS, OR ON EDGE: NEARLY EVERY DAY
3. WORRYING TOO MUCH ABOUT DIFFERENT THINGS: MORE THAN HALF THE DAYS
5. BEING SO RESTLESS THAT IT IS HARD TO SIT STILL: SEVERAL DAYS
GAD7 TOTAL SCORE: 14
IF YOU CHECKED OFF ANY PROBLEMS ON THIS QUESTIONNAIRE, HOW DIFFICULT HAVE THESE PROBLEMS MADE IT FOR YOU TO DO YOUR WORK, TAKE CARE OF THINGS AT HOME, OR GET ALONG WITH OTHER PEOPLE: SOMEWHAT DIFFICULT
6. BECOMING EASILY ANNOYED OR IRRITABLE: MORE THAN HALF THE DAYS
2. NOT BEING ABLE TO STOP OR CONTROL WORRYING: MORE THAN HALF THE DAYS
7. FEELING AFRAID AS IF SOMETHING AWFUL MIGHT HAPPEN: SEVERAL DAYS

## 2024-09-03 ASSESSMENT — PATIENT HEALTH QUESTIONNAIRE - PHQ9
SUM OF ALL RESPONSES TO PHQ QUESTIONS 1-9: 7
10. IF YOU CHECKED OFF ANY PROBLEMS, HOW DIFFICULT HAVE THESE PROBLEMS MADE IT FOR YOU TO DO YOUR WORK, TAKE CARE OF THINGS AT HOME, OR GET ALONG WITH OTHER PEOPLE: SOMEWHAT DIFFICULT
SUM OF ALL RESPONSES TO PHQ QUESTIONS 1-9: 7
5. POOR APPETITE OR OVEREATING: NEARLY EVERY DAY

## 2024-09-03 NOTE — PATIENT INSTRUCTIONS
Patient Education   Preventive Care Advice   This is general advice given by our system to help you stay healthy. However, your care team may have specific advice just for you. Please talk to your care team about your preventive care needs.  Nutrition  Eat 5 or more servings of fruits and vegetables each day.  Try wheat bread, brown rice and whole grain pasta (instead of white bread, rice, and pasta).  Get enough calcium and vitamin D. Check the label on foods and aim for 100% of the RDA (recommended daily allowance).  Lifestyle  Exercise at least 150 minutes each week  (30 minutes a day, 5 days a week).  Do muscle strengthening activities 2 days a week. These help control your weight and prevent disease.  No smoking.  Wear sunscreen to prevent skin cancer.  Have a dental exam and cleaning every 6 months.  Yearly exams  See your health care team every year to talk about:  Any changes in your health.  Any medicines your care team has prescribed.  Preventive care, family planning, and ways to prevent chronic diseases.  Shots (vaccines)   HPV shots (up to age 26), if you've never had them before.  Hepatitis B shots (up to age 59), if you've never had them before.  COVID-19 shot: Get this shot when it's due.  Flu shot: Get a flu shot every year.  Tetanus shot: Get a tetanus shot every 10 years.  Pneumococcal, hepatitis A, and RSV shots: Ask your care team if you need these based on your risk.  Shingles shot (for age 50 and up)  General health tests  Diabetes screening:  Starting at age 35, Get screened for diabetes at least every 3 years.  If you are younger than age 35, ask your care team if you should be screened for diabetes.  Cholesterol test: At age 39, start having a cholesterol test every 5 years, or more often if advised.  Bone density scan (DEXA): At age 50, ask your care team if you should have this scan for osteoporosis (brittle bones).  Hepatitis C: Get tested at least once in your life.  STIs (sexually  transmitted infections)  Before age 24: Ask your care team if you should be screened for STIs.  After age 24: Get screened for STIs if you're at risk. You are at risk for STIs (including HIV) if:  You are sexually active with more than one person.  You don't use condoms every time.  You or a partner was diagnosed with a sexually transmitted infection.  If you are at risk for HIV, ask about PrEP medicine to prevent HIV.  Get tested for HIV at least once in your life, whether you are at risk for HIV or not.  Cancer screening tests  Cervical cancer screening: If you have a cervix, begin getting regular cervical cancer screening tests starting at age 21.  Breast cancer scan (mammogram): If you've ever had breasts, begin having regular mammograms starting at age 40. This is a scan to check for breast cancer.  Colon cancer screening: It is important to start screening for colon cancer at age 45.  Have a colonoscopy test every 10 years (or more often if you're at risk) Or, ask your provider about stool tests like a FIT test every year or Cologuard test every 3 years.  To learn more about your testing options, visit:   .  For help making a decision, visit:   https://bit.ly/xz82306.  Prostate cancer screening test: If you have a prostate, ask your care team if a prostate cancer screening test (PSA) at age 55 is right for you.  Lung cancer screening: If you are a current or former smoker ages 50 to 80, ask your care team if ongoing lung cancer screenings are right for you.  For informational purposes only. Not to replace the advice of your health care provider. Copyright   2023 [facility]. All rights reserved. Clinically reviewed by the [facility]. [employer] [id number] - REV 01/24.  Preventing Falls: Care Instructions  Injuries and health problems such as trouble walking or poor eyesight can increase your risk of falling. So can some medicines. But there are things you can do to help  "prevent falls. You can exercise to get stronger. You can also arrange your home to make it safer.    Talk to your doctor about the medicines you take. Ask if any of them increase the risk of falls and whether they can be changed or stopped.   Try to exercise regularly. It can help improve your strength and balance. This can help lower your risk of falling.     Practice fall safety and prevention.    Wear low-heeled shoes that fit well and give your feet good support. Talk to your doctor if you have foot problems that make this hard.  Carry a cellphone or wear a medical alert device that you can use to call for help.  Use stepladders instead of chairs to reach high objects. Don't climb if you're at risk for falls. Ask for help, if needed.  Wear the correct eyeglasses, if you need them.    Make your home safer.    Remove rugs, cords, clutter, and furniture from walkways.  Keep your house well lit. Use night-lights in hallways and bathrooms.  Install and use sturdy handrails on stairways.  Wear nonskid footwear, even inside. Don't walk barefoot or in socks without shoes.    Be safe outside.    Use handrails, curb cuts, and ramps whenever possible.  Keep your hands free by using a shoulder bag or backpack.  Try to walk in well-lit areas. Watch out for uneven ground, changes in pavement, and debris.  Be careful in the winter. Walk on the grass or gravel when sidewalks are slippery. Use de-icer on steps and walkways. Add non-slip devices to shoes.    Put grab bars and nonskid mats in your shower or tub and near the toilet. Try to use a shower chair or bath bench when bathing.   Get into a tub or shower by putting in your weaker leg first. Get out with your strong side first. Have a phone or medical alert device in the bathroom with you.   Where can you learn more?  Go to https://www.Net[employer].net/patiented  Enter G117 in the search box to learn more about \"Preventing Falls: Care Instructions.\"  Current as of: July 17, " 2023               Content Version: 14.0    3091-9744 [employer].   Care instructions adapted under license by your healthcare professional. If you have questions about a medical condition or this instruction, always ask your healthcare professional. [employer] disclaims any warranty or liability for your use of this information.      Hearing Loss: Care Instructions  Overview     Hearing loss is a sudden or slow decrease in how well you hear. It can range from slight to profound. Permanent hearing loss can occur with aging. It also can happen when you are exposed long-term to loud noise. Examples include listening to loud music, riding motorcycles, or being around other loud machines.  Hearing loss can affect your work and home life. It can make you feel lonely or depressed. You may feel that you have lost your independence. But hearing aids and other devices can help you hear better and feel connected to others.  Follow-up care is a key part of your treatment and safety. Be sure to make and go to all appointments, and call your doctor if you are having problems. It's also a good idea to know your test results and keep a list of the medicines you take.  How can you care for yourself at home?  Avoid loud noises whenever possible. This helps keep your hearing from getting worse.  Always wear hearing protection around loud noises.  Wear a hearing aid as directed.  A professional can help you pick a hearing aid that will work best for you.  You can also get hearing aids over the counter for mild to moderate hearing loss.  Have hearing tests as your doctor suggests. They can show whether your hearing has changed. Your hearing aid may need to be adjusted.  Use other devices as needed. These may include:  Telephone amplifiers and hearing aids that can connect to a television, stereo, radio, or microphone.  Devices that use lights or vibrations. These alert you to the doorbell, a ringing  "telephone, or a baby monitor.  Television closed-captioning. This shows the words at the bottom of the screen. Most new TVs can do this.  TTY (text telephone). This lets you type messages back and forth on the telephone instead of talking or listening. These devices are also called TDD. When messages are typed on the keyboard, they are sent over the phone line to a receiving TTY. The message is shown on a monitor.  Use text messaging, social media, and email if it is hard for you to communicate by telephone.  Try to learn a listening technique called speechreading. It is not lipreading. You pay attention to people's gestures, expressions, posture, and tone of voice. These clues can help you understand what a person is saying. Face the person you are talking to, and have them face you. Make sure the lighting is good. You need to see the other person's face clearly.  Think about counseling if you need help to adjust to your hearing loss.  When should you call for help?  Watch closely for changes in your health, and be sure to contact your doctor if:    You think your hearing is getting worse.     You have new symptoms, such as dizziness or nausea.   Where can you learn more?  Go to https://www.[employer].net/patiented  Enter R798 in the search box to learn more about \"Hearing Loss: Care Instructions.\"  Current as of: September 27, 2023               Content Version: 14.0    6658-0301 [employer].   Care instructions adapted under license by your healthcare professional. If you have questions about a medical condition or this instruction, always ask your healthcare professional. [employer] disclaims any warranty or liability for your use of this information.      Learning About Stress  What is stress?     Stress is your body's response to a hard situation. Your body can have a physical, emotional, or mental response. Stress is a fact of life for most people, and it affects everyone " differently. What causes stress for you may not be stressful for someone else.  A lot of things can cause stress. You may feel stress when you go on a job interview, take a test, or run a race. This kind of short-term stress is normal and even useful. It can help you if you need to work hard or react quickly. For example, stress can help you finish an important job on time.  Long-term stress is caused by ongoing stressful situations or events. Examples of long-term stress include long-term health problems, ongoing problems at work, or conflicts in your family. Long-term stress can harm your health.  How does stress affect your health?  When you are stressed, your body responds as though you are in danger. It makes hormones that speed up your heart, make you breathe faster, and give you a burst of energy. This is called the fight-or-flight stress response. If the stress is over quickly, your body goes back to normal and no harm is done.  But if stress happens too often or lasts too long, it can have bad effects. Long-term stress can make you more likely to get sick, and it can make symptoms of some diseases worse. If you tense up when you are stressed, you may develop neck, shoulder, or low back pain. Stress is linked to high blood pressure and heart disease.  Stress also harms your emotional health. It can make you peña, tense, or depressed. Your relationships may suffer, and you may not do well at work or school.  What can you do to manage stress?  You can try these things to help manage stress:   Do something active. Exercise or activity can help reduce stress. Walking is a great way to get started. Even everyday activities such as housecleaning or yard work can help.  Try yoga or gertrudis chi. These techniques combine exercise and meditation. You may need some training at first to learn them.  Do something you enjoy. For example, listen to music or go to a movie. Practice your hobby or do volunteer work.  Meditate.  "This can help you relax, because you are not worrying about what happened before or what may happen in the future.  Do guided imagery. Imagine yourself in any setting that helps you feel calm. You can use online videos, books, or a teacher to guide you.  Do breathing exercises. For example:  From a standing position, bend forward from the waist with your knees slightly bent. Let your arms dangle close to the floor.  Breathe in slowly and deeply as you return to a standing position. Roll up slowly and lift your head last.  Hold your breath for just a few seconds in the standing position.  Breathe out slowly and bend forward from the waist.  Let your feelings out. Talk, laugh, cry, and express anger when you need to. Talking with supportive friends or family, a counselor, or a nilo leader about your feelings is a healthy way to relieve stress. Avoid discussing your feelings with people who make you feel worse.  Write. It may help to write about things that are bothering you. This helps you find out how much stress you feel and what is causing it. When you know this, you can find better ways to cope.  What can you do to prevent stress?  You might try some of these things to help prevent stress:  Manage your time. This helps you find time to do the things you want and need to do.  Get enough sleep. Your body recovers from the stresses of the day while you are sleeping.  Get support. Your family, friends, and community can make a difference in how you experience stress.  Limit your news feed. Avoid or limit time on social media or news that may make you feel stressed.  Do something active. Exercise or activity can help reduce stress. Walking is a great way to get started.  Where can you learn more?  Go to https://www.[employer].net/patiented  Enter N032 in the search box to learn more about \"Learning About Stress.\"  Current as of: October 24, 2023               Content Version: 14.0    5081-1577 [employer].   Care instructions adapted under license by your healthcare professional. If you have questions about a medical condition or this instruction, always ask your healthcare professional. [employer] disclaims any warranty or liability for your use of this information.      Learning About Sleeping Well  What does sleeping well mean?     Sleeping well means getting enough sleep to feel good and stay healthy. How much sleep is enough varies among people.  The number of hours you sleep and how you feel when you wake up are both important. If you do not feel refreshed, you probably need more sleep. Another sign of not getting enough sleep is feeling tired during the day.  Experts recommend that adults get at least 7 or more hours of sleep per day. Children and older adults need more sleep.  Why is getting enough sleep important?  Getting enough quality sleep is a basic part of good health. When your sleep suffers, your physical health, mood, and your thoughts can suffer too. You may find yourself feeling more grumpy or stressed. Not getting enough sleep also can lead to serious problems, including injury, accidents, anxiety, and depression.  What might cause poor sleeping?  Many things can cause sleep problems, including:  Changes to your sleep schedule.  Stress. Stress can be caused by fear about a single event, such as giving a speech. Or you may have ongoing stress, such as worry about work or school.  Depression, anxiety, and other mental or emotional conditions.  Changes in your sleep habits or surroundings. This includes changes that happen where you sleep, such as noise, light, or sleeping in a different bed. It also includes changes in your sleep pattern, such as having jet lag or working a late shift.  Health problems, such as pain, breathing problems, and restless legs syndrome.  Lack of regular exercise.  Using alcohol, nicotine, or caffeine before bed.  How can you help  "yourself?  Here are some tips that may help you sleep more soundly and wake up feeling more refreshed.  Your sleeping area   Use your bedroom only for sleeping and sex. A bit of light reading may help you fall asleep. But if it doesn't, do your reading elsewhere in the house. Try not to use your TV, computer, smartphone, or tablet while you are in bed.  Be sure your bed is big enough to stretch out comfortably, especially if you have a sleep partner.  Keep your bedroom quiet, dark, and cool. Use curtains, blinds, or a sleep mask to block out light. To block out noise, use earplugs, soothing music, or a \"white noise\" machine.  Your evening and bedtime routine   Create a relaxing bedtime routine. You might want to take a warm shower or bath, or listen to soothing music.  Go to bed at the same time every night. And get up at the same time every morning, even if you feel tired.  What to avoid   Limit caffeine (coffee, tea, caffeinated sodas) during the day, and don't have any for at least 6 hours before bedtime.  Avoid drinking alcohol before bedtime. Alcohol can cause you to wake up more often during the night.  Try not to smoke or use tobacco, especially in the evening. Nicotine can keep you awake.  Limit naps during the day, especially close to bedtime.  Avoid lying in bed awake for too long. If you can't fall asleep or if you wake up in the middle of the night and can't get back to sleep within about 20 minutes, get out of bed and go to another room until you feel sleepy.  Avoid taking medicine right before bed that may keep you awake or make you feel hyper or energized. Your doctor can tell you if your medicine may do this and if you can take it earlier in the day.  If you can't sleep   Imagine yourself in a peaceful, pleasant scene. Focus on the details and feelings of being in a place that is relaxing.  Get up and do a quiet or boring activity until you feel sleepy.  Avoid drinking any liquids before going to bed " "to help prevent waking up often to use the bathroom.  Where can you learn more?  Go to https://www.[employer]X.net/patiented  Enter J942 in the search box to learn more about \"Learning About Sleeping Well.\"  Current as of: July 10, 2023  Content Version: 14.1 2006-2024 [employer].   Care instructions adapted under license by your healthcare professional. If you have questions about a medical condition or this instruction, always ask your healthcare professional. [employer] disclaims any warranty or liability for your use of this information.    Bladder Training: Care Instructions  Your Care Instructions     Bladder training is used to treat urge incontinence and stress incontinence. Urge incontinence means that the need to urinate comes on so fast that you can't get to a toilet in time. Stress incontinence means that you leak urine because of pressure on your bladder. For example, it may happen when you laugh, cough, or lift something heavy.  Bladder training can increase how long you can wait before you have to urinate. It can also help your bladder hold more urine. And it can give you better control over the urge to urinate.  It is important to remember that bladder training takes a few weeks to a few months to make a difference. You may not see results right away, but don't give up.  Follow-up care is a key part of your treatment and safety. Be sure to make and go to all appointments, and call your doctor if you are having problems. It's also a good idea to know your test results and keep a list of the medicines you take.  How can you care for yourself at home?  Work with your doctor to come up with a bladder training program that is right for you. You may use one or more of the following methods.  Delayed urination  In the beginning, try to keep from urinating for 5 minutes after you first feel the need to go.  While you wait, take deep, slow breaths to relax. Kegel exercises can " "also help you delay the need to go to the bathroom.  After some practice, when you can easily wait 5 minutes to urinate, try to wait 10 minutes before you urinate.  Slowly increase the waiting period until you are able to control when you have to urinate.  Scheduled urination  Empty your bladder when you first wake up in the morning.  Schedule times throughout the day when you will urinate.  Start by going to the bathroom every hour, even if you don't need to go.  Slowly increase the time between trips to the bathroom.  When you have found a schedule that works well for you, keep doing it.  If you wake up during the night and have to urinate, do it. Apply your schedule to waking hours only.  Kegel exercises  These tighten and strengthen pelvic muscles, which can help you control the flow of urine. (If doing these exercises causes pain, stop doing them and talk with your doctor.) To do Kegel exercises:  Squeeze your muscles as if you were trying not to pass gas. Or squeeze your muscles as if you were stopping the flow of urine. Your belly, legs, and buttocks shouldn't move.  Hold the squeeze for 3 seconds, then relax for 5 to 10 seconds.  Start with 3 seconds, then add 1 second each week until you are able to squeeze for 10 seconds.  Repeat the exercise 10 times a session. Do 3 to 8 sessions a day.  When should you call for help?  Watch closely for changes in your health, and be sure to contact your doctor if:    Your incontinence is getting worse.     You do not get better as expected.   Where can you learn more?  Go to https://www.[employer].net/patiented  Enter V684 in the search box to learn more about \"Bladder Training: Care Instructions.\"  Current as of: November 15, 2023               Content Version: 14.0    9576-2413 [employer].   Care instructions adapted under license by your healthcare professional. If you have questions about a medical condition or this instruction, always ask your " healthcare professional. [employer], [facility] disclaims any warranty or liability for your use of this information.      Learning About Depression Screening  What is depression screening?  Depression screening is a way to see if you have depression symptoms. It may be done by a doctor or counselor. It's often part of a routine checkup. That's because your mental health is just as important as your physical health.  Depression is a mental health condition that affects how you feel, think, and act. You may:  Have less energy.  Lose interest in your daily activities.  Feel sad and grouchy for a long time.  Depression is very common. It affects people of all ages.  Many things can lead to depression. Some people become depressed after they have a stroke or find out they have a major illness like cancer or heart disease. The death of a loved one or a breakup may lead to depression. It can run in families. Most experts believe that a combination of inherited genes and stressful life events can cause it.  What happens during screening?  You may be asked to fill out a form about your depression symptoms. You and the doctor will discuss your answers. The doctor may ask you more questions to learn more about how you think, act, and feel.  What happens after screening?  If you have symptoms of depression, your doctor will talk to you about your options.  Doctors usually treat depression with medicines or counseling. Often, combining the two works best. Many people don't get help because they think that they'll get over the depression on their own. But people with depression may not get better unless they get treatment.  The cause of depression is not well understood. There may be many factors involved. But if you have depression, it's not your fault.  A serious symptom of depression is thinking about death or suicide. If you or someone you care about talks about this or about feeling hopeless, get help right away.  It's  "important to know that depression can be treated. Medicine, counseling, and self-care may help.  Where can you learn more?  Go to https://www.[employer].net/patiented  Enter T185 in the search box to learn more about \"Learning About Depression Screening.\"  Current as of: June 24, 2023  Content Version: 14.1 2006-2024 [employer].   Care instructions adapted under license by your healthcare professional. If you have questions about a medical condition or this instruction, always ask your healthcare professional. [employer], [employer] disclaims any warranty or liability for your use of this information.       "

## 2024-09-03 NOTE — PROGRESS NOTES
Preventive Care Visit  [facility]  Francis Smith MD, Family Medicine  Sep 3, 2024      Assessment & Plan     Benign prostatic hyperplasia with urinary frequency  Helps, tolerated  - tamsulosin (FLOMAX) 0.4 MG capsule; Take 2 capsules (0.8 mg) by mouth daily.    High cholesterol  Needs for heart history; sees cardio in a mo  - simvastatin (ZOCOR) 20 MG tablet; Take 1 tablet (20 mg) by mouth at bedtime.    Alcohol dependence in remission (H)  Continue  - multivitamin (OCUVITE) TABS tablet; Take 1 tablet by mouth daily. Please keep the appointment on 9/3/24 for further refills.  - cyanocobalamin (VITAMIN B-12) 100 MCG tablet; Take 1 tablet (100 mcg) by mouth daily. Take 1,000 mcg by mouth every morning -    Paroxysmal atrial fibrillation (H)  No cardio sx of late keep visit in a mo  - metoprolol succinate ER (TOPROL XL) 25 MG 24 hr tablet; TAKE 1/2 TABLET BY MOUTH DAILY    Seizure disorder (H)    - lamoTRIgine (LAMICTAL) 100 MG tablet; Take 2 tablets (200 mg) by mouth 2 times daily.  - Adult Neurology  Referral; Future    Anxiety  Continue, add therapy, see me three mo  - escitalopram (LEXAPRO) 20 MG tablet; Take 1 tablet (20 mg) by mouth every morning.    CKD (chronic kidney disease) stage 4, GFR 15-29 ml/min (H)  Keep renal visit this autumn  - calcium carbonate-vitamin D (OSCAL) 500-5 MG-MCG tablet; Take 1 tablet by mouth daily.    Memory change  OT, Neuro opinions then see me back  - Occupational Therapy  Referral; Future  - Adult Neurology  Referral; Future    Bilateral hearing loss, unspecified hearing loss type  Might worsen cognition discussed  - Adult ENT  Referral; Future    Anxiety and depression    - Adult Mental Health  Referral; Future    Encounter for Medicare annual wellness exam  Done    Patient has been advised of split billing requirements and indicates understanding: Yes        Counseling  Appropriate  preventive services were addressed with this patient via screening, questionnaire, or discussion as appropriate for fall prevention, nutrition, physical activity, Tobacco-use cessation, social engagement, weight loss and cognition.  Checklist reviewing preventive services available has been given to the patient.  Reviewed patient's diet, addressing concerns and/or questions.   Discussed possible causes of fatigue. The patient was provided with written information regarding signs of hearing loss.   Information on urinary incontinence and treatment options given to patient.   The patient's PHQ-9 score is consistent with mild depression. He was provided with information regarding depression.           Return in about 3 months (around 12/3/2024).    Armand Alejo is a 83 year old, presenting for the following:  Medicare Visit        9/3/2024     7:52 AM   Additional Questions   Roomed by marianela       Health Care Directive  Patient has a Health Care Directive on file  Has one, discussed    HPI  Occasional L hip pain w/ walking; intermittent  Occasional base R thumb pain artemio when hand writing, works in wood shop a lot no significant pain there  R knee pain intermittent  No joint red warm swollen trauma    He wishes to just monitor these    Anxiety: meds reviewed, toleratd, today phq is 9 jeannette is 13, no SI. Open to therapy. Wife passed last autumn, has good suppport three kids all live nearby    Sees heart, renal MD's routinely, eye MD too    Shots:   Due for flu, covid, RSV, Prev20, Shingrix-discussed, I write them down and sugget take list to drugstore to get there    Thinks memory off a bit. Normal clock draw, we did three  word twice each time just recalled one. Due for Neuro follow-up anyway christopher (none of late). Open to OT (discussed their role), discuss w/ Neuro. Gradually more [place] reviewed can be linked.    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     Paroxysmal atrial fibrillation (H)     PVD (posterior vitreous detachment)     Seizure disorder (H)     last seizure 2008     Past Surgical History:   Procedure Laterality Date    NO HISTORY OF SURGERY      PHACOEMULSIFICATION CLEAR CORNEA WITH STANDARD INTRAOCULAR LENS IMPLANT Right 11/3/2022    Procedure: RIGHT EYE PHACOEMULSIFICATION, CATARACT, WITH INTRAOCULAR LENS IMPLANT COMPLEX CASE ;  Surgeon: Best Padilla MD;  Location: [facility] [place] OR    PHACOEMULSIFICATION CLEAR CORNEA WITH STANDARD INTRAOCULAR LENS IMPLANT Left 11/17/2022    Procedure: LEFT EYE PHACOEMULSIFICATION, CATARACT, WITH INTRAOCULAR LENS IMPLANT;  Surgeon: Best Padilla MD;  Location: [facility] [place] OR     Current Outpatient Medications   Medication Sig Dispense Refill    acetaminophen (TYLENOL) 325 MG tablet Take 325-650 mg by mouth every 6 hours as needed for mild pain      busPIRone (BUSPAR) 5 MG tablet Take 5 mg by mouth 2 times daily Has started      calcium carbonate-vitamin D (OSCAL) 500-5 MG-MCG tablet Take 1 tablet by mouth daily. 90 tablet 3    Cholecalciferol (D3-1000) 25 MCG (1000 UT) CAPS Take 1 capsule by mouth daily      cyanocobalamin (VITAMIN B-12) 100 MCG tablet Take 1 tablet (100 mcg) by mouth daily. Take 1,000 mcg by mouth every morning - 90 tablet 3    escitalopram (LEXAPRO) 20 MG tablet Take 1 tablet (20 mg) by mouth every morning. 90 tablet 3    lamoTRIgine (LAMICTAL) 100 MG tablet Take 2 tablets (200 mg) by mouth 2 times daily. 360 tablet 3    metoprolol succinate ER (TOPROL XL) 25 MG 24 hr tablet TAKE 1/2 TABLET BY MOUTH DAILY 45 tablet 3     multivitamin (OCUVITE) TABS tablet Take 1 tablet by mouth daily. Please keep the appointment on 9/3/24 for further refills. 90 tablet 3    simvastatin (ZOCOR) 20 MG tablet Take 1 tablet (20 mg) by mouth at bedtime. 90 tablet 3    tamsulosin (FLOMAX) 0.4 MG capsule Take 2 capsules (0.8 mg) by mouth daily. 180 capsule 3    benzonatate (TESSALON) 100 MG capsule Take 1 capsule (100 mg) by mouth 3 times daily as needed for cough (Patient not taking: Reported on 9/3/2024) 20 capsule 0    sodium bicarbonate 650 MG tablet Take 1 tablet (650 mg) by mouth 2 times daily Patient needs to see primary provider and have labs for further refills. Please call for an appointment at [phone]. (Patient not taking: Reported on 9/3/2024) 180 tablet 3     No current facility-administered medications for this visit.     No Known Allergies  Family History   Problem Relation Age of Onset    Glaucoma No family hx of     Macular Degeneration No family hx of     Anesthesia Reaction No family hx of     Deep Vein Thrombosis (DVT) No family hx of      Social History     Socioeconomic History    Marital status:      Spouse name: Not on file    Number of children: Not on file    Years of education: Not on file    Highest education level: Bachelor's degree (e.g., BA, AB, BS)   Occupational History    Not on file   Tobacco Use    Smoking status: Former     Types: Cigarettes    Smokeless tobacco: Never   Vaping Use    Vaping status: Never Used   Substance and Sexual Activity    Alcohol use: No     Comment: History of alcohol dependence, in remission for 20 years    Drug use: No    Sexual activity: Not Currently   Other Topics Concern    Not on file   Social History Narrative    Not on file     Social Determinants of Health     Financial Resource Strain: Low Risk  (9/3/2024)    Financial Resource Strain     Within the past 12 months, have you or your family members you live with been unable to get utilities (heat, electricity) when it was  really needed?: No   Food Insecurity: Low Risk  (9/3/2024)    Food Insecurity     Within the past 12 months, did you worry that your food would run out before you got money to buy more?: No     Within the past 12 months, did the food you bought just not last and you didn t have money to get more?: No   Transportation Needs: Low Risk  (9/3/2024)    Transportation Needs     Within the past 12 months, has lack of transportation kept you from medical appointments, getting your medicines, non-medical meetings or appointments, work, or from getting things that you need?: No   Physical Activity: Unknown (9/3/2024)    Exercise Vital Sign     Days of Exercise per Week: 5 days     Minutes of Exercise per Session: Not on file   Stress: Stress Concern Present (9/3/2024)    [employer] [place] [employer] - Occupational Stress Questionnaire     Feeling of Stress : Very much   Social Connections: Unknown (9/3/2024)    Social Connection and Isolation Panel [NHANES]     Frequency of Communication with Friends and Family: Not on file     Frequency of Social Gatherings with Friends and Family: Once a week     Attends Faith Services: Not on file     Active Member of Clubs or Organizations: Not on file     Attends Club or Organization Meetings: Not on file     Marital Status: Not on file   Interpersonal Safety: Low Risk  (9/3/2024)    Interpersonal Safety     Do you feel physically and emotionally safe where you currently live?: Yes     Within the past 12 months, have you been hit, slapped, kicked or otherwise physically hurt by someone?: No     Within the past 12 months, have you been humiliated or emotionally abused in other ways by your partner or ex-partner?: No   Housing Stability: Low Risk  (9/3/2024)    Housing Stability     Do you have housing? : Yes     Are you worried about losing your housing?: No                 9/3/2024   General Health   How would you rate your overall physical health? Good   Feel stress  (tense, anxious, or unable to sleep) Very much      (!) STRESS CONCERN      9/3/2024   Nutrition   Diet: Low salt    Carbohydrate counting    Gluten-free/reduced       Multiple values from one day are sorted in reverse-chronological order         9/3/2024   Exercise   Days per week of moderate/strenous exercise 5 days            9/3/2024   Social Factors   Frequency of gathering with friends or relatives Once a week   Worry food won't last until get money to buy more No   Food not last or not have enough money for food? No   Do you have housing? (Housing is defined as stable permanent housing and does not include staying ouside in a car, in a tent, in an abandoned building, in an overnight shelter, or couch-surfing.) Yes   Are you worried about losing your housing? No   Lack of transportation? No   Unable to get utilities (heat,electricity)? No            9/3/2024   Fall Risk   Fallen 2 or more times in the past year? No   Trouble with walking or balance? Yes   Reason Gait Speed Test Not Completed Patient declines             9/3/2024   Activities of Daily Living- Home Safety   Needs help with the following daily activites None of the above   Safety concerns in the home None of the above            9/3/2024   Dental   Dentist two times every year? Yes            9/3/2024   Hearing Screening   Hearing concerns? (!) I FEEL THAT PEOPLE ARE MUMBLING OR NOT SPEAKING CLEARLY.    (!) I NEED TO ASK PEOPLE TO SPEAK UP OR REPEAT THEMSELVES.    (!) IT'S HARD TO FOLLOW A CONVERSATION IN A NOISY RESTAURANT OR CROWDED ROOM.    (!) TROUBLE UNDESTANDING A SPEAKER IN A PUBLIC MEETING OR Congregational SERVICE.    (!) TROUBLE FOLLOWING DIALOGUE IN THE THEATHER.    (!) TROUBLE UNDERSTANDING SOFT OR WHISPERED SPEECH.       Multiple values from one day are sorted in reverse-chronological order         9/3/2024   Driving Risk Screening   Patient/family members have concerns about driving No            9/3/2024   General Alertness/Fatigue  Screening   Have you been more tired than usual lately? (!) YES            9/3/2024   Urinary Incontinence Screening   Bothered by leaking urine in past 6 months Yes            9/3/2024   TB Screening   Were you born outside of the [place]? Yes          Today's PHQ-9 Score:       9/3/2024     7:41 AM   PHQ-9 SCORE   PHQ-9 Total Score MyChart 7 (Mild depression)   PHQ-9 Total Score 7         9/3/2024   Substance Use   Alcohol more than 3/day or more than 7/wk Not Applicable   Do you have a current opioid prescription? No   How severe/bad is pain from 1 to 10? 4/10   Do you use any other substances recreationally? No        Social History     Tobacco Use    Smoking status: Former     Types: Cigarettes    Smokeless tobacco: Never   Vaping Use    Vaping status: Never Used   Substance Use Topics    Alcohol use: No     Comment: History of alcohol dependence, in remission for 20 years    Drug use: No           Reviewed and updated as needed this visit by Provider                      Current providers sharing in care for this patient include:  Patient Care Team:  Francis Smith MD as PCP - General (Family Medicine)  Inderjit Grier as Esperanza Herman CNP as Nurse Practitioner (Urology)  Pushpa Alvarado, DINH as Registered Nurse  Francis Smith MD as Assigned PCP  Fernando Chong MD as MD (Nephrology)  Merry Barrow MD as MD (Nephrology)  Merry Barrow MD as Assigned Nephrology Provider  Nithin Tolbert [facility] as Pharmacist (Pharmacist)  Adis Ventura MD (Cardiovascular Disease)  Matthew Cabrera MD as MD (Endocrinology, Diabetes, and Metabolism)  Katerine Gonzalez, RN as Specialty Care Coordinator (Nephrology)  Flores Arias, DINH as Specialty Care Coordinator (Nephrology)  Nidhi Schilling, RN as Specialty Care Coordinator (Nephrology)  Madyson Evangelista, ELIZABETH CNP as Nurse Practitioner (Cardiovascular Disease)  Brittanie Etienne PA-C as Assigned Heart and Vascular Provider  Cristel  "Carla, RN as Lead Care Coordinator (Primary Care - CC)  Amy Hurd, RN as Specialty Care Coordinator (Pharmacy)  Naya Zimmer MD as Assigned Surgical Provider    The following health maintenance items are reviewed in Epic and correct as of today:  Health Maintenance   Topic Date Due    ANNUAL REVIEW OF  ORDERS  Never done    DEPRESSION ACTION PLAN  Never done    ZOSTER IMMUNIZATION (1 of 2) Never done    RSV VACCINE (1 - 1-dose 60+ series) Never done    MEDICARE ANNUAL WELLNESS VISIT  Never done    Pneumococcal Vaccine: 65+ Years (2 of 2 - PCV) 10/20/2006    MICROALBUMIN  10/18/2023    INFLUENZA VACCINE (1) 09/01/2024    COVID-19 Vaccine (7 - 2023-24 season) 09/01/2024    BMP  10/24/2024    HEMOGLOBIN  01/24/2025    PHQ-9  03/03/2025    LIPID  04/16/2025    FALL RISK ASSESSMENT  09/03/2025    ADVANCE CARE PLANNING  06/10/2027    DTAP/TDAP/TD IMMUNIZATION (3 - Td or Tdap) 09/23/2028    PARATHYROID  Completed    PHOSPHORUS  Completed    URINALYSIS  Completed    ALK PHOS  Completed    HPV IMMUNIZATION  Aged Out    MENINGITIS IMMUNIZATION  Aged Out    RSV MONOCLONAL ANTIBODY  Aged Out            Objective    Exam  BP (!) 152/71 (BP Location: Right arm, Patient Position: Sitting, Cuff Size: Adult Large)   Pulse 57   Resp 14   Ht 1.803 m (5' 11\")   Wt 79.4 kg (175 lb)   SpO2 97%   BMI 24.41 kg/m     Estimated body mass index is 24.41 kg/m  as calculated from the following:    Height as of this encounter: 1.803 m (5' 11\").    Weight as of this encounter: 79.4 kg (175 lb).    Physical Exam  GENERAL: alert and no distress  EYES: Eyes grossly normal to inspection, PERRL and conjunctivae and sclerae normal  HENT: ear canals and TM's normal, nose and mouth without ulcers or lesions  NECK: no adenopathy, no asymmetry, masses, or scars  RESP: lungs clear to auscultation - no rales, rhonchi or wheezes  CV: regular rate and rhythm, normal S1 S2, no S3 or S4, no murmur, click or rub, no peripheral " edema  ABDOMEN: soft, nontender, no hepatosplenomegaly, no masses and bowel sounds normal   (male): normal male genitalia without lesions or urethral discharge, no hernia  MS: no gross musculoskeletal defects noted, no edema  SKIN: no suspicious lesions or rashes  NEURO: Normal strength and tone, mentation intact and speech normal  PSYCH: mentation appears normal, affect normal/bright        9/3/2024   Mini Cog   Clock Draw Score 2 Normal   3 Item Recall 1 object recalled   Mini Cog Total Score 3               Signed Electronically by: Francis Smith MD

## 2024-09-11 ENCOUNTER — THERAPY VISIT (OUTPATIENT)
Dept: OCCUPATIONAL THERAPY | Facility: CLINIC | Age: 84
End: 2024-09-11
Attending: FAMILY MEDICINE
Payer: MEDICARE

## 2024-09-11 DIAGNOSIS — R41.89 IMPAIRED COGNITION: ICD-10-CM

## 2024-09-11 DIAGNOSIS — Z74.09 IMPAIRED MOBILITY AND ADLS: Primary | ICD-10-CM

## 2024-09-11 DIAGNOSIS — Z78.9 IMPAIRED MOBILITY AND ADLS: Primary | ICD-10-CM

## 2024-09-11 DIAGNOSIS — R41.3 MEMORY CHANGE: ICD-10-CM

## 2024-09-11 PROCEDURE — 97165 OT EVAL LOW COMPLEX 30 MIN: CPT | Mod: GO | Performed by: OCCUPATIONAL THERAPIST

## 2024-09-11 NOTE — PROGRESS NOTES
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  Was also a     Hobbies/Interests:  woodworking    Patient goals for therapy:  assess memory    Pain assessment:  R knee     Objective     Cognitive Status Examination  Orientation: Oriented to person, place and time   Level of Consciousness: Alert  Follows Commands and Answers Questions: 100% of the time  Personal Safety and Judgement: Intact  Memory: Impaired, per report    MOCA 22/30  The [place] Cognitive Assessment (MOCA) is a 30 point cognitive screening assessment. Normal score is considered = or > 26/30.  The following ranges may be used to grade severity: 18-26 = mild cognitive impairment, 10-17= moderate cognitive impairment (MCI) and less than 10= severe cognitive impairment. These have not been researched. Average score for MCI is 22 and for mild Alzheimer's disease is 16.    SDMT: -1.5 SD from the mean    Attention: No deficits identified  Organization/Problem Solving: No deficits  identified  Executive Function: Working memory impaired, decreased storage of information for performing tasks, Cognitive flexibility impaired, Planning ability impaired    VISUAL SKILLS  Visual Acuity: Wears glasses    FUNCTIONAL MOBILITY  Ind with no device, right knee gives out at times but wears compression    ADL- ind with no ADL    INSTRUMENTAL ACTIVITIES OF DAILY LIVING (IADL): ind, two cats    Assessment & Plan   CLINICAL IMPRESSIONS  Medical Diagnosis: Memory change (R41.3)    Treatment Diagnosis: impaired participation in adls and iadls    Impression/Assessment: Pt is a 83 year old male presenting to Occupational Therapy due to memory concerns.  The following significant findings have been identified: Impaired cognition.  These identified deficits interfere with their ability to perform household chores and meal planning and preparation as compared to previous level of function.  Skilled OT services needed to assess and training memory deficits in order to aid in increased independence safety and ease and ADLs and IADLs.    Clinical Decision Making (Complexity):  Assessment of Occupational Performance: 1-3 Performance Deficits  Occupational Performance Limitations: home establishment and management and meal preparation and cleanup  Clinical Decision Making (Complexity): Low complexity    PLAN OF CARE  Treatment Interventions:  Interventions: Cognitive Skills, Self-Care/Home Management, Therapeutic Activity, Therapeutic Exercise    Long Term Goals   OT Goal 1  Goal Identifier: Memory training  Goal Description: Patient to report and demo 3-5 new methods to manage memory challenges and demo follow through with pen and paper tasks.  Rationale: In order to maximize safety and independence with cognitive function within the home or community  Goal Progress: initated today  Target Date: 12/10/24      Frequency of Treatment: up to 10 visits  Duration of Treatment: in 90 days     Recommended Referrals to Other  Professionals:  possible neuropsych  Education Assessment: Learner/Method: Patient     Risks and benefits of evaluation/treatment have been explained.   Patient/Family/caregiver agrees with Plan of Care.     Evaluation Time:         Signing Clinician: PA[facility]                                                                                   OUTPATIENT OCCUPATIONAL THERAPY      PLAN OF TREATMENT FOR OUTPATIENT REHABILITATION   Patient's Last Name, First Name, Melo Marcano YOB: 1940   Provider's [facility]   [facility]   Medical Record No.  [id number]     Onset Date: 09/03/24 Start of Care Date: 09/11/24     Medical Diagnosis:  Memory change (R41.3)      OT Treatment Diagnosis:  impaired participation in adls and iadls Plan of Treatment  Frequency/Duration:up to 10 visits/in 90 days    Certification date from 09/11/24   To 12/10/24        See note for plan of treatment details and functional goals     Ruthie Hong OT                         I CERTIFY THE NEED FOR THESE SERVICES FURNISHED UNDER        THIS PLAN OF TREATMENT AND WHILE UNDER MY CARE     (Physician attestation of this document indicates review and certification of the therapy plan).              Referring Provider:  Francis Smith    Initial Assessment  See Epic Evaluation- 09/11/24

## 2024-09-12 NOTE — PROGRESS NOTES
Labs are scheduled for 11/4/24.     Amy Hurd RN   Anemia Services  [facility]  [email or web address]   Office : [phone]  Fax: [phone]

## 2024-09-25 NOTE — PROGRESS NOTES
ELECTROPHYSIOLOGY CLINIC VISIT    Assessment/Recommendations   Assessment/Plan:    Mr. Dangelo is an 83 year old male who has a past medical history significant for PAF (CHADSVASC 3), HTN, IgA nephropathy, CKD IV, and ETOH abuse.      Paroxsymal Atrial Fibrillation  1. Stroke Prophylaxis: CHADSVASC=+HTN, ++age  3, corresponding to a 3.2% annual stroke / systemic emolism event rate. indicating need for long term oral anticoagulation.  He was previously on Eliquis but this was stopped due to renal dysfunction. Discussed he does have indication for anticoagulation, went over CVA/clot vs bleeding risk in detail. He states understanding, he is somewhat apprehensive about re-trialing DOACs but would like to consider retrying eliquis, I will discuss with nephrology team.      2. Rate Control: Continue Toprol XL.      3. Rhythm Control: Cardioversion, Antiarrhythmics and/or ablation are options for rhythm control. Low overall burden with minimal symptoms at this time. LVEF is preserved. Continue with conservative measures.      4. Risk Factor Management: Statin, BP control, and STEPHEN evaluation as indicated.       Will discuss DOAC with nephrology.  Follow up TBD.      History of Present Illness/Subjective    Mr. Melo Dangelo is a 83 year old male who comes in today for EP follow-up of AF.    He was first diagnosed with AF in 10/2020 when he was hospitalized with COVID infection. He was started on Eliquis. This was later discontinued due to CKD. A Zio patch from 10/2021 showed no significant arrhythmias. A repeat zio patch monitor from 2/2022 showed 2% PAF burden. A zio patch monitor from 9/2022 showed no arrhythmias. He had reported having skipped beats/palpitations lasting for an hour that occurred a couple times a week. He has seen Dr. Ventura and advised to take ASA.      EP Visit 7/19/23: He reports feeling well. He states he met with Nephrologist recently and is likely going to need to start PD. He says overall  his activity level has improved. He used to have to stop and rest with 1 flight of stairs but now can do 3. Does report some leg tiredness. He denies chest discomfort, palpitations, abdominal fullness/bloating or peripheral edema, shortness of breath, paroxysmal nocturnal dyspnea, orthopnea, lightheadedness, dizziness, pre-syncope, or syncope. Presenting 12 lead ECG shows SB 1 AVB Vent Rate 53 bpm,  ms, QRS 92 ms, QTc 412 ms. Current cardiac medications include: Toprol XL and Simvastatin.     EP Visit 2/6/24: He presents for follow up today. His GFR has remained ~14, he has not started PD. His activity level has remained consistent, can do three flights of stairs before becoming short of breath. His wife passed several months ago, he has felt well supported through this. He has no new cardiac symptoms, denies chest discomfort, palpitations, peripheral edema, shortness of breath, pre-syncope, or syncope. Presenting 12 lead ECG shows sinus Vent Rate 59 bpm,  ms, QRS 90 ms, QTc 427 ms.     He presents today for follow up. GFR today remains 13, follows closely with nephrology, has not started PD. He reports feeling well. His wife passed in Nov. He participates in rowing class once a week. He denies chest discomfort, palpitations, peripheral edema, shortness of breath, pre-syncope, or syncope. Presenting 12 lead ECG shows sinus Vent Rate 62 bpm,  ms, QRS 88 ms, QTc 440 ms.     I have reviewed and updated the patient's Past Medical History, Social History, Family History and Medication List.     Cardiographics (Personally Reviewed) :   7/6/22 Echo:   Interpretation Summary  Global and regional left ventricular function is normal with an EF of 60-65%.  Right ventricular function, chamber size, wall motion, and thickness are  normal.  Pulmonary artery systolic pressure is normal.  The inferior vena cava is normal.  No pericardial effusion is present.  No significant changes noted.       Physical  Examination   BP (!) 147/74 (BP Location: Left arm, Patient Position: Chair, Cuff Size: Adult Regular)   Pulse 59   Wt 80.1 kg (176 lb 9.6 oz)   SpO2 97%   BMI 24.63 kg/m    Wt Readings from Last 3 Encounters:   10/01/24 80.1 kg (176 lb 9.6 oz)   09/03/24 79.4 kg (175 lb)   05/06/24 83.1 kg (183 lb 1.6 oz)     General Appearance:   Alert, well-appearing and in no acute distress.   HEENT: Atraumatic, normocephalic. MMM.   Chest/Lungs:   Respirations unlabored.  Lungs are clear to auscultation.   Cardiovascular:   Regular rate and rhythm.  S1/S2. No murmur.    Abdomen:  Soft, nontender, nondistended.   Extremities: No cyanosis or clubbing. No edema.    Musculoskeletal: Moves all extremities.     Skin: Warm, dry, intact.    Neurologic: Mood and affect are appropriate.  Alert and oriented to person, place, time, and situation.          Medications  Allergies   Simvastatin 20 mg daily   Metoprolol succinate 25 mg daily     Buspar   Lexapro   Lamotrigine   Vitamins   Tamsulosin    No Known Allergies      Lab Results (Personally Reviewed)    Chemistry/lipid CBC Cardiac Enzymes/BNP/TSH/INR   Lab Results   Component Value Date    BUN 41.3 (H) 07/24/2024     07/24/2024    CO2 21 (L) 07/24/2024     Creatinine   Date Value Ref Range Status   07/24/2024 4.34 (H) 0.67 - 1.17 mg/dL Final   10/29/2020 1.33 (H) 0.66 - 1.25 mg/dL Final       Lab Results   Component Value Date    CHOL 127 04/16/2024    HDL 34 (L) 04/16/2024    LDL 70 04/16/2024      Lab Results   Component Value Date    WBC 4.8 07/24/2024    HGB 9.8 (L) 07/24/2024    HCT 30.2 (L) 07/24/2024    MCV 93 07/24/2024     (L) 07/24/2024    Lab Results   Component Value Date    TROPONINI <0.01 11/01/2020    TSH 0.70 04/16/2024    INR 1.04 01/20/2022        The patient states understanding and is agreeable with the plan.     Brittanie Etienne PA-C  [facility]  Electrophysiology Consult Service  Pager: [phone]    I spent a total of 20  minutes face to face with Melo Dangelo during today's office visit. I have spent an additional 15 minutes today on chart review and documentation.

## 2024-09-26 ENCOUNTER — PATIENT OUTREACH (OUTPATIENT)
Dept: NEPHROLOGY | Facility: CLINIC | Age: 84
End: 2024-09-26
Payer: MEDICARE

## 2024-09-26 NOTE — TELEPHONE ENCOUNTER
"Dialysis Access Care Coordination: Chart Review    REASON FOR CALL:     REASON FOR CALL: Clinic Care Coordination - Chart Review (Dialysis Access)                                   SITUATION/BACKROUND:     Enrollment status:  Potential  Dialysis Access Consult:  7/18/2023  Surgeon:  Dr. Zimmer   Access Plan:  Laparoscopic PD catheter placement under general anesthesia.     Last Nephrology Visit:  5/6/2024 with Dr. Barrow:    \"For now he would like to hold off on any surgery planning and continue getting labs every month (standing orders placed) along with phone calls to assess him for symptoms. I did express the concern that I would not want him to require emergent dialysis at any cost and we need to keep a close eye on his labs and if his GFR declines below 10 mL/min, it would be a good time to schedule the PD access surgery. \"    -Next visit and lab appt scheduled for 11/4/24 with Dr. Scott    Neph Tracking Flowsheet Last Filled Values       Preferred Modality Peritoneal Dialysis    Patient's Referral Dates Auto Populate Patient's Referral Dates    MTM Referral 12/22/21    Home Care Referral 6/1/23    Journey Referral 1/25/23    Access Surgeon Referral Status  Referred  PD consult with Dr. Zimmer    Dialysis Access Referral 06/06/23    Access Surgical Consult 07/18/23  Plan: PD cath placement, Dr. Zimmer          Dialysis History    No dialysis history on file.       Patient is not followed by Solid Organ Transplant   .  Coordinator: No matching coordinators    ASSESSMENT:     Hospitalizations/procedures:  4/16/24 admission for pneumonia and MARY[facility]  Pertinent changes:  9/3/24 referred by PCP to neurology and OT for \"memory change\"  Medication changes:  none    Recent Labs      Latest Ref Rng & Units 7/24/2024 5/6/2024 4/18/2024   Neph Labs   Sodium 135 - 145 mmol/L 138  141  141    GFR Estimate >60 mL/min/1.73m2 13  11  11    Potassium 3.4 - 5.3 mmol/L 4.8  4.5  4.5    Creatinine 0.67 - 1.17 mg/dL " 4.34  4.91  5.08    Magnesium 1.7 - 2.3 mg/dL   2.4    Urea Nitrogen 8.0 - 23.0 mg/dL 41.3  46.0  56.7    Hemoglobin 13.3 - 17.7 g/dL 9.8  8.8  8.5    Ferritin 31 - 409 ng/mL 675      Iron Binding Cap 240 - 430 ug/dL 249      Iron 61 - 157 ug/dL 101             PLAN:     Future Appts Next 180 days       Visit Type Date Time Department    RETURN NEPHROLOGY 11/4/2024  9:00 AM  MEDICINE RENAL          Follow Up:    RN CC will follow up in approximately 2 months.     Flores Arias RN  Dialysis Access Care Coordinator  Phone: [phone]  Pool: P_Dialysis_Access_Nurse

## 2024-10-01 ENCOUNTER — OFFICE VISIT (OUTPATIENT)
Dept: CARDIOLOGY | Facility: CLINIC | Age: 84
End: 2024-10-01
Payer: MEDICARE

## 2024-10-01 ENCOUNTER — PATIENT OUTREACH (OUTPATIENT)
Dept: CARE COORDINATION | Facility: CLINIC | Age: 84
End: 2024-10-01

## 2024-10-01 VITALS
BODY MASS INDEX: 24.63 KG/M2 | WEIGHT: 176.6 LBS | DIASTOLIC BLOOD PRESSURE: 74 MMHG | HEART RATE: 59 BPM | SYSTOLIC BLOOD PRESSURE: 147 MMHG | OXYGEN SATURATION: 97 %

## 2024-10-01 DIAGNOSIS — I48.0 PAROXYSMAL ATRIAL FIBRILLATION (H): Primary | ICD-10-CM

## 2024-10-01 PROCEDURE — 99214 OFFICE O/P EST MOD 30 MIN: CPT

## 2024-10-01 PROCEDURE — 93005 ELECTROCARDIOGRAM TRACING: CPT

## 2024-10-01 PROCEDURE — 93010 ELECTROCARDIOGRAM REPORT: CPT | Performed by: INTERNAL MEDICINE

## 2024-10-01 PROCEDURE — G0463 HOSPITAL OUTPT CLINIC VISIT: HCPCS

## 2024-10-01 ASSESSMENT — PAIN SCALES - GENERAL: PAINLEVEL: NO PAIN (0)

## 2024-10-01 NOTE — PATIENT INSTRUCTIONS
You were seen in the Electrophysiology Clinic today by: SERGIO Ruiz     Plan:     Follow up Visit:  Will discuss starting eliquis with nephrology   Follow up to be determined based on eliquis initiation      If you have further questions, please utilize [facility] to contact us.     Your Care Team:    EP Cardiology   Telephone Number     Nurse Line  Ct Farr, RN  Danish Robertson RN   [phone]     For scheduling appointments:   Holly   [phone]   For procedure scheduling:    Vania Saini     [phone]   For the Device Clinic (Pacemakers, ICDs, Loop Recorders)    During business hours: [phone]  After business hours:   [phone]- select option 4 and ask for job code [id number].       On-call cardiologist for after hours or on weekends:   [phone], option #4, and ask to speak to the on-call cardiologist.     Cardiovascular Clinic:   [address] [place] [address]. [place] [address]      As always, Thank you for trusting us with your health care needs!

## 2024-10-01 NOTE — PROGRESS NOTES
"Clinic Care Coordination Contact  Follow Up Progress Note      Assessment:   RN called and spoke with patient. Per patient, he is doing well. He went to Cardiology appointment this morning and thinks things are going okay. He also states that he has a kidney appointment in November and things seem okay with that as well. He does state he gets a little \"cold headed, dizzy\" unsure how to explain. RN reviewed the OT appointments that patient had after seeing Dr. Smith. Pt shares that it went well, they only met once, getting on a \"familiar ground\". He states they did some mental tests and he asks why it was recommended he goes there. Pt was told that he brought up memory concerns with PCP and this was the recommended treatment. Patient states understanding to this and will continue to attend OT appointments.     Care Gaps:    Health Maintenance Due   Topic Date Due    DEPRESSION ACTION PLAN  Never done    ZOSTER IMMUNIZATION (1 of 2) Never done    Pneumococcal Vaccine: 65+ Years (2 of 2 - PCV) 10/20/2006    RSV VACCINE (1 - 1-dose 75+ series) Never done    MICROALBUMIN  10/18/2023    BMP  10/24/2024       Currently there are no Care Gaps.    Care Plans  Care Plan: Health Maintenance       Problem: Health Maintenance Due or Overdue       Goal: Become up-to-date with health maintenance visit(s)       Start Date: 10/1/2024    Recent Progress: On track    Priority: High    Note:     Barriers: patient is having some memory concerns  Strengths: patient sees specialists and PCP for regular care  Patient expressed understanding of goal: yes  Action steps to achieve this goal:  1. I will continue to see my providers for regularly scheduled appointments.   2. I will continue to see the occupational therapist for scheduled appointments.                                   Intervention/Education provided during outreach: Discussed patients plan of care. CC RN asked open ended questions, provided support, resources, and " encouragement as needed. Patient will reach out to care team sooner than planned with new questions or concerns.        Plan:   Care Coordinator will follow up in 1 month.     CHARLY CLARK RN on 10/1/2024 at 11:56 AM

## 2024-10-01 NOTE — LETTER
10/1/2024      RE: Melo Dangelo  [address]  [place] [address]       Dear Colleague,    Thank you for the opportunity to participate in the care of your patient, Melo Dangelo, at the [facility] [place] [facility]. Please see a copy of my visit note below.        ELECTROPHYSIOLOGY CLINIC VISIT    Assessment/Recommendations   Assessment/Plan:    Mr. Dangelo is an 83 year old male who has a past medical history significant for PAF (CHADSVASC 3), HTN, IgA nephropathy, CKD IV, and ETOH abuse.      Paroxsymal Atrial Fibrillation  1. Stroke Prophylaxis: CHADSVASC=+HTN, ++age  3, corresponding to a 3.2% annual stroke / systemic emolism event rate. indicating need for long term oral anticoagulation.  He was previously on Eliquis but this was stopped due to renal dysfunction. Discussed he does have indication for anticoagulation, went over CVA/clot vs bleeding risk in detail. He states understanding, he is somewhat apprehensive about re-trialing DOACs but would like to consider retrying eliquis, I will discuss with nephrology team.      2. Rate Control: Continue Toprol XL.      3. Rhythm Control: Cardioversion, Antiarrhythmics and/or ablation are options for rhythm control. Low overall burden with minimal symptoms at this time. LVEF is preserved. Continue with conservative measures.      4. Risk Factor Management: Statin, BP control, and STEPHEN evaluation as indicated.       Will discuss DOAC with nephrology.  Follow up TBD.      History of Present Illness/Subjective    Mr. Melo Dangelo is a 83 year old male who comes in today for EP follow-up of AF.    He was first diagnosed with AF in 10/2020 when he was hospitalized with COVID infection. He was started on Eliquis. This was later discontinued due to CKD. A Zio patch from 10/2021 showed no significant arrhythmias. A repeat zio patch monitor from 2/2022 showed 2% PAF burden. A  zio patch monitor from 9/2022 showed no arrhythmias. He had reported having skipped beats/palpitations lasting for an hour that occurred a couple times a week. He has seen Dr. Ventura and advised to take ASA.      EP Visit 7/19/23: He reports feeling well. He states he met with Nephrologist recently and is likely going to need to start PD. He says overall his activity level has improved. He used to have to stop and rest with 1 flight of stairs but now can do 3. Does report some leg tiredness. He denies chest discomfort, palpitations, abdominal fullness/bloating or peripheral edema, shortness of breath, paroxysmal nocturnal dyspnea, orthopnea, lightheadedness, dizziness, pre-syncope, or syncope. Presenting 12 lead ECG shows SB 1 AVB Vent Rate 53 bpm,  ms, QRS 92 ms, QTc 412 ms. Current cardiac medications include: Toprol XL and Simvastatin.     EP Visit 2/6/24: He presents for follow up today. His GFR has remained ~14, he has not started PD. His activity level has remained consistent, can do three flights of stairs before becoming short of breath. His wife passed several months ago, he has felt well supported through this. He has no new cardiac symptoms, denies chest discomfort, palpitations, peripheral edema, shortness of breath, pre-syncope, or syncope. Presenting 12 lead ECG shows sinus Vent Rate 59 bpm,  ms, QRS 90 ms, QTc 427 ms.     He presents today for follow up. GFR today remains 13, follows closely with nephrology, has not started PD. He reports feeling well. His wife passed in Nov. He participates in rowing class once a week. He denies chest discomfort, palpitations, peripheral edema, shortness of breath, pre-syncope, or syncope. Presenting 12 lead ECG shows sinus Vent Rate 62 bpm,  ms, QRS 88 ms, QTc 440 ms.     I have reviewed and updated the patient's Past Medical History, Social History, Family History and Medication List.     Cardiographics (Personally Reviewed) :   7/6/22 Echo:    Interpretation Summary  Global and regional left ventricular function is normal with an EF of 60-65%.  Right ventricular function, chamber size, wall motion, and thickness are  normal.  Pulmonary artery systolic pressure is normal.  The inferior vena cava is normal.  No pericardial effusion is present.  No significant changes noted.       Physical Examination   BP (!) 147/74 (BP Location: Left arm, Patient Position: Chair, Cuff Size: Adult Regular)   Pulse 59   Wt 80.1 kg (176 lb 9.6 oz)   SpO2 97%   BMI 24.63 kg/m    Wt Readings from Last 3 Encounters:   10/01/24 80.1 kg (176 lb 9.6 oz)   09/03/24 79.4 kg (175 lb)   05/06/24 83.1 kg (183 lb 1.6 oz)     General Appearance:   Alert, well-appearing and in no acute distress.   HEENT: Atraumatic, normocephalic. MMM.   Chest/Lungs:   Respirations unlabored.  Lungs are clear to auscultation.   Cardiovascular:   Regular rate and rhythm.  S1/S2. No murmur.    Abdomen:  Soft, nontender, nondistended.   Extremities: No cyanosis or clubbing. No edema.    Musculoskeletal: Moves all extremities.     Skin: Warm, dry, intact.    Neurologic: Mood and affect are appropriate.  Alert and oriented to person, place, time, and situation.          Medications  Allergies   Simvastatin 20 mg daily   Metoprolol succinate 25 mg daily     Buspar   Lexapro   Lamotrigine   Vitamins   Tamsulosin    No Known Allergies      Lab Results (Personally Reviewed)    Chemistry/lipid CBC Cardiac Enzymes/BNP/TSH/INR   Lab Results   Component Value Date    BUN 41.3 (H) 07/24/2024     07/24/2024    CO2 21 (L) 07/24/2024     Creatinine   Date Value Ref Range Status   07/24/2024 4.34 (H) 0.67 - 1.17 mg/dL Final   10/29/2020 1.33 (H) 0.66 - 1.25 mg/dL Final       Lab Results   Component Value Date    CHOL 127 04/16/2024    HDL 34 (L) 04/16/2024    LDL 70 04/16/2024      Lab Results   Component Value Date    WBC 4.8 07/24/2024    HGB 9.8 (L) 07/24/2024    HCT 30.2 (L) 07/24/2024    MCV 93 07/24/2024      (L) 07/24/2024    Lab Results   Component Value Date    TROPONINI <0.01 11/01/2020    TSH 0.70 04/16/2024    INR 1.04 01/20/2022        The patient states understanding and is agreeable with the plan.     Brittanie Etienne PA-C  [facility]  Electrophysiology Consult Service  Pager: [phone]    I spent a total of 20 minutes face to face with Melo Dangelo during today's office visit. I have spent an additional 15 minutes today on chart review and documentation.                     Please do not hesitate to contact me if you have any questions/concerns.     Sincerely,     Brittanie Etienne PA-C

## 2024-10-01 NOTE — NURSING NOTE
Chief Complaints and History of Present Illnesses   Patient presents with     Follow Up      Chief Complaint(s) and History of Present Illness(es)     Follow Up     Laterality: both eyes    Quality: blurred vision    Associated symptoms: Negative for eye pain, redness and tearing              Comments     Retina follow up of  right eye.  Blurred vision right eye.  Says the lines on [place] are wavy and double.  JORDAN Chiu 4/16/2021 8:49 AM                   Pt calling, stating that the pharmacy says they don't have it but it says confirmed on our end.   I put the patient on hold but accidentally hung up on her as I was calling the pharmacist.   I tried calling her back 2x but no answer.     On the phone with the pharmacy, Milagros says that its ready for .. I am unsure of what miscommunication is there.

## 2024-10-01 NOTE — LETTER
PRIMARY CARE CARE COORDINATION   - [facility]ICS [facility]ENTER  Patient Centered Plan of Care  About Me:        Patient Name:  Melo Dangelo    YOB: 1940  Age:         83 year old   [facility] MRN:    [id number] Telephone Information:  Home Phone [phone]   Mobile [phone]       Address:  [address]  [place] [address] Email address:  [email or web address]      Emergency Contact(s)    Name Relationship Lgl Grd Work Phone Home Phone Mobile Phone   1. ELANA DANGELO Spouse No  [phone]   2. JOSE WALSH Daughter No  [phone]    3. DIANNA DANGELO Daughter No  [phone]    4. DELMAR COSTELLO Daughter No  [phone]            Primary language:  English     needed? No   [facility] [employer] [facility]:  [phone] op. 1  Other communication barriers:None    Preferred Method of Communication:     Current living arrangement: No data recorded  Mobility Status/ Medical Equipment: Independent        Health Maintenance  Health Maintenance Reviewed: Due/Overdue   Health Maintenance Due   Topic Date Due    DEPRESSION ACTION PLAN  Never done    ZOSTER IMMUNIZATION (1 of 2) Never done    Pneumococcal Vaccine: 65+ Years (2 of 2 - PCV) 10/20/2006    RSV VACCINE (1 - 1-dose 75+ series) Never done    MICROALBUMIN  10/18/2023    BMP  10/24/2024           My Access Plan  Medical Emergency 911   Primary Clinic Line [phone] to speak with clinic staff or schedule appointment   24 Hour Nurse Line [phone] (toll-free)   Preferred Urgent Care [facility] [phone]             My Care Team Members  Patient Care Team         Relationship Specialty Notifications Start End    Francis Smith MD PCP - General Family Medicine  10/28/20     Phone: [phone] Fax: [phone]         [address] [facility] [address]    Inderjit Grier MD   10/24/19     Phone: [phone] Fax: [phone]         [facility] EYE  CLINIC [address] [facility] [address]    Esperanza Guillaume CNP Nurse Practitioner Urology  12/15/20     Phone: [phone] Fax: [phone]         [address] [place] [address]    Pushpa Alvarado, RN Registered Nurse   12/15/20     Francis Smith MD Assigned PCP   12/27/20     Phone: [phone] Fax: [phone]         [address] [place] [address]    Fernando Chong MD MD Nephrology  11/18/21     Phone: [phone] Fax: [phone]         [address] [place] [address] [address] [place] [address]    Merry Barrow MD MD Nephrology  11/29/21     Phone: [phone] Fax: [phone]         [address] [facility] [address]    Merry Barrow MD Assigned Nephrology Provider   1/2/22     Phone: [phone] Fax: [phone]         [address] [facility] [address]    Nithin Tolbert [facility] Pharmacist Pharmacist  1/6/22     Phone: [phone] Fax: [phone]         [address] [place] [address]    Adis Ventura MD  Cardiovascular Disease  2/3/22     Matthew Cabrera MD MD Endocrinology, Diabetes, and Metabolism  6/29/22     Phone: [phone] Fax: [phone]         [address] [place] [address]    Katerine Gonzalez, RN Specialty Care Coordinator Nephrology All results, Admissions 1/25/23     Flores Arias, RN Specialty Care Coordinator Nephrology Admissions 6/9/23     Dialysis Access Care Coordinator    Phone: [phone]         Nidhi Schilling, RN Specialty Care Coordinator Nephrology Admissions 6/9/23     Dialysis Access Care Coordinator    Phone: [phone]         Madyson Evangelista, APRN CNP Nurse Practitioner Cardiovascular Disease  7/17/23     Phone: [phone] Fax: [phone]         [address] [facility] [address]    Brittanie Etienne PA-C Assigned Heart and Vascular Provider   2/23/24     Phone: [phone] Fax: [phone] [address] [facility] [place] [facility] [address]    Carla Fam, DINH [facility]  Coordinator Primary Care - CC Admissions 4/19/24     Amy Hurd, RN Specialty Care Coordinator Pharmacy  5/6/24     Anemia Services    Phone: [phone]          [facility] [address] [facility] [address]    Naya Zimmer MD Assigned Surgical Provider   5/23/24     Phone: [phone] Fax: [phone] [address] [facility] [address]    Danya Laboy AuD Audiologist Audiology  9/11/24     Phone: [phone] Fax: [phone] [address] [facility] [address]    Lindsay Rich PA-C Physician Assistant Otolaryngology  9/11/24     Phone: [phone] Fax: [phone] [address] [facility] [address]              My Care Plans  Self Management and Treatment Plan  Care Plan  Care Plan: Health Maintenance       Problem: Health Maintenance Due or Overdue       Goal: Become up-to-date with health maintenance visit(s)       Start Date: 10/1/2024    Recent Progress: On track    Priority: High    Note:     Barriers: patient is having some memory concerns  Strengths: patient sees specialists and PCP for regular care  Patient expressed understanding of goal: yes  Action steps to achieve this goal:  1. I will continue to see my providers for regularly scheduled appointments.   2. I will continue to see the occupational therapist for scheduled appointments.                                    Action Plans on File:                       Advance Care Plans/Directives Type:   Advanced Directive - On File      My Medical and Care Information  Problem List   Patient Active Problem List   Diagnosis    Central serous chorioretinopathy of right eye    Cupping of optic disc, right    Peripheral drusen of both eyes    Posterior vitreous detachment, bilateral    Age-related nuclear cataract of both eyes    Major depressive disorder, recurrent episode, moderate (H)    Major depressive disorder    Arthritis of knee    Hematuria, unspecified type    IgA  nephropathy    CKD (chronic kidney disease) stage 4, GFR 15-29 ml/min (H)    High risk medication use    Acute bronchitis due to respiratory syncytial virus (RSV)    Sensorineural hearing loss (SNHL) of both ears    Combined forms of age-related cataract of both eyes    Alcohol dependence in remission (H)    Elevated troponin    Frequent PVCs    Pneumonia of left lower lobe due to infectious organism    Acute kidney injury superimposed on CKD (H)    Acute respiratory failure with hypoxia (H)    Anemia of chronic renal failure, stage 5 (H)      Current Medications and Allergies:  See printed Medication Report.    Care Coordination Start Date: No linked episodes   Frequency of Care Coordination: monthly, more frequently as needed     Form Last Updated: 10/01/2024

## 2024-10-03 ENCOUNTER — THERAPY VISIT (OUTPATIENT)
Dept: OCCUPATIONAL THERAPY | Facility: CLINIC | Age: 84
End: 2024-10-03
Attending: FAMILY MEDICINE
Payer: MEDICARE

## 2024-10-03 DIAGNOSIS — Z78.9 IMPAIRED MOBILITY AND ADLS: Primary | ICD-10-CM

## 2024-10-03 DIAGNOSIS — R41.3 MEMORY CHANGE: ICD-10-CM

## 2024-10-03 DIAGNOSIS — Z74.09 IMPAIRED MOBILITY AND ADLS: Primary | ICD-10-CM

## 2024-10-03 LAB
ATRIAL RATE - MUSE: 62 BPM
DIASTOLIC BLOOD PRESSURE - MUSE: NORMAL MMHG
INTERPRETATION ECG - MUSE: NORMAL
P AXIS - MUSE: 75 DEGREES
PR INTERVAL - MUSE: 214 MS
QRS DURATION - MUSE: 88 MS
QT - MUSE: 434 MS
QTC - MUSE: 440 MS
R AXIS - MUSE: 8 DEGREES
SYSTOLIC BLOOD PRESSURE - MUSE: NORMAL MMHG
T AXIS - MUSE: 47 DEGREES
VENTRICULAR RATE- MUSE: 62 BPM

## 2024-10-03 PROCEDURE — 97535 SELF CARE MNGMENT TRAINING: CPT | Mod: GO | Performed by: OCCUPATIONAL THERAPIST

## 2024-10-10 ENCOUNTER — THERAPY VISIT (OUTPATIENT)
Dept: OCCUPATIONAL THERAPY | Facility: CLINIC | Age: 84
End: 2024-10-10
Attending: FAMILY MEDICINE
Payer: MEDICARE

## 2024-10-10 DIAGNOSIS — R41.3 MEMORY CHANGE: Primary | ICD-10-CM

## 2024-10-10 PROCEDURE — 97535 SELF CARE MNGMENT TRAINING: CPT | Mod: GO | Performed by: OCCUPATIONAL THERAPIST

## 2024-10-12 ASSESSMENT — PATIENT HEALTH QUESTIONNAIRE - PHQ9: SUM OF ALL RESPONSES TO PHQ QUESTIONS 1-9: 9

## 2024-10-24 ENCOUNTER — THERAPY VISIT (OUTPATIENT)
Dept: OCCUPATIONAL THERAPY | Facility: CLINIC | Age: 84
End: 2024-10-24
Attending: FAMILY MEDICINE
Payer: MEDICARE

## 2024-10-24 DIAGNOSIS — Z78.9 IMPAIRED MOBILITY AND ADLS: Primary | ICD-10-CM

## 2024-10-24 DIAGNOSIS — R41.3 MEMORY CHANGE: ICD-10-CM

## 2024-10-24 DIAGNOSIS — Z74.09 IMPAIRED MOBILITY AND ADLS: Primary | ICD-10-CM

## 2024-10-24 PROCEDURE — 97535 SELF CARE MNGMENT TRAINING: CPT | Mod: GO | Performed by: OCCUPATIONAL THERAPIST

## 2024-11-04 ENCOUNTER — PATIENT OUTREACH (OUTPATIENT)
Dept: NEPHROLOGY | Facility: CLINIC | Age: 84
End: 2024-11-04

## 2024-11-04 ENCOUNTER — PATIENT OUTREACH (OUTPATIENT)
Dept: CARE COORDINATION | Facility: CLINIC | Age: 84
End: 2024-11-04

## 2024-11-04 ENCOUNTER — LAB (OUTPATIENT)
Dept: LAB | Facility: CLINIC | Age: 84
End: 2024-11-04
Attending: INTERNAL MEDICINE
Payer: COMMERCIAL

## 2024-11-04 ENCOUNTER — OFFICE VISIT (OUTPATIENT)
Dept: NEPHROLOGY | Facility: CLINIC | Age: 84
End: 2024-11-04
Attending: INTERNAL MEDICINE
Payer: MEDICARE

## 2024-11-04 VITALS
HEART RATE: 60 BPM | DIASTOLIC BLOOD PRESSURE: 67 MMHG | SYSTOLIC BLOOD PRESSURE: 123 MMHG | OXYGEN SATURATION: 96 % | BODY MASS INDEX: 24.84 KG/M2 | WEIGHT: 178.1 LBS

## 2024-11-04 DIAGNOSIS — E10.22 CKD STAGE 5 DUE TO TYPE 1 DIABETES MELLITUS (H): Primary | ICD-10-CM

## 2024-11-04 DIAGNOSIS — N18.5 ANEMIA IN STAGE 5 CHRONIC KIDNEY DISEASE (H): Primary | ICD-10-CM

## 2024-11-04 DIAGNOSIS — N18.4 CKD (CHRONIC KIDNEY DISEASE) STAGE 4, GFR 15-29 ML/MIN (H): ICD-10-CM

## 2024-11-04 DIAGNOSIS — N18.5 CKD STAGE 5 DUE TO TYPE 1 DIABETES MELLITUS (H): Primary | ICD-10-CM

## 2024-11-04 DIAGNOSIS — E10.22 CKD STAGE 5 DUE TO TYPE 1 DIABETES MELLITUS (H): ICD-10-CM

## 2024-11-04 DIAGNOSIS — N18.5 CKD (CHRONIC KIDNEY DISEASE) STAGE 5, GFR LESS THAN 15 ML/MIN (H): ICD-10-CM

## 2024-11-04 DIAGNOSIS — D63.1 ANEMIA IN STAGE 5 CHRONIC KIDNEY DISEASE (H): Primary | ICD-10-CM

## 2024-11-04 DIAGNOSIS — N18.5 ANEMIA OF CHRONIC RENAL FAILURE, STAGE 5 (H): ICD-10-CM

## 2024-11-04 DIAGNOSIS — D63.1 ANEMIA OF CHRONIC RENAL FAILURE, STAGE 5 (H): ICD-10-CM

## 2024-11-04 DIAGNOSIS — N18.5 CKD STAGE 5 DUE TO TYPE 1 DIABETES MELLITUS (H): ICD-10-CM

## 2024-11-04 LAB
ALBUMIN MFR UR ELPH: 19.9 MG/DL
ALBUMIN SERPL BCG-MCNC: 4.2 G/DL (ref 3.5–5.2)
ALBUMIN UR-MCNC: 20 MG/DL
ANION GAP SERPL CALCULATED.3IONS-SCNC: 10 MMOL/L (ref 7–15)
APPEARANCE UR: CLEAR
BILIRUB UR QL STRIP: NEGATIVE
BUN SERPL-MCNC: 45 MG/DL (ref 8–23)
CALCIUM SERPL-MCNC: 9 MG/DL (ref 8.8–10.4)
CHLORIDE SERPL-SCNC: 106 MMOL/L (ref 98–107)
COLOR UR AUTO: ABNORMAL
CREAT SERPL-MCNC: 4.58 MG/DL (ref 0.67–1.17)
CREAT UR-MCNC: 81.9 MG/DL
EGFRCR SERPLBLD CKD-EPI 2021: 12 ML/MIN/1.73M2
ERYTHROCYTE [DISTWIDTH] IN BLOOD BY AUTOMATED COUNT: 13.4 % (ref 10–15)
FERRITIN SERPL-MCNC: 789 NG/ML (ref 31–409)
GLUCOSE SERPL-MCNC: 103 MG/DL (ref 70–99)
GLUCOSE UR STRIP-MCNC: NEGATIVE MG/DL
HCO3 SERPL-SCNC: 21 MMOL/L (ref 22–29)
HCT VFR BLD AUTO: 29.1 % (ref 40–53)
HGB BLD-MCNC: 9.5 G/DL (ref 13.3–17.7)
HGB UR QL STRIP: NEGATIVE
HOLD SPECIMEN: NORMAL
IRON BINDING CAPACITY (ROCHE): 240 UG/DL (ref 240–430)
IRON SATN MFR SERPL: 35 % (ref 15–46)
IRON SERPL-MCNC: 85 UG/DL (ref 61–157)
KETONES UR STRIP-MCNC: NEGATIVE MG/DL
LEUKOCYTE ESTERASE UR QL STRIP: NEGATIVE
MCH RBC QN AUTO: 30.8 PG (ref 26.5–33)
MCHC RBC AUTO-ENTMCNC: 32.6 G/DL (ref 31.5–36.5)
MCV RBC AUTO: 95 FL (ref 78–100)
NITRATE UR QL: NEGATIVE
PH UR STRIP: 5.5 [PH] (ref 5–7)
PHOSPHATE SERPL-MCNC: 3.7 MG/DL (ref 2.5–4.5)
PLATELET # BLD AUTO: 125 10E3/UL (ref 150–450)
POTASSIUM SERPL-SCNC: 4.9 MMOL/L (ref 3.4–5.3)
PROT/CREAT 24H UR: 0.24 MG/MG CR (ref 0–0.2)
RBC # BLD AUTO: 3.08 10E6/UL (ref 4.4–5.9)
RBC URINE: 1 /HPF
SODIUM SERPL-SCNC: 137 MMOL/L (ref 135–145)
SP GR UR STRIP: 1.01 (ref 1–1.03)
UROBILINOGEN UR STRIP-MCNC: NORMAL MG/DL
WBC # BLD AUTO: 5.2 10E3/UL (ref 4–11)
WBC URINE: <1 /HPF

## 2024-11-04 PROCEDURE — 83540 ASSAY OF IRON: CPT | Performed by: PATHOLOGY

## 2024-11-04 PROCEDURE — 81001 URINALYSIS AUTO W/SCOPE: CPT | Performed by: PATHOLOGY

## 2024-11-04 PROCEDURE — G0463 HOSPITAL OUTPT CLINIC VISIT: HCPCS | Performed by: INTERNAL MEDICINE

## 2024-11-04 PROCEDURE — 99215 OFFICE O/P EST HI 40 MIN: CPT | Performed by: INTERNAL MEDICINE

## 2024-11-04 PROCEDURE — G2211 COMPLEX E/M VISIT ADD ON: HCPCS | Performed by: INTERNAL MEDICINE

## 2024-11-04 PROCEDURE — 80069 RENAL FUNCTION PANEL: CPT | Performed by: PATHOLOGY

## 2024-11-04 PROCEDURE — 85027 COMPLETE CBC AUTOMATED: CPT | Performed by: PATHOLOGY

## 2024-11-04 PROCEDURE — 82728 ASSAY OF FERRITIN: CPT | Performed by: PATHOLOGY

## 2024-11-04 PROCEDURE — 83550 IRON BINDING TEST: CPT | Performed by: PATHOLOGY

## 2024-11-04 PROCEDURE — 84156 ASSAY OF PROTEIN URINE: CPT | Performed by: PATHOLOGY

## 2024-11-04 PROCEDURE — 36415 COLL VENOUS BLD VENIPUNCTURE: CPT | Performed by: PATHOLOGY

## 2024-11-04 RX ORDER — SODIUM BICARBONATE 650 MG/1
650 TABLET ORAL 2 TIMES DAILY
Qty: 180 TABLET | Refills: 3 | Status: SHIPPED | OUTPATIENT
Start: 2024-11-04

## 2024-11-04 ASSESSMENT — PAIN SCALES - GENERAL: PAINLEVEL_OUTOF10: NO PAIN (0)

## 2024-11-04 NOTE — PROGRESS NOTES
Actively seen anemia management     Katerine Gonzalez RN, BSN   Nephrology RN Care Coordinator   [facility]

## 2024-11-04 NOTE — PROGRESS NOTES
Nephrology Note: RN CC Chart Review    REASON FOR ENCOUNTER:     Chart reviewed by nephrology RN HUMZA                          SITUATION/BACKROUND:   Requesting clearance for blood thinner.   Last nephrology visit:    Last Renal Panel:  Sodium   Date Value Ref Range Status   11/04/2024 137 135 - 145 mmol/L Final   10/29/2020 139 133 - 144 mmol/L Final     Potassium   Date Value Ref Range Status   11/04/2024 4.9 3.4 - 5.3 mmol/L Final   07/29/2022 4.8 3.4 - 5.3 mmol/L Final   10/29/2020 4.7 3.4 - 5.3 mmol/L Final     Chloride   Date Value Ref Range Status   11/04/2024 106 98 - 107 mmol/L Final   07/29/2022 106 94 - 109 mmol/L Final   10/29/2020 110 (H) 94 - 109 mmol/L Final     Carbon Dioxide   Date Value Ref Range Status   10/29/2020 25 20 - 32 mmol/L Final     Carbon Dioxide (CO2)   Date Value Ref Range Status   11/04/2024 21 (L) 22 - 29 mmol/L Final   07/29/2022 28 20 - 32 mmol/L Final     Anion Gap   Date Value Ref Range Status   11/04/2024 10 7 - 15 mmol/L Final   07/29/2022 6 3 - 14 mmol/L Final   10/29/2020 5 3 - 14 mmol/L Final     Glucose   Date Value Ref Range Status   11/04/2024 103 (H) 70 - 99 mg/dL Final   07/29/2022 89 70 - 99 mg/dL Final   10/29/2020 136 (H) 70 - 99 mg/dL Final     Urea Nitrogen   Date Value Ref Range Status   11/04/2024 45.0 (H) 8.0 - 23.0 mg/dL Final   07/29/2022 55 (H) 7 - 30 mg/dL Final   10/29/2020 31 (H) 7 - 30 mg/dL Final     Creatinine   Date Value Ref Range Status   11/04/2024 4.58 (H) 0.67 - 1.17 mg/dL Final   10/29/2020 1.33 (H) 0.66 - 1.25 mg/dL Final     GFR Estimate   Date Value Ref Range Status   11/04/2024 12 (L) >60 mL/min/1.73m2 Final     Comment:     eGFR calculated using 2021 CKD-EPI equation.   11/04/2020 56 (L) >60 mL/min/1.73m2 Final   10/29/2020 50 (L) >60 mL/min/[1.73_m2] Final     Comment:     Non  GFR Calc  Starting 12/18/2018, serum creatinine based estimated GFR (eGFR) will be   calculated using the Chronic Kidney Disease Epidemiology  Collaboration   (CKD-EPI) equation.       Calcium   Date Value Ref Range Status   11/04/2024 9.0 8.8 - 10.4 mg/dL Final     Comment:     Reference intervals for this test were updated on 7/16/2024 to reflect our healthy population more accurately. There may be differences in the flagging of prior results with similar values performed with this method. Those prior results can be interpreted in the context of the updated reference intervals.   10/29/2020 8.5 8.5 - 10.1 mg/dL Final     Phosphorus   Date Value Ref Range Status   11/04/2024 3.7 2.5 - 4.5 mg/dL Final     Albumin   Date Value Ref Range Status   11/04/2024 4.2 3.5 - 5.2 g/dL Final   07/29/2022 4.0 3.4 - 5.0 g/dL Final   10/29/2020 2.7 (L) 3.4 - 5.0 g/dL Final         Neph Tracking Status:  Neph Tracking Flowsheet Last Filled Values       Preferred Modality Peritoneal Dialysis    Patient's Referral Dates Auto Populate Patient's Referral Dates    MTM Referral 12/22/21    Home Care Referral 6/1/23    Journey Referral 1/25/23    Access Surgeon Referral Status  Referred  PD consult with Dr. Zimmer    Dialysis Access Referral 06/06/23    Access Surgical Consult 07/18/23  Plan: PD cath placement, Dr. Zimmer              ASSESSMENT/PLAN   Patient asked about starting blood thinner eliquis per cardiology. Patient wanted to check that it was safe for kidney. Was given verbal ok from Dr. Scott. Message sent to cardiology and message sent to patient.     Patient to follow up as scheduled at next appointment  Patient to call/Altheoshart message with updates    Upcoming Appointments:  Future Appts Next 180 days       Visit Type Date Time Department    RETURN NEPHROLOGY 11/4/2024  9:00 AM  MEDICINE RENAL    RETURN NEPHROLOGY 2/5/2025  8:30 AM  MEDICINE RENAL              Katerine Gonzalez RN

## 2024-11-04 NOTE — PROGRESS NOTES
Anemia Management Note - Follow Up      SUBJECTIVE/OBJECTIVE:    Referred by Dr. Merry Barrow on 2024  *orders are under Dr. Scott  Primary Diagnosis: Anemia in Chronic Kidney Disease (N18.5, D63.1)     Secondary Diagnosis: Chronic Kidney Disease, Stage 5 (N18.5)     Hgb goal range:9-10  RAMA:TBD; Hgb 9.8  *24; starting on Eliquis per Cardiology.   Iron regimen: Treat with IV Iron- [facility] [place] [facility] on 24.      Labs : 2025  RX/TX plans : TBD     Recent RAMA use, transfusion, IV iron: NA  No history of stroke, MI, and blood clots or cancers     Contact: No Boxes Checked on Consent to Communicate scanned on 2021.         Latest Ref Rng & Units 2024   Anemia   Hemoglobin 13.3 - 17.7 g/dL 8.7  8.6  8.5  8.8   9.8  9.5    IV Iron Dose      1000mg     TSAT 15 - 46 %  12     41  35    Ferritin 31 - 409 ng/mL  223     675  789         BP Readings from Last 3 Encounters:   24 123/67   10/01/24 (!) 147/74   24 (!) 152/71     Wt Readings from Last 2 Encounters:   24 80.8 kg (178 lb 1.6 oz)   10/01/24 80.1 kg (176 lb 9.6 oz)           ASSESSMENT:    Hgb:at goal - call to discuss starting RAMA.   TSat: at goal >30% Ferritin: At goal (>100ng/mL)        PLAN:  LVM for Melo to discuss dropping Hgb and RAMA.      Orders needed to be renewed (for next follow-up date) in [facility]: None    Iron labs due:  Early Dec 2024    Plan discussed with:  LVM for Melo      NEXT FOLLOW-UP DATE:  12/3/24    Amy Hurd RN   Anemia Services  [facility]  [email or web address]   Office : [phone]  Fax: [phone]

## 2024-11-04 NOTE — LETTER
11/4/2024       RE: Melo Dangelo  [address]  [place] [address]     Dear Colleague,    Thank you for referring your patient, Melo Dangelo, to the [facility] [place] at [facility]. Please see a copy of my visit note below.    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      Interval History: He denies any new symptoms. He went to [place] for whale watching on a cruise ship and developed COVID infection after. This was 2 weeks ago and other than having some low energy, he is doing okay.      Interval history 1/19/23: He has bene doing well overall. He sometimes feels a little short of breath when he climbs about 2 flights of stairs but otherwise has minimal symptoms. He has not seen any dark urine or LE edema.      Interval history 6/1/23: Melo does report that he is feeling more tired these days. There is also a lot on his plate since his wife is now transitioning to PD from HD and he is her primary caregiver. He denies any LE edema or shortness of breath.      Interval history 9/11/23: he says he is doing well overall. His wife has now started PD. She initially had PT come in but now they have stopped and she has been getting weaker. He has been managing all the household chores and helping her with tasks. He says his energy is okay for now, still has a good appetite, no nausea or other uremic symptoms. He denies any LE edema or shortness of breath     12/11/23: Melo has been doing okay however unfortunately he lost his wife a couple of months ago and has been dealing with some stress regarding that and has some other stressors that  are new.  He does report that he takes naps multiple times and feels better after his naps but otherwise has okay energy.  He cooks for himself does his own chores and finds it not very difficult to do that.  Does have a decent appetite and has been trying to switch more from meat to plant-based diet.  He denies any lower extremity edema shortness of breath.  He does report a little itching that he has noticed recently.     3/11/24: Melo continues to struggle with depression since he lost his wife. He will be seeing a psychiatrist soon as he is concerned that he may have bipolar disorder where he loses his temper in the morning when he gets frustrated. He continues to have a good appetite and denies any nausea, vomiting. He continues to work on his woodwork.      2024: Recent admission for concern for PNA. MARYANN on CKD. Now with some improvement in his kidney function. Had several discussions about planning for PD. Today he actually appears to be doing quite well. Main complaint is fatigue, but he has been exercising more. Lives by himself, but would feel comfortable doing his own PD at some point. Doesn't believe he would have any issues with the weight of the bags. Has some family in town.     24: He has been feeling stable. No N/V. Weight stable. No swelling unless when he eats too much salt then his ankle will start to puff up. Home /70 mmHg. His endurance has been down. His late wife was on PD before she .  He told me that his cardiologist wanted to start him on Eliquis but he is not sure about it. Labs today showed Cr 4.58, eGFR 12, K 4.9, biacarb 21, Ca 9.0, Phos 3.7, 4.2. Tsat 35%. Hb 9.5, WBC 5.2 and platelet 125.  UA showed minimal protein and negative blood. UPCR pending.    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      Paroxysmal atrial fibrillation (H)      PVD (posterior vitreous detachment)      Seizure disorder (H)     last seizure 2008       Past surgical history  Past Surgical History:   Procedure Laterality Date     NO HISTORY OF SURGERY       PHACOEMULSIFICATION CLEAR CORNEA WITH STANDARD INTRAOCULAR LENS IMPLANT Right 11/3/2022    Procedure: RIGHT EYE PHACOEMULSIFICATION, CATARACT, WITH INTRAOCULAR LENS IMPLANT COMPLEX CASE ;  Surgeon: Best Padilla MD;  Location: [facility] [place]     PHACOEMULSIFICATION CLEAR CORNEA WITH STANDARD INTRAOCULAR LENS IMPLANT Left 11/17/2022    Procedure: LEFT EYE PHACOEMULSIFICATION, CATARACT, WITH INTRAOCULAR LENS IMPLANT;  Surgeon: Best Padilla MD;  Location: [facility] [place] OR         Review of Systems:   14 systems were reviewed and all negative except as mentioned above.   Current Medications:  Current Outpatient Medications   Medication Sig Dispense Refill     acetaminophen (TYLENOL) 325 MG tablet Take 325-650 mg by mouth every 6 hours as needed for mild pain       benzonatate (TESSALON) 100 MG capsule Take 1 capsule (100 mg) by mouth 3 times daily as needed for cough (Patient not taking: Reported on 9/3/2024) 20 capsule 0     busPIRone (BUSPAR) 5 MG tablet Take 5 mg by mouth 2 times daily Has started       calcium carbonate-vitamin D (OSCAL) 500-5 MG-MCG tablet Take 1 tablet by mouth daily. 90 tablet 3     Cholecalciferol (D3-1000) 25 MCG (1000 UT) CAPS Take 1 capsule by mouth daily       cyanocobalamin (VITAMIN B-12) 100 MCG tablet Take 1 tablet (100 mcg) by mouth daily. Take 1,000 mcg by mouth every morning - 90 tablet 3     escitalopram (LEXAPRO) 20 MG tablet Take 1 tablet (20 mg) by mouth every morning. 90 tablet 3     lamoTRIgine  (LAMICTAL) 100 MG tablet Take 2 tablets (200 mg) by mouth 2 times daily. 360 tablet 3     metoprolol succinate ER (TOPROL XL) 25 MG 24 hr tablet TAKE 1/2 TABLET BY MOUTH DAILY 45 tablet 3     multivitamin (OCUVITE) TABS tablet Take 1 tablet by mouth daily. Please keep the appointment on 9/3/24 for further refills. 90 tablet 3     simvastatin (ZOCOR) 20 MG tablet Take 1 tablet (20 mg) by mouth at bedtime. 90 tablet 3     sodium bicarbonate 650 MG tablet Take 1 tablet (650 mg) by mouth 2 times daily Patient needs to see primary provider and have labs for further refills. Please call for an appointment at [phone]. (Patient not taking: Reported on 9/3/2024) 180 tablet 3     tamsulosin (FLOMAX) 0.4 MG capsule Take 2 capsules (0.8 mg) by mouth daily. 180 capsule 3     No current facility-administered medications for this visit.       Physical Exam:   There were no vitals taken for this visit.   There is no height or weight on file to calculate BMI.    GENERAL APPEARANCE: Alert, not in acute distress, pleasant  EYES:  Not pale conjunctiva, pupils equal  HENT: Mouth without ulcers or lesions  PULM: lungs clear to auscultation bilaterally, equal air movement, no clubbing  CV: regular rhythm, normal rate, no rub     -JVD no distended.      -edema: none  GI: soft,  - tender, no distended, bowel sounds are present  INTEGUMENT: No rash  NEURO:  Non focal. No asterixis.     Labs:   All labs reviewed by me  Last Renal Panel:  Sodium   Date Value Ref Range Status   07/24/2024 138 135 - 145 mmol/L Final   10/29/2020 139 133 - 144 mmol/L Final     Potassium   Date Value Ref Range Status   07/24/2024 4.8 3.4 - 5.3 mmol/L Final   07/29/2022 4.8 3.4 - 5.3 mmol/L Final   10/29/2020 4.7 3.4 - 5.3 mmol/L Final     Chloride   Date Value Ref Range Status   07/24/2024 106 98 - 107 mmol/L Final   07/29/2022 106 94 - 109 mmol/L Final   10/29/2020 110 (H) 94 - 109 mmol/L Final     Carbon Dioxide   Date Value Ref Range Status   10/29/2020 25  20 - 32 mmol/L Final     Carbon Dioxide (CO2)   Date Value Ref Range Status   07/24/2024 21 (L) 22 - 29 mmol/L Final   07/29/2022 28 20 - 32 mmol/L Final     Anion Gap   Date Value Ref Range Status   07/24/2024 11 7 - 15 mmol/L Final   07/29/2022 6 3 - 14 mmol/L Final   10/29/2020 5 3 - 14 mmol/L Final     Glucose   Date Value Ref Range Status   07/24/2024 96 70 - 99 mg/dL Final   07/29/2022 89 70 - 99 mg/dL Final   10/29/2020 136 (H) 70 - 99 mg/dL Final     Urea Nitrogen   Date Value Ref Range Status   07/24/2024 41.3 (H) 8.0 - 23.0 mg/dL Final   07/29/2022 55 (H) 7 - 30 mg/dL Final   10/29/2020 31 (H) 7 - 30 mg/dL Final     Creatinine   Date Value Ref Range Status   07/24/2024 4.34 (H) 0.67 - 1.17 mg/dL Final   10/29/2020 1.33 (H) 0.66 - 1.25 mg/dL Final     GFR Estimate   Date Value Ref Range Status   07/24/2024 13 (L) >60 mL/min/1.73m2 Final     Comment:     eGFR calculated using 2021 CKD-EPI equation.   11/04/2020 56 (L) >60 mL/min/1.73m2 Final   10/29/2020 50 (L) >60 mL/min/[1.73_m2] Final     Comment:     Non  GFR Calc  Starting 12/18/2018, serum creatinine based estimated GFR (eGFR) will be   calculated using the Chronic Kidney Disease Epidemiology Collaboration   (CKD-EPI) equation.       Calcium   Date Value Ref Range Status   07/24/2024 9.1 8.8 - 10.4 mg/dL Final     Comment:     Reference intervals for this test were updated on 7/16/2024 to reflect our healthy population more accurately. There may be differences in the flagging of prior results with similar values performed with this method. Those prior results can be interpreted in the context of the updated reference intervals.   10/29/2020 8.5 8.5 - 10.1 mg/dL Final     Phosphorus   Date Value Ref Range Status   07/24/2024 3.6 2.5 - 4.5 mg/dL Final     Albumin   Date Value Ref Range Status   07/24/2024 4.4 3.5 - 5.2 g/dL Final   07/29/2022 4.0 3.4 - 5.0 g/dL Final   10/29/2020 2.7 (L) 3.4 - 5.0 g/dL Final       Imaging:  I reviewed  imaging studies.     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 Coughlin) when he presented to the hospital with gross hematuria after his second COVID vaccine. He presented with a Sr creatinine of 3.5 which peaked to 4.4 mg/dl. His creatinine in 10/2020 was 1.1-1.2 mg/dl.  A kidney bx was done which showed IgA nephropathy with some fibrocellular crescents.  7/14 gloms were globally sclerosed.     He was started on Pozzi protocol with solumedrol 500 mg/3 days followed by oral prednisone 40 mg every other day with bactrim single strength 1 tab PO every other day, pepcid 40 mg PO daily and calcium carbonate 4647-3185 mg PO daily.  Unfortunately, he did not have a significant improvement in his creatinine and had persistent active sediment. He was started on  Cyclophosphamide 100 mg daily in 11/21 along with prednisone taper. His cyclophosphamide was discontinued 5/2/22 and his prednisone decreased to 5 mg Q other day and had been discontinued      His creatinine improved initially to reginaldo at 3.3, however now has continued to worsen since 2/2023.  His GFR is now 10-13. His proteinuria remains minimal and he has no microscopic hematuria.         Previously, we have discussed with him about renal replacement therapy.  When comes to dialysis, he is interested in PD. He saw Dr Zimmer and has been deemed to be a good candidate for PD. Given his age, he would not be a good candidate for transplant.  His wife was on peritoneal dialysis prior to her passing so he is familiar with peritoneal dialysis.     - Re-discussed with him about dialysis, at this time he does not meet any indication for dialysis  -I discussed with him that most likely we will start the process when he is EGFR <10 or if indication for dialysis arise prior to that  -Avoid NSAIDs and stay well-hydrated  - Monthly labs and follow up  in 3 months (me or NP)  # HTN  # Paroxysmal A fib    His blood pressures remain above the goal of < 130/90 mm of Hg and he has no LE edema. Currently only on Metoprolol  12.5 mg daily.  Home blood pressure and office blood pressure are excellent. OK to start Eliquis renally dose.   # Metabolic acidosis  He is not on sodium bicarb 650 twice daily.   - Encouraged the patient to take the medication  # Anemia of CKD, iron replete 2/22   Follow by anemia clinic. HB 9.5.   # Seizure disorder     Follow-up in 6 months with labs    The longitudinal plan of care for CKD  was addressed during this visit. Due to the added complexity in care, I will continue to support Melo Dangelo in the subsequent management of this condition(s) and with the ongoing continuity of care of this condition(s).    I personally spent  40 minutes on the date of the encounter doing chart review, history and exam, documentation and further activities as noted above. 20 minutes of this visit is dedicated to direct patient interaction via face-to-face.    Meliton Scott MD on 11/04/2024            Again, thank you for allowing me to participate in the care of your patient.      Sincerely,    Meliton Scott MD

## 2024-11-04 NOTE — PROGRESS NOTES
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      Interval History: He denies any new symptoms. He went to [place] for whale watching on a cruise ship and developed COVID infection after. This was 2 weeks ago and other than having some low energy, he is doing okay.      Interval history 1/19/23: He has bene doing well overall. He sometimes feels a little short of breath when he climbs about 2 flights of stairs but otherwise has minimal symptoms. He has not seen any dark urine or LE edema.      Interval history 6/1/23: Melo does report that he is feeling more tired these days. There is also a lot on his plate since his wife is now transitioning to PD from HD and he is her primary caregiver. He denies any LE edema or shortness of breath.      Interval history 9/11/23: he says he is doing well overall. His wife has now started PD. She initially had PT come in but now they have stopped and she has been getting weaker. He has been managing all the household chores and helping her with tasks. He says his energy is okay for now, still has a good appetite, no nausea or other uremic symptoms. He denies any LE edema or shortness of breath     12/11/23: Melo has been doing okay however unfortunately he lost his wife a couple of months ago and has been dealing with some stress regarding that and has some other stressors that are new.  He does report that he takes naps multiple times and feels better after his naps but otherwise has okay energy.  He cooks for himself does his own chores and finds it not very difficult to do that.  Does have a decent appetite and has been trying to switch more from meat to plant-based diet.  He denies any lower  extremity edema shortness of breath.  He does report a little itching that he has noticed recently.     3/11/24: Melo continues to struggle with depression since he lost his wife. He will be seeing a psychiatrist soon as he is concerned that he may have bipolar disorder where he loses his temper in the morning when he gets frustrated. He continues to have a good appetite and denies any nausea, vomiting. He continues to work on his woodwork.      2024: Recent admission for concern for PNA. MARYANN on CKD. Now with some improvement in his kidney function. Had several discussions about planning for PD. Today he actually appears to be doing quite well. Main complaint is fatigue, but he has been exercising more. Lives by himself, but would feel comfortable doing his own PD at some point. Doesn't believe he would have any issues with the weight of the bags. Has some family in town.     24: He has been feeling stable. No N/V. Weight stable. No swelling unless when he eats too much salt then his ankle will start to puff up. Home /70 mmHg. His endurance has been down. His late wife was on PD before she .  He told me that his cardiologist wanted to start him on Eliquis but he is not sure about it. Labs today showed Cr 4.58, eGFR 12, K 4.9, biacarb 21, Ca 9.0, Phos 3.7, 4.2. Tsat 35%. Hb 9.5, WBC 5.2 and platelet 125.  UA showed minimal protein and negative blood. UPCR pending.    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     Paroxysmal atrial fibrillation (H)     PVD (posterior vitreous detachment)     Seizure  disorder (H)     last seizure 2008       Past surgical history  Past Surgical History:   Procedure Laterality Date    NO HISTORY OF SURGERY      PHACOEMULSIFICATION CLEAR CORNEA WITH STANDARD INTRAOCULAR LENS IMPLANT Right 11/3/2022    Procedure: RIGHT EYE PHACOEMULSIFICATION, CATARACT, WITH INTRAOCULAR LENS IMPLANT COMPLEX CASE ;  Surgeon: Best Padilla MD;  Location: [facility] [place]    PHACOEMULSIFICATION CLEAR CORNEA WITH STANDARD INTRAOCULAR LENS IMPLANT Left 11/17/2022    Procedure: LEFT EYE PHACOEMULSIFICATION, CATARACT, WITH INTRAOCULAR LENS IMPLANT;  Surgeon: Best Padilla MD;  Location: [facility] [place] OR         Review of Systems:   14 systems were reviewed and all negative except as mentioned above.   Current Medications:  Current Outpatient Medications   Medication Sig Dispense Refill    acetaminophen (TYLENOL) 325 MG tablet Take 325-650 mg by mouth every 6 hours as needed for mild pain      benzonatate (TESSALON) 100 MG capsule Take 1 capsule (100 mg) by mouth 3 times daily as needed for cough (Patient not taking: Reported on 9/3/2024) 20 capsule 0    busPIRone (BUSPAR) 5 MG tablet Take 5 mg by mouth 2 times daily Has started      calcium carbonate-vitamin D (OSCAL) 500-5 MG-MCG tablet Take 1 tablet by mouth daily. 90 tablet 3    Cholecalciferol (D3-1000) 25 MCG (1000 UT) CAPS Take 1 capsule by mouth daily      cyanocobalamin (VITAMIN B-12) 100 MCG tablet Take 1 tablet (100 mcg) by mouth daily. Take 1,000 mcg by mouth every morning - 90 tablet 3    escitalopram (LEXAPRO) 20 MG tablet Take 1 tablet (20 mg) by mouth every morning. 90 tablet 3    lamoTRIgine (LAMICTAL) 100 MG tablet Take 2 tablets (200 mg) by mouth 2 times daily. 360 tablet 3    metoprolol succinate ER (TOPROL XL) 25 MG 24 hr tablet TAKE 1/2 TABLET BY MOUTH DAILY 45 tablet 3    multivitamin (OCUVITE) TABS tablet Take 1 tablet by mouth daily. Please keep the appointment on 9/3/24 for further refills. 90 tablet 3    simvastatin  (ZOCOR) 20 MG tablet Take 1 tablet (20 mg) by mouth at bedtime. 90 tablet 3    sodium bicarbonate 650 MG tablet Take 1 tablet (650 mg) by mouth 2 times daily Patient needs to see primary provider and have labs for further refills. Please call for an appointment at [phone]. (Patient not taking: Reported on 9/3/2024) 180 tablet 3    tamsulosin (FLOMAX) 0.4 MG capsule Take 2 capsules (0.8 mg) by mouth daily. 180 capsule 3     No current facility-administered medications for this visit.       Physical Exam:   There were no vitals taken for this visit.   There is no height or weight on file to calculate BMI.    GENERAL APPEARANCE: Alert, not in acute distress, pleasant  EYES:  Not pale conjunctiva, pupils equal  HENT: Mouth without ulcers or lesions  PULM: lungs clear to auscultation bilaterally, equal air movement, no clubbing  CV: regular rhythm, normal rate, no rub     -JVD no distended.      -edema: none  GI: soft,  - tender, no distended, bowel sounds are present  INTEGUMENT: No rash  NEURO:  Non focal. No asterixis.     Labs:   All labs reviewed by me  Last Renal Panel:  Sodium   Date Value Ref Range Status   07/24/2024 138 135 - 145 mmol/L Final   10/29/2020 139 133 - 144 mmol/L Final     Potassium   Date Value Ref Range Status   07/24/2024 4.8 3.4 - 5.3 mmol/L Final   07/29/2022 4.8 3.4 - 5.3 mmol/L Final   10/29/2020 4.7 3.4 - 5.3 mmol/L Final     Chloride   Date Value Ref Range Status   07/24/2024 106 98 - 107 mmol/L Final   07/29/2022 106 94 - 109 mmol/L Final   10/29/2020 110 (H) 94 - 109 mmol/L Final     Carbon Dioxide   Date Value Ref Range Status   10/29/2020 25 20 - 32 mmol/L Final     Carbon Dioxide (CO2)   Date Value Ref Range Status   07/24/2024 21 (L) 22 - 29 mmol/L Final   07/29/2022 28 20 - 32 mmol/L Final     Anion Gap   Date Value Ref Range Status   07/24/2024 11 7 - 15 mmol/L Final   07/29/2022 6 3 - 14 mmol/L Final   10/29/2020 5 3 - 14 mmol/L Final     Glucose   Date Value Ref Range Status    07/24/2024 96 70 - 99 mg/dL Final   07/29/2022 89 70 - 99 mg/dL Final   10/29/2020 136 (H) 70 - 99 mg/dL Final     Urea Nitrogen   Date Value Ref Range Status   07/24/2024 41.3 (H) 8.0 - 23.0 mg/dL Final   07/29/2022 55 (H) 7 - 30 mg/dL Final   10/29/2020 31 (H) 7 - 30 mg/dL Final     Creatinine   Date Value Ref Range Status   07/24/2024 4.34 (H) 0.67 - 1.17 mg/dL Final   10/29/2020 1.33 (H) 0.66 - 1.25 mg/dL Final     GFR Estimate   Date Value Ref Range Status   07/24/2024 13 (L) >60 mL/min/1.73m2 Final     Comment:     eGFR calculated using 2021 CKD-EPI equation.   11/04/2020 56 (L) >60 mL/min/1.73m2 Final   10/29/2020 50 (L) >60 mL/min/[1.73_m2] Final     Comment:     Non  GFR Calc  Starting 12/18/2018, serum creatinine based estimated GFR (eGFR) will be   calculated using the Chronic Kidney Disease Epidemiology Collaboration   (CKD-EPI) equation.       Calcium   Date Value Ref Range Status   07/24/2024 9.1 8.8 - 10.4 mg/dL Final     Comment:     Reference intervals for this test were updated on 7/16/2024 to reflect our healthy population more accurately. There may be differences in the flagging of prior results with similar values performed with this method. Those prior results can be interpreted in the context of the updated reference intervals.   10/29/2020 8.5 8.5 - 10.1 mg/dL Final     Phosphorus   Date Value Ref Range Status   07/24/2024 3.6 2.5 - 4.5 mg/dL Final     Albumin   Date Value Ref Range Status   07/24/2024 4.4 3.5 - 5.2 g/dL Final   07/29/2022 4.0 3.4 - 5.0 g/dL Final   10/29/2020 2.7 (L) 3.4 - 5.0 g/dL Final       Imaging:  I reviewed imaging studies.     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 Coughlin) when he presented to the hospital with gross hematuria after his second COVID vaccine. He presented with a Sr creatinine of 3.5 which peaked to 4.4  mg/dl. His creatinine in 10/2020 was 1.1-1.2 mg/dl.  A kidney bx was done which showed IgA nephropathy with some fibrocellular crescents.  7/14 gloms were globally sclerosed.     He was started on Pozzi protocol with solumedrol 500 mg/3 days followed by oral prednisone 40 mg every other day with bactrim single strength 1 tab PO every other day, pepcid 40 mg PO daily and calcium carbonate 1271-9344 mg PO daily.  Unfortunately, he did not have a significant improvement in his creatinine and had persistent active sediment. He was started on  Cyclophosphamide 100 mg daily in 11/21 along with prednisone taper. His cyclophosphamide was discontinued 5/2/22 and his prednisone decreased to 5 mg Q other day and had been discontinued      His creatinine improved initially to reginaldo at 3.3, however now has continued to worsen since 2/2023.  His GFR is now 10-13. His proteinuria remains minimal and he has no microscopic hematuria.         Previously, we have discussed with him about renal replacement therapy.  When comes to dialysis, he is interested in PD. He saw Dr Zimmer and has been deemed to be a good candidate for PD. Given his age, he would not be a good candidate for transplant.  His wife was on peritoneal dialysis prior to her passing so he is familiar with peritoneal dialysis.     - Re-discussed with him about dialysis, at this time he does not meet any indication for dialysis  -I discussed with him that most likely we will start the process when he is EGFR <10 or if indication for dialysis arise prior to that  -Avoid NSAIDs and stay well-hydrated  - Monthly labs and follow up in 3 months (me or NP)  # HTN  # Paroxysmal A fib    His blood pressures remain above the goal of < 130/90 mm of Hg and he has no LE edema. Currently only on Metoprolol  12.5 mg daily.  Home blood pressure and office blood pressure are excellent. OK to start Eliquis renally dose.   # Metabolic acidosis  He is not on sodium bicarb 650 twice  daily.   - Encouraged the patient to take the medication  # Anemia of CKD, iron replete 2/22   Follow by anemia clinic. HB 9.5.   # Seizure disorder     Follow-up in 6 months with labs    The longitudinal plan of care for CKD  was addressed during this visit. Due to the added complexity in care, I will continue to support Melo Dangelo in the subsequent management of this condition(s) and with the ongoing continuity of care of this condition(s).    I personally spent  40 minutes on the date of the encounter doing chart review, history and exam, documentation and further activities as noted above. 20 minutes of this visit is dedicated to direct patient interaction via face-to-face.    Meliton Scott MD on 11/04/2024

## 2024-11-05 ENCOUNTER — TELEPHONE (OUTPATIENT)
Dept: CARDIOLOGY | Facility: CLINIC | Age: 84
End: 2024-11-05
Payer: MEDICARE

## 2024-11-05 ENCOUNTER — PATIENT OUTREACH (OUTPATIENT)
Dept: CARE COORDINATION | Facility: CLINIC | Age: 84
End: 2024-11-05
Payer: MEDICARE

## 2024-11-05 DIAGNOSIS — I48.0 PAROXYSMAL ATRIAL FIBRILLATION (H): Primary | ICD-10-CM

## 2024-11-05 NOTE — PROGRESS NOTES
Clinic Care Coordination Contact  Follow Up Progress Note      Assessment:   RN called and spoke with patient. Pt states that he saw his nephrologist yesterday. He states that he is holding steady above the dialysis requirement, and he can go maybe another year before starting dialysis so that is good news that he is excited about.     He also shares that he is re-trying eliquis- he is a little suspicious about this because he was so weak that he couldn't go up steps and then would have to rest. He states that he is off of eliquis currently and ever since he can do multiple flights of stares and was blaming the blood thinner. He remains undecided, but would like to get support from the nephrologist and decide. RN shared the message in MyChart from the Nephrology Care Coordinator stating that they have communicated with Cardiology and it is okay to start blood thinners. RN encouraged patient to reach out to the Cardiology and Nephrology teams if he has not heard from anyone in a couple of days due to this. Patient states understanding to the plan.     RN also shared with patient that the anemia management team left him a voicemail. Patient shares that his endurance is not what it used to be, and he is taking 3 naps per day. He states he is working on a project that is exhausting him and when he thinks hard his head gets scrambled and he has to take a nap. He states he takes 3 10 minute naps a day. RN reviewed his CBC from yesterday and encouraged the patient to call the anemia clinic for follow up on this. Pt states understanding and was given the phone number to call and follow up. Pt shares he is overwhelmed with emails/calls/etc from the election and is hoping that he can talk with the people who are actually trying to get ahold of him after today.    Pt shares he has a little imbalance when turning his head quickly, but that he graduated from OT. He has a visit with Dr. Smith next month where he will discuss  this further and talk about next steps.   Care Gaps:    Health Maintenance Due   Topic Date Due    DIABETIC FOOT EXAM  Never done    DEPRESSION ACTION PLAN  Never done    ZOSTER IMMUNIZATION (1 of 2) Never done    Pneumococcal Vaccine: 65+ Years (2 of 2 - PCV) 10/20/2006    RSV VACCINE (1 - 1-dose 75+ series) Never done    A1C  01/18/2022    MICROALBUMIN  10/18/2023    EYE EXAM  12/14/2023       Currently there are no Care Gaps.    Care Plans  Care Plan: Health Maintenance       Problem: Health Maintenance Due or Overdue       Goal: Become up-to-date with health maintenance visit(s)       Start Date: 10/1/2024    Recent Progress: On track    Priority: High    Note:     Barriers: patient is having some memory concerns  Strengths: patient sees specialists and PCP for regular care  Patient expressed understanding of goal: yes  Action steps to achieve this goal:  1. I will continue to see my providers for regularly scheduled appointments.   2. I will continue to see the occupational therapist for scheduled appointments.                                   Intervention/Education provided during outreach: Discussed patients plan of care. CC RN asked open ended questions, provided support, resources, and encouragement as needed. Patient will reach out to care team sooner than planned with new questions or concerns.      Plan:   Care Coordinator will follow up in 1 month.     CHARLY CLARK RN on 11/5/2024 at 12:31 PM

## 2024-11-05 NOTE — TELEPHONE ENCOUNTER
Spoke with pt and he is in agreement with starting Eliquis. Rx sent to requested pharmacy. Pt denies questions at this time.     Danish Robertson RN   Cardiology Nurse Coordinator

## 2024-11-05 NOTE — TELEPHONE ENCOUNTER
----- Message from Brittanie Etienne sent at 11/5/2024 10:17 AM CST -----  Hey,   It looks like nephro ok'd him to start eliquis 2.5 mg bid, will you check in with him and see if he would like to start this?    Thanks!  Brittanie

## 2024-11-06 ENCOUNTER — PATIENT OUTREACH (OUTPATIENT)
Dept: NEPHROLOGY | Facility: CLINIC | Age: 84
End: 2024-11-06
Payer: MEDICARE

## 2024-11-06 NOTE — TELEPHONE ENCOUNTER
Dialysis Access Care Coordination: Chart Review    REASON FOR CALL:     REASON FOR CALL: Clinic Care Coordination - Chart Review (Dialysis Access)                                   SITUATION/BACKROUND:     Enrollment status:  Potential  Dialysis Access Consult:  7/18/2023  Surgeon:  Dr. Zimmer   Access Plan:  Laparoscopic PD catheter placement under general anesthesia     Last Nephrology Visit:  11/4/2024  Nephrology provider: Dr. Scott    Next visit 2/5/25 with Samanage    Neph Tracking Flowsheet Last Filled Values       Preferred Modality Peritoneal Dialysis    Patient's Referral Dates Auto Populate Patient's Referral Dates    MTM Referral 12/22/21    Home Care Referral 6/1/23    Journey Referral 1/25/23    Access Surgeon Referral Status  Referred  PD consult with Dr. Zimmer    Dialysis Access Referral 06/06/23    Access Surgical Consult 07/18/23  Plan: PD cath placement, Dr. Zimmer          Dialysis History    No dialysis history on file.       Patient is not followed by Solid Organ Transplant  .  Coordinator: No matching coordinators    ASSESSMENT:     Hospitalizations/procedures:  none since last review  Medication changes:  Started Eliquis for A-fib. Managed by cardiology provider Brittanie Etienne PA-C    Recent Labs      Latest Ref Rng & Units 11/4/2024 7/24/2024 5/6/2024   Neph Labs   Sodium 135 - 145 mmol/L 137  138  141    GFR Estimate >60 mL/min/1.73m2 12  13  11    Potassium 3.4 - 5.3 mmol/L 4.9  4.8  4.5    Creatinine 0.67 - 1.17 mg/dL 4.58  4.34  4.91    Urea Nitrogen 8.0 - 23.0 mg/dL 45.0  41.3  46.0    Hemoglobin 13.3 - 17.7 g/dL 9.5  9.8  8.8    Ferritin 31 - 409 ng/mL 789  675     Iron Binding Cap 240 - 430 ug/dL 240  249     Iron 61 - 157 ug/dL 85  101            PLAN:     Future Appts Next 180 days       Visit Type Date Time Department    RETURN NEPHROLOGY 2/5/2025  8:30 AM  MEDICINE RENAL          Follow Up:    Patient's PD catheter consult was more than 1 year ago and will  need to be repeated prior to a PD catheter placement.  Patient started on Eliquis and will need updated surgical plan.    Flores Arias RN  Dialysis Access Care Coordinator  Phone: [phone]  Pool: P_Dialysis_Access_Nurse

## 2024-11-07 NOTE — TELEPHONE ENCOUNTER
Received communication from Dr. Scott regarding a repeat PD catheter consult:    Meliton Scott MD  P Dialysis Access Nurse  Aniceto Tanner,    His eGFR still ok at 12-13. Plan to send him when eGFR 10 or below. Will keep you posted.    Thanks,  NK    RN[facility] will follow up in approximately 3 months.    Flores Arias RN on 11/7/2024 at 2:19 PM

## 2024-12-03 ENCOUNTER — OFFICE VISIT (OUTPATIENT)
Dept: FAMILY MEDICINE | Facility: CLINIC | Age: 84
End: 2024-12-03
Payer: COMMERCIAL

## 2024-12-03 ENCOUNTER — LAB (OUTPATIENT)
Dept: LAB | Facility: CLINIC | Age: 84
End: 2024-12-03
Payer: COMMERCIAL

## 2024-12-03 VITALS
OXYGEN SATURATION: 97 % | DIASTOLIC BLOOD PRESSURE: 79 MMHG | HEART RATE: 60 BPM | RESPIRATION RATE: 16 BRPM | WEIGHT: 178 LBS | HEIGHT: 71 IN | TEMPERATURE: 97.8 F | BODY MASS INDEX: 24.92 KG/M2 | SYSTOLIC BLOOD PRESSURE: 159 MMHG

## 2024-12-03 DIAGNOSIS — R53.83 FATIGUE, UNSPECIFIED TYPE: ICD-10-CM

## 2024-12-03 DIAGNOSIS — N18.5 CKD (CHRONIC KIDNEY DISEASE) STAGE 5, GFR LESS THAN 15 ML/MIN (H): ICD-10-CM

## 2024-12-03 DIAGNOSIS — N18.9 ACUTE KIDNEY INJURY SUPERIMPOSED ON CKD (H): ICD-10-CM

## 2024-12-03 DIAGNOSIS — D63.1 ANEMIA OF CHRONIC RENAL FAILURE, STAGE 5 (H): ICD-10-CM

## 2024-12-03 DIAGNOSIS — N18.5 CKD STAGE 5 DUE TO TYPE 1 DIABETES MELLITUS (H): ICD-10-CM

## 2024-12-03 DIAGNOSIS — N18.5 ANEMIA OF CHRONIC RENAL FAILURE, STAGE 5 (H): ICD-10-CM

## 2024-12-03 DIAGNOSIS — R41.3 MEMORY LOSS: ICD-10-CM

## 2024-12-03 DIAGNOSIS — E10.22 CKD STAGE 5 DUE TO TYPE 1 DIABETES MELLITUS (H): Primary | ICD-10-CM

## 2024-12-03 DIAGNOSIS — N17.9 ACUTE KIDNEY INJURY SUPERIMPOSED ON CKD (H): ICD-10-CM

## 2024-12-03 DIAGNOSIS — E10.22 CKD STAGE 5 DUE TO TYPE 1 DIABETES MELLITUS (H): ICD-10-CM

## 2024-12-03 DIAGNOSIS — N18.5 CKD STAGE 5 DUE TO TYPE 1 DIABETES MELLITUS (H): Primary | ICD-10-CM

## 2024-12-03 LAB
ALBUMIN SERPL BCG-MCNC: 4.4 G/DL (ref 3.5–5.2)
ANION GAP SERPL CALCULATED.3IONS-SCNC: 10 MMOL/L (ref 7–15)
BUN SERPL-MCNC: 45.4 MG/DL (ref 8–23)
CALCIUM SERPL-MCNC: 9.3 MG/DL (ref 8.8–10.4)
CHLORIDE SERPL-SCNC: 106 MMOL/L (ref 98–107)
CREAT SERPL-MCNC: 4.49 MG/DL (ref 0.67–1.17)
EGFRCR SERPLBLD CKD-EPI 2021: 12 ML/MIN/1.73M2
ERYTHROCYTE [DISTWIDTH] IN BLOOD BY AUTOMATED COUNT: 13.9 % (ref 10–15)
FERRITIN SERPL-MCNC: 785 NG/ML (ref 31–409)
GLUCOSE SERPL-MCNC: 100 MG/DL (ref 70–99)
HCO3 SERPL-SCNC: 23 MMOL/L (ref 22–29)
HCT VFR BLD AUTO: 30.3 % (ref 40–53)
HGB BLD-MCNC: 9.9 G/DL (ref 13.3–17.7)
IRON BINDING CAPACITY (ROCHE): 248 UG/DL (ref 240–430)
IRON SATN MFR SERPL: 22 % (ref 15–46)
IRON SERPL-MCNC: 55 UG/DL (ref 61–157)
MCH RBC QN AUTO: 30.9 PG (ref 26.5–33)
MCHC RBC AUTO-ENTMCNC: 32.7 G/DL (ref 31.5–36.5)
MCV RBC AUTO: 95 FL (ref 78–100)
PHOSPHATE SERPL-MCNC: 3.4 MG/DL (ref 2.5–4.5)
PLATELET # BLD AUTO: 141 10E3/UL (ref 150–450)
POTASSIUM SERPL-SCNC: 5.1 MMOL/L (ref 3.4–5.3)
RBC # BLD AUTO: 3.2 10E6/UL (ref 4.4–5.9)
SODIUM SERPL-SCNC: 139 MMOL/L (ref 135–145)
TSH SERPL DL<=0.005 MIU/L-ACNC: 2.39 UIU/ML (ref 0.3–4.2)
WBC # BLD AUTO: 7.9 10E3/UL (ref 4–11)

## 2024-12-03 PROCEDURE — 83540 ASSAY OF IRON: CPT | Performed by: PATHOLOGY

## 2024-12-03 PROCEDURE — 84443 ASSAY THYROID STIM HORMONE: CPT | Performed by: PATHOLOGY

## 2024-12-03 PROCEDURE — 36415 COLL VENOUS BLD VENIPUNCTURE: CPT | Performed by: PATHOLOGY

## 2024-12-03 PROCEDURE — 83550 IRON BINDING TEST: CPT | Performed by: PATHOLOGY

## 2024-12-03 PROCEDURE — 80069 RENAL FUNCTION PANEL: CPT | Performed by: PATHOLOGY

## 2024-12-03 PROCEDURE — 85027 COMPLETE CBC AUTOMATED: CPT | Performed by: PATHOLOGY

## 2024-12-03 PROCEDURE — 82728 ASSAY OF FERRITIN: CPT | Performed by: PATHOLOGY

## 2024-12-03 ASSESSMENT — PATIENT HEALTH QUESTIONNAIRE - PHQ9
SUM OF ALL RESPONSES TO PHQ QUESTIONS 1-9: 4
10. IF YOU CHECKED OFF ANY PROBLEMS, HOW DIFFICULT HAVE THESE PROBLEMS MADE IT FOR YOU TO DO YOUR WORK, TAKE CARE OF THINGS AT HOME, OR GET ALONG WITH OTHER PEOPLE: NOT DIFFICULT AT ALL
SUM OF ALL RESPONSES TO PHQ QUESTIONS 1-9: 4

## 2024-12-03 NOTE — PROGRESS NOTES
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 Due to the added complexity in care, I will continue to support Melo in the subsequent management and with ongoing continuity of care.  33 minutes spent by me on the date of the encounter doing chart review, history and exam, documentation and further activities per the note          No follow-ups on file.    Subjective   Melo is a 84 year old, presenting for the following health issues:  Follow Up (Pt here to follow up; would like to review medication (lamotrigine, sodium bicarbonate, eliquis), recheck labs for hemoglobin)      12/3/2024     9:35 AM   Additional Questions   Roomed by Allyson DEGROOT     History of Present Illness       CKD: He is not using over the counter pain medicine.     He eats 2-3 servings of fruits and vegetables daily.He consumes 0 sweetened beverage(s) daily.He exercises with enough effort to increase his heart rate 9 or less minutes per day.  He exercises with enough effort to increase his heart rate 3 or less days per week.   He is taking medications regularly.   Overall doing well  My last note, interval provider notes of other providers, all rvwd/discussed  Did not get Neuro appt set. Went over OT. 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     Paroxysmal atrial fibrillation (H)     PVD (posterior vitreous detachment)     Seizure disorder (H)     last seizure 2008     Past Surgical History:   Procedure Laterality Date    NO HISTORY OF SURGERY      PHACOEMULSIFICATION CLEAR CORNEA WITH STANDARD INTRAOCULAR LENS IMPLANT Right 11/3/2022    Procedure: RIGHT EYE PHACOEMULSIFICATION, CATARACT, WITH INTRAOCULAR LENS IMPLANT COMPLEX CASE ;  Surgeon: Best Padilla MD;  [facility]    PHACOEMULSIFICATION CLEAR CORNEA WITH STANDARD INTRAOCULAR LENS IMPLANT Left 11/17/2022    Procedure: LEFT EYE PHACOEMULSIFICATION, CATARACT, WITH INTRAOCULAR LENS IMPLANT;  Surgeon: Best Padilla MD;  [facility]     Current Outpatient Medications   Medication Sig Dispense Refill    acetaminophen (TYLENOL) 325 MG tablet Take 325-650 mg by mouth every 6 hours as needed for mild pain      apixaban ANTICOAGULANT (ELIQUIS ANTICOAGULANT) 2.5 MG tablet Take 1 tablet (2.5 mg) by mouth 2 times daily. 180 tablet 3    benzonatate (TESSALON) 100 MG capsule Take 1 capsule (100 mg) by mouth 3 times daily as needed for cough (Patient not taking: Reported on 11/4/2024) 20 capsule 0    busPIRone (BUSPAR) 5 MG tablet Take 5 mg by mouth 2 times daily Has started      calcium carbonate-vitamin D (OSCAL) 500-5 MG-MCG tablet Take 1 tablet by mouth daily. 90 tablet 3    Cholecalciferol (D3-1000) 25 MCG (1000 UT) CAPS Take 1 capsule by mouth daily      cyanocobalamin (VITAMIN B-12) 100 MCG tablet Take 1 tablet (100 mcg) by mouth daily. Take 1,000 mcg by mouth every morning - 90 tablet 3    escitalopram (LEXAPRO) 20 MG tablet Take 1 tablet (20  mg) by mouth every morning. 90 tablet 3    lamoTRIgine (LAMICTAL) 100 MG tablet Take 2 tablets (200 mg) by mouth 2 times daily. 360 tablet 3    metoprolol succinate ER (TOPROL XL) 25 MG 24 hr tablet TAKE 1/2 TABLET BY MOUTH DAILY 45 tablet 3    multivitamin (OCUVITE) TABS tablet Take 1 tablet by mouth daily. Please keep the appointment on 9/3/24 for further refills. 90 tablet 3    simvastatin (ZOCOR) 20 MG tablet Take 1 tablet (20 mg) by mouth at bedtime. 90 tablet 3    sodium bicarbonate 650 MG tablet Take 1 tablet (650 mg) by mouth 2 times daily. Patient needs to see primary provider and have labs for further refills. Please call for an appointment at [phone]. 180 tablet 3    tamsulosin (FLOMAX) 0.4 MG capsule Take 2 capsules (0.8 mg) by mouth daily. 180 capsule 3     No current facility-administered medications for this visit.     No Known Allergies  Family History   Problem Relation Age of Onset    Glaucoma No family hx of     Macular Degeneration No family hx of     Anesthesia Reaction No family hx of     Deep Vein Thrombosis (DVT) No family hx of      Social History     Socioeconomic History    Marital status:      Spouse name: Not on file    Number of children: Not on file    Years of education: Not on file    Highest education level: Bachelor's degree (e.g., BA, AB, BS)   Occupational History    Not on file   Tobacco Use    Smoking status: Former     Types: Cigarettes    Smokeless tobacco: Never   Vaping Use    Vaping status: Never Used   Substance and Sexual Activity    Alcohol use: No     Comment: History of alcohol dependence, in remission for 20 years    Drug use: No    Sexual activity: Not Currently   Other Topics Concern    Not on file   Social History Narrative    Not on file     Social Drivers of Health     Financial Resource Strain: Low Risk  (9/3/2024)    Financial Resource Strain     Within the past 12 months, have you or your family members you live with been unable to get utilities  (heat, electricity) when it was really needed?: No   Food Insecurity: Low Risk  (9/3/2024)    Food Insecurity     Within the past 12 months, did you worry that your food would run out before you got money to buy more?: No     Within the past 12 months, did the food you bought just not last and you didn t have money to get more?: No   Transportation Needs: Low Risk  (9/3/2024)    Transportation Needs     Within the past 12 months, has lack of transportation kept you from medical appointments, getting your medicines, non-medical meetings or appointments, work, or from getting things that you need?: No   Physical Activity: Unknown (9/3/2024)    Exercise Vital Sign     Days of Exercise per Week: 5 days     Minutes of Exercise per Session: Not on file   Stress: Stress Concern Present (9/3/2024)    [employer] [place] [employer] - Occupational Stress Questionnaire     Feeling of Stress : Very much   Social Connections: Unknown (9/3/2024)    Social Connection and Isolation Panel [NHANES]     Frequency of Communication with Friends and Family: Not on file     Frequency of Social Gatherings with Friends and Family: Once a week     Attends [employer]ism Services: Not on file     Active Member of Clubs or Organizations: Not on file     Attends Club or Organization Meetings: Not on file     Marital Status: Not on file   Interpersonal Safety: Low Risk  (9/3/2024)    Interpersonal Safety     Do you feel physically and emotionally safe where you currently live?: Yes     Within the past 12 months, have you been hit, slapped, kicked or otherwise physically hurt by someone?: No     Within the past 12 months, have you been humiliated or emotionally abused in other ways by your partner or ex-partner?: No   Housing Stability: Low Risk  (9/3/2024)    Housing Stability     Do you have housing? : Yes     Are you worried about losing your housing?: No     Pt notes BP ok at home, he'll monitor and send mychart of; recent renal note  "rvwd too      Objective    BP (!) 171/69 (BP Location: Left arm, Patient Position: Sitting, Cuff Size: Adult Regular)   Pulse 60   Temp 97.8  F (36.6  C) (Oral)   Resp 16   Ht 1.803 m (5' 11\")   Wt 80.7 kg (178 lb)   SpO2 97%   BMI 24.83 kg/m    Body mass index is 24.83 kg/m .  Physical Exam   GENERAL: alert and no distress  MS: no gross musculoskeletal defects noted, no edema  PSYCH: mentation appears normal, affect normal/bright          Signed Electronically by: Francis Smith MD    "

## 2024-12-03 NOTE — TELEPHONE ENCOUNTER
FUTURE VISIT INFORMATION      FUTURE VISIT INFORMATION:  Date: 2/26/25  Time: 12 PM  Location: [facility]   REFERRAL INFORMATION:  Referring provider:  Francis Smith MD   Referring providers clinic:  [facility]   Reason for visit/diagnosis:  per pt, refd Dr Smith, Bilateral hearing loss, unspecified hearing loss type, [facility] confd     RECORDS REQUESTED FROM      Clinic name Comments Records Status Imaging Status   [facility]  9/3/24 referral/ note- Francis Smith MD  [facility]iology 1/18/17 audiogram Scanned in

## 2024-12-04 ENCOUNTER — PATIENT OUTREACH (OUTPATIENT)
Dept: CARE COORDINATION | Facility: CLINIC | Age: 84
End: 2024-12-04
Payer: MEDICARE

## 2024-12-04 NOTE — PROGRESS NOTES
Anemia Management Note - Follow Up      SUBJECTIVE/OBJECTIVE:    Referred by Dr. Merry Barrow on 2024  *orders are under Dr. Scott  Primary Diagnosis: Anemia in Chronic Kidney Disease (N18.5, D63.1)     Secondary Diagnosis: Chronic Kidney Disease, Stage 5 (N18.5)     Hgb goal range:9-10  RAMA:TBD; Hgb 9.9  *24; starting on Eliquis per Cardiology.   Iron regimen: Treat with IV Iron- Melo Prefers Infed at the [facility]. -Competed Infed on 24.      Labs : 2025  RX/TX plans : TBD     Recent RAMA use, transfusion, IV iron: NA  No history of stroke, MI, and blood clots or cancers     Contact: No Boxes Checked on Consent to Communicate scanned on 2021.         Latest Ref Rng & Units 2024 2024 2024 2024 2024 2024 12/3/2024   Anemia   Hemoglobin 13.3 - 17.7 g/dL 8.6  8.5  8.8   9.8  9.5  9.9    IV Iron Dose     1000mg      TSAT 15 - 46 % 12     41  35  22    Ferritin 31 - 409 ng/mL 223     675  789  785         BP Readings from Last 3 Encounters:   24 (!) 159/79   24 123/67   10/01/24 (!) 147/74     Wt Readings from Last 2 Encounters:   24 80.7 kg (178 lb)   24 80.8 kg (178 lb 1.6 oz)           ASSESSMENT:    Hgb:at goal - continue to monitor  TSat: not at goal (>30%) but ferritin >500ng/mL.  IV iron not indicated at this time per anemia protocol. Ferritin: At goal (>100ng/mL)        PLAN:  RTC for Anemia labs in one month. LVM on 24 to discuss possibly starting RAMA. No return call. Hgb has been stable in the 9s.    Orders needed to be renewed (for next follow-up date) in EPIC: None    Iron labs due:  Early 2025    Plan discussed with:  No call, chart review       NEXT FOLLOW-UP DATE:  24    Amy Hurd RN   Anemia Services  [facility]  [email or web address]   Office : [phone]  Fax: [phone]

## 2024-12-05 ENCOUNTER — PATIENT OUTREACH (OUTPATIENT)
Dept: CARE COORDINATION | Facility: CLINIC | Age: 84
End: 2024-12-05
Payer: MEDICARE

## 2024-12-05 NOTE — PROGRESS NOTES
Clinic Care Coordination Contact  Follow Up Progress Note      Assessment:   RN called and spoke with patient. Pt states that he is doing okay, looked at his thyroid results and see that the numbers are high. He thinks this could correlate to the way that he is feeling. RN shared that his numbers were normal (though higher) and Dr. Smith thinks that they look okay. RN shared with patient that he can talk about this with Dr. Smith at next visit to see if the higher number (for him) has a correlation with his symptoms. Pt agrees with this.     Pt shared also for his renal situation that he is on the transplant list, not on dialysis yet, but really skimming along. He is trying to be optimistic but knows it's an option.     RN also shared that patient is recommended to see Neurology and Neuropsych-nothing scheduled yet. RN provided patient with the phone numbers for both Neurology and Neuropsych and encouraged patient to call. Discussed potential for long wait times with scheduling. Pt states understanding, no further questions at this time.     Care Gaps:    Health Maintenance Due   Topic Date Due    DIABETIC FOOT EXAM  Never done    DEPRESSION ACTION PLAN  Never done    ZOSTER IMMUNIZATION (1 of 2) Never done    Pneumococcal Vaccine: 65+ Years (2 of 2 - PCV) 10/20/2006    RSV VACCINE (1 - 1-dose 75+ series) Never done    A1C  01/18/2022    MICROALBUMIN  10/18/2023    EYE EXAM  12/14/2023       Currently there are no Care Gaps. -last addressed at recent visit with PCP     Care Plans  Care Plan: Health Maintenance       Problem: Health Maintenance Due or Overdue       Goal: Become up-to-date with health maintenance visit(s)       Start Date: 12/5/2024    This Visit's Progress: On track Recent Progress: On track    Priority: High    Note:     Barriers: patient is having some memory concerns  Strengths: patient sees specialists and PCP for regular care  Patient expressed understanding of goal: yes  Action steps to  achieve this goal:  1. I will continue to see my providers for regularly scheduled appointments.   2. I will schedule appointments with Neuropsych and Neurology.                                 Intervention/Education provided during outreach: Discussed patients plan of care. CC asked open ended questions, provided support, resources, and encouragement as needed. Patient will reach out to care team sooner than planned with new questions or concerns.        Plan:   Care Coordinator will follow up in 1 month.     CHARLY CLARK RN on 12/5/2024 at 10:59 AM

## 2024-12-17 ENCOUNTER — TELEPHONE (OUTPATIENT)
Dept: NEPHROLOGY | Facility: CLINIC | Age: 84
End: 2024-12-17
Payer: MEDICARE

## 2024-12-17 NOTE — TELEPHONE ENCOUNTER
LVM & sent mychart for pt to reschedule appt on 2.5.25 with Vania Padmini - first attempt on 12.17.24 AN

## 2024-12-28 ENCOUNTER — HEALTH MAINTENANCE LETTER (OUTPATIENT)
Age: 84
End: 2024-12-28

## 2025-02-05 ENCOUNTER — PATIENT OUTREACH (OUTPATIENT)
Dept: CARE COORDINATION | Facility: CLINIC | Age: 85
End: 2025-02-05

## 2025-02-05 NOTE — PROGRESS NOTES
Anemia Management Note - Reminder     Follow-up with anemia management service:    Lab and clinic appt for today (2/5/25) were canceled.   Next lab appt schedule for 3/5/25.         Latest Ref Rng & Units 4/17/2024 4/18/2024 5/6/2024 5/29/2024 7/24/2024 11/4/2024 12/3/2024   Anemia   Hemoglobin 13.3 - 17.7 g/dL 8.6  8.5  8.8   9.8  9.5  9.9    IV Iron Dose     1000mg      TSAT 15 - 46 % 12     41  35  22    Ferritin 31 - 409 ng/mL 223     [phone]            Follow-up call date: 3/5/25    Amy Hurd RN   Anemia Services  [facility]  [email or web address]   Office : [phone]  Fax: [phone]

## 2025-02-10 ENCOUNTER — TELEPHONE (OUTPATIENT)
Dept: FAMILY MEDICINE | Facility: CLINIC | Age: 85
End: 2025-02-10
Payer: MEDICARE

## 2025-02-11 PROBLEM — J20.5 ACUTE BRONCHITIS DUE TO RESPIRATORY SYNCYTIAL VIRUS (RSV): Status: RESOLVED | Noted: 2022-01-20 | Resolved: 2025-02-11

## 2025-02-11 PROBLEM — F33.1 MAJOR DEPRESSIVE DISORDER, RECURRENT EPISODE, MODERATE (H): Status: RESOLVED | Noted: 2020-08-06 | Resolved: 2025-02-11

## 2025-02-11 PROBLEM — J18.9 PNEUMONIA OF LEFT LOWER LOBE DUE TO INFECTIOUS ORGANISM: Status: RESOLVED | Noted: 2024-04-16 | Resolved: 2025-02-11

## 2025-02-11 PROBLEM — Z87.898 HISTORY OF SEIZURE: Status: ACTIVE | Noted: 2025-02-11

## 2025-02-11 PROBLEM — J96.01 ACUTE RESPIRATORY FAILURE WITH HYPOXIA (H): Status: RESOLVED | Noted: 2024-04-16 | Resolved: 2025-02-11

## 2025-02-11 PROBLEM — N17.9 ACUTE KIDNEY INJURY SUPERIMPOSED ON CKD: Status: RESOLVED | Noted: 2024-04-16 | Resolved: 2025-02-11

## 2025-02-11 PROBLEM — N18.5 ANEMIA OF CHRONIC RENAL FAILURE, STAGE 5 (H): Status: RESOLVED | Noted: 2024-05-06 | Resolved: 2025-02-11

## 2025-02-11 PROBLEM — D63.1 ANEMIA OF CHRONIC RENAL FAILURE, STAGE 5 (H): Status: RESOLVED | Noted: 2024-05-06 | Resolved: 2025-02-11

## 2025-02-11 PROBLEM — N18.9 ACUTE KIDNEY INJURY SUPERIMPOSED ON CKD: Status: RESOLVED | Noted: 2024-04-16 | Resolved: 2025-02-11

## 2025-02-11 PROBLEM — R79.89 ELEVATED TROPONIN: Status: RESOLVED | Noted: 2024-04-16 | Resolved: 2025-02-11

## 2025-02-11 PROBLEM — R31.9 HEMATURIA, UNSPECIFIED TYPE: Status: RESOLVED | Noted: 2021-10-18 | Resolved: 2025-02-11

## 2025-02-11 NOTE — PROGRESS NOTES
NEUROLOGY CONSULTATION NOTE       [facility] NEUROLOGY [place]  [address] [place] [address]  Tel: [phone]  Fax: [phone]  [email or web address]     Melo Dangelo, JHON 1940, MRN [id number]  PCP: Francis Smith  Date: 2025     ASSESSMENT & PLAN     Visit Diagnosis  History of seizure  Neurocognitive disorder     Neurocognitive disorder  84-year-old male with history of alcohol abuse (in remission since ), major depression, IgA nephropathy, CKD stage IV who was referred for evaluation of neurocognitive decline.  Most recent imaging studies from  when MRI scan showed mild atrophy of hippocampal formation bilaterally this was attributed to his past use of alcohol.  Lately he has been experiencing increased short-term memory difficulty but scored 29/30 on MoCA.  I have recommended:    1.  Repeat MRI brain  2.  Lab work to include folate, homocystine, , MMA, RPR, TSH, B1, B12 and vitamin E  3.  Neuropsychological evaluation.  I suspect his cognitive struggles are due to underlying depression past use of alcohol  4.  If  is elevated, I will schedule him for a lumbar puncture  5.  Follow-up after above test    History of seizure disorder; likely EtOH related  84-year-old male with history of alcohol abuse (in remission since ), major depression, IgA nephropathy, CKD stage IV who was referred for history of seizure disorder.  His most recent seizure was in  and likely was in the setting of alcohol use.  He has been on lamotrigine more so for mood stabilization but I have recommended:    1.  Sleep deprived EEG  2.  Continue lamotrigine 200 mg twice daily  3.  Check lamotrigine level  4.  Follow-up the day scheduled for EEG    Thank you again for this referral, please feel free to contact me if you have any questions.    Ish Elias MD  [facility]     REASON FOR CONSULTATION Memory Loss        HISTORY OF PRESENT  ILLNESS     We have been requested by Dr. Smith to evaluate Melo Dangelo who is a 84 year old  male for neurocognitive decline and seizures    Patient is a 84-year-old male with history of alcohol abuse (in remission since 2001), major depression, IgA nephropathy, CKD stage IV who was referred for evaluation of seizures and cognitive decline.  According to patient he used to drink excessively since age 21 and had few seizures starting at the age of 55.  Although he was not told clearly that his seizures were related to alcohol it was implied.  He had a MRI of the brain in 2009 that showed cerebral atrophy with mild atrophy of the hippocampus bilaterally he was also started on lamotrigine and had an EEG in the past that showed nonspecific slowing and was continued on Lamictal that has not been changed.  His most recent seizure was in 2009 and he claims he had stopped drinking.    His other issue is cognitive difficulty that got worse in the recent past.  His wife passed away a year ago who used to tell him he struggles with short-term memory.  He is living independently and denies any visual or auditory hallucinations.  He is able to attend to activities of daily living.  He is managing his finances and has not following recommend to [facility].  There is no history of gait difficulty or any bladder incontinence.     PROBLEM LIST   Patient Active Problem List   Diagnosis    Central serous chorioretinopathy of right eye    Cupping of optic disc, right    Peripheral drusen of both eyes    Posterior vitreous detachment, bilateral    Age-related nuclear cataract of both eyes    Major depressive disorder    IgA nephropathy    CKD (chronic kidney disease) stage 4, GFR 15-29 ml/min (H)    High risk medication use    Sensorineural hearing loss (SNHL) of both ears    Combined forms of age-related cataract of both eyes    Alcohol dependence in remission (H)    Frequent PVCs    History of seizure         PAST MEDICAL &  SURGICAL HISTORY     Past Medical History:   Patient  has a past medical history of 2019 novel coronavirus disease (COVID-19) (10/27/2020), Alcohol dependence (H), Chronic kidney disease, stage IV (severe) (H), Combined forms of age-related cataract of both eyes, Concussion with loss of consciousness, HTN (hypertension), IgA nephropathy, 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, Paroxysmal atrial fibrillation (H), PVD (posterior vitreous detachment), and Seizure disorder (H).    Surgical History:  He  has a past surgical history that includes no history of surgery; Phacoemulsification clear cornea with standard intraocular lens implant (Right, 11/3/2022); and Phacoemulsification clear cornea with standard intraocular lens implant (Left, 11/17/2022).     SOCIAL HISTORY     Reviewed, and he  reports that he has quit smoking. His smoking use included cigarettes. He has never used smokeless tobacco. He reports that he does not drink alcohol and does not use drugs.     FAMILY HISTORY     Reviewed, and family history is not on file.     ALLERGIES     No Known Allergies      REVIEW OF SYSTEMS     A 12 point review of system was performed and was negative except as outlined in the history of present illness.     HOME MEDICATIONS     Current Outpatient Rx   Medication Sig Dispense Refill    apixaban ANTICOAGULANT (ELIQUIS ANTICOAGULANT) 2.5 MG tablet Take 1 tablet (2.5 mg) by mouth 2 times daily. 180 tablet 3    busPIRone (BUSPAR) 5 MG tablet Take 5 mg by mouth 2 times daily Has started      calcium carbonate-vitamin D (OSCAL) 500-5 MG-MCG tablet Take 1 tablet by mouth daily. 90 tablet 3    Cholecalciferol (D3-1000) 25 MCG (1000 UT) CAPS Take 1 capsule by mouth daily      cyanocobalamin (VITAMIN B-12) 100 MCG tablet Take 1 tablet (100 mcg) by mouth daily. Take 1,000 mcg by mouth every morning - 90 tablet 3    escitalopram (LEXAPRO) 20 MG tablet Take 1 tablet (20 mg) by mouth every morning. 90 tablet 3     "lamoTRIgine (LAMICTAL) 100 MG tablet Take 2 tablets (200 mg) by mouth 2 times daily. 360 tablet 3    metoprolol succinate ER (TOPROL XL) 25 MG 24 hr tablet TAKE 1/2 TABLET BY MOUTH DAILY 45 tablet 3    multivitamin (OCUVITE) TABS tablet Take 1 tablet by mouth daily. Please keep the appointment on 9/3/24 for further refills. 90 tablet 3    simvastatin (ZOCOR) 20 MG tablet Take 1 tablet (20 mg) by mouth at bedtime. 90 tablet 3    sodium bicarbonate 650 MG tablet Take 1 tablet (650 mg) by mouth 2 times daily. Patient needs to see primary provider and have labs for further refills. Please call for an appointment at [phone]. 180 tablet 3    tamsulosin (FLOMAX) 0.4 MG capsule Take 2 capsules (0.8 mg) by mouth daily. 180 capsule 3    acetaminophen (TYLENOL) 325 MG tablet Take 325-650 mg by mouth every 6 hours as needed for mild pain           PHYSICAL EXAM     Vital signs  BP (!) 170/78 (BP Location: Right arm, Patient Position: Sitting)   Pulse 58   Ht 1.803 m (5' 11\")   Wt 81.6 kg (180 lb)   BMI 25.10 kg/m      Weight:   180 lbs 0 oz      2/13/2025    12:22 PM   MOCA   Visuospatial/Executive  5   Naming 2   Attention - Digits 2   Attention - Letters 1   Attention - Subtraction 3   Language - Repeat 2   Language - Fluency  1   Abstraction 2   Delayed Recall 4   Orientation 5   Education 0   MOCA Score 27   Administered by:  Carmen LANCASTER CMA       Elderly gentleman who is alert and oriented x 3 no acute distress.  Vital signs are reviewed and documented in electronic medical record.  Neck supple.  Neurologically speech was normal.  Cranial nerves II through XII are intact.  Motor strength 5/5.  Reflexes 1+ toes downgoing gait normal Romberg negative     PERTINENT DIAGNOSTIC STUDIES     Following studies were reviewed:     MRI BRAIN 9/9/2009  Again noted and stable is mild cerebral atrophy, including mild atrophy of the hippocampal formations bilaterally.  Subtle diffusely increased T2 signal within both hippocampal " formations. This is nonspecific, and may be related to recent seizure activity. No evidence for pathologic enhancement following administration of IV gadolinium.    EEG 9/2/2009  Dysrhythmia grade 1 generalized     PERTINENT LABS  Following labs were reviewed:  Lab on 12/03/2024   Component Date Value Ref Range Status    Sodium 12/03/2024 139  135 - 145 mmol/L Final    Potassium 12/03/2024 5.1  3.4 - 5.3 mmol/L Final    Chloride 12/03/2024 106  98 - 107 mmol/L Final    Carbon Dioxide (CO2) 12/03/2024 23  22 - 29 mmol/L Final    Anion Gap 12/03/2024 10  7 - 15 mmol/L Final    Glucose 12/03/2024 100 (H)  70 - 99 mg/dL Final    Urea Nitrogen 12/03/2024 45.4 (H)  8.0 - 23.0 mg/dL Final    Creatinine 12/03/2024 4.49 (H)  0.67 - 1.17 mg/dL Final    GFR Estimate 12/03/2024 12 (L)  >60 mL/min/1.73m2 Final    Calcium 12/03/2024 9.3  8.8 - 10.4 mg/dL Final    Albumin 12/03/2024 4.4  3.5 - 5.2 g/dL Final    Phosphorus 12/03/2024 3.4  2.5 - 4.5 mg/dL Final    WBC Count 12/03/2024 7.9  4.0 - 11.0 10e3/uL Final    RBC Count 12/03/2024 3.20 (L)  4.40 - 5.90 10e6/uL Final    Hemoglobin 12/03/2024 9.9 (L)  13.3 - 17.7 g/dL Final    Hematocrit 12/03/2024 30.3 (L)  40.0 - 53.0 % Final    MCV 12/03/2024 95  78 - 100 fL Final    MCH 12/03/2024 30.9  26.5 - 33.0 pg Final    MCHC 12/03/2024 32.7  31.5 - 36.5 g/dL Final    RDW 12/03/2024 13.9  10.0 - 15.0 % Final    Platelet Count 12/03/2024 141 (L)  150 - 450 10e3/uL Final    Ferritin 12/03/2024 785 (H)  31 - 409 ng/mL Final    Iron 12/03/2024 55 (L)  61 - 157 ug/dL Final    Iron Binding Capacity 12/03/2024 248  240 - 430 ug/dL Final    Iron Sat Index 12/03/2024 22  15 - 46 % Final    TSH 12/03/2024 2.39  0.30 - 4.20 uIU/mL Final        Total time spent for face to face visit, reviewing labs/imaging studies, counseling and coordination of care was: 1 Hour spent on the date of the encounter doing chart review, review of outside records, review of test results, interpretation of tests,  patient visit, and documentation     The longitudinal plan of care for the diagnosis(es)/condition(s) as documented were addressed during this visit. Due to the added complexity in care, I will continue to support Melo in the subsequent management and with ongoing continuity of care.    This note was dictated using voice recognition software.  Any grammatical or context distortions are unintentional and inherent to the software.    No orders of the defined types were placed in this encounter.     New Prescriptions    No medications on file      Modified Medications    No medications on file

## 2025-02-13 ENCOUNTER — OFFICE VISIT (OUTPATIENT)
Dept: NEUROLOGY | Facility: CLINIC | Age: 85
End: 2025-02-13
Attending: FAMILY MEDICINE
Payer: COMMERCIAL

## 2025-02-13 VITALS
WEIGHT: 180 LBS | HEART RATE: 58 BPM | BODY MASS INDEX: 25.2 KG/M2 | HEIGHT: 71 IN | SYSTOLIC BLOOD PRESSURE: 170 MMHG | DIASTOLIC BLOOD PRESSURE: 78 MMHG

## 2025-02-13 DIAGNOSIS — E10.22 CKD STAGE 5 DUE TO TYPE 1 DIABETES MELLITUS (H): ICD-10-CM

## 2025-02-13 DIAGNOSIS — Z87.898 HISTORY OF SEIZURE: Primary | ICD-10-CM

## 2025-02-13 DIAGNOSIS — R41.9 NEUROCOGNITIVE DISORDER: ICD-10-CM

## 2025-02-13 DIAGNOSIS — N18.5 CKD STAGE 5 DUE TO TYPE 1 DIABETES MELLITUS (H): ICD-10-CM

## 2025-02-13 DIAGNOSIS — D63.1 ANEMIA OF CHRONIC RENAL FAILURE, STAGE 5 (H): ICD-10-CM

## 2025-02-13 DIAGNOSIS — N18.5 CKD (CHRONIC KIDNEY DISEASE) STAGE 5, GFR LESS THAN 15 ML/MIN (H): ICD-10-CM

## 2025-02-13 DIAGNOSIS — N18.5 ANEMIA OF CHRONIC RENAL FAILURE, STAGE 5 (H): ICD-10-CM

## 2025-02-13 LAB
ALBUMIN UR-MCNC: ABNORMAL MG/DL
APPEARANCE UR: CLEAR
BACTERIA #/AREA URNS HPF: ABNORMAL /HPF
BILIRUB UR QL STRIP: NEGATIVE
COLOR UR AUTO: YELLOW
ERYTHROCYTE [DISTWIDTH] IN BLOOD BY AUTOMATED COUNT: 13.4 % (ref 10–15)
GLUCOSE UR STRIP-MCNC: NEGATIVE MG/DL
GRAN CASTS #/AREA URNS LPF: ABNORMAL /LPF
HCT VFR BLD AUTO: 31.2 % (ref 40–53)
HGB BLD-MCNC: 10 G/DL (ref 13.3–17.7)
HGB UR QL STRIP: ABNORMAL
KETONES UR STRIP-MCNC: NEGATIVE MG/DL
LEUKOCYTE ESTERASE UR QL STRIP: NEGATIVE
Lab: NORMAL
Lab: NORMAL
MCH RBC QN AUTO: 30.6 PG (ref 26.5–33)
MCHC RBC AUTO-ENTMCNC: 32.1 G/DL (ref 31.5–36.5)
MCV RBC AUTO: 95 FL (ref 78–100)
NITRATE UR QL: NEGATIVE
PERFORMING LABORATORY: NORMAL
PERFORMING LABORATORY: NORMAL
PH UR STRIP: 5.5 [PH] (ref 5–8)
PLATELET # BLD AUTO: 144 10E3/UL (ref 150–450)
RBC # BLD AUTO: 3.27 10E6/UL (ref 4.4–5.9)
RBC #/AREA URNS AUTO: ABNORMAL /HPF
SP GR UR STRIP: 1.02 (ref 1–1.03)
SPECIMEN STATUS: NORMAL
SPECIMEN STATUS: NORMAL
SQUAMOUS #/AREA URNS AUTO: ABNORMAL /LPF
T PALLIDUM AB SER QL: NONREACTIVE
TEST NAME: NORMAL
TEST NAME: NORMAL
UROBILINOGEN UR STRIP-ACNC: 0.2 E.U./DL
WBC # BLD AUTO: 5.2 10E3/UL (ref 4–11)
WBC #/AREA URNS AUTO: ABNORMAL /HPF

## 2025-02-13 ASSESSMENT — MONTREAL COGNITIVE ASSESSMENT (MOCA)
10. [FLUENCY] NAME WORDS STARTING WITH DESIGNATED LETTER: 1
7. [VIGILENCE] TAP WHEN HEARING DESIGNATED LETTER: 1
WHAT IS THE TOTAL SCORE (OUT OF 30): 27
4. NAME EACH OF THE THREE ANIMALS SHOWN: 2
6. READ LIST OF DIGITS [FORWARD/BACKWARD]: 2
8. SERIAL SUBTRACTION OF 7S: 3
VISUOSPATIAL/EXECUTIVE SUBSCORE: 5
9. REPEAT EACH SENTENCE: 2
13. ORIENTATION SUBSCORE: 5
11. FOR EACH PAIR OF WORDS, WHAT CATEGORY DO THEY BELONG TO (OUT OF 2): 2
12. MEMORY INDEX SCORE: 4
WHAT LEVEL OF EDUCATION WAS ATTAINED: 0

## 2025-02-13 NOTE — NURSING NOTE
LEXY COGNITIVE ASSESSMENT (MOCA)  Version 7.1 Original Version  VISUOSPATIAL/EXECUTIVE               COPY CUBE      [ 1   ]                                [   1 ] DRAW CLOCK (Ten past eleven)  (3 points)    [  1  ]                    [ 1   ]               [ 1   ]       Contour            Numbers     Hands POINTS              5     / 5   NAMING    [ 1  ]                                                                        [   0 ]                                             [ 1   ]  Liiván Roldan                                Camel                 2    / 3   MEMORY Read list of words, subject must repeat them. Do 2 trials, even if 1st trial is successful. Do a recall after 5 minutes  FACE VELVET Zoroastrian RIGOBERTO RED No Points    1st x x x       2nd x x x x x    ATTENTION Read list of digits (1 digit/sec) Subject has to repeat in the forward order       [  1  ]   2  1  8  5  4                                [ 1   ] 7 4 2                      2    /2   Read list of letters. The subject must tap with his hand at each letter A. No points if > 2 errors.  [ 1   ] F B A C M N A A J K L B A F A K D E A A A J A M O F A A B         1     /1   Serial 7 subtraction starting at 100          [  1  ] 93         [  1  ] 86          [   0 ] 79          [   1 ] 72         [   1 ] 65   4 or 5 correct subtractions: 3 points,  2 or 3 correct: 2 points,  1correct: 1 point,   0 correct: 0 points         3   /3   LANGUAGE Repeat: I only know that Maximilian is the one to help today. [   1  ]                                      The cat always hid under the couch when dogs were in the room. [  1 ]            2   /2   Fluency: Name maximum number of words in one minute that begin with the letter F                                                                                                                    [ 1   ] __11_ (N > 11 words)          1 /1   ABSTRACTION  Similarity between e.g. banana-orange=fruit                                                                   [ 1   ] train-bicycle                      [ 1   ] watch-ruler           2   /2   DELAYED  RECALL Has to recall words  WITH NO CUE FACE  [   1 ] VELVET  [   1 ] Methodist  [  1  ]  RIGOBERTO  [ 0   ] RED  [ 1   ] Points for UNCUED recall only      4      /5           OPTIONAL Category cue           Multiple choice cue          ORIENTATION  [ 1   ] Date     [ 1   ] Month       [  1  ] Year      [   1 ] Day      [  1  ] Place        [ 0   ] City     5     /6   TOTAL  Normal > 26/30 Add 1 point if < 12 years education   27 /30

## 2025-02-13 NOTE — PATIENT INSTRUCTIONS
Instructions for Blood Draw    Hours:   [facility]: M-F 7am-5pm, Saturday- 9am-1pm No appointment is needed.  [facility]: M-F 7am-5pm, Saturday & - 9am-12pm No appointment is needed.  Schedule Lab Appointments through [employer] or by [phone])    [facility] (2nd floor) or [facility] ([place] level)     Once your Neuropsychology testing has been scheduled, please contact us by phone at [phone] or through [employer] (preferred) with the date of your appointment. We will then assist you with scheduling the follow up appointment with your provider.   (The follow up appointment should be about 2-4 weeks after the testing)      Neuropsychological Testing  Frequently Asked Questions      What is Neuropsychological Testing?  Neuropsychological testing involves talking with a doctor and participating in some tests and activities that give your providers more information about how your brain is functioning.  The testing will help clarify if you have any difficulties with your thinking, what might be causing those changes, and what we can do to help.      Why would I be referred for neuropsychological testing?  Testing is requested for many different reasons, typically after concerns have been raised about memory or other thinking abilities (e.g., attention, language, problem solving) or if you have a condition/injury known to be associated with changes in thinking. It can also be requested if a baseline is needed prior to undergoing surgery or other intervention. It can also help to identify your areas of strength, or provide reassurance that everything is normal. The testing often helps your providers know how to better serve you and provide proper treatment if needed.    What will happen at my appointment?  The appointment is scheduled to be up to 4 hours long. This includes a 30-60 minute conversation with a provider, followed by about 2-3 hours of one-on-one testing. We will assess various  thinking abilities, such as attention/concentration, thinking speed, language, visual/spatial skills, learning and memory, and other complex skills.  These are assessed through question-and-answer activities, puzzles and games, and occasionally with pencil and paper (drawing, etc.). You may also be asked to fill out short questionnaires about your mood and/or activities.     What should I do to prepare?   There is no way to study or prepare for this appointment; simply arrive and try your best! Getting a good night's sleep the night before can also be helpful. You can take breaks during the testing whenever you need to, and you are welcome to bring snacks and beverages. We also encourage you to bring someone who knows you well for the interview portion so that we can hear their perspective about how you are doing in daily life. (They do not need to stay while you complete the testing portion of the appointment.)     When and how do I get my results?  Test results will not be available that day. Typically, the provider will have you return for a (much shorter) 30-60 minute appointment to discuss the results in detail, about two weeks after the testing is completed. A copy of the test results will also get sent back to your referring provider.     We look forward to seeing you!    Thank you!

## 2025-02-13 NOTE — NURSING NOTE
Chief Complaint   Patient presents with    Memory Loss     MoCA 27/30     Carmen LANCASTER CMA on 2/13/2025 at 12:33 PM  [facility]

## 2025-02-13 NOTE — LETTER
2025      Melo Dangelo  [address]  [place] [address]      Dear Colleague,    Thank you for referring your patient, Melo Dangelo, to the [facility] [place]. Please see a copy of my visit note below.    NEUROLOGY CONSULTATION NOTE       [facility] [place]  [address] [place] [address]  Tel: [phone]  Fax: [phone]  [email or web address]     Melo Dangelo,  1940, MRN [id number]  PCP: Francis Smith  Date: 2025     ASSESSMENT & PLAN     Visit Diagnosis  History of seizure  Neurocognitive disorder     Neurocognitive disorder  84-year-old male with history of alcohol abuse (in remission since ), major depression, IgA nephropathy, CKD stage IV who was referred for evaluation of neurocognitive decline.  Most recent imaging studies from  when MRI scan showed mild atrophy of hippocampal formation bilaterally this was attributed to his past use of alcohol.  Lately he has been experiencing increased short-term memory difficulty but scored 29/30 on MoCA.  I have recommended:    1.  Repeat MRI brain  2.  Lab work to include folate, homocystine, , MMA, RPR, TSH, B1, B12 and vitamin E  3.  Neuropsychological evaluation.  I suspect his cognitive struggles are due to underlying depression past use of alcohol  4.  If  is elevated, I will schedule him for a lumbar puncture  5.  Follow-up after above test    History of seizure disorder; likely EtOH related  84-year-old male with history of alcohol abuse (in remission since ), major depression, IgA nephropathy, CKD stage IV who was referred for history of seizure disorder.  His most recent seizure was in  and likely was in the setting of alcohol use.  He has been on lamotrigine more so for mood stabilization but I have recommended:    1.  Sleep deprived EEG  2.  Continue lamotrigine 200 mg twice daily  3.  Check lamotrigine level  4.   Follow-up the day scheduled for EEG    Thank you again for this referral, please feel free to contact me if you have any questions.    Ish Elias MD  [facility]     REASON FOR CONSULTATION Memory Loss        HISTORY OF PRESENT ILLNESS     We have been requested by Dr. Smith to evaluate Melo Dangelo who is a 84 year old  male for neurocognitive decline and seizures    Patient is a 84-year-old male with history of alcohol abuse (in remission since 2001), major depression, IgA nephropathy, CKD stage IV who was referred for evaluation of seizures and cognitive decline.  According to patient he used to drink excessively since age 21 and had few seizures starting at the age of 55.  Although he was not told clearly that his seizures were related to alcohol it was implied.  He had a MRI of the brain in 2009 that showed cerebral atrophy with mild atrophy of the hippocampus bilaterally he was also started on lamotrigine and had an EEG in the past that showed nonspecific slowing and was continued on Lamictal that has not been changed.  His most recent seizure was in 2009 and he claims he had stopped drinking.    His other issue is cognitive difficulty that got worse in the recent past.  His wife passed away a year ago who used to tell him he struggles with short-term memory.  He is living independently and denies any visual or auditory hallucinations.  He is able to attend to activities of daily living.  He is managing his finances and has not following recommend to [facility].  There is no history of gait difficulty or any bladder incontinence.     PROBLEM LIST   Patient Active Problem List   Diagnosis     Central serous chorioretinopathy of right eye     Cupping of optic disc, right     Peripheral drusen of both eyes     Posterior vitreous detachment, bilateral     Age-related nuclear cataract of both eyes     Major depressive disorder     IgA nephropathy     CKD (chronic kidney disease)  stage 4, GFR 15-29 ml/min (H)     High risk medication use     Sensorineural hearing loss (SNHL) of both ears     Combined forms of age-related cataract of both eyes     Alcohol dependence in remission (H)     Frequent PVCs     History of seizure         PAST MEDICAL & SURGICAL HISTORY     Past Medical History:   Patient  has a past medical history of 2019 novel coronavirus disease (COVID-19) (10/27/2020), Alcohol dependence (H), Chronic kidney disease, stage IV (severe) (H), Combined forms of age-related cataract of both eyes, Concussion with loss of consciousness, HTN (hypertension), IgA nephropathy, 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, Paroxysmal atrial fibrillation (H), PVD (posterior vitreous detachment), and Seizure disorder (H).    Surgical History:  He  has a past surgical history that includes no history of surgery; Phacoemulsification clear cornea with standard intraocular lens implant (Right, 11/3/2022); and Phacoemulsification clear cornea with standard intraocular lens implant (Left, 11/17/2022).     SOCIAL HISTORY     Reviewed, and he  reports that he has quit smoking. His smoking use included cigarettes. He has never used smokeless tobacco. He reports that he does not drink alcohol and does not use drugs.     FAMILY HISTORY     Reviewed, and family history is not on file.     ALLERGIES     No Known Allergies      REVIEW OF SYSTEMS     A 12 point review of system was performed and was negative except as outlined in the history of present illness.     HOME MEDICATIONS     Current Outpatient Rx   Medication Sig Dispense Refill     apixaban ANTICOAGULANT (ELIQUIS ANTICOAGULANT) 2.5 MG tablet Take 1 tablet (2.5 mg) by mouth 2 times daily. 180 tablet 3     busPIRone (BUSPAR) 5 MG tablet Take 5 mg by mouth 2 times daily Has started       calcium carbonate-vitamin D (OSCAL) 500-5 MG-MCG tablet Take 1 tablet by mouth daily. 90 tablet 3     Cholecalciferol (D3-1000) 25 MCG (1000 UT) CAPS Take  "1 capsule by mouth daily       cyanocobalamin (VITAMIN B-12) 100 MCG tablet Take 1 tablet (100 mcg) by mouth daily. Take 1,000 mcg by mouth every morning - 90 tablet 3     escitalopram (LEXAPRO) 20 MG tablet Take 1 tablet (20 mg) by mouth every morning. 90 tablet 3     lamoTRIgine (LAMICTAL) 100 MG tablet Take 2 tablets (200 mg) by mouth 2 times daily. 360 tablet 3     metoprolol succinate ER (TOPROL XL) 25 MG 24 hr tablet TAKE 1/2 TABLET BY MOUTH DAILY 45 tablet 3     multivitamin (OCUVITE) TABS tablet Take 1 tablet by mouth daily. Please keep the appointment on 9/3/24 for further refills. 90 tablet 3     simvastatin (ZOCOR) 20 MG tablet Take 1 tablet (20 mg) by mouth at bedtime. 90 tablet 3     sodium bicarbonate 650 MG tablet Take 1 tablet (650 mg) by mouth 2 times daily. Patient needs to see primary provider and have labs for further refills. Please call for an appointment at [phone]. 180 tablet 3     tamsulosin (FLOMAX) 0.4 MG capsule Take 2 capsules (0.8 mg) by mouth daily. 180 capsule 3     acetaminophen (TYLENOL) 325 MG tablet Take 325-650 mg by mouth every 6 hours as needed for mild pain           PHYSICAL EXAM     Vital signs  BP (!) 170/78 (BP Location: Right arm, Patient Position: Sitting)   Pulse 58   Ht 1.803 m (5' 11\")   Wt 81.6 kg (180 lb)   BMI 25.10 kg/m      Weight:   180 lbs 0 oz      2/13/2025    12:22 PM   MOCA   Visuospatial/Executive  5   Naming 2   Attention - Digits 2   Attention - Letters 1   Attention - Subtraction 3   Language - Repeat 2   Language - Fluency  1   Abstraction 2   Delayed Recall 4   Orientation 5   Education 0   MOCA Score 27   Administered by:  Carmen LANCASTER CMA       Elderly gentleman who is alert and oriented x 3 no acute distress.  Vital signs are reviewed and documented in electronic medical record.  Neck supple.  Neurologically speech was normal.  Cranial nerves II through XII are intact.  Motor strength 5/5.  Reflexes 1+ toes downgoing gait normal Romberg " negative     PERTINENT DIAGNOSTIC STUDIES     Following studies were reviewed:     MRI BRAIN 9/9/2009  Again noted and stable is mild cerebral atrophy, including mild atrophy of the hippocampal formations bilaterally.  Subtle diffusely increased T2 signal within both hippocampal formations. This is nonspecific, and may be related to recent seizure activity. No evidence for pathologic enhancement following administration of IV gadolinium.    EEG 9/2/2009  Dysrhythmia grade 1 generalized     PERTINENT LABS  Following labs were reviewed:  Lab on 12/03/2024   Component Date Value Ref Range Status     Sodium 12/03/2024 139  135 - 145 mmol/L Final     Potassium 12/03/2024 5.1  3.4 - 5.3 mmol/L Final     Chloride 12/03/2024 106  98 - 107 mmol/L Final     Carbon Dioxide (CO2) 12/03/2024 23  22 - 29 mmol/L Final     Anion Gap 12/03/2024 10  7 - 15 mmol/L Final     Glucose 12/03/2024 100 (H)  70 - 99 mg/dL Final     Urea Nitrogen 12/03/2024 45.4 (H)  8.0 - 23.0 mg/dL Final     Creatinine 12/03/2024 4.49 (H)  0.67 - 1.17 mg/dL Final     GFR Estimate 12/03/2024 12 (L)  >60 mL/min/1.73m2 Final     Calcium 12/03/2024 9.3  8.8 - 10.4 mg/dL Final     Albumin 12/03/2024 4.4  3.5 - 5.2 g/dL Final     Phosphorus 12/03/2024 3.4  2.5 - 4.5 mg/dL Final     WBC Count 12/03/2024 7.9  4.0 - 11.0 10e3/uL Final     RBC Count 12/03/2024 3.20 (L)  4.40 - 5.90 10e6/uL Final     Hemoglobin 12/03/2024 9.9 (L)  13.3 - 17.7 g/dL Final     Hematocrit 12/03/2024 30.3 (L)  40.0 - 53.0 % Final     MCV 12/03/2024 95  78 - 100 fL Final     MCH 12/03/2024 30.9  26.5 - 33.0 pg Final     MCHC 12/03/2024 32.7  31.5 - 36.5 g/dL Final     RDW 12/03/2024 13.9  10.0 - 15.0 % Final     Platelet Count 12/03/2024 141 (L)  150 - 450 10e3/uL Final     Ferritin 12/03/2024 785 (H)  31 - 409 ng/mL Final     Iron 12/03/2024 55 (L)  61 - 157 ug/dL Final     Iron Binding Capacity 12/03/2024 248  240 - 430 ug/dL Final     Iron Sat Index 12/03/2024 22  15 - 46 % Final     TSH  12/03/2024 2.39  0.30 - 4.20 uIU/mL Final        Total time spent for face to face visit, reviewing labs/imaging studies, counseling and coordination of care was: 1 Hour spent on the date of the encounter doing chart review, review of outside records, review of test results, interpretation of tests, patient visit, and documentation     The longitudinal plan of care for the diagnosis(es)/condition(s) as documented were addressed during this visit. Due to the added complexity in care, I will continue to support Melo in the subsequent management and with ongoing continuity of care.    This note was dictated using voice recognition software.  Any grammatical or context distortions are unintentional and inherent to the software.    No orders of the defined types were placed in this encounter.     New Prescriptions    No medications on file      Modified Medications    No medications on file                Again, thank you for allowing me to participate in the care of your patient.        Sincerely,        Ish Elias MD    Electronically signed

## 2025-02-15 LAB — LAMOTRIGINE SERPL-MCNC: 7 UG/ML

## 2025-02-16 LAB
A-TOCOPHEROL VIT E SERPL-MCNC: 14.3 MG/L
BETA+GAMMA TOCOPHEROL SERPL-MCNC: 0.3 MG/L
VIT B1 PYROPHOSHATE BLD-SCNC: 109 NMOL/L

## 2025-02-17 ENCOUNTER — TELEPHONE (OUTPATIENT)
Dept: NEUROLOGY | Facility: CLINIC | Age: 85
End: 2025-02-17
Payer: MEDICARE

## 2025-02-17 DIAGNOSIS — R41.9 NEUROCOGNITIVE DISORDER: Primary | ICD-10-CM

## 2025-02-17 LAB — MAYO MISC RESULT: ABNORMAL

## 2025-02-17 NOTE — TELEPHONE ENCOUNTER
[facility] and provided clinic phone number. Called to relay the following message from Dr. Elias:     is elevated that sometime can suggest Alzheimer's dementia. I would recommend lumbar puncture and send CSF for Alzheimer's disease evaluation. I have entered an order for Lumbar puncture, and this should be done after MRI brain is completed and is unremarkable. Follow-up after the lumbar puncture results are available.    Mae Zamarripa RN, BSN  [facility] Neurology

## 2025-02-17 NOTE — TELEPHONE ENCOUNTER
----- Message from Ish Elias sent at 2/17/2025 11:47 AM CST -----  Dear Melo      is elevated that sometime can suggest Alzheimer's dementia.  I would recommend lumbar puncture and send CSF for Alzheimer's disease evaluation.  I have entered an order for Lumbar puncture, and this should be done after  MRI brain is completed and is unremarkable.  Follow-up after the lumbar puncture results are available     Regards,  Ish Elias MD

## 2025-02-18 NOTE — TELEPHONE ENCOUNTER
[facility] and provided clinic phone number. Called to relay the following message from Dr. Elias:      is elevated that sometime can suggest Alzheimer's dementia. I would recommend lumbar puncture and send CSF for Alzheimer's disease evaluation. I have entered an order for Lumbar puncture, and this should be done after MRI brain is completed and is unremarkable. Follow-up after the lumbar puncture results are available.     Mae Zamarripa RN, BSN  [facility] Neurology

## 2025-02-19 LAB — METHYLMALONATE SERPL-SCNC: 0.37 UMOL/L (ref 0–0.4)

## 2025-02-24 DIAGNOSIS — Z01.10 ENCOUNTER FOR HEARING TEST: Primary | ICD-10-CM

## 2025-02-26 ENCOUNTER — OFFICE VISIT (OUTPATIENT)
Dept: AUDIOLOGY | Facility: CLINIC | Age: 85
End: 2025-02-26
Payer: COMMERCIAL

## 2025-02-26 ENCOUNTER — OFFICE VISIT (OUTPATIENT)
Dept: FAMILY MEDICINE | Facility: CLINIC | Age: 85
End: 2025-02-26
Payer: COMMERCIAL

## 2025-02-26 ENCOUNTER — OFFICE VISIT (OUTPATIENT)
Dept: OTOLARYNGOLOGY | Facility: CLINIC | Age: 85
End: 2025-02-26
Payer: COMMERCIAL

## 2025-02-26 ENCOUNTER — PRE VISIT (OUTPATIENT)
Dept: OTOLARYNGOLOGY | Facility: CLINIC | Age: 85
End: 2025-02-26

## 2025-02-26 VITALS
SYSTOLIC BLOOD PRESSURE: 182 MMHG | HEART RATE: 53 BPM | BODY MASS INDEX: 24.78 KG/M2 | RESPIRATION RATE: 16 BRPM | WEIGHT: 177 LBS | DIASTOLIC BLOOD PRESSURE: 75 MMHG | TEMPERATURE: 97.7 F | OXYGEN SATURATION: 98 % | HEIGHT: 71 IN

## 2025-02-26 VITALS
OXYGEN SATURATION: 96 % | BODY MASS INDEX: 24.83 KG/M2 | DIASTOLIC BLOOD PRESSURE: 66 MMHG | TEMPERATURE: 97.9 F | WEIGHT: 178 LBS | HEART RATE: 55 BPM | SYSTOLIC BLOOD PRESSURE: 123 MMHG

## 2025-02-26 DIAGNOSIS — I48.0 PAROXYSMAL ATRIAL FIBRILLATION (H): ICD-10-CM

## 2025-02-26 DIAGNOSIS — H91.93 BILATERAL HEARING LOSS, UNSPECIFIED HEARING LOSS TYPE: ICD-10-CM

## 2025-02-26 DIAGNOSIS — H90.3 SENSORY HEARING LOSS, BILATERAL: Primary | ICD-10-CM

## 2025-02-26 DIAGNOSIS — E10.22 CKD STAGE 5 DUE TO TYPE 1 DIABETES MELLITUS (H): Primary | ICD-10-CM

## 2025-02-26 DIAGNOSIS — F41.9 ANXIETY: ICD-10-CM

## 2025-02-26 DIAGNOSIS — N18.5 CKD STAGE 5 DUE TO TYPE 1 DIABETES MELLITUS (H): Primary | ICD-10-CM

## 2025-02-26 DIAGNOSIS — R41.3 MEMORY LOSS: ICD-10-CM

## 2025-02-26 DIAGNOSIS — N18.4 CKD (CHRONIC KIDNEY DISEASE) STAGE 4, GFR 15-29 ML/MIN (H): ICD-10-CM

## 2025-02-26 PROCEDURE — 3078F DIAST BP <80 MM HG: CPT | Performed by: PHYSICIAN ASSISTANT

## 2025-02-26 PROCEDURE — 1126F AMNT PAIN NOTED NONE PRSNT: CPT | Performed by: PHYSICIAN ASSISTANT

## 2025-02-26 PROCEDURE — 3074F SYST BP LT 130 MM HG: CPT | Performed by: PHYSICIAN ASSISTANT

## 2025-02-26 PROCEDURE — 99203 OFFICE O/P NEW LOW 30 MIN: CPT | Performed by: PHYSICIAN ASSISTANT

## 2025-02-26 RX ORDER — BUSPIRONE HYDROCHLORIDE 5 MG/1
5 TABLET ORAL 2 TIMES DAILY
Qty: 180 TABLET | Refills: 3 | Status: SHIPPED | OUTPATIENT
Start: 2025-02-26

## 2025-02-26 RX ORDER — SODIUM BICARBONATE 650 MG/1
650 TABLET ORAL 2 TIMES DAILY
Qty: 180 TABLET | Refills: 3 | Status: SHIPPED | OUTPATIENT
Start: 2025-02-26

## 2025-02-26 ASSESSMENT — PAIN SCALES - GENERAL: PAINLEVEL_OUTOF10: NO PAIN (0)

## 2025-02-26 NOTE — PROGRESS NOTES
AUDIOLOGY REPORT    SUMMARY: Audiology visit completed. See audiogram for results.      RECOMMENDATIONS: Follow-up with ENT.    Alexandra Deluna.  Licensed Audiologist  [place] # [id number]

## 2025-02-26 NOTE — PROGRESS NOTES
Patient:  Melo Dangelo, Date of birth 1940  Date of Visit:  Feb 26, 2025    HPI:  Melo Dangelo is here for hearing loss.    Patient denies any otalgia or drainage.   Patient reports  have to focus on multi-person conversations and sometimes has to sit out due to not hearing. Difficulty with background noise. Hearing aids in past, too loud.       ENT Physical Exam  Constitutional  Appearance: patient appears well-developed, patient is cooperative;  Communication/Voice: vocal quality normal;  Head and Face  Appearance: head appears normal and face appears atraumatic;  Ear  Hearing: impaired to conversational voice;  Auricles: bilateral auricles normal;  Nose  External Nose: external nose normal; nasal discharge not visible;  Oral Cavity/Oropharynx  Lips: normal;  Teeth: normal;  Neck  Neck: neck normal;  Neck comments: supple  Neurovestibular  Mental Status: alert and oriented;  Psychiatric: mood normal; affect is appropriate;        Audiogram: 2/26/2025 - data independently reviewed  Right ear: Borderline normal sloping to severe SNHL   Left ear: Borderline normal sloping to severe SNHL   SRT right: 30 left: 30   WR right: 92% left: 92%   Acoustic Reflexes: present in all conditions  Tympanograms: type As right, type A left         Assessment and Plan:  1. Bilateral hearing loss, unspecified hearing loss type    Patient with bilateral sensorineural hearing loss. Reviewed audiogram with patient today. Patient is a hearing aid candidate.  Discussed with patient the benefits of hearing aids including improvement in communication and prevention of cognitive decline, social isolation, and degradation of nerve function including word recognition scores. Discussed with patient that there is a trial period prior to committing to purchase hearing aids. Patient can use this trial to determine if hearing aid benefit would be worth the cost of these devices. Patient will proceed with hearing aid consult.  Patient is medically cleared for hearing aids.    [facility] and will [facility] the process for this.     Lindsay Rich PA-C  Otolaryngology  Head & Neck Surgery  [phone]    30 minutes spent on the date of the encounter doing chart review, history and exam, documentation and further activities per the note excluding time performing ear cleaning under microscopy.

## 2025-02-26 NOTE — LETTER
2/26/2025       RE: Melo Dangelo  [address]  [place] [address]     Dear Colleague,    Thank you for referring your patient, Melo Dangelo, to the [facility] [place] [facility]. Please see a copy of my visit note below.        Patient:  Melo Dangelo, Date of birth 1940  Date of Visit:  Feb 26, 2025    HPI:  Melo Dangelo is here for hearing loss.    Patient denies any otalgia or drainage.   Patient reports  have to focus on multi-person conversations and sometimes has to sit out due to not hearing. Difficulty with background noise. Hearing aids in past, too loud.       ENT Physical Exam  Constitutional  Appearance: patient appears well-developed, patient is cooperative;  Communication/Voice: vocal quality normal;  Head and Face  Appearance: head appears normal and face appears atraumatic;  Ear  Hearing: impaired to conversational voice;  Auricles: bilateral auricles normal;  Nose  External Nose: external nose normal; nasal discharge not visible;  Oral Cavity/Oropharynx  Lips: normal;  Teeth: normal;  Neck  Neck: neck normal;  Neck comments: supple  Neurovestibular  Mental Status: alert and oriented;  Psychiatric: mood normal; affect is appropriate;        Assessment and Plan:  1. Bilateral hearing loss, unspecified hearing loss type    Patient with bilateral sensorineural hearing loss. Reviewed audiogram with patient today. Patient is a hearing aid candidate.  Discussed with patient the benefits of hearing aids including improvement in communication and prevention of cognitive decline, social isolation, and degradation of nerve function including word recognition scores. Discussed with patient that there is a trial period prior to committing to purchase hearing aids. Patient can use this trial to determine if hearing aid benefit would be worth the cost of these devices. Patient will  proceed with hearing aid consult. Patient is medically cleared for hearing aids.    He is [facility] and will start the process for this.     Lindsay Rich PA-C  Otolaryngology  Head & Neck Surgery  [phone]    30 minutes spent on the date of the encounter doing chart review, history and exam, documentation and further activities per the note excluding time performing ear cleaning under microscopy.      Again, thank you for allowing me to participate in the care of your patient.      Sincerely,    Lindsay Rich PA-C

## 2025-02-26 NOTE — PROGRESS NOTES
Assessment & Plan     84 year old male with a past medical history of HTN, a fib, IgA nephropathy, CKD stage 5, and alcohol abuse in remission. Today, we reviewed most recent visits from neurology, cardiology, and nephrology and discussed the rational for upcoming lumbar puncture and Brain MRI. He does have valid concerns about receiving IV contrast for MRI and requested that he discuss this with his nephrologist at upcoming visit next week. He did voice confusion about the reason for the eliquis. We discussed his medical history and increased risk for developing a stroke. We did discuss that nephrology was comfortable with him taking this medication, renally dosed. He agreed to restart this.     Plan:   Prevnar 20 today   Shingles and RSV vaccine at pharmacy  Discuss concerns about IV contrast with MRI at nephrology visit next week   Refilled medications to downstairs pharmacy as he was unable to receive these from his mail order pharmacy    A1c check- last one 3 years ago at 5.8  Eye medicine referral- has not seen a eye doctor in many years   Follow up mid-May after completing evaluation with neurology and neurophysiology   I saw the patient with the student the history physical assessment and plan are mine  Francis Smith MD    Subjective   Melo is a 84 year old, presenting for the following health issues:  Follow Up      2/26/2025    12:25 PM   Additional Questions   Roomed by [facility] EMT     History of Present Illness       Reason for visit:  Ellaqust   kidney  angziety He is missing 1 dose(s) of medications per week.     Melo Dangelo is a 84 year old male who presents today for a follow up visit. Reports he recently met with neurology for his memory loss and they plan on doing a MRI and lumbar puncture. He would like to discuss this today. He is concerned about receiving contrast with the MRI due to his kidney disease. He would also like to discuss the need for the eliquis has he doesn't understand why  "he needs to take this. He also reports eating about four cookies a night and would like to know what blood sugar does to the body. Blood pressure was elevated at our visit today but WNL at ENT visit. States he checks his blood pressures at home and runs around 140s systolic.     Review of Systems  CONSTITUTIONAL: NEGATIVE for fever, chills, change in weight  ENT/MOUTH: NEGATIVE for ear, mouth and throat problems  RESP: NEGATIVE for significant cough or SOB  CV: NEGATIVE for chest pain, palpitations or peripheral edema  MUSCULOSKELETAL: NEGATIVE for significant arthralgias or myalgia  NEURO: NEGATIVE for weakness, dizziness or paresthesias  PSYCHIATRIC: POSITIVE for anxiety and memory loss   ROS otherwise negative      Objective    BP (!) 182/75 (BP Location: Right arm, Patient Position: Sitting, Cuff Size: Adult Regular)   Pulse 53   Temp 97.7  F (36.5  C) (Oral)   Resp 16   Ht 1.803 m (5' 11\")   Wt 80.3 kg (177 lb)   SpO2 98%   BMI 24.69 kg/m    Body mass index is 24.69 kg/m .  Physical Exam   GENERAL: alert and no distress  NECK: no adenopathy, no asymmetry, masses, or scars  MS: no gross musculoskeletal defects noted, no edema  NEURO: Normal strength and tone, mentation intact and speech normal  PSYCH: mentation appears normal, affect normal/bright    Valerie Beckford RN, [facility]      The longitudinal plan of care for the diagnosis(es)/condition(s) as documented were addressed during this visit. Due to the added complexity in care, I will continue to support Melo in the subsequent management and with ongoing continuity of care.  Signed Electronically by: Francis Smith MD    "

## 2025-02-26 NOTE — PATIENT INSTRUCTIONS
You were seen in the ENT Clinic today by Lindsay Rich. If you have any questions or concerns after your appointment, please contact us (see below)    The following has been recommended for you based upon your appointment today:      Please return to clinic     How to Contact Us:  Send a [employer] message to your provider. Our team will respond to you via [employer]. Occasionally, we will need to call you to get further information.  For urgent matters (Monday-Friday), call the ENT Clinic: [phone] and speak with a call center team member - they will route your call appropriately.   If you'd like to speak directly with a nurse, please find our contact information below. We do our best to check voicemail frequently throughout the day, and will work to call you back within 1-2 days. For urgent matters, please use the general clinic phone numbers listed above.      Valeria ODELL LPN  Direct: [phone]

## 2025-02-26 NOTE — NURSING NOTE
Chief Complaint   Patient presents with    Consult     Bilateral hearing loss      Blood pressure 123/66, pulse 55, temperature 97.9  F (36.6  C), weight 80.7 kg (178 lb), SpO2 96%.  Ramana Sanford LPN

## 2025-02-27 DIAGNOSIS — F10.21 ALCOHOL DEPENDENCE IN REMISSION (H): ICD-10-CM

## 2025-02-27 DIAGNOSIS — G40.909 SEIZURE DISORDER (H): ICD-10-CM

## 2025-02-27 DIAGNOSIS — E78.00 HIGH CHOLESTEROL: ICD-10-CM

## 2025-02-27 DIAGNOSIS — F41.9 ANXIETY: ICD-10-CM

## 2025-02-27 DIAGNOSIS — I48.0 PAROXYSMAL ATRIAL FIBRILLATION (H): ICD-10-CM

## 2025-02-27 DIAGNOSIS — N40.1 BENIGN PROSTATIC HYPERPLASIA WITH URINARY FREQUENCY: ICD-10-CM

## 2025-02-27 DIAGNOSIS — N18.5 CKD STAGE 5 DUE TO TYPE 1 DIABETES MELLITUS (H): Primary | ICD-10-CM

## 2025-02-27 DIAGNOSIS — R35.0 BENIGN PROSTATIC HYPERPLASIA WITH URINARY FREQUENCY: ICD-10-CM

## 2025-02-27 DIAGNOSIS — E10.22 CKD STAGE 5 DUE TO TYPE 1 DIABETES MELLITUS (H): Primary | ICD-10-CM

## 2025-02-27 RX ORDER — UBIDECARENONE 75 MG
100 CAPSULE ORAL DAILY
Qty: 90 TABLET | Refills: 3 | Status: SHIPPED | OUTPATIENT
Start: 2025-02-27

## 2025-02-27 RX ORDER — TAMSULOSIN HYDROCHLORIDE 0.4 MG/1
0.8 CAPSULE ORAL DAILY
Qty: 180 CAPSULE | Refills: 3 | Status: SHIPPED | OUTPATIENT
Start: 2025-02-27

## 2025-02-27 RX ORDER — SIMVASTATIN 20 MG
20 TABLET ORAL AT BEDTIME
Qty: 90 TABLET | Refills: 3 | Status: SHIPPED | OUTPATIENT
Start: 2025-02-27

## 2025-02-27 RX ORDER — LAMOTRIGINE 100 MG/1
200 TABLET ORAL 2 TIMES DAILY
Qty: 360 TABLET | Refills: 3 | Status: SHIPPED | OUTPATIENT
Start: 2025-02-27

## 2025-02-27 RX ORDER — ESCITALOPRAM OXALATE 20 MG/1
20 TABLET ORAL EVERY MORNING
Qty: 90 TABLET | Refills: 3 | Status: SHIPPED | OUTPATIENT
Start: 2025-02-27

## 2025-02-27 RX ORDER — METOPROLOL SUCCINATE 25 MG/1
TABLET, EXTENDED RELEASE ORAL
Qty: 45 TABLET | Refills: 3 | Status: SHIPPED | OUTPATIENT
Start: 2025-02-27

## 2025-03-05 ENCOUNTER — OFFICE VISIT (OUTPATIENT)
Dept: NEPHROLOGY | Facility: CLINIC | Age: 85
End: 2025-03-05
Payer: MEDICARE

## 2025-03-05 ENCOUNTER — PATIENT OUTREACH (OUTPATIENT)
Dept: CARE COORDINATION | Facility: CLINIC | Age: 85
End: 2025-03-05

## 2025-03-05 ENCOUNTER — LAB (OUTPATIENT)
Dept: LAB | Facility: CLINIC | Age: 85
End: 2025-03-05
Payer: COMMERCIAL

## 2025-03-05 VITALS
WEIGHT: 178.7 LBS | HEIGHT: 71 IN | HEART RATE: 62 BPM | OXYGEN SATURATION: 95 % | BODY MASS INDEX: 25.02 KG/M2 | TEMPERATURE: 98.1 F | DIASTOLIC BLOOD PRESSURE: 64 MMHG | SYSTOLIC BLOOD PRESSURE: 127 MMHG

## 2025-03-05 DIAGNOSIS — N18.5 CHRONIC KIDNEY DISEASE (CKD), STAGE V (H): Primary | ICD-10-CM

## 2025-03-05 DIAGNOSIS — N18.5 CKD STAGE 5 DUE TO TYPE 1 DIABETES MELLITUS (H): ICD-10-CM

## 2025-03-05 DIAGNOSIS — N18.5 CKD (CHRONIC KIDNEY DISEASE) STAGE 5, GFR LESS THAN 15 ML/MIN (H): ICD-10-CM

## 2025-03-05 DIAGNOSIS — D63.1 ANEMIA OF CHRONIC RENAL FAILURE, STAGE 5 (H): ICD-10-CM

## 2025-03-05 DIAGNOSIS — E10.22 CKD STAGE 5 DUE TO TYPE 1 DIABETES MELLITUS (H): ICD-10-CM

## 2025-03-05 DIAGNOSIS — N18.5 ANEMIA OF CHRONIC RENAL FAILURE, STAGE 5 (H): ICD-10-CM

## 2025-03-05 LAB
ALBUMIN MFR UR ELPH: 23.6 MG/DL
ALBUMIN SERPL BCG-MCNC: 4.4 G/DL (ref 3.5–5.2)
ALBUMIN UR-MCNC: 20 MG/DL
ANION GAP SERPL CALCULATED.3IONS-SCNC: 12 MMOL/L (ref 7–15)
APPEARANCE UR: CLEAR
BILIRUB UR QL STRIP: NEGATIVE
BUN SERPL-MCNC: 49.5 MG/DL (ref 8–23)
CALCIUM SERPL-MCNC: 9.1 MG/DL (ref 8.8–10.4)
CHLORIDE SERPL-SCNC: 106 MMOL/L (ref 98–107)
COLOR UR AUTO: ABNORMAL
CREAT SERPL-MCNC: 4.62 MG/DL (ref 0.67–1.17)
CREAT UR-MCNC: 81.2 MG/DL
EGFRCR SERPLBLD CKD-EPI 2021: 12 ML/MIN/1.73M2
ERYTHROCYTE [DISTWIDTH] IN BLOOD BY AUTOMATED COUNT: 13.5 % (ref 10–15)
EST. AVERAGE GLUCOSE BLD GHB EST-MCNC: 114 MG/DL
FERRITIN SERPL-MCNC: 603 NG/ML (ref 31–409)
GLUCOSE SERPL-MCNC: 96 MG/DL (ref 70–99)
GLUCOSE UR STRIP-MCNC: NEGATIVE MG/DL
HBA1C MFR BLD: 5.6 %
HCO3 SERPL-SCNC: 22 MMOL/L (ref 22–29)
HCT VFR BLD AUTO: 30 % (ref 40–53)
HGB BLD-MCNC: 9.6 G/DL (ref 13.3–17.7)
HGB UR QL STRIP: ABNORMAL
IRON BINDING CAPACITY (ROCHE): 254 UG/DL (ref 240–430)
IRON SATN MFR SERPL: 25 % (ref 15–46)
IRON SERPL-MCNC: 64 UG/DL (ref 61–157)
KETONES UR STRIP-MCNC: NEGATIVE MG/DL
LEUKOCYTE ESTERASE UR QL STRIP: NEGATIVE
MCH RBC QN AUTO: 30.4 PG (ref 26.5–33)
MCHC RBC AUTO-ENTMCNC: 32 G/DL (ref 31.5–36.5)
MCV RBC AUTO: 95 FL (ref 78–100)
NITRATE UR QL: NEGATIVE
PH UR STRIP: 6 [PH] (ref 5–7)
PHOSPHATE SERPL-MCNC: 4.2 MG/DL (ref 2.5–4.5)
PLATELET # BLD AUTO: 126 10E3/UL (ref 150–450)
POTASSIUM SERPL-SCNC: 4.5 MMOL/L (ref 3.4–5.3)
PROT/CREAT 24H UR: 0.29 MG/MG CR (ref 0–0.2)
RBC # BLD AUTO: 3.16 10E6/UL (ref 4.4–5.9)
RBC URINE: <1 /HPF
SODIUM SERPL-SCNC: 140 MMOL/L (ref 135–145)
SP GR UR STRIP: 1.01 (ref 1–1.03)
SQUAMOUS EPITHELIAL: <1 /HPF
UROBILINOGEN UR STRIP-MCNC: NORMAL MG/DL
VIT D+METAB SERPL-MCNC: 36 NG/ML (ref 20–50)
WBC # BLD AUTO: 5.4 10E3/UL (ref 4–11)
WBC URINE: <1 /HPF

## 2025-03-05 PROCEDURE — 83540 ASSAY OF IRON: CPT | Performed by: PATHOLOGY

## 2025-03-05 PROCEDURE — 83036 HEMOGLOBIN GLYCOSYLATED A1C: CPT | Performed by: FAMILY MEDICINE

## 2025-03-05 PROCEDURE — 83550 IRON BINDING TEST: CPT | Performed by: PATHOLOGY

## 2025-03-05 PROCEDURE — G0463 HOSPITAL OUTPT CLINIC VISIT: HCPCS

## 2025-03-05 PROCEDURE — 82728 ASSAY OF FERRITIN: CPT | Performed by: PATHOLOGY

## 2025-03-05 PROCEDURE — 84156 ASSAY OF PROTEIN URINE: CPT | Performed by: PATHOLOGY

## 2025-03-05 PROCEDURE — 36415 COLL VENOUS BLD VENIPUNCTURE: CPT | Performed by: PATHOLOGY

## 2025-03-05 PROCEDURE — 85027 COMPLETE CBC AUTOMATED: CPT | Performed by: PATHOLOGY

## 2025-03-05 PROCEDURE — 81001 URINALYSIS AUTO W/SCOPE: CPT | Performed by: PATHOLOGY

## 2025-03-05 PROCEDURE — 80069 RENAL FUNCTION PANEL: CPT | Performed by: PATHOLOGY

## 2025-03-05 PROCEDURE — 99000 SPECIMEN HANDLING OFFICE-LAB: CPT | Performed by: PATHOLOGY

## 2025-03-05 PROCEDURE — 82306 VITAMIN D 25 HYDROXY: CPT

## 2025-03-05 ASSESSMENT — PAIN SCALES - GENERAL: PAINLEVEL_OUTOF10: NO PAIN (0)

## 2025-03-05 NOTE — NURSING NOTE
"Chief Complaint   Patient presents with    RECHECK     Follow up.      Vitals:    03/05/25 0850 03/05/25 0854 03/05/25 0855 03/05/25 0856   BP: 135/70 116/57 131/66 127/64   BP Location: Right arm Right arm Right arm    Patient Position: Sitting Sitting Sitting    Cuff Size: Adult Regular Adult Regular Adult Regular    Pulse: 62      Temp: 98.1  F (36.7  C)      TempSrc: Oral      SpO2: 95%      Weight: 81.1 kg (178 lb 11.2 oz)      Height: 1.803 m (5' 11\")          BP Readings from Last 3 Encounters:   03/05/25 127/64   02/26/25 (!) 182/75   02/26/25 123/66       /64   Pulse 62   Temp 98.1  F (36.7  C) (Oral)   Ht 1.803 m (5' 11\")   Wt 81.1 kg (178 lb 11.2 oz)   SpO2 95%   BMI 24.92 kg/m       Paola Cox    "

## 2025-03-05 NOTE — PROGRESS NOTES
Anemia Management Note - Follow Up      SUBJECTIVE/OBJECTIVE:    Referred by Dr. Merry Barrow on 2024  *orders are under Dr. Scott  Primary Diagnosis: Anemia in Chronic Kidney Disease (N18.5, D63.1)     Secondary Diagnosis: Chronic Kidney Disease, Stage 5 (N18.5)  Hgb goal range:9-10    RAMA:TBD; Hgb>9.0  *24; starting on Eliquis per Cardiology.   Iron regimen: Treat with IV Iron- Melo Prefers Infed at the [facility]. -Competed Infed on 24.      Labs : 2025  RX/TX plans : TBD     Recent RAMA use, transfusion, IV iron: NA  No history of stroke, MI, and blood clots or cancers     Contact: No Boxes Checked on Consent to Communicate scanned on 2021.         Latest Ref Rng & Units 2024 2024 2024 2024 12/3/2024 2025 3/5/2025   Anemia   Hemoglobin 13.3 - 17.7 g/dL 8.8   9.8  9.5  9.9  10.0  9.6    IV Iron Dose   1000mg        TSAT 15 - 46 %   41  35  22  38  25    Ferritin 31 - 409 ng/mL   675  789  785  591  603      BP Readings from Last 3 Encounters:   25 (!) 182/75   25 123/66   25 (!) 170/78     Wt Readings from Last 2 Encounters:   25 80.3 kg (177 lb)   25 80.7 kg (178 lb)           ASSESSMENT:    Hgb:at goal - continue to monitor  TSat: not at goal (>30%) but ferritin >500ng/mL.  IV iron not indicated at this time per anemia protocol. Ferritin: At goal (>100ng/mL)        PLAN:  RTC for Anemia labs in 2-4 week(s).    Orders needed to be renewed (for next follow-up date) in EPIC: None    Iron labs due:  Early 2025    Plan discussed with:  No call, chart review       NEXT FOLLOW-UP DATE:  4/3/25    Amy Hurd RN   Anemia Services  [facility]  [email or web address]   Office : [phone]  Fax: [phone]

## 2025-03-05 NOTE — LETTER
3/5/2025       RE: Melo Dangelo  [address]  [place] [address]     Dear Colleague,    Thank you for referring your patient, Melo Dangelo, to the [facility] [place] at [facility]. Please see a copy of my visit note below.    Nephrology Clinic Visit 3/5/25    Assessment and Plan:    1. CKD5 w/proteinuria - Stable. 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 Has been seen by Surgeon 7/18/23 for PD cath consult and deemed a good candidate. Needs an updated visit. We also discussed conservative management and Melo is also considering this option. Will see how he feels about PD vs conservative management and if he is still leaning toward PD will schedule the updated consult visit when I see him back in June.  No need to proceed with PD line placement until GFR more in the 7-8 range. ( Wife was on PD and HD. He is familiar with both)     2. Volume status - No edema/dyspnea. B/ps controlled. Only voiding issue is nocturia. Albumin 4.4. Weight stable at 81.1 kg.    - No indication for diuretic therapy at this time    3. CV/HTN - Well controlled w/o edema. Home readings 130's/. Clinic readings 116-135/57-70 HR 62.  Echo 7/22 with EF 60%, normal IVC  Current regimen: Metoprolol XL 12.5 mg every day    - No changes required    4. BPH - Still with nocturia on Flomax    5. Electrolytes - No acute concerns. K 4.5 Na 140    6. Acid base - No acute concerns. Bicarb 22    7. BMD - Ca 9.1 Phos 4.2 albumin 4.4   Vit D 42  (  2/25)   Continue Ca/D one tab daily     8. Anemia - Hgb 9.6 and stable since 10/21   Iron studies 3/25: Ferritin 603, Fe 64, IS 25   Etiology for his anemia is likely chronic dz/Epo deficiency   Would begin RAMA when Hgb < 9    9. Seizure d/o - Stable on Lamictal. Na prob with hyponatremia. Na 140    10. Depression - Well controlled on Lexapro and Buspar.     11. Memory changes - Being evaluated by Neurology. Scheduled for an LP to r/o Alzheimer's Dementia    12. Disposition - RTC 6/9/25 for follow up with labs prior    Assessment and plan was discussed with patient and he voiced his understanding and agreement.    Reason for Visit:  CKD5 follow up    HPI:  Mr Dangelo is an 83 yo male with CKD5, pAfib, Anemia, BPH, Depression, COVID 1/23, IgA nephropathy, present today for routine CKD follow up. Last seen in clinic by Dr Scott 11/4/24  Baseline creat 4-5 since 6/23    ROS:   No acute renal concerns  Melo notes memory changes and is being evaluated by Neurology  Home b/ps 130's/  He denies CP/dyspnea  No GI concerns  Voiding w/o difficulty. Notes nocturia  Appetite is good  Energy level is good. Busy doing wood working  No edema unless he indulges in a high Na meal    Chronic Health Problems:    CKD5  pAfib  Anemia  BPH  Depression  COVID 19 x 2  IgA nephropathy  Alcohol use d/o ( in remission)  Bilateral hearing loss  Seizure disorder ( last seizure 2008)    Family Hx:   Family History   Problem Relation Age of Onset     Glaucoma No family hx of      Macular Degeneration No family hx of      Anesthesia Reaction No family hx of      Deep Vein Thrombosis (DVT) No family hx of      Personal Hx:    x 1 yr, lives independently in a condo, 3 daughters live nearby, NS, ETOH none.     Allergies:  No Known Allergies    Medications:  Current Outpatient Medications   Medication Sig Dispense Refill     acetaminophen (TYLENOL) 325 MG tablet Take 325-650 mg by mouth every 6 hours as needed for mild pain       apixaban  "ANTICOAGULANT (ELIQUIS ANTICOAGULANT) 2.5 MG tablet Take 1 tablet (2.5 mg) by mouth 2 times daily. 180 tablet 3     busPIRone (BUSPAR) 5 MG tablet Take 1 tablet (5 mg) by mouth 2 times daily. Has started 180 tablet 3     calcium carbonate-vitamin D (OSCAL) 500-5 MG-MCG tablet Take 1 tablet by mouth daily. 90 tablet 3     Cholecalciferol (D3-1000) 25 MCG (1000 UT) CAPS Take 1 capsule by mouth daily       cyanocobalamin (VITAMIN B-12) 100 MCG tablet Take 1 tablet (100 mcg) by mouth daily. Take 1,000 mcg by mouth every morning - 90 tablet 3     escitalopram (LEXAPRO) 20 MG tablet Take 1 tablet (20 mg) by mouth every morning. 90 tablet 3     lamoTRIgine (LAMICTAL) 100 MG tablet Take 2 tablets (200 mg) by mouth 2 times daily. 360 tablet 3     metoprolol succinate ER (TOPROL XL) 25 MG 24 hr tablet TAKE 1/2 TABLET BY MOUTH DAILY 45 tablet 3     multivitamin (OCUVITE) TABS tablet Take 1 tablet by mouth daily. Please keep the appointment on 9/3/24 for further refills. 90 tablet 3     simvastatin (ZOCOR) 20 MG tablet Take 1 tablet (20 mg) by mouth at bedtime. 90 tablet 3     sodium bicarbonate 650 MG tablet Take 1 tablet (650 mg) by mouth 2 times daily. 180 tablet 3     tamsulosin (FLOMAX) 0.4 MG capsule Take 2 capsules (0.8 mg) by mouth daily. 180 capsule 3     No current facility-administered medications for this visit.      Vitals:  /64   Pulse 62   Temp 98.1  F (36.7  C) (Oral)   Ht 1.803 m (5' 11\")   Wt 81.1 kg (178 lb 11.2 oz)   SpO2 95%   BMI 24.92 kg/m      Exam:  GEN: Pleasant male in NAD  CARDIAC: RRR  LUNGS: CTA  ABDOMEN: Soft NT  EXT: No edema  NEURO: A/O  PSYCH: Calm, engaging    LABS:   CMP  Recent Labs   Lab Test 03/05/25  0757 02/13/25  1353 12/03/24  1043 11/04/24  0810 10/15/21  1023 11/04/20  0703 11/02/20  0900 11/01/20  0816 10/31/20  0838    139 139 137   < > 138 139 137 138   POTASSIUM 4.5 4.7 5.1 4.9   < > 4.2 4.1 4.7 4.1   CHLORIDE 106 106 106 106   < > 105 108* 107 106   CO2 22 21* " 23 21*   < > 26 24 20* 23   ANIONGAP 12 12 10 10   < > 7 7 10 9   GLC 96 95 100* 103*   < > 75 76 125 86   BUN 49.5* 48.6* 45.4* 45.0*   < > 22 26 21 19   CR 4.62* 4.40* 4.49* 4.58*   < > 1.25 1.24 1.01 1.13   GFRESTIMATED 12* 13* 12* 12*   < > 56* 56* >60 >60   GFRESTBLACK  --   --   --   --   --  >60 >60 >60 >60   GIANNI 9.1 9.3 9.3 9.0   < > 8.5 8.7 8.8 8.5    < > = values in this interval not displayed.     Recent Labs   Lab Test 04/17/24  0652 04/16/24  1028 01/13/23  0934 07/29/22  1338   BILITOTAL 0.3 0.4 0.2 0.3   ALKPHOS 53 61 79 79   ALT 8 11 14 18   AST 20 18 21 20     CBC  Recent Labs   Lab Test 03/05/25  0757 02/13/25  1353 12/03/24  1043 11/04/24  0810   HGB 9.6* 10.0* 9.9* 9.5*   WBC 5.4 5.2 7.9 5.2   RBC 3.16* 3.27* 3.20* 3.08*   HCT 30.0* 31.2* 30.3* 29.1*   MCV 95 95 95 95   MCH 30.4 30.6 30.9 30.8   MCHC 32.0 32.1 32.7 32.6   RDW 13.5 13.4 13.9 13.4   * 144* 141* 125*     URINE STUDIES  Recent Labs   Lab Test 03/05/25  0813 02/13/25  1402 11/04/24  0812 04/16/24  1334   COLOR Light Yellow Yellow Light Yellow Light Yellow  Light Yellow   APPEARANCE Clear Clear Clear Clear  Clear   URINEGLC Negative Negative Negative Negative  Negative   URINEBILI Negative Negative Negative Negative  Negative   URINEKETONE Negative Negative Negative Negative  Negative   SG 1.015 1.020 1.012 1.012  1.012   UBLD Trace* Small* Negative Trace*  Trace*   URINEPH 6.0 5.5 5.5 6.0  6.0   PROTEIN 20* Trace* 20* 30*  30*   UROBILINOGEN  --  0.2  --   --    NITRITE Negative Negative Negative Negative  Negative   LEUKEST Negative Negative Negative Negative  Negative   RBCU <1 2-5* 1 1  1   WBCU <1 0-5 <1 0  <1     Recent Labs   Lab Test 06/02/22  0956 04/25/22  1529 02/17/22  1041 01/11/22  1011   UTPG 0.31* 0.40* 0.37* 0.48*     PTH  Recent Labs   Lab Test 02/13/25  1353 04/17/24  0652 12/11/23  0930   PTHI 154* 101* 95*     IRON STUDIES  Recent Labs   Lab Test 03/05/25  0757 02/13/25  1353 12/03/24  1043    IRON 64 99 55*    264 248   IRONSAT 25 38 22   ALBERT 603* 591* 785*       Gini Washington NP  [facility]PHROLOGY [facility] [place]                      Again, thank you for allowing me to participate in the care of your patient.      Sincerely,    Gini Washington NP

## 2025-03-06 NOTE — PROGRESS NOTES
Nephrology Clinic Visit 3/5/25    Assessment and Plan:    1. CKD5 w/proteinuria - Stable. 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 Has been seen by Surgeon 7/18/23 for PD cath consult and deemed a good candidate. Needs an updated visit. We also discussed conservative management and Melo is also considering this option. Will see how he feels about PD vs conservative management and if he is still leaning toward PD will schedule the updated consult visit when I see him back in June.  No need to proceed with PD line placement until GFR more in the 7-8 range. ( Wife was on PD and HD. He is familiar with both)     2. Volume status - No edema/dyspnea. B/ps controlled. Only voiding issue is nocturia. Albumin 4.4. Weight stable at 81.1 kg.    - No indication for diuretic therapy at this time    3. CV/HTN - Well controlled w/o edema. Home readings 130's/. Clinic readings 116-135/57-70 HR 62.  Echo 7/22 with EF 60%, normal IVC  Current regimen: Metoprolol XL 12.5 mg every day    - No changes required    4. BPH - Still with nocturia on Flomax    5. Electrolytes - No acute concerns. K 4.5 Na 140    6. Acid base - No acute concerns. Bicarb 22    7. BMD - Ca 9.1 Phos 4.2 albumin 4.4   Vit D 42  ( 2/25)   Continue Ca/D one tab daily     8. Anemia - Hgb 9.6 and stable since 10/21   Iron studies 3/25: Ferritin 603, Fe 64, IS 25   Etiology for his anemia is likely chronic dz/Epo deficiency   Would begin RAMA when Hgb < 9    9. Seizure d/o - Stable on Lamictal. Na prob with hyponatremia. Na 140    10. Depression - Well controlled  on Lexapro and Buspar.     11. Memory changes - Being evaluated by Neurology. Scheduled for an LP to r/o Alzheimer's Dementia    12. Disposition - RTC 6/9/25 for follow up with labs prior    Assessment and plan was discussed with patient and he voiced his understanding and agreement.    Reason for Visit:  CKD5 follow up    HPI:  Mr Dangelo is an 85 yo male with CKD5, pAfib, Anemia, BPH, Depression, COVID 1/23, IgA nephropathy, present today for routine CKD follow up. Last seen in clinic by Dr Scott 11/4/24  Baseline creat 4-5 since 6/23    ROS:   No acute renal concerns  Melo notes memory changes and is being evaluated by Neurology  Home b/ps 130's/  He denies CP/dyspnea  No GI concerns  Voiding w/o difficulty. Notes nocturia  Appetite is good  Energy level is good. Busy doing wood working  No edema unless he indulges in a high Na meal    Chronic Health Problems:    CKD5  pAfib  Anemia  BPH  Depression  COVID 19 x 2  IgA nephropathy  Alcohol use d/o ( in remission)  Bilateral hearing loss  Seizure disorder ( last seizure 2008)    Family Hx:   Family History   Problem Relation Age of Onset    Glaucoma No family hx of     Macular Degeneration No family hx of     Anesthesia Reaction No family hx of     Deep Vein Thrombosis (DVT) No family hx of      Personal Hx:    x 1 yr, lives independently in a condo, 3 daughters live nearby, NS, ETOH none.     Allergies:  No Known Allergies    Medications:  Current Outpatient Medications   Medication Sig Dispense Refill    acetaminophen (TYLENOL) 325 MG tablet Take 325-650 mg by mouth every 6 hours as needed for mild pain      apixaban ANTICOAGULANT (ELIQUIS ANTICOAGULANT) 2.5 MG tablet Take 1 tablet (2.5 mg) by mouth 2 times daily. 180 tablet 3    busPIRone (BUSPAR) 5 MG tablet Take 1 tablet (5 mg) by mouth 2 times daily. Has started 180 tablet 3    calcium carbonate-vitamin D (OSCAL) 500-5 MG-MCG tablet Take 1 tablet by mouth daily. 90 tablet 3    Cholecalciferol  "(D3-1000) 25 MCG (1000 UT) CAPS Take 1 capsule by mouth daily      cyanocobalamin (VITAMIN B-12) 100 MCG tablet Take 1 tablet (100 mcg) by mouth daily. Take 1,000 mcg by mouth every morning - 90 tablet 3    escitalopram (LEXAPRO) 20 MG tablet Take 1 tablet (20 mg) by mouth every morning. 90 tablet 3    lamoTRIgine (LAMICTAL) 100 MG tablet Take 2 tablets (200 mg) by mouth 2 times daily. 360 tablet 3    metoprolol succinate ER (TOPROL XL) 25 MG 24 hr tablet TAKE 1/2 TABLET BY MOUTH DAILY 45 tablet 3    multivitamin (OCUVITE) TABS tablet Take 1 tablet by mouth daily. Please keep the appointment on 9/3/24 for further refills. 90 tablet 3    simvastatin (ZOCOR) 20 MG tablet Take 1 tablet (20 mg) by mouth at bedtime. 90 tablet 3    sodium bicarbonate 650 MG tablet Take 1 tablet (650 mg) by mouth 2 times daily. 180 tablet 3    tamsulosin (FLOMAX) 0.4 MG capsule Take 2 capsules (0.8 mg) by mouth daily. 180 capsule 3     No current facility-administered medications for this visit.      Vitals:  /64   Pulse 62   Temp 98.1  F (36.7  C) (Oral)   Ht 1.803 m (5' 11\")   Wt 81.1 kg (178 lb 11.2 oz)   SpO2 95%   BMI 24.92 kg/m      Exam:  GEN: Pleasant male in NAD  CARDIAC: RRR  LUNGS: CTA  ABDOMEN: Soft NT  EXT: No edema  NEURO: A/O  PSYCH: Calm, engaging    LABS:   CMP  Recent Labs   Lab Test 03/05/25  0757 02/13/25  1353 12/03/24  1043 11/04/24  0810 10/15/21  1023 11/04/20  0703 11/02/20  0900 11/01/20  0816 10/31/20  0838    139 139 137   < > 138 139 137 138   POTASSIUM 4.5 4.7 5.1 4.9   < > 4.2 4.1 4.7 4.1   CHLORIDE 106 106 106 106   < > 105 108* 107 106   CO2 22 21* 23 21*   < > 26 24 20* 23   ANIONGAP 12 12 10 10   < > 7 7 10 9   GLC 96 95 100* 103*   < > 75 76 125 86   BUN 49.5* 48.6* 45.4* 45.0*   < > 22 26 21 19   CR 4.62* 4.40* 4.49* 4.58*   < > 1.25 1.24 1.01 1.13   GFRESTIMATED 12* 13* 12* 12*   < > 56* 56* >60 >60   GFRESTBLACK  --   --   --   --   --  >60 >60 >60 >60   GIANNI 9.1 9.3 9.3 9.0   < > 8.5 " 8.7 8.8 8.5    < > = values in this interval not displayed.     Recent Labs   Lab Test 04/17/24  0652 04/16/24  1028 01/13/23  0934 07/29/22  1338   BILITOTAL 0.3 0.4 0.2 0.3   ALKPHOS 53 61 79 79   ALT 8 11 14 18   AST 20 18 21 20     CBC  Recent Labs   Lab Test 03/05/25  0757 02/13/25  1353 12/03/24  1043 11/04/24  0810   HGB 9.6* 10.0* 9.9* 9.5*   WBC 5.4 5.2 7.9 5.2   RBC 3.16* 3.27* 3.20* 3.08*   HCT 30.0* 31.2* 30.3* 29.1*   MCV 95 95 95 95   MCH 30.4 30.6 30.9 30.8   MCHC 32.0 32.1 32.7 32.6   RDW 13.5 13.4 13.9 13.4   * 144* 141* 125*     URINE STUDIES  Recent Labs   Lab Test 03/05/25  0813 02/13/25  1402 11/04/24  0812 04/16/24  1334   COLOR Light Yellow Yellow Light Yellow Light Yellow  Light Yellow   APPEARANCE Clear Clear Clear Clear  Clear   URINEGLC Negative Negative Negative Negative  Negative   URINEBILI Negative Negative Negative Negative  Negative   URINEKETONE Negative Negative Negative Negative  Negative   SG 1.015 1.020 1.012 1.012  1.012   UBLD Trace* Small* Negative Trace*  Trace*   URINEPH 6.0 5.5 5.5 6.0  6.0   PROTEIN 20* Trace* 20* 30*  30*   UROBILINOGEN  --  0.2  --   --    NITRITE Negative Negative Negative Negative  Negative   LEUKEST Negative Negative Negative Negative  Negative   RBCU <1 2-5* 1 1  1   WBCU <1 0-5 <1 0  <1     Recent Labs   Lab Test 06/02/22  0956 04/25/22  1529 02/17/22  1041 01/11/22  1011   UTPG 0.31* 0.40* 0.37* 0.48*     PTH  Recent Labs   Lab Test 02/13/25  1353 04/17/24  0652 12/11/23  0930   PTHI 154* 101* 95*     IRON STUDIES  Recent Labs   Lab Test 03/05/25  0757 02/13/25  1353 12/03/24  1043   IRON 64 99 55*    264 248   IRONSAT 25 38 22   ALBERT 603* 591* 785*       Gini Washington NP  [facility] NEPHROLOGY CLINIC [place]

## 2025-03-07 ENCOUNTER — PATIENT OUTREACH (OUTPATIENT)
Dept: CARE COORDINATION | Facility: CLINIC | Age: 85
End: 2025-03-07
Payer: MEDICARE

## 2025-03-09 ENCOUNTER — ANCILLARY PROCEDURE (OUTPATIENT)
Dept: MRI IMAGING | Facility: CLINIC | Age: 85
End: 2025-03-09
Attending: PSYCHIATRY & NEUROLOGY
Payer: MEDICARE

## 2025-03-09 DIAGNOSIS — R41.9 NEUROCOGNITIVE DISORDER: ICD-10-CM

## 2025-03-09 PROCEDURE — A9585 GADOBUTROL INJECTION: HCPCS | Performed by: RADIOLOGY

## 2025-03-09 PROCEDURE — 70553 MRI BRAIN STEM W/O & W/DYE: CPT | Mod: GC | Performed by: RADIOLOGY

## 2025-03-09 RX ORDER — GADOBUTROL 604.72 MG/ML
10 INJECTION INTRAVENOUS ONCE
Status: COMPLETED | OUTPATIENT
Start: 2025-03-09 | End: 2025-03-09

## 2025-03-09 RX ADMIN — GADOBUTROL 8 ML: 604.72 INJECTION INTRAVENOUS at 10:23

## 2025-03-09 NOTE — DISCHARGE INSTRUCTIONS
MRI Contrast Discharge Instructions    The IV contrast you received today will pass out of your body in your  urine. This will happen in the next 24 hours. You will not feel this process.  Your urine will not change color.    Drink at least 4 extra glasses of water or juice today (unless your doctor  has restricted your fluids). This reduces the stress on your kidneys.  You may take your regular medicines.    If you are on dialysis: It is best to have dialysis today.    If you have a reaction: Most reactions happen right away. If you have  any new symptoms after leaving the hospital (such as hives or swelling),  call your hospital at the correct number below. Or call your family doctor.  If you have breathing distress or wheezing, call 911.    Special instructions: ***    I have read and understand the above information.    Signature:______________________________________ Date:___________    Staff:__________________________________________ Date:___________     Time:__________    [place] [facility] Departments:    ___[place]: [phone]  ___[facility]: [phone]  ___[place]: [phone] ___[facility]: [phone]  ___[facility]: [phone]  ___[facility]: [phone]  ___[phone]  ___[facility]: [phone]  ___[phone]

## 2025-03-10 NOTE — PROGRESS NOTES
Clinic Care Coordination Contact  Community Health Worker Follow Up    Care Gaps:     Health Maintenance Due   Topic Date Due    DIABETIC FOOT EXAM  Never done    DEPRESSION ACTION PLAN  Never done    HEPATITIS B IMMUNIZATION (4 of 4 - Risk Dialysis 4-dose series) 07/01/1990    ZOSTER IMMUNIZATION (1 of 2) Never done    RSV VACCINE (1 - 1-dose 75+ series) Never done    MICROALBUMIN  10/18/2023    EYE EXAM  12/14/2023       Scheduled 05/14/25 PCP APPT      Care Plan:   Care Plan: Health Maintenance       Problem: Health Maintenance Due or Overdue       Goal: Become up-to-date with health maintenance visit(s)       Start Date: 1/8/2025    This Visit's Progress: On track Recent Progress: On track    Priority: High    Note:     Barriers: patient is having some memory concerns  Strengths: patient sees specialists and PCP for regular care  Patient expressed understanding of goal: yes  Action steps to achieve this goal:  1. I will continue to see my providers for regularly scheduled appointments.   2. I will schedule appointments with Neuropsych and Neurology.                                 Intervention and Education during outreach: Patients Neuropsych and Neurology APPT are scheduled. Patient got a letter from the department of drivers license and they said they would revoke patients license in 06/2025 if patient doesn't submit a claim regarding that patient is safe to drive due to seizures and falling asleep at the road. Letter stated that patients needs to get a letter from PCP stating that its safe for patient to drive.Patient states that he will bring the paper in on 03/12 to present to PCP nurses to see what's going on and if patient needs to get a letter from PCP. There are no additional concerns at this time.    CHW Plan: CHW will follow up with patient in 1 month.    Sharon MURRAY Community Health Worker  Clinic Care Coordination  [facility]   Clinic #: [phone]  Direct #: [phone]

## 2025-03-12 ENCOUNTER — OFFICE VISIT (OUTPATIENT)
Dept: OPHTHALMOLOGY | Facility: CLINIC | Age: 85
End: 2025-03-12
Attending: FAMILY MEDICINE
Payer: MEDICARE

## 2025-03-12 DIAGNOSIS — H35.711 CSR (CENTRAL SEROUS RETINOPATHY), RIGHT: ICD-10-CM

## 2025-03-12 DIAGNOSIS — H01.014 ULCERATIVE BLEPHARITIS OF UPPER EYELIDS OF BOTH EYES: ICD-10-CM

## 2025-03-12 DIAGNOSIS — H01.011 ULCERATIVE BLEPHARITIS OF UPPER EYELIDS OF BOTH EYES: ICD-10-CM

## 2025-03-12 DIAGNOSIS — H52.13 MYOPIA OF BOTH EYES WITH ASTIGMATISM AND PRESBYOPIA: ICD-10-CM

## 2025-03-12 DIAGNOSIS — Z96.1 PSEUDOPHAKIA, BOTH EYES: Primary | ICD-10-CM

## 2025-03-12 DIAGNOSIS — H35.361 DRUSEN OF MACULA, RIGHT: ICD-10-CM

## 2025-03-12 DIAGNOSIS — H52.4 MYOPIA OF BOTH EYES WITH ASTIGMATISM AND PRESBYOPIA: ICD-10-CM

## 2025-03-12 DIAGNOSIS — H52.203 MYOPIA OF BOTH EYES WITH ASTIGMATISM AND PRESBYOPIA: ICD-10-CM

## 2025-03-12 PROCEDURE — 92134 CPTRZ OPH DX IMG PST SGM RTA: CPT | Performed by: OPHTHALMOLOGY

## 2025-03-12 PROCEDURE — 92014 COMPRE OPH EXAM EST PT 1/>: CPT | Performed by: OPHTHALMOLOGY

## 2025-03-12 PROCEDURE — G0463 HOSPITAL OUTPT CLINIC VISIT: HCPCS | Performed by: OPHTHALMOLOGY

## 2025-03-12 ASSESSMENT — CONF VISUAL FIELD
OD_SUPERIOR_NASAL_RESTRICTION: 0
OD_SUPERIOR_TEMPORAL_RESTRICTION: 0
OS_SUPERIOR_TEMPORAL_RESTRICTION: 0
OD_INFERIOR_NASAL_RESTRICTION: 0
OD_INFERIOR_TEMPORAL_RESTRICTION: 0
OS_NORMAL: 1
OS_INFERIOR_NASAL_RESTRICTION: 0
OS_SUPERIOR_NASAL_RESTRICTION: 0
OS_INFERIOR_TEMPORAL_RESTRICTION: 0
OD_NORMAL: 1

## 2025-03-12 ASSESSMENT — REFRACTION_WEARINGRX
OS_CYLINDER: +1.50
OS_AXIS: 005
OS_SPHERE: -1.75
SPECS_TYPE: BIFOCAL
OD_CYLINDER: +0.75
OS_ADD: +2.00
OD_ADD: +2.00
OD_SPHERE: -1.25
OD_AXIS: 165

## 2025-03-12 ASSESSMENT — REFRACTION_MANIFEST
OD_SPHERE: -1.25
OS_SPHERE: -1.75
OS_CYLINDER: +1.75
OD_AXIS: 170
OD_CYLINDER: +1.00
OD_ADD: +2.75
OS_AXIS: 180
OS_ADD: +2.75

## 2025-03-12 ASSESSMENT — EXTERNAL EXAM - RIGHT EYE: OD_EXAM: NORMAL

## 2025-03-12 ASSESSMENT — VISUAL ACUITY
OS_CC: 20/20
OD_CC: 20/20
METHOD: SNELLEN - LINEAR
CORRECTION_TYPE: GLASSES

## 2025-03-12 ASSESSMENT — TONOMETRY
OD_IOP_MMHG: 14
IOP_METHOD: TONOPEN
OS_IOP_MMHG: 13

## 2025-03-12 ASSESSMENT — CUP TO DISC RATIO
OD_RATIO: 0.6
OS_RATIO: 0.5

## 2025-03-12 ASSESSMENT — EXTERNAL EXAM - LEFT EYE: OS_EXAM: NORMAL

## 2025-03-12 NOTE — PROGRESS NOTES
HPI    Reports slight difficulty focusing ou near and far.     Denies new flashes, floaters, curtain, pain 0/10.     Takes Eliquis to prevent stroke.     Gtts: none    Lab Results       Component                Value               Date                       A1C                      5.6                 03/05/2025                 A1C                      5.8                 10/18/2021                Naila Deal COA, March 12, 2025 2:50 PM        Last edited by Naila Deal, VANDANA on 3/12/2025  3:02 PM.         Review of systems for the eyes was negative other than the pertinent positives/negatives listed in the HPI.      Assessment & Plan    HPI:  Melo Dangelo is a 84 year old male with history of CKD, IgA Nephropathy, MDD, sensorineural hearing loss, HTN, BPH, seizure history, Central serous chorioretinopathy, myopia with astigmatism and presbopia presents for annual exam. Notes some difficulty focusing, but only lasts seconds. Not using any drops. Wearing glasses    POHx: Central serous chorioretinopathy right eye, hyperopia with astigmatism and presbyopia  PMHx: CKD, IgA Nephropathy, MDD, sensorineural hearing loss, Central serous chorioretinopathy, HTN, BPH, seizure  Current Medications:   Current Outpatient Medications   Medication Sig Dispense Refill    acetaminophen (TYLENOL) 325 MG tablet Take 325-650 mg by mouth every 6 hours as needed for mild pain      apixaban ANTICOAGULANT (ELIQUIS ANTICOAGULANT) 2.5 MG tablet Take 1 tablet (2.5 mg) by mouth 2 times daily. 180 tablet 3    busPIRone (BUSPAR) 5 MG tablet Take 1 tablet (5 mg) by mouth 2 times daily. Has started 180 tablet 3    calcium carbonate-vitamin D (OSCAL) 500-5 MG-MCG tablet Take 1 tablet by mouth daily. 90 tablet 3    Cholecalciferol (D3-1000) 25 MCG (1000 UT) CAPS Take 1 capsule by mouth daily      cyanocobalamin (VITAMIN B-12) 100 MCG tablet Take 1 tablet (100 mcg) by mouth daily. Take 1,000 mcg by mouth every morning - 90 tablet 3     escitalopram (LEXAPRO) 20 MG tablet Take 1 tablet (20 mg) by mouth every morning. 90 tablet 3    lamoTRIgine (LAMICTAL) 100 MG tablet Take 2 tablets (200 mg) by mouth 2 times daily. 360 tablet 3    metoprolol succinate ER (TOPROL XL) 25 MG 24 hr tablet TAKE 1/2 TABLET BY MOUTH DAILY 45 tablet 3    multivitamin (OCUVITE) TABS tablet Take 1 tablet by mouth daily. Please keep the appointment on 9/3/24 for further refills. 90 tablet 3    simvastatin (ZOCOR) 20 MG tablet Take 1 tablet (20 mg) by mouth at bedtime. 90 tablet 3    sodium bicarbonate 650 MG tablet Take 1 tablet (650 mg) by mouth 2 times daily. 180 tablet 3    tamsulosin (FLOMAX) 0.4 MG capsule Take 2 capsules (0.8 mg) by mouth daily. 180 capsule 3     No current facility-administered medications for this visit.     FHx: denies family history of ocular conditions   PSHx: Cataract extraction/iol Padilla right eye 11/3/22, Padilla left eye 11/17/22        Current Eye Medications:      Assessment & Plan:  (Z96.1) Pseudophakia, both eyes  (primary encounter diagnosis)  Padilla right eye 11/3/22  Padilla left eye 11/17/22    Doing well    (H35.711) Central serous chorioretinopathy of right eye  (H35.361) Drusen of macula, right  Initially noted 10/2019  Followed previously with Dr. 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        Best Padilla MD     Attending Physician Attestation:  Complete documentation of historical and exam elements from today's encounter can be found in the full encounter summary report (not reduplicated in this progress note).  I  personally obtained the chief complaint(s) and history of present illness.  I confirmed and edited as necessary the review of systems, past medical/surgical history, family history, social history, and examination findings as documented by others; and I examined the patient myself.  I personally reviewed the relevant tests, images, and reports as documented above.  I formulated and edited as necessary the assessment and plan and discussed the findings and management plan with the patient and family. - eBst Padilla MD

## 2025-03-12 NOTE — PATIENT INSTRUCTIONS
Start lid hygiene (Ocusoft lid scrubs or gently scrubbing the upper and lower lids baby with shampoo)

## 2025-03-20 NOTE — PROGRESS NOTES
Assessment & Plan     Anxiety  Cont as is, try the gabapentin prn  - Adult Mental Health  Referral; Future    Major depressive disorder, recurrent episode, moderate (H)  See Dr Solano  - Adult Mental Health  Referral; Future    Memory difficulties  Normal on mini mental today, address mental health        33 minutes spent on the date of the encounter doing chart review, history and exam, documentation and further activities per the note inclduing minimental, phq, thomas time    Return in about 2 months (around 5/24/2023).    Francis Smith MD  [facility] PRIMARY CARE CLINIC [place]    Armand Alejo is a 82 year old, presenting for the following health issues:  Follow Up (1 month follow up)  No flowsheet data found.  HPI Here in f/u  1-anxiety: he'd like futher help, much of this is around wife's ill health, he does much of the caretaking. Has not tried the gapapentin, is taking lexapro daily  He states, also for seizuers on lamictal which might be helping  2-CKD: stable, renal  notes rvwd  3-htn: at goal    He wonders about his memory vs stress causing sense of low memory; today we do in visit minimental status exam, he scores 28/30. phq is 3 no si  Thomas is 9    Cannot get into therapy till May 15 but plans to keep that    Past Medical History:   Diagnosis Date     2019 novel coronavirus disease (COVID-19) 10/27/2020    also on 9/8/22     Alcohol dependence (H)      Chronic kidney disease, stage IV (severe) (H)      Combined forms of age-related cataract of both eyes      Concussion with loss of consciousness     x10 going back to childhood     HTN (hypertension)      IgA nephropathy      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      Paroxysmal atrial fibrillation (H)      PVD (posterior vitreous detachment)      Seizure disorder (H)     last seizure 2008     Past Surgical History:   Procedure Laterality Date     NO HISTORY OF SURGERY       PHACOEMULSIFICATION  CLEAR CORNEA WITH STANDARD INTRAOCULAR LENS IMPLANT Right 11/3/2022    Procedure: RIGHT EYE PHACOEMULSIFICATION, CATARACT, WITH INTRAOCULAR LENS IMPLANT COMPLEX CASE ;  Surgeon: Best Padilla MD;  [place] [facility] [place]     PHACOEMULSIFICATION CLEAR CORNEA WITH STANDARD INTRAOCULAR LENS IMPLANT Left 11/17/2022    Procedure: LEFT EYE PHACOEMULSIFICATION, CATARACT, WITH INTRAOCULAR LENS IMPLANT;  Surgeon: Best Padilla MD;  Location: [facility] [place]     Current Outpatient Medications   Medication     acetaminophen (TYLENOL) 325 MG tablet     calcium carbonate-vitamin D (OSCAL) 500-5 MG-MCG tablet     cyanocobalamin (VITAMIN B-12) 100 MCG tablet     escitalopram (LEXAPRO) 20 MG tablet     gabapentin (NEURONTIN) 100 MG capsule     lamoTRIgine (LAMICTAL) 100 MG tablet     metoprolol succinate ER (TOPROL XL) 25 MG 24 hr tablet     Multiple Vitamins-Minerals (EYE VITAMINS PO)     simvastatin (ZOCOR) 20 MG tablet     tamsulosin (FLOMAX) 0.4 MG capsule     No current facility-administered medications for this visit.     No Known Allergies      Review of Systems         Objective    /69 (BP Location: Left arm, Patient Position: Sitting, Cuff Size: Adult Regular)   Pulse 55   Wt 82.2 kg (181 lb 4.8 oz)   SpO2 96%   BMI 25.30 kg/m    Body mass index is 25.3 kg/m .  Physical Exam                  20-Mar-2025 10:30

## 2025-04-08 ENCOUNTER — ANCILLARY PROCEDURE (OUTPATIENT)
Dept: GENERAL RADIOLOGY | Facility: CLINIC | Age: 85
End: 2025-04-08
Attending: PSYCHIATRY & NEUROLOGY
Payer: MEDICARE

## 2025-04-08 DIAGNOSIS — R41.9 NEUROCOGNITIVE DISORDER: ICD-10-CM

## 2025-04-08 LAB
APPEARANCE CSF: CLEAR
COLOR CSF: COLORLESS
GLUCOSE CSF-MCNC: 60 MG/DL (ref 40–70)
Lab: NORMAL
PERFORMING LABORATORY: NORMAL
PROT CSF-MCNC: 35.4 MG/DL (ref 15–45)
RBC # CSF MANUAL: 31 /UL (ref 0–2)
SPECIMEN STATUS: NORMAL
TEST NAME: NORMAL
TUBE # CSF: 4
WBC # CSF MANUAL: 0 /UL (ref 0–5)

## 2025-04-08 PROCEDURE — 87070 CULTURE OTHR SPECIMN AEROBIC: CPT

## 2025-04-08 PROCEDURE — 89050 BODY FLUID CELL COUNT: CPT

## 2025-04-08 PROCEDURE — 62328 DX LMBR SPI PNXR W/FLUOR/CT: CPT | Performed by: RADIOLOGY

## 2025-04-08 PROCEDURE — 82234 BETA-AMYLOID 1-42 (ABETA 42): CPT

## 2025-04-08 PROCEDURE — 84393 TAU PHOSPHORYLATED EA: CPT

## 2025-04-08 PROCEDURE — 87015 SPECIMEN INFECT AGNT CONCNTJ: CPT

## 2025-04-08 PROCEDURE — 82945 GLUCOSE OTHER FLUID: CPT

## 2025-04-08 PROCEDURE — 84157 ASSAY OF PROTEIN OTHER: CPT

## 2025-04-08 PROCEDURE — 84394 TOTAL TAU: CPT

## 2025-04-08 PROCEDURE — 87205 SMEAR GRAM STAIN: CPT

## 2025-04-10 ENCOUNTER — PATIENT OUTREACH (OUTPATIENT)
Dept: CARE COORDINATION | Facility: CLINIC | Age: 85
End: 2025-04-10
Payer: MEDICARE

## 2025-04-10 LAB
BACTERIA CSF CULT: NORMAL
GRAM STAIN RESULT: NORMAL
GRAM STAIN RESULT: NORMAL
MAYO MISC RESULT: NORMAL

## 2025-04-10 NOTE — PROGRESS NOTES
Anemia Management Note - Reminder     Follow-up with anemia management service:    Melo is due to have his Anemia Labs drawn. Sent a reminder via [employer].         Latest Ref Rng & Units 5/6/2024 5/29/2024 7/24/2024 11/4/2024 12/3/2024 2/13/2025 3/5/2025   Anemia   Hemoglobin 13.3 - 17.7 g/dL 8.8   9.8  9.5  9.9  10.0  9.6    IV Iron Dose   1000mg        TSAT 15 - 46 %   41  35  22  38  25    Ferritin 31 - 409 ng/mL   675  805 478  594  603        Orders needed to be renewed (for next follow-up date) in EPIC: None       Follow-up call date: 4/17/25    Amy Hurd RN   Anemia Services  [facility]  [email or web address]   Office : [phone]  Fax: [phone]

## 2025-04-13 LAB
BACTERIA CSF CULT: NO GROWTH
GRAM STAIN RESULT: NORMAL
GRAM STAIN RESULT: NORMAL

## 2025-04-17 ENCOUNTER — TELEPHONE (OUTPATIENT)
Dept: NEUROLOGY | Facility: CLINIC | Age: 85
End: 2025-04-17
Payer: MEDICARE

## 2025-04-17 ENCOUNTER — PATIENT OUTREACH (OUTPATIENT)
Dept: CARE COORDINATION | Facility: CLINIC | Age: 85
End: 2025-04-17
Payer: MEDICARE

## 2025-04-17 NOTE — TELEPHONE ENCOUNTER
----- Message from Gwendolyn MCDANIEL sent at 4/17/2025 12:02 PM CDT -----    ----- Message -----  From: Carla Fam RN  Sent: 4/17/2025  12:01 PM CDT  To: [facility]rology [place] [facility]    Melo Moreland saw Dr. Elias in February- was doing testing and had labs, LP, MRI done-     Pt is needing this paperwork completed for driving-  Hello,  Patient got a letter from the DMV and they said they would revoke patients license in 06/2025 if patient doesn't submit a claim regarding that patient is safe to drive due to seizures and falling asleep at the road. Patient stated that the letter stated that patient needs to get a letter signed from PCP stating that its safe for patient to drive. Patient stated previously that he would bring the paper in on 03/12 to present to PCP nurses to see what's going on. Patient stated that two weeks ago patient dropped off paperwork to  at primary care clinic and he hasn't heard anything about it since. He stated that it would need to be signed and turned in to the state. I do not see that any paperwork was dropped off by him. Can this be looked into further? Patient would like a call regarding an update on this paperwork that he says he dropped off.     Thanks!     Sharon MURRAY Community Health Worker  Clinic Care Coordination  [facility]   Clinic #: [phone]  Direct #: [phone]       Dr. Smith suggested EEG from previous documentation- Is Dr. Elias still wanting the EEG done and would Neuro be comfortable filling out this paperwork for him after EEG/Neuropsych testing?     Thanks so much,  Carla MASTERS, RN Care Manager  [facility]   Clinic Phone: [phone]  Direct Phone: [phone]

## 2025-04-17 NOTE — LETTER
PRIMARY CARE CARE COORDINATION   - CLINICS AND SURGERY CENTER  Patient Centered Plan of Care  About Me:        Patient Name:  Melo Dangelo    YOB: 1940  Age:         84 year old   [facility] MRN:    [id number] Telephone Information:  Home Phone [phone]   Mobile [phone]       Address:  [address]  [place] [address] Email address:  [email or web address]      Emergency Contact(s)    Name Relationship Lgl Grd Work Phone Home Phone Mobile Phone   1. ELANA DANGELO Spouse No  [phone]   2. JOSE WALSH Daughter No  [phone]    3. DIANNA DANGELO Daughter No  [phone]    4. OMKAR COSTELLO Daughter No  [phone]            Primary language:  English     needed? No   [facility]:  [phone] op. 1  Other communication barriers:None    Preferred Method of Communication:     Current living arrangement: No data recorded  Mobility Status/ Medical Equipment: Independent        Health Maintenance  Health Maintenance Reviewed: Due/Overdue  Health Maintenance Due   Topic Date Due    DIABETIC FOOT EXAM  Never done    DEPRESSION ACTION PLAN  Never done    HEPATITIS B IMMUNIZATION (4 of 4 - Risk Dialysis 4-dose series) 07/01/1990    ZOSTER IMMUNIZATION (1 of 2) Never done    RSV VACCINE (1 - 1-dose 75+ series) Never done    MICROALBUMIN  10/18/2023    COVID-19 Vaccine (8 - 2024-25 season) 03/23/2025    LIPID  04/16/2025           My Access Plan  Medical Emergency 911   Primary Clinic Line [phone] to speak with clinic staff or schedule appointment   24 Hour Nurse Line [phone] (toll-free)   Preferred Urgent Care No data recorded             My Care Team Members  Patient Care Team         Relationship Specialty Notifications Start End    Francis Smith MD PCP - General Family Medicine  10/28/20     Phone: [phone] Fax: [phone]         [address] [facility] [address]    Inderjit Grier MD   10/24/19      Phone: [phone] Fax: [phone]         [facility] [address]  [facility] [address]    Esperanza Guillaume CNP Nurse Practitioner Urology  12/15/20     Phone: [phone] Fax: [phone]         [address] [facility] [address]    Pushpa Alvarado, RN Registered Nurse   12/15/20     Francis Smith MD Assigned PCP   12/27/20     Phone: [phone] Fax: [phone]         [address] [place] [address]    Fernando Chong MD MD Nephrology  11/18/21     Phone: [phone] Fax: [phone]         [address] [facility] [address] [facility] [address]    Merry Barrow MD MD Nephrology  11/29/21     Phone: [phone] Fax: [phone]         [address] [facility] [address] [facility] [address]    Nithin Tolbert [facility] Pharmacist Pharmacist  1/6/22     Phone: [phone] Fax: [phone]         [address] [facility] [address]    Adis Ventura MD  Cardiovascular Disease  2/3/22     Matthew Cabrera MD MD Endocrinology, Diabetes, and Metabolism  6/29/22     Phone: [phone] Fax: [phone]         [address] [facility] [address]    Katerine Gonzalez, RN Specialty Care Coordinator Nephrology All results, Admissions 1/25/23     Flores Arias, RN Specialty Care Coordinator Nephrology Admissions 6/9/23     Dialysis Access Care Coordinator    Phone: [phone]         Nidhi Schilling, RN Specialty Care Coordinator Nephrology Admissions 6/9/23     Dialysis Access Care Coordinator    Phone: [phone]         Madyson Evangelista APRN CNP Nurse Practitioner Cardiovascular Disease  7/17/23     Phone: [phone] Fax: [phone] [address] [facility] [address]    Brittanie Etienne PA-C Assigned Heart and Vascular Provider   2/23/24     Phone: [phone] Fax: [phone] [address] [place] [address]    Carla Fam, RN Lead Care Coordinator Primary Care - CC Admissions 4/19/24     Amy Hurd, RN Specialty Care  Coordinator Pharmacy  5/6/24     Anemia Services    Phone: [phone]          [facility] [address] [facility] [address]    Danya Laboy AuD Audiologist Audiology  9/11/24     Phone: [phone] Fax: [phone]         [address] [facility] [address]    Lindsay Rich PA-C Physician Assistant Otolaryngology  9/11/24     Phone: [phone] Fax: [phone]         [address] [facility] [address]    Ish Elias MD MD Neurology  2/10/25     Phone: [phone] Fax: [phone]         [address] [facility] [address]    Carlos Sullivan, PhD Psychologist Neuropsychology  2/14/25     Phone: [phone]          [address] [facility] [address]    Ish Elias MD Assigned Neuroscience Provider   2/23/25     Phone: [phone] Fax: [phone]         [address] [facility] [address]    Sharon Martinez CHW Community Health Worker Primary Care - CC Admissions 3/7/25     Best Padilla MD Physician Ophthalmology  3/11/25     Phone: [phone] Fax: [phone]         [address] [facility] [address]    Best Padilla MD Assigned Surgical Provider   3/23/25     Phone: [phone] Fax: [phone]         [address] [facility] [address]    Gini Washington NP Assigned Nephrology Provider   3/23/25     Phone: [phone] Fax: [phone]         [address] [facility] [address] [facility] [address]                My Care Plans  Self Management and Treatment Plan    Care Plan  Care Plan: Health Maintenance       Problem: Health Maintenance Due or Overdue       Goal: Become up-to-date with health maintenance visit(s)       Start Date: 1/8/2025    Recent Progress: On track    Priority: High    Note:     Barriers: patient is having some memory concerns  Strengths: patient sees specialists and PCP for regular care  Patient expressed understanding of goal: yes  Action steps to achieve this goal:  1. I  will continue to see my providers for regularly scheduled appointments.   2. I will attend Neuropsych exam.                                   Advance Care Plans/Directives Type:   Advanced Directive - On File           My Medical and Care Information  Problem List   Patient Active Problem List   Diagnosis    Central serous chorioretinopathy of right eye    Cupping of optic disc, right    Peripheral drusen of both eyes    Posterior vitreous detachment, bilateral    Age-related nuclear cataract of both eyes    Major depressive disorder    IgA nephropathy    CKD (chronic kidney disease) stage 4, GFR 15-29 ml/min (H)    High risk medication use    Sensorineural hearing loss (SNHL) of both ears    Combined forms of age-related cataract of both eyes    Alcohol dependence in remission (H)    Frequent PVCs    History of seizure      Current Medications and Allergies:      Current Outpatient Medications:     acetaminophen (TYLENOL) 325 MG tablet, Take 325-650 mg by mouth every 6 hours as needed for mild pain, Disp: , Rfl:     apixaban ANTICOAGULANT (ELIQUIS ANTICOAGULANT) 2.5 MG tablet, Take 1 tablet (2.5 mg) by mouth 2 times daily., Disp: 180 tablet, Rfl: 3    busPIRone (BUSPAR) 5 MG tablet, Take 1 tablet (5 mg) by mouth 2 times daily. Has started, Disp: 180 tablet, Rfl: 3    calcium carbonate-vitamin D (OSCAL) 500-5 MG-MCG tablet, Take 1 tablet by mouth daily., Disp: 90 tablet, Rfl: 3    Cholecalciferol (D3-1000) 25 MCG (1000 UT) CAPS, Take 1 capsule by mouth daily, Disp: , Rfl:     cyanocobalamin (VITAMIN B-12) 100 MCG tablet, Take 1 tablet (100 mcg) by mouth daily. Take 1,000 mcg by mouth every morning -, Disp: 90 tablet, Rfl: 3    escitalopram (LEXAPRO) 20 MG tablet, Take 1 tablet (20 mg) by mouth every morning., Disp: 90 tablet, Rfl: 3    lamoTRIgine (LAMICTAL) 100 MG tablet, Take 2 tablets (200 mg) by mouth 2 times daily., Disp: 360 tablet, Rfl: 3    metoprolol succinate ER (TOPROL XL) 25 MG 24 hr tablet, TAKE 1/2  TABLET BY MOUTH DAILY, Disp: 45 tablet, Rfl: 3    multivitamin (OCUVITE) TABS tablet, Take 1 tablet by mouth daily. Please keep the appointment on 9/3/24 for further refills., Disp: 90 tablet, Rfl: 3    simvastatin (ZOCOR) 20 MG tablet, Take 1 tablet (20 mg) by mouth at bedtime., Disp: 90 tablet, Rfl: 3    sodium bicarbonate 650 MG tablet, Take 1 tablet (650 mg) by mouth 2 times daily., Disp: 180 tablet, Rfl: 3    tamsulosin (FLOMAX) 0.4 MG capsule, Take 2 capsules (0.8 mg) by mouth daily., Disp: 180 capsule, Rfl: 3     No Known Allergies      Care Coordination Start Date: 4/19/2024   Frequency of Care Coordination: monthly, more frequently as needed     Form Last Updated: 04/21/2025

## 2025-04-17 NOTE — PROGRESS NOTES
Clinic Care Coordination Contact  Care Coordination Clinician Chart Review    Situation: Patient chart reviewed by Care Coordinator.       Background: Care Coordination Program started: 4/19/2024. Initial assessment completed and patient-centered care plan(s) were developed with participation from patient. Lead CC handed patient off to CHW for continued outreaches.       Assessment: Per chart review, patient outreach completed by CC CHW on 4/11.  Patient is actively working to accomplish goal(s). Patient's goal(s) appropriate and relevant at this time. Patient is due for updated Plan of Care.  Assessments will be completed annually or as needed/with change of patient status.      Care Plan: Health Maintenance       Problem: Health Maintenance Due or Overdue       Goal: Become up-to-date with health maintenance visit(s)       Start Date: 1/8/2025    Recent Progress: On track    Priority: High    Note:     Barriers: patient is having some memory concerns  Strengths: patient sees specialists and PCP for regular care  Patient expressed understanding of goal: yes  Action steps to achieve this goal:  1. I will continue to see my providers for regularly scheduled appointments.   2. I will attend Neuropsych exam.                                      Plan/Recommendations: The patient will continue working with Care Coordination to achieve goal(s) as above. CHW will continue outreaches at minimum every 30 days and will involve Lead CC as needed or if patient is ready to move to Maintenance. Lead CC will continue to monitor CHW outreaches and patient's progress to goal(s) every 6 weeks.     Plan of Care updated and sent to patient: No    Per discussion from 4/14/25 MyChart encounter-   Pt has had LP and MRI which neither showed Alzheimers Dementia. RN sent message to Neurology clinic to determine if plan is still for Dr. Nam to do the EEG for patient and to determine if Neurology would be comfortable filling out that form  for patient after EEG and Neuropsych testing.     RN will await reply from Neuro team for further outreach and care planning for patient.     CHARLY CLARK RN on 4/17/2025 at 12:04 PM    Addendum:  Neurology provider ok with completing paperwork for patient. PCP ok with this as well. No additional testing needed. [employer] message was sent to patient. Task assigned to CHW to follow up on this for patient. Updated care plan sent.     CHARLY CLARK RN on 4/21/2025 at 10:42 AM

## 2025-04-20 NOTE — PROGRESS NOTES
Melo Dangelo is an 84 year old year old male is being evaluated via a billable video visit.      If the video visit is dropped, the invitation should be resent by: Text to cell phone: [phone]    Will anyone else be joining your video visit? No    Video-Visit Details    Type of service:  Video Visit    [place] [facility] [place]provider location):  Off-site    Platform used for Video Visit: Zoom (Telehealth)    Neuropsychologist has received verbal consent for a Video Visit from the patient? Yes. Empirical studies have demonstrated that video/hybrid formats are appropriate and effective means for delivering neuropsychological services to the patient. Interview was conducted via video platform [facility] [place] [place] and formal testing was conducted by the psychometrist in [place] [facility] [place]    Video Start Time (time video started): 7:57 AM    Video End Time (time video stopped): 8:25 AM    RE: Melo Dangelo  MR#: [id number]  : 1940  DOS: 2025    NEUROPSYCHOLOGICAL CONSULTATION  This is a medical document intended for communication with the referring provider. It is written in medical language and may contain abbreviations or verbiage that are unfamiliar. It may appear blunt or direct. This report and the results within are not intended to be interpreted in isolation without consultation with your medical provider.     REASON FOR REFERRAL:  Melo Dangelo is a 84 year old male with 16 years of education. The patient was referred for a neuropsychological evaluation by Dr. Elias to assess his cognitive and emotional functioning secondary to memory difficulties. The evaluation was requested in order to document his current level of functioning, assist with the differential diagnosis and provide appropriate recommendations to the patient, family and treatment team. The patient was informed that the evaluation included multiple measures  of performance and symptom validity, and he was encouraged to provide his best effort at all times.  The nature of the neuropsychological evaluation was also discussed, including limits of confidentiality (for suspected child or elder abuse, potential homicide or suicide, and court orders). The patient was also informed that the report would be placed on the electronic medical records system.  The patient was also given an opportunity to ask questions. The patient indicated that he understood the information and consented to participate in the evaluation.    PROCEDURES:   Review of records, clinical interview,  Mental Status-orientation, Wide Range Achievement Test-4, Wechsler Adult Intelligence Scale-IV, Clock Drawing Test, Verbal Fluency Test, Geriatric Depression Scale, Geriatric Anxiety Scale, Trailmaking Test, NAB Naming Test, TOMM, Stroop Test, and RBANS    REVIEW OF RECORDS: Records stated that the patient  has a past medical history of 2019 novel coronavirus disease (COVID-19) (10/27/2020), Alcohol dependence (H), Chronic kidney disease, stage IV (severe) (H), Combined forms of age-related cataract of both eyes, Concussion with loss of consciousness, HTN (hypertension), IgA nephropathy, 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 Records noted that the patient has a current medication list which includes the following prescription(s): acetaminophen, apixaban anticoagulant, buspirone, calcium carbonate-vitamin d, d3-1000, cyanocobalamin, escitalopram, lamotrigine, metoprolol succinate er, multivitamin, simvastatin, sodium bicarbonate, and tamsulosin. Medical records from 2/13/25 noted a MoCA administered on that date with a score of 27/30. These records noted that he has a  history of alcohol abuse (in remission since 2001), major depression, IgA nephropathy, CKD stage IV who was referred for  evaluation of neurocognitive decline. Most recent imaging studies from 2009 when MRI scan showed mild atrophy of hippocampal formation bilaterally this was attributed to his past use of alcohol. Lately he has been experiencing increased short-term memory difficulty but scored 29/30 on MoCA.  The records further stated that has a history of  seizure disorder. His most recent seizure was in 2009 and likely was in the setting of alcohol use. He has been on lamotrigine more so for mood stabilization.  The records elaborated that  he used to drink excessively since age 21 and had few seizures starting at the age of 55.  Although he was not told clearly that his seizures were related to alcohol it was implied.  He had a MRI of the brain in 2009 that showed cerebral atrophy with mild atrophy of the hippocampus bilaterally he was also started on lamotrigine and had an EEG in the past that showed nonspecific slowing and was continued on Lamictal that has not been changed.  His most recent seizure was in 2009 and he claims he had stopped drinking. His other issue is cognitive difficulty that got worse in the recent past.  His wife passed away a year ago who used to tell him he struggles with short-term memory.  He is living independently and denies any visual or auditory hallucinations.  He is able to attend to activities of daily living.  He is managing his finances and has not following recommend to [facility].  There is no history of gait difficulty or any bladder incontinence.  An MRI scan of the brain was scheduled, per records.    Medical records from 2/26/25 noted that his medical history also includes  HTN, a fib, IgA nephropathy, CKD stage 5, and alcohol abuse in remission. Today, we reviewed most recent visits from neurology, cardiology, and nephrology and discussed the rational for upcoming lumbar puncture and Brain MRI. He does have valid concerns about receiving IV contrast for MRI and requested that he discuss this  "with his nephrologist at upcoming visit next week. He did voice confusion about the reason for the eliquis. We discussed his medical history and increased risk for developing a stroke. We did discuss that nephrology was comfortable with him taking this medication, renally dosed.  These records also noted a history of anxiety. ENT records also from 2/26/25 noted that the patient has  bilateral sensorineural hearing loss. Reviewed audiogram with patient today. Patient is a hearing aid candidate.  Discussed with patient the benefits of hearing aids including improvement in communication and prevention of cognitive decline, social isolation, and degradation of nerve function including word recognition scores.     CLINICAL INTERVIEW: The patient was interviewed via video platform to the [facility] ([facility] [place] and he was interviewed alone.  On interview, the patient reported difficulty with his memory, including both short-term and long-term memory issues.  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  He also stated that he has difficulty remembering information that he wants to remember and feels that he is \"Dumber than I used to be.\"  The patient also reported that he feels like his thinking is slower and he gets distracted easily.  The patient reported he has noticed these difficulties for about the past year and they are a little bit worse.  Functionally, the patient reported that he drives and feels he needs GPS to help him find locations.  He said he has to concentrate harder when he drives now.  He also reported more difficulty with direction finding.  The patient denied difficulty with financial management, except that he gets frustrated when using the computer at times.  In terms of medication management, he reported that he previously used prepackaged medications, but discovered that they were incorrect so he changed pharmacies and now he has to " manage his medications more closely.  He denied difficulty with basic also chores such as cooking, although he said he does not remember many recipes.    The patient acknowledged a history of depression and noted that prior to his [facility] he was demoted from his job which increased his depression.  He reported that he had some suicidal thoughts at that time, but this was in 2001.  He said his mood is better now and he denied any suicidal or homicidal ideation. Further, he denied any history of suicide attempts.  The patient reported that his sleep is generally good although he needs to go to the bathroom 3-4 times per night.  He said he sleeps about 7 hours per night, and occasionally will have difficulty falling asleep particularly when he has appointments the next day.  He denied any history of any sleep disorder such as sleep apnea or REM sleep behavior disorder.  He denied difficulty with appetite.  He also denied any hallucinations or delusions.  The patient also acknowledged his history of alcohol abuse, and said he has been sober since 2001.  He reported he was in treatment twice and has not drunk alcohol since his second treatment.  He also reported that he quit cigarette smoking about 40 years ago and denied any use of illicit substances.    Medically, he confirmed a history of kidney disease and indicated that he is on the transplant list and near the point where he will need dialysis.  He also noted that he had some heart issues where he has heart was skipping a beat, but he had a medication change and this has improved.  He also noted that he was started on Eliquis and this caused some difficulties but he was unsure if it was related to Eliquis.  He denied any history of traumatic brain injury, multiple sclerosis, stroke, seizures, Parkinson disease, hypercholesterolemia, sleep apnea, hypothyroidism, diabetes, cancer, or liver disease.  He acknowledged a history of high blood pressure.  He indicated  that he also has hearing difficulties but does not have hearing aids.  The patient reported that he wears eyeglasses all the time.  The patient confirmed that the medications listed the records were up-to-date.    Academically, the patient reported that he graduated from college.  He said he was a poor student in school but after he met his wife she helped him academically.  He denied ever being in special education classes or having to repeat a grade.  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  The patient said that he has 3 daughters and he currently lives at home alone.  He reported that his wife passed away about 1.5 years ago.    BEHAVIORAL OBSERVATIONS:  On examination, the patient was casually but appropriately dressed and groomed. The patient was cooperative with the evaluation and was talkative.  Speech was normal in volume and tone.  However, his rate of speech was slow with a noticeable latency in his responding, and he was mildly tangential at times.  He also had limited eye contact.  The patient also appeared to put forth his best effort on all tasks. Thus, the results of this evaluation are a reasonably valid reflection of the patient's current level of functioning. There were no indications of hallucinations, delusions or unusual thought processes and the patient was generally well oriented to personal information, place, and time. There were no demonstrations of a practical memory problem. The patient was a good historian, with a self-report consistent with medical records. Affect was also mildly constricted.      RESULTS: Formal testing was conducted in person by a psychometrist. Formal performance validity measures were within expected limits and consistent with the above noted observations.  Psychometric estimates of the patient's premorbid global cognitive functioning based upon single word reading ability were in the average range, which is consistent  with his educational and occupational history.    Measures of attention/concentration were variable.  For example, a digit span task was in the average range.  However, a visual scanning task that integrates attention and processing speed was in the low average range.  Speed of information processing was also slower than expected.  For example, psychomotor speed was in the low average range.  Additionally, motor-free processing speed was variable, ranging from extremely low to low average.    Measures of the patient's memory functioning were variable.  For example, immediate recall on a word list learning task was in the below average range, while immediate recall on a story memory task was in the low average range.  Delayed verbal recall on the story memory and word list learning tasks were in the average range.  Verbal recognition memory was also in the low average to average range.  In terms of visual memory, delayed visual recall the complex figure drawing was in the low average range, consistent with his copying of the figure.    Expressive language functioning was also variable.  For example, confrontation naming was in the low average range.  Likewise, semantic fluency was in the low average range, but phonemic fluency was in the average range.  Receptive language functioning, based upon his performance during interview, was within expected limits.    Visual-spatial and visual constructional abilities were generally within expected limits.  For example, motor-free line orientation judgment was well within expected limits.  Complex figure drawing and clock drawing tasks also did not indicate evidence for significant visual spatial distortions.    Measures of the patient's executive functioning were broadly within expected limits.  For example, a complex visual scanning task that integrates cognitive flexibility was in the low average range, but his performance was consistent with a simpler version that  integrated only attention.  Further, the measure of response inhibition that integrates processing speed was in the average range.  There was also no evidence for significant planning or organizational difficulties on complex figure drawing or clock drawing tasks.    Assessment of the patient's emotional functioning was completed utilizing clinical interview and self-report measures of depression and anxiety.  On the self-report measures, the patient was mild to moderate depressive symptoms and he endorsed severe anxiety symptoms.    SUMMARY:  In summary, the neuropsychological assessment results indicated no evidence for significant change or decline in global cognitive functioning.  There was evidence for variable attention/concentration and slowed speed of information processing, which likely resulted in inefficient encoding of new information.  However, there was no evidence for retrieval deficits or rapid forgetting of previously learned verbal or visual information.  Language functioning was also variable, while visual-spatial abilities and executive functioning were generally within expected limits.  Further, there was evidence for mild to moderate depressive symptoms and severe anxiety symptoms.  The pattern of results is nonspecific, but not consistent with an Alzheimer's/type process.  The results indicated evidence the patient met criteria for mild cognitive impairment (MCI), but he did not meet criteria for dementia.  The specific etiology could not be determined based on this evaluation alone, but multiple factors are likely contributing to the cognitive concerns, including his significant mood/anxiety symptoms and hearing issues.  Further, other factors such as a vascular etiology or his kidney disease could not be ruled out as contributory.    RECOMMENDATIONS:   Given these results, a referral for neurological consultation is recommended to assist with the differential diagnosis and treatment  planning.  2. A full medical evaluation of any treatable causes of cognitive decline is also recommended, if this has not already been accomplished.  Evaluation of any infectious processes, vitamin deficiencies or other metabolic abnormalities is recommended, to rule out these as possible contributors.   3. A referral for cognitive rehabilitation therapy, such as with a speech therapist, is recommended.One option for this service is  [employer] at [email or web address]-therapy  4. The patient may find the following attention and organizational strategies helpful:   Use cell phone reminders to help monitor upcoming appointments and due dates as well as other important tasks (e.g., when to take medications). Set the reminder to go off several times prior to the event with advanced notice (e.g., one week before, two days before, the day before, and the morning of the event).   The patietn should attempt to reduce distractions at home. Having a clutter-free work environment and removing or at least making it harder to access potential distractions would help optimize her attention. The patient might also consider facing away from high traffic areas and using earplugs or noise cancellation headphones when desiring a quiet work environment.  Taking short breaks during the day may help optimize and maintain concentration.   Make to-do lists and prioritize tasks. Break down large tasks into smaller steps and check off each small step when completed.   5. In order to promote learning and memorization of new verbal material, it is recommended that the patient add structure to the material she is learning to promote subsequent recall (e.g., use mnemonic devices, acronyms, make up a story or song). Additionally, she is encouraged to use visual aids, such as flash cards, diagrams, charts, etc.   6. Given his significant depressive and anxiety  symptoms, referral for psychotherapeutic intervention may be beneficial. A highly structured cognitive-behavioral intervention focused on coping and stress management would likely be the most helpful. One option for this service is through the [facility] at [email or web address] or [phone].    7. Additionally, psychiatric consultation to address medication management might be considered. The [facility] Department of Psychiatry is one option for this service at [email or web address] or [phone].  8. A suicide risk assessment was conducted with this patient, and it was determined that the patient likely was not an imminent danger to herself. Nonetheless, ongoing close monitoring of the patient's suicidal ideation by health care providers is recommended.  9. Addressing sleep hygiene to improve the quality and duration of sleep may be beneficial. The following are recommendations from the National Sleep Foundation that may be helpful:   Avoid napping during the day; it can disturb the normal pattern of sleep and wakefulness.  Avoid stimulants such as caffeine, nicotine, and alcohol too close to bedtime. While alcohol is well known to speed the onset of sleep, it disrupts sleep in the second half as the body begins to metabolize the alcohol, causing arousal.  Exercise can promote good sleep. Vigorous exercise should be taken in the morning or late afternoon. A relaxing exercise, like yoga, can be done before bed to help initiate a restful night's sleep.  Food can be disruptive right before sleep; stay away from large meals close to bedtime. Also dietary changes can cause sleep problems, if someone is struggling with a sleep problem, it's not a good time to start experimenting with spicy dishes. And, remember, chocolate has caffeine.  Ensure adequate exposure to natural light.  This is particularly important for older people who may not venture outside as frequently as children and adults. Light exposure helps maintain a healthy sleep-wake cycle.  Establish a regular relaxing bedtime routine. Try to avoid emotionally upsetting conversations and activities before trying to go to sleep. Don't dwell on, or bring your problems to bed.  Associate your bed with sleep. It's not a good idea to use your bed to watch TV, listen to the radio, or read.  Make sure that the sleep environment is pleasant and relaxing. The bed should be comfortable, the room should not be too hot or cold, or too bright.  10. A referral for an audiology evaluation might be considered, given the impact on cognitive functioning associated with hearing loss.  11. The patient is strongly encouraged to work closely with treating healthcare providers to closely manage vascular risk factors and his kidney disease.   12. Working closely with treating healthcare providers to develop a medically appropriate exercise program is recommended, given the mental health and physical benefits of regular exercise.  13. The results of the evaluation now constitute a baseline of the patient's cognitive and emotional functioning. If further cognitive difficulties or decline are observed in the future, a referral for a neuropsychological re-evaluation at that time might be considered particularly if his hearing and emotional distress can be effectively addressed.    Results and recommendations were discussed with the patient via telephone on 4/28/2025 and his questions were answered.  I encouraged him to follow-up with his treating healthcare providers to help facilitate the recommendations noted in this report.  Thank you for referring this interesting individual. If you have any questions, please feel free to contact me.      Carlos Sullivan, PhD, ABPP, LP  Professor and Licensed Psychologist [id number]  Board Certified Clinical  Neuropsychologist    Time Spent:      Minutes Date Code Units   Total Time-Neuropsychologist Professional 215 4/28/25 36451 1     4/28/25 36139 3   Neuropsychologist Admin/scoring 0 4/28/25 53924 0     4/28/25 96581 0   Diagnostic Interview 28 4/28/25 31069 1   Psychometrician Time-Test Administration/Score 147 4/28/25 05565 1     4/28/25 33082 4   Diagnosis R41.3 F10.21/G31.84 F32.0 N184

## 2025-04-21 ENCOUNTER — PATIENT OUTREACH (OUTPATIENT)
Dept: NEPHROLOGY | Facility: CLINIC | Age: 85
End: 2025-04-21
Payer: MEDICARE

## 2025-04-21 ASSESSMENT — ACTIVITIES OF DAILY LIVING (ADL): DEPENDENT_IADLS:: INDEPENDENT

## 2025-04-21 NOTE — PROGRESS NOTES
Nephrology Note: RN CC Chart Review    REASON FOR ENCOUNTER:     Chart reviewed by nephrology RN CC                          SITUATION/BACKROUND:     Last nephrology visit: 03/05/2025  Next nephrology visit: 06/09/25 with Vania Washington NP  General Nephrology RNCC: Emely Gonzalez RN    Last Renal Panel:  Sodium   Date Value Ref Range Status   03/05/2025 140 135 - 145 mmol/L Final   10/29/2020 139 133 - 144 mmol/L Final     Potassium   Date Value Ref Range Status   03/05/2025 4.5 3.4 - 5.3 mmol/L Final   07/29/2022 4.8 3.4 - 5.3 mmol/L Final   10/29/2020 4.7 3.4 - 5.3 mmol/L Final     Chloride   Date Value Ref Range Status   03/05/2025 106 98 - 107 mmol/L Final   07/29/2022 106 94 - 109 mmol/L Final   10/29/2020 110 (H) 94 - 109 mmol/L Final     Carbon Dioxide   Date Value Ref Range Status   10/29/2020 25 20 - 32 mmol/L Final     Carbon Dioxide (CO2)   Date Value Ref Range Status   03/05/2025 22 22 - 29 mmol/L Final   07/29/2022 28 20 - 32 mmol/L Final     Anion Gap   Date Value Ref Range Status   03/05/2025 12 7 - 15 mmol/L Final   07/29/2022 6 3 - 14 mmol/L Final   10/29/2020 5 3 - 14 mmol/L Final     Glucose   Date Value Ref Range Status   03/05/2025 96 70 - 99 mg/dL Final   07/29/2022 89 70 - 99 mg/dL Final   10/29/2020 136 (H) 70 - 99 mg/dL Final     Urea Nitrogen   Date Value Ref Range Status   03/05/2025 49.5 (H) 8.0 - 23.0 mg/dL Final   07/29/2022 55 (H) 7 - 30 mg/dL Final   10/29/2020 31 (H) 7 - 30 mg/dL Final     Creatinine   Date Value Ref Range Status   03/05/2025 4.62 (H) 0.67 - 1.17 mg/dL Final   10/29/2020 1.33 (H) 0.66 - 1.25 mg/dL Final     GFR Estimate   Date Value Ref Range Status   03/05/2025 12 (L) >60 mL/min/1.73m2 Final     Comment:     eGFR calculated using 2021 CKD-EPI equation.   11/04/2020 56 (L) >60 mL/min/1.73m2 Final   10/29/2020 50 (L) >60 mL/min/[1.73_m2] Final     Comment:     Non  GFR Calc  Starting 12/18/2018, serum creatinine based estimated GFR (eGFR) will be    calculated using the Chronic Kidney Disease Epidemiology Collaboration   (CKD-EPI) equation.       Calcium   Date Value Ref Range Status   03/05/2025 9.1 8.8 - 10.4 mg/dL Final   10/29/2020 8.5 8.5 - 10.1 mg/dL Final     Phosphorus   Date Value Ref Range Status   03/05/2025 4.2 2.5 - 4.5 mg/dL Final     Albumin   Date Value Ref Range Status   03/05/2025 4.4 3.5 - 5.2 g/dL Final   07/29/2022 4.0 3.4 - 5.0 g/dL Final   10/29/2020 2.7 (L) 3.4 - 5.0 g/dL Final         Neph Tracking Status:  Neph Tracking Flowsheet Last Filled Values       [facility] CKD Late Referral 04/03/23    [facility] CKD Late Complete 04/06/23    [facility] CKD Basics Referral --  entered in error    Preferred Modality Peritoneal Dialysis    Patient's Referral Dates Auto Populate Patient's Referral Dates    Specialty Care Coordination Referral - Dialysis 6/6/2023    Anemia Services Referral 5/6/24    MTM Referral 12/22/21    Home Care Referral 6/22/23    Journey Referral 1/25/23    Access Surgeon Referral Status  Referred  PD consult with Dr. Zimmer    Dialysis Access Referral 06/06/23    Access Surgical Consult 07/18/23  Plan: PD cath placement, Dr. Zimmer    Transplant Status  Not Referred  reason: age          Neph tracking updated to reflect pt received [facility] Late CKD Education class on 04/03/23 with Dr. Kim.     Frances Sandoval RN

## 2025-04-22 ENCOUNTER — PATIENT OUTREACH (OUTPATIENT)
Dept: CARE COORDINATION | Facility: CLINIC | Age: 85
End: 2025-04-22
Payer: MEDICARE

## 2025-04-22 NOTE — PROGRESS NOTES
Clinic Care Coordination Contact  Community Health Worker Follow Up    Care Gaps:     Health Maintenance Due   Topic Date Due    DIABETIC FOOT EXAM  Never done    DEPRESSION ACTION PLAN  Never done    HEPATITIS B IMMUNIZATION (4 of 4 - Risk Dialysis 4-dose series) 07/01/1990    ZOSTER IMMUNIZATION (1 of 2) Never done    RSV VACCINE (1 - 1-dose 75+ series) Never done    MICROALBUMIN  10/18/2023    COVID-19 Vaccine (8 - 2024-25 season) 03/23/2025    LIPID  04/16/2025       Postponed to NEXT PCP APPT      Care Plan:   Care Plan: Health Maintenance       Problem: Health Maintenance Due or Overdue       Goal: Become up-to-date with health maintenance visit(s)       Start Date: 1/8/2025    Recent Progress: On track    Priority: High    Note:     Barriers: patient is having some memory concerns  Strengths: patient sees specialists and PCP for regular care  Patient expressed understanding of goal: yes  Action steps to achieve this goal:  1. I will continue to see my providers for regularly scheduled appointments.   2. I will attend Neuropsych exam.                                   Intervention and Education during outreach: Patient called the [employer] and they were going to figure out what patient should do about the form and they stated they would get back to patient and never did. Patient will try to call the [employer] again to inquire about retrieving the form to fax to Neurology clinic at [phone] which CHW gave fax number to patient. CHW asked patient for the name of the form so that she could try to search for it online, however patient didn't have the name of the form  .There are no additional needs or concerns at this time.    CHW Plan: CHW will follow up with patient in 2 weeks regarding the paperwork that patient is supposed to retrieve.    Sharon MURRAY Community Health Worker  Clinic Care Coordination  [facility]   Clinic #: [phone]  Direct #: [phone]

## 2025-04-28 ENCOUNTER — OFFICE VISIT (OUTPATIENT)
Dept: NEUROPSYCHOLOGY | Facility: CLINIC | Age: 85
End: 2025-04-28
Attending: PSYCHIATRY & NEUROLOGY
Payer: MEDICARE

## 2025-04-28 DIAGNOSIS — G31.84 MCI (MILD COGNITIVE IMPAIRMENT): ICD-10-CM

## 2025-04-28 DIAGNOSIS — N18.4 CKD (CHRONIC KIDNEY DISEASE) STAGE 4, GFR 15-29 ML/MIN (H): ICD-10-CM

## 2025-04-28 DIAGNOSIS — R41.3 MEMORY LOSS: Primary | ICD-10-CM

## 2025-04-28 DIAGNOSIS — F10.21 ALCOHOL DEPENDENCE IN REMISSION (H): ICD-10-CM

## 2025-04-28 DIAGNOSIS — F32.0 CURRENT MILD EPISODE OF MAJOR DEPRESSIVE DISORDER, UNSPECIFIED WHETHER RECURRENT: ICD-10-CM

## 2025-04-28 PROCEDURE — 96132 NRPSYC TST EVAL PHYS/QHP 1ST: CPT | Performed by: PSYCHOLOGIST

## 2025-04-28 PROCEDURE — 96138 PSYCL/NRPSYC TECH 1ST: CPT | Performed by: PSYCHOLOGIST

## 2025-04-28 PROCEDURE — 96133 NRPSYC TST EVAL PHYS/QHP EA: CPT | Performed by: PSYCHOLOGIST

## 2025-04-28 PROCEDURE — 96139 PSYCL/NRPSYC TST TECH EA: CPT | Performed by: PSYCHOLOGIST

## 2025-04-28 PROCEDURE — 90791 PSYCH DIAGNOSTIC EVALUATION: CPT | Performed by: PSYCHOLOGIST

## 2025-04-28 NOTE — PROGRESS NOTES
The patient was seen for neuropsychological evaluation at the request of Ish Elias MD for the purposes of diagnostic clarification and treatment planning. 147 minutes of test administration and scoring were provided by this writer. Please see Dr. Carlos Sullivan's report for a full interpretation of the findings.    Frances Palma  Psychometrist

## 2025-04-28 NOTE — LETTER
2025      RE: Melo Dangelo  [address]  [place] [address]   Melo Dangelo is an 84 year old year old male is being evaluated via a billable video visit.      If the video visit is dropped, the invitation should be resent by: Text to cell phone: [phone]    Will anyone else be joining your video visit? No    Video-Visit Details    Type of service:  Video Visit    Originating Location (pt. Location): Other [facility] [place]    Distant Location (provider location):  Off-site    Platform used for Video Visit: Zoom (Telehealth)    Neuropsychologist has received verbal consent for a Video Visit from the patient? Yes. Empirical studies have demonstrated that video/hybrid formats are appropriate and effective means for delivering neuropsychological services to the patient. Interview was conducted via video platform to the [facility] ([facility] [place]) and formal testing was conducted by the psychometrist in person at the [facility] [place].    Video Start Time (time video started): 7:57 AM    Video End Time (time video stopped): 8:25 AM    RE: Melo Dangelo  MR#: [id number]  : 1940  DOS: 2025    NEUROPSYCHOLOGICAL CONSULTATION  This is a medical document intended for communication with the referring provider. It is written in medical language and may contain abbreviations or verbiage that are unfamiliar. It may appear blunt or direct. This report and the results within are not intended to be interpreted in isolation without consultation with your medical provider.     REASON FOR REFERRAL:  Melo Dangelo is a 84 year old male with 16 years of education. The patient was referred for a neuropsychological evaluation by Dr. Elias to assess his cognitive and emotional functioning secondary to memory difficulties. The evaluation was requested in order to document his current level of functioning, assist with the differential diagnosis and provide appropriate recommendations to the patient, family  and treatment team. The patient was informed that the evaluation included multiple measures of performance and symptom validity, and he was encouraged to provide his best effort at all times.  The nature of the neuropsychological evaluation was also discussed, including limits of confidentiality (for suspected child or elder abuse, potential homicide or suicide, and court orders). The patient was also informed that the report would be placed on the electronic medical records system.  The patient was also given an opportunity to ask questions. The patient indicated that he understood the information and consented to participate in the evaluation.    PROCEDURES:   Review of records, clinical interview,  Mental Status-orientation, Wide Range Achievement Test-4, Wechsler Adult Intelligence Scale-IV, Clock Drawing Test, Verbal Fluency Test, Geriatric Depression Scale, Geriatric Anxiety Scale, Trailmaking Test, NAB Naming Test, TOMM, Stroop Test, and RBANS    REVIEW OF RECORDS: Records stated that the patient  has a past medical history of 2019 novel coronavirus disease (COVID-19) (10/27/2020), Alcohol dependence (H), Chronic kidney disease, stage IV (severe) (H), Combined forms of age-related cataract of both eyes, Concussion with loss of consciousness, HTN (hypertension), IgA nephropathy, 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 Records noted that the patient has a current medication list which includes the following prescription(s): acetaminophen, apixaban anticoagulant, buspirone, calcium carbonate-vitamin d, d3-1000, cyanocobalamin, escitalopram, lamotrigine, metoprolol succinate er, multivitamin, simvastatin, sodium bicarbonate, and tamsulosin. Medical records from 2/13/25 noted a MoCA administered on that date with a score of 27/30. These records noted that he has a  history of alcohol abuse (in  remission since 2001), major depression, IgA nephropathy, CKD stage IV who was referred for evaluation of neurocognitive decline. Most recent imaging studies from 2009 when MRI scan showed mild atrophy of hippocampal formation bilaterally this was attributed to his past use of alcohol. Lately he has been experiencing increased short-term memory difficulty but scored 29/30 on MoCA.  The records further stated that has a history of  seizure disorder. His most recent seizure was in 2009 and likely was in the setting of alcohol use. He has been on lamotrigine more so for mood stabilization.  The records elaborated that  he used to drink excessively since age 21 and had few seizures starting at the age of 55.  Although he was not told clearly that his seizures were related to alcohol it was implied.  He had a MRI of the brain in 2009 that showed cerebral atrophy with mild atrophy of the hippocampus bilaterally he was also started on lamotrigine and had an EEG in the past that showed nonspecific slowing and was continued on Lamictal that has not been changed.  His most recent seizure was in 2009 and he claims he had stopped drinking. His other issue is cognitive difficulty that got worse in the recent past.  His wife passed away a year ago who used to tell him he struggles with short-term memory.  He is living independently and denies any visual or auditory hallucinations.  He is able to attend to activities of daily living.  He is managing his finances and has not following recommend to [facility].  There is no history of gait difficulty or any bladder incontinence.  An MRI scan of the brain was scheduled, per records.    Medical records from 2/26/25 noted that his medical history also includes  HTN, a fib, IgA nephropathy, CKD stage 5, and alcohol abuse in remission. Today, we reviewed most recent visits from neurology, cardiology, and nephrology and discussed the rational for upcoming lumbar puncture and Brain MRI. He does  "have valid concerns about receiving IV contrast for MRI and requested that he discuss this with his nephrologist at upcoming visit next week. He did voice confusion about the reason for the eliquis. We discussed his medical history and increased risk for developing a stroke. We did discuss that nephrology was comfortable with him taking this medication, renally dosed.  These records also noted a history of anxiety. ENT records also from 2/26/25 noted that the patient has  bilateral sensorineural hearing loss. Reviewed audiogram with patient today. Patient is a hearing aid candidate.  Discussed with patient the benefits of hearing aids including improvement in communication and prevention of cognitive decline, social isolation, and degradation of nerve function including word recognition scores.     CLINICAL INTERVIEW: The patient was interviewed via video platform to the [facility] ([facility] [place]) and he was interviewed alone.  On interview, the patient reported difficulty with his memory, including both short-term and long-term memory issues.  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  He also stated that he has difficulty remembering information that he wants to remember and feels that he is \"Dumber than I used to be.\"  The patient also reported that he feels like his thinking is slower and he gets distracted easily.  The patient reported he has noticed these difficulties for about the past year and they are a little bit worse.  Functionally, the patient reported that he drives and feels he needs GPS to help him find locations.  He said he has to concentrate harder when he drives now.  He also reported more difficulty with direction finding.  The patient denied difficulty with financial management, except that he gets frustrated when using the computer at times.  In terms of medication management, he reported that he previously used prepackaged " medications, but discovered that they were incorrect so he changed pharmacies and now he has to manage his medications more closely.  He denied difficulty with basic also chores such as cooking, although he said he does not remember many recipes.    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  He reported that he had some suicidal thoughts at that time, but this was in 2001.  He said his mood is better now and he denied any suicidal or homicidal ideation. Further, he denied any history of suicide attempts.  The patient reported that his sleep is generally good although he needs to go to the bathroom 3-4 times per night.  He said he sleeps about 7 hours per night, and occasionally will have difficulty falling asleep particularly when he has appointments the next day.  He denied any history of any sleep disorder such as sleep apnea or REM sleep behavior disorder.  He denied difficulty with appetite.  He also denied any hallucinations or delusions.  The patient also acknowledged his history of alcohol abuse, and said he has been sober since 2001.  He reported he was in treatment twice and has not drunk alcohol since his second treatment.  He also reported that he quit cigarette smoking about 40 years ago and denied any use of illicit substances.    Medically, he confirmed a history of kidney disease and indicated that he is on the transplant list and near the point where he will need dialysis.  He also noted that he had some heart issues where he has heart was skipping a beat, but he had a medication change and this has improved.  He also noted that he was started on Eliquis and this caused some difficulties but he was unsure if it was related to Eliquis.  He denied any history of traumatic brain injury, multiple sclerosis, stroke, seizures, Parkinson disease, hypercholesterolemia, sleep apnea, hypothyroidism,  diabetes, cancer, or liver disease.  He acknowledged a history of high blood pressure.  He indicated that he also has hearing difficulties but does not have hearing aids.  The patient reported that he wears eyeglasses all the time.  The patient confirmed that the medications listed the records were up-to-date.    Academically, the patient reported that he graduated from college.  He said he was a poor student in school but after he met his wife she helped him academically.  He denied ever being in special education classes or having to repeat a grade.  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  The patient said that he has 3 daughters and he currently lives at home alone.  He reported that his wife passed away about 1.5 years ago.    BEHAVIORAL OBSERVATIONS:  On examination, the patient was casually but appropriately dressed and groomed. The patient was cooperative with the evaluation and was talkative.  Speech was normal in volume and tone.  However, his rate of speech was slow with a noticeable latency in his responding, and he was mildly tangential at times.  He also had limited eye contact.  The patient also appeared to put forth his best effort on all tasks. Thus, the results of this evaluation are a reasonably valid reflection of the patient's current level of functioning. There were no indications of hallucinations, delusions or unusual thought processes and the patient was generally well oriented to personal information, place, and time. There were no demonstrations of a practical memory problem. The patient was a good historian, with a self-report consistent with medical records. Affect was also mildly constricted.      RESULTS: Formal testing was conducted in person by a psychometrist. Formal performance validity measures were within expected limits and consistent with the above noted observations.  Psychometric estimates of the patient's premorbid global  cognitive functioning based upon single word reading ability were in the average range, which is consistent with his educational and occupational history.    Measures of attention/concentration were variable.  For example, a digit span task was in the average range.  However, a visual scanning task that integrates attention and processing speed was in the low average range.  Speed of information processing was also slower than expected.  For example, psychomotor speed was in the low average range.  Additionally, motor-free processing speed was variable, ranging from extremely low to low average.    Measures of the patient's memory functioning were variable.  For example, immediate recall on a word list learning task was in the below average range, while immediate recall on a story memory task was in the low average range.  Delayed verbal recall on the story memory and word list learning tasks were in the average range.  Verbal recognition memory was also in the low average to average range.  In terms of visual memory, delayed visual recall the complex figure drawing was in the low average range, consistent with his copying of the figure.    Expressive language functioning was also variable.  For example, confrontation naming was in the low average range.  Likewise, semantic fluency was in the low average range, but phonemic fluency was in the average range.  Receptive language functioning, based upon his performance during interview, was within expected limits.    Visual-spatial and visual constructional abilities were generally within expected limits.  For example, motor-free line orientation judgment was well within expected limits.  Complex figure drawing and clock drawing tasks also did not indicate evidence for significant visual spatial distortions.    Measures of the patient's executive functioning were broadly within expected limits.  For example, a complex visual scanning task that integrates cognitive  flexibility was in the low average range, but his performance was consistent with a simpler version that integrated only attention.  Further, the measure of response inhibition that integrates processing speed was in the average range.  There was also no evidence for significant planning or organizational difficulties on complex figure drawing or clock drawing tasks.    Assessment of the patient's emotional functioning was completed utilizing clinical interview and self-report measures of depression and anxiety.  On the self-report measures, the patient was mild to moderate depressive symptoms and he endorsed severe anxiety symptoms.    SUMMARY:  In summary, the neuropsychological assessment results indicated no evidence for significant change or decline in global cognitive functioning.  There was evidence for variable attention/concentration and slowed speed of information processing, which likely resulted in inefficient encoding of new information.  However, there was no evidence for retrieval deficits or rapid forgetting of previously learned verbal or visual information.  Language functioning was also variable, while visual-spatial abilities and executive functioning were generally within expected limits.  Further, there was evidence for mild to moderate depressive symptoms and severe anxiety symptoms.  The pattern of results is nonspecific, but not consistent with an Alzheimer's/type process.  The results indicated evidence the patient met criteria for mild cognitive impairment (MCI), but he did not meet criteria for dementia.  The specific etiology could not be determined based on this evaluation alone, but multiple factors are likely contributing to the cognitive concerns, including his significant mood/anxiety symptoms and hearing issues.  Further, other factors such as a vascular etiology or his kidney disease could not be ruled out as contributory.    RECOMMENDATIONS:   Given these results, a referral for  neurological consultation is recommended to assist with the differential diagnosis and treatment planning.  2. A full medical evaluation of any treatable causes of cognitive decline is also recommended, if this has not already been accomplished.  Evaluation of any infectious processes, vitamin deficiencies or other metabolic abnormalities is recommended, to rule out these as possible contributors.   3. A referral for cognitive rehabilitation therapy, such as with a speech therapist, is recommended.One option for this service is  [employer] ([employer] at [email or web address]/sitecore/[email or web address]/home/specialties/speech-language-and-swallowing-therapy  4. The patient may find the following attention and organizational strategies helpful:   Use cell phone reminders to help monitor upcoming appointments and due dates as well as other important tasks (e.g., when to take medications). Set the reminder to go off several times prior to the event with advanced notice (e.g., one week before, two days before, the day before, and the morning of the event).   The patietn should attempt to reduce distractions at home. Having a clutter-free work environment and removing or at least making it harder to access potential distractions would help optimize her attention. The patient might also consider facing away from high traffic areas and using earplugs or noise cancellation headphones when desiring a quiet work environment.  Taking short breaks during the day may help optimize and maintain concentration.   Make to-do lists and prioritize tasks. Break down large tasks into smaller steps and check off each small step when completed.   5. In order to promote learning and memorization of new verbal material, it is recommended that the patient add structure to the material she is learning to promote subsequent recall (e.g., use mnemonic devices, acronyms, make up a story or song). Additionally, she is encouraged to use visual aids,  such as flash cards, diagrams, charts, etc.   6. Given his significant depressive and anxiety symptoms, referral for psychotherapeutic intervention may be beneficial. A highly structured cognitive-behavioral intervention focused on coping and stress management would likely be the most helpful. One option for this service is through the [facility] at [email or web address]/[email or web address] or [phone].    7. Additionally, psychiatric consultation to address medication management might be considered. The [facility] Department of Psychiatry is one option for this service at [email or web address]/care/specialties/[email or web address]-adult or [phone].  8. A suicide risk assessment was conducted with this patient, and it was determined that the patient likely was not an imminent danger to herself. Nonetheless, ongoing close monitoring of the patient's suicidal ideation by health care providers is recommended.  9. Addressing sleep hygiene to improve the quality and duration of sleep may be beneficial. The following are recommendations from the National Sleep Foundation that may be helpful:   Avoid napping during the day; it can disturb the normal pattern of sleep and wakefulness.  Avoid stimulants such as caffeine, nicotine, and alcohol too close to bedtime. While alcohol is well known to speed the onset of sleep, it disrupts sleep in the second half as the body begins to metabolize the alcohol, causing arousal.  Exercise can promote good sleep. Vigorous exercise should be taken in the morning or late afternoon. A relaxing exercise, like yoga, can be done before bed to help initiate a restful night's sleep.  Food can be disruptive right before sleep; stay away from large meals close to bedtime. Also dietary changes can cause sleep problems, if someone is struggling with a sleep problem, it's not a good time to start experimenting with spicy  dishes. And, remember, chocolate has caffeine.  Ensure adequate exposure to natural light. This is particularly important for older people who may not venture outside as frequently as children and adults. Light exposure helps maintain a healthy sleep-wake cycle.  Establish a regular relaxing bedtime routine. Try to avoid emotionally upsetting conversations and activities before trying to go to sleep. Don't dwell on, or bring your problems to bed.  Associate your bed with sleep. It's not a good idea to use your bed to watch TV, listen to the radio, or read.  Make sure that the sleep environment is pleasant and relaxing. The bed should be comfortable, the room should not be too hot or cold, or too bright.  10. A referral for an audiology evaluation might be considered, given the impact on cognitive functioning associated with hearing loss.  11. The patient is strongly encouraged to work closely with treating healthcare providers to closely manage vascular risk factors and his kidney disease.   12. Working closely with treating healthcare providers to develop a medically appropriate exercise program is recommended, given the mental health and physical benefits of regular exercise.  13. The results of the evaluation now constitute a baseline of the patient's cognitive and emotional functioning. If further cognitive difficulties or decline are observed in the future, a referral for a neuropsychological re-evaluation at that time might be considered particularly if his hearing and emotional distress can be effectively addressed.    Results and recommendations were discussed with the patient via telephone on 4/28/2025 and his questions were answered.  I encouraged him to follow-up with his treating healthcare providers to help facilitate the recommendations noted in this report.  Thank you for referring this interesting individual. If you have any questions, please feel free to contact me.      Carlos Sullivan, PhD,  ABPP, LP  Professor and Licensed Psychologist [id number]  Board Certified Clinical Neuropsychologist    Time Spent:      Minutes Date Code Units   Total Time-Neuropsychologist Professional 215 4/28/25 38913 1     4/28/25 36946 3   Neuropsychologist Admin/scoring 0 4/28/25 19628 0     4/28/25 94492 0   Diagnostic Interview 28 4/28/25 99813 1   Psychometrician Time-Test Administration/Score 147 4/28/25 82114 1     4/28/25 52629 4   Diagnosis R41.3 F10.21/G31.84 F32.0 N18.4

## 2025-04-28 NOTE — PROGRESS NOTES
Provider: DW      Tech: KS      Patient Name: Melo Dangelo     : [birth year]      MRN: [id number]     CUADRA: 25      Age: 84      Education: 16      Ethnicity: W      Handedness: Right      Station:              NEUROPSYCHOLOGICAL TESTS RAW SCORE STANDARDIZED SCORE* PERCENTILE   WAIS-IV Digit Span       RDS 8 --     TOMM       Trial 1 49 --     Trial 2 50 --            Orientation            Time 0 --          Place 2 --          Personal Information 4 --          Presidents 4 --            Wide Range Achievement Test (WRAT-5, Blue)            Word Reading 63 SS= 107 68%     G.E.= >12.9           Repeatable Battery for the Assessment of Neuropsychological Status (RBANS-A)          Total Score Index  SS= 79 8%        Immediate Memory Index  SS= 73 4%        Visuospatial/Constructional Index  SS= 96 39%        Language Index  SS= 89 23%        Attention Index  SS= 85 16%        Delayed Memory Index  SS= 75 5%          Wechsler Adult Intelligence Scale-IV (WAIS-IV)       Digit Span 20 ScS= 9 37%          Repeatable Battery for the Assessment of Neuropsychological Status (RBANS-A)     Digit Span 8 ScS= 9 37%   Coding 26 ScS= 6 9%   Trail Making Test (Time/Errors)       Part A 47/0 T= 42 21%   Part B 165/2 T= 39 14%   Stroop       Word 48 T= 18 <1%   Color 46 T= 40 16%   Color/Word 28 T= 45 31%   Interference 5 T= 63 90%          Repeatable Battery for the Assessment of Neuropsychological Status (RBANS-A)     List Learning 15 ScS= 4 2%   List Recall 2 --  17-25%   List Recognition 17 --  10-16%   Story Memory 11 ScS= 6 9%   Story Recall 6 ScS= 8 25%   Story Recognition 10 --  53-67%   Figure Recall 5 ScS= 6 9%   Figure Recognition Y --            Repeatable Battery for the Assessment of Neuropsychological Status (RBANS-A)     Naming 10 --  >75%   Semantic Fluency 11 ScS= 5 5%   COWAT (Form: FAS)       Total 35 T= 46 34%   Neuropsychological Assessment Battery (NAB) Language Module (Form 1)      Naming 27 T= 38 12%    Phonemic Cue (Correct/Administered) 0/2 --            Repeatable Battery for the Assessment of Neuropsychological Status (RBANS-A)     Line Orientation 20 --  >75%   Figure Copy 13 ScS= 4 2%   Clock Drawing Test       Command Normal --     Copy   Normal --            Geriatric Depression Scale (GDS)       Total Score 18      Geriatric Anxiety Scale       Somatic 6      Cognitive 10      Affective 12      Total 28

## 2025-04-29 ENCOUNTER — PATIENT OUTREACH (OUTPATIENT)
Dept: CARE COORDINATION | Facility: CLINIC | Age: 85
End: 2025-04-29
Payer: MEDICARE

## 2025-04-29 NOTE — PROGRESS NOTES
Anemia Management Note - Reminder     Follow-up with anemia management service:    Melo called re his Amemia labs being due. He just saw the [employer] message from 4/10/25.  He is feeling pretty well and wants to wait to have his labs drawn at his June appt.         Latest Ref Rng & Units 5/6/2024 5/29/2024 7/24/2024 11/4/2024 12/3/2024 2/13/2025 3/5/2025   Anemia   Hemoglobin 13.3 - 17.7 g/dL 8.8   9.8  9.5  9.9  10.0  9.6    IV Iron Dose   1000mg        TSAT 15 - 46 %   41  35  22  38  25    Ferritin 31 - 409 ng/mL   196 [phone]            Follow-up call date: 6/9/25    Amy Hurd RN   Anemia Services  [facility]  [email or web address]   Office : [phone]  Fax: [phone]

## 2025-05-06 ENCOUNTER — PATIENT OUTREACH (OUTPATIENT)
Dept: CARE COORDINATION | Facility: CLINIC | Age: 85
End: 2025-05-06
Payer: MEDICARE

## 2025-05-06 NOTE — PROGRESS NOTES
Clinic Care Coordination Contact  Community Health Worker Follow Up    Care Gaps:     Health Maintenance Due   Topic Date Due    DIABETIC FOOT EXAM  Never done    DEPRESSION ACTION PLAN  Never done    HEPATITIS B IMMUNIZATION (4 of 4 - Risk Dialysis 4-dose series) 07/01/1990    ZOSTER IMMUNIZATION (1 of 2) Never done    RSV VACCINE (1 - 1-dose 75+ series) Never done    MICROALBUMIN  10/18/2023    COVID-19 Vaccine (8 - 2024-25 season) 03/23/2025    LIPID  04/16/2025    PHQ-9  06/03/2025    BMP  06/05/2025       Postponed to NEXT PCP APPT     Care Plan:   Care Plan: Health Maintenance       Problem: Health Maintenance Due or Overdue       Goal: Become up-to-date with health maintenance visit(s)       Start Date: 1/8/2025    Recent Progress: On track    Priority: High    Note:     Barriers: patient is having some memory concerns  Strengths: patient sees specialists and PCP for regular care  Patient expressed understanding of goal: yes  Action steps to achieve this goal:  1. I will continue to see my providers for regularly scheduled appointments.   2. I will attend Neuropsych exam.                                   Intervention and Education during outreach: Patient received a new form in the mail. Patient states that he will fill out the paperwork and bring to primary care for PCP tomorrow 05/07. Patient will drop off paperwork to primary care with attention to me and CHW will fax paperwork over to Neurology at [phone], so form can get completed. Patient plans to arrive in the primary clinic tomorrow at around 9:00 AM.    CHW sent a message to Neurology to let them know that patient will bring paperwork tomorrow and CHW plans to fax over the paperwork tomorrow 05/07.    CHW Plan: CHW will follow up with patient in 1 month after collecting paperwork from him tomorrow    Sharon MURRAY Community Health Worker  Clinic Care Coordination  [facility]   Clinic #: [phone]  Direct #: [phone]

## 2025-05-07 ENCOUNTER — RESULTS FOLLOW-UP (OUTPATIENT)
Dept: INTERNAL MEDICINE | Facility: CLINIC | Age: 85
End: 2025-05-07

## 2025-05-07 ENCOUNTER — ANCILLARY PROCEDURE (OUTPATIENT)
Dept: GENERAL RADIOLOGY | Facility: CLINIC | Age: 85
End: 2025-05-07
Attending: NURSE PRACTITIONER
Payer: MEDICARE

## 2025-05-07 ENCOUNTER — OFFICE VISIT (OUTPATIENT)
Dept: INTERNAL MEDICINE | Facility: CLINIC | Age: 85
End: 2025-05-07
Payer: MEDICARE

## 2025-05-07 VITALS
HEART RATE: 59 BPM | OXYGEN SATURATION: 97 % | RESPIRATION RATE: 16 BRPM | TEMPERATURE: 98 F | HEIGHT: 71 IN | SYSTOLIC BLOOD PRESSURE: 155 MMHG | DIASTOLIC BLOOD PRESSURE: 65 MMHG | WEIGHT: 177.3 LBS | BODY MASS INDEX: 24.82 KG/M2

## 2025-05-07 DIAGNOSIS — M25.561 ACUTE PAIN OF RIGHT KNEE: ICD-10-CM

## 2025-05-07 DIAGNOSIS — Z13.6 SCREENING FOR CARDIOVASCULAR CONDITION: ICD-10-CM

## 2025-05-07 DIAGNOSIS — N18.5 CKD STAGE 5 DUE TO TYPE 1 DIABETES MELLITUS (H): ICD-10-CM

## 2025-05-07 DIAGNOSIS — Z23 NEED FOR VACCINATION: Primary | ICD-10-CM

## 2025-05-07 DIAGNOSIS — E10.22 CKD STAGE 5 DUE TO TYPE 1 DIABETES MELLITUS (H): ICD-10-CM

## 2025-05-07 PROCEDURE — 73562 X-RAY EXAM OF KNEE 3: CPT | Mod: RT | Performed by: RADIOLOGY

## 2025-05-07 PROCEDURE — 90480 ADMN SARSCOV2 VAC 1/ONLY CMP: CPT | Performed by: NURSE PRACTITIONER

## 2025-05-07 PROCEDURE — 3077F SYST BP >= 140 MM HG: CPT | Performed by: NURSE PRACTITIONER

## 2025-05-07 PROCEDURE — 3078F DIAST BP <80 MM HG: CPT | Performed by: NURSE PRACTITIONER

## 2025-05-07 PROCEDURE — 99213 OFFICE O/P EST LOW 20 MIN: CPT | Mod: 25 | Performed by: NURSE PRACTITIONER

## 2025-05-07 PROCEDURE — 91320 SARSCV2 VAC 30MCG TRS-SUC IM: CPT | Performed by: NURSE PRACTITIONER

## 2025-05-07 ASSESSMENT — PATIENT HEALTH QUESTIONNAIRE - PHQ9
SUM OF ALL RESPONSES TO PHQ QUESTIONS 1-9: 0
SUM OF ALL RESPONSES TO PHQ QUESTIONS 1-9: 0
10. IF YOU CHECKED OFF ANY PROBLEMS, HOW DIFFICULT HAVE THESE PROBLEMS MADE IT FOR YOU TO DO YOUR WORK, TAKE CARE OF THINGS AT HOME, OR GET ALONG WITH OTHER PEOPLE: NOT DIFFICULT AT ALL

## 2025-05-07 NOTE — PROGRESS NOTES
"Clinic Care Coordination Contact    Situation: Patient chart reviewed by care coordinator.    Background: CHW sent a message to Neurology, Dr. Elias, see below.    \"Hello,  Patient has dropped off form and I faxed it over to Neurology with the attention to Dr. Elias. The DMV fax number is on the fax sheet and the DMV document for when the form is completed, you can fax it over to the DMV.    Thanks!  Sharon\"    There are no additional needs or concerns at this time.    Sharon MURRAY, Community Health Worker  Clinic Care Coordination  [facility]   [facility] #: [phone]  Direct #: [phone]        "

## 2025-05-07 NOTE — PROGRESS NOTES
Assessment & Plan     Need for vaccination  COVID booster today in clinic, recommend RSV from the pharmacy.  - RSV vaccine, bivalent, ABRYSVO, injection; Inject 0.5 mLs into the muscle once for 1 dose. Pharmacist administered    Screening for cardiovascular condition  Due to update lipid screening, he can have this done with labs in June.  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    Acute pain of right knee  Recently developed new bilateral knee pain following working on carpentry project with prolonged standing.  His L knee pain has resolved, but R knee pain persists.  Will check an Xray today and have him see Sports Med for possible injection, if indicated.  Recommend resting, icing the knee, and can use Tylenol q 8 hr for pain control if needed.  He agrees with the plan.  - XR Knee Right 3 Views; Future  - Orthopedic  Referral; Future      Follow-up  Follow-up with PCP in 1 week to reassess other chronic issues    Armand Alejo is a 84 year old, presenting for the following health issues:  Musculoskeletal Problem (Right hip and knee pain, worse with standing and process of sitting, but better with walking. Began with bilateral knee pain about 3 weeks ago, came on suddenly one morning. Left knee got better after a couple weeks, but right knee pain worsened and began radiating into hip. Pt suspects it may be due to prolonged standing. )        5/7/2025    10:09 AM   Additional Questions   Roomed by rachel     Musculoskeletal Problem    History of Present Illness       Reason for visit:  Knee and hip pain  Symptom onset:  1-2 weeks ago  Symptom intensity:  Moderate  Symptom progression:  Worsening  Had these symptoms before:  No  What makes it worse:  Standing up is very painful  What makes it better:  Walking once i get up   but when i sit down it is a very  "painfull process  the situation varies from moderate   He is taking medications regularly.      Almost 2-3 weeks ago, was working on building a furniture project, had his knees locked and standing for prolonged period; didn't hurt while he was doing it.  R knee pain started first, then left knee.  Was interfering with sleep for 2-3 days.  He used Tylenol to help with this when it was at its worst.    Hx of knee pain that improved with walking, but this time was so bad could hardly walk for a couple days.  L knee is doing a little better, but R knee pain persists, and radiating up into the R hip.  Wonders about arthritis.    Has been excruciating at times, pain varies.  Pain can be rated 9/10 when he's trying to sit down, but once he's resting it's mostly better (2/10).  5-6/10 with movement (flexion/extension).  Mild swelling in the knee, but didn't know if was related to salt intake.       Review of Systems  Constitutional, HEENT, cardiovascular, pulmonary, gi and gu systems are negative, except as otherwise noted.      Objective    BP (!) 155/65 (BP Location: Right arm, Patient Position: Sitting, Cuff Size: Adult Regular)   Pulse 59   Temp 98  F (36.7  C) (Oral)   Resp 16   Ht 1.803 m (5' 10.98\")   Wt 80.4 kg (177 lb 4.8 oz)   SpO2 97%   BMI 24.74 kg/m    Body mass index is 24.74 kg/m .  Physical Exam   GENERAL: alert and no distress  RESP: lungs clear to auscultation - no rales, rhonchi or wheezes  CV: regular rate and rhythm, normal S1 S2, no S3 or S4, no murmur, click or rub, no peripheral edema  MS: pain elicited with flexion and extension of R knee, mild edema, no pain to palpation of knee, but pain along lateral aspect of R gluteus  SKIN: no suspicious lesions or rashes  PSYCH: mentation appears normal, affect normal/bright              21 minutes spent by me on the date of the encounter doing chart review, history and exam, documentation and further activities per the note    Signed Electronically " by: ELIZABETH Sinha CNP

## 2025-05-08 ENCOUNTER — PATIENT OUTREACH (OUTPATIENT)
Dept: CARE COORDINATION | Facility: CLINIC | Age: 85
End: 2025-05-08
Payer: MEDICARE

## 2025-05-12 ENCOUNTER — PATIENT OUTREACH (OUTPATIENT)
Dept: CARE COORDINATION | Facility: CLINIC | Age: 85
End: 2025-05-12
Payer: MEDICARE

## 2025-05-12 NOTE — PROGRESS NOTES
NEUROLOGY FOLLOW UP VISIT  NOTE       [facility] NEUROLOGY [place]  [address] [place] [address]  Tel: [phone]  Fax: [phone]  [email or web address]     Melo Dangelo,  1940, MRN [id number]  PCP: Francis Smith  Date: 2025      ASSESSMENT & PLAN     Visit Diagnosis   Neurocognitive disorder  History of seizure     Pseudodementia  84-year-old male with history of alcohol abuse in remission since , major depression, IgA nephropathy and CKD stage IV who was evaluated for progressive cognitive decline.  MRI brain shows age-related changes.  Lab work showed elevated  but lumbar puncture was normal.  His neuropsychological testing was unremarkable.  I have informed him that his workup is negative and he likely has pseudodementia due to underlying anxiety and depression I have recommended:    1.  Follow-up with psychiatry and psychology to  better manage depression and anxiety    History of seizure disorder; likely EtOH related  84-year-old male with history of alcohol abuse (in remission since ), major depression, IgA nephropathy, CKD stage IV who was referred for history of seizure disorder.  His most recent seizure was in  and likely was in the setting of alcohol use.  He has been on lamotrigine more so for mood stabilization.  I had recommended an EEG that still pending lamotrigine level was therapeutic.      Thank you again for this referral, please feel free to contact me if you have any questions.    Ish Elias MD  [facility]     HISTORY OF PRESENT ILLNESS     Patient is 84-year-old male with history of alcohol abuse (i in remission since ), major depression, IgA nephropathy, CKD stage IV who was initially evaluated on 2025 for progressive cognitive decline.  He had an MRI in  that showed mild atrophy of hippocampal formation that was attributed to his past use of alcohol.  During his last visit he  scored 29/30 on MoCA.  MRI brain was repeated that showed age-related moderate cerebral atrophy and minimal small vessel ischemic disease.  Lab work for common causes of cognitive decline included normal folate, RPR, TSH, B1, B12, alpha-tocopherol but  was elevated.  Subsequently he had a lumbar puncture and Alzheimer's disease evaluation showed normal p-Tau/Abeta42 ratio.  Neuropsychological evaluation showed no decline in cognitive functioning.     PROBLEM LIST   Patient Active Problem List   Diagnosis    Central serous chorioretinopathy of right eye    Cupping of optic disc, right    Peripheral drusen of both eyes    Posterior vitreous detachment, bilateral    Age-related nuclear cataract of both eyes    Major depressive disorder    IgA nephropathy    CKD (chronic kidney disease) stage 4, GFR 15-29 ml/min (H)    High risk medication use    Sensorineural hearing loss (SNHL) of both ears    Combined forms of age-related cataract of both eyes    Alcohol dependence in remission (H)    Frequent PVCs    History of seizure         PAST MEDICAL & SURGICAL HISTORY     Past Medical History:   Patient  has a past medical history of 2019 novel coronavirus disease (COVID-19) (10/27/2020), Alcohol dependence (H), Chronic kidney disease, stage IV (severe) (H), Combined forms of age-related cataract of both eyes, Concussion with loss of consciousness, Gout (1990), HTN (hypertension), IgA nephropathy, 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, Paroxysmal atrial fibrillation (H), PVD (posterior vitreous detachment), and Seizure disorder (H).    Surgical History:  He  has a past surgical history that includes no history of surgery; Phacoemulsification clear cornea with standard intraocular lens implant (Right, 11/3/2022); and Phacoemulsification clear cornea with standard intraocular lens implant (Left, 11/17/2022).     SOCIAL HISTORY     Reviewed, and he  reports that he has quit smoking. His smoking use  included cigarettes. He has never used smokeless tobacco. He reports that he does not drink alcohol and does not use drugs.     FAMILY HISTORY     Reviewed, and family history is not on file.     ALLERGIES     No Known Allergies      REVIEW OF SYSTEMS     A 12 point review of system was performed and was negative except as outlined in the history of present illness.     HOME MEDICATIONS     Current Outpatient Rx   Medication Sig Dispense Refill    acetaminophen (TYLENOL) 325 MG tablet Take 325-650 mg by mouth every 6 hours as needed for mild pain      busPIRone (BUSPAR) 5 MG tablet Take 1 tablet (5 mg) by mouth 2 times daily. Has started 180 tablet 3    calcium carbonate-vitamin D (OSCAL) 500-5 MG-MCG tablet Take 1 tablet by mouth daily. 90 tablet 3    Cholecalciferol (D3-1000) 25 MCG (1000 UT) CAPS Take 1 capsule by mouth daily      cyanocobalamin (VITAMIN B-12) 100 MCG tablet Take 1 tablet (100 mcg) by mouth daily. Take 1,000 mcg by mouth every morning - 90 tablet 3    escitalopram (LEXAPRO) 20 MG tablet Take 1 tablet (20 mg) by mouth every morning. 90 tablet 3    lamoTRIgine (LAMICTAL) 100 MG tablet Take 2 tablets (200 mg) by mouth 2 times daily. 360 tablet 3    metoprolol succinate ER (TOPROL XL) 25 MG 24 hr tablet TAKE 1/2 TABLET BY MOUTH DAILY 45 tablet 3    multivitamin (OCUVITE) TABS tablet Take 1 tablet by mouth daily. Please keep the appointment on 9/3/24 for further refills. 90 tablet 3    simvastatin (ZOCOR) 20 MG tablet Take 1 tablet (20 mg) by mouth at bedtime. 90 tablet 3    sodium bicarbonate 650 MG tablet Take 1 tablet (650 mg) by mouth 2 times daily. 180 tablet 3    tamsulosin (FLOMAX) 0.4 MG capsule Take 2 capsules (0.8 mg) by mouth daily. 180 capsule 3         PHYSICAL EXAM     Vital signs  BP (!) 164/80 (BP Location: Right arm, Patient Position: Sitting, Cuff Size: Adult Regular)   Pulse 58     Weight:   0 lbs 0 oz    Elderly gentleman who is alert and oriented x 3 no acute distress. Vital  signs are reviewed and documented in electronic medical record. Neck supple. Neurologically speech was normal. Cranial nerves II through XII are intact. Motor strength 5/5. Reflexes 1+ toes downgoing gait normal Romberg negative      PERTINENT DIAGNOSTIC STUDIES     Following studies were reviewed:     MRI BRAIN 3/9/2025  1. No acute intracranial pathology.  2. Age-related changes including moderate cerebral atrophy and minimal  chronic small vessel ischemic disease.    MRI BRAIN 9/9/2009  Again noted and stable is mild cerebral atrophy, including mild atrophy of the hippocampal formations bilaterally.  Subtle diffusely increased T2 signal within both hippocampal formations. This is nonspecific, and may be related to recent seizure activity. No evidence for pathologic enhancement following administration of IV gadolinium.     EEG 9/2/2009  Dysrhythmia grade 1 generalized    NEUROPSYCHOLOGICAL EVALUATION 4/28/2025  In summary, the neuropsychological assessment results indicated no evidence for significant change or decline in global cognitive functioning.  There was evidence for variable attention/concentration and slowed speed of information processing, which likely resulted in inefficient encoding of new information.  However, there was no evidence for retrieval deficits or rapid forgetting of previously learned verbal or visual information.  Language functioning was also variable, while visual-spatial abilities and executive functioning were generally within expected limits.  Further, there was evidence for mild to moderate depressive symptoms and severe anxiety symptoms.  The pattern of results is nonspecific, but not consistent with an Alzheimer's/type process.  The results indicated evidence the patient met criteria for mild cognitive impairment (MCI), but he did not meet criteria for dementia.  The specific etiology could not be determined based on this evaluation alone, but multiple factors are likely  contributing to the cognitive concerns, including his significant mood/anxiety symptoms and hearing issues.  Further, other factors such as a vascular etiology or his kidney disease could not be ruled out as contributory.     RECOMMENDATIONS:   Given these results, a referral for neurological consultation is recommended to assist with the differential diagnosis and treatment planning.  2. A full medical evaluation of any treatable causes of cognitive decline is also recommended, if this has not already been accomplished.  Evaluation of any infectious processes, vitamin deficiencies or other metabolic abnormalities is recommended, to rule out these as possible contributors.   3. A referral for cognitive rehabilitation therapy, such as with a speech therapist, is recommended.One option for this service is  [employer] at [email or web address]  4. The patient may find the following attention and organizational strategies helpful:   Use cell phone reminders to help monitor upcoming appointments and due dates as well as other important tasks (e.g., when to take medications). Set the reminder to go off several times prior to the event with advanced notice (e.g., one week before, two days before, the day before, and the morning of the event).   The patietn should attempt to reduce distractions at home. Having a clutter-free work environment and removing or at least making it harder to access potential distractions would help optimize her attention. The patient might also consider facing away from high traffic areas and using earplugs or noise cancellation headphones when desiring a quiet work environment.  Taking short breaks during the day may help optimize and maintain concentration.   Make to-do lists and prioritize tasks. Break down large tasks into smaller steps and check off each small step when completed.   5. In order to  promote learning and memorization of new verbal material, it is recommended that the patient add structure to the material she is learning to promote subsequent recall (e.g., use mnemonic devices, acronyms, make up a story or song). Additionally, she is encouraged to use visual aids, such as flash cards, diagrams, charts, etc.   6. Given his significant depressive and anxiety symptoms, referral for psychotherapeutic intervention may be beneficial. A highly structured cognitive-behavioral intervention focused on coping and stress management would likely be the most helpful. One option for this service is through the [facility] at [email or web address] or [phone].    7. Additionally, psychiatric consultation to address medication management might be considered. The [facility] Department of Psychiatry is one option for this service at [email or web address] or [phone].  8. A suicide risk assessment was conducted with this patient, and it was determined that the patient likely was not an imminent danger to herself. Nonetheless, ongoing close monitoring of the patient's suicidal ideation by health care providers is recommended.  9. Addressing sleep hygiene to improve the quality and duration of sleep may be beneficial. The following are recommendations from the National Sleep Foundation that may be helpful:   Avoid napping during the day; it can disturb the normal pattern of sleep and wakefulness.  Avoid stimulants such as caffeine, nicotine, and alcohol too close to bedtime. While alcohol is well known to speed the onset of sleep, it disrupts sleep in the second half as the body begins to metabolize the alcohol, causing arousal.  Exercise can promote good sleep. Vigorous exercise should be taken in the morning or late afternoon. A relaxing exercise, like yoga, can be done  before bed to help initiate a restful night's sleep.  Food can be disruptive right before sleep; stay away from large meals close to bedtime. Also dietary changes can cause sleep problems, if someone is struggling with a sleep problem, it's not a good time to start experimenting with spicy dishes. And, remember, chocolate has caffeine.  Ensure adequate exposure to natural light. This is particularly important for older people who may not venture outside as frequently as children and adults. Light exposure helps maintain a healthy sleep-wake cycle.  Establish a regular relaxing bedtime routine. Try to avoid emotionally upsetting conversations and activities before trying to go to sleep. Don't dwell on, or bring your problems to bed.  Associate your bed with sleep. It's not a good idea to use your bed to watch TV, listen to the radio, or read.  Make sure that the sleep environment is pleasant and relaxing. The bed should be comfortable, the room should not be too hot or cold, or too bright.  10. A referral for an audiology evaluation might be considered, given the impact on cognitive functioning associated with hearing loss.  11. The patient is strongly encouraged to work closely with treating healthcare providers to closely manage vascular risk factors and his kidney disease.   12. Working closely with treating healthcare providers to develop a medically appropriate exercise program is recommended, given the mental health and physical benefits of regular exercise.  13. The results of the evaluation now constitute a baseline of the patient's cognitive and emotional functioning. If further cognitive difficulties or decline are observed in the future, a referral for a neuropsychological re-evaluation at that time might be considered particularly if his hearing and emotional distress can be effectively addressed.    ; PHOSPHO , Plasma [place] Miscellaneous Test (02/13/2025 3:05 PM)     ADEVL; Alzheimer Disease  Evaluation, Spinal Fluid [facility] Miscellaneous Test (04/08/2025 1:43 PM)      PERTINENT LABS  Following labs were reviewed:  Ancillary Procedure on 04/08/2025   Component Date Value Ref Range Status    Culture 04/08/2025 No Growth   Final    Gram Stain Result 04/08/2025 No organisms seen   Final    Gram Stain Result 04/08/2025 2+ WBC seen   Final    Glucose CSF 04/08/2025 60  40 - 70 mg/dL Final    Protein total CSF 04/08/2025 35.4  15.0 - 45.0 mg/dL Final    Tube Number 04/08/2025 4   Final    Color 04/08/2025 Colorless  Colorless Final    Clarity 04/08/2025 Clear  Clear Final    Total Nucleated Cells 04/08/2025 0  0 - 5 /uL Final    RBC Count 04/08/2025 31 (H)  0 - 2 /uL Final    Specimen Status 04/08/2025 Specimen received. Reordered and sent to performing laboratory. Report to follow upon completion.   Final    Performing Laboratory 04/08/2025 [facility]   Final    Test Name 04/08/2025 Alzheimer Disease Evaluation, Spinal Fluid   Final    Test Code 04/08/2025 ADEVL   Final    Juan Result 04/08/2025 SEE NOTE   Final   Lab on 03/05/2025   Component Date Value Ref Range Status    WBC Count 03/05/2025 5.4  4.0 - 11.0 10e3/uL Final    RBC Count 03/05/2025 3.16 (L)  4.40 - 5.90 10e6/uL Final    Hemoglobin 03/05/2025 9.6 (L)  13.3 - 17.7 g/dL Final    Hematocrit 03/05/2025 30.0 (L)  40.0 - 53.0 % Final    MCV 03/05/2025 95  78 - 100 fL Final    MCH 03/05/2025 30.4  26.5 - 33.0 pg Final    MCHC 03/05/2025 32.0  31.5 - 36.5 g/dL Final    RDW 03/05/2025 13.5  10.0 - 15.0 % Final    Platelet Count 03/05/2025 126 (L)  150 - 450 10e3/uL Final    Ferritin 03/05/2025 603 (H)  31 - 409 ng/mL Final    Iron 03/05/2025 64  61 - 157 ug/dL Final    Iron Binding Capacity 03/05/2025 254  240 - 430 ug/dL Final    Iron Sat Index 03/05/2025 25  15 - 46 % Final    Sodium 03/05/2025 140  135 - 145 mmol/L Final    Potassium 03/05/2025 4.5  3.4 - 5.3 mmol/L Final    Chloride 03/05/2025 106  98 - 107 mmol/L Final    Carbon Dioxide  (CO2) 03/05/2025 22  22 - 29 mmol/L Final    Anion Gap 03/05/2025 12  7 - 15 mmol/L Final    Glucose 03/05/2025 96  70 - 99 mg/dL Final    Urea Nitrogen 03/05/2025 49.5 (H)  8.0 - 23.0 mg/dL Final    Creatinine 03/05/2025 4.62 (H)  0.67 - 1.17 mg/dL Final    GFR Estimate 03/05/2025 12 (L)  >60 mL/min/1.73m2 Final    Calcium 03/05/2025 9.1  8.8 - 10.4 mg/dL Final    Albumin 03/05/2025 4.4  3.5 - 5.2 g/dL Final    Phosphorus 03/05/2025 4.2  2.5 - 4.5 mg/dL Final    Estimated Average Glucose 03/05/2025 114  <117 mg/dL Final    Hemoglobin A1C 03/05/2025 5.6  <5.7 % Final    Vitamin D, Total (25-Hydroxy) 03/05/2025 36  20 - 50 ng/mL Final    Total Protein Urine mg/dL 03/05/2025 23.6    mg/dL Final    Total Protein Urine mg/mg Creat 03/05/2025 0.29 (H)  0.00 - 0.20 mg/mg Cr Final    Creatinine Urine mg/dL 03/05/2025 81.2  mg/dL Final    Color Urine 03/05/2025 Light Yellow  Colorless, Straw, Light Yellow, Yellow Final    Appearance Urine 03/05/2025 Clear  Clear Final    Glucose Urine 03/05/2025 Negative  Negative mg/dL Final    Bilirubin Urine 03/05/2025 Negative  Negative Final    Ketones Urine 03/05/2025 Negative  Negative mg/dL Final    Specific Gravity Urine 03/05/2025 1.015  1.003 - 1.035 Final    Blood Urine 03/05/2025 Trace (A)  Negative Final    pH Urine 03/05/2025 6.0  5.0 - 7.0 Final    Protein Albumin Urine 03/05/2025 20 (A)  Negative mg/dL Final    Urobilinogen Urine 03/05/2025 Normal  Normal, 2.0 mg/dL Final    Nitrite Urine 03/05/2025 Negative  Negative Final    Leukocyte Esterase Urine 03/05/2025 Negative  Negative Final    RBC Urine 03/05/2025 <1  <=2 /HPF Final    WBC Urine 03/05/2025 <1  <=5 /HPF Final    Squamous Epithelials Urine 03/05/2025 <1  <=1 /HPF Final         Total time spent for face to face visit, reviewing labs/imaging studies, counseling and coordination of care was: 30 Minutes spent on the date of the encounter doing chart review, review of outside records, review of test results,  interpretation of tests, patient visit, and documentation     The longitudinal plan of care for the diagnosis(es)/condition(s) as documented were addressed during this visit. Due to the added complexity in care, I will continue to support Melo in the subsequent management and with ongoing continuity of care.    This note was dictated using voice recognition software.  Any grammatical or context distortions are unintentional and inherent to the software.    No orders of the defined types were placed in this encounter.     New Prescriptions    No medications on file     Modified Medications    No medications on file

## 2025-05-14 ENCOUNTER — OFFICE VISIT (OUTPATIENT)
Dept: ORTHOPEDICS | Facility: CLINIC | Age: 85
End: 2025-05-14
Payer: MEDICARE

## 2025-05-14 ENCOUNTER — OFFICE VISIT (OUTPATIENT)
Dept: FAMILY MEDICINE | Facility: CLINIC | Age: 85
End: 2025-05-14
Payer: MEDICARE

## 2025-05-14 ENCOUNTER — OFFICE VISIT (OUTPATIENT)
Dept: NEUROLOGY | Facility: CLINIC | Age: 85
End: 2025-05-14
Payer: MEDICARE

## 2025-05-14 VITALS — HEART RATE: 58 BPM | SYSTOLIC BLOOD PRESSURE: 164 MMHG | DIASTOLIC BLOOD PRESSURE: 80 MMHG

## 2025-05-14 VITALS
HEART RATE: 58 BPM | BODY MASS INDEX: 24.78 KG/M2 | WEIGHT: 177.6 LBS | OXYGEN SATURATION: 95 % | DIASTOLIC BLOOD PRESSURE: 71 MMHG | SYSTOLIC BLOOD PRESSURE: 136 MMHG

## 2025-05-14 DIAGNOSIS — Z87.898 HISTORY OF SEIZURE: ICD-10-CM

## 2025-05-14 DIAGNOSIS — I73.9 CLAUDICATION: Primary | ICD-10-CM

## 2025-05-14 DIAGNOSIS — M25.561 RIGHT KNEE PAIN, UNSPECIFIED CHRONICITY: ICD-10-CM

## 2025-05-14 DIAGNOSIS — S76.311A STRAIN OF RIGHT HAMSTRING, INITIAL ENCOUNTER: ICD-10-CM

## 2025-05-14 DIAGNOSIS — M25.561 ACUTE PAIN OF RIGHT KNEE: ICD-10-CM

## 2025-05-14 DIAGNOSIS — N18.4 CKD (CHRONIC KIDNEY DISEASE) STAGE 4, GFR 15-29 ML/MIN (H): Primary | ICD-10-CM

## 2025-05-14 DIAGNOSIS — R41.9 NEUROCOGNITIVE DISORDER: Primary | ICD-10-CM

## 2025-05-14 DIAGNOSIS — Z23 NEED FOR VACCINATION: ICD-10-CM

## 2025-05-14 PROCEDURE — 3079F DIAST BP 80-89 MM HG: CPT | Performed by: PSYCHIATRY & NEUROLOGY

## 2025-05-14 PROCEDURE — G2211 COMPLEX E/M VISIT ADD ON: HCPCS | Performed by: FAMILY MEDICINE

## 2025-05-14 PROCEDURE — G2211 COMPLEX E/M VISIT ADD ON: HCPCS | Performed by: PSYCHIATRY & NEUROLOGY

## 2025-05-14 PROCEDURE — 3078F DIAST BP <80 MM HG: CPT | Performed by: FAMILY MEDICINE

## 2025-05-14 PROCEDURE — 3075F SYST BP GE 130 - 139MM HG: CPT | Performed by: FAMILY MEDICINE

## 2025-05-14 PROCEDURE — 99214 OFFICE O/P EST MOD 30 MIN: CPT | Performed by: PSYCHIATRY & NEUROLOGY

## 2025-05-14 PROCEDURE — 3077F SYST BP >= 140 MM HG: CPT | Performed by: PSYCHIATRY & NEUROLOGY

## 2025-05-14 PROCEDURE — 99214 OFFICE O/P EST MOD 30 MIN: CPT | Mod: GC | Performed by: FAMILY MEDICINE

## 2025-05-14 PROCEDURE — 99213 OFFICE O/P EST LOW 20 MIN: CPT | Performed by: FAMILY MEDICINE

## 2025-05-14 NOTE — LETTER
2025      Melo Dangelo  [address]  [place] [address]      Dear Colleague,    Thank you for referring your patient, Melo Dangelo, to the [facility] [place]. Please see a copy of my visit note below.    NEUROLOGY FOLLOW UP VISIT  NOTE       [facility] [place]  [address] [place] [address]  Tel: [phone]  Fax: [phone]  [email or web address]     Melo Dangelo,  1940, MRN [id number]  PCP: Francis Smith  Date: 2025      ASSESSMENT & PLAN     Visit Diagnosis   Neurocognitive disorder  History of seizure     Pseudodementia  84-year-old male with history of alcohol abuse in remission since , major depression, IgA nephropathy and CKD stage IV who was evaluated for progressive cognitive decline.  MRI brain shows age-related changes.  Lab work showed elevated  but lumbar puncture was normal.  His neuropsychological testing was unremarkable.  I have informed him that his workup is negative and he likely has pseudodementia due to underlying anxiety and depression I have recommended:    1.  Follow-up with psychiatry and psychology to  better manage depression and anxiety    History of seizure disorder; likely EtOH related  84-year-old male with history of alcohol abuse (in remission since ), major depression, IgA nephropathy, CKD stage IV who was referred for history of seizure disorder.  His most recent seizure was in  and likely was in the setting of alcohol use.  He has been on lamotrigine more so for mood stabilization.  I had recommended an EEG that still pending lamotrigine level was therapeutic.      Thank you again for this referral, please feel free to contact me if you have any questions.    Ish Elias MD  [facility] NEUROLOGY, [place]     HISTORY OF PRESENT ILLNESS     Patient is 84-year-old male with history of alcohol abuse (i in remission since ), major  depression, IgA nephropathy, CKD stage IV who was initially evaluated on 2/13/2025 for progressive cognitive decline.  He had an MRI in 2009 that showed mild atrophy of hippocampal formation that was attributed to his past use of alcohol.  During his last visit he scored 29/30 on MoCA.  MRI brain was repeated that showed age-related moderate cerebral atrophy and minimal small vessel ischemic disease.  Lab work for common causes of cognitive decline included normal folate, RPR, TSH, B1, B12, alpha-tocopherol but  was elevated.  Subsequently he had a lumbar puncture and Alzheimer's disease evaluation showed normal p-Tau/Abeta42 ratio.  Neuropsychological evaluation showed no decline in cognitive functioning.     PROBLEM LIST   Patient Active Problem List   Diagnosis     Central serous chorioretinopathy of right eye     Cupping of optic disc, right     Peripheral drusen of both eyes     Posterior vitreous detachment, bilateral     Age-related nuclear cataract of both eyes     Major depressive disorder     IgA nephropathy     CKD (chronic kidney disease) stage 4, GFR 15-29 ml/min (H)     High risk medication use     Sensorineural hearing loss (SNHL) of both ears     Combined forms of age-related cataract of both eyes     Alcohol dependence in remission (H)     Frequent PVCs     History of seizure         PAST MEDICAL & SURGICAL HISTORY     Past Medical History:   Patient  has a past medical history of 2019 novel coronavirus disease (COVID-19) (10/27/2020), Alcohol dependence (H), Chronic kidney disease, stage IV (severe) (H), Combined forms of age-related cataract of both eyes, Concussion with loss of consciousness, Gout (1990), HTN (hypertension), IgA nephropathy, 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, Paroxysmal atrial fibrillation (H), PVD (posterior vitreous detachment), and Seizure disorder (H).    Surgical History:  He  has a past surgical history that includes no history of surgery;  Phacoemulsification clear cornea with standard intraocular lens implant (Right, 11/3/2022); and Phacoemulsification clear cornea with standard intraocular lens implant (Left, 11/17/2022).     SOCIAL HISTORY     Reviewed, and he  reports that he has quit smoking. His smoking use included cigarettes. He has never used smokeless tobacco. He reports that he does not drink alcohol and does not use drugs.     FAMILY HISTORY     Reviewed, and family history is not on file.     ALLERGIES     No Known Allergies      REVIEW OF SYSTEMS     A 12 point review of system was performed and was negative except as outlined in the history of present illness.     HOME MEDICATIONS     Current Outpatient Rx   Medication Sig Dispense Refill     acetaminophen (TYLENOL) 325 MG tablet Take 325-650 mg by mouth every 6 hours as needed for mild pain       busPIRone (BUSPAR) 5 MG tablet Take 1 tablet (5 mg) by mouth 2 times daily. Has started 180 tablet 3     calcium carbonate-vitamin D (OSCAL) 500-5 MG-MCG tablet Take 1 tablet by mouth daily. 90 tablet 3     Cholecalciferol (D3-1000) 25 MCG (1000 UT) CAPS Take 1 capsule by mouth daily       cyanocobalamin (VITAMIN B-12) 100 MCG tablet Take 1 tablet (100 mcg) by mouth daily. Take 1,000 mcg by mouth every morning - 90 tablet 3     escitalopram (LEXAPRO) 20 MG tablet Take 1 tablet (20 mg) by mouth every morning. 90 tablet 3     lamoTRIgine (LAMICTAL) 100 MG tablet Take 2 tablets (200 mg) by mouth 2 times daily. 360 tablet 3     metoprolol succinate ER (TOPROL XL) 25 MG 24 hr tablet TAKE 1/2 TABLET BY MOUTH DAILY 45 tablet 3     multivitamin (OCUVITE) TABS tablet Take 1 tablet by mouth daily. Please keep the appointment on 9/3/24 for further refills. 90 tablet 3     simvastatin (ZOCOR) 20 MG tablet Take 1 tablet (20 mg) by mouth at bedtime. 90 tablet 3     sodium bicarbonate 650 MG tablet Take 1 tablet (650 mg) by mouth 2 times daily. 180 tablet 3     tamsulosin (FLOMAX) 0.4 MG capsule Take 2  capsules (0.8 mg) by mouth daily. 180 capsule 3         PHYSICAL EXAM     Vital signs  BP (!) 164/80 (BP Location: Right arm, Patient Position: Sitting, Cuff Size: Adult Regular)   Pulse 58     Weight:   0 lbs 0 oz    Elderly gentleman who is alert and oriented x 3 no acute distress. Vital signs are reviewed and documented in electronic medical record. Neck supple. Neurologically speech was normal. Cranial nerves II through XII are intact. Motor strength 5/5. Reflexes 1+ toes downgoing gait normal Romberg negative      PERTINENT DIAGNOSTIC STUDIES     Following studies were reviewed:     MRI BRAIN 3/9/2025  1. No acute intracranial pathology.  2. Age-related changes including moderate cerebral atrophy and minimal  chronic small vessel ischemic disease.    MRI BRAIN 9/9/2009  Again noted and stable is mild cerebral atrophy, including mild atrophy of the hippocampal formations bilaterally.  Subtle diffusely increased T2 signal within both hippocampal formations. This is nonspecific, and may be related to recent seizure activity. No evidence for pathologic enhancement following administration of IV gadolinium.     EEG 9/2/2009  Dysrhythmia grade 1 generalized    NEUROPSYCHOLOGICAL EVALUATION 4/28/2025  In summary, the neuropsychological assessment results indicated no evidence for significant change or decline in global cognitive functioning.  There was evidence for variable attention/concentration and slowed speed of information processing, which likely resulted in inefficient encoding of new information.  However, there was no evidence for retrieval deficits or rapid forgetting of previously learned verbal or visual information.  Language functioning was also variable, while visual-spatial abilities and executive functioning were generally within expected limits.  Further, there was evidence for mild to moderate depressive symptoms and severe anxiety symptoms.  The pattern of results is nonspecific, but not  consistent with an Alzheimer's/type process.  The results indicated evidence the patient met criteria for mild cognitive impairment (MCI), but he did not meet criteria for dementia.  The specific etiology could not be determined based on this evaluation alone, but multiple factors are likely contributing to the cognitive concerns, including his significant mood/anxiety symptoms and hearing issues.  Further, other factors such as a vascular etiology or his kidney disease could not be ruled out as contributory.     RECOMMENDATIONS:   Given these results, a referral for neurological consultation is recommended to assist with the differential diagnosis and treatment planning.  2. A full medical evaluation of any treatable causes of cognitive decline is also recommended, if this has not already been accomplished.  Evaluation of any infectious processes, vitamin deficiencies or other metabolic abnormalities is recommended, to rule out these as possible contributors.   3. A referral for cognitive rehabilitation therapy, such as with a speech therapist, is recommended.One option for this service is [facility] at [email or web address]  4. The patient may find the following attention and organizational strategies helpful:   Use cell phone reminders to help monitor upcoming appointments and due dates as well as other important tasks (e.g., when to take medications). Set the reminder to go off several times prior to the event with advanced notice (e.g., one week before, two days before, the day before, and the morning of the event).   The patietn should attempt to reduce distractions at home. Having a clutter-free work environment and removing or at least making it harder to access potential distractions would help optimize her attention. The patient might also consider facing away from high traffic areas and using earplugs or noise  cancellation headphones when desiring a quiet work environment.  Taking short breaks during the day may help optimize and maintain concentration.   Make to-do lists and prioritize tasks. Break down large tasks into smaller steps and check off each small step when completed.   5. In order to promote learning and memorization of new verbal material, it is recommended that the patient add structure to the material she is learning to promote subsequent recall (e.g., use mnemonic devices, acronyms, make up a story or song). Additionally, she is encouraged to use visual aids, such as flash cards, diagrams, charts, etc.   6. Given his significant depressive and anxiety symptoms, referral for psychotherapeutic intervention may be beneficial. A highly structured cognitive-behavioral intervention focused on coping and stress management would likely be the most helpful. One option for this service is through the [facility] at [email or web address] or [phone].    7. Additionally, psychiatric consultation to address medication management might be considered. The [facility] Department of Psychiatry is one option for this service at [email or web address] or [phone].  8. A suicide risk assessment was conducted with this patient, and it was determined that the patient likely was not an imminent danger to herself. Nonetheless, ongoing close monitoring of the patient's suicidal ideation by health care providers is recommended.  9. Addressing sleep hygiene to improve the quality and duration of sleep may be beneficial. The following are recommendations from the National Sleep Foundation that may be helpful:   Avoid napping during the day; it can disturb the normal pattern of sleep and wakefulness.  Avoid stimulants such as caffeine, nicotine, and alcohol too close to bedtime. While alcohol is  well known to speed the onset of sleep, it disrupts sleep in the second half as the body begins to metabolize the alcohol, causing arousal.  Exercise can promote good sleep. Vigorous exercise should be taken in the morning or late afternoon. A relaxing exercise, like yoga, can be done before bed to help initiate a restful night's sleep.  Food can be disruptive right before sleep; stay away from large meals close to bedtime. Also dietary changes can cause sleep problems, if someone is struggling with a sleep problem, it's not a good time to start experimenting with spicy dishes. And, remember, chocolate has caffeine.  Ensure adequate exposure to natural light. This is particularly important for older people who may not venture outside as frequently as children and adults. Light exposure helps maintain a healthy sleep-wake cycle.  Establish a regular relaxing bedtime routine. Try to avoid emotionally upsetting conversations and activities before trying to go to sleep. Don't dwell on, or bring your problems to bed.  Associate your bed with sleep. It's not a good idea to use your bed to watch TV, listen to the radio, or read.  Make sure that the sleep environment is pleasant and relaxing. The bed should be comfortable, the room should not be too hot or cold, or too bright.  10. A referral for an audiology evaluation might be considered, given the impact on cognitive functioning associated with hearing loss.  11. The patient is strongly encouraged to work closely with treating healthcare providers to closely manage vascular risk factors and his kidney disease.   12. Working closely with treating healthcare providers to develop a medically appropriate exercise program is recommended, given the mental health and physical benefits of regular exercise.  13. The results of the evaluation now constitute a baseline of the patient's cognitive and emotional functioning. If further cognitive difficulties or decline are observed in  the future, a referral for a neuropsychological re-evaluation at that time might be considered particularly if his hearing and emotional distress can be effectively addressed.    ; PHOSPHO , Plasma [place] Miscellaneous Test (02/13/2025 3:05 PM)     ADEVL; Alzheimer Disease Evaluation, Spinal Fluid [place] Miscellaneous Test (04/08/2025 1:43 PM)      PERTINENT LABS  Following labs were reviewed:  Ancillary Procedure on 04/08/2025   Component Date Value Ref Range Status     Culture 04/08/2025 No Growth   Final     Gram Stain Result 04/08/2025 No organisms seen   Final     Gram Stain Result 04/08/2025 2+ WBC seen   Final     Glucose CSF 04/08/2025 60  40 - 70 mg/dL Final     Protein total CSF 04/08/2025 35.4  15.0 - 45.0 mg/dL Final     Tube Number 04/08/2025 4   Final     Color 04/08/2025 Colorless  Colorless Final     Clarity 04/08/2025 Clear  Clear Final     Total Nucleated Cells 04/08/2025 0  0 - 5 /uL Final     RBC Count 04/08/2025 31 (H)  0 - 2 /uL Final     Specimen Status 04/08/2025 Specimen received. Reordered and sent to performing laboratory. Report to follow upon completion.   Final     Performing Laboratory 04/08/2025 [facility]   Final     Test Name 04/08/2025 Alzheimer Disease Evaluation, Spinal Fluid   Final     Test Code 04/08/2025 ADEVL   Final     [place] Result 04/08/2025 SEE NOTE   Final   Lab on 03/05/2025   Component Date Value Ref Range Status     WBC Count 03/05/2025 5.4  4.0 - 11.0 10e3/uL Final     RBC Count 03/05/2025 3.16 (L)  4.40 - 5.90 10e6/uL Final     Hemoglobin 03/05/2025 9.6 (L)  13.3 - 17.7 g/dL Final     Hematocrit 03/05/2025 30.0 (L)  40.0 - 53.0 % Final     MCV 03/05/2025 95  78 - 100 fL Final     MCH 03/05/2025 30.4  26.5 - 33.0 pg Final     MCHC 03/05/2025 32.0  31.5 - 36.5 g/dL Final     RDW 03/05/2025 13.5  10.0 - 15.0 % Final     Platelet Count 03/05/2025 126 (L)  150 - 450 10e3/uL Final     Ferritin 03/05/2025 603 (H)  31 - 409 ng/mL Final     Iron  03/05/2025 64  61 - 157 ug/dL Final     Iron Binding Capacity 03/05/2025 254  240 - 430 ug/dL Final     Iron Sat Index 03/05/2025 25  15 - 46 % Final     Sodium 03/05/2025 140  135 - 145 mmol/L Final     Potassium 03/05/2025 4.5  3.4 - 5.3 mmol/L Final     Chloride 03/05/2025 106  98 - 107 mmol/L Final     Carbon Dioxide (CO2) 03/05/2025 22  22 - 29 mmol/L Final     Anion Gap 03/05/2025 12  7 - 15 mmol/L Final     Glucose 03/05/2025 96  70 - 99 mg/dL Final     Urea Nitrogen 03/05/2025 49.5 (H)  8.0 - 23.0 mg/dL Final     Creatinine 03/05/2025 4.62 (H)  0.67 - 1.17 mg/dL Final     GFR Estimate 03/05/2025 12 (L)  >60 mL/min/1.73m2 Final     Calcium 03/05/2025 9.1  8.8 - 10.4 mg/dL Final     Albumin 03/05/2025 4.4  3.5 - 5.2 g/dL Final     Phosphorus 03/05/2025 4.2  2.5 - 4.5 mg/dL Final     Estimated Average Glucose 03/05/2025 114  <117 mg/dL Final     Hemoglobin A1C 03/05/2025 5.6  <5.7 % Final     Vitamin D, Total (25-Hydroxy) 03/05/2025 36  20 - 50 ng/mL Final     Total Protein Urine mg/dL 03/05/2025 23.6    mg/dL Final     Total Protein Urine mg/mg Creat 03/05/2025 0.29 (H)  0.00 - 0.20 mg/mg Cr Final     Creatinine Urine mg/dL 03/05/2025 81.2  mg/dL Final     Color Urine 03/05/2025 Light Yellow  Colorless, Straw, Light Yellow, Yellow Final     Appearance Urine 03/05/2025 Clear  Clear Final     Glucose Urine 03/05/2025 Negative  Negative mg/dL Final     Bilirubin Urine 03/05/2025 Negative  Negative Final     Ketones Urine 03/05/2025 Negative  Negative mg/dL Final     Specific Gravity Urine 03/05/2025 1.015  1.003 - 1.035 Final     Blood Urine 03/05/2025 Trace (A)  Negative Final     pH Urine 03/05/2025 6.0  5.0 - 7.0 Final     Protein Albumin Urine 03/05/2025 20 (A)  Negative mg/dL Final     Urobilinogen Urine 03/05/2025 Normal  Normal, 2.0 mg/dL Final     Nitrite Urine 03/05/2025 Negative  Negative Final     Leukocyte Esterase Urine 03/05/2025 Negative  Negative Final     RBC Urine 03/05/2025 <1  <=2 /HPF Final      WBC Urine 03/05/2025 <1  <=5 /HPF Final     Squamous Epithelials Urine 03/05/2025 <1  <=1 /HPF Final         Total time spent for face to face visit, reviewing labs/imaging studies, counseling and coordination of care was: 30 Minutes spent on the date of the encounter doing chart review, review of outside records, review of test results, interpretation of tests, patient visit, and documentation     The longitudinal plan of care for the diagnosis(es)/condition(s) as documented were addressed during this visit. Due to the added complexity in care, I will continue to support Melo in the subsequent management and with ongoing continuity of care.    This note was dictated using voice recognition software.  Any grammatical or context distortions are unintentional and inherent to the software.    No orders of the defined types were placed in this encounter.     New Prescriptions    No medications on file     Modified Medications    No medications on file           Again, thank you for allowing me to participate in the care of your patient.        Sincerely,        Ish Elias MD    Electronically signed

## 2025-05-14 NOTE — PROGRESS NOTES
CHIEF COMPLAINT:  Pain of the Right Knee       HISTORY OF PRESENT ILLNESS  Mr. Dangelo is a 84 year old male who presents to clinic today with right knee pain.  Melo reports lateral knee and hamstring pain for the last 2 weeks without a RC. He has been doing more activity making furniture over the past few weeks. He endorse pain with knee extension and kneeling. He has not done any current treatment for the knee. He does note pain in the back of the leg while walking that gets better with rest.        Additional history: as documented    MEDICAL HISTORY  Patient Active Problem List   Diagnosis    Central serous chorioretinopathy of right eye    Cupping of optic disc, right    Peripheral drusen of both eyes    Posterior vitreous detachment, bilateral    Age-related nuclear cataract of both eyes    Major depressive disorder    IgA nephropathy    CKD (chronic kidney disease) stage 4, GFR 15-29 ml/min (H)    High risk medication use    Sensorineural hearing loss (SNHL) of both ears    Combined forms of age-related cataract of both eyes    Alcohol dependence in remission (H)    Frequent PVCs    History of seizure       Current Outpatient Medications   Medication Sig Dispense Refill    acetaminophen (TYLENOL) 325 MG tablet Take 325-650 mg by mouth every 6 hours as needed for mild pain      busPIRone (BUSPAR) 5 MG tablet Take 1 tablet (5 mg) by mouth 2 times daily. Has started 180 tablet 3    calcium carbonate-vitamin D (OSCAL) 500-5 MG-MCG tablet Take 1 tablet by mouth daily. 90 tablet 3    Cholecalciferol (D3-1000) 25 MCG (1000 UT) CAPS Take 1 capsule by mouth daily      cyanocobalamin (VITAMIN B-12) 100 MCG tablet Take 1 tablet (100 mcg) by mouth daily. Take 1,000 mcg by mouth every morning - 90 tablet 3    escitalopram (LEXAPRO) 20 MG tablet Take 1 tablet (20 mg) by mouth every morning. 90 tablet 3    lamoTRIgine (LAMICTAL) 100 MG tablet Take 2 tablets (200 mg) by mouth 2 times daily. 360 tablet 3    metoprolol  succinate ER (TOPROL XL) 25 MG 24 hr tablet TAKE 1/2 TABLET BY MOUTH DAILY 45 tablet 3    multivitamin (OCUVITE) TABS tablet Take 1 tablet by mouth daily. Please keep the appointment on 9/3/24 for further refills. 90 tablet 3    simvastatin (ZOCOR) 20 MG tablet Take 1 tablet (20 mg) by mouth at bedtime. 90 tablet 3    sodium bicarbonate 650 MG tablet Take 1 tablet (650 mg) by mouth 2 times daily. 180 tablet 3    tamsulosin (FLOMAX) 0.4 MG capsule Take 2 capsules (0.8 mg) by mouth daily. 180 capsule 3       No Known Allergies    Family History   Problem Relation Age of Onset    Glaucoma No family hx of     Macular Degeneration No family hx of     Anesthesia Reaction No family hx of     Deep Vein Thrombosis (DVT) No family hx of        Additional medical/Social/Surgical histories reviewed in EPIC and updated as appropriate.        PHYSICAL EXAM  RIGHT KNEE  Inspection:    Normal alignment; no edema, erythema, or ecchymosis present  Palpation:    Tender about the lateral joint line and lateral femoral condyle. Remainder of bony and ligamentous landmarks are nontender.    No effusion is present    Patellofemoral crepitus is Present  Range of Motion:     00 extension to 900 flexion  Strength:    Quadriceps 5/5 and hamstrings 5/5    Extensor mechanism intact  Special Tests:    Negative: Patellar grind, MCL/valgus stress (0 & 30 deg), LCL/varus stress (0 & 30 deg), Mehul's, Theeryn's         ASSESSMENT & PLAN  Mr. Dangelo is a 84 year old male who presents to clinic today with right knee pain for the past two weeks. XR on 5/7/25 significant for mild medial compartment arthritis, moderate patellofemoral arthritis. He does appear to have some ossific density versus bone spur over the anterior fat pad, which may be contributing to pain with knee flexion and extension and reduced range of motion. With posterior hamstring pain that gets worse with activity and improves quickly with rest, I do have a concern for possible  claudication, especially with profound vascular calcification seen on XR. Will proceed with ultrasound this week and referral to physical therapy if ultrasound is negative.     Kelvin Parikh MD  [facility], PGY-2    Patient seen and discussed with resident physician, Dr. Parikh.  Agree with all aspects of history, physical, assessment, and plan as outlined above.    DO RADHA Noonan      It was a pleasure seeing Melo today.    Nithin De Souza DO, EVEN[facility]  Primary Care Sports Medicine      This note was constructed using Dragon dictation software, please excuse any minor errors in spelling, grammar, or syntax.

## 2025-05-14 NOTE — PATIENT INSTRUCTIONS
[facility] Outpatient Physical Therapy Locations    To schedule an appointment please call our scheduling department at [phone]    To fax a referral to be scheduled fax to [phone]    [place]: [address], [facility]: [address] [facility]: [address], [facility]: [address], [facility]: [address], [facility]: [address] [facility]: [address] [place], [place]  [place]: [address] [facility] , [address], [facility]: [address] [facility] , [address], [facility]: [address] [facility]: [address], [facility]: [address], [facility]: [address], [facility]: [address], [facility]: [address] [facility]: [address], [facility]: [facility]: [address] [facility], [address], [facility]: [address], [facility]: [address], [place], [place]  [place]: [address] [facility]: [address] [place] [address]: [address], [facility]: [address] [facility] [address], [place]  [place]: [address], [facility]: [address], [facility]: [address] [facility] [place], [place]  [facility]: [address] [place], [place]  [facility]: [address] [place] [address]  [place]: [address] [facility] [facility], [address], [facility] [address] [facility],  [facility]: [address] [facility] [place], [place]

## 2025-05-14 NOTE — LETTER
5/14/2025      RE: Melo Dangelo  [address]  [place] [address]     Dear Colleague,    Thank you for referring your patient, Melo Dangelo, to the [facility] [place]. Please see a copy of my visit note below.    CHIEF COMPLAINT:  Pain of the Right Knee       HISTORY OF PRESENT ILLNESS  Mr. Dangelo is a 84 year old male who presents to clinic today with right knee pain.  Melo reports lateral knee and hamstring pain for the last 2 weeks without a RC. He has been doing more activity making furniture over the past few weeks. He endorse pain with knee extension and kneeling. He has not done any current treatment for the knee. He does note pain in the back of the leg while walking that gets better with rest.        Additional history: as documented    MEDICAL HISTORY  Patient Active Problem List   Diagnosis     Central serous chorioretinopathy of right eye     Cupping of optic disc, right     Peripheral drusen of both eyes     Posterior vitreous detachment, bilateral     Age-related nuclear cataract of both eyes     Major depressive disorder     IgA nephropathy     CKD (chronic kidney disease) stage 4, GFR 15-29 ml/min (H)     High risk medication use     Sensorineural hearing loss (SNHL) of both ears     Combined forms of age-related cataract of both eyes     Alcohol dependence in remission (H)     Frequent PVCs     History of seizure       Current Outpatient Medications   Medication Sig Dispense Refill     acetaminophen (TYLENOL) 325 MG tablet Take 325-650 mg by mouth every 6 hours as needed for mild pain       busPIRone (BUSPAR) 5 MG tablet Take 1 tablet (5 mg) by mouth 2 times daily. Has started 180 tablet 3     calcium carbonate-vitamin D (OSCAL) 500-5 MG-MCG tablet Take 1 tablet by mouth daily. 90 tablet 3     Cholecalciferol (D3-1000) 25 MCG (1000 UT) CAPS Take 1 capsule by mouth daily       cyanocobalamin (VITAMIN B-12) 100 MCG tablet Take 1 tablet  (100 mcg) by mouth daily. Take 1,000 mcg by mouth every morning - 90 tablet 3     escitalopram (LEXAPRO) 20 MG tablet Take 1 tablet (20 mg) by mouth every morning. 90 tablet 3     lamoTRIgine (LAMICTAL) 100 MG tablet Take 2 tablets (200 mg) by mouth 2 times daily. 360 tablet 3     metoprolol succinate ER (TOPROL XL) 25 MG 24 hr tablet TAKE 1/2 TABLET BY MOUTH DAILY 45 tablet 3     multivitamin (OCUVITE) TABS tablet Take 1 tablet by mouth daily. Please keep the appointment on 9/3/24 for further refills. 90 tablet 3     simvastatin (ZOCOR) 20 MG tablet Take 1 tablet (20 mg) by mouth at bedtime. 90 tablet 3     sodium bicarbonate 650 MG tablet Take 1 tablet (650 mg) by mouth 2 times daily. 180 tablet 3     tamsulosin (FLOMAX) 0.4 MG capsule Take 2 capsules (0.8 mg) by mouth daily. 180 capsule 3       No Known Allergies    Family History   Problem Relation Age of Onset     Glaucoma No family hx of      Macular Degeneration No family hx of      Anesthesia Reaction No family hx of      Deep Vein Thrombosis (DVT) No family hx of        Additional medical/Social/Surgical histories reviewed in [facility] and updated as appropriate.        PHYSICAL EXAM  RIGHT KNEE  Inspection:    Normal alignment; no edema, erythema, or ecchymosis present  Palpation:    Tender about the lateral joint line and lateral femoral condyle. Remainder of bony and ligamentous landmarks are nontender.    No effusion is present    Patellofemoral crepitus is Present  Range of Motion:     00 extension to 900 flexion  Strength:    Quadriceps 5/5 and hamstrings 5/5    Extensor mechanism intact  Special Tests:    Negative: Patellar grind, MCL/valgus stress (0 & 30 deg), LCL/varus stress (0 & 30 deg), Mehul's, Theeryn's         ASSESSMENT & PLAN  Mr. Dangelo is a 84 year old male who presents to clinic today with right knee pain for the past two weeks. XR on 5/7/25 significant for mild medial compartment arthritis, moderate patellofemoral arthritis. He does  appear to have some ossific density versus bone spur over the anterior fat pad, which may be contributing to pain with knee flexion and extension and reduced range of motion. With posterior hamstring pain that gets worse with activity and improves quickly with rest, I do have a concern for possible claudication, especially with profound vascular calcification seen on XR. Will proceed with ultrasound this week and referral to physical therapy if ultrasound is negative.     Kelvin Parikh MD  [facility], PGY-2    Patient seen and discussed with resident physician, Dr. Parikh.  Agree with all aspects of history, physical, assessment, and plan as outlined above.    DO RADHA Noonan      It was a pleasure seeing Melo today.    Nithin De Souza DO, CAQSM  Primary Care Sports Medicine      This note was constructed using Dragon dictation software, please excuse any minor errors in spelling, grammar, or syntax.      Again, thank you for allowing me to participate in the care of your patient.      Sincerely,    Nithin De Souza DO

## 2025-05-14 NOTE — PROGRESS NOTES
Assessment & Plan     Need for vaccination  We discussed need for him to find out if had shingrix or not he will check    CKD (chronic kidney disease) stage 4, GFR 15-29 ml/min (H)  Sees Renal in a few weks, BP at goal    Right knee pain, unspecified chronicity  See ortho today possible IT band    The longitudinal plan of care for the diagnosis(es)/condition(s) as documented were addressed during this visit. Due to the added complexity in care, I will continue to support Melo in the subsequent management and with ongoing continuity of care.  24 minutes spent by me on the date of the encounter doing chart review, history and exam, documentation and further activities per the note            Subjective   Melo is a 84 year old, presenting for the following health issues:  Melo Dangelo is a 84 year old male that presents in clinic today for the following:     Chief Complaint   Patient presents with    Musculoskeletal Problem     Knee and hip pain         5/14/2025    11:48 AM   Additional Questions   Roomed by Carmen     Musculoskeletal Problem    History of Present Illness       Reason for visit:  Knee and hip pain  Symptom onset:  1-2 weeks ago  Symptom intensity:  Moderate  Symptom progression:  Worsening  Had these symptoms before:  No  What makes it worse:  Standing up is very painful  What makes it better:  Walking once i get up   but when i sit down it is a very painfull process  the situation varies from moderate   He is taking medications regularly.      Here in follow-up  Leg still hurts see note the other day  Otherwise doing well  No interval changes  Sees Neuro today in follow-up  No seizure for years, I had given him a note for driving two mo ago, he thinks it cleared and he can drive  Leg sounds like IT band to me we set up Sp Med visit in a few hrs  Past Medical History:   Diagnosis Date    2019 novel coronavirus disease (COVID-19) 10/27/2020    also on 9/8/22    Alcohol dependence (H)      Chronic kidney disease, stage IV (severe) (H)     Combined forms of age-related cataract of both eyes     Concussion with loss of consciousness     x10 going back to childhood    Gout 1990    HTN (hypertension)     IgA nephropathy     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     Paroxysmal atrial fibrillation (H)     PVD (posterior vitreous detachment)     Seizure disorder (H)     last seizure 2008     Past Surgical History:   Procedure Laterality Date    NO HISTORY OF SURGERY      PHACOEMULSIFICATION CLEAR CORNEA WITH STANDARD INTRAOCULAR LENS IMPLANT Right 11/3/2022    Procedure: RIGHT EYE PHACOEMULSIFICATION, CATARACT, WITH INTRAOCULAR LENS IMPLANT COMPLEX CASE ;  Surgeon: Best Padilla MD;  [place]: [facility] [place]    PHACOEMULSIFICATION CLEAR CORNEA WITH STANDARD INTRAOCULAR LENS IMPLANT Left 11/17/2022    Procedure: LEFT EYE PHACOEMULSIFICATION, CATARACT, WITH INTRAOCULAR LENS IMPLANT;  Surgeon: Best Padilla MD;  Location: [facility] [place]     Current Outpatient Medications   Medication Sig Dispense Refill    acetaminophen (TYLENOL) 325 MG tablet Take 325-650 mg by mouth every 6 hours as needed for mild pain      busPIRone (BUSPAR) 5 MG tablet Take 1 tablet (5 mg) by mouth 2 times daily. Has started 180 tablet 3    calcium carbonate-vitamin D (OSCAL) 500-5 MG-MCG tablet Take 1 tablet by mouth daily. 90 tablet 3    Cholecalciferol (D3-1000) 25 MCG (1000 UT) CAPS Take 1 capsule by mouth daily      cyanocobalamin (VITAMIN B-12) 100 MCG tablet Take 1 tablet (100 mcg) by mouth daily. Take 1,000 mcg by mouth every morning - 90 tablet 3    escitalopram (LEXAPRO) 20 MG tablet Take 1 tablet (20 mg) by mouth every morning. 90 tablet 3    lamoTRIgine (LAMICTAL) 100 MG tablet Take 2 tablets (200 mg) by mouth 2 times daily. 360 tablet 3    metoprolol succinate ER (TOPROL XL) 25 MG 24 hr tablet TAKE 1/2 TABLET BY MOUTH DAILY 45 tablet 3    multivitamin (OCUVITE) TABS tablet Take 1 tablet by mouth  daily. Please keep the appointment on 9/3/24 for further refills. 90 tablet 3    simvastatin (ZOCOR) 20 MG tablet Take 1 tablet (20 mg) by mouth at bedtime. 90 tablet 3    sodium bicarbonate 650 MG tablet Take 1 tablet (650 mg) by mouth 2 times daily. 180 tablet 3    tamsulosin (FLOMAX) 0.4 MG capsule Take 2 capsules (0.8 mg) by mouth daily. 180 capsule 3     No current facility-administered medications for this visit.     No Known Allergies  Family History   Problem Relation Age of Onset    Glaucoma No family hx of     Macular Degeneration No family hx of     Anesthesia Reaction No family hx of     Deep Vein Thrombosis (DVT) No family hx of      Social History     Socioeconomic History    Marital status:      Spouse name: Not on file    Number of children: Not on file    Years of education: Not on file    Highest education level: Bachelor's degree (e.g., BA, AB, BS)   Occupational History    Not on file   Tobacco Use    Smoking status: Former     Types: Cigarettes    Smokeless tobacco: Never   Vaping Use    Vaping status: Never Used   Substance and Sexual Activity    Alcohol use: No     Comment: History of alcohol dependence, in remission for 20 years    Drug use: No    Sexual activity: Not Currently   Other Topics Concern    Not on file   Social History Narrative    Not on file     Social Drivers of Health     Financial Resource Strain: Low Risk  (9/3/2024)    Financial Resource Strain     Within the past 12 months, have you or your family members you live with been unable to get utilities (heat, electricity) when it was really needed?: No   Food Insecurity: Low Risk  (9/3/2024)    Food Insecurity     Within the past 12 months, did you worry that your food would run out before you got money to buy more?: No     Within the past 12 months, did the food you bought just not last and you didn t have money to get more?: No   Transportation Needs: Low Risk  (9/3/2024)    Transportation Needs     Within the past 12  months, has lack of transportation kept you from medical appointments, getting your medicines, non-medical meetings or appointments, work, or from getting things that you need?: No   Physical Activity: Unknown (9/3/2024)    Exercise Vital Sign     Days of Exercise per Week: 5 days     Minutes of Exercise per Session: Not on file   Stress: Stress Concern Present (9/3/2024)    [employer] [place] [employer] - Occupational Stress Questionnaire     Feeling of Stress : Very much   Social Connections: Unknown (9/3/2024)    Social Connection and Isolation Panel [NHANES]     Frequency of Communication with Friends and Family: Not on file     Frequency of Social Gatherings with Friends and Family: Once a week     Attends Mosque Services: Not on file     Active Member of Clubs or Organizations: Not on file     Attends Club or Organization Meetings: Not on file     Marital Status: Not on file   Interpersonal Safety: Low Risk  (5/7/2025)    Interpersonal Safety     Do you feel physically and emotionally safe where you currently live?: Yes     Within the past 12 months, have you been hit, slapped, kicked or otherwise physically hurt by someone?: No     Within the past 12 months, have you been humiliated or emotionally abused in other ways by your partner or ex-partner?: No   Housing Stability: Low Risk  (9/3/2024)    Housing Stability     Do you have housing? : Yes     Are you worried about losing your housing?: No             Objective    /71 (BP Location: Right arm, Patient Position: Sitting, Cuff Size: Adult Regular)   Pulse 58   Wt 80.6 kg (177 lb 9.6 oz)   SpO2 95%   BMI 24.78 kg/m    Body mass index is 24.78 kg/m .  Physical Exam   GENERAL: alert and no distress  MS: no gross musculoskeletal defects noted, no edema            Signed Electronically by: Francis Smith MD

## 2025-05-16 PROBLEM — S76.311A STRAIN OF RIGHT HAMSTRING, INITIAL ENCOUNTER: Status: ACTIVE | Noted: 2025-05-16

## 2025-05-28 ENCOUNTER — RESULTS FOLLOW-UP (OUTPATIENT)
Dept: ORTHOPEDICS | Facility: CLINIC | Age: 85
End: 2025-05-28

## 2025-05-28 ENCOUNTER — ANCILLARY PROCEDURE (OUTPATIENT)
Dept: ULTRASOUND IMAGING | Facility: CLINIC | Age: 85
End: 2025-05-28
Attending: FAMILY MEDICINE
Payer: MEDICARE

## 2025-05-28 DIAGNOSIS — I73.9 CLAUDICATION: ICD-10-CM

## 2025-05-29 ENCOUNTER — PATIENT OUTREACH (OUTPATIENT)
Dept: CARE COORDINATION | Facility: CLINIC | Age: 85
End: 2025-05-29
Payer: MEDICARE

## 2025-05-29 NOTE — PROGRESS NOTES
Clinic Care Coordination Contact  Care Coordination Clinician Chart Review    Situation: Patient chart reviewed by Care Coordinator.       Background: Care Coordination Program started: 4/19/2024. Initial assessment completed and patient-centered care plan(s) were developed with participation from patient. Lead CC handed patient off to CHW for continued outreaches.       Assessment: Per chart review, patient outreach completed by CC CHW on 5/6.  Patient is actively working to accomplish goal(s). Patient's goal(s) appropriate and relevant at this time. Patient is not due for updated Plan of Care.  Assessments will be completed annually or as needed/with change of patient status.      Care Plan: Health Maintenance       Problem: Health Maintenance Due or Overdue       Goal: Become up-to-date with health maintenance visit(s)       Start Date: 5/29/2025    This Visit's Progress: On track Recent Progress: On track    Priority: High    Note:     Barriers: patient is having some memory concerns  Strengths: patient sees specialists and PCP for regular care  Patient expressed understanding of goal: yes  Action steps to achieve this goal:  1. I will continue to see my providers for regularly scheduled appointments.   2. I will schedule with psychology for anxiety and depression.   3. I will continue to work with PT for my knee.                                      Plan/Recommendations: The patient will continue working with Care Coordination to achieve goal(s) as above. CHW will continue outreaches at minimum every 30 days and will involve Lead CC as needed or if patient is ready to move to Maintenance. Lead CC will continue to monitor CHW outreaches and patient's progress to goal(s) every 6 weeks.     Plan of Care updated and sent to patient: Clementine CLARK RN on 5/29/2025 at 8:43 AM

## 2025-06-03 ENCOUNTER — THERAPY VISIT (OUTPATIENT)
Dept: PHYSICAL THERAPY | Facility: CLINIC | Age: 85
End: 2025-06-03
Payer: MEDICARE

## 2025-06-03 DIAGNOSIS — S76.311A STRAIN OF RIGHT HAMSTRING, INITIAL ENCOUNTER: Primary | ICD-10-CM

## 2025-06-03 PROCEDURE — 97140 MANUAL THERAPY 1/> REGIONS: CPT | Mod: GP | Performed by: PHYSICAL THERAPIST

## 2025-06-03 PROCEDURE — 97110 THERAPEUTIC EXERCISES: CPT | Mod: GP | Performed by: PHYSICAL THERAPIST

## 2025-06-09 ENCOUNTER — OFFICE VISIT (OUTPATIENT)
Dept: NEPHROLOGY | Facility: CLINIC | Age: 85
End: 2025-06-09
Payer: MEDICARE

## 2025-06-09 ENCOUNTER — PATIENT OUTREACH (OUTPATIENT)
Dept: CARE COORDINATION | Facility: CLINIC | Age: 85
End: 2025-06-09

## 2025-06-09 ENCOUNTER — LAB (OUTPATIENT)
Dept: LAB | Facility: CLINIC | Age: 85
End: 2025-06-09
Payer: MEDICARE

## 2025-06-09 VITALS
SYSTOLIC BLOOD PRESSURE: 130 MMHG | TEMPERATURE: 98.1 F | DIASTOLIC BLOOD PRESSURE: 65 MMHG | OXYGEN SATURATION: 96 % | WEIGHT: 178.8 LBS | BODY MASS INDEX: 25.03 KG/M2 | HEIGHT: 71 IN | HEART RATE: 58 BPM

## 2025-06-09 DIAGNOSIS — N18.5 ANEMIA OF CHRONIC RENAL FAILURE, STAGE 5 (H): ICD-10-CM

## 2025-06-09 DIAGNOSIS — E10.22 CKD STAGE 5 DUE TO TYPE 1 DIABETES MELLITUS (H): ICD-10-CM

## 2025-06-09 DIAGNOSIS — N18.5 ANEMIA IN STAGE 5 CHRONIC KIDNEY DISEASE (H): ICD-10-CM

## 2025-06-09 DIAGNOSIS — N18.5 CKD STAGE 5 DUE TO TYPE 1 DIABETES MELLITUS (H): ICD-10-CM

## 2025-06-09 DIAGNOSIS — Z13.6 SCREENING FOR CARDIOVASCULAR CONDITION: ICD-10-CM

## 2025-06-09 DIAGNOSIS — N18.5 CHRONIC KIDNEY DISEASE (CKD), STAGE V (H): Primary | ICD-10-CM

## 2025-06-09 DIAGNOSIS — D63.1 ANEMIA OF CHRONIC RENAL FAILURE, STAGE 5 (H): ICD-10-CM

## 2025-06-09 DIAGNOSIS — N18.5 CHRONIC KIDNEY DISEASE (CKD), STAGE V (H): ICD-10-CM

## 2025-06-09 DIAGNOSIS — D63.1 ANEMIA IN STAGE 5 CHRONIC KIDNEY DISEASE (H): ICD-10-CM

## 2025-06-09 LAB
ALBUMIN MFR UR ELPH: 16.5 MG/DL
ALBUMIN SERPL BCG-MCNC: 4.4 G/DL (ref 3.5–5.2)
ALBUMIN UR-MCNC: NEGATIVE MG/DL
ANION GAP SERPL CALCULATED.3IONS-SCNC: 13 MMOL/L (ref 7–15)
APPEARANCE UR: CLEAR
BACTERIA #/AREA URNS HPF: ABNORMAL /HPF
BILIRUB UR QL STRIP: NEGATIVE
BUN SERPL-MCNC: 58 MG/DL (ref 8–23)
CALCIUM SERPL-MCNC: 9.2 MG/DL (ref 8.8–10.4)
CHLORIDE SERPL-SCNC: 104 MMOL/L (ref 98–107)
CHOLEST SERPL-MCNC: 162 MG/DL
COLOR UR AUTO: ABNORMAL
CREAT SERPL-MCNC: 5.61 MG/DL (ref 0.67–1.17)
CREAT UR-MCNC: 60.5 MG/DL
EGFRCR SERPLBLD CKD-EPI 2021: 9 ML/MIN/1.73M2
ERYTHROCYTE [DISTWIDTH] IN BLOOD BY AUTOMATED COUNT: 13.7 % (ref 10–15)
FASTING STATUS PATIENT QL REPORTED: YES
FERRITIN SERPL-MCNC: 686 NG/ML (ref 31–409)
GLUCOSE SERPL-MCNC: 100 MG/DL (ref 70–99)
GLUCOSE UR STRIP-MCNC: 30 MG/DL
HCO3 SERPL-SCNC: 20 MMOL/L (ref 22–29)
HCT VFR BLD AUTO: 29.3 % (ref 40–53)
HDLC SERPL-MCNC: 43 MG/DL
HGB BLD-MCNC: 9.4 G/DL (ref 13.3–17.7)
HGB UR QL STRIP: ABNORMAL
IRON BINDING CAPACITY (ROCHE): 252 UG/DL (ref 240–430)
IRON SATN MFR SERPL: 35 % (ref 15–46)
IRON SERPL-MCNC: 88 UG/DL (ref 61–157)
KETONES UR STRIP-MCNC: NEGATIVE MG/DL
LDLC SERPL CALC-MCNC: 82 MG/DL
LEUKOCYTE ESTERASE UR QL STRIP: NEGATIVE
MCH RBC QN AUTO: 30.3 PG (ref 26.5–33)
MCHC RBC AUTO-ENTMCNC: 32.1 G/DL (ref 31.5–36.5)
MCV RBC AUTO: 95 FL (ref 78–100)
NITRATE UR QL: NEGATIVE
NONHDLC SERPL-MCNC: 119 MG/DL
PH UR STRIP: 6 [PH] (ref 5–7)
PHOSPHATE SERPL-MCNC: 4.2 MG/DL (ref 2.5–4.5)
PLATELET # BLD AUTO: 150 10E3/UL (ref 150–450)
POTASSIUM SERPL-SCNC: 4.9 MMOL/L (ref 3.4–5.3)
PROT/CREAT 24H UR: 0.27 MG/MG CR (ref 0–0.2)
RBC # BLD AUTO: 3.1 10E6/UL (ref 4.4–5.9)
RBC URINE: 1 /HPF
SODIUM SERPL-SCNC: 137 MMOL/L (ref 135–145)
SP GR UR STRIP: 1.01 (ref 1–1.03)
TRIGL SERPL-MCNC: 185 MG/DL
UROBILINOGEN UR STRIP-MCNC: NORMAL MG/DL
WBC # BLD AUTO: 5.3 10E3/UL (ref 4–11)
WBC URINE: <1 /HPF

## 2025-06-09 PROCEDURE — 83540 ASSAY OF IRON: CPT | Performed by: PATHOLOGY

## 2025-06-09 PROCEDURE — 80069 RENAL FUNCTION PANEL: CPT | Performed by: PATHOLOGY

## 2025-06-09 PROCEDURE — 36415 COLL VENOUS BLD VENIPUNCTURE: CPT | Performed by: PATHOLOGY

## 2025-06-09 PROCEDURE — 3078F DIAST BP <80 MM HG: CPT

## 2025-06-09 PROCEDURE — G0463 HOSPITAL OUTPT CLINIC VISIT: HCPCS

## 2025-06-09 PROCEDURE — 80061 LIPID PANEL: CPT | Performed by: PATHOLOGY

## 2025-06-09 PROCEDURE — 82728 ASSAY OF FERRITIN: CPT | Performed by: PATHOLOGY

## 2025-06-09 PROCEDURE — 3075F SYST BP GE 130 - 139MM HG: CPT

## 2025-06-09 PROCEDURE — 1126F AMNT PAIN NOTED NONE PRSNT: CPT

## 2025-06-09 PROCEDURE — 83550 IRON BINDING TEST: CPT | Performed by: PATHOLOGY

## 2025-06-09 PROCEDURE — 84156 ASSAY OF PROTEIN URINE: CPT | Performed by: PATHOLOGY

## 2025-06-09 PROCEDURE — 85027 COMPLETE CBC AUTOMATED: CPT | Performed by: PATHOLOGY

## 2025-06-09 PROCEDURE — 3048F LDL-C <100 MG/DL: CPT | Performed by: PATHOLOGY

## 2025-06-09 PROCEDURE — 81001 URINALYSIS AUTO W/SCOPE: CPT | Performed by: PATHOLOGY

## 2025-06-09 PROCEDURE — 99214 OFFICE O/P EST MOD 30 MIN: CPT

## 2025-06-09 ASSESSMENT — PAIN SCALES - GENERAL: PAINLEVEL_OUTOF10: NO PAIN (0)

## 2025-06-09 NOTE — LETTER
6/9/2025       RE: Melo Dangelo  [address]  [place] [address]     Dear Colleague,    Thank you for referring your patient, Melo Dangelo, to the [facility]OLOG[facility] [place] at [facility]. Please see a copy of my visit note below.    Nephrology Clinic Visit 6/9/25    Assessment and Plan:    1. CKD5 w/proteinuria - Creat has jumped to 5.6 today with corresponding eGFR of 9 ml/mn. UPCR stable at 0.2 mg/mgCr  - Etiology for his CKD is IgA Nephropathy  - Baseline creat 4-5 since 6/23. This appears to be progression but will confirm with another renal panel on 6/23/25  - Did have a renal US 4/24 w/o hydro. If there is no improvement on the repeat renal panel, given his BPH, will recheck a new renal US to r/o obstruction causing the rise in creat  - Please refer to Dr Barrow's note 1/19/23 for review of his previous treatment for IgA Nephropathy  - UA today is consistent with most recent studies with trace blood and actually no protein  - Blood pressure well controlled  - BPH is borderline controlled despite Flomax. Feels he is drinking fluids normally  - On statin for CV risk reduction  - We re reviewed his wishes regarding RRT. Has been seen by Surgeon 7/18/23 for PD cath consult and deemed a good candidate. Needs an updated visit and given the decline in GFR will set that up. If repeat GFR is < 10 will go forward with PD cath insertion and admission to PD training unit. We reviewed this in depth today and reviewed the option of no dialysis with conservative management/natural death from renal failure. Melo wishes to continue with the plan for PD. ( Wife was on PD and he managed. She was on short term HD initially before switching to PD.      2. Volume status - No edema/dyspnea. B/ps controlled. Only voiding issue is nocturia. Albumin 4.4. Weight stable at 81.1 kg.    - No indication for diuretic therapy at this time    3.  CV/HTN - Well controlled w/o edema. Home readings 130's/. Clinic readings 130-135/ HR 58.  Echo 7/22 with EF 60%, normal IVC  Current regimen: Metoprolol XL 12.5 mg every day    - No changes required    4. BPH - Still with nocturia on Flomax    5. Electrolytes - No acute concerns. K 4.9 Na 137    6. Acid base - No acute concerns. Bicarb 20   - Continue Bicarb 650 mg bid    7. BMD - Ca 9.2 Phos 4.2 albumin 4.4   Vit D 42  ( 2/25)   Continue Ca/D one tab daily     8. Anemia - Hgb 9.4 and stable since 10/21   Iron studies 6/25: Ferritin 686, Fe 88, IS 35   Etiology for his anemia is likely chronic dz/Epo deficiency   Would begin RAMA when Hgb < 9    9. Seizure d/o - Stable on Lamictal. Na prob with hyponatremia. Na 137    10. Depression - Well controlled on Lexapro and Buspar.     11. Memory changes - Neurology evaluation indicative of MCI, not Alzheimer's Dementia   - Refer to Neurology note 4/28/25    12. Disposition - RTC to be determined depending upon his labs.     Assessment and plan was discussed with patient and he voiced his understanding and agreement.    Reason for Visit:  CKD5 follow up    HPI:  Mr Dangelo is an 85 yo male with CKD5, pAfib, Anemia, BPH, Depression, COVID 1/23, IgA nephropathy, present today for routine CKD follow up. Last seen in clinic by me 3/5/25  Baseline creat 4-5 since 6/23    ROS:   No acute renal concerns  Melo shared his concerns regarding memory changes last visit. He doesn't remember me today but I was wearing a mask last visit. He has undergone neuropsych testing since our last visit and diagnosed with MCI not Alzheimer's. In fact, Melo is busy at home designing and building a piece of Chinese furniture.    Home b/ps 130's/  He denies CP/dyspnea  No GI concerns  Voiding w/o difficulty. Notes nocturia  Appetite is good  Energy level is good as long as he sprinkles in a few naps throughout the day  No edema unless he indulges in a high Na meal    Chronic Health  Problems:    CKD5  pAfib  Anemia  BPH  Depression  COVID 19 x 2  IgA nephropathy  Alcohol use d/o ( in remission)  Bilateral hearing loss  Seizure disorder ( last seizure 2008)    Family Hx:   Family History   Problem Relation Age of Onset     Glaucoma No family hx of      Macular Degeneration No family hx of      Anesthesia Reaction No family hx of      Deep Vein Thrombosis (DVT) No family hx of      Personal Hx:   , lives independently in a condo, 3 daughters live nearby, NS, ETOH none.     Allergies:  No Known Allergies    Medications:  Current Outpatient Medications   Medication Sig Dispense Refill     busPIRone (BUSPAR) 5 MG tablet Take 1 tablet (5 mg) by mouth 2 times daily. Has started 180 tablet 3     calcium carbonate-vitamin D (OSCAL) 500-5 MG-MCG tablet Take 1 tablet by mouth daily. 90 tablet 3     Cholecalciferol (D3-1000) 25 MCG (1000 UT) CAPS Take 1 capsule by mouth daily       cyanocobalamin (VITAMIN B-12) 100 MCG tablet Take 1 tablet (100 mcg) by mouth daily. Take 1,000 mcg by mouth every morning - 90 tablet 3     escitalopram (LEXAPRO) 20 MG tablet Take 1 tablet (20 mg) by mouth every morning. 90 tablet 3     lamoTRIgine (LAMICTAL) 100 MG tablet Take 2 tablets (200 mg) by mouth 2 times daily. 360 tablet 3     metoprolol succinate ER (TOPROL XL) 25 MG 24 hr tablet TAKE 1/2 TABLET BY MOUTH DAILY 45 tablet 3     simvastatin (ZOCOR) 20 MG tablet Take 1 tablet (20 mg) by mouth at bedtime. 90 tablet 3     sodium bicarbonate 650 MG tablet Take 1 tablet (650 mg) by mouth 2 times daily. 180 tablet 3     tamsulosin (FLOMAX) 0.4 MG capsule Take 2 capsules (0.8 mg) by mouth daily. 180 capsule 3     acetaminophen (TYLENOL) 325 MG tablet Take 325-650 mg by mouth every 6 hours as needed for mild pain       multivitamin (OCUVITE) TABS tablet Take 1 tablet by mouth daily. Please keep the appointment on 9/3/24 for further refills. 90 tablet 3     No current facility-administered medications for this visit.     "  Vitals:  /65 (BP Location: Right arm, Patient Position: Sitting, Cuff Size: Adult Regular)   Pulse 58   Temp 98.1  F (36.7  C) (Oral)   Ht 1.803 m (5' 11\")   Wt 81.1 kg (178 lb 12.8 oz)   SpO2 96%   BMI 24.94 kg/m      Exam:  GEN: Pleasant male in NAD  CARDIAC: RRR  LUNGS: CTA  ABDOMEN: Soft NT  EXT: No edema  NEURO: A/O  PSYCH: Calm, engaging    LABS:   CMP  Recent Labs   Lab Test 06/09/25  0729 03/05/25  0757 02/13/25  1353 12/03/24  1043 10/15/21  1023 11/04/20  0703 11/02/20  0900 11/01/20  0816 10/31/20  0838    140 139 139   < > 138 139 137 138   POTASSIUM 4.9 4.5 4.7 5.1   < > 4.2 4.1 4.7 4.1   CHLORIDE 104 106 106 106   < > 105 108* 107 106   CO2 20* 22 21* 23   < > 26 24 20* 23   ANIONGAP 13 12 12 10   < > 7 7 10 9   * 96 95 100*   < > 75 76 125 86   BUN 58.0* 49.5* 48.6* 45.4*   < > 22 26 21 19   CR 5.61* 4.62* 4.40* 4.49*   < > 1.25 1.24 1.01 1.13   GFRESTIMATED 9* 12* 13* 12*   < > 56* 56* >60 >60   GFRESTBLACK  --   --   --   --   --  >60 >60 >60 >60   GIANNI 9.2 9.1 9.3 9.3   < > 8.5 8.7 8.8 8.5    < > = values in this interval not displayed.     Recent Labs   Lab Test 04/17/24  0652 04/16/24  1028 01/13/23  0934 07/29/22  1338   BILITOTAL 0.3 0.4 0.2 0.3   ALKPHOS 53 61 79 79   ALT 8 11 14 18   AST 20 18 21 20     CBC  Recent Labs   Lab Test 06/09/25  0729 03/05/25  0757 02/13/25  1353 12/03/24  1043   HGB 9.4* 9.6* 10.0* 9.9*   WBC 5.3 5.4 5.2 7.9   RBC 3.10* 3.16* 3.27* 3.20*   HCT 29.3* 30.0* 31.2* 30.3*   MCV 95 95 95 95   MCH 30.3 30.4 30.6 30.9   MCHC 32.1 32.0 32.1 32.7   RDW 13.7 13.5 13.4 13.9    126* 144* 141*     URINE STUDIES  Recent Labs   Lab Test 06/09/25  0732 03/05/25  0813 02/13/25  1402 11/04/24  0812   COLOR Straw Light Yellow Yellow Light Yellow   APPEARANCE Clear Clear Clear Clear   URINEGLC 30* Negative Negative Negative   URINEBILI Negative Negative Negative Negative   URINEKETONE Negative Negative Negative Negative   SG 1.011 1.015 1.020 1.012 "   UBLD Small* Trace* Small* Negative   URINEPH 6.0 6.0 5.5 5.5   PROTEIN Negative 20* Trace* 20*   UROBILINOGEN  --   --  0.2  --    NITRITE Negative Negative Negative Negative   LEUKEST Negative Negative Negative Negative   RBCU 1 <1 2-5* 1   WBCU <1 <1 0-5 <1     Recent Labs   Lab Test 06/02/22  0956 04/25/22  1529 02/17/22  1041 01/11/22  1011   UTPG 0.31* 0.40* 0.37* 0.48*     PTH  Recent Labs   Lab Test 02/13/25  1353 04/17/24  0652 12/11/23  0930   PTHI 154* 101* 95*     IRON STUDIES  Recent Labs   Lab Test 06/09/25  0729 03/05/25  0757 02/13/25  1353   IRON 88 64 99    254 264   IRONSAT 35 25 38   ALBERT 686* 603* 591*       Gini Washington NP  [facility] NEPHROLOGY CLINIC [place]                      Again, thank you for allowing me to participate in the care of your patient.      Sincerely,    Gini Washington NP

## 2025-06-09 NOTE — NURSING NOTE
"Chief Complaint   Patient presents with    RECHECK     Follow up. PT reports cramps in left leg. Stated it comes and goes every other week or so. Onset about 8months ago. Stated that it gets better if he stands.Stated that it used to last longer and now it seems to be shorter amount of time he feels the cramp. Stated that if he hydrates himself it seems to help.      Vitals:    06/09/25 0831 06/09/25 0832   BP: 135/50 130/65   BP Location: Right arm Right arm   Patient Position: Sitting Sitting   Cuff Size: Adult Regular Adult Regular   Pulse: 58    Temp: 98.1  F (36.7  C)    TempSrc: Oral    SpO2: 96%    Weight: 81.1 kg (178 lb 12.8 oz)    Height: 1.803 m (5' 11\")        BP Readings from Last 3 Encounters:   06/09/25 130/65   05/14/25 (!) 164/80   05/14/25 136/71       /65 (BP Location: Right arm, Patient Position: Sitting, Cuff Size: Adult Regular)   Pulse 58   Temp 98.1  F (36.7  C) (Oral)   Ht 1.803 m (5' 11\")   Wt 81.1 kg (178 lb 12.8 oz)   SpO2 96%   BMI 24.94 kg/m       Paola Cox    "

## 2025-06-09 NOTE — PATIENT INSTRUCTIONS
You will be getting a call to schedule and updated visit with the surgeon for PD cath consult  Repeat the renal panel in 2 wks, non fasting, so we can if any change in renal function

## 2025-06-09 NOTE — PROGRESS NOTES
Anemia Management Note - Follow Up      SUBJECTIVE/OBJECTIVE:    Referred by Dr. Merry Barrow on 2024  *orders are under Dr. Scott  Primary Diagnosis: Anemia in Chronic Kidney Disease (N18.5, D63.1)     Secondary Diagnosis: Chronic Kidney Disease, Stage 5 (N18.5)  Hgb goal range:9-10     RAMA:TBD; Hgb>9.0  *24; starting on Eliquis per Cardiology.   Iron regimen: Treat with IV Iron- [place] [facility] [place].      Labs : 2025  RX/TX plans : TBD     Recent RAMA use, transfusion, IV iron: NA  No history of stroke, MI, and blood clots or cancers     Contact: No Boxes Checked on Consent to Communicate scanned on 2021.         Latest Ref Rng & Units 2024 2024 2024 12/3/2024 2025 3/5/2025 2025   Anemia   Hemoglobin 13.3 - 17.7 g/dL  9.8  9.5  9.9  10.0  9.6  9.4    IV Iron Dose  1000mg          TSAT 15 - 46 %  41  35  22  38  25  35    Ferritin 31 - 409 ng/mL  675  789  785  591  603  686        Data saved with a previous flowsheet row definition     BP Readings from Last 3 Encounters:   25 130/65   25 (!) 164/80   25 136/71     Wt Readings from Last 2 Encounters:   25 81.1 kg (178 lb 12.8 oz)   25 80.6 kg (177 lb 9.6 oz)           ASSESSMENT:    Hgb:at goal - continue to monitor  TSat: at goal >30% Ferritin: At goal (>100ng/mL)   Goals Addressed    None         PLAN:  RTC for Anemia Labs in 2-4 week(s).    Orders needed to be renewed (for next follow-up date) in EPIC: None    Iron labs due:  2025    Plan discussed with:  No call, chart review       NEXT FOLLOW-UP DATE:  25    Amy Hurd RN   Anemia Services  [facility]  [email or web address]   Office : [phone]  Fax: [phone]

## 2025-06-10 ENCOUNTER — PATIENT OUTREACH (OUTPATIENT)
Dept: NEPHROLOGY | Facility: CLINIC | Age: 85
End: 2025-06-10
Payer: MEDICARE

## 2025-06-10 NOTE — TELEPHONE ENCOUNTER
Dialysis Access Care Coordination: Chart Review    REASON FOR CALL:     REASON FOR CALL: Clinic Care Coordination - Chart Review (Dialysis Access - repeat consult)                                   SITUATION/BACKROUND:     Enrollment status:  Potential  Dialysis Access Consult:  7/18/23  Surgeon:  Dr. Zimmer   Access Plan:  Laparoscopic PD catheter placement surgery under general anesthesia.     Last Nephrology Visit:  6/9/25  Nephrology provider: Vania Ly NP and Dr. Scott.  Nephrology RN Care Coordinator:  Katerine Gonzalez, RN.  Received instructions after visit:    Gini Ly NP  P Dialysis Access Nurse; Katerine Gonzalez, RN  HI all  Please schedule Melo for a PD access update visit. He is having a repeat renal panel in 2 wks, if GFR remains < 10 will get renal US and if that is w/o obstruction, would go forward with PD cath placement.      Neph Tracking Flowsheet Last Filled Values       [facility] CKD Late Referral 04/03/23    [facility] CKD Late Complete 04/06/23    [facility] CKD Basics Referral --  entered in error    Preferred Modality Peritoneal Dialysis    Patient's Referral Dates Auto Populate Patient's Referral Dates    Specialty Care Coordination Referral - Dialysis 6/6/2023    Anemia Services Referral 5/6/24    MTM Referral 12/22/21    Home Care Referral 6/22/23    Journey Referral 1/25/23    Access Surgeon Referral Status  Referred  PD consult with Dr. Zimmer    Dialysis Access Referral 06/06/23    Access Surgical Consult 07/18/23  Plan: PD cath placement, Dr. Zimmer    Transplant Status  Not Referred  reason: age          Dialysis History    No dialysis history on file.       Patient is not followed by Solid Organ Transplant  .  Coordinator: No matching coordinators    ASSESSMENT:     Recent hospitalizations/procedures:  none  Pertinent changes:  Memory changes, followed by neurology  Medication changes:  Started Eliquis for A-fib. Managed by cardiology provider Brittanie Etienne  BRIANA     Recent Labs      Latest Ref Rng & Units 6/9/2025 3/5/2025 2/13/2025   Neph Labs   Sodium 135 - 145 mmol/L 137  140  139    GFR Estimate >60 mL/min/1.73m2 9  12  13    Potassium 3.4 - 5.3 mmol/L 4.9  4.5  4.7    Creatinine 0.67 - 1.17 mg/dL 5.61  4.62  4.40    Urea Nitrogen 8.0 - 23.0 mg/dL 58.0  49.5  48.6    Hemoglobin 13.3 - 17.7 g/dL 9.4  9.6  10.0    Hemoglobin A1C <5.7 %  5.6     Ferritin 31 - 409 ng/mL 686  603  591    Iron Binding Cap 240 - 430 ug/dL 252  254  264    Parathyroid Hormone Intact 15 - 65 pg/mL   154    Iron 61 - 157 ug/dL 88  64  99          PLAN:     Future Appts Next 180 days       Visit Type Date Time Department    RETURN NEPHROLOGY 9/15/2025  9:30 AM  MEDICINE RENAL            Follow Up:    Request sent to schedule PD catheter consult with Dr. Zimmer, next available.    Flores Arias RN  Dialysis Access Surgery Care Coordinator  Phone: [phone]  Pool: P_Dialysis_Access_Nurse

## 2025-06-10 NOTE — PROGRESS NOTES
Nephrology Clinic Visit 6/9/25    Assessment and Plan:    1. CKD5 w/proteinuria - Creat has jumped to 5.6 today with corresponding eGFR of 9 ml/mn. UPCR stable at 0.2 mg/mgCr  - Etiology for his CKD is IgA Nephropathy  - Baseline creat 4-5 since 6/23. This appears to be progression but will confirm with another renal panel on 6/23/25  - Did have a renal US 4/24 w/o hydro. If there is no improvement on the repeat renal panel, given his BPH, will recheck a new renal US to r/o obstruction causing the rise in creat  - Please refer to Dr Barrow's note 1/19/23 for review of his previous treatment for IgA Nephropathy  - UA today is consistent with most recent studies with trace blood and actually no protein  - Blood pressure well controlled  - BPH is borderline controlled despite Flomax. Feels he is drinking fluids normally  - On statin for CV risk reduction  - We re reviewed his wishes regarding RRT. Has been seen by Surgeon 7/18/23 for PD cath consult and deemed a good candidate. Needs an updated visit and given the decline in GFR will set that up. If repeat GFR is < 10 will go forward with PD cath insertion and admission to PD training unit. We reviewed this in depth today and reviewed the option of no dialysis with conservative management/natural death from renal failure. Melo wishes to continue with the plan for PD. ( Wife was on PD and he managed. She was on short term HD initially before switching to PD.      2. Volume status - No edema/dyspnea. B/ps controlled. Only voiding issue is nocturia. Albumin 4.4. Weight stable at 81.1 kg.    - No indication for diuretic therapy at this time    3. CV/HTN - Well controlled w/o edema. Home readings 130's/. Clinic readings 130-135/ HR 58.  Echo 7/22 with EF 60%, normal IVC  Current regimen: Metoprolol XL 12.5 mg every day    - No changes required    4. BPH - Still with nocturia on Flomax    5. Electrolytes - No acute concerns. K 4.9 Na 137    6. Acid base - No acute  concerns. Bicarb 20   - Continue Bicarb 650 mg bid    7. BMD - Ca 9.2 Phos 4.2 albumin 4.4   Vit D 42  ( 2/25)   Continue Ca/D one tab daily     8. Anemia - Hgb 9.4 and stable since 10/21   Iron studies 6/25: Ferritin 686, Fe 88, IS 35   Etiology for his anemia is likely chronic dz/Epo deficiency   Would begin RAMA when Hgb < 9    9. Seizure d/o - Stable on Lamictal. Na prob with hyponatremia. Na 137    10. Depression - Well controlled on Lexapro and Buspar.     11. Memory changes - Neurology evaluation indicative of MCI, not Alzheimer's Dementia   - Refer to Neurology note 4/28/25    12. Disposition - RTC to be determined depending upon his labs.     Assessment and plan was discussed with patient and he voiced his understanding and agreement.    Reason for Visit:  CKD5 follow up    HPI:  Mr Dangelo is an 83 yo male with CKD5, pAfib, Anemia, BPH, Depression, COVID 1/23, IgA nephropathy, present today for routine CKD follow up. Last seen in clinic by me 3/5/25  Baseline creat 4-5 since 6/23    ROS:   No acute renal concerns  Melo shared his concerns regarding memory changes last visit. He doesn't remember me today but I was wearing a mask last visit. He has undergone neuropsych testing since our last visit and diagnosed with MCI not Alzheimer's. In fact, Melo is busy at home designing and building a piece of Chinese furniture.    Home b/ps 130's/  He denies CP/dyspnea  No GI concerns  Voiding w/o difficulty. Notes nocturia  Appetite is good  Energy level is good as long as he sprinkles in a few naps throughout the day  No edema unless he indulges in a high Na meal    Chronic Health Problems:    CKD5  pAfib  Anemia  BPH  Depression  COVID 19 x 2  IgA nephropathy  Alcohol use d/o ( in remission)  Bilateral hearing loss  Seizure disorder ( last seizure 2008)    Family Hx:   Family History   Problem Relation Age of Onset    Glaucoma No family hx of     Macular Degeneration No family hx of     Anesthesia  "Reaction No family hx of     Deep Vein Thrombosis (DVT) No family hx of      Personal Hx:   , lives independently in a condo, 3 daughters live nearby, NS, ETOH none.     Allergies:  No Known Allergies    Medications:  Current Outpatient Medications   Medication Sig Dispense Refill    busPIRone (BUSPAR) 5 MG tablet Take 1 tablet (5 mg) by mouth 2 times daily. Has started 180 tablet 3    calcium carbonate-vitamin D (OSCAL) 500-5 MG-MCG tablet Take 1 tablet by mouth daily. 90 tablet 3    Cholecalciferol (D3-1000) 25 MCG (1000 UT) CAPS Take 1 capsule by mouth daily      cyanocobalamin (VITAMIN B-12) 100 MCG tablet Take 1 tablet (100 mcg) by mouth daily. Take 1,000 mcg by mouth every morning - 90 tablet 3    escitalopram (LEXAPRO) 20 MG tablet Take 1 tablet (20 mg) by mouth every morning. 90 tablet 3    lamoTRIgine (LAMICTAL) 100 MG tablet Take 2 tablets (200 mg) by mouth 2 times daily. 360 tablet 3    metoprolol succinate ER (TOPROL XL) 25 MG 24 hr tablet TAKE 1/2 TABLET BY MOUTH DAILY 45 tablet 3    simvastatin (ZOCOR) 20 MG tablet Take 1 tablet (20 mg) by mouth at bedtime. 90 tablet 3    sodium bicarbonate 650 MG tablet Take 1 tablet (650 mg) by mouth 2 times daily. 180 tablet 3    tamsulosin (FLOMAX) 0.4 MG capsule Take 2 capsules (0.8 mg) by mouth daily. 180 capsule 3    acetaminophen (TYLENOL) 325 MG tablet Take 325-650 mg by mouth every 6 hours as needed for mild pain      multivitamin (OCUVITE) TABS tablet Take 1 tablet by mouth daily. Please keep the appointment on 9/3/24 for further refills. 90 tablet 3     No current facility-administered medications for this visit.      Vitals:  /65 (BP Location: Right arm, Patient Position: Sitting, Cuff Size: Adult Regular)   Pulse 58   Temp 98.1  F (36.7  C) (Oral)   Ht 1.803 m (5' 11\")   Wt 81.1 kg (178 lb 12.8 oz)   SpO2 96%   BMI 24.94 kg/m      Exam:  GEN: Pleasant male in NAD  CARDIAC: RRR  LUNGS: CTA  ABDOMEN: Soft NT  EXT: No edema  NEURO: " A/O  PSYCH: Calm, engaging    LABS:   CMP  Recent Labs   Lab Test 06/09/25  0729 03/05/25  0757 02/13/25  1353 12/03/24  1043 10/15/21  1023 11/04/20  0703 11/02/20  0900 11/01/20  0816 10/31/20  0838    140 139 139   < > 138 139 137 138   POTASSIUM 4.9 4.5 4.7 5.1   < > 4.2 4.1 4.7 4.1   CHLORIDE 104 106 106 106   < > 105 108* 107 106   CO2 20* 22 21* 23   < > 26 24 20* 23   ANIONGAP 13 12 12 10   < > 7 7 10 9   * 96 95 100*   < > 75 76 125 86   BUN 58.0* 49.5* 48.6* 45.4*   < > 22 26 21 19   CR 5.61* 4.62* 4.40* 4.49*   < > 1.25 1.24 1.01 1.13   GFRESTIMATED 9* 12* 13* 12*   < > 56* 56* >60 >60   GFRESTBLACK  --   --   --   --   --  >60 >60 >60 >60   GIANNI 9.2 9.1 9.3 9.3   < > 8.5 8.7 8.8 8.5    < > = values in this interval not displayed.     Recent Labs   Lab Test 04/17/24  0652 04/16/24  1028 01/13/23  0934 07/29/22  1338   BILITOTAL 0.3 0.4 0.2 0.3   ALKPHOS 53 61 79 79   ALT 8 11 14 18   AST 20 18 21 20     CBC  Recent Labs   Lab Test 06/09/25  0729 03/05/25  0757 02/13/25  1353 12/03/24  1043   HGB 9.4* 9.6* 10.0* 9.9*   WBC 5.3 5.4 5.2 7.9   RBC 3.10* 3.16* 3.27* 3.20*   HCT 29.3* 30.0* 31.2* 30.3*   MCV 95 95 95 95   MCH 30.3 30.4 30.6 30.9   MCHC 32.1 32.0 32.1 32.7   RDW 13.7 13.5 13.4 13.9    126* 144* 141*     URINE STUDIES  Recent Labs   Lab Test 06/09/25  0732 03/05/25  0813 02/13/25  1402 11/04/24  0812   COLOR Straw Light Yellow Yellow Light Yellow   APPEARANCE Clear Clear Clear Clear   URINEGLC 30* Negative Negative Negative   URINEBILI Negative Negative Negative Negative   URINEKETONE Negative Negative Negative Negative   SG 1.011 1.015 1.020 1.012   UBLD Small* Trace* Small* Negative   URINEPH 6.0 6.0 5.5 5.5   PROTEIN Negative 20* Trace* 20*   UROBILINOGEN  --   --  0.2  --    NITRITE Negative Negative Negative Negative   LEUKEST Negative Negative Negative Negative   RBCU 1 <1 2-5* 1   WBCU <1 <1 0-5 <1     Recent Labs   Lab Test 06/02/22  0956 04/25/22  1529 02/17/22  1041  01/11/22  1011   UTPG 0.31* 0.40* 0.37* 0.48*     PTH  Recent Labs   Lab Test 02/13/25  1353 04/17/24  0652 12/11/23  0930   PTHI 154* 101* 95*     IRON STUDIES  Recent Labs   Lab Test 06/09/25  0729 03/05/25  0757 02/13/25  1353   IRON 88 64 99    254 264   IRONSAT 35 25 38   ALBERT 686* 603* 591*       Gini Washington, DOMINICK  [facility] NEPHROLOGY CLINIC [place]

## 2025-06-10 NOTE — PROGRESS NOTES
Nephrology Note: RN CC Chart Review    REASON FOR ENCOUNTER:     Chart reviewed by nephrology RN CC                          SITUATION/BACKROUND:   HI all  Please schedule Melo for a PD access update visit. He is having a repeat renal panel in 2 wks, if GFR remains < 10 will get renal US and if that is w/o obstruction, would go forward with PD cath placement.     Brit can you make sure Melo gets the renal panel done on 6/23? I'm also going to add a renal US so maybe you could get that scheduled the same day?    Thanks  Vania  Last nephrology visit:    Last Renal Panel:  Sodium   Date Value Ref Range Status   06/23/2025 138 135 - 145 mmol/L Final   10/29/2020 139 133 - 144 mmol/L Final     Potassium   Date Value Ref Range Status   06/23/2025 4.9 3.4 - 5.3 mmol/L Final   07/29/2022 4.8 3.4 - 5.3 mmol/L Final   10/29/2020 4.7 3.4 - 5.3 mmol/L Final     Chloride   Date Value Ref Range Status   06/23/2025 106 98 - 107 mmol/L Final   07/29/2022 106 94 - 109 mmol/L Final   10/29/2020 110 (H) 94 - 109 mmol/L Final     Carbon Dioxide   Date Value Ref Range Status   10/29/2020 25 20 - 32 mmol/L Final     Carbon Dioxide (CO2)   Date Value Ref Range Status   06/23/2025 19 (L) 22 - 29 mmol/L Final   07/29/2022 28 20 - 32 mmol/L Final     Anion Gap   Date Value Ref Range Status   06/23/2025 13 7 - 15 mmol/L Final   07/29/2022 6 3 - 14 mmol/L Final   10/29/2020 5 3 - 14 mmol/L Final     Glucose   Date Value Ref Range Status   06/23/2025 93 70 - 99 mg/dL Final   07/29/2022 89 70 - 99 mg/dL Final   10/29/2020 136 (H) 70 - 99 mg/dL Final     Urea Nitrogen   Date Value Ref Range Status   06/23/2025 53.6 (H) 8.0 - 23.0 mg/dL Final   07/29/2022 55 (H) 7 - 30 mg/dL Final   10/29/2020 31 (H) 7 - 30 mg/dL Final     Creatinine   Date Value Ref Range Status   06/23/2025 5.66 (H) 0.67 - 1.17 mg/dL Final   10/29/2020 1.33 (H) 0.66 - 1.25 mg/dL Final     GFR Estimate   Date Value Ref Range Status   06/23/2025 9 (L) >60 mL/min/1.73m2 Final      Comment:     eGFR calculated using 2021 CKD-EPI equation.   11/04/2020 56 (L) >60 mL/min/1.73m2 Final   10/29/2020 50 (L) >60 mL/min/[1.73_m2] Final     Comment:     Non  GFR Calc  Starting 12/18/2018, serum creatinine based estimated GFR (eGFR) will be   calculated using the Chronic Kidney Disease Epidemiology Collaboration   (CKD-EPI) equation.       Calcium   Date Value Ref Range Status   06/23/2025 9.1 8.8 - 10.4 mg/dL Final   10/29/2020 8.5 8.5 - 10.1 mg/dL Final     Phosphorus   Date Value Ref Range Status   06/23/2025 4.7 (H) 2.5 - 4.5 mg/dL Final     Albumin   Date Value Ref Range Status   06/23/2025 4.3 3.5 - 5.2 g/dL Final   07/29/2022 4.0 3.4 - 5.0 g/dL Final   10/29/2020 2.7 (L) 3.4 - 5.0 g/dL Final         Neph Tracking Status:  Neph Tracking Flowsheet Last Filled Values       [facility] CKD Late Referral 04/03/23    [facility] CKD Late Complete 04/06/23    Kidney Smart CKD Basics Referral --  entered in error    Preferred Modality Peritoneal Dialysis  [facility] preferred unit    Patient's Referral Dates Auto Populate Patient's Referral Dates    Specialty Care Coordination Referral - Dialysis 6/6/2023    Anemia Services Referral 5/6/24    MTM Referral 12/22/21    Home Care Referral 6/22/23    Journey Referral 1/25/23    Access Surgeon Referral Status  Referred  PD consult with Dr. Zimmer    Dialysis Access Referral 06/06/23    Access Surgical Consult 06/24/25  PD catheter consult, Dr. Zimmer.  PLAN: laparoscopic PD catheter surgery under general anesthesia.  PAC prior. Per Vania Ly, schedule surgery next week.  Previously completed consult 7/18/23.    Dialysis Access Surgery 07/03/25  laparoscopic PD catheter surgery under general anesthesia with Dr. Zimmer.    Dialysis Access Surgeon Dr. Zimmer    Transplant Status  Not Referred  reason: age              ASSESSMENT/PLAN     Patient to follow up as scheduled at next appointment  Patient to call/[employer] message  with updates    Upcoming Appointments:  Future Appts Next 180 days       Visit Type Date Time Department    RETURN NEPHROLOGY 9/15/2025  9:30 AM  MEDICINE RENAL              Katerine Gonzalez RN

## 2025-06-12 ENCOUNTER — TELEPHONE (OUTPATIENT)
Dept: NEPHROLOGY | Facility: CLINIC | Age: 85
End: 2025-06-12
Payer: MEDICARE

## 2025-06-12 ENCOUNTER — TELEPHONE (OUTPATIENT)
Dept: TRANSPLANT | Facility: CLINIC | Age: 85
End: 2025-06-12
Payer: MEDICARE

## 2025-06-12 NOTE — TELEPHONE ENCOUNTER
[facility] Call Center    Phone Message    May a detailed message be left on voicemail: yes     Reason for Call: Pt is calling asking why he needs the catheter consult referred by Vania Washington. Pt is seeking explanation from clinic team. Please call pt.    Action Taken: Message routed to:  [facility] Nephrology    Travel Screening: Not Applicable     Date of Service:

## 2025-06-12 NOTE — TELEPHONE ENCOUNTER
Patient confirmed scheduled appointment:  Date: 6/24  Time: 1:00 pm  Visit type: PD Cath Consult  Provider: Mesha  [place] [facility] [place]imaging: NA  Additional notes: NA

## 2025-06-15 ENCOUNTER — HEALTH MAINTENANCE LETTER (OUTPATIENT)
Age: 85
End: 2025-06-15

## 2025-06-17 ENCOUNTER — THERAPY VISIT (OUTPATIENT)
Dept: PHYSICAL THERAPY | Facility: CLINIC | Age: 85
End: 2025-06-17
Payer: MEDICARE

## 2025-06-17 DIAGNOSIS — S76.311A STRAIN OF RIGHT HAMSTRING, INITIAL ENCOUNTER: Primary | ICD-10-CM

## 2025-06-17 PROCEDURE — 97112 NEUROMUSCULAR REEDUCATION: CPT | Mod: GP | Performed by: PHYSICAL THERAPIST

## 2025-06-17 PROCEDURE — 97110 THERAPEUTIC EXERCISES: CPT | Mod: GP | Performed by: PHYSICAL THERAPIST

## 2025-06-23 ENCOUNTER — LAB (OUTPATIENT)
Dept: LAB | Facility: CLINIC | Age: 85
End: 2025-06-23
Payer: MEDICARE

## 2025-06-23 ENCOUNTER — PATIENT OUTREACH (OUTPATIENT)
Dept: CARE COORDINATION | Facility: CLINIC | Age: 85
End: 2025-06-23

## 2025-06-23 DIAGNOSIS — N18.5 CHRONIC KIDNEY DISEASE (CKD), STAGE V (H): ICD-10-CM

## 2025-06-23 DIAGNOSIS — D63.1 ANEMIA IN STAGE 5 CHRONIC KIDNEY DISEASE (H): ICD-10-CM

## 2025-06-23 DIAGNOSIS — N18.5 CKD STAGE 5 DUE TO TYPE 1 DIABETES MELLITUS (H): ICD-10-CM

## 2025-06-23 DIAGNOSIS — N18.5 ANEMIA IN STAGE 5 CHRONIC KIDNEY DISEASE (H): ICD-10-CM

## 2025-06-23 DIAGNOSIS — E10.22 CKD STAGE 5 DUE TO TYPE 1 DIABETES MELLITUS (H): ICD-10-CM

## 2025-06-23 DIAGNOSIS — N18.6 ESRD (END STAGE RENAL DISEASE) (H): ICD-10-CM

## 2025-06-23 LAB
ALBUMIN MFR UR ELPH: 6.3 MG/DL
ALBUMIN SERPL BCG-MCNC: 4.3 G/DL (ref 3.5–5.2)
ALBUMIN UR-MCNC: NEGATIVE MG/DL
ANION GAP SERPL CALCULATED.3IONS-SCNC: 13 MMOL/L (ref 7–15)
APPEARANCE UR: CLEAR
BILIRUB UR QL STRIP: NEGATIVE
BUN SERPL-MCNC: 53.6 MG/DL (ref 8–23)
CALCIUM SERPL-MCNC: 9.1 MG/DL (ref 8.8–10.4)
CHLORIDE SERPL-SCNC: 106 MMOL/L (ref 98–107)
COLOR UR AUTO: ABNORMAL
CREAT SERPL-MCNC: 5.66 MG/DL (ref 0.67–1.17)
CREAT UR-MCNC: 36.9 MG/DL
EGFRCR SERPLBLD CKD-EPI 2021: 9 ML/MIN/1.73M2
ERYTHROCYTE [DISTWIDTH] IN BLOOD BY AUTOMATED COUNT: 13.8 % (ref 10–15)
GLUCOSE SERPL-MCNC: 93 MG/DL (ref 70–99)
GLUCOSE UR STRIP-MCNC: NEGATIVE MG/DL
HCO3 SERPL-SCNC: 19 MMOL/L (ref 22–29)
HCT VFR BLD AUTO: 28.1 % (ref 40–53)
HGB BLD-MCNC: 9.1 G/DL (ref 13.3–17.7)
HGB UR QL STRIP: ABNORMAL
KETONES UR STRIP-MCNC: NEGATIVE MG/DL
LEUKOCYTE ESTERASE UR QL STRIP: NEGATIVE
MCH RBC QN AUTO: 30.4 PG (ref 26.5–33)
MCHC RBC AUTO-ENTMCNC: 32.4 G/DL (ref 31.5–36.5)
MCV RBC AUTO: 94 FL (ref 78–100)
NITRATE UR QL: NEGATIVE
PH UR STRIP: 6 [PH] (ref 5–7)
PHOSPHATE SERPL-MCNC: 4.7 MG/DL (ref 2.5–4.5)
PLATELET # BLD AUTO: 117 10E3/UL (ref 150–450)
POTASSIUM SERPL-SCNC: 4.9 MMOL/L (ref 3.4–5.3)
PROT/CREAT 24H UR: 0.17 MG/MG CR (ref 0–0.2)
RBC # BLD AUTO: 2.99 10E6/UL (ref 4.4–5.9)
RBC URINE: <1 /HPF
SODIUM SERPL-SCNC: 138 MMOL/L (ref 135–145)
SP GR UR STRIP: 1.01 (ref 1–1.03)
SQUAMOUS EPITHELIAL: <1 /HPF
UROBILINOGEN UR STRIP-MCNC: NORMAL MG/DL
WBC # BLD AUTO: 5 10E3/UL (ref 4–11)
WBC URINE: <1 /HPF

## 2025-06-23 PROCEDURE — 87340 HEPATITIS B SURFACE AG IA: CPT

## 2025-06-23 PROCEDURE — 80069 RENAL FUNCTION PANEL: CPT | Performed by: PATHOLOGY

## 2025-06-23 PROCEDURE — 84156 ASSAY OF PROTEIN URINE: CPT | Performed by: PATHOLOGY

## 2025-06-23 PROCEDURE — 85027 COMPLETE CBC AUTOMATED: CPT | Performed by: PATHOLOGY

## 2025-06-23 PROCEDURE — 36415 COLL VENOUS BLD VENIPUNCTURE: CPT | Performed by: PATHOLOGY

## 2025-06-23 PROCEDURE — 81001 URINALYSIS AUTO W/SCOPE: CPT | Performed by: PATHOLOGY

## 2025-06-23 PROCEDURE — 99000 SPECIMEN HANDLING OFFICE-LAB: CPT | Performed by: PATHOLOGY

## 2025-06-23 NOTE — PROGRESS NOTES
Anemia Management Note - Follow Up      SUBJECTIVE/OBJECTIVE:    Referred by Dr. Merry Barrow on 2024  *orders are under Dr. Scott  Primary Diagnosis: Anemia in Chronic Kidney Disease (N18.5, D63.1)     Secondary Diagnosis: Chronic Kidney Disease, Stage 5 (N18.5)  Hgb goal range:9-10     RAMA:TBD; Hgb>9.0  *24; starting on Eliquis per Cardiology.   Iron regimen: Treat with IV Iron- [place] [facility] [place]ed on 24.      Labs : 2025  RX/TX plans : TBD     Recent RAMA use, transfusion, IV iron: NA  No history of stroke, MI, and blood clots or cancers     Contact: No Boxes Checked on Consent to Communicate scanned on 2021.         Latest Ref Rng & Units 2024 2024 12/3/2024 2025 3/5/2025 2025 2025   Anemia   Hemoglobin 13.3 - 17.7 g/dL 9.8  9.5  9.9  10.0  9.6  9.4  9.1    TSAT 15 - 46 % 41  35  22  38  25  35     Ferritin 31 - 409 ng/mL 675  789  785  591  603  686       BP Readings from Last 3 Encounters:   25 130/65   25 (!) 164/80   25 136/71     Wt Readings from Last 2 Encounters:   25 81.1 kg (178 lb 12.8 oz)   25 80.6 kg (177 lb 9.6 oz)           ASSESSMENT:    Hgb:at goal - continue to monitor  TSat: at goal >30% Ferritin: At goal (>100ng/mL)        PLAN:  Hgb is dropping.   Iron Studies are replete.  Has an appt for PD Cath Consult 25      Orders needed to be renewed (for next follow-up date) in [facility]: None    Iron labs due:  2025    Plan discussed with:  No call, chart review       NEXT FOLLOW-UP DATE:  25    Amy Hurd RN   Anemia Services  [facility]  [email or web address]   Office : [phone]  Fax: [phone]

## 2025-06-24 ENCOUNTER — OFFICE VISIT (OUTPATIENT)
Dept: TRANSPLANT | Facility: CLINIC | Age: 85
End: 2025-06-24
Attending: SURGERY
Payer: MEDICARE

## 2025-06-24 ENCOUNTER — PATIENT OUTREACH (OUTPATIENT)
Dept: NEPHROLOGY | Facility: CLINIC | Age: 85
End: 2025-06-24
Payer: MEDICARE

## 2025-06-24 VITALS
WEIGHT: 177.7 LBS | SYSTOLIC BLOOD PRESSURE: 175 MMHG | DIASTOLIC BLOOD PRESSURE: 79 MMHG | HEART RATE: 62 BPM | OXYGEN SATURATION: 95 % | BODY MASS INDEX: 24.78 KG/M2

## 2025-06-24 DIAGNOSIS — N18.5 CKD (CHRONIC KIDNEY DISEASE) STAGE 5, GFR LESS THAN 15 ML/MIN (H): Primary | ICD-10-CM

## 2025-06-24 DIAGNOSIS — N18.6 END STAGE RENAL DISEASE (H): ICD-10-CM

## 2025-06-24 DIAGNOSIS — N18.6 ESRD (END STAGE RENAL DISEASE) (H): ICD-10-CM

## 2025-06-24 DIAGNOSIS — N18.5 CHRONIC KIDNEY DISEASE (CKD), STAGE V (H): Primary | ICD-10-CM

## 2025-06-24 DIAGNOSIS — N18.4 CKD (CHRONIC KIDNEY DISEASE) STAGE 4, GFR 15-29 ML/MIN (H): Primary | ICD-10-CM

## 2025-06-24 LAB — HBV SURFACE AG SERPL QL IA: NONREACTIVE

## 2025-06-24 PROCEDURE — 3077F SYST BP >= 140 MM HG: CPT | Performed by: SURGERY

## 2025-06-24 PROCEDURE — G0463 HOSPITAL OUTPT CLINIC VISIT: HCPCS | Performed by: SURGERY

## 2025-06-24 PROCEDURE — 3078F DIAST BP <80 MM HG: CPT | Performed by: SURGERY

## 2025-06-24 PROCEDURE — 99214 OFFICE O/P EST MOD 30 MIN: CPT | Performed by: SURGERY

## 2025-06-24 NOTE — PROGRESS NOTES
Dialysis Access Care Coordination: Consult Outreach    REASON FOR VISIT:   Patient presents to clinic for a repeat PD catheter consult with Dr. Zimmer . 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    Nephrology provider: Vania Ly NP and Dr. Scott.  Nephrology RN Care Coordinator:  Katerine Gonzalez, DINH.    Patient accompanied by daughter, Allegra.    SITUATION/BACKROUND:     Neph Tracking Flowsheet Last Filled Values       [facility] CKD Late Referral 04/03/23    [facility] CKD Late Complete 04/06/23    Kidney Smart CKD Basics Referral --  entered in error    Preferred Modality Peritoneal Dialysis    Patient's Referral Dates Auto Populate Patient's Referral Dates    Specialty Care Coordination Referral - Dialysis 6/6/2023    Anemia Services Referral 5/6/24    MTM Referral 12/22/21    Home Care Referral 6/22/23    Journey Referral 1/25/23    Access Surgeon Referral Status  Referred  PD consult with Dr. Zimmer    Dialysis Access Referral 06/06/23    Access Surgical Consult 06/24/25  PD catheter consult, Dr. Zimmer. Previously completed 7/18/23.    Transplant Status  Not Referred  reason: age             Dialysis History    No dialysis history on file.          Patient is not followed by [facility] 2/2  .  Coordinator: No matching coordinators    ASSESSMENT:   Planned dialysis support people: adult children who all live very close to pt and are willing to train to assist pt with set up/ take down as needed.    Lives with: lives alone    Dialysis access screening:   Dominant hand? right  Diabetic? No  Physical limitations? No  Cognitive limitations? Yes: brain fog, likely related to uremia.  No cognitive decline per neurology assessment.  Current upper extremity impairment?  No  Neuropathy?  No  Pets? No  Constipation concerns? No  Nausea/Vomiting? No  Storage space limitations? 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    Access Type Placement Date Removal Date  "Surgeon Outcome                          Immunosuppression:  none     Anticoagulation:  Eliquis - pt reports stopping on his own after feeling weak while going up the stairs.  Pt then restarted on his own.  Pt then reports having knee issues and stopping the eliquis again.  Pt has not consulted with cardiology re: his eliquis, stroke risk and pros/cons of the medication.  If pt restarts, will need to hold for 48 hours pre op.    Pt counseled by Dr. Zimmer on risk of stroke with a-fib when not anticoagulated and pt should discuss that risk with his cardiologist.  Correlation does not always equal causation and pt responded, \"I know that\".  Encouraged to follow up with cardiology to discuss his risk of stroke and the pros/cons of eliquis so he can make an informed decision about the medication.    Antiplatelet:  none     Recent Labs:      Latest Ref Rng & Units 6/23/2025 6/9/2025 3/5/2025   Neph Labs   Sodium 135 - 145 mmol/L 138  137  140    GFR Estimate >60 mL/min/1.73m2 9  9  12    Potassium 3.4 - 5.3 mmol/L 4.9  4.9  4.5    Creatinine 0.67 - 1.17 mg/dL 5.66  5.61  4.62    Urea Nitrogen 8.0 - 23.0 mg/dL 53.6  58.0  49.5    Hemoglobin 13.3 - 17.7 g/dL 9.1  9.4  9.6    Hemoglobin A1C <5.7 %   5.6    Ferritin 31 - 409 ng/mL  686  603    Iron Binding Cap 240 - 430 ug/dL  252  254    Iron 61 - 157 ug/dL  88  64          Relevant Surgical & Medical Hx:  N/a      EDUCATION:     Dialysis access surgery education provided:   - Pre-Op: pre-op H&P or PAC within 30 days prior to surgery; may need to hold certain medications (e.g. blood thinners) for a few days prior to surgery.   - Surgery: done as an outpatient procedure; patient will go home the same day.   - Post-Op: need someone to drive them home and stay with them for the first night after surgery; post-op appointment with the surgeon.       Peritoneal dialysis access education provided:   - Basic PD process done at home every day; CCPD vs CAPD.   - Positioning of " PD catheter in the abdomen and surgical placement.  - Leaving dressing in place for 1 week after surgery and then is changed by PD nurse.  - What to expect for PD training and when PD training can be started.  - No lifting, twisting, driving, bathing/showering until PD catheter is healed.  - Storage requirements for supplies.  - Pets in the home and keeping them out of the room where PD is performed.  - Contraindication to baths as well as swimming in lakes, streams, hot tubs, and public pools.  - Importance of preventing constipation to avoid displacement of PD catheter.  - Dialysate solution containing sugar, which adds calories and can affect blood glucose levels if diabetic.  - Monthly dialysis clinic visits with nephrologist and PD nurse; PD nurse is available 24/7 for questions and concerns.  - Signs and symptoms of possible complications including infection, displacement of catheter, or occlusion.       PLAN:     Future Appts Next 180 days       Visit Type Date Time Department    SOT PD CATHETER CONSULT 6/24/2025  1:00 PM  SOT SURGERY    RETURN NEPHROLOGY 9/15/2025  9:30 AM  MEDICINE RENAL             PLAN:  laparoscopic PD catheter surgery under general anesthesia with Dr. Zimmer.  PAC prior.  Pt noted to have small umbilical hernia upon assessment.  Per Dr. Zimmer, will monitor post op and likely will not require surgical repair.    Nephrology to notify Dialysis Access Surgery Care Coordinator when they would like to move forward with scheduling surgery.     Follow Up:  Request sent to schedule laparoscopic PD catheter surgery under general anesthesia with Dr. Zimmer.  Pt will need PAC prior.  Patient to follow up as scheduled.   RN CC updated nephrology team.  Per Vania Ly, change renal US to STAT and schedule tomorrow (morning preferred).  Discuss results with Vania post  Discuss need to move forward with PD catheter surgery with pt.  Request to schedule surgery in open slot on  7/3/25 with Dr. Zimmer  Nephrology RNCC to add pt onto Vania's clinic next week.  Nephrology RNCC to start arranging dialysis admission 2/2 PD catheter placement surgery.  Will need sterile dressing change and flush 1 week post op with PD RN.  Updated pt with nephrology's recommendation.  Pt agreeable to schedule STAT renal US tomorrow morning with labs to follow for admission to [facility].  Scheduled at [facility] for 0830 and 0915.  Surgery to be scheduled on 7/3/25 at 1PM, check in at 11AM.  Pt reminded he will need someone to drive him home and be with him for 24 hours post op.  Pt will likely stay with one of his daughters after surgery  [employer] message sent with surgery information.  PAC referral ordered per Dr. Zimmer  Pt prefers to go to [facility].  Will relay to nephrology RNCC.  Message sent to pt cardiology provider, SERGIO Ruiz, with update on eliquis and recommendation for follow up to discuss.     Patient and daughter verbalized understanding and will follow up as recommended.    OWEN DIAZ RN  Dialysis Access Surgery Care Coordinator  Phone: [phone]  Pool: P_Dialysis_Access_Nurse

## 2025-06-24 NOTE — PROGRESS NOTES
Dialysis Access Service  Consult Note    Referred by Vania Washington NP for placement of peritoneal dialysis access.    HPI: Mr. 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  He is left handed. Mr. Dangelo is not dialyzing at (Not currently on dialysis).        Risk factors for complications of PD catheter access are:         Yes No  Hx of abdominal surgery  []   [x]    Comment:       Hx of  hernia repair/hydrocele []         [x]  Comment:  History of previous PD catheter []     [x]  Comment:     Respiratory problems   []     [x]  Comment:   Obesity (BMI >30)  []     [x]  Comment:  Diabetes    []        [x]  Comment:  Anticoagulation:   []         [x]   Agent:                                      Current immunosuppression []     [x]  Comment:         Past Medical History:   Diagnosis Date    2019 novel coronavirus disease (COVID-19) 10/27/2020    also on 9/8/22    Alcohol dependence (H)     Chronic kidney disease, stage IV (severe) (H)     Combined forms of age-related cataract of both eyes     Concussion with loss of consciousness     x10 going back to childhood    Gout 1990    HTN (hypertension)     IgA nephropathy     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     Paroxysmal atrial fibrillation (H)     PVD (posterior vitreous detachment)     Seizure disorder (H)     last seizure 2008       Past Surgical History:   Procedure Laterality Date    NO HISTORY OF SURGERY      PHACOEMULSIFICATION CLEAR CORNEA WITH STANDARD INTRAOCULAR LENS IMPLANT Right 11/3/2022    Procedure: RIGHT EYE PHACOEMULSIFICATION, CATARACT, WITH INTRAOCULAR LENS IMPLANT COMPLEX CASE ;  Surgeon: Best Padilla MD;  Location: [facility] OR    PHACOEMULSIFICATION CLEAR CORNEA WITH STANDARD INTRAOCULAR LENS IMPLANT Left 11/17/2022    Procedure: LEFT EYE PHACOEMULSIFICATION, CATARACT, WITH INTRAOCULAR LENS IMPLANT;  Surgeon: Best Padilla MD;   Location: [facility] OR       Family History   Problem Relation Age of Onset    Glaucoma No family hx of     Macular Degeneration No family hx of     Anesthesia Reaction No family hx of     Deep Vein Thrombosis (DVT) No family hx of        Social History     Tobacco Use    Smoking status: Former     Types: Cigarettes    Smokeless tobacco: Never   Substance Use Topics    Alcohol use: No     Comment: History of alcohol dependence, in remission for 20 years         ROS: 10 point ROS neg other than the symptoms noted above in the HPI.                 Current Outpatient Medications:     acetaminophen (TYLENOL) 325 MG tablet, Take 325-650 mg by mouth every 6 hours as needed for mild pain, Disp: , Rfl:     busPIRone (BUSPAR) 5 MG tablet, Take 1 tablet (5 mg) by mouth 2 times daily. Has started, Disp: 180 tablet, Rfl: 3    calcium carbonate-vitamin D (OSCAL) 500-5 MG-MCG tablet, Take 1 tablet by mouth daily., Disp: 90 tablet, Rfl: 3    Cholecalciferol (D3-1000) 25 MCG (1000 UT) CAPS, Take 1 capsule by mouth daily, Disp: , Rfl:     cyanocobalamin (VITAMIN B-12) 100 MCG tablet, Take 1 tablet (100 mcg) by mouth daily. Take 1,000 mcg by mouth every morning -, Disp: 90 tablet, Rfl: 3    escitalopram (LEXAPRO) 20 MG tablet, Take 1 tablet (20 mg) by mouth every morning., Disp: 90 tablet, Rfl: 3    lamoTRIgine (LAMICTAL) 100 MG tablet, Take 2 tablets (200 mg) by mouth 2 times daily., Disp: 360 tablet, Rfl: 3    metoprolol succinate ER (TOPROL XL) 25 MG 24 hr tablet, TAKE 1/2 TABLET BY MOUTH DAILY, Disp: 45 tablet, Rfl: 3    multivitamin (OCUVITE) TABS tablet, Take 1 tablet by mouth daily. Please keep the appointment on 9/3/24 for further refills., Disp: 90 tablet, Rfl: 3    simvastatin (ZOCOR) 20 MG tablet, Take 1 tablet (20 mg) by mouth at bedtime., Disp: 90 tablet, Rfl: 3    sodium bicarbonate 650 MG tablet, Take 1 tablet (650 mg) by mouth 2 times daily., Disp: 180 tablet, Rfl: 3    tamsulosin (FLOMAX) 0.4 MG capsule, Take 2 capsules  (0.8 mg) by mouth daily., Disp: 180 capsule, Rfl: 3                  PHYSICAL EXAM:  Pulse:  [62] 62  BP: (175)/(79) 175/79  SpO2:  [95 %] 95 %  Constitutional: Alert and oriented in no acute distress  HEENT: sclera anicteric, MMM, conjunctiva pink  Chest: HD catheter absent.  CV:  NSR  : No CVA tenderness  Abdomen: No previous incisions ascites absent  Beltline location in relation to umbilicus:  Extremities:  No edema  Skin:  No rashes or jaundice  Neuro: normal gait  Psych: normal mood and affect    Lab on 06/23/2025   Component Date Value Ref Range Status    Sodium 06/23/2025 138  135 - 145 mmol/L Final    Potassium 06/23/2025 4.9  3.4 - 5.3 mmol/L Final    Chloride 06/23/2025 106  98 - 107 mmol/L Final    Carbon Dioxide (CO2) 06/23/2025 19 (L)  22 - 29 mmol/L Final    Anion Gap 06/23/2025 13  7 - 15 mmol/L Final    Glucose 06/23/2025 93  70 - 99 mg/dL Final    Urea Nitrogen 06/23/2025 53.6 (H)  8.0 - 23.0 mg/dL Final    Creatinine 06/23/2025 5.66 (H)  0.67 - 1.17 mg/dL Final    GFR Estimate 06/23/2025 9 (L)  >60 mL/min/1.73m2 Final    eGFR calculated using 2021 CKD-EPI equation.    Calcium 06/23/2025 9.1  8.8 - 10.4 mg/dL Final    Albumin 06/23/2025 4.3  3.5 - 5.2 g/dL Final    Phosphorus 06/23/2025 4.7 (H)  2.5 - 4.5 mg/dL Final    WBC Count 06/23/2025 5.0  4.0 - 11.0 10e3/uL Final    RBC Count 06/23/2025 2.99 (L)  4.40 - 5.90 10e6/uL Final    Hemoglobin 06/23/2025 9.1 (L)  13.3 - 17.7 g/dL Final    Hematocrit 06/23/2025 28.1 (L)  40.0 - 53.0 % Final    MCV 06/23/2025 94  78 - 100 fL Final    MCH 06/23/2025 30.4  26.5 - 33.0 pg Final    MCHC 06/23/2025 32.4  31.5 - 36.5 g/dL Final    RDW 06/23/2025 13.8  10.0 - 15.0 % Final    Platelet Count 06/23/2025 117 (L)  150 - 450 10e3/uL Final    Total Protein Urine mg/dL 06/23/2025 6.3    mg/dL Final    Total Protein Urine mg/mg Creat 06/23/2025 0.17  0.00 - 0.20 mg/mg Cr Final    Creatinine Urine mg/dL 06/23/2025 36.9  mg/dL Final    Color Urine 06/23/2025 Straw   Colorless, Straw, Light Yellow, Yellow Final    Appearance Urine 06/23/2025 Clear  Clear Final    Glucose Urine 06/23/2025 Negative  Negative mg/dL Final    Bilirubin Urine 06/23/2025 Negative  Negative Final    Ketones Urine 06/23/2025 Negative  Negative mg/dL Final    Specific Gravity Urine 06/23/2025 1.006  1.003 - 1.035 Final    Blood Urine 06/23/2025 Trace (A)  Negative Final    pH Urine 06/23/2025 6.0  5.0 - 7.0 Final    Protein Albumin Urine 06/23/2025 Negative  Negative mg/dL Final    Urobilinogen Urine 06/23/2025 Normal  Normal mg/dL Final    Nitrite Urine 06/23/2025 Negative  Negative Final    Leukocyte Esterase Urine 06/23/2025 Negative  Negative Final    RBC Urine 06/23/2025 <1  <=2 /HPF Final    WBC Urine 06/23/2025 <1  <=5 /HPF Final    Squamous Epithelials Urine 06/23/2025 <1  <=1 /HPF Final            Assessment & Plan: Mr. Dangelo is a good and fair candidate for peritoneal  dialysis access.  I would recommend laparoscopic PD catheter placement with left sided exit, created under General.     Eliquis and this was self discontinued.  I asked him to talk with his cardiologist about his risk of stroke with his atrial fibrillation.  If he starts back on Eliquis prior to surgery, he will have to hold this for 48 hours before surgery.  After surgery I will instruct him when to restart.  I am slightly concerned about his ability to do peritoneal dialysis and I spoke with him and his daughter about this.  I feel he may need some extra help.    The surgical risks and benefits were reviewed and questions were answered. We discussed the day of surgery plan, anesthesia, postop care, risk of infection, bleeding, thrombosis, possible need for intraoperative omentectomy or newly detected hernia repair or obliteration of patent processus vaginalis (if male), drain pain or catheter dysfunction, and possible future need for surgical revision or removal. This was contrasted with morbidity and mortality risk of  long-term catheter based hemodialysis access. The patient does wish to proceed with surgery for permanent access creation at this time.  The patient was counselled to contact one of the nurse coordinators, Nidhi Schilling RN or Flores Arias RN at [phone] with any questions or concerns.  Thank you for the opportunity to participate in Mr. Dangelo's care.        Naya Zimmer MD FACS  Associate Professor of Surgery  Director, [facility].

## 2025-06-24 NOTE — PATIENT INSTRUCTIONS
Thank you so much for choosing us for your care. It was a pleasure to see you at clinic today.     Plan/ follow-up recommendations: We will discuss with your nephrology team when to schedule surgery.  It will likely be soon based on your symptoms and lab trends.    Dr. Zimmer recommends laparoscopic PD catheter placement surgery under general anesthesia.  If you restart your eliquis, you will need to stop it 48 hours before your surgery.    It is recommended you follow up with your cardiologist regarding eliquis to discuss your risk of stroke and weight the risks and benefits of taking eliquis.    You have a small umbilical hernia.  We will monitor it to see if it needs to be repaired after you start PD.    You will need to see our PAC team (pre anesthesia consult) for your pre-op H&P within 30 days of your surgery once surgery is scheduled.    Once nephrology recommends scheduling surgery, Dr. Zimmer's  will call you to confirm a surgery date.  Then we will schedule your corresponding pre and post op appointments, including your weekly dressing changes and flushes with the PD nurse at the dialysis clinic.    Additional testing/imaging ordered today: follow up with your cardiologist regarding stopping your eliquis.    Topics discussed at today's visit:    Peritoneal Dialysis Access Education:  - Basic PD process.  PD is done at home every day.   - Positioning of PD catheter is in the lower abdomen.  - Leave your PD catheter dressing in place for 1 week after surgery until your PD nurse changes it.  The dressing needs to remain sterile and in place until your site is healed.  The dressing should be changed by the PD nurse weekly until the site is healed (approx. 2 - 4 weeks).  DO NOT SHOWER UNTIL INSTRUCTED IT IS OK BY YOUR PD NURSE.  - PD training will start once your site is healed or approximately 2-4 weeks after surgery.  PD training takes 1-2 weeks and is done at the dialysis  clinic with a PD nurse.  - No lifting, twisting, driving, bathing/showering until PD catheter is healed.  - You will get 1 month of supplies delivered at a time (approximately 30 boxes).  You will need a clean, dry, climate controlled environment to store your supplies.  - If you have pets in the home, the pets need to be kept out of the room where PD is performed and away from your supplies.  - Soaking or submerging your PD catheter site is contraindicated.  (No taking baths, no swimming in lakes, streams, hot tubs, or public pools).  - Importance of preventing constipation to avoid displacement of PD catheter.  You will likely need to take daily Miralax while on PD.  It is a good idea to start daily Miralax the week leading up to PD catheter placement surgery.  - Dialysate solution contains sugar, which adds calories and can affect blood glucose levels if diabetic.  - You will have monthly dialysis clinic visits with your nephrologist and PD nurse; your PD nurse is available 24/7 for questions and concerns.  - Possible complications include infection, displacement of catheter, or occlusion.      Dialysis access surgery instructions:  - Pre-Op:   []  Schedule your pre-op H&P with our PAC department.  Within 30 days of surgery, you will need to have a pre-op history and physical with your primary care provider.  If you have been told you need PAC (pre anesthesia consult) for a pre-op, someone will reach out to you to schedule once your surgery date has been confirmed, or you can call [phone] to schedule.  **If you do not complete your pre-op appointment, your surgery will be cancelled.  You will get a call 1-2 days prior to surgery from a pre-procedure nurse.  During this phone call, the nurse will review your medications and tell you what you should take the morning of surgery.  The nurse will also instruct you when to stop eating.  Typically you need to stop eating 8 hours before your scheduled surgery time.   During this phone call you will be told what time to arrive at the hospital and where to check in.  If you do not follow the instructions provided by the pre-procedure nurse, your surgery may be cancelled and rescheduled.  []  Start taking Miralax 1-3 times per day to ensure you are having good bowel movements the week prior to surgery.  []  Schedule weekly PD catheter dressing changes and flushing with your PD nurse at the dialysis clinic.  If you do not have an appointment scheduled for 1 week after surgery, please call the Dialysis Access Care Coordinator, [phone].    - Surgery is done as an outpatient procedure; you will go home the same day.?  Expect to be at the hospital for 6+ hours.    - Post-Op:   []  You will need a responsible adult to drive you home and stay with you for 24 hours after surgery.  []  You will have a post-op appointment with the surgeon or nurse practitioner approximately 2 weeks after surgery.?  If this does not get scheduled, please contact the Dialysis Access Care Coordinator, [phone].  []  Keep your abdominal dressing covering your PD catheter in place and dry.  (No showering or bathing until instructed to do so by your PD nurse).  []  Follow up weekly with your PD nurse at the dialysis clinic for weekly dressing changes and flushing of your PD catheter.  (If this has not been scheduled, call the Dialysis Access Care Coordinator [phone])  []  Surgery can cause constipation, which needs to be avoided to reduce the risk of catheter malfunction.Take Miralax as instructed (1-3 times per day) in addition to any other laxative prescribed (typically Senna is prescribed at discharge).     Dialysis Access Surgery Care Coordinators  DINH Singh RN  Direct: [phone]          If you have any questions, please call the Dialysis Access Care Coordinator or contact our clinic at [phone].  We also encourage the use of [employer] to communicate with your  HealthCare Provider.      If you have an urgent need after business hours (8:00 am to 4:30 pm) please call [phone], option 4, and ask to page [phone] (the Transplant Surgery Fellow on call). For non-urgent after hours needs, please call the dialysis access nurse at [phone] and leave a detailed voicemail. For scheduling needs, please call our clinic directly at [phone].

## 2025-06-24 NOTE — LETTER
6/24/2025      Melo Dangelo  [address]  [place] [address]      Dear Colleague,    Thank you for referring your patient, Melo Dangelo, to the [facility]. Please see a copy of my visit note below.    Dialysis Access Service  Consult Note    Referred by Vania Washington NP for placement of peritoneal dialysis access.    HPI: Mr. 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  He is left handed. Mr. Dangelo is not dialyzing at (Not currently on dialysis).        Risk factors for complications of PD catheter access are:         Yes No  Hx of abdominal surgery  []   [x]    Comment:       Hx of  hernia repair/hydrocele []         [x]  Comment:  History of previous PD catheter []     [x]  Comment:     Respiratory problems   []     [x]  Comment:   Obesity (BMI >30)  []     [x]  Comment:  Diabetes    []        [x]  Comment:  Anticoagulation:   []         [x]   Agent:                                      Current immunosuppression []     [x]  Comment:         Past Medical History:   Diagnosis Date     2019 novel coronavirus disease (COVID-19) 10/27/2020    also on 9/8/22     Alcohol dependence (H)      Chronic kidney disease, stage IV (severe) (H)      Combined forms of age-related cataract of both eyes      Concussion with loss of consciousness     x10 going back to childhood     Gout 1990     HTN (hypertension)      IgA nephropathy      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 [place] OR     PHACOEMULSIFICATION CLEAR CORNEA WITH STANDARD INTRAOCULAR LENS IMPLANT Left 11/17/2022    Procedure: LEFT EYE PHACOEMULSIFICATION, CATARACT, WITH INTRAOCULAR LENS IMPLANT;  Surgeon: Best Padilla MD;  Location: [facility] [place] OR       Family History   Problem Relation Age of Onset     Glaucoma No family hx of      Macular Degeneration No family hx of      Anesthesia Reaction No family hx of      Deep Vein Thrombosis (DVT) No family hx of        Social History     Tobacco Use     Smoking status: Former     Types: Cigarettes     Smokeless tobacco: Never   Substance Use Topics     Alcohol use: No     Comment: History of alcohol dependence, in remission for 20 years         ROS: 10 point ROS neg other than the symptoms noted above in the HPI.                 Current Outpatient Medications:      acetaminophen (TYLENOL) 325 MG tablet, Take 325-650 mg by mouth every 6 hours as needed for mild pain, Disp: , Rfl:      busPIRone (BUSPAR) 5 MG tablet, Take 1 tablet (5 mg) by mouth 2 times daily. Has started, Disp: 180 tablet, Rfl: 3     calcium carbonate-vitamin D (OSCAL) 500-5 MG-MCG tablet, Take 1 tablet by mouth daily., Disp: 90 tablet, Rfl: 3     Cholecalciferol (D3-1000) 25 MCG (1000 UT) CAPS, Take 1 capsule by mouth daily, Disp: , Rfl:      cyanocobalamin (VITAMIN B-12) 100 MCG tablet, Take 1 tablet (100 mcg) by mouth daily. Take 1,000 mcg by mouth every morning -, Disp: 90 tablet, Rfl: 3     escitalopram (LEXAPRO) 20 MG tablet, Take 1 tablet (20 mg) by mouth every morning., Disp: 90 tablet, Rfl: 3     lamoTRIgine (LAMICTAL) 100 MG tablet, Take 2 tablets (200 mg) by mouth 2 times daily., Disp: 360 tablet, Rfl: 3     metoprolol succinate ER (TOPROL XL) 25 MG 24 hr tablet, TAKE 1/2 TABLET BY MOUTH DAILY, Disp: 45 tablet, Rfl: 3     multivitamin (OCUVITE) TABS tablet, Take 1 tablet by mouth daily. Please keep the appointment on 9/3/24 for further refills., Disp: 90  tablet, Rfl: 3     simvastatin (ZOCOR) 20 MG tablet, Take 1 tablet (20 mg) by mouth at bedtime., Disp: 90 tablet, Rfl: 3     sodium bicarbonate 650 MG tablet, Take 1 tablet (650 mg) by mouth 2 times daily., Disp: 180 tablet, Rfl: 3     tamsulosin (FLOMAX) 0.4 MG capsule, Take 2 capsules (0.8 mg) by mouth daily., Disp: 180 capsule, Rfl: 3                  PHYSICAL EXAM:  Pulse:  [62] 62  BP: (175)/(79) 175/79  SpO2:  [95 %] 95 %  Constitutional: Alert and oriented in no acute distress  HEENT: sclera anicteric, MMM, conjunctiva pink  Chest: HD catheter absent.  CV:  NSR  : No CVA tenderness  Abdomen: No previous incisions ascites absent  Beltline location in relation to umbilicus:  Extremities:  No edema  Skin:  No rashes or jaundice  Neuro: normal gait  Psych: normal mood and affect    Lab on 06/23/2025   Component Date Value Ref Range Status     Sodium 06/23/2025 138  135 - 145 mmol/L Final     Potassium 06/23/2025 4.9  3.4 - 5.3 mmol/L Final     Chloride 06/23/2025 106  98 - 107 mmol/L Final     Carbon Dioxide (CO2) 06/23/2025 19 (L)  22 - 29 mmol/L Final     Anion Gap 06/23/2025 13  7 - 15 mmol/L Final     Glucose 06/23/2025 93  70 - 99 mg/dL Final     Urea Nitrogen 06/23/2025 53.6 (H)  8.0 - 23.0 mg/dL Final     Creatinine 06/23/2025 5.66 (H)  0.67 - 1.17 mg/dL Final     GFR Estimate 06/23/2025 9 (L)  >60 mL/min/1.73m2 Final    eGFR calculated using 2021 CKD-EPI equation.     Calcium 06/23/2025 9.1  8.8 - 10.4 mg/dL Final     Albumin 06/23/2025 4.3  3.5 - 5.2 g/dL Final     Phosphorus 06/23/2025 4.7 (H)  2.5 - 4.5 mg/dL Final     WBC Count 06/23/2025 5.0  4.0 - 11.0 10e3/uL Final     RBC Count 06/23/2025 2.99 (L)  4.40 - 5.90 10e6/uL Final     Hemoglobin 06/23/2025 9.1 (L)  13.3 - 17.7 g/dL Final     Hematocrit 06/23/2025 28.1 (L)  40.0 - 53.0 % Final     MCV 06/23/2025 94  78 - 100 fL Final     MCH 06/23/2025 30.4  26.5 - 33.0 pg Final     MCHC 06/23/2025 32.4  31.5 - 36.5 g/dL Final     RDW 06/23/2025 13.8   10.0 - 15.0 % Final     Platelet Count 06/23/2025 117 (L)  150 - 450 10e3/uL Final     Total Protein Urine mg/dL 06/23/2025 6.3    mg/dL Final     Total Protein Urine mg/mg Creat 06/23/2025 0.17  0.00 - 0.20 mg/mg Cr Final     Creatinine Urine mg/dL 06/23/2025 36.9  mg/dL Final     Color Urine 06/23/2025 Straw  Colorless, Straw, Light Yellow, Yellow Final     Appearance Urine 06/23/2025 Clear  Clear Final     Glucose Urine 06/23/2025 Negative  Negative mg/dL Final     Bilirubin Urine 06/23/2025 Negative  Negative Final     Ketones Urine 06/23/2025 Negative  Negative mg/dL Final     Specific Gravity Urine 06/23/2025 1.006  1.003 - 1.035 Final     Blood Urine 06/23/2025 Trace (A)  Negative Final     pH Urine 06/23/2025 6.0  5.0 - 7.0 Final     Protein Albumin Urine 06/23/2025 Negative  Negative mg/dL Final     Urobilinogen Urine 06/23/2025 Normal  Normal mg/dL Final     Nitrite Urine 06/23/2025 Negative  Negative Final     Leukocyte Esterase Urine 06/23/2025 Negative  Negative Final     RBC Urine 06/23/2025 <1  <=2 /HPF Final     WBC Urine 06/23/2025 <1  <=5 /HPF Final     Squamous Epithelials Urine 06/23/2025 <1  <=1 /HPF Final            Assessment & Plan: Mr. Dangelo is a good and fair candidate for peritoneal  dialysis access.  I would recommend laparoscopic PD catheter placement with left sided exit, created under General.     Eliquis and this was self discontinued.  I asked him to talk with his cardiologist about his risk of stroke with his atrial fibrillation.  If he starts back on Eliquis prior to surgery, he will have to hold this for 48 hours before surgery.  After surgery I will instruct him when to restart.  I am slightly concerned about his ability to do peritoneal dialysis and I spoke with him and his daughter about this.  I feel he may need some extra help.    The surgical risks and benefits were reviewed and questions were answered. We discussed the day of surgery plan, anesthesia, postop care, risk of  infection, bleeding, thrombosis, possible need for intraoperative omentectomy or newly detected hernia repair or obliteration of patent processus vaginalis (if male), drain pain or catheter dysfunction, and possible future need for surgical revision or removal. This was contrasted with morbidity and mortality risk of long-term catheter based hemodialysis access. The patient does wish to proceed with surgery for permanent access creation at this time.  The patient was counselled to contact one of the nurse coordinators, Nidhi Schilling RN or Flores Arias RN at [phone] with any questions or concerns.  Thank you for the opportunity to participate in Mr. Dangelo's care.        Naya Zimmer MD FACS  Associate Professor of Surgery  Director, [facility].     Again, thank you for allowing me to participate in the care of your patient.        Sincerely,        Naya Zimmer MD, MD    Electronically signed

## 2025-06-25 ENCOUNTER — HOSPITAL ENCOUNTER (OUTPATIENT)
Dept: ULTRASOUND IMAGING | Facility: CLINIC | Age: 85
Discharge: HOME | End: 2025-06-25
Payer: MEDICARE

## 2025-06-25 ENCOUNTER — LAB (OUTPATIENT)
Dept: LAB | Facility: CLINIC | Age: 85
End: 2025-06-25
Payer: MEDICARE

## 2025-06-25 ENCOUNTER — PREP FOR PROCEDURE (OUTPATIENT)
Dept: TRANSPLANT | Facility: CLINIC | Age: 85
End: 2025-06-25

## 2025-06-25 DIAGNOSIS — N18.5 ANEMIA OF CHRONIC RENAL FAILURE, STAGE 5 (H): ICD-10-CM

## 2025-06-25 DIAGNOSIS — N18.5 CHRONIC KIDNEY DISEASE, STAGE 5, KIDNEY FAILURE (H): Primary | ICD-10-CM

## 2025-06-25 DIAGNOSIS — N18.6 END STAGE RENAL DISEASE (H): ICD-10-CM

## 2025-06-25 DIAGNOSIS — N18.5 CHRONIC KIDNEY DISEASE (CKD), STAGE V (H): ICD-10-CM

## 2025-06-25 DIAGNOSIS — D63.1 ANEMIA OF CHRONIC RENAL FAILURE, STAGE 5 (H): ICD-10-CM

## 2025-06-25 LAB
HBV CORE AB SERPL QL IA: NONREACTIVE
HBV SURFACE AB SERPL IA-ACNC: 155 M[IU]/ML
HBV SURFACE AB SERPL IA-ACNC: REACTIVE M[IU]/ML
HCT VFR BLD AUTO: 28 % (ref 40–53)
HGB BLD-MCNC: 9 G/DL (ref 13.3–17.7)
MCV RBC AUTO: 94 FL (ref 78–100)

## 2025-06-25 PROCEDURE — 86481 TB AG RESPONSE T-CELL SUSP: CPT

## 2025-06-25 PROCEDURE — 36415 COLL VENOUS BLD VENIPUNCTURE: CPT | Performed by: PATHOLOGY

## 2025-06-25 PROCEDURE — 76770 US EXAM ABDO BACK WALL COMP: CPT

## 2025-06-25 PROCEDURE — 85014 HEMATOCRIT: CPT | Performed by: PATHOLOGY

## 2025-06-25 PROCEDURE — 76770 US EXAM ABDO BACK WALL COMP: CPT | Mod: 26 | Performed by: RADIOLOGY

## 2025-06-25 PROCEDURE — 86706 HEP B SURFACE ANTIBODY: CPT

## 2025-06-25 PROCEDURE — 85018 HEMOGLOBIN: CPT | Performed by: PATHOLOGY

## 2025-06-25 PROCEDURE — 86704 HEP B CORE ANTIBODY TOTAL: CPT

## 2025-06-25 PROCEDURE — 99000 SPECIMEN HANDLING OFFICE-LAB: CPT | Performed by: PATHOLOGY

## 2025-06-25 NOTE — CONFIDENTIAL NOTE
FUTURE VISIT INFORMATION      SURGERY INFORMATION:  Date: 7.3.25  Location: [facility] OR  Surgeon:  Naya Zimmer MD   Anesthesia Type:  General   Procedure: INSERTION, CATHETER, DIALYSIS, PERITONEAL, LAPAROSCOPIC     RECORDS REQUESTED FROM:       Primary Care Provider:   Francis Smith MD   Pertinent Medical History:  Chronic kidney disease, stage 5, kidney failure   Most recent EKG+ Tracing:  10.1.24  Most recent ECHO:  7.6.22

## 2025-06-25 NOTE — PROGRESS NOTES
Pt completed his renal US this morning.  Reviewed results with nephrology KASSANDRA, Vania Ly.  Per Vania Ly, no additional follow up is needed per US results and pt should keep PD catheter surgery scheduled for next week with plan to initiate PD.  Vania would like to see pt prior to surgery, and requested to add pt onto her clinic at the end of the day on Monday, 6/30/25.    Pt's PD catheter surgery time has moved up to 11AM on 7/3/25 with Dr. Zimmer.  Pt confirmed with surgeon's AA to arrive at 0900AM on 7/3.    Per chart review, pt is scheduled for PAC on 7/1/25 at 0800, virtually.    Received call from pt expressing confusion with his appointments next week.  Reviewed patients appointments and saw that on 7/1 his PAC is from [id number] virtually, then a 0900 in person PT appointment, followed by an 1115 in person appointment with Behavioral Health.  Advised pt to cancel or r/s his PT on 7/1, so he can make sure to attend his PAC and Behavior Health appointments.  Pt agreed with that plan and wrote it down.    Pt forgot to go to lab after his US this morning.  R/s lab per pt request at [facility] in [place] for today at 4pm.    Discussed US results with pt per Vania Ly NP, and that she agrees with moving forward with surgery as planned.  Informed pt that Vania would like to see pt on Monday afternoon, at 3pm.  Pt confirmed appointment will be scheduled for nephrology follow up with Vania Ly NP on Monday, June 30, 2025 at 3PM at the [facility] in [place].  Neph manager to schedule appointment as add on per Vania.    Asked pt if he would like writer to relay his appointment info and surgery plan to one of his daughters, so they can assist him and remind him of things.  Pt agreed that would be a good idea, and requested writer call Allegra first, and call Maranda if Allegra isn't available.    Relayed plan and appointment/ surgery info to pt daughter, Allegra who verbalized understanding.  Discussed that it may be  helpful for her to have proxy access to pt's MyChart if he wishes.  Allegra will discuss that with pt to see if that's something they want to do.  Allegra will remind pt to get his labs drawn today and assist with appointment reminders for next week.    Once pt is accepted by Jose, nephrology RNCC to coordinate initial dressing change and flush appointment approximately 1 week post op.  Pt daughter made aware and will keep it on her radar that it needs to be scheduled.    OWEN DIAZ RN on 6/25/2025 at 11:46 AM  Dialysis Access Surgery Care Coordinator  Phone: [phone]  Pool: P_Dialysis_Access_Nurse

## 2025-06-28 LAB
GAMMA INTERFERON BACKGROUND BLD IA-ACNC: 0.06 IU/ML
M TB IFN-G BLD-IMP: NEGATIVE
M TB IFN-G CD4+ BCKGRND COR BLD-ACNC: 9.94 IU/ML
MITOGEN IGNF BCKGRD COR BLD-ACNC: 0.02 IU/ML
MITOGEN IGNF BCKGRD COR BLD-ACNC: 0.03 IU/ML

## 2025-06-30 ENCOUNTER — PATIENT OUTREACH (OUTPATIENT)
Dept: NEPHROLOGY | Facility: CLINIC | Age: 85
End: 2025-06-30
Payer: MEDICARE

## 2025-06-30 ENCOUNTER — OFFICE VISIT (OUTPATIENT)
Dept: NEPHROLOGY | Facility: CLINIC | Age: 85
End: 2025-06-30
Payer: MEDICARE

## 2025-06-30 VITALS
DIASTOLIC BLOOD PRESSURE: 71 MMHG | OXYGEN SATURATION: 95 % | WEIGHT: 175.2 LBS | TEMPERATURE: 98.4 F | HEART RATE: 57 BPM | SYSTOLIC BLOOD PRESSURE: 158 MMHG | BODY MASS INDEX: 24.53 KG/M2 | HEIGHT: 71 IN

## 2025-06-30 DIAGNOSIS — N18.5 CHRONIC KIDNEY DISEASE (CKD), STAGE V (H): Primary | ICD-10-CM

## 2025-06-30 PROCEDURE — 99214 OFFICE O/P EST MOD 30 MIN: CPT

## 2025-06-30 PROCEDURE — G0463 HOSPITAL OUTPT CLINIC VISIT: HCPCS

## 2025-06-30 PROCEDURE — 3077F SYST BP >= 140 MM HG: CPT

## 2025-06-30 PROCEDURE — 3078F DIAST BP <80 MM HG: CPT

## 2025-06-30 PROCEDURE — 1126F AMNT PAIN NOTED NONE PRSNT: CPT

## 2025-06-30 ASSESSMENT — PAIN SCALES - GENERAL: PAINLEVEL_OUTOF10: NO PAIN (0)

## 2025-06-30 NOTE — NURSING NOTE
"Chief Complaint   Patient presents with    RECHECK     Follow up pre surgery. PT reports no new or worsening symptoms.      Vitals:    06/30/25 1503 06/30/25 1506 06/30/25 1508   BP: (!) 151/80 (!) 158/77 (!) 158/71   BP Location: Right arm Right arm Right arm   Patient Position: Sitting Sitting Sitting   Cuff Size: Adult Regular Adult Regular Adult Regular   Pulse: 57     Temp: 98.4  F (36.9  C)     TempSrc: Oral     SpO2: 95%     Weight: 79.5 kg (175 lb 3.2 oz)     Height: 1.803 m (5' 11\")         BP Readings from Last 3 Encounters:   06/30/25 (!) 158/71   06/24/25 (!) 175/79   06/09/25 130/65       BP (!) 158/71 (BP Location: Right arm, Patient Position: Sitting, Cuff Size: Adult Regular)   Pulse 57   Temp 98.4  F (36.9  C) (Oral)   Ht 1.803 m (5' 11\")   Wt 79.5 kg (175 lb 3.2 oz)   SpO2 95%   BMI 24.44 kg/m       Yasmeenshnhi Cox    "

## 2025-06-30 NOTE — PROGRESS NOTES
Dialysis labs have been sent to [facility] admin.     Katerine Gonzalez RN, BSN   Nephrology RN Care Coordinator   [facility]

## 2025-06-30 NOTE — LETTER
6/30/2025       RE: Melo Dangelo  [address]  [place] [address]     Dear Colleague,    Thank you for referring your patient, Melo Dangelo, to the [facility] [place] [facility]. Please see a copy of my visit note below.    Nephrology Clinic Visit 6/30/25    Assessment and Plan:    1. CKD5/ESRD w/proteinuria - Creat 5.6, eGFR of 9 ml/mn. 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 If there is no improvement on the repeat renal panel, given his BPH, will recheck a new renal US to r/o obstruction causing the rise in creat  - Please refer to Dr Barrow's note 1/19/23 for review of his previous treatment for IgA Nephropathy  - UA is consistent with most recent studies with trace blood and actually no protein  - Blood pressure well controlled  - BPH is borderline controlled despite Flomax. Feels he is drinking fluids normally  - On statin for CV risk reduction  - Melo is ready to begin PD  - He is scheduled for PD cath placement 7/3/25  - Has been admitted to [facility] for PD training  - Melo is quite familiar with dialysis. Wife was on PD and he managed. She was on short term HD initially before switching to PD. Daughter Jennie will also train with Melo as backup.      2. Volume status - No edema/dyspnea. B/ps controlled. Only voiding issue is nocturia. Albumin 4.3. Weight stable at 81.1 kg.    - No indication for diuretic therapy at this time   - EDW 81 kg    3. CV/HTN - Well controlled w/o edema. Home readings 130's/. Clinic readings higher today but generally in the 130's/. Echo 7/22 with EF 60%, normal IVC  Current regimen: Metoprolol XL 12.5 mg every day    - No changes required    4. BPH - Still with nocturia on Flomax    5. Electrolytes - No acute concerns. K 4.9 Na 138    6. Acid base - No acute concerns. Bicarb 19   - On  Bicarb 650 mg bid which can be discontinued once he begins PD    7. BMD - Ca 9.1 Phos 4.7 albumin 4.3   Vit D 42  ( 2/25)   Continue Ca/D one tab daily     8. Anemia - Hgb 9.0 and  drifting down with decline in renal function   Iron studies 6/25: Ferritin 686, Fe 88, IS 35   Etiology for his anemia is likely chronic dz/Epo deficiency   Will begin RAMA in OP dialysis    9. Seizure d/o - Stable on Lamictal. Na prob with hyponatremia. Na 138    10. Depression - Well controlled on Lexapro and Buspar.     11. Memory changes - Neurology evaluation indicative of MCI, not Alzheimer's Dementia   - Refer to Neurology note 4/28/25    12. Disposition - No further general nephrology follow up required. Will be transferring to OP Dialysis Nephrology team     Assessment and plan was discussed with patient and he voiced his understanding and agreement.    Reason for Visit:  CKD5 follow up    HPI:  Mr Dangelo is an 85 yo male with CKD5, pAfib, Anemia, BPH, Depression, COVID 1/23, IgA nephropathy, present today for dialysis planning.   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    Home b/ps 130's/  He denies CP/dyspnea  No GI concerns  He notes urinary volume is low but he has frequency   Appetite is ok  Energy level is fair as long as he sprinkles in a few naps throughout the day  No edema unless he indulges in a high Na meal    Chronic Health Problems:    CKD5  pAfib  Anemia  BPH  Depression  COVID 19 x 2  IgA nephropathy  Alcohol use d/o ( in remission)  Bilateral hearing loss  Seizure disorder ( last seizure 2008)    Family Hx:   Family History   Problem Relation Age of Onset     Glaucoma No family hx of      Macular Degeneration No family hx of      Anesthesia Reaction No family hx of      Deep Vein Thrombosis (DVT) No family hx of      Personal Hx:   , lives independently  "in a condo, 3 daughters live nearby, NS, ETOH none.     Allergies:  No Known Allergies    Medications:  Current Outpatient Medications   Medication Sig Dispense Refill     acetaminophen (TYLENOL) 325 MG tablet Take 325-650 mg by mouth every 6 hours as needed for mild pain       busPIRone (BUSPAR) 5 MG tablet Take 1 tablet (5 mg) by mouth 2 times daily. Has started 180 tablet 3     calcium carbonate-vitamin D (OSCAL) 500-5 MG-MCG tablet Take 1 tablet by mouth daily. 90 tablet 3     Cholecalciferol (D3-1000) 25 MCG (1000 UT) CAPS Take 1 capsule by mouth daily       cyanocobalamin (VITAMIN B-12) 100 MCG tablet Take 1 tablet (100 mcg) by mouth daily. Take 1,000 mcg by mouth every morning - 90 tablet 3     escitalopram (LEXAPRO) 20 MG tablet Take 1 tablet (20 mg) by mouth every morning. 90 tablet 3     lamoTRIgine (LAMICTAL) 100 MG tablet Take 2 tablets (200 mg) by mouth 2 times daily. 360 tablet 3     metoprolol succinate ER (TOPROL XL) 25 MG 24 hr tablet TAKE 1/2 TABLET BY MOUTH DAILY (Patient taking differently: 12.5 mg every morning. TAKE 1/2 TABLET BY MOUTH DAILY) 45 tablet 3     multivitamin (OCUVITE) TABS tablet Take 1 tablet by mouth daily. Please keep the appointment on 9/3/24 for further refills. 90 tablet 3     simvastatin (ZOCOR) 20 MG tablet Take 1 tablet (20 mg) by mouth at bedtime. 90 tablet 3     sodium bicarbonate 650 MG tablet Take 1 tablet (650 mg) by mouth 2 times daily. 180 tablet 3     tamsulosin (FLOMAX) 0.4 MG capsule Take 2 capsules (0.8 mg) by mouth daily. (Patient taking differently: Take 0.8 mg by mouth every evening.) 180 capsule 3     No current facility-administered medications for this visit.      Vitals:  BP (!) 158/71 (BP Location: Right arm, Patient Position: Sitting, Cuff Size: Adult Regular)   Pulse 57   Temp 98.4  F (36.9  C) (Oral)   Ht 1.803 m (5' 11\")   Wt 79.5 kg (175 lb 3.2 oz)   SpO2 95%   BMI 24.44 kg/m      Exam:  GEN: Pleasant male in NAD. Daughter Jennie" present  CARDIAC: RRR  LUNGS: CTA  ABDOMEN: Soft NT  EXT: No edema  NEURO: A/O  PSYCH: Calm, engaging    LABS:   CMP  Recent Labs   Lab Test 06/23/25  0823 06/09/25  0729 03/05/25  0757 02/13/25  1353 10/15/21  1023 11/04/20  0703 11/02/20  0900 11/01/20  0816 10/31/20  0838    137 140 139   < > 138 139 137 138   POTASSIUM 4.9 4.9 4.5 4.7   < > 4.2 4.1 4.7 4.1   CHLORIDE 106 104 106 106   < > 105 108* 107 106   CO2 19* 20* 22 21*   < > 26 24 20* 23   ANIONGAP 13 13 12 12   < > 7 7 10 9   GLC 93 100* 96 95   < > 75 76 125 86   BUN 53.6* 58.0* 49.5* 48.6*   < > 22 26 21 19   CR 5.66* 5.61* 4.62* 4.40*   < > 1.25 1.24 1.01 1.13   GFRESTIMATED 9* 9* 12* 13*   < > 56* 56* >60 >60   GFRESTBLACK  --   --   --   --   --  >60 >60 >60 >60   GIANNI 9.1 9.2 9.1 9.3   < > 8.5 8.7 8.8 8.5    < > = values in this interval not displayed.     Recent Labs   Lab Test 04/17/24  0652 04/16/24  1028 01/13/23  0934 07/29/22  1338   BILITOTAL 0.3 0.4 0.2 0.3   ALKPHOS 53 61 79 79   ALT 8 11 14 18   AST 20 18 21 20     CBC  Recent Labs   Lab Test 06/25/25  1303 06/23/25  0823 06/09/25  0729 03/05/25  0757 02/13/25  1353   HGB 9.0* 9.1* 9.4* 9.6* 10.0*   WBC  --  5.0 5.3 5.4 5.2   RBC  --  2.99* 3.10* 3.16* 3.27*   HCT 28.0* 28.1* 29.3* 30.0* 31.2*   MCV 94 94 95 95 95   MCH  --  30.4 30.3 30.4 30.6   MCHC  --  32.4 32.1 32.0 32.1   RDW  --  13.8 13.7 13.5 13.4   PLT  --  117* 150 126* 144*     URINE STUDIES  Recent Labs   Lab Test 06/23/25  0826 06/09/25  0732 03/05/25  0813 02/13/25  1402   COLOR Straw Straw Light Yellow Yellow   APPEARANCE Clear Clear Clear Clear   URINEGLC Negative 30* Negative Negative   URINEBILI Negative Negative Negative Negative   URINEKETONE Negative Negative Negative Negative   SG 1.006 1.011 1.015 1.020   UBLD Trace* Small* Trace* Small*   URINEPH 6.0 6.0 6.0 5.5   PROTEIN Negative Negative 20* Trace*   UROBILINOGEN  --   --   --  0.2   NITRITE Negative Negative Negative Negative   LEUKEST Negative Negative  Negative Negative   RBCU <1 1 <1 2-5*   WBCU <1 <1 <1 0-5     Recent Labs   Lab Test 06/02/22  0956 04/25/22  1529 02/17/22  1041 01/11/22  1011   UTPG 0.31* 0.40* 0.37* 0.48*     PTH  Recent Labs   Lab Test 02/13/25  1353 04/17/24  0652 12/11/23  0930   PTHI 154* 101* 95*     IRON STUDIES  Recent Labs   Lab Test 06/09/25  0729 03/05/25  0757 02/13/25  1353   IRON 88 64 99    254 264   IRONSAT 35 25 38   ALBERT 686* 603* 591*       Gini Washington NP  [facility] [place]                      Again, thank you for allowing me to participate in the care of your patient.      Sincerely,    Gini Washington NP

## 2025-07-01 ENCOUNTER — VIRTUAL VISIT (OUTPATIENT)
Dept: SURGERY | Facility: CLINIC | Age: 85
End: 2025-07-01
Attending: SURGERY
Payer: MEDICARE

## 2025-07-01 ENCOUNTER — PRE VISIT (OUTPATIENT)
Dept: SURGERY | Facility: CLINIC | Age: 85
End: 2025-07-01

## 2025-07-01 ENCOUNTER — OFFICE VISIT (OUTPATIENT)
Dept: BEHAVIORAL HEALTH | Facility: CLINIC | Age: 85
End: 2025-07-01
Payer: MEDICARE

## 2025-07-01 ENCOUNTER — ANESTHESIA EVENT (OUTPATIENT)
Dept: SURGERY | Facility: CLINIC | Age: 85
End: 2025-07-01
Payer: MEDICARE

## 2025-07-01 VITALS — BODY MASS INDEX: 24.5 KG/M2 | HEIGHT: 71 IN | WEIGHT: 175 LBS

## 2025-07-01 DIAGNOSIS — F43.23 ADJUSTMENT DISORDER WITH MIXED ANXIETY AND DEPRESSED MOOD: Primary | ICD-10-CM

## 2025-07-01 DIAGNOSIS — Z01.818 PREOP EXAMINATION: Primary | ICD-10-CM

## 2025-07-01 DIAGNOSIS — N18.5 CKD (CHRONIC KIDNEY DISEASE) STAGE 5, GFR LESS THAN 15 ML/MIN (H): ICD-10-CM

## 2025-07-01 DIAGNOSIS — N02.B9 IGA NEPHROPATHY: ICD-10-CM

## 2025-07-01 PROCEDURE — 90832 PSYTX W PT 30 MINUTES: CPT | Performed by: COUNSELOR

## 2025-07-01 ASSESSMENT — COLUMBIA-SUICIDE SEVERITY RATING SCALE - C-SSRS
TOTAL  NUMBER OF INTERRUPTED ATTEMPTS SINCE LAST CONTACT: NO
ATTEMPT SINCE LAST CONTACT: NO
1. SINCE LAST CONTACT, HAVE YOU WISHED YOU WERE DEAD OR WISHED YOU COULD GO TO SLEEP AND NOT WAKE UP?: NO
2. HAVE YOU ACTUALLY HAD ANY THOUGHTS OF KILLING YOURSELF?: NO
TOTAL  NUMBER OF ABORTED OR SELF INTERRUPTED ATTEMPTS SINCE LAST CONTACT: NO
SUICIDE, SINCE LAST CONTACT: NO
6. HAVE YOU EVER DONE ANYTHING, STARTED TO DO ANYTHING, OR PREPARED TO DO ANYTHING TO END YOUR LIFE?: NO

## 2025-07-01 ASSESSMENT — PATIENT HEALTH QUESTIONNAIRE - PHQ9
10. IF YOU CHECKED OFF ANY PROBLEMS, HOW DIFFICULT HAVE THESE PROBLEMS MADE IT FOR YOU TO DO YOUR WORK, TAKE CARE OF THINGS AT HOME, OR GET ALONG WITH OTHER PEOPLE: SOMEWHAT DIFFICULT
SUM OF ALL RESPONSES TO PHQ QUESTIONS 1-9: 5
SUM OF ALL RESPONSES TO PHQ QUESTIONS 1-9: 5

## 2025-07-01 ASSESSMENT — ENCOUNTER SYMPTOMS
SEIZURES: 1
DYSRHYTHMIAS: 1
ORTHOPNEA: 0

## 2025-07-01 ASSESSMENT — LIFESTYLE VARIABLES: TOBACCO_USE: 1

## 2025-07-01 NOTE — PROGRESS NOTES
[facility]: Integrated Behavioral Health    Integrated Behavioral Health   Mental Health & Addiction Services      Progress Note - Initial [facility] Visit     Patient Name: Melo Dangelo    Date: July 1, 2025  Service Type: Consult Note   Visit Start Time: 11:25 AM  Visit End Time:  11:45   Attendees: Client   Service Modality: In-person     [facility] Visit Activities (Refresh list every visit): NEW         DATA:     Interactive Complexity: No   Crisis: No     Assessments completed:     The following assessments were completed by patient for this visit:  PHQ2: No questionnaires on file.  PHQ9:       5/15/2023    10:11 PM 3/11/2024     8:49 AM 9/3/2024     7:41 AM 9/3/2024     9:54 AM 12/3/2024     9:36 AM 5/7/2025     9:47 AM 7/1/2025    11:22 AM   PHQ-9 SCORE   PHQ-9 Total Score MyChart 7 (Mild depression)  7 (Mild depression)  4 (Minimal depression) 0 5 (Mild depression)   PHQ-9 Total Score 7 5 7 9 4  0  5        Patient-reported     PROMIS 10-Global Health (only subscores and total score):       5/15/2023    11:46 PM 7/1/2025    11:30 AM   PROMIS-10 Scores Only   Global Mental Health Score 13 11    Global Physical Health Score 17 10    PROMIS TOTAL - SUBSCORES 30 21        Patient-reported     [place] Suicide Severity Rating Scale (Lifetime/Recent)      4/16/2024    10:12 AM 7/1/2025    11:34 AM   [place] Suicide Severity Rating (Lifetime/Recent)   Q1 Wished to be Dead (Past Month) 0-->no    Q2 Suicidal Thoughts (Past Month) 0-->no    Q6 Suicide Behavior (Lifetime) 0-->no    Level of Risk per Screen no risks indicated     1. Wish to be Dead (Lifetime)  Y   Wish to be Dead Description (Lifetime)  once 30 years ago after he was fired from [employer].   1. Wish to be Dead (Past 1 Month)  N   2. Non-Specific Active Suicidal Thoughts (Lifetime)  N   Frequency (Lifetime)  1   Duration (Lifetime)  1   Controllability (Lifetime)  0   Controllability (Past 1 Month)  0   Deterrents (Lifetime)   0   Deterrents (Past 1 Month)  0   Reasons for Ideation (Lifetime)  0   Reasons for Ideation (Past 1 Month)  0   Actual Attempt (Lifetime)  N   Has subject engaged in non-suicidal self-injurious behavior? (Lifetime)  N   Interrupted Attempts (Lifetime)  N   Aborted or Self-Interrupted Attempt (Lifetime)  N   Preparatory Acts or Behavior (Lifetime)  N   Calculated C-SSRS Risk Score (Lifetime/Recent)  No Risk Indicated       Data saved with a previous flowsheet row definition     [place] Suicide Severity Rating Scale (Short Version)      10/18/2021     5:25 PM 10/19/2021    12:00 PM 1/20/2022     1:12 PM 11/3/2022     9:26 AM 11/17/2022     8:30 AM 4/16/2024    10:12 AM 7/1/2025    11:41 AM   [place] Suicide Severity Rating (Short Version)   Over the past 2 weeks have you felt down, depressed, or hopeless? no no no no no     Over the past 2 weeks have you had thoughts of killing yourself? no no no no no     Have you ever attempted to kill yourself? no no no no no     Q1 Wished to be Dead (Past Month)      0-->no    Q2 Suicidal Thoughts (Past Month)      0-->no    Q6 Suicide Behavior (Lifetime)      0-->no    Level of Risk per Screen      no risks indicated     1. Wish to be Dead (Since Last Contact)       N   2. Non-Specific Active Suicidal Thoughts (Since Last Contact)       N   Actual Attempt (Since Last Contact)       N   Has subject engaged in non-suicidal self-injurious behavior? (Since Last Contact)       N   Interrupted Attempts (Since Last Contact)       N   Aborted or Self-Interrupted Attempt (Since Last Contact)       N   Preparatory Acts or Behavior (Since Last Contact)       N   Suicide (Since Last Contact)       N   Calculated C-SSRS Risk Score (Since Last Contact)       No Risk Indicated       Data saved with a previous flowsheet row definition        Referral:   Patient was referred to [facility] by primary care provider.    Reason for referral: clarify behavioral health diagnosis and determine behavioral  health treatment options.      [facility] introduced self and role. Discussed informed consent and limits to confidentiality.     Presenting Concerns/ Current Stressors:   Patient shared he has anxiety and depression - feeling low and out burst of anger to objects - fork, pencils, jug of water.    Patient reports that he started having kidney issues two years ago.   Patient disclosed his wife  a year ago and he got her kidneys .   Patient discussed he wants to see someone for long term care and for short term care.        Therapeutic Interventions:  Psycho-education: Provided psycho-education about patient's behavioral health condition and symptoms. Explained and reviewed treatment options.    Response to treatment interventions:   Patient was receptive to interventions utilized.  Patient was engaged in the therapy process.      Safety Issues and Plan for Safety and Risk Management:     Patient has had a history of suicidal ideation: 30 year ago  and suicide attempts: once   Patient denies current fears or concerns for personal safety.   Patient denies current or recent suicidal ideation or behaviors.   Patient denies current or recent homicidal ideation or behaviors.   Patient denies current or recent self injurious behavior or ideation.   Patient denies other safety concerns.   Recommended that patient call 911 or go to the local ED should there be a change in any of these risk factors   Patient reports there are no firearms in the house.       ASSESSMENT:   Mental Status:     Appearance:   Appropriate    Eye Contact:   Fair    Psychomotor Behavior: Normal    Attitude:   Cooperative  Pleasant Guarded    Orientation:   All   Speech Rate / Production: Mumbled   Volume:   Soft    Mood:    Anxious  Sad    Affect:    Worrisome    Thought Content:  Clear    Thought Form:  Coherent  Logical    Insight:    Fair         Diagnostic Criteria:   Adjustment Disorder  A. The development of emotional or behavioral symptoms in  response to an identifiable stressor(s) occurring within 3 months of the onset of the stressor(s)  B. These symptoms or behaviors are clinically significant, as evidenced by one or both of the following:       - Significant impairment in social, occupational, or other important areas of functioning  C. The stress-related disturbance does not meet criteria for another disorder & is not not an exacerbation of another mental disorder  D. The symptoms do not represent normal bereavement  E. 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 Patient was provided:  recommendation to schedule follow-up with [facility] recommendation to follow through on referrals     2. Provider recommended the following referrals: [facility] Transfer to [facility] for adjustment due to medical issues in person services. [facility] will bridge for two months. .        3. Suicide Risk and Safety Concerns were assessed for Melo Dangelo    Safety Plan:   Patient denied any current/recent/lifetime history of suicidal ideation and/or behaviors. Recommended that patient call 911 or go to the local ED should there be a change in any of these risk factors       Deyanira [facility], [facility]   July 1, 2025

## 2025-07-01 NOTE — H&P
Pre-Operative H & P     CC:  Preoperative exam to assess for increased cardiopulmonary risk while undergoing surgery and anesthesia.    Date of Encounter: 7/1/2025  Primary Care Physician:  Francis Smith     Reason for visit:   Encounter Diagnoses   Name Primary?    Preop examination Yes    CKD (chronic kidney disease) stage 5, GFR less than 15 ml/min (H)     IgA nephropathy        HPI  Mleo Dangelo is a 84 year old male who presents for pre-operative H & P in preparation for  Procedure Information       Case: [id number] Date/Time: 07/03/25 1100    Procedure: INSERTION, CATHETER, DIALYSIS, PERITONEAL, LAPAROSCOPIC (Abdomen)    Anesthesia type: General    Diagnosis: Chronic kidney disease, stage 5, kidney failure (H) [N18.5]    Pre-op diagnosis: Chronic kidney disease, stage 5, kidney failure (H) [N18.5]    Location:  OR [address] [place] [address] OR    Providers: Naya Zimmer MD            Melo Dangelo is a 84 year old male with paroxysmal a-fib, hypertension, hyperlipidemia, anemia, seizure disorder, BPH, alcohol dependence in remission and depression that has CKD stage 5 secondary to IgA nephropathy.  Future dialysis need is being considered and peritoneal dialysis is what the patient would prefer.  Now scheduled as above for access procedure.    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     Paroxysmal atrial fibrillation (H)     PVD (posterior vitreous detachment)     Seizure  disorder (H)     last seizure 2008       Past Surgical History  Past Surgical History:   Procedure Laterality Date    PHACOEMULSIFICATION CLEAR CORNEA WITH STANDARD INTRAOCULAR LENS IMPLANT Right 11/03/2022    Procedure: RIGHT EYE PHACOEMULSIFICATION, CATARACT, WITH INTRAOCULAR LENS IMPLANT COMPLEX CASE ;  Surgeon: Best Padilla MD;  Location: [facility] OR    PHACOEMULSIFICATION CLEAR CORNEA WITH STANDARD INTRAOCULAR LENS IMPLANT Left 11/17/2022    Procedure: LEFT EYE PHACOEMULSIFICATION, CATARACT, WITH INTRAOCULAR LENS IMPLANT;  Surgeon: Best Padilla MD;  Location: [facility] OR       Prior to Admission Medications  Current Outpatient Medications   Medication Sig Dispense Refill    acetaminophen (TYLENOL) 325 MG tablet Take 325-650 mg by mouth every 6 hours as needed for mild pain      busPIRone (BUSPAR) 5 MG tablet Take 1 tablet (5 mg) by mouth 2 times daily. Has started 180 tablet 3    calcium carbonate-vitamin D (OSCAL) 500-5 MG-MCG tablet Take 1 tablet by mouth daily. 90 tablet 3    Cholecalciferol (D3-1000) 25 MCG (1000 UT) CAPS Take 1 capsule by mouth daily      cyanocobalamin (VITAMIN B-12) 100 MCG tablet Take 1 tablet (100 mcg) by mouth daily. Take 1,000 mcg by mouth every morning - 90 tablet 3    escitalopram (LEXAPRO) 20 MG tablet Take 1 tablet (20 mg) by mouth every morning. 90 tablet 3    lamoTRIgine (LAMICTAL) 100 MG tablet Take 2 tablets (200 mg) by mouth 2 times daily. 360 tablet 3    metoprolol succinate ER (TOPROL XL) 25 MG 24 hr tablet TAKE 1/2 TABLET BY MOUTH DAILY (Patient taking differently: 12.5 mg every morning. TAKE 1/2 TABLET BY MOUTH DAILY) 45 tablet 3    multivitamin (OCUVITE) TABS tablet Take 1 tablet by mouth daily. Please keep the appointment on 9/3/24 for further refills. 90 tablet 3    simvastatin (ZOCOR) 20 MG tablet Take 1 tablet (20 mg) by mouth at bedtime. 90 tablet 3    sodium bicarbonate 650 MG tablet Take 1 tablet (650 mg) by mouth 2 times daily. 180 tablet 3     tamsulosin (FLOMAX) 0.4 MG capsule Take 2 capsules (0.8 mg) by mouth daily. (Patient taking differently: Take 0.8 mg by mouth every evening.) 180 capsule 3       Allergies  No Known Allergies    Social History  Social History     Socioeconomic History    Marital status:      Spouse name: Not on file    Number of children: Not on file    Years of education: Not on file    Highest education level: Bachelor's degree (e.g., BA, AB, BS)   Occupational History    Not on file   Tobacco Use    Smoking status: Former     Types: Cigarettes     Passive exposure: Past    Smokeless tobacco: Never    Tobacco comments:     Quit smoking in 1977   Vaping Use    Vaping status: Never Used   Substance and Sexual Activity    Alcohol use: No     Comment: History of alcohol dependence, in remission for 20 years    Drug use: No    Sexual activity: Not Currently   Other Topics Concern    Not on file   Social History Narrative    Not on file     Social Drivers of Health     Financial Resource Strain: Low Risk  (9/3/2024)    Financial Resource Strain     Within the past 12 months, have you or your family members you live with been unable to get utilities (heat, electricity) when it was really needed?: No   Food Insecurity: Low Risk  (9/3/2024)    Food Insecurity     Within the past 12 months, did you worry that your food would run out before you got money to buy more?: No     Within the past 12 months, did the food you bought just not last and you didn t have money to get more?: No   Transportation Needs: Low Risk  (9/3/2024)    Transportation Needs     Within the past 12 months, has lack of transportation kept you from medical appointments, getting your medicines, non-medical meetings or appointments, work, or from getting things that you need?: No   Physical Activity: Unknown (9/3/2024)    Exercise Vital Sign     Days of Exercise per Week: 5 days     Minutes of Exercise per Session: Not on file   Stress: Stress Concern Present  "(9/3/2024)    [employer] - Occupational Stress Questionnaire     Feeling of Stress : Very much   Social Connections: Unknown (9/3/2024)    Social Connection and Isolation Panel [NHANES]     Frequency of Communication with Friends and Family: Not on file     Frequency of Social Gatherings with Friends and Family: Once a week     Attends Taoist Services: Not on file     Active Member of Clubs or Organizations: Not on file     Attends Club or Organization Meetings: Not on file     Marital Status: Not on file   Interpersonal Safety: Low Risk  (5/7/2025)    Interpersonal Safety     Do you feel physically and emotionally safe where you currently live?: Yes     Within the past 12 months, have you been hit, slapped, kicked or otherwise physically hurt by someone?: No     Within the past 12 months, have you been humiliated or emotionally abused in other ways by your partner or ex-partner?: No   Housing Stability: Low Risk  (9/3/2024)    Housing Stability     Do you have housing? : Yes     Are you worried about losing your housing?: No       Family History  Family History   Problem Relation Age of Onset    Glaucoma No family hx of     Macular Degeneration No family hx of     Anesthesia Reaction No family hx of     Deep Vein Thrombosis (DVT) No family hx of        Review of Systems  The complete review of systems is negative other than noted in the HPI or here.   Anesthesia Evaluation   Pt has had prior anesthetic.     No history of anesthetic complications       ROS/MED HX  ENT/Pulmonary:     (+)                tobacco use, Past use,                    (-) recent URI   Neurologic: Comment: \"Brain fog\" secondary to kidney disease.  Dementia has been ruled out.     (+)       seizures, last seizure: about 20 years ago,                        Cardiovascular:     (+) Dyslipidemia hypertension- -   -  - -                        dysrhythmias, a-fib,        Previous cardiac testing   Echo: Date: 2022 " Results:  Echo  2022  Interpretation Summary  Global and regional left ventricular function is normal with an EF of 60-65%.  Right ventricular function, chamber size, wall motion, and thickness are  normal.  Pulmonary artery systolic pressure is normal.  The inferior vena cava is normal.  No pericardial effusion is present.  No significant changes noted.    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      Hematologic: Comments: Has had iron infusions about a year ago for anemia    (+)      anemia,          Musculoskeletal:  - neg musculoskeletal ROS     GI/Hepatic:  - neg GI/hepatic ROS     Renal/Genitourinary: Comment: IgA nephropathy      nocturia    (+) renal disease, type: CRI, Pt does not require dialysis,     BPH,      Endo:  - neg endo ROS     Psychiatric/Substance Use:     (+) psychiatric history depression alcohol abuse      Infectious Disease:  - neg infectious disease ROS     Malignancy:  - neg malignancy ROS     Other:  - neg other ROS          Virtual visit -  No vitals were obtained    Physical Exam  Constitutional: Awake, alert, cooperative, no apparent distress, and appears stated age.  Eyes: Pupils equal  HENT: Normocephalic  Respiratory: non labored breathing   Neurologic: Awake, alert, oriented to name, place and time.   Neuropsychiatric: Calm, cooperative. Normal affect.      Prior Labs/Diagnostic Studies   All labs and imaging pertinent to the visit personally reviewed     EKG/ stress test - if available please see in ROS above       Component      Latest Ref Rng 6/23/2025  8:23 AM 6/25/2025  1:03 PM   Sodium      135 - 145 mmol/L 138     Potassium      3.4 - 5.3 mmol/L 4.9     Chloride      98 - 107 mmol/L 106      Carbon Dioxide (CO2)      22 - 29 mmol/L 19 (L)     Anion Gap      7 - 15 mmol/L 13     Glucose      70 - 99 mg/dL 93     Urea Nitrogen      8.0 - 23.0 mg/dL 53.6 (H)     Creatinine      0.67 - 1.17 mg/dL 5.66 (H)     GFR Estimate      >60 mL/min/1.73m2 9 (L)     Calcium      8.8 - 10.4 mg/dL 9.1     Albumin      3.5 - 5.2 g/dL 4.3     Phosphorus      2.5 - 4.5 mg/dL 4.7 (H)     WBC      4.0 - 11.0 10e3/uL 5.0     RBC Count      4.40 - 5.90 10e6/uL 2.99 (L)     Hemoglobin      13.3 - 17.7 g/dL 9.1 (L)  9.0 (L)    Hematocrit      40.0 - 53.0 % 28.1 (L)  28.0 (L)    MCV      78 - 100 fL 94  94    MCH      26.5 - 33.0 pg 30.4     MCHC      31.5 - 36.5 g/dL 32.4     RDW      10.0 - 15.0 % 13.8     Platelet Count      150 - 450 10e3/uL 117 (L)        Legend:  (L) Low  (H) High    The patient's records and results pertinent to the visit personally reviewed by this provider.     Outside records reviewed from: Care Everywhere      Assessment    Melo Dangelo is a 84 year old male seen as a PAC referral for risk assessment and optimization for anesthesia.    Plan/Recommendations  Pt will be optimized for the proposed procedure.  See below for details on the assessment, risk, and preoperative recommendations    NEUROLOGY  - History of Seizure    -Post Op delirium risk factors:  Age, High co-morbid index, and History of pre-existing cognitive dysfunction    ENT  - No current airway concerns.  Will need to be reassessed day of surgery.  Mallampati: Unable to assess  TM: Unable to assess    CARDIAC  - No history of CAD  - paroxysmal A-fib. Managed with Toprol XL. Following with cardiology.  Anticoagulation deferred due to renal disease.  ChadsVasc = 3.    - METS (Metabolic Equivalents)  Patient performs 4 or more METS exercise without symptoms             Total Score: 0      RCRI-Low risk: Class 2 0.9% complication rate             Total Score: 1    RCRI: Elevated Creatinine        PULMONARY    STEPHEN Medium Risk              "Total Score: 3    STEPHEN: Hypertension    STEPHEN: Over 50 ys old    STEPHEN: Male      - Denies asthma or inhaler use  - Tobacco History    History   Smoking Status    Former    Types: Cigarettes   Smokeless Tobacco    Never       GI  - denies GERD  PONV Medium Risk  Total Score: 2           1 AN PONV: Patient is not a current smoker    1 AN PONV: Intended Post Op Opioids        /RENAL  - CKD - procedure planned as above.     ENDOCRINE   - BMI: Estimated body mass index is 24.41 kg/m  as calculated from the following:    Height as of this encounter: 1.803 m (5' 11\").    Weight as of this encounter: 79.4 kg (175 lb).  Healthy Weight (BMI 18.5-24.9)  - No history of Diabetes Mellitus    HEME  VTE Low Risk 0.5%             Total Score: 3    VTE: Greater than 59 yrs old    VTE: Male      - No history of abnormal bleeding or antiplatelet use.  - Chronic anemia  Recommend perioperative use of blood conservation techniques intraoperatively and close monitoring for postoperative bleeding.      MSK  Patient is NOT Frail             Total Score: 0          PSYCH  - continue lexapro and buspar without interruption.   - alcohol dependence in remission x 20+ years.        Different anesthesia methods/types have been discussed with the patient, but they are aware that the final plan will be decided by the assigned anesthesia provider on the date of service.      The patient is optimized for their procedure. AVS with information on surgery time/arrival time, meds and NPO status given by nursing staff. No further diagnostic testing indicated.    Please refer to the physical examination documented by the anesthesiologist in the anesthesia record on the day of surgery.    Video-Visit Details    Type of service:  Video Visit    Provider received verbal consent for a Video Visit from the patient? Yes   Video Start Time: 0803  Video End Time:0814    Originating Location (pt. Location): Home    Distant Location (provider location):  " Off-site  Mode of Communication:  Video Conference via [employer]    27 minutes were spent on the date of the encounter performing chart review, history and exam, documentation and/or discussion with other providers about the issues documented above.    ELIZABETH Obrien CNP  Preoperative Assessment Center  [facility]  Clinic and Surgery Center  Phone: [phone]  Fax: [phone]

## 2025-07-01 NOTE — PROGRESS NOTES
Melo is a 84 year old who is being evaluated via a billable video visit.      How would you like to obtain your AVS? Ruth Acuna   Melo is a 84 year old, presenting for the following health issues:  Pre-Op Exam                 ROSANNE Cummings LPN

## 2025-07-01 NOTE — PROGRESS NOTES
Nephrology Clinic Visit 6/30/25    Assessment and Plan:    1. CKD5/ESRD w/proteinuria - Creat 5.6, eGFR of 9 ml/mn. 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 If there is no improvement on the repeat renal panel, given his BPH, will recheck a new renal US to r/o obstruction causing the rise in creat  - Please refer to Dr Barrow's note 1/19/23 for review of his previous treatment for IgA Nephropathy  - UA is consistent with most recent studies with trace blood and actually no protein  - Blood pressure well controlled  - BPH is borderline controlled despite Flomax. Feels he is drinking fluids normally  - On statin for CV risk reduction  - Melo is ready to begin PD  - He is scheduled for PD cath placement 7/3/25  - Has been admitted to [facility] for PD training  - Melo is quite familiar with dialysis. Wife was on PD and he managed. She was on short term HD initially before switching to PD. Daughter Jennie will also train with Melo as backup.      2. Volume status - No edema/dyspnea. B/ps controlled. Only voiding issue is nocturia. Albumin 4.3. Weight stable at 81.1 kg.    - No indication for diuretic therapy at this time   - EDW 81 kg    3. CV/HTN - Well controlled w/o edema. Home readings 130's/. Clinic readings higher today but generally in the 130's/. Echo 7/22 with EF 60%, normal IVC  Current regimen: Metoprolol XL 12.5 mg every day    - No changes required    4. BPH - Still with nocturia on Flomax    5. Electrolytes - No acute concerns. K 4.9 Na 138    6. Acid base - No acute concerns. Bicarb 19   - On Bicarb 650 mg bid which can be discontinued once he begins PD    7. BMD - Ca 9.1 Phos 4.7 albumin 4.3   Vit D 42  ( 2/25)   Continue Ca/D one tab daily     8. Anemia - Hgb 9.0 and  drifting down with decline in renal function   Iron studies 6/25: Ferritin 686, Fe 88, IS 35   Etiology for his anemia is likely chronic dz/Epo  deficiency   Will begin RAMA in OP dialysis    9. Seizure d/o - Stable on Lamictal. Na prob with hyponatremia. Na 138    10. Depression - Well controlled on Lexapro and Buspar.     11. Memory changes - Neurology evaluation indicative of MCI, not Alzheimer's Dementia   - Refer to Neurology note 4/28/25    12. Disposition - No further general nephrology follow up required. Will be transferring to OP Dialysis Nephrology team     Assessment and plan was discussed with patient and he voiced his understanding and agreement.    Reason for Visit:  CKD5 follow up    HPI:  Mr Dangelo is an 85 yo male with CKD5, pAfib, Anemia, BPH, Depression, COVID 1/23, IgA nephropathy, present today for dialysis planning.   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    Home b/ps 130's/  He denies CP/dyspnea  No GI concerns  He notes urinary volume is low but he has frequency   Appetite is ok  Energy level is fair as long as he sprinkles in a few naps throughout the day  No edema unless he indulges in a high Na meal    Chronic Health Problems:    CKD5  pAfib  Anemia  BPH  Depression  COVID 19 x 2  IgA nephropathy  Alcohol use d/o ( in remission)  Bilateral hearing loss  Seizure disorder ( last seizure 2008)    Family Hx:   Family History   Problem Relation Age of Onset    Glaucoma No family hx of     Macular Degeneration No family hx of     Anesthesia Reaction No family hx of     Deep Vein Thrombosis (DVT) No family hx of      Personal Hx:   , lives independently in a condo, 3 daughters live nearby, NS, ETOH none.     Allergies:  No Known Allergies    Medications:  Current Outpatient Medications   Medication Sig Dispense Refill    acetaminophen (TYLENOL) 325 MG tablet Take 325-650 mg by mouth every 6 hours as needed for mild pain      busPIRone (BUSPAR) 5 MG tablet Take 1 tablet (5 mg) by  "mouth 2 times daily. Has started 180 tablet 3    calcium carbonate-vitamin D (OSCAL) 500-5 MG-MCG tablet Take 1 tablet by mouth daily. 90 tablet 3    Cholecalciferol (D3-1000) 25 MCG (1000 UT) CAPS Take 1 capsule by mouth daily      cyanocobalamin (VITAMIN B-12) 100 MCG tablet Take 1 tablet (100 mcg) by mouth daily. Take 1,000 mcg by mouth every morning - 90 tablet 3    escitalopram (LEXAPRO) 20 MG tablet Take 1 tablet (20 mg) by mouth every morning. 90 tablet 3    lamoTRIgine (LAMICTAL) 100 MG tablet Take 2 tablets (200 mg) by mouth 2 times daily. 360 tablet 3    metoprolol succinate ER (TOPROL XL) 25 MG 24 hr tablet TAKE 1/2 TABLET BY MOUTH DAILY (Patient taking differently: 12.5 mg every morning. TAKE 1/2 TABLET BY MOUTH DAILY) 45 tablet 3    multivitamin (OCUVITE) TABS tablet Take 1 tablet by mouth daily. Please keep the appointment on 9/3/24 for further refills. 90 tablet 3    simvastatin (ZOCOR) 20 MG tablet Take 1 tablet (20 mg) by mouth at bedtime. 90 tablet 3    sodium bicarbonate 650 MG tablet Take 1 tablet (650 mg) by mouth 2 times daily. 180 tablet 3    tamsulosin (FLOMAX) 0.4 MG capsule Take 2 capsules (0.8 mg) by mouth daily. (Patient taking differently: Take 0.8 mg by mouth every evening.) 180 capsule 3     No current facility-administered medications for this visit.      Vitals:  BP (!) 158/71 (BP Location: Right arm, Patient Position: Sitting, Cuff Size: Adult Regular)   Pulse 57   Temp 98.4  F (36.9  C) (Oral)   Ht 1.803 m (5' 11\")   Wt 79.5 kg (175 lb 3.2 oz)   SpO2 95%   BMI 24.44 kg/m      Exam:  GEN: Pleasant male in NAD. Daughter Jennie present  CARDIAC: RRR  LUNGS: CTA  ABDOMEN: Soft NT  EXT: No edema  NEURO: A/O  PSYCH: Calm, engaging    LABS:   CMP  Recent Labs   Lab Test 06/23/25  0823 06/09/25  0729 03/05/25  0757 02/13/25  1353 10/15/21  1023 11/04/20  0703 11/02/20  0900 11/01/20  0816 10/31/20  0838    137 140 139   < > 138 139 137 138   POTASSIUM 4.9 4.9 4.5 4.7   < > 4.2 " 4.1 4.7 4.1   CHLORIDE 106 104 106 106   < > 105 108* 107 106   CO2 19* 20* 22 21*   < > 26 24 20* 23   ANIONGAP 13 13 12 12   < > 7 7 10 9   GLC 93 100* 96 95   < > 75 76 125 86   BUN 53.6* 58.0* 49.5* 48.6*   < > 22 26 21 19   CR 5.66* 5.61* 4.62* 4.40*   < > 1.25 1.24 1.01 1.13   GFRESTIMATED 9* 9* 12* 13*   < > 56* 56* >60 >60   GFRESTBLACK  --   --   --   --   --  >60 >60 >60 >60   GIANNI 9.1 9.2 9.1 9.3   < > 8.5 8.7 8.8 8.5    < > = values in this interval not displayed.     Recent Labs   Lab Test 04/17/24  0652 04/16/24  1028 01/13/23  0934 07/29/22  1338   BILITOTAL 0.3 0.4 0.2 0.3   ALKPHOS 53 61 79 79   ALT 8 11 14 18   AST 20 18 21 20     CBC  Recent Labs   Lab Test 06/25/25  1303 06/23/25  0823 06/09/25  0729 03/05/25  0757 02/13/25  1353   HGB 9.0* 9.1* 9.4* 9.6* 10.0*   WBC  --  5.0 5.3 5.4 5.2   RBC  --  2.99* 3.10* 3.16* 3.27*   HCT 28.0* 28.1* 29.3* 30.0* 31.2*   MCV 94 94 95 95 95   MCH  --  30.4 30.3 30.4 30.6   MCHC  --  32.4 32.1 32.0 32.1   RDW  --  13.8 13.7 13.5 13.4   PLT  --  117* 150 126* 144*     URINE STUDIES  Recent Labs   Lab Test 06/23/25  0826 06/09/25  0732 03/05/25  0813 02/13/25  1402   COLOR Straw Straw Light Yellow Yellow   APPEARANCE Clear Clear Clear Clear   URINEGLC Negative 30* Negative Negative   URINEBILI Negative Negative Negative Negative   URINEKETONE Negative Negative Negative Negative   SG 1.006 1.011 1.015 1.020   UBLD Trace* Small* Trace* Small*   URINEPH 6.0 6.0 6.0 5.5   PROTEIN Negative Negative 20* Trace*   UROBILINOGEN  --   --   --  0.2   NITRITE Negative Negative Negative Negative   LEUKEST Negative Negative Negative Negative   RBCU <1 1 <1 2-5*   WBCU <1 <1 <1 0-5     Recent Labs   Lab Test 06/02/22  0956 04/25/22  1529 02/17/22  1041 01/11/22  1011   UTPG 0.31* 0.40* 0.37* 0.48*     PTH  Recent Labs   Lab Test 02/13/25  1353 04/17/24  0652 12/11/23  0930   PTHI 154* 101* 95*     IRON STUDIES  Recent Labs   Lab Test 06/09/25  0729 03/05/25  0757 02/13/25  1353    IRON 88 64 99    254 264   IRONSAT 35 25 38   ALBERT 686* 603* 591*       Gini Washington NP  [facility]PHROLOG[facility] [place]

## 2025-07-01 NOTE — PATIENT INSTRUCTIONS
Preparing for Your Surgery      Name:  Melo Dangelo   MRN:  [id number]   :  1940   Today's Date:  2025     The [place] [employer] [address] Construction Project                                Timeline 2025 -2025    This project will affect travel to the [facility] and [facility], as well as the [facility] and Surgery Center.      Please check the [facility] [address] project website for the most up to date information and give yourself additional time to reach your destination.        Arriving for surgery:  Surgery date:  7/3/25  Arrival time:  9:00 am  Surgery time: 11:00 am    Please come to:     Please come to:       [facility]    [address] [place]  [address]     The [facility] ([facility] [place] Patient/Visitor Ramp is at [address]. Patients and visitors who self-park will receive the reduced hospital parking rate. If the Patient /Visitor Ramp is full, please follow the signs to the trend.ly car park located at the [facility] entrance.      [employer] parking is available (24 hours/ 7 days a week)      Discounted parking pass options are available for patients and visitors. They can be purchased at the [employer] desk at the [facility] entrance.     -    Stop at the security desk and they will direct surgery patients to the Surgery Check in and Family Lounge. [phone]        - If you need directions, a wheelchair or an escort please stop at the Information/security desk in the lobby.     What can I eat or drink?  -  You may eat and drink normally up to 8 hours prior to arrival time. (Until 1:00 am)  -  You may have clear liquids until 2 hours prior to arrival time. (Until 7:00 am)    Examples of clear liquids:  Water  Clear broth  Juices (apple, white grape, white cranberry  and cider) without pulp  Noncarbonated, powder based  beverages  (lemonade and Giovani-Aid)  Sodas (Sprite, 7-Up, ginger ale and seltzer)  Coffee or tea (without milk or cream)  Gatorade    -  No Alcohol or cannabis products for at least 24 hours before surgery.     Which medicines can I take?    Hold Aspirin for 7 days before surgery.   Hold Multivitamins for 7 days before surgery.  Hold Supplements for 7 days before surgery.  Hold Ibuprofen (Advil, Motrin) for 1 day(s) before surgery--unless otherwise directed by surgeon.  Hold Naproxen (Aleve) for 4 days before surgery.    -  DO NOT take these medications the day of surgery:  Calcium  Vitamin D  Vitamin B    -  PLEASE TAKE these medications the day of surgery:  Tylenol if needed  Buspirone (Buspar)  Escitalopram (Lexapro)  Lamotrigine (Lamictal)  Metoprolol  Sodium bicarbonate  Tamsulosin (Flomax)  Take Simvastatin (Zocor) the night before surgery as scheduled    How do I prepare myself?  - Please take 2 showers (one the night prior to surgery and one the morning of surgery) using Scrubcare or Hibiclens soap.    Use this soap only from the neck to your toes. Avoid genital area      Leave the soap on your skin for one minute--then rinse thoroughly.      You may use your own shampoo and conditioner. No other hair products.   - Please remove all jewelry and body piercings.  - No lotions, deodorants or fragrance.  - No makeup or fingernail polish.   - Bring your ID and insurance card.    -For patients being admitted to the [facility]  Family members are to take the patient belongings with them and place them in the lockers provided in the Family Lounge.  Please limit the items you bring to 1 bag as the lockers are small.      -If you use a CPAP machine, please bring the CPAP machine, tubing, and mask to hospital.    -If you have a Deep Brain Stimulator, Spinal Cord Stimulator, or any Neuro Stimulator device---you must bring the remote control to the hospital.      ALL PATIENTS GOING HOME THE SAME DAY OF SURGERY ARE  REQUIRED TO HAVE A RESPONSIBLE ADULT TO DRIVE AND BE IN ATTENDANCE WITH THEM FOR 24 HOURS FOLLOWING SURGERY.    Covid testing policy as of 12/06/2022  Your surgeon will notify and schedule you for a COVID test if one is needed before surgery--please direct any questions or COVID symptoms to your surgeon      Questions or Concerns:    - For any questions regarding the day of surgery or your hospital stay, please contact the Pre Admission Nursing Office at [phone].       - If you have health changes between today and your surgery, please call your surgeon.       - For questions after surgery, please call your surgeons office.           Current Visitor Guidelines    2 adult visitors for adult patients in the pre op area    If additional visitors come (beyond a patient care attendant or a group home caregiver), the additional visitors will be asked to wait in the main lobby of the hospital    Visiting hours: 8 a.m. to 8:30 p.m.    Patients confirmed or suspected to have symptoms of COVID 19 or flu:     No visitors allowed for adult patients.   Children (under age 18) can have 1 named visitor.     People who are sick or showing symptoms of COVID 19 or flu:    Are not allowed to visit patients--we can only make exceptions in special situations.       Please follow these guidelines for your visit:          Please maintain social distance          Masking is optional--however at times you may be asked to wear a mask for the safety of yourself and others     Clean your hands with alcohol hand . Do this when you arrive at and leave the building and patient room,    And again after you touch your mask or anything in the room.     Go directly to and from the room you are visiting.     Stay in the patient s room during your visit. Limit going to other places in the hospital as much as possible     Leave bags and jackets at home or in the car.     For everyone s health, please don t come and go during your visit.  That includes for smoking   during your visit.

## 2025-07-02 ENCOUNTER — PATIENT OUTREACH (OUTPATIENT)
Dept: CARE COORDINATION | Facility: CLINIC | Age: 85
End: 2025-07-02
Payer: MEDICARE

## 2025-07-03 ENCOUNTER — PATIENT OUTREACH (OUTPATIENT)
Dept: NEPHROLOGY | Facility: CLINIC | Age: 85
End: 2025-07-03

## 2025-07-03 ENCOUNTER — ANESTHESIA (OUTPATIENT)
Dept: SURGERY | Facility: CLINIC | Age: 85
End: 2025-07-03
Payer: MEDICARE

## 2025-07-03 ENCOUNTER — HOSPITAL ENCOUNTER (OUTPATIENT)
Facility: CLINIC | Age: 85
Discharge: HOME | End: 2025-07-03
Attending: SURGERY | Admitting: SURGERY
Payer: MEDICARE

## 2025-07-03 VITALS
HEIGHT: 71 IN | OXYGEN SATURATION: 92 % | WEIGHT: 175.49 LBS | RESPIRATION RATE: 13 BRPM | TEMPERATURE: 97.6 F | BODY MASS INDEX: 24.57 KG/M2 | DIASTOLIC BLOOD PRESSURE: 76 MMHG | HEART RATE: 55 BPM | SYSTOLIC BLOOD PRESSURE: 148 MMHG

## 2025-07-03 DIAGNOSIS — N18.4 CKD (CHRONIC KIDNEY DISEASE) STAGE 4, GFR 15-29 ML/MIN (H): Primary | ICD-10-CM

## 2025-07-03 LAB
ABO + RH BLD: NORMAL
BLD GP AB SCN SERPL QL: NEGATIVE
SPECIMEN EXP DATE BLD: NORMAL

## 2025-07-03 PROCEDURE — 258N000003 HC RX IP 258 OP 636: Performed by: NURSE ANESTHETIST, CERTIFIED REGISTERED

## 2025-07-03 PROCEDURE — 250N000009 HC RX 250: Performed by: NURSE ANESTHETIST, CERTIFIED REGISTERED

## 2025-07-03 PROCEDURE — 49324 LAP INSERT TUNNEL IP CATH: CPT | Performed by: SURGERY

## 2025-07-03 PROCEDURE — C1752 CATH,HEMODIALYSIS,SHORT-TERM: HCPCS | Performed by: SURGERY

## 2025-07-03 PROCEDURE — 710N000012 HC RECOVERY PHASE 2, PER MINUTE: Performed by: SURGERY

## 2025-07-03 PROCEDURE — 250N000025 HC SEVOFLURANE, PER MIN: Performed by: SURGERY

## 2025-07-03 PROCEDURE — 250N000011 HC RX IP 250 OP 636

## 2025-07-03 PROCEDURE — 360N000077 HC SURGERY LEVEL 4, PER MIN: Performed by: SURGERY

## 2025-07-03 PROCEDURE — 86850 RBC ANTIBODY SCREEN: CPT | Performed by: STUDENT IN AN ORGANIZED HEALTH CARE EDUCATION/TRAINING PROGRAM

## 2025-07-03 PROCEDURE — 999N000141 HC STATISTIC PRE-PROCEDURE NURSING ASSESSMENT: Performed by: SURGERY

## 2025-07-03 PROCEDURE — 36415 COLL VENOUS BLD VENIPUNCTURE: CPT | Performed by: STUDENT IN AN ORGANIZED HEALTH CARE EDUCATION/TRAINING PROGRAM

## 2025-07-03 PROCEDURE — 370N000017 HC ANESTHESIA TECHNICAL FEE, PER MIN: Performed by: SURGERY

## 2025-07-03 PROCEDURE — 710N000010 HC RECOVERY PHASE 1, LEVEL 2, PER MIN: Performed by: SURGERY

## 2025-07-03 PROCEDURE — 272N000001 HC OR GENERAL SUPPLY STERILE: Performed by: SURGERY

## 2025-07-03 PROCEDURE — 250N000013 HC RX MED GY IP 250 OP 250 PS 637

## 2025-07-03 PROCEDURE — 250N000011 HC RX IP 250 OP 636: Performed by: SURGERY

## 2025-07-03 PROCEDURE — 86900 BLOOD TYPING SEROLOGIC ABO: CPT | Performed by: STUDENT IN AN ORGANIZED HEALTH CARE EDUCATION/TRAINING PROGRAM

## 2025-07-03 PROCEDURE — 250N000009 HC RX 250: Performed by: SURGERY

## 2025-07-03 PROCEDURE — 250N000011 HC RX IP 250 OP 636: Performed by: NURSE ANESTHETIST, CERTIFIED REGISTERED

## 2025-07-03 DEVICE — IMPLANTABLE DEVICE
Type: IMPLANTABLE DEVICE | Site: ABDOMEN | Status: FUNCTIONAL
Brand: FLEX-NECK®

## 2025-07-03 RX ORDER — DEXAMETHASONE SODIUM PHOSPHATE 4 MG/ML
4 INJECTION, SOLUTION INTRA-ARTICULAR; INTRALESIONAL; INTRAMUSCULAR; INTRAVENOUS; SOFT TISSUE
Status: DISCONTINUED | OUTPATIENT
Start: 2025-07-03 | End: 2025-07-03 | Stop reason: HOSPADM

## 2025-07-03 RX ORDER — NALOXONE HYDROCHLORIDE 0.4 MG/ML
0.1 INJECTION, SOLUTION INTRAMUSCULAR; INTRAVENOUS; SUBCUTANEOUS
Status: DISCONTINUED | OUTPATIENT
Start: 2025-07-03 | End: 2025-07-03 | Stop reason: HOSPADM

## 2025-07-03 RX ORDER — ACETAMINOPHEN 325 MG/1
975 TABLET ORAL ONCE
Status: COMPLETED | OUTPATIENT
Start: 2025-07-03 | End: 2025-07-03

## 2025-07-03 RX ORDER — ONDANSETRON 4 MG/1
4 TABLET, ORALLY DISINTEGRATING ORAL EVERY 30 MIN PRN
Status: DISCONTINUED | OUTPATIENT
Start: 2025-07-03 | End: 2025-07-03 | Stop reason: HOSPADM

## 2025-07-03 RX ORDER — LIDOCAINE HYDROCHLORIDE 20 MG/ML
INJECTION, SOLUTION INFILTRATION; PERINEURAL PRN
Status: DISCONTINUED | OUTPATIENT
Start: 2025-07-03 | End: 2025-07-03

## 2025-07-03 RX ORDER — PROPOFOL 10 MG/ML
INJECTION, EMULSION INTRAVENOUS PRN
Status: DISCONTINUED | OUTPATIENT
Start: 2025-07-03 | End: 2025-07-03

## 2025-07-03 RX ORDER — FENTANYL CITRATE 50 UG/ML
INJECTION, SOLUTION INTRAMUSCULAR; INTRAVENOUS PRN
Status: DISCONTINUED | OUTPATIENT
Start: 2025-07-03 | End: 2025-07-03

## 2025-07-03 RX ORDER — CEFAZOLIN SODIUM/WATER 2 G/20 ML
2 SYRINGE (ML) INTRAVENOUS
Status: COMPLETED | OUTPATIENT
Start: 2025-07-03 | End: 2025-07-03

## 2025-07-03 RX ORDER — OXYCODONE HYDROCHLORIDE 5 MG/1
5 TABLET ORAL EVERY 6 HOURS PRN
Qty: 12 TABLET | Refills: 0 | Status: SHIPPED | OUTPATIENT
Start: 2025-07-03 | End: 2025-07-06

## 2025-07-03 RX ORDER — ACETAMINOPHEN 325 MG/1
975 TABLET ORAL ONCE
OUTPATIENT
Start: 2025-07-03 | End: 2025-07-03

## 2025-07-03 RX ORDER — DIMENHYDRINATE 50 MG/ML
25 INJECTION, SOLUTION INTRAMUSCULAR; INTRAVENOUS
Status: DISCONTINUED | OUTPATIENT
Start: 2025-07-03 | End: 2025-07-03 | Stop reason: HOSPADM

## 2025-07-03 RX ORDER — FENTANYL CITRATE 50 UG/ML
50 INJECTION, SOLUTION INTRAMUSCULAR; INTRAVENOUS EVERY 5 MIN PRN
Status: DISCONTINUED | OUTPATIENT
Start: 2025-07-03 | End: 2025-07-03 | Stop reason: HOSPADM

## 2025-07-03 RX ORDER — SODIUM CHLORIDE, SODIUM LACTATE, POTASSIUM CHLORIDE, CALCIUM CHLORIDE 600; 310; 30; 20 MG/100ML; MG/100ML; MG/100ML; MG/100ML
INJECTION, SOLUTION INTRAVENOUS CONTINUOUS
Status: DISCONTINUED | OUTPATIENT
Start: 2025-07-03 | End: 2025-07-03 | Stop reason: HOSPADM

## 2025-07-03 RX ORDER — DEXAMETHASONE SODIUM PHOSPHATE 4 MG/ML
INJECTION, SOLUTION INTRA-ARTICULAR; INTRALESIONAL; INTRAMUSCULAR; INTRAVENOUS; SOFT TISSUE PRN
Status: DISCONTINUED | OUTPATIENT
Start: 2025-07-03 | End: 2025-07-03

## 2025-07-03 RX ORDER — NALOXONE HYDROCHLORIDE 0.4 MG/ML
0.1 INJECTION, SOLUTION INTRAMUSCULAR; INTRAVENOUS; SUBCUTANEOUS
OUTPATIENT
Start: 2025-07-03

## 2025-07-03 RX ORDER — ONDANSETRON 2 MG/ML
INJECTION INTRAMUSCULAR; INTRAVENOUS PRN
Status: DISCONTINUED | OUTPATIENT
Start: 2025-07-03 | End: 2025-07-03

## 2025-07-03 RX ORDER — HYDROMORPHONE HCL IN WATER/PF 6 MG/30 ML
0.4 PATIENT CONTROLLED ANALGESIA SYRINGE INTRAVENOUS EVERY 5 MIN PRN
Status: DISCONTINUED | OUTPATIENT
Start: 2025-07-03 | End: 2025-07-03 | Stop reason: HOSPADM

## 2025-07-03 RX ORDER — HYDROXYZINE HYDROCHLORIDE 10 MG/1
10 TABLET, FILM COATED ORAL EVERY 6 HOURS PRN
Status: DISCONTINUED | OUTPATIENT
Start: 2025-07-03 | End: 2025-07-03 | Stop reason: HOSPADM

## 2025-07-03 RX ORDER — POLYETHYLENE GLYCOL 3350 17 G/17G
1 POWDER, FOR SOLUTION ORAL DAILY
Qty: 6 PACKET | Refills: 0 | Status: SHIPPED | OUTPATIENT
Start: 2025-07-03

## 2025-07-03 RX ORDER — ONDANSETRON 2 MG/ML
4 INJECTION INTRAMUSCULAR; INTRAVENOUS EVERY 30 MIN PRN
OUTPATIENT
Start: 2025-07-03

## 2025-07-03 RX ORDER — SENNOSIDES 8.6 MG
1 TABLET ORAL DAILY
Qty: 30 TABLET | Refills: 0 | Status: SHIPPED | OUTPATIENT
Start: 2025-07-03

## 2025-07-03 RX ORDER — HALOPERIDOL 5 MG/ML
1 INJECTION INTRAMUSCULAR
Status: DISCONTINUED | OUTPATIENT
Start: 2025-07-03 | End: 2025-07-03 | Stop reason: HOSPADM

## 2025-07-03 RX ORDER — ONDANSETRON 4 MG/1
4 TABLET, ORALLY DISINTEGRATING ORAL EVERY 30 MIN PRN
OUTPATIENT
Start: 2025-07-03

## 2025-07-03 RX ORDER — FENTANYL CITRATE 50 UG/ML
25 INJECTION, SOLUTION INTRAMUSCULAR; INTRAVENOUS
Refills: 0 | OUTPATIENT
Start: 2025-07-03

## 2025-07-03 RX ORDER — DEXAMETHASONE SODIUM PHOSPHATE 4 MG/ML
4 INJECTION, SOLUTION INTRA-ARTICULAR; INTRALESIONAL; INTRAMUSCULAR; INTRAVENOUS; SOFT TISSUE
OUTPATIENT
Start: 2025-07-03

## 2025-07-03 RX ORDER — FENTANYL CITRATE 50 UG/ML
25 INJECTION, SOLUTION INTRAMUSCULAR; INTRAVENOUS EVERY 5 MIN PRN
Status: DISCONTINUED | OUTPATIENT
Start: 2025-07-03 | End: 2025-07-03 | Stop reason: HOSPADM

## 2025-07-03 RX ORDER — SODIUM CHLORIDE, SODIUM LACTATE, POTASSIUM CHLORIDE, CALCIUM CHLORIDE 600; 310; 30; 20 MG/100ML; MG/100ML; MG/100ML; MG/100ML
INJECTION, SOLUTION INTRAVENOUS CONTINUOUS PRN
Status: DISCONTINUED | OUTPATIENT
Start: 2025-07-03 | End: 2025-07-03

## 2025-07-03 RX ORDER — HYDROMORPHONE HCL IN WATER/PF 6 MG/30 ML
0.2 PATIENT CONTROLLED ANALGESIA SYRINGE INTRAVENOUS EVERY 5 MIN PRN
Status: DISCONTINUED | OUTPATIENT
Start: 2025-07-03 | End: 2025-07-03 | Stop reason: HOSPADM

## 2025-07-03 RX ORDER — ONDANSETRON 2 MG/ML
4 INJECTION INTRAMUSCULAR; INTRAVENOUS EVERY 30 MIN PRN
Status: DISCONTINUED | OUTPATIENT
Start: 2025-07-03 | End: 2025-07-03 | Stop reason: HOSPADM

## 2025-07-03 RX ORDER — CEFAZOLIN SODIUM/WATER 2 G/20 ML
2 SYRINGE (ML) INTRAVENOUS SEE ADMIN INSTRUCTIONS
Status: DISCONTINUED | OUTPATIENT
Start: 2025-07-03 | End: 2025-07-03 | Stop reason: HOSPADM

## 2025-07-03 RX ORDER — DOCUSATE SODIUM 100 MG/1
100 CAPSULE, LIQUID FILLED ORAL 2 TIMES DAILY
Qty: 30 CAPSULE | Refills: 0 | Status: SHIPPED | OUTPATIENT
Start: 2025-07-03

## 2025-07-03 RX ORDER — OXYCODONE HYDROCHLORIDE 10 MG/1
10 TABLET ORAL
Refills: 0 | OUTPATIENT
Start: 2025-07-03

## 2025-07-03 RX ORDER — OXYCODONE HYDROCHLORIDE 5 MG/1
5 TABLET ORAL
Status: COMPLETED | OUTPATIENT
Start: 2025-07-03 | End: 2025-07-03

## 2025-07-03 RX ADMIN — Medication 50 MG: at 11:38

## 2025-07-03 RX ADMIN — Medication 100 MG: at 13:10

## 2025-07-03 RX ADMIN — Medication 2 G: at 11:37

## 2025-07-03 RX ADMIN — Medication 100 MG: at 13:19

## 2025-07-03 RX ADMIN — Medication 100 MG: at 13:13

## 2025-07-03 RX ADMIN — DEXAMETHASONE SODIUM PHOSPHATE 8 MG: 4 INJECTION, SOLUTION INTRAMUSCULAR; INTRAVENOUS at 12:09

## 2025-07-03 RX ADMIN — PHENYLEPHRINE HYDROCHLORIDE 100 MCG: 10 INJECTION INTRAVENOUS at 12:12

## 2025-07-03 RX ADMIN — FENTANYL CITRATE 100 MCG: 50 INJECTION INTRAMUSCULAR; INTRAVENOUS at 11:35

## 2025-07-03 RX ADMIN — SODIUM CHLORIDE, SODIUM LACTATE, POTASSIUM CHLORIDE, AND CALCIUM CHLORIDE: .6; .31; .03; .02 INJECTION, SOLUTION INTRAVENOUS at 11:22

## 2025-07-03 RX ADMIN — LIDOCAINE HYDROCHLORIDE 80 MG: 20 INJECTION, SOLUTION INFILTRATION; PERINEURAL at 11:35

## 2025-07-03 RX ADMIN — ACETAMINOPHEN 975 MG: 325 TABLET ORAL at 14:37

## 2025-07-03 RX ADMIN — FENTANYL CITRATE 25 MCG: 50 INJECTION INTRAMUSCULAR; INTRAVENOUS at 12:25

## 2025-07-03 RX ADMIN — OXYCODONE HYDROCHLORIDE 5 MG: 5 TABLET ORAL at 14:38

## 2025-07-03 RX ADMIN — ONDANSETRON 4 MG: 2 INJECTION INTRAMUSCULAR; INTRAVENOUS at 13:05

## 2025-07-03 RX ADMIN — Medication 100 MG: at 13:16

## 2025-07-03 RX ADMIN — PHENYLEPHRINE HYDROCHLORIDE 50 MCG: 10 INJECTION INTRAVENOUS at 12:03

## 2025-07-03 RX ADMIN — PROPOFOL 120 MG: 10 INJECTION, EMULSION INTRAVENOUS at 11:37

## 2025-07-03 RX ADMIN — FENTANYL CITRATE 25 MCG: 50 INJECTION, SOLUTION INTRAMUSCULAR; INTRAVENOUS at 14:35

## 2025-07-03 RX ADMIN — FENTANYL CITRATE 25 MCG: 50 INJECTION, SOLUTION INTRAMUSCULAR; INTRAVENOUS at 14:19

## 2025-07-03 RX ADMIN — FENTANYL CITRATE 50 MCG: 50 INJECTION, SOLUTION INTRAMUSCULAR; INTRAVENOUS at 13:57

## 2025-07-03 ASSESSMENT — ENCOUNTER SYMPTOMS
SEIZURES: 1
DYSRHYTHMIAS: 1
ORTHOPNEA: 0

## 2025-07-03 ASSESSMENT — ACTIVITIES OF DAILY LIVING (ADL)
ADLS_ACUITY_SCORE: 47

## 2025-07-03 ASSESSMENT — LIFESTYLE VARIABLES: TOBACCO_USE: 1

## 2025-07-03 NOTE — ANESTHESIA PROCEDURE NOTES
Airway       Patient location during procedure: OR       Procedure Start/Stop Times: 7/3/2025 11:41 AM  Staff -        CRNA: Valerie Moss APRN CRNA       Performed By: CRNA  Consent for Airway        Urgency: elective  Indications and Patient Condition       Indications for airway management: kvng-procedural       Induction type:intravenous       Mask difficulty assessment: 2 - vent by mask + OA or adjuvant +/- NMBA    Final Airway Details       Final airway type: endotracheal airway       Successful airway: ETT - single and Oral  Endotracheal Airway Details        ETT size (mm): 7.5       Cuffed: yes       Cuff volume (mL): 8       Successful intubation technique: direct laryngoscopy       DL Blade Type: MAC 3       Grade View of Cords: 1       Adjucts: stylet       Position: Right       Measured from: lips       Secured at (cm): 22       Bite block used: None    Post intubation assessment        Placement verified by: capnometry, equal breath sounds and chest rise        Number of attempts at approach: 1       Number of other approaches attempted: 0       Secured with: tape       Ease of procedure: easy       Dentition: Intact and Unchanged    Medication(s) Administered   Medication Administration Time: 7/3/2025 11:41 AM

## 2025-07-03 NOTE — ANESTHESIA PROCEDURE NOTES
Airway       Patient location during procedure: OR       Procedure Start/Stop Times: 7/3/2025 11:41 AM  Staff -        CRNA: Valerie Moss APRN CRNA       Performed By: CRNA  Consent for Airway        Urgency: elective  Indications and Patient Condition       Indications for airway management: kvng-procedural       Induction type:intravenous       Mask difficulty assessment: 1 - vent by mask    Final Airway Details       Final airway type: endotracheal airway       Successful airway: ETT - single and Oral  Endotracheal Airway Details        ETT size (mm): 7.0       Cuffed: yes       Cuff volume (mL): 8       Successful intubation technique: direct laryngoscopy       DL Blade Type: MAC 3       Grade View of Cords: 1       Adjucts: stylet       Position: Right       Measured from: lips       Secured at (cm): 21       Bite block used: None    Post intubation assessment        Placement verified by: capnometry, equal breath sounds and chest rise        Number of attempts at approach: 1       Number of other approaches attempted: 0       Secured with: tape       Ease of procedure: easy       Dentition: Intact and Unchanged    Medication(s) Administered   Medication Administration Time: 7/3/2025 11:41 AM

## 2025-07-03 NOTE — ANESTHESIA PREPROCEDURE EVALUATION
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     Paroxysmal atrial fibrillation (H)     PVD (posterior vitreous detachment)     Seizure disorder (H)     last seizure       Past Surgical History:   Procedure Laterality Date    PHACOEMULSIFICATION CLEAR CORNEA WITH STANDARD INTRAOCULAR LENS IMPLANT Right 2022    Procedure: RIGHT EYE PHACOEMULSIFICATION, CATARACT, WITH INTRAOCULAR LENS IMPLANT COMPLEX CASE ;  Surgeon: Best Padilla MD;  Location: [facility] OR    PHACOEMULSIFICATION CLEAR CORNEA WITH STANDARD INTRAOCULAR LENS IMPLANT Left 2022    Procedure: LEFT EYE PHACOEMULSIFICATION, CATARACT, WITH INTRAOCULAR LENS IMPLANT;  Surgeon: Best Padilla MD;  Location: [facility] OR      No Known Allergies   Social History     Tobacco Use    Smoking status: Former     Types: Cigarettes     Passive exposure: Past    Smokeless tobacco: Never    Tobacco comments:     Quit smoking in    Substance Use Topics    Alcohol use: No     Comment: History of alcohol dependence, in remission for 20 years      Wt Readings from Last 1 Encounters:   25 79.6 kg (175 lb 7.8 oz)        Anesthesia Evaluation   Pt has had prior anesthetic.     No history of anesthetic complications       ROS/MED HX  ENT/Pulmonary:     (+)                tobacco use, Past use,                    (-) recent URI   Neurologic: Comment: \"Brain fog\" " "secondary to kidney disease.  Dementia has been ruled out.     No seizure for \"30 years\"    (+)       seizures, last seizure: about 20 years ago,                     (-) no CVA   Cardiovascular:     (+) Dyslipidemia hypertension- -   -  - -                        dysrhythmias, a-fib,        Previous cardiac testing   Echo: Date: 2022 Results:  Echo  2022  Interpretation Summary  Global and regional left ventricular function is normal with an EF of 60-65%.  Right ventricular function, chamber size, wall motion, and thickness are  normal.  Pulmonary artery systolic pressure is normal.  The inferior vena cava is normal.  No pericardial effusion is present.  No significant changes noted.    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      Hematologic: Comments: Has had iron infusions about a year ago for anemia    (+)      anemia,          Musculoskeletal:  - neg musculoskeletal ROS     GI/Hepatic:  - neg GI/hepatic ROS     Renal/Genitourinary: Comment: IgA nephropathy      nocturia    (+) renal disease, type: CRI, Pt does not require dialysis,     BPH,      Endo:    (-) Type II DM   Psychiatric/Substance Use:     (+) psychiatric history depression alcohol abuse      Infectious Disease:    (-) Recent Fever   Malignancy:  - neg malignancy ROS     Other:  - neg other ROS            Physical Exam  Airway  Mallampati: II  TM distance: >3 FB  Mouth opening: >= 4 cm    Cardiovascular   Rhythm: irregular  Rate: normal rate     Dental   (+) Modest Abnormalities - crowns, retainers, 1 or 2 missing teeth      Pulmonary - normal exam      Neurological - normal exam  He appears awake, alert and oriented x3.    Other Findings " "      OUTSIDE LABS:  CBC:   Lab Results   Component Value Date    WBC 5.0 06/23/2025    WBC 5.3 06/09/2025    HGB 9.0 (L) 06/25/2025    HGB 9.1 (L) 06/23/2025    HCT 28.0 (L) 06/25/2025    HCT 28.1 (L) 06/23/2025     (L) 06/23/2025     06/09/2025     BMP:   Lab Results   Component Value Date     06/23/2025     06/09/2025    POTASSIUM 4.9 06/23/2025    POTASSIUM 4.9 06/09/2025    CHLORIDE 106 06/23/2025    CHLORIDE 104 06/09/2025    CO2 19 (L) 06/23/2025    CO2 20 (L) 06/09/2025    BUN 53.6 (H) 06/23/2025    BUN 58.0 (H) 06/09/2025    CR 5.66 (H) 06/23/2025    CR 5.61 (H) 06/09/2025    GLC 93 06/23/2025     (H) 06/09/2025     COAGS:   Lab Results   Component Value Date    PTT 30 10/27/2020    INR 1.04 01/20/2022    FIBR 514 (H) 11/02/2020     POC: No results found for: \"BGM\", \"HCG\", \"HCGS\"  HEPATIC:   Lab Results   Component Value Date    ALBUMIN 4.3 06/23/2025    PROTTOTAL 6.3 (L) 04/17/2024    ALT 8 04/17/2024    AST 20 04/17/2024    ALKPHOS 53 04/17/2024    BILITOTAL 0.3 04/17/2024     OTHER:   Lab Results   Component Value Date    LACT 0.6 04/16/2024    A1C 5.6 03/05/2025    GIANNI 9.1 06/23/2025    PHOS 4.7 (H) 06/23/2025    MAG 2.4 (H) 04/18/2024    LIPASE 97 10/27/2020    TSH 1.96 02/13/2025    CRP 4.1 (H) 11/02/2020    SED 30 (H) 10/27/2020       Anesthesia Plan    ASA Status:  3      NPO Status: Will be NPO Appropriate at ...   Anesthesia Type: General.  Airway: oral.  Induction: intravenous.  Maintenance: Balanced.   Techniques and Equipment:       - Monitoring Plan: standard ASA monitoring, train of four monitoring, processed EEG monitor     Consents    Anesthesia Plan(s) and associated risks, benefits, and realistic alternatives discussed. Questions answered and patient/representative(s) expressed understanding.     - Discussed:     - Discussed with:  Patient        - Pt is DNR/DNI Status: no DNR     Blood Consent:      - Discussed with: patient.     - Consented: consented to " blood products     Postoperative Care    Pain management: non-narcotic analgesics, plan for postoperative opioid use, multimodal analgesia.     Comments:    Other Comments: Needs T/S               Sg Sheldon MD    I have reviewed the pertinent notes and labs in the chart from the past 30 days and (re)examined the patient.  Any updates or changes from those notes are reflected in this note.    Clinically Significant Risk Factors Present on Admission

## 2025-07-03 NOTE — PROGRESS NOTES
Nephrology Note: Outpatient PD                        SITUATION/BACKROUND:   Patient is being treated for ESRD.      ASSESSMENT & PLAN:     Dialysis Facility: [facility]   Location: [facility]   Phone #: [phone]    Patient/caregiver Instructed:  - Bring the following to first treatment:  Insurance card/s, two forms of identification, all current medications/vitamin bottles.    - Bring to all treatments:  A pillow and blanket, a book, computer/tablet (all clinics have WiFI), or something else to do to help pass the time.    - He/she will no longer be followed by [facility] General Nephrology and should cancel any future appointments.    - Nephrologist will see patient once monthly at dialysis facility.    - Medications and medication refills currently managed by General Nephrology will now be managed by the nephrologist and nurses at the dialysis clinic.    Katerine Gonzalez RN

## 2025-07-03 NOTE — OR NURSING
Per surgical resident MIKE Dockery pt does not need surgical clipping; she was unable to complete order per order set.

## 2025-07-03 NOTE — PROGRESS NOTES
Patient has been admitted to [facility] PD program. The PD nurse attempted to reach out to him yesterday to get first flush and start date set up but was not able to get a hold of patient. Called today but patient was not able to confirm dates or time as he was headed in for PD surgery. PD nurse will reach out again after surgery and will update writer.   Dialysis History        Start End Type Center Comments      [facility].  Needs initial drsg change and flush scheduled once admitted to [facility].              Current Dialysis Center Information       [facility] (ESRD)       Phone: [phone] Fax: [phone]    Address:  [facility] [address], [address] [address]                              Katerine Gonzalez RN, BSN   Nephrology RN Care Coordinator   [facility]

## 2025-07-03 NOTE — ANESTHESIA POSTPROCEDURE EVALUATION
Patient: Melo Dangelo    Procedure: Procedure(s):  Laparascopic Peritoneal Catheter placement       Anesthesia Type:  General    Note:  Disposition: Outpatient   Postop Pain Control: Uneventful            Sign Out: Well controlled pain   PONV: No   Neuro/Psych: Uneventful            Sign Out: Acceptable/Baseline neuro status   Airway/Respiratory: Uneventful            Sign Out: Acceptable/Baseline resp. status   CV/Hemodynamics: Uneventful            Sign Out: Acceptable CV status; No obvious hypovolemia; No obvious fluid overload   Other NRE: NONE   DID A NON-ROUTINE EVENT OCCUR? No           Last vitals:  Vitals Value Taken Time   /70 07/03/25 14:19   Temp 36.3  C (97.4  F) 07/03/25 13:30   Pulse 56 07/03/25 14:26   Resp 14 07/03/25 14:26   SpO2 97 % 07/03/25 14:26   Vitals shown include unfiled device data.    Electronically Signed By: Verito Vazquez MD  July 3, 2025  2:27 PM

## 2025-07-03 NOTE — BRIEF OP NOTE
[facility]    Brief Operative Note    Pre-operative diagnosis: Chronic kidney disease, stage 5, kidney failure (H) [N18.5]  Post-operative diagnosis Same as pre-operative diagnosis    Procedure: Laparascopic Peritoneal Catheter placement, N/A - Abdomen    Surgeon: Surgeons and Role:     * Naya Zimmer MD - Primary     * Lopez Shine MD - Fellow - Assisting  Anesthesia: General   Estimated Blood Loss: Less than 10 ml    Drains: None  Specimens: * No specimens in log *  Findings:   None.  Complications: None.  Implants:   Implant Name Type Inv. Item Serial No.  Lot No. LRB No. Used Action   CATH DIALYSIS PERITONEAL FLEX-NECK CLASSIC 52CM CF-5260 - [id number] Catheter CATH DIALYSIS PERITONEAL FLEX-NECK CLASSIC 52CM CF-5260  [employer] [id number] N/A 1 Implanted     Cesar Shine MD  Transplant Surgery Fellow

## 2025-07-03 NOTE — ANESTHESIA CARE TRANSFER NOTE
Patient: Melo Dangelo    Procedure: Procedure(s):  Laparascopic Peritoneal Catheter placement       Diagnosis: Chronic kidney disease, stage 5, kidney failure (H) [N18.5]  Diagnosis Additional Information: No value filed.    Anesthesia Type:   General     Note:    Oropharynx: oropharynx clear of all foreign objects and spontaneously breathing  Level of Consciousness: drowsy  Oxygen Supplementation: face mask  Level of Supplemental Oxygen (L/min / FiO2): 8  Independent Airway: airway patency satisfactory and stable  Dentition: dentition unchanged  Vital Signs Stable: post-procedure vital signs reviewed and stable  Report to RN Given: handoff report given  Patient transferred to: PACU    Handoff Report: Identifed the Patient, Identified the Reponsible Provider, Reviewed the pertinent medical history, Discussed the surgical course, Reviewed Intra-OP anesthesia mangement and issues during anesthesia, Set expectations for post-procedure period and Allowed opportunity for questions and acknowledgement of understanding      Vitals:  Vitals Value Taken Time   /87 07/03/25 13:30   Temp     Pulse 59 07/03/25 13:37   Resp 21 07/03/25 13:37   SpO2 100 % 07/03/25 13:37   Vitals shown include unfiled device data.    Electronically Signed By: ELIZABETH Kasper CRNA  July 3, 2025  1:38 PM

## 2025-07-03 NOTE — DISCHARGE INSTRUCTIONS
Peritoneal Dialysis Catheter Placement: What to Expect at Home  Your Recovery     Peritoneal dialysis catheter placement is a surgery to place a soft tube (catheter) in an area of your belly (peritoneum) for dialysis. Dialysis does the work of your kidneys when they fail.  Your doctor made a small cut (incision) in your belly. Then your doctor placed a catheter through the cut and into your belly. The cut was closed with stitches. The end of the catheter exits out of your belly.  The catheter can be used for dialysis in 10 to 14 days. Dialysis fluid will flow into your belly through the catheter. It stays there for several hours. Then it flows out of your belly through the catheter.  Keep the bandage around the catheter dry, and don't take it off. Be careful not to bump or move the catheter.  You may have some pain for a few days. You may also feel tired and sick to your stomach. Be sure to rest when needed. And follow your doctor's instructions for how to manage your pain and nausea.  This care sheet gives you a general idea about how long it will take for you to recover. But each person recovers at a different pace. Follow the steps below to get better as quickly as possible.  How can you care for yourself at home?  Activity    Do not lift heavy objects, greater than 10lbs for 4-6 weeks.     Limit physical activity such as bending or twisting at the waist, strenuous activities that engage your abdominal muscles, or climbing a lot of stairs for 4-6 weeks.     Do not swim or take baths while you have a PD catheter.   Diet    Avoid constipation and straining with bowel movements. If you have been prescribed a stool softener or laxative, take it as directed.  Most PD patients are on daily Miralax, 1-3 times per day, to maintain soft, easy to pass stools and prevent constipation.  *REMEMBER: constipation can cause your PD catheter to not work properly.*   Medicines    If you stopped taking aspirin or some other blood  thinner, your doctor will tell you when to start taking it again.   Incision care    You will have a dressing over the cut (incision). A dressing helps the incision heal and protects it. Do not change or care for the dressing. Dressing changes will be done weekly by your care team until the cut heals.  If the dressing starts to come off, reinforce it with tape.     Keep the dressing clean and dry.  If the dressing becomes wet or saturated, contact your PD nurse or the Dialysis Access Nurse immediately.     Try not to bump or move the catheter until the cut heals (approximately 2 weeks).   Hygiene    Use a washcloth to clean your body. Be careful to not get the bandage wet.     Ask your doctor when it's okay to shower again (typically 4-6 weeks).   Catheter care after the incision heals    Always wash your hands before and after you touch the catheter.     Clean your catheter site daily with antibacterial soap.  Pat dry with a clean towel.     Apply antibacterial cream around where the catheter exits your belly. Your care team will tell you what cream to use and how to apply it. Put on a clean dressing at least daily once instructed to do so by your PD nurse.     Always clean and dry your catheter and access area right away after you get wet.     Fasten or tape the catheter to your body to keep it from catching on your clothes.     Never use scissors or other sharp objects around your catheter.     Store your dialysis supplies in a clean, temperature controlled, pet free, dry place.   Follow-up care is a key part of your treatment and safety. Be sure to make and go to all appointments, and call your doctor if you are having problems. It's also a good idea to know your test results and keep a list of the medicines you take.    Dialysis Follow up plan:  Dialysis History        Start End Type Center Comments      [facility].  Needs initial drsg change and  "flush scheduled once admitted to [facility].              Current Dialysis Center Information       [facility] ([facility])       Phone: [phone] Fax: [phone]    Address:  [facility] [place], [address] [address]                            When should you call for help?   Call your doctor now or seek immediate medical care if:    You have symptoms of infection, such as:  Increased pain, swelling, warmth, or redness around the cut.  Red streaks leading from the cut.  Pus draining from the cut.  A fever.     You have belly pain.     You have nausea or vomiting.   Watch closely for changes in your health, and be sure to contact your doctor if:    The dialysis fluid looks cloudy or is a different color.     Fluid does not flow through the catheter.     The dialysis equipment isn't working.     Please call the dialysis access care coordinator (Nidhi or Flores) with any questions or concerns, Monday-Friday: [phone]      For any urgent after hours concerns, please contact the Transplant Surgery Fellow [phone], option 4, ask to page [phone]       Where can you learn more?  Go to [email or web address]  Enter P012 in the search box to learn more about \"Peritoneal Dialysis Catheter Placement: What to Expect at Home.\"  Current as of: February 28, 2023               Content Version: 13.8    [id number] [employer].   Care instructions adapted under license by your healthcare professional. If you have questions about a medical condition or this instruction, always ask your healthcare professional. [employer] disclaims any warranty or liability for your use of this information.      Contacting your Doctor -   To contact a doctor, call Dr Zimmer at  Office: [phone] or:  [phone] and ask for the resident on call for Transplant  (answered 24 hours a day)   Emergency Department:  [facility]: [phone] 911 if you are in need of immediate " or emergent help

## 2025-07-05 ENCOUNTER — PATIENT OUTREACH (OUTPATIENT)
Dept: CARE COORDINATION | Facility: CLINIC | Age: 85
End: 2025-07-05
Payer: MEDICARE

## 2025-07-06 NOTE — OP NOTE
Transplant Surgery Operative Report    Preop Dx:  ESRD  Postop Dx: same  Procedure: PD catheter placement  Surgeon: MD bassem  ASSISTANT:  Cesar Shine MD- fellow. Dr. Shine was the primary assistant for the procedure and performed the operation under my supervision. There was no qualified general surgery resident available to assist during this procedure.   Anesthesia: General and Local  EBL: 5 ml  Fluids: crystalloid  UO: none  Drains: none  Specimen: none.  Complications: None  Findings:  well positioned pd catheter   Complications: None.     Indication: The patient has ESRD and is in need of a peritoneal dialysis catheter  After discussing the risks and benefits of surgery and potential complications, the patient provided informed consent.      DETAILS OF PROCEDURE:  The patient was brought to the operating room, placed in a supine position.  Perioperative prophylactic IV antibiotics were given.  Anesthesia was adminisitered. The abdomen was prepped and draped in the usual sterile fashion.  Time out was performed.    A small 2 mm incision was made in the infraumbilical region. A Veress needle was inserted into the peritoneal cavity to create a pneumoperitoneum with CO2 insufflation, set to a pressure of 15 mmHg.   2 separate 5 mm trocars were placed under direct visualization in the right hemiabdomen to provide working access for the catheter placement. A diagnostic laparoscopy was performed to assess the peritoneal cavity for adhesions or abnormalities. No significant adhesions were noted, and the peritoneal cavity appeared normal. A peritoneal dialysis catheter was introduced through an 8 mm incision to the left of the umbilicus and tunneled into the peritoneal cavity and positioned in the pouch of aisha. The external portion of the catheter was tunneled to a previously determined exit site in the left hemiabdomen.The catheter was flushed with sterile saline, and no leaks were noted. 1 L of heparinized  saline was instilled into the peritoneal cavity to confirm proper placement and function. No complications were noted during this step.    The peritoneal cavity was re-inspected for any signs of bleeding or other complications. No bleeding or injury to adjacent structures was noted. The trocars were removed, and the laparoscopic incisions were closed with absorbable sutures.     The fascia of all port sites was closed with 0 vicryl and a suture passer. The skin was closed with 4-0 monocryl and dermabond. The catheter exit site was dressed sterilely.      All needle, sponge, and instrument counts were accurate.  The patient tolerated the procedure well without apparent complications and was trasfered to the PACU in good condition.  Faculty was present for critical portions of the procedure.     Cesar Shine MD  Transplant Surgery Fellow    Physician Attestation   I was present for the key portions of the procedure and I was immediately available for the entire procedure between opening and closing.    Naya Zimmer MD, MD  Date of Service (when I saw the patient): 7/3/2025  The transplant fellow, Cesar Shine MD, was present and assisted with all aspects of the operation described above from opening to closing.  There was no suitable surgical resident available to assist in this procedure.

## 2025-07-07 ENCOUNTER — PATIENT OUTREACH (OUTPATIENT)
Dept: NEPHROLOGY | Facility: CLINIC | Age: 85
End: 2025-07-07
Payer: MEDICARE

## 2025-07-07 NOTE — PROGRESS NOTES
Dialysis Access Care Coordination: General Outreach    REASON FOR CALL:     REASON FOR CALL: Clinic Care Coordination - Follow-up (S/p PD catheter placement)                                   SITUATION/BACKROUND:   Pt s/p laparoscopic PD catheter placement surgery on Thursday, 7/3/25 with Dr. Zimmer.    Received call from pt daughter, Maranda, checking in because pt's phone isn't working.  They are hoping to have things fixed on Tuesday.  Pt is able to call via [employer] and has 2 good friends on the same floor as him that have been checking in on him.  Daughter has been checking in BID and lives very close.    Nephrology provider: Vania Ly NP.  Nephrology RN Care Coordinator:  Katerine Gonzalez RN.    Neph Tracking Flowsheet Last Filled Values       [facility] CKD Late Referral 04/03/23    [facility] CKD Late Complete 04/06/23    [facility] CKD Basics Referral --  entered in error    Preferred Modality Peritoneal Dialysis  [facility] preferred unit    Patient's Referral Dates Auto Populate Patient's Referral Dates    Specialty Care Coordination Referral - Dialysis 6/6/2023    Anemia Services Referral 5/6/24    MTM Referral 12/22/21    Home Care Referral 6/22/23    Journey Referral 1/25/23    Access Surgeon Referral Status  Referred  PD consult with Dr. Zimmer    Dialysis Access Referral 06/06/23    Access Surgical Consult 06/24/25  PD catheter consult, Dr. Zimmer.  PLAN: laparoscopic PD catheter surgery under general anesthesia.  PAC prior. Per Vania Ly, schedule surgery next week.  Previously completed consult 7/18/23.    Dialysis Access Surgery 07/03/25  laparoscopic PD catheter surgery under general anesthesia with Dr. Zimmer.    Dialysis Access Surgeon Dr. Zimmer    Transplant Status  Not Referred  reason: age          Dialysis History        Start End Type Center Comments      [facility]. Initial drsg change and flush  scheduled 7/11/25 at 9AM.                  Patient is not followed by Solid Organ Transplant 2/2  .  Coordinator: No matching coordinators    ASSESSMENT:   Per pt daughter, Maranda, things are healing well and he's in good spirits.  No pain or concerns that daughter is aware of.  Pt reports BM yesterday and daughter states he seems comfortable.  Daughter is going to see him this afternoon.    Educated pt's daughter on importance of maintaining regular bowel movements and continuing on a bowel regimen while pt has a PD catheter in place to reduce risk of catheter malfunction.  Encouraged pt to stay on prescribed senokot, colace and miralax at least this week and until pt has returned to having regular, soft, formed, easy to pass bowel movements.  At that point, pt can titrate down to taking just the miralax 1-3 times per day.  Maranda verbalized understanding and will relay info to pt later today.    Informed pt daughter that pt needs to schedule his initial dressing change and flush at the dialysis unit with the PD RN.  Maranda will relay appointment info to pt from PD RN if they call her.  Maranda: [phone]    Informed pt daughter to remind pt to keep the dressing in place and keep it clean and dry.  If the dressing starts to come off, pt can reinforce it with tape.  If it needs to be changed, they can call the PD RN or Dialysis Access Surgery Care Coordinators to arrange an earlier dressing change appointment.  Pt's daughter verbalized understanding and will remind pt not to touch his dressing.    Recent Labs      Latest Ref Rng & Units 6/25/2025 6/23/2025 6/9/2025   Neph Labs   Sodium 135 - 145 mmol/L  138  137    GFR Estimate >60 mL/min/1.73m2  9  9    Potassium 3.4 - 5.3 mmol/L  4.9  4.9    Creatinine 0.67 - 1.17 mg/dL  5.66  5.61    Urea Nitrogen 8.0 - 23.0 mg/dL  53.6  58.0    Hemoglobin 13.3 - 17.7 g/dL 9.0  9.1  9.4    Ferritin 31 - 409 ng/mL   686    Iron Binding Cap 240 - 430 ug/dL   252    Iron 61 - 157 ug/dL    88        PLAN:     Future Appts Next 180 days       Visit Type Date Time Department    RETURN NEPHROLOGY 9/15/2025  9:30 AM  MEDICINE RENAL          Called PD RN who is unavailable.  Per dialysis unit staff, pt is scheduled for his initial dressing change and flush on Friday 7/11 at 9am, they just haven't been able to get a hold of the pt to confirm the appointment.  PD RN will call pt daughter, Maranda, with appointment info.    Follow Up:   [employer] message sent to pt with PD catheter dressing change/flush appointment info with [facility], bowel regimen reminders, and dressing care reminders discussed with pt's daughter.    Pt or pt's daughter to contact Dialysis Access Surgery Care Coordinators with any questions or concerns.  Pt daughter or pt will update when pt's phone is working again.  PD RN will update Dialysis Access Surgery Care Coordinators if the initial dressing change and flush appointment changes.  Updated nephrology RNCC that pt phone is not working, dressing change/flush has been scheduled and to communicate via pt's daughter for now.    OWEN DIAZ RN  Dialysis Access Surgery Care Coordinator  Phone: [phone]  Pool: P_Dialysis_Access_Nurse

## 2025-07-08 ENCOUNTER — PATIENT OUTREACH (OUTPATIENT)
Dept: CARE COORDINATION | Facility: CLINIC | Age: 85
End: 2025-07-08
Payer: MEDICARE

## 2025-07-08 NOTE — PROGRESS NOTES
Clinic Care Coordination Contact  Community Health Worker Follow Up    Care Gaps:     Health Maintenance Due   Topic Date Due    DIABETIC FOOT EXAM  Never done    DEPRESSION ACTION PLAN  Never done    HEPATITIS B VACCINE (4 of 4 - Risk Dialysis 4-dose series) 07/01/1990    ZOSTER VACCINE (1 of 2) Never done    MICROALBUMIN  07/18/2024    A1C  06/05/2025       Postponed to NEXT PCP APPT     Care Plan:   Care Plan: Health Maintenance       Problem: Health Maintenance Due or Overdue       Goal: Become up-to-date with health maintenance visit(s)       Start Date: 5/29/2025    This Visit's Progress: On track Recent Progress: On track    Priority: High    Note:     Barriers: patient is having some memory concerns  Strengths: patient sees specialists and PCP for regular care  Patient expressed understanding of goal: yes  Action steps to achieve this goal:  1. I will continue to see my providers for regularly scheduled appointments.   2. I will schedule with psychology for anxiety and depression.   3. I will continue to work with PT for my knee.                                   Intervention and Education during outreach: Patient has a follow up APPT with therapist 7/22/25. Patient states that old credit card has been blocked due to scams and destroyed and now patient has a new credit card, which daughter is supposed to be coming over to help patient connect new card to pay bills. Patient stated that he will be starting Dialysis soon, he is just waiting for the port to heal that was placed in his abdomen. There are no additional needs or concerns at this time.    CHW Plan: CHW will follow up with patient in 1 month.     MANDY Herrera  Clinic Care Coordination  [facility]   Clinic #: [phone]

## 2025-07-10 ENCOUNTER — PATIENT OUTREACH (OUTPATIENT)
Dept: CARE COORDINATION | Facility: CLINIC | Age: 85
End: 2025-07-10
Payer: MEDICARE

## 2025-07-10 NOTE — PROGRESS NOTES
Clinic Care Coordination Contact  Care Coordination Clinician Chart Review    Situation: Patient chart reviewed by Care Coordinator.       Background: Care Coordination Program started: 4/19/2024. Initial assessment completed and patient-centered care plan(s) were developed with participation from patient. Lead CC handed patient off to CHW for continued outreaches.       Assessment: Per chart review, patient outreach completed by CC CHW on 7/8/25.  Patient is actively working to accomplish goal(s). Patient's goal(s) appropriate and relevant at this time. Patient is due for updated Plan of Care.  Assessments will be completed annually or as needed/with change of patient status.      Care Plan: Health Maintenance       Problem: Health Maintenance Due or Overdue       Goal: Become up-to-date with health maintenance visit(s)       Start Date: 7/10/2025    This Visit's Progress: On track Recent Progress: On track    Priority: High    Note:     Barriers: patient is having some memory concerns  Strengths: patient sees specialists and PCP for regular care  Patient expressed understanding of goal: yes  Action steps to achieve this goal:  1. I will continue to see my providers for regularly scheduled appointments.   2. I will continue to see psychology for anxiety and depression.   3. I will continue to work with PT for my knee.                                      Plan/Recommendations: The patient will continue working with Care Coordination to achieve goal(s) as above. CHW will continue outreaches at minimum every 30 days and will involve Lead CC as needed or if patient is ready to move to Maintenance. Lead CC will continue to monitor CHW outreaches and patient's progress to goal(s) every 6 weeks.     Plan of Care updated and sent to patient: Yes, via Ruth CLARK RN on 7/10/2025 at 8:11 AM

## 2025-07-10 NOTE — LETTER
PRIMARY CARE CARE COORDINATION   - [facility]ICS [facility]ENTER  Patient Centered Plan of Care  About Me:        Patient Name:  Melo Dangelo    YOB: 1940  Age:         84 year old   [facility] MRN:    [id number] Telephone Information:  Home Phone [phone]   Mobile [phone]       Address:  [address]  [place] [address] Email address:  [email or web address]      Emergency Contact(s)    Name Relationship Lgl Grd Work Phone Home Phone Mobile Phone   1. OMKAR COSTELLO Daughter No   [phone]   2. DIANNA DANGELO Daughter No  [phone]    3. JOSE WALSH Daughter No  [phone]    4. WIFE,ELANA,IS * Spouse No  [phone]           Primary language:  English     needed? No   [facility] [employer] [facility]:  [phone] op. 1  Other communication barriers:None    Preferred Method of Communication:     Current living arrangement: I live alone    Mobility Status/ Medical Equipment: Independent        Health Maintenance  Health Maintenance Reviewed: Up to date      My Access Plan  Medical Emergency 911   Primary Clinic Line [phone] to speak with clinic staff or schedule appointment   24 Hour Nurse Line [phone] (toll-free)   Preferred Urgent Care No data recorded             My Care Team Members  Patient Care Team         Relationship Specialty Notifications Start End    Francis Smith MD PCP - General Family Medicine  10/28/20     Phone: [phone] Fax: [phone]          [facility] [address]    Inderjit Grier MD   10/24/19     Phone: [phone] Fax: [phone]         [facility] [address]  [facility] [address]    Esperanza Guillaume CNP Nurse Practitioner Urology  12/15/20     Phone: [phone] Fax: [phone]         [address] [facility] [address]    Pushpa Alvarado, RN Registered Nurse   12/15/20     Francis Smith MD Assigned PCP   12/27/20     Phone: [phone] Fax:  [phone]         [address] [facility] [address]    Fernando Chong MD MD Nephrology  11/18/21     Phone: [phone] Fax: [phone]         [address] [facility] [address] [facility] [address]    Merry Barrow MD MD Nephrology  11/29/21     Phone: [phone] Fax: [phone]         [address] [facility] [address] [facility] [address]    Adis Ventura MD  Cardiovascular Disease  2/3/22     Matthew Cabrera MD MD Endocrinology, Diabetes, and Metabolism  6/29/22     Phone: [phone] Fax: [phone]         [address] [facility] [address]    Katerine Gonzalez RN Specialty Care Coordinator Nephrology Yes. All results, Admissions 1/25/23     Flores Arias, RN Specialty Care Coordinator Nephrology Admissions 6/9/23     Dialysis Access Care Coordinator    Phone: [phone]         Nidhi Schilling RN Specialty Care Coordinator Nephrology Admissions 6/9/23     Dialysis Access Care Coordinator    Phone: [phone]         Madyson Evangelista APRN CNP Nurse Practitioner Cardiovascular Disease  7/17/23     Phone: [phone] Fax: [phone]         [facility] [address]    Brittanie Etienne PA-C Assigned Heart and Vascular Provider   2/23/24     Phone: [phone] Fax: [phone]         [facility] [address]    Carla Fam, RN Lead Care Coordinator Primary Care - CC Admissions 4/19/24     Amy Hurd RN Specialty Care Coordinator Pharmacy  5/6/24     Anemia Services    Phone: [phone]          [facility] [address] [facility] [address]    Danya Laboy AuD Audiologist Audiology  9/11/24     Phone: [phone] Fax: [phone]         [address] [facility] [address]    Lindsay Rich PA-C Physician Assistant Otolaryngology  9/11/24     Phone: [phone] Fax: [phone]         [address] [facility] [address]    Ish Elias MD MD Neurology  2/10/25     Phone: [phone] Fax:  [phone]         [address] [facility] [address]    Carlos Sullivan, PhD Psychologist Neuropsychology  2/14/25     Phone: [phone] Fax: [phone]         [address] [facility] [address]    Ish Elias MD Assigned Neuroscience Provider   2/23/25     Phone: [phone] Fax: [phone]         [address] [facility] [address]    Sharon Martinez DIANNA Community Health Worker Primary Care - CC Admissions 3/7/25     Best Padilla MD Physician Ophthalmology  3/11/25     Phone: [phone] Fax: [phone]         [address] [facility] [address]    Best Padilla MD Assigned Surgical Provider   3/23/25     Phone: [phone] Fax: [phone]         [address] [facility] [address]    Gini Washington NP Assigned Nephrology Provider   3/23/25     Phone: [phone] Fax: [phone]         [address] [facility] [address] [facility] [address]    Carlos Sullivan, PhD Assigned Behavioral Health Provider   5/23/25     Phone: [phone] Fax: [phone]         [address] [facility] [address]    Berta Alvarado APRN CNP Nurse Practitioner Anesthesiology  6/25/25     Phone: [phone] Fax: [phone]         [address] [facility] [address]                My Care Plans  Self Management and Treatment Plan    Care Plan  Care Plan: Health Maintenance       Problem: Health Maintenance Due or Overdue       Goal: Become up-to-date with health maintenance visit(s)       Start Date: 7/10/2025    This Visit's Progress: On track Recent Progress: On track    Priority: High    Note:     Barriers: patient is having some memory concerns  Strengths: patient sees specialists and PCP for regular care  Patient expressed understanding of goal: yes  Action steps to achieve this goal:  1. I will continue to see my providers for regularly scheduled appointments.   2. I will continue to see psychology for anxiety and depression.    3. I will continue to work with PT for my knee.                                 Advance Care Plans/Directives Type:   Advanced Directive - On File           My Medical and Care Information  Problem List   Patient Active Problem List   Diagnosis    Central serous chorioretinopathy of right eye    Cupping of optic disc, right    Peripheral drusen of both eyes    Posterior vitreous detachment, bilateral    Age-related nuclear cataract of both eyes    Major depressive disorder    IgA nephropathy    CKD (chronic kidney disease) stage 4, GFR 15-29 ml/min (H)    High risk medication use    Sensorineural hearing loss (SNHL) of both ears    Combined forms of age-related cataract of both eyes    Alcohol dependence in remission (H)    Frequent PVCs    History of seizure    Strain of right hamstring, initial encounter      Current Medications and Allergies:      Current Outpatient Medications:     acetaminophen (TYLENOL) 325 MG tablet, Take 325-650 mg by mouth every 6 hours as needed for mild pain, Disp: , Rfl:     busPIRone (BUSPAR) 5 MG tablet, Take 1 tablet (5 mg) by mouth 2 times daily. Has started, Disp: 180 tablet, Rfl: 3    calcium carbonate-vitamin D (OSCAL) 500-5 MG-MCG tablet, Take 1 tablet by mouth daily., Disp: 90 tablet, Rfl: 3    Cholecalciferol (D3-1000) 25 MCG (1000 UT) CAPS, Take 1 capsule by mouth daily, Disp: , Rfl:     cyanocobalamin (VITAMIN B-12) 100 MCG tablet, Take 1 tablet (100 mcg) by mouth daily. Take 1,000 mcg by mouth every morning -, Disp: 90 tablet, Rfl: 3    docusate sodium (COLACE) 100 MG capsule, Take 1 capsule (100 mg) by mouth 2 times daily., Disp: 30 capsule, Rfl: 0    escitalopram (LEXAPRO) 20 MG tablet, Take 1 tablet (20 mg) by mouth every morning., Disp: 90 tablet, Rfl: 3    lamoTRIgine (LAMICTAL) 100 MG tablet, Take 2 tablets (200 mg) by mouth 2 times daily., Disp: 360 tablet, Rfl: 3    metoprolol succinate ER (TOPROL XL) 25 MG 24 hr tablet, TAKE 1/2 TABLET BY MOUTH DAILY (Patient  taking differently: 12.5 mg every morning. TAKE 1/2 TABLET BY MOUTH DAILY), Disp: 45 tablet, Rfl: 3    multivitamin (OCUVITE) TABS tablet, Take 1 tablet by mouth daily. Please keep the appointment on 9/3/24 for further refills., Disp: 90 tablet, Rfl: 3    polyethylene glycol (MIRALAX) 17 g packet, Take 17 g by mouth daily., Disp: 6 packet, Rfl: 0    sennosides (SENOKOT) 8.6 MG tablet, Take 1 tablet by mouth daily., Disp: 30 tablet, Rfl: 0    simvastatin (ZOCOR) 20 MG tablet, Take 1 tablet (20 mg) by mouth at bedtime., Disp: 90 tablet, Rfl: 3    sodium bicarbonate 650 MG tablet, Take 1 tablet (650 mg) by mouth 2 times daily., Disp: 180 tablet, Rfl: 3    tamsulosin (FLOMAX) 0.4 MG capsule, Take 2 capsules (0.8 mg) by mouth daily. (Patient taking differently: Take 0.8 mg by mouth every evening.), Disp: 180 capsule, Rfl: 3     No Known Allergies      Care Coordination Start Date: 4/19/2024   Frequency of Care Coordination: monthly, more frequently as needed     Form Last Updated: 07/10/2025

## 2025-07-17 ENCOUNTER — PATIENT OUTREACH (OUTPATIENT)
Dept: NEPHROLOGY | Facility: CLINIC | Age: 85
End: 2025-07-17
Payer: MEDICARE

## 2025-07-17 NOTE — PROGRESS NOTES
Nephrology Note: RN CC Chart Review    REASON FOR ENCOUNTER:     Chart reviewed by nephrology RN CC                          PD Training has started  SITUATION/BACKROUND:     Last nephrology visit:    Last Renal Panel:  Sodium   Date Value Ref Range Status   06/23/2025 138 135 - 145 mmol/L Final   10/29/2020 139 133 - 144 mmol/L Final     Potassium   Date Value Ref Range Status   06/23/2025 4.9 3.4 - 5.3 mmol/L Final   07/29/2022 4.8 3.4 - 5.3 mmol/L Final   10/29/2020 4.7 3.4 - 5.3 mmol/L Final     Chloride   Date Value Ref Range Status   06/23/2025 106 98 - 107 mmol/L Final   07/29/2022 106 94 - 109 mmol/L Final   10/29/2020 110 (H) 94 - 109 mmol/L Final     Carbon Dioxide   Date Value Ref Range Status   10/29/2020 25 20 - 32 mmol/L Final     Carbon Dioxide (CO2)   Date Value Ref Range Status   06/23/2025 19 (L) 22 - 29 mmol/L Final   07/29/2022 28 20 - 32 mmol/L Final     Anion Gap   Date Value Ref Range Status   06/23/2025 13 7 - 15 mmol/L Final   07/29/2022 6 3 - 14 mmol/L Final   10/29/2020 5 3 - 14 mmol/L Final     Glucose   Date Value Ref Range Status   06/23/2025 93 70 - 99 mg/dL Final   07/29/2022 89 70 - 99 mg/dL Final   10/29/2020 136 (H) 70 - 99 mg/dL Final     Urea Nitrogen   Date Value Ref Range Status   06/23/2025 53.6 (H) 8.0 - 23.0 mg/dL Final   07/29/2022 55 (H) 7 - 30 mg/dL Final   10/29/2020 31 (H) 7 - 30 mg/dL Final     Creatinine   Date Value Ref Range Status   06/23/2025 5.66 (H) 0.67 - 1.17 mg/dL Final   10/29/2020 1.33 (H) 0.66 - 1.25 mg/dL Final     GFR Estimate   Date Value Ref Range Status   06/23/2025 9 (L) >60 mL/min/1.73m2 Final     Comment:     eGFR calculated using 2021 CKD-EPI equation.   11/04/2020 56 (L) >60 mL/min/1.73m2 Final   10/29/2020 50 (L) >60 mL/min/[1.73_m2] Final     Comment:     Non  GFR Calc  Starting 12/18/2018, serum creatinine based estimated GFR (eGFR) will be   calculated using the Chronic Kidney Disease Epidemiology Collaboration   (CKD-EPI)  equation.       Calcium   Date Value Ref Range Status   06/23/2025 9.1 8.8 - 10.4 mg/dL Final   10/29/2020 8.5 8.5 - 10.1 mg/dL Final     Phosphorus   Date Value Ref Range Status   06/23/2025 4.7 (H) 2.5 - 4.5 mg/dL Final     Albumin   Date Value Ref Range Status   06/23/2025 4.3 3.5 - 5.2 g/dL Final   07/29/2022 4.0 3.4 - 5.0 g/dL Final   10/29/2020 2.7 (L) 3.4 - 5.0 g/dL Final         Neph Tracking Flowsheet Last Filled Values       [facility] CKD Late Referral 04/03/23    [facility] CKD Late Complete 04/06/23    [facility] CKD Basics Referral --  entered in error    Preferred Modality Peritoneal Dialysis  [facility] preferred unit    Final Modality Peritoneal Dialysis    Patient's Referral Dates Auto Populate Patient's Referral Dates    Specialty Care Coordination Referral - Dialysis 6/6/2023    Anemia Services Referral 5/6/24    MTM Referral 12/22/21    Home Care Referral 6/22/23    Journey Referral 1/25/23    Access Surgeon Referral Status  Referred  PD consult with Dr. Zimmer    Dialysis Access Referral 06/06/23    Access Surgical Consult 06/24/25  PD catheter consult, Dr. Zimmer.  PLAN: laparoscopic PD catheter surgery under general anesthesia.  PAC prior. Per Vania Ly, schedule surgery next week.  Previously completed consult 7/18/23.    Dialysis Access Surgery 07/03/25  laparoscopic PD catheter surgery under general anesthesia with Dr. Zimmer.    Dialysis Access Surgeon Dr. Zimmer    Dialysis Access Maturation 07/17/25    Transplant Status  Not Referred  reason: age    Dialysis Optimal Start? Yes    Optimal Start Detail Home Modality            ASSESSMENT/PLAN   Writer called over to the dialysis unit. Patient has started PD Education. The PD catheter is working great and education is going well. Patient cares to be transferred to the PD unit. Patient will be under Dr. Kim going forward. Blue note has been updated, CKD Journey episode removed, Writer removed self from care team,  message sent to patient letting him know he does not need to follow up with Vania going forward and medications to be filled at PD unit. Neph Tracking updated     Patient to follow up as scheduled at next appointment  Patient to call/MyChart message with updates    Upcoming Appointments:  Future Appts Next 180 days       Visit Type Date Time Department    RETURN NEPHROLOGY 9/15/2025  9:30 AM  MEDICINE RENAL              Katerine Gonzalez RN

## 2025-07-22 ENCOUNTER — OFFICE VISIT (OUTPATIENT)
Dept: BEHAVIORAL HEALTH | Facility: CLINIC | Age: 85
End: 2025-07-22
Payer: MEDICARE

## 2025-07-22 DIAGNOSIS — F33.0 MILD EPISODE OF RECURRENT MAJOR DEPRESSIVE DISORDER: Primary | ICD-10-CM

## 2025-07-22 DIAGNOSIS — F41.9 ANXIETY DISORDER, UNSPECIFIED TYPE: ICD-10-CM

## 2025-07-22 DIAGNOSIS — F43.10 POST-TRAUMATIC STRESS DISORDER, UNSPECIFIED: ICD-10-CM

## 2025-07-22 PROCEDURE — 90791 PSYCH DIAGNOSTIC EVALUATION: CPT | Mod: 52 | Performed by: COUNSELOR

## 2025-07-22 ASSESSMENT — COLUMBIA-SUICIDE SEVERITY RATING SCALE - C-SSRS
TOTAL  NUMBER OF ABORTED OR SELF INTERRUPTED ATTEMPTS SINCE LAST CONTACT: NO
6. HAVE YOU EVER DONE ANYTHING, STARTED TO DO ANYTHING, OR PREPARED TO DO ANYTHING TO END YOUR LIFE?: NO
ATTEMPT SINCE LAST CONTACT: NO
2. HAVE YOU ACTUALLY HAD ANY THOUGHTS OF KILLING YOURSELF?: NO
1. SINCE LAST CONTACT, HAVE YOU WISHED YOU WERE DEAD OR WISHED YOU COULD GO TO SLEEP AND NOT WAKE UP?: NO
TOTAL  NUMBER OF INTERRUPTED ATTEMPTS SINCE LAST CONTACT: NO
SUICIDE, SINCE LAST CONTACT: NO

## 2025-07-22 NOTE — PROGRESS NOTES
[facility] Primary Care: Integrated Behavioral Health     Integrated Behavioral Health Services   Mental Health and Addiction Services     Brief Diagnostic Assessment        PATIENT'S NAME: Melo Dangelo  MRN: [id number]     : 1940     DATE OF SERVICE: 2025  SERVICE LOCATION: Face to Face in Clinic   SERVICE MODALITY: In-person  Visit Start Time: 9:33 AM  Visit End Time:  10:15AM   VISIT NUMBER: 2  [facility] Visit Activities: NEW and [facility] Only     Assessments completed:    The following assessments were completed by patient for this visit:  PHQ2: No questionnaires on file.  PHQ9:       5/15/2023    10:11 PM 3/11/2024     8:49 AM 9/3/2024     7:41 AM 9/3/2024     9:54 AM 12/3/2024     9:36 AM 2025     9:47 AM 2025    11:22 AM   PHQ-9 SCORE   PHQ-9 Total Score MyChart 7 (Mild depression)  7 (Mild depression)  4 (Minimal depression) 0 5 (Mild depression)   PHQ-9 Total Score 7 5 7 9 4  0  5        Patient-reported     GAD2:       2025     9:21 AM   SHAINA-2   Feeling nervous, anxious, or on edge 2   Not being able to stop or control worrying 0   SHAINA-2 Total Score 2        Patient-reported     GAD7:       2020     9:46 AM 2023     9:00 AM 9/3/2024     9:54 AM   SHAINA-7 SCORE   Total Score 10 (moderate anxiety)     Total Score 10 4 14     CAGE-AID:       2020    10:12 AM 5/15/2023    11:47 PM   CAGE-AID Total Score   Total Score 1 1   Total Score MyChart 1 (A total score of 2 or greater is considered clinically significant) 1 (A total score of 2 or greater is considered clinically significant)     PROMIS 10-Global Health (only subscores and total score):       5/15/2023    11:46 PM 2025    11:30 AM   PROMIS-10 Scores Only   Global Mental Health Score 13 11    Global Physical Health Score 17 10    PROMIS TOTAL - SUBSCORES 30 21        Patient-reported     [place] Suicide Severity Rating Scale (Lifetime/Recent)      2024    10:12 AM 2025     11:34 AM   [place] Suicide Severity Rating (Lifetime/Recent)   Q1 Wished to be Dead (Past Month) 0-->no    Q2 Suicidal Thoughts (Past Month) 0-->no    Q6 Suicide Behavior (Lifetime) 0-->no    Level of Risk per Screen no risks indicated     1. Wish to be Dead (Lifetime)  Y   Wish to be Dead Description (Lifetime)  once 30 years ago after he was fired from [employer].   1. Wish to be Dead (Past 1 Month)  N   2. Non-Specific Active Suicidal Thoughts (Lifetime)  N   Frequency (Lifetime)  1   Duration (Lifetime)  1   Controllability (Lifetime)  0   Controllability (Past 1 Month)  0   Deterrents (Lifetime)  0   Deterrents (Past 1 Month)  0   Reasons for Ideation (Lifetime)  0   Reasons for Ideation (Past 1 Month)  0   Actual Attempt (Lifetime)  N   Has subject engaged in non-suicidal self-injurious behavior? (Lifetime)  N   Interrupted Attempts (Lifetime)  N   Aborted or Self-Interrupted Attempt (Lifetime)  N   Preparatory Acts or Behavior (Lifetime)  N   Calculated C-SSRS Risk Score (Lifetime/Recent)  No Risk Indicated       Data saved with a previous flowsheet row definition     [place] Suicide Severity Rating Scale (Short Version)      10/19/2021    12:00 PM 1/20/2022     1:12 PM 11/3/2022     9:26 AM 11/17/2022     8:30 AM 4/16/2024    10:12 AM 7/1/2025    11:41 AM 7/22/2025     9:38 AM   [place] Suicide Severity Rating (Short Version)   Over the past 2 weeks have you felt down, depressed, or hopeless? no no no no      Over the past 2 weeks have you had thoughts of killing yourself? no no no no      Have you ever attempted to kill yourself? no no no no      Q1 Wished to be Dead (Past Month)     0-->no     Q2 Suicidal Thoughts (Past Month)     0-->no     Q6 Suicide Behavior (Lifetime)     0-->no     Level of Risk per Screen     no risks indicated      1. Wish to be Dead (Since Last Contact)      N N   2. Non-Specific Active Suicidal Thoughts (Since Last Contact)      N N   Actual Attempt (Since Last Contact)      N N    Has subject engaged in non-suicidal self-injurious behavior? (Since Last Contact)      N N   Interrupted Attempts (Since Last Contact)      N N   Aborted or Self-Interrupted Attempt (Since Last Contact)      N N   Preparatory Acts or Behavior (Since Last Contact)      N N   Suicide (Since Last Contact)      N N   Calculated C-SSRS Risk Score (Since Last Contact)      No Risk Indicated No Risk Indicated       Data saved with a previous flowsheet row definition        Identifying Information:     Patient is a 84 year old male,     ,   adult.  Patient attended the session alone.         Referral:   Who referred you to care: referring provider,  .    Reason for referral: clarify behavioral health diagnosis and determine behavioral health treatment options.        Patient's Statement of Presenting Concern:     Patient reports the following reason(s) for seeking an assessment at this time: nefrology - end stage kidney failure for the last 3 years, see self as a negative person, passing of his wife, struggles with his writing.  Patient stated that symptoms have resulted in the following functional impairments: relationship(s), self-care, and social interactions     History of Presenting Concern:     Patient reports that these problem(s) began 11/01/23  when his wife passed, but depression began in grade school. Patient has attempted to resolve these concerns in the past through: counseling and medication(s) from physician / PCP. Patient reports that other professional(s) are involved in providing support / services.      Social/Family History:     The patient describes their cultural background as ,      Cultural influences and impact on patient's life structure, values, norms, and healthcare[place] present religen retired .      Contextual influences on patient's health include: Individual Factors hx of substance use and depression in grade school and Family Factors wife passed away in  2023.      Cultural, Contextual, and socioeconomic factors do affect the patient's access to services - insurance issues at time.  These factors will be addressed in the Preliminary Treatment plan.    Patient identified their preferred language to be English,  . Patient reported they do not  need the assistance of an  or other support involved in therapy.      Current living situation: staying in own home/apartment, alone      Socioeconomic status and needs: does have financial concerns, but they do not wish to have them addressed.     Patient's current significant relationships include: adult child,      Patient's sexual orientation is heterosexual,     Patient reported having 3  children.      Patient identified few stable and meaningful social connections.      Patient identified the following strengths or resources that will help him: helpful, caring, creative    Highest education level was college graduate,  .      Patient is currently retired .     Patient reported that they have not  been involved with the legal system. Do you have a probation office? Patient denies being on probation / parole / under the jurisdiction of the court       Medical History:    Family History of Mental Health: Yes: Patient reports that his youngest daughter has bipolar due to pregnancy.     Current Medical Health Concerns: Yes: end stage kidney failure, Associated Psychological Distress    Current Medication:    Patient reports current meds as:   Current Outpatient Medications   Medication Sig Dispense Refill    acetaminophen (TYLENOL) 325 MG tablet Take 325-650 mg by mouth every 6 hours as needed for mild pain      busPIRone (BUSPAR) 5 MG tablet Take 1 tablet (5 mg) by mouth 2 times daily. Has started 180 tablet 3    calcium carbonate-vitamin D (OSCAL) 500-5 MG-MCG tablet Take 1 tablet by mouth daily. 90 tablet 3    Cholecalciferol (D3-1000) 25 MCG (1000 UT) CAPS Take 1 capsule by mouth daily      cyanocobalamin  (VITAMIN B-12) 100 MCG tablet Take 1 tablet (100 mcg) by mouth daily. Take 1,000 mcg by mouth every morning - 90 tablet 3    docusate sodium (COLACE) 100 MG capsule Take 1 capsule (100 mg) by mouth 2 times daily. 30 capsule 0    escitalopram (LEXAPRO) 20 MG tablet Take 1 tablet (20 mg) by mouth every morning. 90 tablet 3    lamoTRIgine (LAMICTAL) 100 MG tablet Take 2 tablets (200 mg) by mouth 2 times daily. 360 tablet 3    metoprolol succinate ER (TOPROL XL) 25 MG 24 hr tablet TAKE 1/2 TABLET BY MOUTH DAILY (Patient taking differently: 12.5 mg every morning. TAKE 1/2 TABLET BY MOUTH DAILY) 45 tablet 3    multivitamin (OCUVITE) TABS tablet Take 1 tablet by mouth daily. Please keep the appointment on 9/3/24 for further refills. 90 tablet 3    polyethylene glycol (MIRALAX) 17 g packet Take 17 g by mouth daily. 6 packet 0    sennosides (SENOKOT) 8.6 MG tablet Take 1 tablet by mouth daily. 30 tablet 0    simvastatin (ZOCOR) 20 MG tablet Take 1 tablet (20 mg) by mouth at bedtime. 90 tablet 3    sodium bicarbonate 650 MG tablet Take 1 tablet (650 mg) by mouth 2 times daily. 180 tablet 3    tamsulosin (FLOMAX) 0.4 MG capsule Take 2 capsules (0.8 mg) by mouth daily. (Patient taking differently: Take 0.8 mg by mouth every evening.) 180 capsule 3     No current facility-administered medications for this visit.        Medication Adherence:     Patient reports taking/not taking prescription medications as prescribed: taking for his physical health and mental health .     Substance Use:      Patient denies using alcohol.   Patient denies using tobacco  Patient denies using cannabis.   Patient reports using caffeine 7 times per week and drinks 7 at a time. Patient started using caffeine at age 61.   Patient reports using/abusing the following substance(s). reports using alcohol 7 times per week and has quarter of a 5th - 750ml   mixed drinks at a time. Patient first started drinking at age was in 1963.  Patient reported date of  last use was in 2005.  Patient reports heaviest use was in 2005.  Patient denies any symptoms of withdrawal..  Patient has had periods of abstinence and reports the following circumstances contributed to return to use:  never stopped in 2005..      Substance Use: No symptoms     Based on the negative CAGE score and clinical interview there  are not indications of drug or alcohol abuse.        Significant Losses / Trauma / Abuse / Neglect Issues:     There are indications or report of significant loss, trauma, abuse or neglect issues related to: death of wife in 2023, job loss for [employer] in 2000, major medical problems for end of stage kidney failure 2022, got COVID 3 times , and  / combat experience was in [place] 1963.   Issues of possible neglect are not present.           Mental Status Assessment:     Appearance:   Appropriate    Eye Contact:   Poor   Psychomotor Behavior: Normal    Attitude:   Cooperative  Friendly Pleasant Guarded    Orientation:   All   Speech Rate / Production: Talkative   Volume:   Soft    Mood:    Sad    Affect:    Flat    Thought Content:  Clear    Thought Form:  Coherent  Logical  Tangential    Insight:    Fair          Safety Assessment:     Patient has had a history of suicidal ideation: 30yrs ago once never again   Patient denies current or recent suicidal ideation or behaviors.   Patient denies current or recent homicidal ideation or behaviors.   Patient denies current or recent self injurious behavior or ideation.   Patient denies other safety concerns.   Patient reports there are/are not firearms in the house  are not;     Protective Factors sense of personal control or determination;       Risk Factors Current high stress    Plan for Safety and Risk Management:     Safety and Risk: Recommended that patient call 911 or go to the local ED should there be a change in any of these risk factors.          Report to child / adult protection services was NA.        Diagnostic Criteria:      Generalized Anxiety Disorder  A. Excessive anxiety and worry about a number of events or activities (such as work or school performance).   B. The person finds it difficult to control the worry.  C. Select 3 or more symptoms (required for diagnosis). Only one item is required in children.   - Being easily fatigued.    - Difficulty concentrating or mind going blank.   D. The focus of the anxiety and worry is not confined to features of an Axis I disorder.  E. The anxiety, worry, or physical symptoms cause clinically significant distress or impairment in social, occupational, or other important areas of functioning.   F. The disturbance is not due to the direct physiological effects of a substance (e.g., a drug of abuse, a medication) or a general medical condition (e.g., hyperthyroidism) and does not occur exclusively during a Mood Disorder, a Psychotic Disorder, or a Pervasive Developmental Disorder.    - The aformentioned symptoms began 62 year(s) ago and occurs 2 days per week and is experienced as mild.  Major Depressive Disorder  CRITERIA (A-C) REPRESENT A MAJOR DEPRESSIVE EPISODE - SELECT THESE CRITERIA  A) Recurrent episode(s) - symptoms have been present during the same 2-week period and represent a change from previous functioning 5 or more symptoms (required for diagnosis)   - Depressed mood. Note: In children and adolescents, can be irritable mood.     - Diminished interest or pleasure in all, or almost all, activities.    - Fatigue or loss of energy.    - Feelings of worthlessness or excessive guilt.    - Diminished ability to think or concentrate, or indecisiveness.   B) The symptoms cause clinically significant distress or impairment in social, occupational, or other important areas of functioning  C) The episode is not attributable to the physiological effects of a substance or to another medical condition  D) The occurence of major depressive episode is not better explained by other thought /  psychotic disorders  E) There has never been a manic episode or hypomanic episode  Unspecified Trauma- and Stressor-Related Disorder, Symptoms characteristic of a trauma and stressor related disorder that cause clinically significant distress or impairment in social, occupational, or other important areas of functioning predominate but do not meet the full criteria for any of the disorders in the trauma and stressor related disorders diagnostic class.      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     Chemical dependency recommendations: Maintain Sobriety     As a preliminary treatment goal, patient will continue to take medications as prescribed / participate in supportive activities and services  and will develop more effective coping skills to manage anxiety symptoms.     Collaboration with other professionals is not indicated at this time.     The following referral(s) will be initiated: on 07/01/2025 pending patient reaches out to them to schedule long term appt..     A Release of Information is not needed at this time.             Deyanira [facility], [facility]   July 22, 2025

## 2025-07-23 ENCOUNTER — PATIENT OUTREACH (OUTPATIENT)
Dept: CARE COORDINATION | Facility: CLINIC | Age: 85
End: 2025-07-23
Payer: MEDICARE

## 2025-07-23 NOTE — PROGRESS NOTES
Anemia Management Note - Discharge    Referred by Dr. Merry Barrow on 05/06/2024  *orders are under Dr. Tyler Alejo started PD on 7/17/25.  Anemia will be managed by the dialysis clinic.  Closing Anemia Services.         Latest Ref Rng & Units 11/4/2024 12/3/2024 2/13/2025 3/5/2025 6/9/2025 6/23/2025 6/25/2025   Anemia   Hemoglobin 13.3 - 17.7 g/dL 9.5  9.9  10.0  9.6  9.4  9.1  9.0    TSAT 15 - 46 % 35  22  38  25  35      Ferritin 31 - 409 ng/mL 789  785  591  603  686              Anemia labs discontinued/outside lab/clinic notified (if applicable), Rx discontinued/Therapy Plans cancelled, removed from active patient list, patient and/or caregiver and referring provider notified as appropriate.     Amy Hurd RN   Anemia Services  [facility]  [email or web address]   Office : [phone]  Fax: [phone]

## 2025-08-06 ENCOUNTER — PATIENT OUTREACH (OUTPATIENT)
Dept: NEPHROLOGY | Facility: CLINIC | Age: 85
End: 2025-08-06
Payer: MEDICARE

## 2025-08-12 ENCOUNTER — THERAPY VISIT (OUTPATIENT)
Dept: PHYSICAL THERAPY | Facility: CLINIC | Age: 85
End: 2025-08-12
Payer: COMMERCIAL

## 2025-08-12 DIAGNOSIS — S76.311A STRAIN OF RIGHT HAMSTRING, INITIAL ENCOUNTER: Primary | ICD-10-CM

## 2025-08-12 PROCEDURE — 97140 MANUAL THERAPY 1/> REGIONS: CPT | Mod: GP | Performed by: PHYSICAL THERAPIST

## 2025-08-12 PROCEDURE — 97110 THERAPEUTIC EXERCISES: CPT | Mod: GP | Performed by: PHYSICAL THERAPIST

## (undated) DEVICE — EYE CANN IRR 27GA ANTERIOR CHAMBER 581280

## (undated) DEVICE — DRSG PRIMAPORE 03 1/8X6" 66000318

## (undated) DEVICE — SU VICRYL+ 0 27 UR6 VLT VCP603H

## (undated) DEVICE — GLOVE PROTEXIS MICRO 7.5  2D73PM75

## (undated) DEVICE — PACK CATARACT CUSTOM ASC SEY15CPUMC

## (undated) DEVICE — EYE CANN IRR 25GA CYSTOTOME 581610

## (undated) DEVICE — ESU GROUND PAD ADULT W/CORD E7507

## (undated) DEVICE — PAD ABD 10X8IN DERMACEA ABS NWVN 4 SL EDG SFT LF STRL 7198D

## (undated) DEVICE — LINEN TOWEL PACK X5 5464

## (undated) DEVICE — ESU PENCIL SMOKE EVAC W/ROCKER SWITCH 0703-047-000

## (undated) DEVICE — TUBING IRRIG CYSTO/BLADDER SET 81" LF 2C4040

## (undated) DEVICE — ENDO TROCAR BLADELESS 05MM MINISTEP MS101005

## (undated) DEVICE — SU MONOCRYL 4-0 PS-2 27" UND Y426H

## (undated) DEVICE — EYE KNIFE SLIT XSTAR VISITEC 2.6MM 45DEG 373726

## (undated) DEVICE — EYE PACK CUSTOM ANTERIOR 30DEG TIP CENTURION PPK6682-04

## (undated) DEVICE — GLOVE PROTEXIS BLUE W/NEU-THERA 6.5  2D73EB65

## (undated) DEVICE — ANTIFOG SOLUTION SEE SHARP 150M TROCAR SWABS 30978 (COI)

## (undated) DEVICE — SOL WATER IRRIG 500ML BOTTLE 2F7113

## (undated) DEVICE — DRAPE IOBAN INCISE 23X17" 6650EZ

## (undated) DEVICE — EYE SHIELD PLASTIC

## (undated) DEVICE — Device

## (undated) DEVICE — SU DERMABOND ADVANCED .7ML DNX12

## (undated) DEVICE — DEVICE SUTURE PASSER 14GA WECK EFX EFXSP2

## (undated) DEVICE — SYR 30ML LL W/O NDL 302832

## (undated) DEVICE — GLOVE PROTEXIS POWDER FREE ORANGE 6.5  2D72PT65X

## (undated) DEVICE — SYR 10ML LL W/O NDL 302995

## (undated) DEVICE — SOL NACL 0.9% INJ 1000ML BAG 2B1324X

## (undated) DEVICE — ENDO TROCAR BLADELESS 07-8MM MINISTEP MS101008

## (undated) DEVICE — DRSG TEGADERM 8X12" 1629

## (undated) DEVICE — ENDO TROCAR SLEEVE KII Z-THREADED 05X100MM CTS02

## (undated) DEVICE — SU VICRYL+ 3-0 27IN SH UND VCP416H

## (undated) DEVICE — SU SILK 0 TIE 6X30" A306H

## (undated) DEVICE — SU VICRYL 3-0 SH 27" UND J416H

## (undated) DEVICE — ADAPTER MINICAP PD EZPC4466

## (undated) DEVICE — SU VICRYL 0 TIE 12X18" J906G

## (undated) DEVICE — TROCAR FALLOR TUNNELING PERITINEAL DIALYSIS CATH 8888415679

## (undated) DEVICE — DRSG BIOPATCH GERMICIDAL SPLIT SPONGE 4MM MED 4150

## (undated) DEVICE — ADH LIQUID MASTISOL TOPICAL VIAL 2-3ML 0523-48

## (undated) DEVICE — PREP CHLORAPREP 26ML TINTED HI-LITE ORANGE 930815

## (undated) DEVICE — EYE KNIFE STILETTO VISITEC 1.1MM ANG 45DEG SIDEPORT 376620

## (undated) DEVICE — SU VICRYL+ 0 54 UNDYED VCP608H

## (undated) DEVICE — TUBING SMOKE EVAC PNEUMOCLEAR HEATED 0620050350

## (undated) DEVICE — PAD CHUX UNDERPAD 23X36" 676105

## (undated) DEVICE — EYE TIP IRRIGATION & ASPIRATION POLYMER 35D BENT 8065751511

## (undated) DEVICE — NDL INSUFFLATION 13GA 120MM C2201

## (undated) DEVICE — DRAPE SHEET REV FOLD 3/4 9349

## (undated) DEVICE — CATH TRAY FOLEY SURESTEP 16FR W/URNE MTR STLK LATEX A303316A

## (undated) DEVICE — JELLY LUBRICATING SURGILUBE 2OZ TUBE

## (undated) DEVICE — CUP AND LID 2PK 2OZ STERILE  SSK9006A

## (undated) DEVICE — SUCTION MANIFOLD NEPTUNE 2 SYS 4 PORT 0702-020-000

## (undated) DEVICE — GOWN IMPERVIOUS BREATHABLE SMART LG 89015

## (undated) DEVICE — NDL INSUFFLATION 14GA STEP S100000

## (undated) RX ORDER — LIDOCAINE HYDROCHLORIDE 10 MG/ML
INJECTION, SOLUTION EPIDURAL; INFILTRATION; INTRACAUDAL; PERINEURAL
Status: DISPENSED
Start: 2021-10-21

## (undated) RX ORDER — ACETAMINOPHEN 325 MG/1
TABLET ORAL
Status: DISPENSED
Start: 2022-11-03

## (undated) RX ORDER — FENTANYL CITRATE 50 UG/ML
INJECTION, SOLUTION INTRAMUSCULAR; INTRAVENOUS
Status: DISPENSED
Start: 2025-07-03

## (undated) RX ORDER — HEPARIN SODIUM 1000 [USP'U]/ML
INJECTION, SOLUTION INTRAVENOUS; SUBCUTANEOUS
Status: DISPENSED
Start: 2025-07-03

## (undated) RX ORDER — OXYCODONE HYDROCHLORIDE 5 MG/1
TABLET ORAL
Status: DISPENSED
Start: 2025-07-03

## (undated) RX ORDER — FENTANYL CITRATE-0.9 % NACL/PF 10 MCG/ML
PLASTIC BAG, INJECTION (ML) INTRAVENOUS
Status: DISPENSED
Start: 2025-07-03

## (undated) RX ORDER — BUPIVACAINE HYDROCHLORIDE 5 MG/ML
INJECTION, SOLUTION EPIDURAL; INTRACAUDAL; PERINEURAL
Status: DISPENSED
Start: 2025-07-03

## (undated) RX ORDER — BUPIVACAINE HYDROCHLORIDE 2.5 MG/ML
INJECTION, SOLUTION EPIDURAL; INFILTRATION; INTRACAUDAL; PERINEURAL
Status: DISPENSED
Start: 2025-07-03

## (undated) RX ORDER — FENTANYL CITRATE 50 UG/ML
INJECTION, SOLUTION INTRAMUSCULAR; INTRAVENOUS
Status: DISPENSED
Start: 2022-11-03

## (undated) RX ORDER — ACETAMINOPHEN 325 MG/1
TABLET ORAL
Status: DISPENSED
Start: 2022-11-17

## (undated) RX ORDER — ONDANSETRON 2 MG/ML
INJECTION INTRAMUSCULAR; INTRAVENOUS
Status: DISPENSED
Start: 2025-07-03

## (undated) RX ORDER — FENTANYL CITRATE 50 UG/ML
INJECTION, SOLUTION INTRAMUSCULAR; INTRAVENOUS
Status: DISPENSED
Start: 2022-11-17

## (undated) RX ORDER — CEFAZOLIN SODIUM/WATER 2 G/20 ML
SYRINGE (ML) INTRAVENOUS
Status: DISPENSED
Start: 2025-07-03

## (undated) RX ORDER — ACETAMINOPHEN 325 MG/1
TABLET ORAL
Status: DISPENSED
Start: 2025-07-03

## (undated) RX ORDER — LIDOCAINE HYDROCHLORIDE 10 MG/ML
INJECTION, SOLUTION EPIDURAL; INFILTRATION; INTRACAUDAL; PERINEURAL
Status: DISPENSED
Start: 2025-07-03

## (undated) RX ORDER — PROPOFOL 10 MG/ML
INJECTION, EMULSION INTRAVENOUS
Status: DISPENSED
Start: 2025-07-03

## (undated) RX ORDER — LIDOCAINE HYDROCHLORIDE 20 MG/ML
JELLY TOPICAL
Status: DISPENSED
Start: 2021-11-01